# Patient Record
Sex: FEMALE | Race: WHITE | NOT HISPANIC OR LATINO | Employment: OTHER | URBAN - METROPOLITAN AREA
[De-identification: names, ages, dates, MRNs, and addresses within clinical notes are randomized per-mention and may not be internally consistent; named-entity substitution may affect disease eponyms.]

---

## 2018-04-05 LAB
%SAT (HISTORICAL): 14 % (ref 20–55)
ALBUMIN SERPL BCP-MCNC: 4 G/DL (ref 3.5–5.7)
ALP SERPL-CCNC: 57 IU/L (ref 55–165)
ALT SERPL W P-5'-P-CCNC: 11 IU/L (ref 5–26)
ANION GAP SERPL CALCULATED.3IONS-SCNC: 12.5 MM/L
AST SERPL W P-5'-P-CCNC: 15 U/L (ref 7–26)
BASOPHILS # BLD AUTO: 0.1 X3/UL (ref 0–0.3)
BASOPHILS # BLD AUTO: 1.1 % (ref 0–2)
BILIRUB SERPL-MCNC: 0.4 MG/DL (ref 0.3–1)
BUN SERPL-MCNC: 15 MG/DL (ref 7–25)
CALCIUM SERPL-MCNC: 9.7 MG/DL (ref 8.6–10.5)
CHLORIDE SERPL-SCNC: 99 MM/L (ref 98–107)
CHOLEST SERPL-MCNC: 152 MG/DL (ref 0–200)
CO2 SERPL-SCNC: 28 MM/L (ref 21–31)
CREAT SERPL-MCNC: 0.68 MG/DL (ref 0.6–1.2)
DEPRECATED RDW RBC AUTO: 14.7 % (ref 11.5–14.5)
EGFR (HISTORICAL): > 60 GFR
EGFR AFRICAN AMERICAN (HISTORICAL): > 60 GFR
EOSINOPHIL # BLD AUTO: 0.2 X3/UL (ref 0–0.5)
EOSINOPHIL NFR BLD AUTO: 3 % (ref 0–5)
GLUCOSE (HISTORICAL): 86 MG/DL (ref 65–99)
HCT VFR BLD AUTO: 34.7 % (ref 37–47)
HDLC SERPL-MCNC: 56 MG/DL (ref 40–60)
HGB BLD-MCNC: 11.5 G/DL (ref 12–16)
IRON SERPL-MCNC: 44 UG/DL (ref 50–212)
LDLC SERPL CALC-MCNC: 72.1 MG/DL (ref 75–193)
LYMPHOCYTES # BLD AUTO: 1.2 X3/UL (ref 1.2–4.2)
LYMPHOCYTES NFR BLD AUTO: 16.9 % (ref 20.5–51.1)
MCH RBC QN AUTO: 28.5 PG (ref 26–34)
MCHC RBC AUTO-ENTMCNC: 33.1 G/DL (ref 31–36)
MCV RBC AUTO: 86 FL (ref 81–99)
MONOCYTES # BLD AUTO: 0.6 X3/UL (ref 0–1)
MONOCYTES NFR BLD AUTO: 8.2 % (ref 1.7–12)
NEUTROPHILS # BLD AUTO: 5.1 X3/UL (ref 1.4–6.5)
NEUTS SEG NFR BLD AUTO: 70.8 % (ref 42.2–75.2)
OSMOLALITY, SERUM (HISTORICAL): 270 MOSM (ref 262–291)
PLATELET # BLD AUTO: 266 X3/UL (ref 130–400)
PMV BLD AUTO: 8.7 FL (ref 8.6–11.7)
POTASSIUM SERPL-SCNC: 4.5 MM/L (ref 3.5–5.5)
RBC # BLD AUTO: 4.04 X6/UL (ref 3.9–5.2)
SODIUM SERPL-SCNC: 135 MM/L (ref 134–143)
TIBC SERPL-MCNC: 314 UG/DL (ref 250–425)
TOTAL PROTEIN (HISTORICAL): 7.3 G/DL (ref 6.4–8.9)
TRANSFERRIN (HISTORICAL): 224 MG/DL (ref 250–380)
TRIGL SERPL-MCNC: 119 MG/DL (ref 44–166)
TSH SERPL DL<=0.05 MIU/L-ACNC: 1.49 UIU/M (ref 0.45–5.33)
VLDL CHOLESTEROL (HISTORICAL): 24 MG/DL (ref 5–51)
WBC # BLD AUTO: 7.2 X3/UL (ref 4.8–10.8)

## 2018-09-13 ENCOUNTER — APPOINTMENT (OUTPATIENT)
Dept: LAB | Facility: MEDICAL CENTER | Age: 83
End: 2018-09-13
Payer: MEDICARE

## 2018-09-13 ENCOUNTER — TRANSCRIBE ORDERS (OUTPATIENT)
Dept: LAB | Facility: MEDICAL CENTER | Age: 83
End: 2018-09-13

## 2018-09-13 DIAGNOSIS — K02.9 DENTAL CAVITIES: ICD-10-CM

## 2018-09-13 DIAGNOSIS — E78.5 HYPERLIPIDEMIA, UNSPECIFIED HYPERLIPIDEMIA TYPE: ICD-10-CM

## 2018-09-13 DIAGNOSIS — I10 HYPERTENSION, UNSPECIFIED TYPE: ICD-10-CM

## 2018-09-13 DIAGNOSIS — D64.9 ANEMIA, UNSPECIFIED TYPE: ICD-10-CM

## 2018-09-13 DIAGNOSIS — I25.10 ASCVD (ARTERIOSCLEROTIC CARDIOVASCULAR DISEASE): ICD-10-CM

## 2018-09-13 DIAGNOSIS — R53.83 OTHER FATIGUE: Primary | ICD-10-CM

## 2018-09-13 DIAGNOSIS — R53.83 OTHER FATIGUE: ICD-10-CM

## 2018-09-13 LAB
ERYTHROCYTE [DISTWIDTH] IN BLOOD BY AUTOMATED COUNT: 13.6 % (ref 11.6–15.1)
FERRITIN SERPL-MCNC: 72 NG/ML (ref 8–388)
HCT VFR BLD AUTO: 36.6 % (ref 34.8–46.1)
HGB BLD-MCNC: 11.8 G/DL (ref 11.5–15.4)
IRON SATN MFR SERPL: 23 %
IRON SERPL-MCNC: 67 UG/DL (ref 50–170)
MCH RBC QN AUTO: 29.5 PG (ref 26.8–34.3)
MCHC RBC AUTO-ENTMCNC: 32.2 G/DL (ref 31.4–37.4)
MCV RBC AUTO: 92 FL (ref 82–98)
PLATELET # BLD AUTO: 313 THOUSANDS/UL (ref 149–390)
PMV BLD AUTO: 9.8 FL (ref 8.9–12.7)
RBC # BLD AUTO: 4 MILLION/UL (ref 3.81–5.12)
TIBC SERPL-MCNC: 287 UG/DL (ref 250–450)
WBC # BLD AUTO: 7.88 THOUSAND/UL (ref 4.31–10.16)

## 2018-09-13 PROCEDURE — 82728 ASSAY OF FERRITIN: CPT

## 2018-09-13 PROCEDURE — 85027 COMPLETE CBC AUTOMATED: CPT

## 2018-09-13 PROCEDURE — 83540 ASSAY OF IRON: CPT

## 2018-09-13 PROCEDURE — 36415 COLL VENOUS BLD VENIPUNCTURE: CPT

## 2018-09-13 PROCEDURE — 83550 IRON BINDING TEST: CPT

## 2019-03-15 ENCOUNTER — APPOINTMENT (OUTPATIENT)
Dept: LAB | Facility: MEDICAL CENTER | Age: 84
End: 2019-03-15
Payer: MEDICARE

## 2019-03-15 ENCOUNTER — TRANSCRIBE ORDERS (OUTPATIENT)
Dept: LAB | Facility: MEDICAL CENTER | Age: 84
End: 2019-03-15

## 2019-03-15 DIAGNOSIS — I25.10 ASCVD (ARTERIOSCLEROTIC CARDIOVASCULAR DISEASE): ICD-10-CM

## 2019-03-15 DIAGNOSIS — I10 HYPERTENSION, UNSPECIFIED TYPE: ICD-10-CM

## 2019-03-15 DIAGNOSIS — E78.5 HYPERLIPIDEMIA, UNSPECIFIED HYPERLIPIDEMIA TYPE: ICD-10-CM

## 2019-03-15 DIAGNOSIS — M81.0 OSTEOPOROSIS, UNSPECIFIED OSTEOPOROSIS TYPE, UNSPECIFIED PATHOLOGICAL FRACTURE PRESENCE: ICD-10-CM

## 2019-03-15 DIAGNOSIS — L30.9 DERMATITIS: ICD-10-CM

## 2019-03-15 DIAGNOSIS — L30.9 DERMATITIS: Primary | ICD-10-CM

## 2019-03-15 LAB
25(OH)D3 SERPL-MCNC: 41.3 NG/ML (ref 30–100)
ALBUMIN SERPL BCP-MCNC: 3.9 G/DL (ref 3.5–5)
ALP SERPL-CCNC: 72 U/L (ref 46–116)
ALT SERPL W P-5'-P-CCNC: 21 U/L (ref 12–78)
ANION GAP SERPL CALCULATED.3IONS-SCNC: 3 MMOL/L (ref 4–13)
AST SERPL W P-5'-P-CCNC: 17 U/L (ref 5–45)
BILIRUB SERPL-MCNC: 0.67 MG/DL (ref 0.2–1)
BUN SERPL-MCNC: 12 MG/DL (ref 5–25)
CALCIUM SERPL-MCNC: 9.4 MG/DL (ref 8.3–10.1)
CHLORIDE SERPL-SCNC: 102 MMOL/L (ref 100–108)
CHOLEST SERPL-MCNC: 149 MG/DL (ref 50–200)
CO2 SERPL-SCNC: 27 MMOL/L (ref 21–32)
CREAT SERPL-MCNC: 0.66 MG/DL (ref 0.6–1.3)
ERYTHROCYTE [DISTWIDTH] IN BLOOD BY AUTOMATED COUNT: 14 % (ref 11.6–15.1)
GFR SERPL CREATININE-BSD FRML MDRD: 82 ML/MIN/1.73SQ M
GLUCOSE P FAST SERPL-MCNC: 96 MG/DL (ref 65–99)
HCT VFR BLD AUTO: 37.7 % (ref 34.8–46.1)
HDLC SERPL-MCNC: 61 MG/DL (ref 40–60)
HGB BLD-MCNC: 12 G/DL (ref 11.5–15.4)
LDLC SERPL CALC-MCNC: 69 MG/DL (ref 0–100)
MCH RBC QN AUTO: 29.3 PG (ref 26.8–34.3)
MCHC RBC AUTO-ENTMCNC: 31.8 G/DL (ref 31.4–37.4)
MCV RBC AUTO: 92 FL (ref 82–98)
NONHDLC SERPL-MCNC: 88 MG/DL
PLATELET # BLD AUTO: 292 THOUSANDS/UL (ref 149–390)
PMV BLD AUTO: 9.9 FL (ref 8.9–12.7)
POTASSIUM SERPL-SCNC: 4.2 MMOL/L (ref 3.5–5.3)
PROT SERPL-MCNC: 7.7 G/DL (ref 6.4–8.2)
RBC # BLD AUTO: 4.09 MILLION/UL (ref 3.81–5.12)
SODIUM SERPL-SCNC: 132 MMOL/L (ref 136–145)
T4 FREE SERPL-MCNC: 1.14 NG/DL (ref 0.76–1.46)
TRIGL SERPL-MCNC: 94 MG/DL
TSH SERPL DL<=0.05 MIU/L-ACNC: 1.87 UIU/ML (ref 0.36–3.74)
WBC # BLD AUTO: 8.26 THOUSAND/UL (ref 4.31–10.16)

## 2019-03-15 PROCEDURE — 80061 LIPID PANEL: CPT

## 2019-03-15 PROCEDURE — 36415 COLL VENOUS BLD VENIPUNCTURE: CPT

## 2019-03-15 PROCEDURE — 80053 COMPREHEN METABOLIC PANEL: CPT

## 2019-03-15 PROCEDURE — 84439 ASSAY OF FREE THYROXINE: CPT

## 2019-03-15 PROCEDURE — 82306 VITAMIN D 25 HYDROXY: CPT

## 2019-03-15 PROCEDURE — 84443 ASSAY THYROID STIM HORMONE: CPT

## 2019-03-15 PROCEDURE — 85027 COMPLETE CBC AUTOMATED: CPT

## 2019-03-19 ENCOUNTER — APPOINTMENT (OUTPATIENT)
Dept: LAB | Facility: MEDICAL CENTER | Age: 84
End: 2019-03-19
Payer: MEDICARE

## 2019-03-19 ENCOUNTER — TRANSCRIBE ORDERS (OUTPATIENT)
Dept: LAB | Facility: MEDICAL CENTER | Age: 84
End: 2019-03-19

## 2019-03-19 DIAGNOSIS — E78.5 HYPERLIPIDEMIA, UNSPECIFIED HYPERLIPIDEMIA TYPE: ICD-10-CM

## 2019-03-19 DIAGNOSIS — M81.0 OSTEOPOROSIS, UNSPECIFIED OSTEOPOROSIS TYPE, UNSPECIFIED PATHOLOGICAL FRACTURE PRESENCE: Primary | ICD-10-CM

## 2019-03-19 DIAGNOSIS — L30.9 DERMATITIS: ICD-10-CM

## 2019-03-19 DIAGNOSIS — I10 HYPERTENSION, UNSPECIFIED TYPE: ICD-10-CM

## 2019-03-19 DIAGNOSIS — I25.10 ASCVD (ARTERIOSCLEROTIC CARDIOVASCULAR DISEASE): ICD-10-CM

## 2019-03-19 PROCEDURE — 84120 ASSAY OF URINE PORPHYRINS: CPT

## 2019-03-21 LAB
COPRO1 24H UR-MCNC: 19 UG/L
COPRO1 24H UR-MRATE: NORMAL UG/24 HR (ref 0–24)
COPRO3 24H UR-MCNC: 40 UG/L
COPRO3 24H UR-MRATE: NORMAL UG/24 HR (ref 0–74)
HEPTA-CP 24H UR-MRATE: NORMAL UG/24 HR (ref 0–4)
HEPTA-CP UR-MCNC: 2 UG/L
HEXA-CP 24H UR-MRATE: NORMAL UG/24 HR (ref 0–1)
HEXA-CP UR-MCNC: <1 UG/L
PENTA-CP 24H UR-MRATE: NORMAL UG/24 HR (ref 0–4)
PENTA-CP UR-MCNC: 1 UG/L
UROPOR 24H UR-MRATE: NORMAL UG/24 HR (ref 0–24)
UROPOR UR-MCNC: 8 UG/L

## 2019-03-26 ENCOUNTER — APPOINTMENT (OUTPATIENT)
Dept: LAB | Facility: MEDICAL CENTER | Age: 84
End: 2019-03-26
Payer: MEDICARE

## 2019-03-26 ENCOUNTER — TRANSCRIBE ORDERS (OUTPATIENT)
Dept: LAB | Facility: MEDICAL CENTER | Age: 84
End: 2019-03-26

## 2019-03-26 DIAGNOSIS — M81.0 OSTEOPOROSIS, UNSPECIFIED OSTEOPOROSIS TYPE, UNSPECIFIED PATHOLOGICAL FRACTURE PRESENCE: ICD-10-CM

## 2019-03-26 DIAGNOSIS — E78.5 HYPERLIPIDEMIA, UNSPECIFIED HYPERLIPIDEMIA TYPE: ICD-10-CM

## 2019-03-26 DIAGNOSIS — I25.10 ASCVD (ARTERIOSCLEROTIC CARDIOVASCULAR DISEASE): ICD-10-CM

## 2019-03-26 DIAGNOSIS — I10 HYPERTENSION, UNSPECIFIED TYPE: ICD-10-CM

## 2019-03-26 DIAGNOSIS — L30.9 DERMATITIS: Primary | ICD-10-CM

## 2019-03-26 PROCEDURE — 36415 COLL VENOUS BLD VENIPUNCTURE: CPT

## 2019-03-26 PROCEDURE — 82542 COL CHROMOTOGRAPHY QUAL/QUAN: CPT

## 2019-04-05 LAB — MISCELLANEOUS LAB TEST RESULT: NORMAL

## 2019-06-05 ENCOUNTER — OFFICE VISIT (OUTPATIENT)
Dept: CARDIOLOGY CLINIC | Facility: CLINIC | Age: 84
End: 2019-06-05
Payer: MEDICARE

## 2019-06-05 VITALS
HEIGHT: 59 IN | SYSTOLIC BLOOD PRESSURE: 112 MMHG | HEART RATE: 68 BPM | WEIGHT: 157 LBS | DIASTOLIC BLOOD PRESSURE: 62 MMHG | BODY MASS INDEX: 31.65 KG/M2

## 2019-06-05 DIAGNOSIS — I25.10 CORONARY ARTERY DISEASE INVOLVING NATIVE CORONARY ARTERY OF NATIVE HEART WITHOUT ANGINA PECTORIS: Primary | ICD-10-CM

## 2019-06-05 DIAGNOSIS — I34.0 NON-RHEUMATIC MITRAL REGURGITATION: ICD-10-CM

## 2019-06-05 DIAGNOSIS — R06.02 SOB (SHORTNESS OF BREATH): ICD-10-CM

## 2019-06-05 DIAGNOSIS — I65.23 ATHEROSCLEROSIS OF BOTH CAROTID ARTERIES: ICD-10-CM

## 2019-06-05 DIAGNOSIS — R60.0 LOCALIZED EDEMA: ICD-10-CM

## 2019-06-05 DIAGNOSIS — R53.83 OTHER FATIGUE: ICD-10-CM

## 2019-06-05 PROCEDURE — 93000 ELECTROCARDIOGRAM COMPLETE: CPT | Performed by: INTERNAL MEDICINE

## 2019-06-05 PROCEDURE — 99214 OFFICE O/P EST MOD 30 MIN: CPT | Performed by: PHYSICIAN ASSISTANT

## 2019-06-05 RX ORDER — METOPROLOL TARTRATE 50 MG/1
50 TABLET, FILM COATED ORAL 2 TIMES DAILY
COMMUNITY
End: 2019-06-05

## 2019-06-05 RX ORDER — MONTELUKAST SODIUM 10 MG/1
10 TABLET ORAL DAILY
Status: ON HOLD | COMMUNITY
End: 2019-07-12 | Stop reason: CLARIF

## 2019-06-05 RX ORDER — FUROSEMIDE 20 MG/1
20 TABLET ORAL 2 TIMES DAILY
Qty: 30 TABLET | Refills: 5 | Status: ON HOLD | OUTPATIENT
Start: 2019-06-05 | End: 2019-07-12 | Stop reason: CLARIF

## 2019-06-05 RX ORDER — ALPRAZOLAM 0.25 MG/1
0.25 TABLET ORAL AS NEEDED
Status: ON HOLD | COMMUNITY
End: 2019-07-12 | Stop reason: CLARIF

## 2019-06-05 RX ORDER — SIMVASTATIN 20 MG
20 TABLET ORAL DAILY
Status: ON HOLD | COMMUNITY
Start: 2019-04-26 | End: 2019-07-12 | Stop reason: CLARIF

## 2019-06-05 RX ORDER — LISINOPRIL 10 MG/1
10 TABLET ORAL DAILY
Status: ON HOLD | COMMUNITY
End: 2019-07-12 | Stop reason: CLARIF

## 2019-06-05 RX ORDER — METOPROLOL TARTRATE 50 MG/1
25 TABLET, FILM COATED ORAL 2 TIMES DAILY
Qty: 60 TABLET | Refills: 5 | Status: ON HOLD | OUTPATIENT
Start: 2019-06-05 | End: 2019-07-12 | Stop reason: CLARIF

## 2019-06-05 RX ORDER — DIPHENHYDRAMINE HCL 25 MG
25 TABLET ORAL EVERY 6 HOURS PRN
Status: ON HOLD | COMMUNITY
End: 2019-07-12 | Stop reason: CLARIF

## 2019-06-05 RX ORDER — OXYBUTYNIN CHLORIDE 5 MG/1
5 TABLET ORAL 3 TIMES DAILY
COMMUNITY
End: 2022-08-09

## 2019-06-05 RX ORDER — OMEPRAZOLE 20 MG/1
20 CAPSULE, DELAYED RELEASE ORAL DAILY
COMMUNITY
End: 2019-09-04

## 2019-06-05 RX ORDER — FLUTICASONE PROPIONATE 50 MCG
SPRAY, SUSPENSION (ML) NASAL AS NEEDED
Status: ON HOLD | COMMUNITY
End: 2019-07-12 | Stop reason: CLARIF

## 2019-06-05 RX ORDER — MELOXICAM 7.5 MG/1
7.5 TABLET ORAL 2 TIMES DAILY
Status: ON HOLD | COMMUNITY
End: 2019-07-12 | Stop reason: CLARIF

## 2019-06-14 ENCOUNTER — APPOINTMENT (OUTPATIENT)
Dept: LAB | Facility: MEDICAL CENTER | Age: 84
End: 2019-06-14
Payer: MEDICARE

## 2019-06-14 DIAGNOSIS — R06.02 SOB (SHORTNESS OF BREATH): ICD-10-CM

## 2019-06-14 DIAGNOSIS — R60.0 LOCALIZED EDEMA: Primary | ICD-10-CM

## 2019-06-14 DIAGNOSIS — R60.0 LOCALIZED EDEMA: ICD-10-CM

## 2019-06-14 DIAGNOSIS — I25.10 CORONARY ARTERY DISEASE INVOLVING NATIVE CORONARY ARTERY OF NATIVE HEART WITHOUT ANGINA PECTORIS: ICD-10-CM

## 2019-06-14 LAB
ANION GAP SERPL CALCULATED.3IONS-SCNC: 5 MMOL/L (ref 4–13)
BASOPHILS # BLD AUTO: 0.05 THOUSANDS/ΜL (ref 0–0.1)
BASOPHILS NFR BLD AUTO: 1 % (ref 0–1)
BUN SERPL-MCNC: 23 MG/DL (ref 5–25)
CALCIUM SERPL-MCNC: 9.4 MG/DL (ref 8.3–10.1)
CHLORIDE SERPL-SCNC: 99 MMOL/L (ref 100–108)
CO2 SERPL-SCNC: 28 MMOL/L (ref 21–32)
CREAT SERPL-MCNC: 0.83 MG/DL (ref 0.6–1.3)
EOSINOPHIL # BLD AUTO: 0.13 THOUSAND/ΜL (ref 0–0.61)
EOSINOPHIL NFR BLD AUTO: 1 % (ref 0–6)
ERYTHROCYTE [DISTWIDTH] IN BLOOD BY AUTOMATED COUNT: 14.2 % (ref 11.6–15.1)
GFR SERPL CREATININE-BSD FRML MDRD: 65 ML/MIN/1.73SQ M
GLUCOSE P FAST SERPL-MCNC: 118 MG/DL (ref 65–99)
HCT VFR BLD AUTO: 40.6 % (ref 34.8–46.1)
HGB BLD-MCNC: 12.8 G/DL (ref 11.5–15.4)
IMM GRANULOCYTES # BLD AUTO: 0.04 THOUSAND/UL (ref 0–0.2)
IMM GRANULOCYTES NFR BLD AUTO: 0 % (ref 0–2)
LYMPHOCYTES # BLD AUTO: 1.06 THOUSANDS/ΜL (ref 0.6–4.47)
LYMPHOCYTES NFR BLD AUTO: 12 % (ref 14–44)
MCH RBC QN AUTO: 28.9 PG (ref 26.8–34.3)
MCHC RBC AUTO-ENTMCNC: 31.5 G/DL (ref 31.4–37.4)
MCV RBC AUTO: 92 FL (ref 82–98)
MONOCYTES # BLD AUTO: 0.67 THOUSAND/ΜL (ref 0.17–1.22)
MONOCYTES NFR BLD AUTO: 7 % (ref 4–12)
NEUTROPHILS # BLD AUTO: 7.07 THOUSANDS/ΜL (ref 1.85–7.62)
NEUTS SEG NFR BLD AUTO: 79 % (ref 43–75)
NRBC BLD AUTO-RTO: 0 /100 WBCS
NT-PROBNP SERPL-MCNC: 9927 PG/ML
PLATELET # BLD AUTO: 274 THOUSANDS/UL (ref 149–390)
PMV BLD AUTO: 10.1 FL (ref 8.9–12.7)
POTASSIUM SERPL-SCNC: 4.1 MMOL/L (ref 3.5–5.3)
RBC # BLD AUTO: 4.43 MILLION/UL (ref 3.81–5.12)
SODIUM SERPL-SCNC: 132 MMOL/L (ref 136–145)
TSH SERPL DL<=0.05 MIU/L-ACNC: 2.64 UIU/ML (ref 0.36–3.74)
WBC # BLD AUTO: 9.02 THOUSAND/UL (ref 4.31–10.16)

## 2019-06-14 PROCEDURE — 83880 ASSAY OF NATRIURETIC PEPTIDE: CPT

## 2019-06-14 PROCEDURE — 36415 COLL VENOUS BLD VENIPUNCTURE: CPT | Performed by: PHYSICIAN ASSISTANT

## 2019-06-14 PROCEDURE — 84443 ASSAY THYROID STIM HORMONE: CPT | Performed by: PHYSICIAN ASSISTANT

## 2019-06-14 PROCEDURE — 85025 COMPLETE CBC W/AUTO DIFF WBC: CPT | Performed by: PHYSICIAN ASSISTANT

## 2019-06-14 PROCEDURE — 80048 BASIC METABOLIC PNL TOTAL CA: CPT | Performed by: PHYSICIAN ASSISTANT

## 2019-06-25 ENCOUNTER — OFFICE VISIT (OUTPATIENT)
Dept: URGENT CARE | Facility: CLINIC | Age: 84
End: 2019-06-25
Payer: MEDICARE

## 2019-06-25 ENCOUNTER — HOSPITAL ENCOUNTER (INPATIENT)
Facility: HOSPITAL | Age: 84
LOS: 7 days | DRG: 308 | End: 2019-07-02
Attending: EMERGENCY MEDICINE | Admitting: INTERNAL MEDICINE
Payer: MEDICARE

## 2019-06-25 ENCOUNTER — APPOINTMENT (INPATIENT)
Dept: ULTRASOUND IMAGING | Facility: HOSPITAL | Age: 84
DRG: 308 | End: 2019-06-25
Payer: MEDICARE

## 2019-06-25 VITALS
WEIGHT: 153 LBS | SYSTOLIC BLOOD PRESSURE: 112 MMHG | BODY MASS INDEX: 30.84 KG/M2 | RESPIRATION RATE: 20 BRPM | HEIGHT: 59 IN | HEART RATE: 106 BPM | OXYGEN SATURATION: 99 % | DIASTOLIC BLOOD PRESSURE: 62 MMHG | TEMPERATURE: 97 F

## 2019-06-25 DIAGNOSIS — I48.91 ATRIAL FIBRILLATION WITH RVR (HCC): Primary | ICD-10-CM

## 2019-06-25 DIAGNOSIS — E87.1 HYPONATREMIA: ICD-10-CM

## 2019-06-25 DIAGNOSIS — I25.10 CORONARY ARTERY DISEASE INVOLVING NATIVE CORONARY ARTERY OF NATIVE HEART WITHOUT ANGINA PECTORIS: ICD-10-CM

## 2019-06-25 DIAGNOSIS — R77.8 ELEVATED TROPONIN: ICD-10-CM

## 2019-06-25 DIAGNOSIS — I48.91 NEW ONSET A-FIB (HCC): Primary | ICD-10-CM

## 2019-06-25 DIAGNOSIS — N17.9 ACUTE KIDNEY INJURY (HCC): ICD-10-CM

## 2019-06-25 PROBLEM — R11.0 NAUSEA: Status: ACTIVE | Noted: 2019-06-25

## 2019-06-25 LAB
ALBUMIN SERPL BCP-MCNC: 3.7 G/DL (ref 3.5–5.7)
ALP SERPL-CCNC: 68 U/L (ref 55–165)
ALT SERPL W P-5'-P-CCNC: 44 U/L (ref 7–52)
ANION GAP SERPL CALCULATED.3IONS-SCNC: 10 MMOL/L (ref 4–13)
APTT PPP: 23 SECONDS (ref 23–37)
AST SERPL W P-5'-P-CCNC: 36 U/L (ref 13–39)
ATRIAL RATE: 115 BPM
ATRIAL RATE: 122 BPM
BASOPHILS # BLD AUTO: 0.1 THOUSANDS/ΜL (ref 0–0.1)
BASOPHILS NFR BLD AUTO: 1 % (ref 0–2)
BILIRUB SERPL-MCNC: 1 MG/DL (ref 0.2–1)
BNP SERPL-MCNC: 1303 PG/ML (ref 1–100)
BUN SERPL-MCNC: 48 MG/DL (ref 7–25)
CALCIUM SERPL-MCNC: 9.5 MG/DL (ref 8.6–10.5)
CHLORIDE SERPL-SCNC: 95 MMOL/L (ref 98–107)
CO2 SERPL-SCNC: 24 MMOL/L (ref 21–31)
CREAT SERPL-MCNC: 1.8 MG/DL (ref 0.6–1.2)
EOSINOPHIL # BLD AUTO: 0.1 THOUSAND/ΜL (ref 0–0.61)
EOSINOPHIL NFR BLD AUTO: 1 % (ref 0–5)
ERYTHROCYTE [DISTWIDTH] IN BLOOD BY AUTOMATED COUNT: 15.3 % (ref 11.5–14.5)
GFR SERPL CREATININE-BSD FRML MDRD: 26 ML/MIN/1.73SQ M
GLUCOSE SERPL-MCNC: 114 MG/DL (ref 65–99)
HCT VFR BLD AUTO: 39.6 % (ref 42–47)
HGB BLD-MCNC: 13.2 G/DL (ref 12–16)
INR PPP: 1.2 (ref 0.9–1.5)
LYMPHOCYTES # BLD AUTO: 1 THOUSANDS/ΜL (ref 0.6–4.47)
LYMPHOCYTES NFR BLD AUTO: 10 % (ref 21–51)
MCH RBC QN AUTO: 29.9 PG (ref 26–34)
MCHC RBC AUTO-ENTMCNC: 33.3 G/DL (ref 31–37)
MCV RBC AUTO: 90 FL (ref 81–99)
MONOCYTES # BLD AUTO: 0.8 THOUSAND/ΜL (ref 0.17–1.22)
MONOCYTES NFR BLD AUTO: 7 % (ref 2–12)
NEUTROPHILS # BLD AUTO: 8.4 THOUSANDS/ΜL (ref 1.4–6.5)
NEUTS SEG NFR BLD AUTO: 82 % (ref 42–75)
PLATELET # BLD AUTO: 240 THOUSANDS/UL (ref 149–390)
PMV BLD AUTO: 8.2 FL (ref 8.6–11.7)
POTASSIUM SERPL-SCNC: 4.5 MMOL/L (ref 3.5–5.5)
PROT SERPL-MCNC: 7 G/DL (ref 6.4–8.9)
PROTHROMBIN TIME: 13.9 SECONDS (ref 10.2–13)
QRS AXIS: -26 DEGREES
QRS AXIS: -29 DEGREES
QRSD INTERVAL: 94 MS
QRSD INTERVAL: 96 MS
QT INTERVAL: 306 MS
QT INTERVAL: 346 MS
QTC INTERVAL: 423 MS
QTC INTERVAL: 463 MS
RBC # BLD AUTO: 4.4 MILLION/UL (ref 3.9–5.2)
SODIUM SERPL-SCNC: 129 MMOL/L (ref 134–143)
T WAVE AXIS: 18 DEGREES
T WAVE AXIS: 23 DEGREES
TROPONIN I SERPL-MCNC: 0.04 NG/ML
TROPONIN I SERPL-MCNC: 0.05 NG/ML
TROPONIN I SERPL-MCNC: 0.05 NG/ML
TSH SERPL DL<=0.05 MIU/L-ACNC: 2.54 UIU/ML (ref 0.45–5.33)
VENTRICULAR RATE: 108 BPM
VENTRICULAR RATE: 115 BPM
WBC # BLD AUTO: 10.3 THOUSAND/UL (ref 4.8–10.8)

## 2019-06-25 PROCEDURE — 93005 ELECTROCARDIOGRAM TRACING: CPT | Performed by: PHYSICIAN ASSISTANT

## 2019-06-25 PROCEDURE — 93005 ELECTROCARDIOGRAM TRACING: CPT

## 2019-06-25 PROCEDURE — 96365 THER/PROPH/DIAG IV INF INIT: CPT

## 2019-06-25 PROCEDURE — 76770 US EXAM ABDO BACK WALL COMP: CPT

## 2019-06-25 PROCEDURE — 83880 ASSAY OF NATRIURETIC PEPTIDE: CPT | Performed by: NURSE PRACTITIONER

## 2019-06-25 PROCEDURE — 99285 EMERGENCY DEPT VISIT HI MDM: CPT

## 2019-06-25 PROCEDURE — 84443 ASSAY THYROID STIM HORMONE: CPT | Performed by: NURSE PRACTITIONER

## 2019-06-25 PROCEDURE — 85025 COMPLETE CBC W/AUTO DIFF WBC: CPT | Performed by: PHYSICIAN ASSISTANT

## 2019-06-25 PROCEDURE — 99203 OFFICE O/P NEW LOW 30 MIN: CPT | Performed by: PHYSICIAN ASSISTANT

## 2019-06-25 PROCEDURE — 85610 PROTHROMBIN TIME: CPT | Performed by: PHYSICIAN ASSISTANT

## 2019-06-25 PROCEDURE — 85730 THROMBOPLASTIN TIME PARTIAL: CPT | Performed by: PHYSICIAN ASSISTANT

## 2019-06-25 PROCEDURE — 36415 COLL VENOUS BLD VENIPUNCTURE: CPT | Performed by: PHYSICIAN ASSISTANT

## 2019-06-25 PROCEDURE — 93010 ELECTROCARDIOGRAM REPORT: CPT | Performed by: INTERNAL MEDICINE

## 2019-06-25 PROCEDURE — 84484 ASSAY OF TROPONIN QUANT: CPT | Performed by: NURSE PRACTITIONER

## 2019-06-25 PROCEDURE — 80053 COMPREHEN METABOLIC PANEL: CPT | Performed by: PHYSICIAN ASSISTANT

## 2019-06-25 PROCEDURE — 84484 ASSAY OF TROPONIN QUANT: CPT | Performed by: PHYSICIAN ASSISTANT

## 2019-06-25 PROCEDURE — 99223 1ST HOSP IP/OBS HIGH 75: CPT | Performed by: NURSE PRACTITIONER

## 2019-06-25 PROCEDURE — G0463 HOSPITAL OUTPT CLINIC VISIT: HCPCS | Performed by: PHYSICIAN ASSISTANT

## 2019-06-25 PROCEDURE — 96376 TX/PRO/DX INJ SAME DRUG ADON: CPT

## 2019-06-25 RX ORDER — ACETAMINOPHEN 325 MG/1
650 TABLET ORAL EVERY 6 HOURS PRN
Status: DISCONTINUED | OUTPATIENT
Start: 2019-06-25 | End: 2019-07-02 | Stop reason: HOSPADM

## 2019-06-25 RX ORDER — ONDANSETRON 2 MG/ML
4 INJECTION INTRAMUSCULAR; INTRAVENOUS EVERY 6 HOURS PRN
Status: DISCONTINUED | OUTPATIENT
Start: 2019-06-25 | End: 2019-07-02 | Stop reason: HOSPADM

## 2019-06-25 RX ORDER — ONDANSETRON 4 MG/1
4 TABLET, FILM COATED ORAL ONCE
Qty: 10 TABLET | Refills: 0 | Status: SHIPPED | OUTPATIENT
Start: 2019-06-25 | End: 2019-06-25 | Stop reason: CLARIF

## 2019-06-25 RX ORDER — MONTELUKAST SODIUM 10 MG/1
10 TABLET ORAL DAILY
Status: DISCONTINUED | OUTPATIENT
Start: 2019-06-26 | End: 2019-07-02 | Stop reason: HOSPADM

## 2019-06-25 RX ORDER — ASPIRIN 81 MG/1
81 TABLET, CHEWABLE ORAL EVERY OTHER DAY
Status: DISCONTINUED | OUTPATIENT
Start: 2019-06-26 | End: 2019-06-26

## 2019-06-25 RX ORDER — PANTOPRAZOLE SODIUM 40 MG/1
40 TABLET, DELAYED RELEASE ORAL
Status: DISCONTINUED | OUTPATIENT
Start: 2019-06-26 | End: 2019-07-02 | Stop reason: HOSPADM

## 2019-06-25 RX ORDER — ALPRAZOLAM 0.25 MG/1
0.25 TABLET ORAL 3 TIMES DAILY PRN
Status: DISCONTINUED | OUTPATIENT
Start: 2019-06-25 | End: 2019-07-02 | Stop reason: HOSPADM

## 2019-06-25 RX ORDER — HEPARIN SODIUM 5000 [USP'U]/ML
5000 INJECTION, SOLUTION INTRAVENOUS; SUBCUTANEOUS EVERY 8 HOURS SCHEDULED
Status: DISCONTINUED | OUTPATIENT
Start: 2019-06-25 | End: 2019-06-26

## 2019-06-25 RX ORDER — DILTIAZEM HYDROCHLORIDE 5 MG/ML
10 INJECTION INTRAVENOUS ONCE
Status: COMPLETED | OUTPATIENT
Start: 2019-06-25 | End: 2019-06-25

## 2019-06-25 RX ORDER — ONDANSETRON 4 MG/1
4 TABLET, ORALLY DISINTEGRATING ORAL ONCE
Status: COMPLETED | OUTPATIENT
Start: 2019-06-25 | End: 2019-06-25

## 2019-06-25 RX ORDER — SODIUM CHLORIDE 9 MG/ML
75 INJECTION, SOLUTION INTRAVENOUS CONTINUOUS
Status: DISPENSED | OUTPATIENT
Start: 2019-06-25 | End: 2019-06-26

## 2019-06-25 RX ORDER — OXYBUTYNIN CHLORIDE 5 MG/1
5 TABLET ORAL DAILY
Status: DISCONTINUED | OUTPATIENT
Start: 2019-06-26 | End: 2019-07-02 | Stop reason: HOSPADM

## 2019-06-25 RX ADMIN — SODIUM CHLORIDE 500 ML: 9 INJECTION, SOLUTION INTRAVENOUS at 23:51

## 2019-06-25 RX ADMIN — ONDANSETRON 4 MG: 2 INJECTION INTRAMUSCULAR; INTRAVENOUS at 17:48

## 2019-06-25 RX ADMIN — DILTIAZEM HYDROCHLORIDE 10 MG: 5 INJECTION INTRAVENOUS at 14:36

## 2019-06-25 RX ADMIN — HEPARIN SODIUM 5000 UNITS: 5000 INJECTION INTRAVENOUS; SUBCUTANEOUS at 17:48

## 2019-06-25 RX ADMIN — SODIUM CHLORIDE 75 ML/HR: 9 INJECTION, SOLUTION INTRAVENOUS at 17:47

## 2019-06-25 RX ADMIN — DILTIAZEM HYDROCHLORIDE 10 MG/HR: 5 INJECTION INTRAVENOUS at 14:54

## 2019-06-25 RX ADMIN — HEPARIN SODIUM 5000 UNITS: 5000 INJECTION INTRAVENOUS; SUBCUTANEOUS at 22:35

## 2019-06-25 RX ADMIN — METOPROLOL TARTRATE 25 MG: 25 TABLET, FILM COATED ORAL at 17:48

## 2019-06-25 RX ADMIN — ONDANSETRON 4 MG: 4 TABLET, ORALLY DISINTEGRATING ORAL at 13:12

## 2019-06-25 NOTE — ASSESSMENT & PLAN NOTE
· Unclear etiology  · Suspected to be multifactorial including possible volume depletion with poor PO intake, new onset paroxysmal atrial fibrillation, congestive heart failure, rule out any GI etiology  · Metoprolol dose was decreased  Will continue with that for now  Will defer decision to cardiology on dose adjustment     · Check TSH

## 2019-06-25 NOTE — ASSESSMENT & PLAN NOTE
· Suspected this is a paroxysmal atrial fibrillation  The patient's metoprolol was decreased from 50 mg b i d  To 25 mg b i d  By her Cardiologist a few weeks ago  · According to cardiology's notes from 06/05/2019 the patient was in normal sinus rhythm with first-degree AV block  · Will continue with diltiazem drip  · Patient does not appears to be in acute congestive failure/fluid overload  · Will check BNP  · Consult Cardiology  · Check echocardiogram in the a m

## 2019-06-25 NOTE — ED PROVIDER NOTES
History  Chief Complaint   Patient presents with    Irregular Heart Beat     A-fib found at urgent care     Patient presents emergency room with complaints of fast heart rate and fatigue and dizziness which she states has been about 2-3 weeks  She states she developed the symptoms initially but now they seem to be getting worse  She denies chest pain pressure shortness of breath headache nausea vomiting  Patient states she does have a cardiologist was seen last week for routine exam, was given a water pill for lower extremity edema  At time of Cardiology visit EKG was obtained and reviewed by me today with patient to be in normal sinus with first-degree block on EKG from 6/5/19  Patient denies history of AFib  Admits to 1 cardiac stent placed in 2007  History provided by:  Patient   used: No    Rapid Heart Rate   Palpitations quality:  Fast  Onset quality:  Sudden  Duration:  2 weeks  Timing:  Intermittent  Progression:  Worsening  Chronicity:  New  Context: dehydration    Context: not anxiety and not blood loss    Relieved by:  None tried  Worsened by:  Nothing  Ineffective treatments:  None tried  Associated symptoms: dizziness and lower extremity edema (chronic)    Associated symptoms: no chest pain, no chest pressure, no cough, no diaphoresis, no nausea, no near-syncope, no numbness, no shortness of breath, no syncope, no vomiting and no weakness    Risk factors: no hx of atrial fibrillation          Allergies   Allergen Reactions    Chocolate Flavor     Iodine Other (See Comments)     shellfish- difficulty breathing    Shellfish-Derived Products     Codeine Rash       Prior to Admission Medications   Prescriptions Last Dose Informant Patient Reported? Taking?    ALPRAZolam (XANAX) 0 25 mg tablet   Yes No   Sig: Take 0 25 mg by mouth as needed   ASPIRIN 81 PO   Yes No   Sig: Take by mouth every other day   diphenhydrAMINE (BENADRYL) 25 mg tablet   Yes No   Sig: Take 25 mg by mouth every 6 (six) hours as needed   fluticasone (FLONASE) 50 mcg/act nasal spray   Yes No   Sig: into each nostril as needed   furosemide (LASIX) 20 mg tablet   No No   Sig: Take 1 tablet (20 mg total) by mouth 2 (two) times a day   lisinopril (ZESTRIL) 10 mg tablet   Yes No   Sig: Take 10 mg by mouth daily   meloxicam (MOBIC) 7 5 mg tablet   Yes No   Sig: Take 7 5 mg by mouth 2 (two) times a day   metoprolol tartrate (LOPRESSOR) 50 mg tablet   No No   Sig: Take 0 5 tablets (25 mg total) by mouth 2 (two) times a day   montelukast (SINGULAIR) 10 mg tablet   Yes No   Sig: Take 10 mg by mouth daily   omeprazole (PriLOSEC) 20 mg delayed release capsule   Yes No   Sig: Take 20 mg by mouth daily   oxybutynin (DITROPAN) 5 mg tablet   Yes No   Sig: Take 5 mg by mouth daily   sertraline (ZOLOFT) 50 mg tablet   Yes No   Sig: Take 50 mg by mouth daily   simvastatin (ZOCOR) 20 mg tablet   Yes No   Si mg daily      Facility-Administered Medications Last Administration Doses Remaining   ondansetron (ZOFRAN-ODT) dispersible tablet 4 mg 2019  1:12 PM 0          Past Medical History:   Diagnosis Date    Coronary artery disease     history of stenting    History of echocardiogram 2017    EF 55%, mild concentric LVH, bileaflet MVP with moderate MR, left atrial enlargement   Hyperlipidemia     Hypertension     Mitral regurgitation        Past Surgical History:   Procedure Laterality Date    CARDIAC CATHETERIZATION  04/10/2012    EF 65%, no significant CAD, patent stents, severe MR     CORONARY ANGIOPLASTY WITH STENT PLACEMENT  2007    EF 55%, GARRET to LAD  History reviewed  No pertinent family history  I have reviewed and agree with the history as documented  Social History     Tobacco Use    Smoking status: Never Smoker    Smokeless tobacco: Never Used   Substance Use Topics    Alcohol use: Not Currently    Drug use: Never        Review of Systems   Constitutional: Positive for fatigue  Negative for chills, diaphoresis and fever  HENT: Negative for congestion, ear pain, rhinorrhea, sneezing and sore throat  Respiratory: Negative for cough, shortness of breath, wheezing and stridor  Cardiovascular: Positive for palpitations  Negative for chest pain, leg swelling (chronic), syncope and near-syncope  Gastrointestinal: Negative for abdominal pain, constipation, diarrhea, nausea and vomiting  Genitourinary: Negative for difficulty urinating, dysuria, frequency, hematuria and urgency  Musculoskeletal: Negative for gait problem, myalgias and neck pain  Skin: Negative for rash  Neurological: Positive for dizziness and light-headedness  Negative for seizures, syncope, speech difficulty, weakness, numbness and headaches  All other systems reviewed and are negative  Physical Exam  Physical Exam   Constitutional: She is oriented to person, place, and time  She appears well-developed and well-nourished  HENT:   Head: Normocephalic and atraumatic  Nose: Nose normal    Eyes: Pupils are equal, round, and reactive to light  Conjunctivae and EOM are normal    Neck: Normal range of motion  Neck supple  No tracheal deviation present  Cardiovascular: Normal rate, normal heart sounds and intact distal pulses  An irregularly irregular rhythm present  Pulmonary/Chest: Effort normal and breath sounds normal  No respiratory distress  She has no wheezes  She has no rales  Abdominal: Soft  Bowel sounds are normal  She exhibits no distension  There is no tenderness  Musculoskeletal: Normal range of motion  She exhibits edema (Trace to +1 pitting bilateral lower extremities)  She exhibits no tenderness  Neurological: She is alert and oriented to person, place, and time  No cranial nerve deficit or sensory deficit  She exhibits normal muscle tone  Skin: Skin is warm and dry  No rash noted  No erythema  Nursing note and vitals reviewed        Vital Signs  ED Triage Vitals [06/25/19 1355] Temperature Pulse Respirations Blood Pressure SpO2   97 7 °F (36 5 °C) (!) 111 18 121/81 99 %      Temp Source Heart Rate Source Patient Position - Orthostatic VS BP Location FiO2 (%)   Temporal Monitor Lying Left arm --      Pain Score       No Pain           Vitals:    06/25/19 1355 06/25/19 1445 06/25/19 1500   BP: 121/81 110/65 130/59   Pulse: (!) 111 (!) 121 (!) 114   Patient Position - Orthostatic VS: Lying Lying Lying         Visual Acuity      ED Medications  Medications   diltiazem (CARDIZEM) 125 mg in sodium chloride 0 9 % 125 mL infusion (10 mg/hr Intravenous New Bag 6/25/19 1454)   diltiazem (CARDIZEM) injection 10 mg (10 mg Intravenous Given 6/25/19 1436)       Diagnostic Studies  Results Reviewed     Procedure Component Value Units Date/Time    Comprehensive metabolic panel [[id number]]  (Abnormal) Collected:  06/25/19 1422    Lab Status:  Final result Specimen:  Blood from Arm, Right Updated:  06/25/19 1452     Sodium 129 mmol/L      Potassium 4 5 mmol/L      Chloride 95 mmol/L      CO2 24 mmol/L      ANION GAP 10 mmol/L      BUN 48 mg/dL      Creatinine 1 80 mg/dL      Glucose 114 mg/dL      Calcium 9 5 mg/dL      AST 36 U/L      ALT 44 U/L      Alkaline Phosphatase 68 U/L      Total Protein 7 0 g/dL      Albumin 3 7 g/dL      Total Bilirubin 1 00 mg/dL      eGFR 26 ml/min/1 73sq m     Narrative:       Harsh guidelines for Chronic Kidney Disease (CKD):     Stage 1 with normal or high GFR (GFR > 90 mL/min/1 73 square meters)    Stage 2 Mild CKD (GFR = 60-89 mL/min/1 73 square meters)    Stage 3A Moderate CKD (GFR = 45-59 mL/min/1 73 square meters)    Stage 3B Moderate CKD (GFR = 30-44 mL/min/1 73 square meters)    Stage 4 Severe CKD (GFR = 15-29 mL/min/1 73 square meters)    Stage 5 End Stage CKD (GFR <15 mL/min/1 73 square meters)  Note: GFR calculation is accurate only with a steady state creatinine    Troponin I [[id number]]  (Abnormal) Collected:  06/25/19 1422 Lab Status:  Final result Specimen:  Blood from Arm, Right Updated:  06/25/19 1452     Troponin I 0 05 ng/mL     CBC and differential [[id number]]  (Abnormal) Collected:  06/25/19 1422    Lab Status:  Final result Specimen:  Blood from Arm, Right Updated:  06/25/19 1440     WBC 10 30 Thousand/uL      RBC 4 40 Million/uL      Hemoglobin 13 2 g/dL      Hematocrit 39 6 %      MCV 90 fL      MCH 29 9 pg      MCHC 33 3 g/dL      RDW 15 3 %      MPV 8 2 fL      Platelets 434 Thousands/uL      Neutrophils Relative 82 %      Lymphocytes Relative 10 %      Monocytes Relative 7 %      Eosinophils Relative 1 %      Basophils Relative 1 %      Neutrophils Absolute 8 40 Thousands/µL      Lymphocytes Absolute 1 00 Thousands/µL      Monocytes Absolute 0 80 Thousand/µL      Eosinophils Absolute 0 10 Thousand/µL      Basophils Absolute 0 10 Thousands/µL     Protime-INR [[id number]]  (Abnormal) Collected:  06/25/19 1422    Lab Status:  Final result Specimen:  Blood from Arm, Right Updated:  06/25/19 1440     Protime 13 9 seconds      INR 1 20    APTT [[id number]]  (Normal) Collected:  06/25/19 1422    Lab Status:  Final result Specimen:  Blood from Arm, Right Updated:  06/25/19 1440     PTT 23 seconds     UA w Reflex to Microscopic w Reflex to Culture [[id number]]     Lab Status:  No result Specimen:  Urine                  No orders to display              Procedures  ECG 12 Lead Documentation  Date/Time: 6/25/2019 2:08 PM  Performed by: Charlotte Jimenez PA-C  Authorized by: Charlotte Jimenez PA-C     Indications / Diagnosis:  Fatigue, irregular heart beat  ECG reviewed by me, the ED Provider: yes (& Dr Ashely Khan)    Patient location:  ED  Previous ECG:     Previous ECG:  Compared to current    Comparison ECG info:  6/5/19    Similarity:  Changes noted  Interpretation:     Interpretation: abnormal    Rate:     ECG rate:  115    ECG rate assessment: tachycardic    Rhythm:     Rhythm: atrial fibrillation    Ectopy:     Ectopy: none    QRS: QRS axis:  Normal  ST segments:     ST segments:  Normal  T waves:     T waves: normal      CriticalCare Time  Performed by: Lakisha العراقي PA-C  Authorized by: Lakisha العراقي PA-C     Critical care provider statement:     Critical care time (minutes):  65    Critical care start time:  6/25/2019 1:55 PM    Critical care time was exclusive of:  Separately billable procedures and treating other patients    Critical care was necessary to treat or prevent imminent or life-threatening deterioration of the following conditions:  Cardiac failure and renal failure    Critical care was time spent personally by me on the following activities:  Obtaining history from patient or surrogate, development of treatment plan with patient or surrogate, evaluation of patient's response to treatment, examination of patient, ordering and performing treatments and interventions, ordering and review of laboratory studies, ordering and review of radiographic studies, re-evaluation of patient's condition and review of old charts    I assumed direction of critical care for this patient from another provider in my specialty: no    Comments:      Acute new onset AFib with RVR requiring IV cardizem for rate control as well as a new elevation of troponin and acute kidney injury requiring IV fluids and further inpatient evaluation and management  Patient's heart rate improved following IV Cardizem will admit to ICU  ED Course  ED Course as of Jun 25 1617   Tue Jun 25, 2019   1537 Patient discussed with hospitalist Dr Maykel Dillard agrees to accept ICU admission                                    MDM  Number of Diagnoses or Management Options  Acute kidney injury [facility]): new and requires workup  Atrial fibrillation with RVR ([facility] [address] ): new and requires workup  Elevated troponin: new and requires workup  Hyponatremia: new and requires workup     Amount and/or Complexity of Data Reviewed  Clinical lab tests: ordered and reviewed  Tests in the radiology section of CPT®: ordered and reviewed  Review and summarize past medical records: yes  Discuss the patient with other providers: yes (Dr Yann Pike hospitalist)  Independent visualization of images, tracings, or specimens: yes    Risk of Complications, Morbidity, and/or Mortality  Presenting problems: high  Diagnostic procedures: high  Management options: high  General comments: Acute new onset AFib with RVR requiring IV medication for rate control as well as a new elevation of troponin and acute kidney injury requiring IV fluids and further inpatient evaluation and management  Patient Progress  Patient progress: stable      Disposition  Final diagnoses:   Atrial fibrillation with RVR ([facility] )   Acute kidney injury ([facility] )   Elevated troponin   Hyponatremia     Time reflects when diagnosis was documented in both MDM as applicable and the Disposition within this note     Time User Action Codes Description Comment    6/25/2019  3:32 PM Chris Tidwell Add [I48 91] Atrial fibrillation with RVR ([facility] )     6/25/2019  3:33 PM Chris Tidwell Add [N17 9] Acute kidney injury ([facility] )     6/25/2019  3:34 PM Chris Tidwell Add [R74 8] Elevated troponin     6/25/2019  3:34 PM Chris Tidwell Add [E87 1] Hyponatremia     6/25/2019  4:01 PM Crissy Gambino Add [I25 10] Coronary artery disease involving native coronary artery of native heart without angina pectoris       ED Disposition     ED Disposition Condition Date/Time Comment    Admit Stable Tue Jun 25, 2019  3:34 PM Case was discussed with Dr Yann Pike and the patient's admission status was agreed to be Admission Status: inpatient status to the service of Dr Yann Pike           Follow-up Information    None         Current Discharge Medication List      CONTINUE these medications which have NOT CHANGED    Details   ALPRAZolam (XANAX) 0 25 mg tablet Take 0 25 mg by mouth as needed      ASPIRIN 81 PO Take by mouth every other day      diphenhydrAMINE (BENADRYL) 25 mg tablet Take 25 mg by mouth every 6 (six) hours as needed      fluticasone (FLONASE) 50 mcg/act nasal spray into each nostril as needed      furosemide (LASIX) 20 mg tablet Take 1 tablet (20 mg total) by mouth 2 (two) times a day  Qty: 30 tablet, Refills: 5    Associated Diagnoses: Localized edema      lisinopril (ZESTRIL) 10 mg tablet Take 10 mg by mouth daily      meloxicam (MOBIC) 7 5 mg tablet Take 7 5 mg by mouth 2 (two) times a day      metoprolol tartrate (LOPRESSOR) 50 mg tablet Take 0 5 tablets (25 mg total) by mouth 2 (two) times a day  Qty: 60 tablet, Refills: 5    Associated Diagnoses: Coronary artery disease involving native coronary artery of native heart without angina pectoris; Other fatigue      montelukast (SINGULAIR) 10 mg tablet Take 10 mg by mouth daily      omeprazole (PriLOSEC) 20 mg delayed release capsule Take 20 mg by mouth daily      oxybutynin (DITROPAN) 5 mg tablet Take 5 mg by mouth daily      sertraline (ZOLOFT) 50 mg tablet Take 50 mg by mouth daily      simvastatin (ZOCOR) 20 mg tablet 20 mg daily           No discharge procedures on file      ED Provider  Electronically Signed by           Leonard Raines PA-C  06/25/19 [id number]

## 2019-06-25 NOTE — ASSESSMENT & PLAN NOTE
· Suspected that this is related to acute congestive heart failure-unknown type  · The patient was seen outpatient by her cardiologist and NT-pro BNP noted to be 9000s  She was started on oral diuretic initially at 20 mg daily and this was increased to 40 mg daily  · Lower extremity edema appears to be improving  · Will hold off on diuretic at this time due to a KI  · Check echocardiogram in the a m

## 2019-06-25 NOTE — ASSESSMENT & PLAN NOTE
· Suspected to be secondary to volume depletion due to diuretic therapy and poor p o  Intake  The patient also takes naproxen for her chronic pain    · Will check renal ultrasound  · Urine studies  · Consult Nephrology  · Follow-up with renal function closely  · Will give gentle IV fluid  · Hold off on diuretic therapy

## 2019-06-25 NOTE — H&P
H&P- Darwin  1935, 81 y o  female MRN: [id number]    Unit/Bed#: ICU 03 Encounter: [id number]    Primary Care Provider: Genaro Mackey MD   Date and time admitted to hospital: 6/25/2019  1:56 PM        * Atrial fibrillation with rapid ventricular response ([facility] [address] )  Assessment & Plan  · Suspected this is a paroxysmal atrial fibrillation  The patient's metoprolol was decreased from 50 mg b i d  To 25 mg b i d  By her Cardiologist a few weeks ago  · According to cardiology's notes from 06/05/2019 the patient was in normal sinus rhythm with first-degree AV block  · Will continue with diltiazem drip  · Patient does not appears to be in acute congestive failure/fluid overload  · Will check BNP  · Consult Cardiology  · Check echocardiogram in the a m  HUY (acute kidney injury) ([facility] [address] )  Assessment & Plan  · Suspected to be secondary to volume depletion due to diuretic therapy and poor p o  Intake  The patient also takes naproxen for her chronic pain  · Will check renal ultrasound  · Urine studies  · Consult Nephrology  · Follow-up with renal function closely  · Will give gentle IV fluid  · Hold off on diuretic therapy    Nausea  Assessment & Plan  · No vomiting  Patient also has abdominal pain  · Unclear etiology suspected this to be related to new onset AFib versus CHF  · Will check CT of abdomen and pelvis  · Continue on PPI  · Soft/low-fat diet    Other fatigue  Assessment & Plan  · Unclear etiology  · Suspected to be multifactorial including possible volume depletion with poor PO intake, new onset paroxysmal atrial fibrillation, congestive heart failure, rule out any GI etiology  · Metoprolol dose was decreased  Will continue with that for now  Will defer decision to cardiology on dose adjustment     · Check TSH    Localized edema  Assessment & Plan  · Suspected that this is related to acute congestive heart failure-unknown type  · The patient was seen outpatient by her cardiologist and NT-pro BNP noted to be 9000s  She was started on oral diuretic initially at 20 mg daily and this was increased to 40 mg daily  · Lower extremity edema appears to be improving  · Will hold off on diuretic at this time due to a KI  · Check echocardiogram in the a m  Coronary artery disease involving native coronary artery of native heart without angina pectoris  Assessment & Plan  · Will continue with current home regimen  · The patient is asymptomatic  · Cardiology is consulted  VTE Prophylaxis: Heparin  Code Status: DNR/DNI  POLST: POLST is not applicable to this patient  Discussion with family: left a message with Farhat Sons- [phone]    Anticipated Length of Stay:  Patient will be admitted on an Inpatient basis with an anticipated length of stay of  > 2 midnights  Justification for Hospital Stay:  Atrial fibrillation with rapid ventricular response    Chief Complaint:   Irregular heartbeat    History of Present Illness:    Anthony Shetty is a 81 y o  female who presents with irregular heartbeat that was found at Urgent Care today  The patient states that she has been feeling weak, nauseous, and fatigue over the past few weeks  She was seen at an urgent care today and found to be in atrial fibrillation with rapid ventricular response hence sent to the ED for further evaluation  Patient denies any chest pain or palpitation  She is feeling generalized weakness and has been having poor appetite due to nausea but no vomiting  She also complaining of some mild abdominal pain on the right lower quadrant  She was seen by her cardiologist outpatient on 06/5/2019 due to increasing lower extremity edema and fatigue  Metoprolol was decreased from 50 to 25 mg b i d  And furosemide 20 mg daily was added  Subsequently, furosemide dose was increased to 40 mg daily after NT-pro BNP of 9000s  The patient is supposed to have echocardiogram done as an outpatient    Upon reviewing the records during the office visit the patient was in normal sinus rhythm with first-degree AV block  She denies any prior history of atrial fibrillation  Review of Systems:  Review of Systems   Constitutional: Positive for appetite change (poor appetite), chills and fatigue  Negative for activity change, diaphoresis and fever  HENT: Negative for congestion, ear pain, hearing loss, tinnitus and trouble swallowing  Eyes: Negative for photophobia, pain, discharge, itching and visual disturbance  Respiratory: Negative for cough, shortness of breath, wheezing and stridor  Cardiovascular: Positive for leg swelling  Negative for chest pain and palpitations  Gastrointestinal: Positive for abdominal pain (RLQ), constipation and nausea  Negative for blood in stool, diarrhea and vomiting  Endocrine: Negative for cold intolerance, heat intolerance, polydipsia, polyphagia and polyuria  Genitourinary: Negative for difficulty urinating, dysuria, frequency, hematuria and urgency  Decreased amount of urine     Musculoskeletal: Negative for back pain, gait problem and neck stiffness  Skin: Negative for pallor, rash and wound  Allergic/Immunologic: Negative for environmental allergies, food allergies and immunocompromised state  Neurological: Positive for weakness  Negative for dizziness, tremors, speech difficulty, light-headedness, numbness and headaches  Hematological: Negative for adenopathy  Does not bruise/bleed easily  Psychiatric/Behavioral: Negative for confusion, hallucinations and sleep disturbance  Past Medical and Surgical History:   Past Medical History:   Diagnosis Date    Arthritis     Coronary artery disease     history of stenting    History of echocardiogram 07/20/2017    EF 55%, mild concentric LVH, bileaflet MVP with moderate MR, left atrial enlargement      History of transfusion     Hyperlipidemia     Hypertension     Mitral regurgitation        Past Surgical History:   Procedure Laterality Date    ABDOMINAL SURGERY      hysterectomy    CARDIAC CATHETERIZATION  04/10/2012    EF 65%, no significant CAD, patent stents, severe MR     CORONARY ANGIOPLASTY WITH STENT PLACEMENT  03/21/2007    EF 55%, GARRET to LAD   EYE SURGERY      b/l cataracts    HYSTERECTOMY         Meds/Allergies:  Prior to Admission medications    Medication Sig Start Date End Date Taking? Authorizing Provider   ALPRAZolam [place]) 0 25 mg tablet Take 0 25 mg by mouth as needed   Yes Historical Provider, MD   ASPIRIN 81 PO Take by mouth every other day   Yes Historical Provider, MD   diphenhydrAMINE (BENADRYL) 25 mg tablet Take 25 mg by mouth every 6 (six) hours as needed   Yes Historical Provider, MD   fluticasone (FLONASE) 50 mcg/act nasal spray into each nostril as needed   Yes Historical Provider, MD   furosemide (LASIX) 20 mg tablet Take 1 tablet (20 mg total) by mouth 2 (two) times a day 6/5/19  Yes Za Millan PA-C   lisinopril (ZESTRIL) 10 mg tablet Take 10 mg by mouth daily   Yes Historical Provider, MD   meloxicam (MOBIC) 7 5 mg tablet Take 7 5 mg by mouth 2 (two) times a day   Yes Historical Provider, MD   metoprolol tartrate (LOPRESSOR) 50 mg tablet Take 0 5 tablets (25 mg total) by mouth 2 (two) times a day 6/5/19  Yes Za Millan PA-C   montelukast (SINGULAIR) 10 mg tablet Take 10 mg by mouth daily   Yes Historical Provider, MD   omeprazole (PriLOSEC) 20 mg delayed release capsule Take 20 mg by mouth daily   Yes Historical Provider, MD   oxybutynin (DITROPAN) 5 mg tablet Take 5 mg by mouth daily   Yes Historical Provider, MD   sertraline (ZOLOFT) 50 mg tablet Take 50 mg by mouth daily   Yes Historical Provider, MD   simvastatin (ZOCOR) 20 mg tablet 20 mg daily 4/26/19  Yes Historical Provider, MD   ondansetron (ZOFRAN) 4 mg tablet Take 1 tablet (4 mg total) by mouth once for 1 dose 6/25/19 6/25/19  Reg Schwarz PA-C     I have reviewed home medications with patient personally      Allergies: Allergies   Allergen Reactions    Chocolate Flavor     Iodine Other (See Comments)     shellfish- difficulty breathing    Shellfish-Derived Products     Codeine Rash       Social History:  Marital Status:    Occupation: retired   Patient Pre-hospital Living Situation: family   Patient Pre-hospital Level of Mobility: independent   Patient Pre-hospital Diet Restrictions: cardiac   Substance Use History:     Social History     Substance and Sexual Activity   Alcohol Use Not Currently     Social History     Tobacco Use   Smoking Status Never Smoker   Smokeless Tobacco Never Used     Social History     Substance and Sexual Activity   Drug Use Never       Family History:  I have reviewed the patients family history    Physical Exam:   Vitals:   Blood Pressure: 130/59 (06/25/19 1500)  Pulse: (!) 114 (06/25/19 1500)  Temperature: 97 7 °F (36 5 °C) (06/25/19 1355)  Temp Source: Temporal (06/25/19 1355)  Respirations: (!) 25 (06/25/19 1500)  Height: 4' 11" (149 9 cm) (06/25/19 1355)  Weight - Scale: 69 4 kg (153 lb) (06/25/19 1355)  SpO2: 96 % (06/25/19 1500)    Physical Exam   Constitutional: She is oriented to person, place, and time  She appears well-developed  No distress  Frail appearing elderly female     HENT:   Head: Normocephalic and atraumatic  Mouth/Throat: Oropharynx is clear and moist    Eyes: Pupils are equal, round, and reactive to light  Conjunctivae and EOM are normal    Neck: Normal range of motion  Neck supple  Cardiovascular: Exam reveals no gallop and no friction rub  Murmur (+5/0 systolic murmur) heard  Pulmonary/Chest: Effort normal  She has no wheezes  She has rales (bases left greater than right)  Abdominal: Soft  Bowel sounds are normal  She exhibits no distension  There is tenderness (mild tenderness on RLQ)  There is no rebound  Musculoskeletal: Normal range of motion  She exhibits edema (trace to +1 pitting edema on ankles)  She exhibits no tenderness or deformity  Neurological: She is alert and oriented to person, place, and time  No cranial nerve deficit  Skin: Skin is warm and dry  No rash noted  No erythema  Psychiatric: She has a normal mood and affect  Her behavior is normal  Thought content normal    Vitals reviewed  Additional Data:   Lab Results: I have personally reviewed pertinent reports  Results from last 7 days   Lab Units 06/25/19  1422   WBC Thousand/uL 10 30   HEMOGLOBIN g/dL 13 2   HEMATOCRIT % 39 6*   PLATELETS Thousands/uL 240   NEUTROS PCT % 82*   LYMPHS PCT % 10*   MONOS PCT % 7   EOS PCT % 1     Results from last 7 days   Lab Units 06/25/19  1422   POTASSIUM mmol/L 4 5   CHLORIDE mmol/L 95*   CO2 mmol/L 24   BUN mg/dL 48*   CREATININE mg/dL 1 80*   CALCIUM mg/dL 9 5   ALK PHOS U/L 68   ALT U/L 44   AST U/L 36     Results from last 7 days   Lab Units 06/25/19  1422   INR  1 20               Imaging: I have personally reviewed pertinent reports  CT abdomen pelvis wo contrast    (Results Pending)   US retroperitoneal complete    (Results Pending)       EKG, Pathology, and Other Studies Reviewed on Admission:   · EKG: atrial fibrillation with heart rate of 103    NetAccess/Epic Records Reviewed: Yes     ** Please Note: This note has been constructed using a voice recognition system   **

## 2019-06-25 NOTE — ASSESSMENT & PLAN NOTE
· Will continue with current home regimen  · The patient is asymptomatic  · Cardiology is consulted

## 2019-06-25 NOTE — NURSING NOTE
icu admission to bed #3, dx afib with rvr  Pt awake and alert and oriented x3  Assisted to bed and placed on icu monitors  Assessment as noted in computer charting  Pt denies cp, sob, n/v, dizziness or lightheadedness  Cardizem gtt at 10 mg/hr  Oriented to room and unit  callbell at side

## 2019-06-25 NOTE — ASSESSMENT & PLAN NOTE
· No vomiting  Patient also has abdominal pain     · Unclear etiology suspected this to be related to new onset AFib versus CHF  · Will check CT of abdomen and pelvis  · Continue on PPI  · Soft/low-fat diet

## 2019-06-26 ENCOUNTER — APPOINTMENT (INPATIENT)
Dept: CT IMAGING | Facility: HOSPITAL | Age: 84
DRG: 308 | End: 2019-06-26
Payer: MEDICARE

## 2019-06-26 ENCOUNTER — APPOINTMENT (INPATIENT)
Dept: NON INVASIVE DIAGNOSTICS | Facility: HOSPITAL | Age: 84
DRG: 308 | End: 2019-06-26
Payer: MEDICARE

## 2019-06-26 LAB
ALBUMIN SERPL BCP-MCNC: 3.1 G/DL (ref 3.5–5.7)
ALP SERPL-CCNC: 55 U/L (ref 55–165)
ALT SERPL W P-5'-P-CCNC: 33 U/L (ref 7–52)
ANION GAP SERPL CALCULATED.3IONS-SCNC: 8 MMOL/L (ref 4–13)
AST SERPL W P-5'-P-CCNC: 25 U/L (ref 13–39)
BILIRUB SERPL-MCNC: 0.8 MG/DL (ref 0.2–1)
BILIRUB UR QL STRIP: NEGATIVE
BUN SERPL-MCNC: 46 MG/DL (ref 7–25)
CALCIUM SERPL-MCNC: 8.4 MG/DL (ref 8.6–10.5)
CHLORIDE SERPL-SCNC: 100 MMOL/L (ref 98–107)
CLARITY UR: CLEAR
CO2 SERPL-SCNC: 22 MMOL/L (ref 21–31)
COLOR UR: YELLOW
CREAT SERPL-MCNC: 1.67 MG/DL (ref 0.6–1.2)
CREAT UR-MCNC: 74.7 MG/DL
CREAT UR-MCNC: 74.7 MG/DL
ERYTHROCYTE [DISTWIDTH] IN BLOOD BY AUTOMATED COUNT: 15.5 % (ref 11.5–14.5)
GFR SERPL CREATININE-BSD FRML MDRD: 28 ML/MIN/1.73SQ M
GLUCOSE SERPL-MCNC: 94 MG/DL (ref 65–99)
GLUCOSE UR STRIP-MCNC: NEGATIVE MG/DL
HCT VFR BLD AUTO: 31.4 % (ref 42–47)
HGB BLD-MCNC: 10.8 G/DL (ref 12–16)
HGB UR QL STRIP.AUTO: NEGATIVE
KETONES UR STRIP-MCNC: NEGATIVE MG/DL
LEUKOCYTE ESTERASE UR QL STRIP: NEGATIVE
MAGNESIUM SERPL-MCNC: 2.3 MG/DL (ref 1.9–2.7)
MCH RBC QN AUTO: 30.6 PG (ref 26–34)
MCHC RBC AUTO-ENTMCNC: 34.3 G/DL (ref 31–37)
MCV RBC AUTO: 89 FL (ref 81–99)
MICROALBUMIN UR-MCNC: 147 MG/L (ref 0–20)
MICROALBUMIN/CREAT 24H UR: 197 MG/G CREATININE (ref 0–30)
NITRITE UR QL STRIP: NEGATIVE
OSMOLALITY UR: 351 MMOL/KG
PH UR STRIP.AUTO: 5.5 [PH]
PHOSPHATE SERPL-MCNC: 5.2 MG/DL (ref 3–5.5)
PLATELET # BLD AUTO: 184 THOUSANDS/UL (ref 149–390)
PMV BLD AUTO: 8.2 FL (ref 8.6–11.7)
POTASSIUM SERPL-SCNC: 4.5 MMOL/L (ref 3.5–5.5)
PROT SERPL-MCNC: 5.4 G/DL (ref 6.4–8.9)
PROT UR STRIP-MCNC: NEGATIVE MG/DL
RBC # BLD AUTO: 3.51 MILLION/UL (ref 3.9–5.2)
SODIUM 24H UR-SCNC: 14 MOL/L
SODIUM SERPL-SCNC: 130 MMOL/L (ref 134–143)
SP GR UR STRIP.AUTO: 1.02 (ref 1–1.03)
UROBILINOGEN UR QL STRIP.AUTO: 0.2 E.U./DL
WBC # BLD AUTO: 7.8 THOUSAND/UL (ref 4.8–10.8)

## 2019-06-26 PROCEDURE — 82043 UR ALBUMIN QUANTITATIVE: CPT | Performed by: NURSE PRACTITIONER

## 2019-06-26 PROCEDURE — 82570 ASSAY OF URINE CREATININE: CPT | Performed by: NURSE PRACTITIONER

## 2019-06-26 PROCEDURE — 74176 CT ABD & PELVIS W/O CONTRAST: CPT

## 2019-06-26 PROCEDURE — G8987 SELF CARE CURRENT STATUS: HCPCS

## 2019-06-26 PROCEDURE — 99223 1ST HOSP IP/OBS HIGH 75: CPT | Performed by: INTERNAL MEDICINE

## 2019-06-26 PROCEDURE — 93306 TTE W/DOPPLER COMPLETE: CPT | Performed by: INTERNAL MEDICINE

## 2019-06-26 PROCEDURE — 84100 ASSAY OF PHOSPHORUS: CPT | Performed by: NURSE PRACTITIONER

## 2019-06-26 PROCEDURE — 97163 PT EVAL HIGH COMPLEX 45 MIN: CPT

## 2019-06-26 PROCEDURE — 97530 THERAPEUTIC ACTIVITIES: CPT

## 2019-06-26 PROCEDURE — G8979 MOBILITY GOAL STATUS: HCPCS

## 2019-06-26 PROCEDURE — 80053 COMPREHEN METABOLIC PANEL: CPT | Performed by: NURSE PRACTITIONER

## 2019-06-26 PROCEDURE — 93306 TTE W/DOPPLER COMPLETE: CPT

## 2019-06-26 PROCEDURE — 81003 URINALYSIS AUTO W/O SCOPE: CPT | Performed by: NURSE PRACTITIONER

## 2019-06-26 PROCEDURE — 83935 ASSAY OF URINE OSMOLALITY: CPT | Performed by: NURSE PRACTITIONER

## 2019-06-26 PROCEDURE — 84300 ASSAY OF URINE SODIUM: CPT | Performed by: NURSE PRACTITIONER

## 2019-06-26 PROCEDURE — 85027 COMPLETE CBC AUTOMATED: CPT | Performed by: NURSE PRACTITIONER

## 2019-06-26 PROCEDURE — 97167 OT EVAL HIGH COMPLEX 60 MIN: CPT

## 2019-06-26 PROCEDURE — G8978 MOBILITY CURRENT STATUS: HCPCS

## 2019-06-26 PROCEDURE — G8988 SELF CARE GOAL STATUS: HCPCS

## 2019-06-26 PROCEDURE — 83735 ASSAY OF MAGNESIUM: CPT | Performed by: NURSE PRACTITIONER

## 2019-06-26 PROCEDURE — 99232 SBSQ HOSP IP/OBS MODERATE 35: CPT | Performed by: INTERNAL MEDICINE

## 2019-06-26 RX ORDER — FUROSEMIDE 10 MG/ML
80 INJECTION INTRAMUSCULAR; INTRAVENOUS ONCE
Status: COMPLETED | OUTPATIENT
Start: 2019-06-26 | End: 2019-06-26

## 2019-06-26 RX ORDER — DILTIAZEM HYDROCHLORIDE 60 MG/1
60 TABLET, FILM COATED ORAL EVERY 6 HOURS SCHEDULED
Status: COMPLETED | OUTPATIENT
Start: 2019-06-26 | End: 2019-06-27

## 2019-06-26 RX ADMIN — SODIUM CHLORIDE 75 ML/HR: 9 INJECTION, SOLUTION INTRAVENOUS at 01:44

## 2019-06-26 RX ADMIN — SODIUM CHLORIDE 500 ML: 9 INJECTION, SOLUTION INTRAVENOUS at 07:51

## 2019-06-26 RX ADMIN — ACETAMINOPHEN 650 MG: 325 TABLET ORAL at 10:04

## 2019-06-26 RX ADMIN — MONTELUKAST SODIUM 10 MG: 10 TABLET, COATED ORAL at 10:03

## 2019-06-26 RX ADMIN — PANTOPRAZOLE SODIUM 40 MG: 40 TABLET, DELAYED RELEASE ORAL at 05:49

## 2019-06-26 RX ADMIN — FUROSEMIDE 80 MG: 10 INJECTION, SOLUTION INTRAMUSCULAR; INTRAVENOUS at 10:05

## 2019-06-26 RX ADMIN — DILTIAZEM HYDROCHLORIDE 60 MG: 60 TABLET, FILM COATED ORAL at 17:33

## 2019-06-26 RX ADMIN — HEPARIN SODIUM 5000 UNITS: 5000 INJECTION INTRAVENOUS; SUBCUTANEOUS at 05:49

## 2019-06-26 RX ADMIN — OXYBUTYNIN CHLORIDE 5 MG: 5 TABLET ORAL at 10:01

## 2019-06-26 RX ADMIN — SERTRALINE HYDROCHLORIDE 50 MG: 50 TABLET ORAL at 10:02

## 2019-06-26 RX ADMIN — ENOXAPARIN SODIUM 80 MG: 100 INJECTION SUBCUTANEOUS at 13:44

## 2019-06-26 RX ADMIN — METOPROLOL TARTRATE 25 MG: 25 TABLET, FILM COATED ORAL at 10:17

## 2019-06-26 RX ADMIN — ALPRAZOLAM 0.25 MG: 0.25 TABLET ORAL at 07:54

## 2019-06-26 RX ADMIN — DILTIAZEM HYDROCHLORIDE 60 MG: 60 TABLET, FILM COATED ORAL at 10:03

## 2019-06-26 NOTE — OCCUPATIONAL THERAPY NOTE
Occupational Therapy Evaluation      Enrico Velez    6/26/2019    Patient Active Problem List   Diagnosis    Coronary artery disease involving native coronary artery of native heart without angina pectoris    SOB (shortness of breath)    Localized edema    Atherosclerosis of both carotid arteries    Non-rheumatic mitral regurgitation    Other fatigue    Atrial fibrillation with rapid ventricular response ([facility] [address] )    HUY (acute kidney injury) ([facility] [address] )    Nausea       Past Medical History:   Diagnosis Date    Arthritis     Coronary artery disease     history of stenting    History of echocardiogram 07/20/2017    EF 55%, mild concentric LVH, bileaflet MVP with moderate MR, left atrial enlargement   History of transfusion     Hyperlipidemia     Hypertension     Mitral regurgitation        Past Surgical History:   Procedure Laterality Date    ABDOMINAL SURGERY      hysterectomy    CARDIAC CATHETERIZATION  04/10/2012    EF 65%, no significant CAD, patent stents, severe MR     CORONARY ANGIOPLASTY WITH STENT PLACEMENT  03/21/2007    EF 55%, GARRET to LAD   EYE SURGERY      b/l cataracts    HYSTERECTOMY          06/26/19 0913   Note Type   Note type Eval/Treat   Restrictions/Precautions   Weight Bearing Precautions Per Order No   Other Precautions Fall Risk;Telemetry;Multiple lines;O2  (c/o Dizziness, > HR, > BP)   Pain Assessment   Pain Assessment No/denies pain   Pain Score No Pain   Home Living   Type of Home House   Home Layout Two level;Performs ADLs on one level; Able to live on main level with bedroom/bathroom; Ramped entrance; Access   Bathroom Shower/Tub Tub/shower unit   Bathroom Toilet Standard   Bathroom Equipment Shower chair   Bathroom Accessibility Accessible   Home Equipment Walker;Cane   Additional Comments uses cane in community, RW in home   Prior Function   Level of [place] Independent with ADLs and functional mobility   Lives With Son   Receives Help From Family   ADL Assistance Independent   IADLs Needs assistance   Falls in the last 6 months 0   Vocational Retired   Psychosocial   Psychosocial (WDL) WDL   ADL   Eating Assistance 5  Supervision/Setup   Eating Deficit Setup   Grooming Assistance 5  Supervision/Setup   Grooming Deficit Setup   [address] 3  Moderate Assistance   LB Bathing Assistance Unable to assess   [address]  Minimal Assistance    [address] Unable to assess   Additional Comments A r/t multiple lines   Bed Mobility   Supine to Sit 4  Minimal assistance   Additional items Assist x 1;HOB elevated; Bedrails; Increased time required;Verbal cues;LE management   Sit to Supine 3  Moderate assistance   Additional items Assist x 1;Bedrails; Increased time required;Verbal cues;LE management   Transfers   Sit to Stand Unable to assess   Additional items   (c/o dizziness)   Toilet transfer Unable to assess   Balance   Static Sitting Fair +   Dynamic Sitting Fair   Activity Tolerance   Activity Tolerance Treatment limited secondary to medical complications (Comment); Other (Comment)  (dizziness, > HR, > BP)   Nurse Made Aware yes   RUE Assessment   RUE Assessment WFL   LUE Assessment   LUE Assessment WFL   Hand Function   Gross Motor Coordination Functional   Fine Motor Coordination Functional   Cognition   Overall Cognitive Status WFL   Arousal/Participation Alert; Cooperative   Attention Within functional limits   Orientation Level Oriented X4   Memory Within functional limits   Following Commands Follows one step commands without difficulty   Assessment   Limitation Decreased ADL status; Decreased Safe judgement during ADL;Decreased endurance;Decreased self-care trans;Decreased high-level ADLs   Prognosis Good   Assessment Pt is a 81 y o  female seen for OT evaluation s/p admit to [address] on 6/25/2019 w/ Atrial fibrillation with rapid ventricular response ([address] )    Comorbidities affecting pt's functional performance at time of assessment include: SOB, CAD  Personal factors affecting pt at time of IE include:difficulty performing ADLS and difficulty performing IADLS   Prior to admission, pt was I with self care ADL's and functional mobility, A w/ IADL's  Upon evaluation: Pt requires an increased level of assistance with self care ADL's and functional transfers/toileting/mobility r/t the following deficits impacting occupational performance: weakness, decreased balance and decreased tolerance  Pt to benefit from continued skilled OT tx while in the hospital to address deficits as defined above and maximize level of functional independence w ADL's and functional mobility  Occupational Performance areas to address include: bathing/shower, dressing and functional mobility  From OT standpoint, recommendation at time of d/c would be STR  Goals   Patient Goals feel better   STG Time Frame 3-5   Short Term Goal #1 increase knowledge re: adaptations to good to increase lability to safely and independently participate in self care and daily tasks   Short Term Goal #2 increase bed mobility to Min A (all levels)   LTG Time Frame 7-10   Long Term Goal #1 increase self care ADL's to MI   Long Term Goal #2 increase bed mobility to MI   Long Term Goal increase self toileting Raymond with good safety demonstrated   Plan   Treatment Interventions Energy conservation; Activityengagement;ADL retraining;Functional transfer training; Endurance training;Patient/family training   Goal Expiration Date 07/06/19   Treatment Day 0   OT Frequency 3-5x/wk   Recommendation   OT Discharge Recommendation Short Term Rehab   OT - OK to Discharge No   Barthel Index   Feeding 5   Bathing 0   Grooming Score 0   Dressing Score 5   Bladder Score 5   Bowels Score 10   Toilet Use Score 5   Transfers (Bed/Chair) Score 5   Mobility (Level Surface) Score 0   Stairs Score 0   Barthel Index Score 35   Denis Rome OT

## 2019-06-26 NOTE — SOCIAL WORK
Evaluated the pt at the bedside with the son Melissa Gilliland present  Pt states she lives with her son Sharyle Donovan in a 2 story home  Pt states she has a ramp outside and stays on the first floor of the home  Pt states she has a walker, cane, shower bench at home  Pt does not wear oxygen at home  Pt indicated she is independent with her ADL"s and does her own laundry  Son provides all other IADl assistance  Pt gets her [address]  Pt may either need HHC vs STR upon discharge asa per PT  Will continue to follow  Patient/caregiver received discharge checklist   Content reviewed  Patient/caregiver encouraged to participate in discharge plan of care prior to discharge home  CM reviewed d/c planning process including the following: identifying help at home, patient preference for d/c planning needs, availability of treatment team to discuss questions or concerns patient and/or family may have regarding understanding medications and recognizing signs and symptoms once discharged  CM also encouraged patient to follow up with all recommended appointments after discharge  Patient advised of importance for patient and family to participate in managing patients medical well being

## 2019-06-26 NOTE — PHYSICAL THERAPY NOTE
Physical Therapy Evaluation     Patient's Name: Greg Zaman    Admitting Diagnosis  Hyponatremia [E87 1]  Irregular heart beat [I49 9]  Elevated troponin [R74 8]  Atrial fibrillation with RVR ([facility] ) [I48 91]  Acute kidney injury ([facility] [address] ) [N17 9]  Coronary artery disease involving native coronary artery of native heart without angina pectoris [I25 10]    Problem List  Patient Active Problem List   Diagnosis    Coronary artery disease involving native coronary artery of native heart without angina pectoris    SOB (shortness of breath)    Localized edema    Atherosclerosis of both carotid arteries    Non-rheumatic mitral regurgitation    Other fatigue    Atrial fibrillation with rapid ventricular response ([facility] [address] )    HUY (acute kidney injury) ([facility] [address] )    Nausea       Past Medical History  Past Medical History:   Diagnosis Date    Arthritis     Coronary artery disease     history of stenting    History of echocardiogram 07/20/2017    EF 55%, mild concentric LVH, bileaflet MVP with moderate MR, left atrial enlargement   History of transfusion     Hyperlipidemia     Hypertension     Mitral regurgitation        Past Surgical History  Past Surgical History:   Procedure Laterality Date    ABDOMINAL SURGERY      hysterectomy    CARDIAC CATHETERIZATION  04/10/2012    EF 65%, no significant CAD, patent stents, severe MR     CORONARY ANGIOPLASTY WITH STENT PLACEMENT  03/21/2007    EF 55%, GARRET to LAD   EYE SURGERY      b/l cataracts    HYSTERECTOMY          06/26/19 0911   Note Type   Note type Eval/Treat   Pain Assessment   Pain Assessment No/denies pain   Pain Score No Pain   Home Living   Type of Home House   Home Layout Two level;Performs ADLs on one level; Able to live on main level with bedroom/bathroom; Access; Ramped entrance   Bathroom Shower/Tub Tub/shower unit   [address] [place] [address]  chair   Bathroom Accessibility Accessible   Home Equipment Walker;Cane  (RW in home, SPC in community)   Prior Function   Level of [place] Independent with ADLs and functional mobility; Needs assistance with IADLs   Lives With Son   Receives Help From Family   ADL Assistance Independent   IADLs Needs assistance   Falls in the last 6 months 0   Vocational Retired   Restrictions/Precautions   [facility] Bearing Precautions Per Order No   Other Precautions Multiple lines;Telemetry;O2;Fall Risk  (2 L O2, does not utilize at home)   General   Family/Caregiver Present No   Cognition   Overall Cognitive Status WFL   Arousal/Participation Alert   Orientation Level Oriented X4   Memory Within functional limits   Following Commands Follows one step commands with increased time or repetition   RUE Assessment   RUE Assessment WFL   LUE Assessment   LUE Assessment WFL   RLE Assessment   RLE Assessment X   Strength RLE   R Hip Flexion 3/5   R Knee Extension 3/5   R Ankle Dorsiflexion 3/5   LLE Assessment   LLE Assessment X   Strength LLE   L Hip Flexion 3/5   L Knee Extension 3/5   L Ankle Dorsiflexion 3/5   Coordination   Movements are Fluid and Coordinated 0   Coordination and Movement Description Incremental mobility 2/2 medical complications requiring increased time and tactile cues of 1  Bed Mobility   Supine to Sit 4  Minimal assistance   Additional items Assist x 1;HOB elevated; Bedrails; Increased time required;Verbal cues;LE management   Sit to Supine 3  Moderate assistance   Additional items Assist x 1;Bedrails; Increased time required;Verbal cues;LE management   Additional Comments Patient rested on bedside w/ intermittent tactile cues to maintain stability  As sitting progressed, patient reported her dizziness was increasing & was returned BTB w/symptom resolution within 1 minute     Transfers   Sit to Stand Unable to assess  (2/2 safety concerns,medical status limitations)   Balance   Static Sitting Fair +   Dynamic Sitting Fair   Static Standing   (unable to assess at this time)   Endurance Deficit Endurance Deficit Yes   Endurance Deficit Description Dizziness & fatigue w/ sitting progression   (->124->114   /68->127/68)   Activity Tolerance   Activity Tolerance Treatment limited secondary to medical complications (Comment)   Nurse Made Aware yes, Yudi   Assessment   Prognosis Fair   Problem List Decreased strength;Decreased endurance; Impaired balance;Decreased mobility;Obesity   Assessment Pt is 81 y o  female seen for PT evaluation s/p admit to [facility] ICU on 6/25/2019 w/ Atrial fibrillation with rapid ventricular response ([address] )  PT consulted to assess pt's functional mobility and d/c needs  Order placed for PT eval and tx, w/ activity as tolerated order  Comorbidities affecting pt's physical performance at time of assessment include: weakness, dizziness, A-fib w/ RVR, HUY, nausea, SOB, elevated troponin, CAD  PTA, pt was independent w/ all functional mobility w/ AD usage  Personal factors affecting pt at time of IE include: inability to ambulate household distances, inability to navigate community distances, inability to navigate level surfaces w/o external assistance, unable to perform dynamic tasks in community, unable to perform physical activity and inability to perform ADLs  Please find objective findings from PT assessment regarding body systems outlined above with impairments and limitations including weakness, impaired balance, decreased endurance, impaired coordination, gait deviations, decreased activity tolerance, decreased functional mobility tolerance, fall risk and SOB upon exertion  The following objective measures performed on IE also reveal limitations: Barthel Index: 35/100  Pt's clinical presentation is currently unstable/unpredictable  Pt to benefit from continued PT tx to address deficits as defined above and maximize level of functional independent mobility and consistency   From PT/mobility standpoint, recommendation at time of d/c would be Home PT vs  STR pending progress in order to facilitate return to PLOF  Goals   Patient Goals feel better soon   LTG Expiration Date 07/06/19   Long Term Goal #1 1 )Patient will complete bed mobility with supervision of 1 for decrease need for caregiver assistance, decrease burden of care  2 ) Patient will complete transfers with supervision of 1 to decrease risk of falls, facilitate upright standing posture  3 ) BLE strength to greater than/equal to 3+/5 gross musculature to increase ability to safely transfer, control descent to chair  4 ) Patient will exhibit increase static standing balance to Fair+ , for 3-5 minutes  without LOB and supervision of 1 to improve activity tolerance and endurance  5 ) Patient will exhibit increase dynamic ambulatory balance to Fair for 100 feet w/RW supervision of 1  to improve ability to mobilize to toilet, chair and decrease risk for additional medical complications  6 ) Patient will exhibit good self monitoring and ability to follow 2 step commands to increase complexity of tasks and resume ADL's without LOB  Treatment Day 1   Plan   Treatment/Interventions Functional transfer training;LE strengthening/ROM; Therapeutic exercise; Endurance training;Bed mobility;Gait training;Equipment eval/education;Spoke to nursing;OT  (& neuro re-education w/balance training)   PT Frequency Other (Comment)  (3-5x/week)   Recommendation   Recommendation Other (Comment)  (STR vs HHPT/family support)   Equipment Recommended   (TBD based on functional progress)   PT - OK to Discharge No   Additional Comments Upon conclusion, patient was resting in bed w/ SCD's active & all needs within reach     Barthel Index   Feeding 5   Bathing 0   Grooming Score 0   Dressing Score 5   Bladder Score 5   Bowels Score 10   Toilet Use Score 5   Transfers (Bed/Chair) Score 5   Mobility (Level Surface) Score 0   Stairs Score 0   Barthel Index Score 35     Additional skilled interventions: Therapeutic Activity x 24 minutes    S: No reported pain prior or during session, patient agreeable to mobilize OOB as tolerated, A&O x 4     O: therapeutic activity x 24 minutes including mobilization education: bed mobility, supine<->sit, static/dynamic sitting balance w/ postural facilitation, and continued safety training for increased environmental awareness & monitoring symptoms  A: Patient exhibited weakness, impaired balance, gait dysfunction, decreased endurance, activity intolerance, and decline from PLOF to benefit from continued interventions  P: Continue PT tx with progression of functional tasks as patient can safely tolerate, 3-5x/week        Norah Armas, PT

## 2019-06-26 NOTE — PLAN OF CARE
Problem: PHYSICAL THERAPY ADULT  Goal: Performs mobility at highest level of function for planned discharge setting  See evaluation for individualized goals  Description  Treatment/Interventions: Functional transfer training, LE strengthening/ROM, Therapeutic exercise, Endurance training, Bed mobility, Gait training, Equipment eval/education, Spoke to nursing, OT(& neuro re-education w/balance training)  Equipment Recommended: (TBD based on functional progress)  See flowsheet documentation for full assessment, interventions and recommendations  Alison Guo, PT     Note:   Prognosis: Fair  Problem List: Decreased strength, Decreased endurance, Impaired balance, Decreased mobility, Obesity  Assessment: Pt is 81 y o  female seen for PT evaluation s/p admit to [facility] ICU on 6/25/2019 w/ Atrial fibrillation with rapid ventricular response ([address] )  PT consulted to assess pt's functional mobility and d/c needs  Order placed for PT eval and tx, w/ activity as tolerated order  Comorbidities affecting pt's physical performance at time of assessment include: weakness, dizziness, A-fib w/ RVR, HUY, nausea, SOB, elevated troponin, CAD  PTA, pt was independent w/ all functional mobility w/ AD usage  Personal factors affecting pt at time of IE include: inability to ambulate household distances, inability to navigate community distances, inability to navigate level surfaces w/o external assistance, unable to perform dynamic tasks in community, unable to perform physical activity and inability to perform ADLs  Please find objective findings from PT assessment regarding body systems outlined above with impairments and limitations including weakness, impaired balance, decreased endurance, impaired coordination, gait deviations, decreased activity tolerance, decreased functional mobility tolerance, fall risk and SOB upon exertion   The following objective measures performed on IE also reveal limitations: Barthel Index: 35/100  Pt's clinical presentation is currently unstable/unpredictable  Pt to benefit from continued PT tx to address deficits as defined above and maximize level of functional independent mobility and consistency  From PT/mobility standpoint, recommendation at time of d/c would be Home PT vs  STR pending progress in order to facilitate return to PLOF  Recommendation: Other (Comment)(STR vs HHPT/family support)     PT - OK to Discharge: No    See flowsheet documentation for full assessment     Cloteal Gammons, PT

## 2019-06-26 NOTE — ASSESSMENT & PLAN NOTE
· New onset atrial fibrillation  · Currently on Cardizem drip, hopefully transition to p o   Medications  · Cardiology consulted  · Check echocardiogram

## 2019-06-26 NOTE — PLAN OF CARE
Problem: Potential for Falls  Goal: Patient will remain free of falls  Description  INTERVENTIONS:  - Assess patient frequently for physical needs  -  Identify cognitive and physical deficits and behaviors that affect risk of falls  -  [place] fall precautions as indicated by assessment   - Educate patient/family on patient safety including physical limitations  - Instruct patient to call for assistance with activity based on assessment  - Modify environment to reduce risk of injury  - Consider OT/PT consult to assist with strengthening/mobility  Outcome: Progressing     Problem: Prexisting or High Potential for Compromised Skin Integrity  Goal: Skin integrity is maintained or improved  Description  INTERVENTIONS:  - Identify patients at risk for skin breakdown  - Assess and monitor skin integrity  - Assess and monitor nutrition and hydration status  - Monitor labs (i e  albumin)  - Assess for incontinence   - Turn and reposition patient  - Assist with mobility/ambulation  - Relieve pressure over bony prominences  - Avoid friction and shearing  - Provide appropriate hygiene as needed including keeping skin clean and dry  - Evaluate need for skin moisturizer/barrier cream  - Collaborate with interdisciplinary team (i e  Nutrition, Rehabilitation, etc )   - Patient/family teaching  Outcome: Progressing     Problem: Nutrition/Hydration-ADULT  Goal: Nutrient/Hydration intake appropriate for improving, restoring or maintaining nutritional needs  Description  Monitor and assess patient's nutrition/hydration status for malnutrition (ex- brittle hair, bruises, dry skin, pale skin and conjunctiva, muscle wasting, smooth red tongue, and disorientation)  Collaborate with interdisciplinary team and initiate plan and interventions as ordered  Monitor patient's weight and dietary intake as ordered or per policy  Utilize nutrition screening tool and intervene per policy   Determine patient's food preferences and provide high-protein, high-caloric foods as appropriate       INTERVENTIONS:  - Monitor oral intake, urinary output, labs, and treatment plans  - Assess nutrition and hydration status and recommend course of action  - Evaluate amount of meals eaten  - Assist patient with eating if necessary   - Allow adequate time for meals  - Recommend/ encourage appropriate diets, oral nutritional supplements, and vitamin/mineral supplements  - Order, calculate, and assess calorie counts as needed  - Recommend, monitor, and adjust tube feedings and TPN/PPN based on assessed needs  - Assess need for intravenous fluids  - Provide specific nutrition/hydration education as appropriate  - Include patient/family/caregiver in decisions related to nutrition  Outcome: Progressing     Problem: PAIN - ADULT  Goal: Verbalizes/displays adequate comfort level or baseline comfort level  Description  Interventions:  - Encourage patient to monitor pain and request assistance  - Assess pain using appropriate pain scale  - Administer analgesics based on type and severity of pain and evaluate response  - Implement non-pharmacological measures as appropriate and evaluate response  - Consider cultural and social influences on pain and pain management  - Notify physician/advanced practitioner if interventions unsuccessful or patient reports new pain  Outcome: Progressing     Problem: SAFETY ADULT  Goal: Maintain or return to baseline ADL function  Description  INTERVENTIONS:  -  Assess patient's ability to carry out ADLs; assess patient's baseline for ADL function and identify physical deficits which impact ability to perform ADLs (bathing, care of mouth/teeth, toileting, grooming, dressing, etc )  - Assess/evaluate cause of self-care deficits   - Assess range of motion  - Assess patient's mobility; develop plan if impaired  - Assess patient's need for assistive devices and provide as appropriate  - Encourage maximum independence but intervene and supervise when necessary  ¯ Involve family in performance of ADLs  ¯ Assess for home care needs following discharge   ¯ Request OT consult to assist with ADL evaluation and planning for discharge  ¯ Provide patient education as appropriate  Outcome: Progressing  Goal: Maintain or return mobility status to optimal level  Description  INTERVENTIONS:  - Assess patient's baseline mobility status (ambulation, transfers, stairs, etc )    - Identify cognitive and physical deficits and behaviors that affect mobility  - Identify mobility aids required to assist with transfers and/or ambulation (gait belt, sit-to-stand, lift, walker, cane, etc )  - [place] fall precautions as indicated by assessment  - Record patient progress and toleration of activity level on Mobility SBAR; progress patient to next Phase/Stage  - Instruct patient to call for assistance with activity based on assessment  - Request Rehabilitation consult to assist with strengthening/weightbearing, etc   Outcome: Progressing     Problem: Knowledge Deficit  Goal: Patient/family/caregiver demonstrates understanding of disease process, treatment plan, medications, and discharge instructions  Description  Complete learning assessment and assess knowledge base    Interventions:  - Provide teaching at level of understanding  - Provide teaching via preferred learning methods  Outcome: Progressing

## 2019-06-26 NOTE — CONSULTS
Consultation - Nephrology   Bora Monterroso 81 y o  female MRN: [id number]  Unit/Bed#: ICU 03 Encounter: [id number]      A/P:  1  Acute kidney failure on top of chronic kidney disease   This multifactorial including volume depletion, hemodynamic instability due to atrial fibrillation with rapid ventricular response, and use of naproxen  Creatinine slightly improved this morning status post volume expansion and heart rate which is marginally improved though remains above 120 in an atrial fibrillation rhythm  I will give another bolus the this morning due to hypotension  Patient currently getting 2D echocardiogram at the bedside  Check urine sodium, creatinine to rule out a acute tubular necrosis  Urinalysis was negative for blood or protein  2  Chronic kidney disease stage 2 with baseline creatinine between 0 7 - 0 8 mg/dL    3  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   I stressed the importance of avoiding nonsteroidal anti-inflammatory medications as possible  4  Hyponatremia   Potentially multifactorial, check urine osmolality, continue volume expanded at this time as patient most likely has at least a mild component of volume depletion  5  Hypotension most likely due to hypovolemia and hemodynamic instability   As mentioned above, continue with volume expansion, 500 mL normal saline bolus will be given this morning  Continue with rate control as indicated by Cardiology, continue with diltiazem  6  Atrial fibrillation with rapid ventricular response   As mentioned above  7  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       Thank you for allowing us to participate in the care of your patient  Please feel free to contact us regarding the care of this patient, or any other questions/concerns that may be applicable      Patient Active Problem List   Diagnosis    Coronary artery disease involving native coronary artery of native heart without angina pectoris    SOB (shortness of breath)    Localized edema    Atherosclerosis of both carotid arteries    Non-rheumatic mitral regurgitation    Other fatigue    Atrial fibrillation with rapid ventricular response (HCC)    HUY (acute kidney injury) ([facility] [address] )    Nausea       History of Present Illness   Physician Requesting Consult: Meño Nowak MD  Reason for Consult / Principal Problem: HUY  Hx and PE limited by:   HPI: Joanne Shukla is a 81y o  year old female who presented to the emergency department June 25th after presenting initially to the urgent care due to feeling weak, having being found to have atrial fibrillation with rapid ventricular response she was referred to the emergency department  Patient currently has been feeling like this for the past several days, she has experienced some anorexia with decreased overall p o  Intake  She denies any vomiting or diarrhea  Recently, earlier this June, patient was initiated on furosemide 20 mg once daily due to increased lower extremity edema which is noted physical exam while she visited her cardiologist   Patient has known valvulopathy in the form of severe mitral regurgitation and a history of chronic diastolic congestive heart failure  With respect to the atrial fibrillation, patient does not have a history of this dysrhythmia  Patient was transferred to the intensive care unit for further evaluation and care including the use of a diltiazem drip  From the renal standpoint, patient has chronic kidney disease stage 2 with baseline creatinine between 0 7-0 8 mg/dL  Patient presents to the hospital a creatinine of 1 8 mg/dL, patient had a mild hyponatremia otherwise no other significant electrolyte abnormalities  Patient denies any significant episode of acute kidney injury that she is aware of  As mentioned above, patient has had reduced p o  Intake overall, she was initiated on Lasix recently, and denies any use of nonsteroidal anti-inflammatory medications    Since presentation, the patient's creatinine has slightly improved down to 1 67 mg/dL from 1 8 mg/dL  Patient's sodium level is also minimally improved from 129 millimole/L up to 130 millimole/L  Patient was noted to have significantly reduced blood pressure a presentation and was given a 500 mL bolus normal saline, this morning hemodynamics remain unstable with the patient currently being imaged fibrillation with rapid ventricular response with associated systolic blood pressure around 80 mm Hg  History obtained from chart review and the patient    Review of Systems - Negative except as mentioned above in HPI, more specifics as mentioned below  Review of Systems - History obtained from chart review and the patient  General ROS: positive for  - fatigue  Psychological ROS: negative  Ophthalmic ROS: negative  ENT ROS: negative  Allergy and Immunology ROS: negative  Hematological and Lymphatic ROS: negative  Endocrine ROS: negative  Respiratory ROS: no cough, shortness of breath, or wheezing  Cardiovascular ROS:  As mentioned above  Gastrointestinal ROS: no abdominal pain, change in bowel habits, or black or bloody stools  Genito-Urinary ROS: no dysuria, trouble voiding, or hematuria  Musculoskeletal ROS: positive for - muscular weakness  Neurological ROS: no TIA or stroke symptoms  Dermatological ROS: negative    Historical Information   Past Medical History:   Diagnosis Date    Arthritis     Coronary artery disease     history of stenting    History of echocardiogram 07/20/2017    EF 55%, mild concentric LVH, bileaflet MVP with moderate MR, left atrial enlargement   History of transfusion     Hyperlipidemia     Hypertension     Mitral regurgitation      Past Surgical History:   Procedure Laterality Date    ABDOMINAL SURGERY      hysterectomy    CARDIAC CATHETERIZATION  04/10/2012    EF 65%, no significant CAD, patent stents, severe MR     CORONARY ANGIOPLASTY WITH STENT PLACEMENT  03/21/2007    EF 55%, GARRET to LAD      EYE SURGERY      b/l cataracts    HYSTERECTOMY       Social History   Social History     Substance and Sexual Activity   Alcohol Use Not Currently     Social History     Substance and Sexual Activity   Drug Use Never     Social History     Tobacco Use   Smoking Status Never Smoker   Smokeless Tobacco Never Used     Family History   Problem Relation Age of Onset    Arthritis Mother     Cancer Mother     Heart disease Mother    [facility] Hypertension Mother     Alcohol abuse Father     Arthritis Father     Birth defects Son     Hearing loss Son     Diabetes Daughter     Stroke Maternal Grandmother     Asthma Maternal Grandfather        Meds/Allergies   all current active meds have been reviewed, current meds:   Current Facility-Administered Medications   Medication Dose Route Frequency    acetaminophen (TYLENOL) tablet 650 mg  650 mg Oral Q6H PRN    ALPRAZolam (XANAX) tablet 0 25 mg  0 25 mg Oral TID PRN    aspirin chewable tablet 81 mg  81 mg Oral Every Other Day    diltiazem (CARDIZEM) 125 mg in sodium chloride 0 9 % 125 mL infusion  1-15 mg/hr Intravenous Titrated    heparin (porcine) subcutaneous injection 5,000 Units  5,000 Units Subcutaneous Q8H [facility]    metoprolol tartrate (LOPRESSOR) tablet 25 mg  25 mg Oral BID    montelukast (SINGULAIR) tablet 10 mg  10 mg Oral Daily    ondansetron (ZOFRAN) injection 4 mg  4 mg Intravenous Q6H PRN    oxybutynin (DITROPAN) tablet 5 mg  5 mg Oral Daily    pantoprazole (PROTONIX) EC tablet 40 mg  40 mg Oral Early Morning    sertraline (ZOLOFT) tablet 50 mg  50 mg Oral Daily    sodium chloride 0 9 % bolus 500 mL  500 mL Intravenous Once    sodium chloride 0 9 % infusion  75 mL/hr Intravenous Continuous    and PTA meds:    Facility-Administered Medications Prior to Admission   Medication    [COMPLETED] ondansetron (ZOFRAN-ODT) dispersible tablet 4 mg     Medications Prior to Admission   Medication    ALPRAZolam (XANAX) 0 25 mg tablet    ASPIRIN 81 PO    diphenhydrAMINE (BENADRYL) 25 mg tablet    fluticasone (FLONASE) 50 mcg/act nasal spray    furosemide (LASIX) 20 mg tablet    lisinopril (ZESTRIL) 10 mg tablet    meloxicam (MOBIC) 7 5 mg tablet    metoprolol tartrate (LOPRESSOR) 50 mg tablet    montelukast (SINGULAIR) 10 mg tablet    omeprazole (PriLOSEC) 20 mg delayed release capsule    oxybutynin (DITROPAN) 5 mg tablet    sertraline (ZOLOFT) 50 mg tablet    simvastatin (ZOCOR) 20 mg tablet         Allergies   Allergen Reactions    Chocolate Flavor     Iodine Other (See Comments)     shellfish- difficulty breathing    Shellfish-Derived Products     Codeine Rash       Objective     Intake/Output Summary (Last 24 hours) at 6/26/2019 0833  Last data filed at 6/26/2019 0751  Gross per 24 hour   Intake 1913 59 ml   Output 300 ml   Net 1613 59 ml       Invasive Devices:        Physical Exam      I/O last 3 completed shifts: In: 1413 6 [P O :60; I V :93 6; IV Piggyback:1260]  Out: 300 [Urine:300]    Vitals:    06/26/19 0600   BP: 95/61   Pulse: (!) 115   Resp: 19   Temp:    SpO2: 95%       Gen: in NAD, Alert/Awake  HEENT: no sclerous icterus, MMM, neck supple  CV: +S1/S2, RRR  Lungs:  Reduced bilaterally  Abd: +BS, soft NT/ND  Ext: all four extremities are warm  Skin: no rashes noted  Neuro: CN II-XII intact    Current Weight: Weight - Scale: 76 kg (167 lb 8 8 oz)  First Weight: Weight - Scale: 69 4 kg (153 lb)    Lab Results:  I have personally reviewed pertinent labs      CBC:   Lab Results   Component Value Date    WBC 7 80 06/26/2019    HGB 10 8 (L) 06/26/2019    HCT 31 4 (L) 06/26/2019    MCV 89 06/26/2019     06/26/2019    MCH 30 6 06/26/2019    MCHC 34 3 06/26/2019    RDW 15 5 (H) 06/26/2019    MPV 8 2 (L) 06/26/2019     CMP:   Lab Results   Component Value Date    K 4 5 06/26/2019     06/26/2019    CO2 22 06/26/2019    BUN 46 (H) 06/26/2019    CREATININE 1 67 (H) 06/26/2019    CALCIUM 8 4 (L) 06/26/2019    AST 25 06/26/2019    ALT 33 06/26/2019    ALKPHOS 55 06/26/2019    EGFR 28 06/26/2019     Phosphorus:   Lab Results   Component Value Date    PHOS 5 2 06/26/2019     Magnesium:   Lab Results   Component Value Date    MG 2 3 06/26/2019     Urinalysis:   Lab Results   Component Value Date    COLORU Yellow 06/26/2019    CLARITYU Clear 06/26/2019    SPECGRAV 1 020 06/26/2019    PHUR 5 5 06/26/2019    LEUKOCYTESUR Negative 06/26/2019    NITRITE Negative 06/26/2019    GLUCOSEU Negative 06/26/2019    KETONESU Negative 06/26/2019    BILIRUBINUR Negative 06/26/2019    BLOODU Negative 06/26/2019     Ionized Calcium: No results found for: CAION  Coagulation:   Lab Results   Component Value Date    INR 1 20 06/25/2019     Troponin:   Lab Results   Component Value Date    TROPONINI 0 04 (H) 06/25/2019     ABG: No results found for: PHART, AGE0EQP, PO2ART, WIT0DRQ, H2UOBLWL, BEART, SOURCE    Results from last 7 days   Lab Units 06/26/19  0444 06/25/19  1422   POTASSIUM mmol/L 4 5 4 5   CHLORIDE mmol/L 100 95*   CO2 mmol/L 22 24   BUN mg/dL 46* 48*   CREATININE mg/dL 1 67* 1 80*   CALCIUM mg/dL 8 4* 9 5   ALK PHOS U/L 55 68   ALT U/L 33 44   AST U/L 25 36       Radiology review:  No results found        Celester Repress, DO

## 2019-06-26 NOTE — ASSESSMENT & PLAN NOTE
· Suspected to be secondary to volume depletion due to diuretic therapy and poor p o  Intake  The patient also takes naproxen for her chronic pain    · Will check renal ultrasound  · Nephrology consulted  · Creatinine improved today  · Continue trend and monitor

## 2019-06-26 NOTE — CONSULTS
Consult- Marty Be 1935, 81 y o  female MRN: [id number]    Unit/Bed#: ICU 03 Encounter: [id number]    Primary Care Provider: Natacha Thakkar MD   Date and time admitted to hospital: 6/25/2019  1:56 PM      Inpatient consult to Cardiology  Consult performed by: Herbert Samuel PA-C  Consult ordered by: NEAL Blanco        Assessment/Plan:   New onset afib  · Remains in afib on telemetry   · Continue metoprolol 25mg PO BID  Discontinue diltiazem gtt and switch to diltiazem 60mg PO q6h with hold parameters  Goal HR <100-110  · [id number] - Will start lovenox and consider low dose Eliquis  She is somewhat of a fall risk, but is at high risk of CVA given high Chads score and severe MR    Acute on chronic diastolic HF   · BNP 0244 on presentation, proBNP 9927 two weeks ago  · Started on lasix a few weeks ago for lower extremity edema and dyspnea on exertion, but she has not been taking it   · Will give single dose of IV lasix today and recheck BMP tomorrow morning  Discontinue IV fluids  Mitral regurgitation   · Severe by cath in 2012, moderate by echo this morning   · Likely contributing to HF decompensation   · When/if renal function improves, would benefit from lisinopril for afterload reduction     HUY   · Baseline Cr 0 6-0 8, was 1 8 on presentation   · Likely secondary to diuretic and chronic NSAID use, poor PO intake    CAD   · S/p GARRET to LAD in 2007  · Discontinue ASA with addition of anticoagulation   · No recent angina     HTN   · Has been slightly hypotensive on cardizem gtt  Pt reports BP has been 150s at home  · Continue oral metoprolol and cardizem with hold parameters     HLD   · Not on statin therapy   · LDL 69 on 3/15/19      HPI: Marty Be is a 81 y o  female w/ CAD, MR, HTN, HLD, 1st degree AV block and bilateral carotid stenosis who presents with weakness x1 week   She originally presented to urgent care and was found to be in new onset afib and was transferred to the ED via EMS for further evaluation  She reports that she was "not feeling right" for the last few weeks  She has been weak/fatiged with some nausea  She hasn't been able to walk around the house much, due to significant dyspnea on exertion which is relatively new for her  She has had dyspnea on exertion for 1 year, but it has worsened in the last few weeks  As a result, PO intake / hydration have been poor  She was started on lasix a couple of weeks ago, but only took 2 doses because it made her feel nauseous  She lives at home with her son  She had 1 fall about 5 years ago, but reports that balance is fair when she uses her walker/cane  EKG: Afib with RVR  Telemetry: Afib, rates 100-120    Most recent cardiac testing:    TTE today - EF 60%  Moderate LVH  Moderate MR with marked prolapse of the anterior and posterior leaflets  Mild TR with moderate pulm HTN    TTE 2017 - EF 55%, moderate MR    Cardiac cath 2012 - patent LAD stent, severe MR  No significant CAD     Review of Systems:   Review of Systems   Constitution: Positive for malaise/fatigue  HENT: Negative  Eyes: Negative  Cardiovascular: Positive for dyspnea on exertion  Negative for chest pain, claudication, irregular heartbeat, leg swelling, near-syncope, orthopnea, palpitations, paroxysmal nocturnal dyspnea and syncope  Respiratory: Negative for cough, shortness of breath and wheezing  Endocrine: Negative  Skin: Negative  Musculoskeletal: Negative  Gastrointestinal: Positive for nausea  Genitourinary: Negative  Neurological: Positive for weakness  Negative for dizziness and light-headedness  Psychiatric/Behavioral: Negative  Historical Information   Past Medical History:   Diagnosis Date    Arthritis     Coronary artery disease     history of stenting    History of echocardiogram 07/20/2017    EF 55%, mild concentric LVH, bileaflet MVP with moderate MR, left atrial enlargement      History of transfusion  Hyperlipidemia     Hypertension     Mitral regurgitation      Past Surgical History:   Procedure Laterality Date    ABDOMINAL SURGERY      hysterectomy    CARDIAC CATHETERIZATION  04/10/2012    EF 65%, no significant CAD, patent stents, severe MR     CORONARY ANGIOPLASTY WITH STENT PLACEMENT  03/21/2007    EF 55%, GARRET to LAD      EYE SURGERY      b/l cataracts    HYSTERECTOMY       Social History     Substance and Sexual Activity   Alcohol Use Not Currently     Social History     Substance and Sexual Activity   Drug Use Never     Social History     Tobacco Use   Smoking Status Never Smoker   Smokeless Tobacco Never Used       Family History:   Family History   Problem Relation Age of Onset    Arthritis Mother     Cancer Mother     Heart disease Mother     Hypertension Mother     Alcohol abuse Father     Arthritis Father     Birth defects Son     Hearing loss Son     Diabetes Daughter     Stroke Maternal Grandmother     Asthma Maternal Grandfather        Meds/Allergies   all current active meds have been reviewed  Facility-Administered Medications Prior to Admission   Medication    [COMPLETED] ondansetron (ZOFRAN-ODT) dispersible tablet 4 mg     Medications Prior to Admission   Medication    ALPRAZolam (XANAX) 0 25 mg tablet    ASPIRIN 81 PO    diphenhydrAMINE (BENADRYL) 25 mg tablet    fluticasone (FLONASE) 50 mcg/act nasal spray    furosemide (LASIX) 20 mg tablet    lisinopril (ZESTRIL) 10 mg tablet    meloxicam (MOBIC) 7 5 mg tablet    metoprolol tartrate (LOPRESSOR) 50 mg tablet    montelukast (SINGULAIR) 10 mg tablet    omeprazole (PriLOSEC) 20 mg delayed release capsule    oxybutynin (DITROPAN) 5 mg tablet    sertraline (ZOLOFT) 50 mg tablet    simvastatin (ZOCOR) 20 mg tablet       Allergies   Allergen Reactions    Chocolate Flavor     Iodine Other (See Comments)     shellfish- difficulty breathing    Shellfish-Derived Products     Codeine Rash       Objective   Vitals: Blood pressure 95/61, pulse (!) 115, temperature 97 5 °F (36 4 °C), temperature source Temporal, resp  rate 19, height 4' 11" (1 499 m), weight 76 kg (167 lb 8 8 oz), SpO2 95 %  , Body mass index is 33 84 kg/m² ,   Orthostatic Blood Pressures      Most Recent Value   Blood Pressure  95/61 filed at 06/26/2019 0600   Patient Position - Orthostatic VS  Lying filed at 06/26/2019 0400        Physical Exam   Physical Exam   Constitutional: She is oriented to person, place, and time  She appears well-developed and well-nourished  No distress  HENT:   Head: Normocephalic and atraumatic  Eyes: EOM are normal  No scleral icterus  Neck: Normal range of motion  Neck supple  Cardiovascular: Normal rate, regular rhythm, S1 normal, S2 normal and intact distal pulses  Murmur (Grade 4/6 holosystolic murmur at the apex) heard  Pulmonary/Chest: Effort normal  She has no wheezes  She has rales  Abdominal: Soft  Bowel sounds are normal    Musculoskeletal: She exhibits no edema  Neurological: She is alert and oriented to person, place, and time  Skin: Skin is warm and dry  Psychiatric: She has a normal mood and affect  Her behavior is normal    Nursing note and vitals reviewed        Lab Results:     Troponins:   Results from last 7 days   Lab Units 06/25/19 2044 06/25/19  1812 06/25/19  1422   TROPONIN I ng/mL 0 04* 0 05* 0 05*     BNP:  Results from last 6 Months   Lab Units 06/25/19  1812   BNP pg/mL 1,303*     CBC with diff:   Results from last 7 days   Lab Units 06/26/19  0444 06/25/19  1422   WBC Thousand/uL 7 80 10 30   HEMOGLOBIN g/dL 10 8* 13 2   HEMATOCRIT % 31 4* 39 6*   PLATELETS Thousands/uL 184 240   RBC Million/uL 3 51* 4 40     CMP:  Results from last 7 days   Lab Units 06/26/19  0444 06/25/19  1422   POTASSIUM mmol/L 4 5 4 5   CHLORIDE mmol/L 100 95*   CO2 mmol/L 22 24   BUN mg/dL 46* 48*   CREATININE mg/dL 1 67* 1 80*   CALCIUM mg/dL 8 4* 9 5   AST U/L 25 36   ALT U/L 33 44   ALK PHOS U/L 55 68   EGFR ml/min/1 73sq m 28 26     TSH:     Coags:   Results from last 7 days   Lab Units 06/25/19  1422   INR  1 20

## 2019-06-26 NOTE — PROGRESS NOTES
Progress Note - Selene Shine 1935, 81 y o  female MRN: [id number]    Unit/Bed#: ICU 03 Encounter: [id number]    Primary Care Provider: Zulema Wilkes MD   Date and time admitted to hospital: 6/25/2019  1:56 PM        Nausea  Assessment & Plan  · Along with abdominal pain  · Unclear etiology suspected this to be related to new onset AFib versus CHF  · Will check CT of abdomen and pelvis  · Continue on PPI  · Soft/low-fat diet    HUY (acute kidney injury) ([facility] [address] )  Assessment & Plan  · Suspected to be secondary to volume depletion due to diuretic therapy and poor p o  Intake  The patient also takes naproxen for her chronic pain  · Will check renal ultrasound  · Nephrology consulted  · Creatinine improved today  · Continue trend and monitor    Coronary artery disease involving native coronary artery of native heart without angina pectoris  Assessment & Plan  · Will continue with current home regimen  · The patient is asymptomatic  · Cardiology is consulted    * Atrial fibrillation with rapid ventricular response ([facility] [address] )  Assessment & Plan  · New onset atrial fibrillation  · Currently on Cardizem drip, hopefully transition to p o  Medications  · Cardiology consulted  · Check echocardiogram       Acute on Chronic Diastolic CHF - POA, Check ECHO, cardiology consult    VTE Pharmacologic Prophylaxis: Pharmacologic: Enoxaparin (Lovenox)    Patient Centered Rounds: I have performed bedside rounds with nursing staff today  Discussions with Specialists or Other Care Team Provider: n/a  Education and Discussions with Family / Patient: patient    Current Length of Stay: 1 day(s)    Current Patient Status: Inpatient   Certification Statement: The patient will continue to require additional inpatient hospital stay due to afib with rvr    Discharge Plan: pending hospital course    Code Status: Level 3 - DNAR and DNI    Subjective:   Patient seen examined  No acute events overnight    Patient still feels very tired    Objective:     Vitals:   Temp (24hrs), Av 4 °F (36 3 °C), Min:97 °F (36 1 °C), Max:97 9 °F (36 6 °C)    Temp:  [97 °F (36 1 °C)-97 9 °F (36 6 °C)] 97 5 °F (36 4 °C)  HR:  [] 115  Resp:  [16-35] 19  BP: ()/(38-98) 95/61  SpO2:  [79 %-99 %] 95 %  Body mass index is 33 84 kg/m²  Input and Output Summary (last 24 hours): Intake/Output Summary (Last 24 hours) at 2019 0940  Last data filed at 2019 0751  Gross per 24 hour   Intake 1913 59 ml   Output 300 ml   Net 1613 59 ml       Physical Exam:     Physical Exam   Constitutional: She is oriented to person, place, and time  She appears well-developed and well-nourished  HENT:   Head: Normocephalic and atraumatic  Eyes: Pupils are equal, round, and reactive to light  EOM are normal    Neck: Normal range of motion  Neck supple  Cardiovascular:   Tachycardia, irregularly irregular   Pulmonary/Chest: Effort normal and breath sounds normal    Abdominal: Soft  Bowel sounds are normal  There is tenderness  Obese   Musculoskeletal: Normal range of motion  She exhibits edema  Neurological: She is alert and oriented to person, place, and time  Skin: Skin is warm  Capillary refill takes less than 2 seconds  Psychiatric: She has a normal mood and affect  Her behavior is normal  Judgment and thought content normal    Nursing note and vitals reviewed        Additional Data:     Labs:    Results from last 7 days   Lab Units 19  0444 19  1422   WBC Thousand/uL 7 80 10 30   HEMOGLOBIN g/dL 10 8* 13 2   HEMATOCRIT % 31 4* 39 6*   PLATELETS Thousands/uL 184 240   NEUTROS PCT %  --  82*   LYMPHS PCT %  --  10*   MONOS PCT %  --  7   EOS PCT %  --  1     Results from last 7 days   Lab Units 19  0444   POTASSIUM mmol/L 4 5   CHLORIDE mmol/L 100   CO2 mmol/L 22   BUN mg/dL 46*   CREATININE mg/dL 1 67*   CALCIUM mg/dL 8 4*   ALK PHOS U/L 55   ALT U/L 33   AST U/L 25     Results from last 7 days   Lab Units 19  9293 INR  1 20               * I Have Reviewed All Lab Data Listed Above  * Additional Pertinent Lab Tests Reviewed: [address] Admission  Reviewed    Imaging:  Imaging Reports Reviewed Today Include: n/a    Recent Cultures (last 7 days):           Last 24 Hours Medication List:     Current Facility-Administered Medications:  acetaminophen 650 mg Oral Q6H PRN Larna Lie, CRNP    ALPRAZolam 0 25 mg Oral TID PRN Larna Lie, CRNP    diltiazem 60 mg Oral Q6H [address] Sinai Mejia PA-C    enoxaparin 1 mg/kg Subcutaneous Q24H [address] Lia Ashley PA-C    furosemide 80 mg Intravenous Once Lia Ashley PA-C    metoprolol tartrate 25 mg Oral BID Larna Lie, CRNP    montelukast 10 mg Oral Daily Larna Lie, CRNP    ondansetron 4 mg Intravenous Q6H PRN Larna Lie, CRNP    oxybutynin 5 mg Oral Daily Larna Lie, CRNP    pantoprazole 40 mg Oral Early Morning Larna Lie, CRNP    sertraline 50 mg Oral Daily Larna Lie, CRNP    sodium chloride 75 mL/hr Intravenous Continuous Larna Lie, CRNP Last Rate: 75 mL/hr (06/26/19 0144)        Today, Patient Was Seen By: Holli Saldaña MD    ** Please Note: Dictation voice to text software may have been used in the creation of this document   **

## 2019-06-26 NOTE — ASSESSMENT & PLAN NOTE
· Along with abdominal pain  · Unclear etiology suspected this to be related to new onset AFib versus CHF  · Will check CT of abdomen and pelvis  · Continue on PPI  · Soft/low-fat diet

## 2019-06-27 PROBLEM — I05.0 MITRAL VALVE STENOSIS: Status: ACTIVE | Noted: 2019-06-27

## 2019-06-27 PROBLEM — I50.33 ACUTE ON CHRONIC DIASTOLIC CONGESTIVE HEART FAILURE (HCC): Status: ACTIVE | Noted: 2019-06-27

## 2019-06-27 LAB
ANION GAP SERPL CALCULATED.3IONS-SCNC: 8 MMOL/L (ref 4–13)
BUN SERPL-MCNC: 42 MG/DL (ref 7–25)
CALCIUM SERPL-MCNC: 9.1 MG/DL (ref 8.6–10.5)
CHLORIDE SERPL-SCNC: 99 MMOL/L (ref 98–107)
CO2 SERPL-SCNC: 24 MMOL/L (ref 21–31)
CREAT SERPL-MCNC: 1.69 MG/DL (ref 0.6–1.2)
ERYTHROCYTE [DISTWIDTH] IN BLOOD BY AUTOMATED COUNT: 15.9 % (ref 11.5–14.5)
GFR SERPL CREATININE-BSD FRML MDRD: 28 ML/MIN/1.73SQ M
GLUCOSE SERPL-MCNC: 118 MG/DL (ref 65–99)
HCT VFR BLD AUTO: 35.1 % (ref 42–47)
HGB BLD-MCNC: 12 G/DL (ref 12–16)
MCH RBC QN AUTO: 30.4 PG (ref 26–34)
MCHC RBC AUTO-ENTMCNC: 34.1 G/DL (ref 31–37)
MCV RBC AUTO: 89 FL (ref 81–99)
PLATELET # BLD AUTO: 205 THOUSANDS/UL (ref 149–390)
PMV BLD AUTO: 8.1 FL (ref 8.6–11.7)
POTASSIUM SERPL-SCNC: 4.5 MMOL/L (ref 3.5–5.5)
RBC # BLD AUTO: 3.93 MILLION/UL (ref 3.9–5.2)
SODIUM SERPL-SCNC: 131 MMOL/L (ref 134–143)
WBC # BLD AUTO: 7.8 THOUSAND/UL (ref 4.8–10.8)

## 2019-06-27 PROCEDURE — 80048 BASIC METABOLIC PNL TOTAL CA: CPT | Performed by: INTERNAL MEDICINE

## 2019-06-27 PROCEDURE — 99232 SBSQ HOSP IP/OBS MODERATE 35: CPT | Performed by: INTERNAL MEDICINE

## 2019-06-27 PROCEDURE — 97530 THERAPEUTIC ACTIVITIES: CPT

## 2019-06-27 PROCEDURE — 85027 COMPLETE CBC AUTOMATED: CPT | Performed by: INTERNAL MEDICINE

## 2019-06-27 PROCEDURE — 97116 GAIT TRAINING THERAPY: CPT

## 2019-06-27 PROCEDURE — 97535 SELF CARE MNGMENT TRAINING: CPT

## 2019-06-27 RX ORDER — POLYETHYLENE GLYCOL 3350 17 G/17G
17 POWDER, FOR SOLUTION ORAL DAILY
Status: DISCONTINUED | OUTPATIENT
Start: 2019-06-27 | End: 2019-07-02 | Stop reason: HOSPADM

## 2019-06-27 RX ORDER — FUROSEMIDE 10 MG/ML
80 INJECTION INTRAMUSCULAR; INTRAVENOUS
Status: COMPLETED | OUTPATIENT
Start: 2019-06-27 | End: 2019-06-27

## 2019-06-27 RX ORDER — DILTIAZEM HYDROCHLORIDE 120 MG/1
240 CAPSULE, COATED, EXTENDED RELEASE ORAL DAILY
Status: DISCONTINUED | OUTPATIENT
Start: 2019-06-28 | End: 2019-06-27

## 2019-06-27 RX ORDER — DILTIAZEM HYDROCHLORIDE 120 MG/1
240 CAPSULE, COATED, EXTENDED RELEASE ORAL DAILY
Status: DISCONTINUED | OUTPATIENT
Start: 2019-06-28 | End: 2019-06-28

## 2019-06-27 RX ADMIN — DILTIAZEM HYDROCHLORIDE 60 MG: 60 TABLET, FILM COATED ORAL at 12:21

## 2019-06-27 RX ADMIN — ENOXAPARIN SODIUM 80 MG: 100 INJECTION SUBCUTANEOUS at 08:47

## 2019-06-27 RX ADMIN — SERTRALINE HYDROCHLORIDE 50 MG: 50 TABLET ORAL at 08:47

## 2019-06-27 RX ADMIN — MONTELUKAST SODIUM 10 MG: 10 TABLET, COATED ORAL at 08:47

## 2019-06-27 RX ADMIN — OXYBUTYNIN CHLORIDE 5 MG: 5 TABLET ORAL at 08:47

## 2019-06-27 RX ADMIN — FUROSEMIDE 80 MG: 10 INJECTION, SOLUTION INTRAMUSCULAR; INTRAVENOUS at 12:21

## 2019-06-27 RX ADMIN — FUROSEMIDE 80 MG: 10 INJECTION, SOLUTION INTRAMUSCULAR; INTRAVENOUS at 17:10

## 2019-06-27 RX ADMIN — POLYETHYLENE GLYCOL 3350 17 G: 17 POWDER, FOR SOLUTION ORAL at 20:40

## 2019-06-27 RX ADMIN — APIXABAN 2.5 MG: 2.5 TABLET, FILM COATED ORAL at 12:20

## 2019-06-27 RX ADMIN — METOPROLOL TARTRATE 25 MG: 25 TABLET, FILM COATED ORAL at 08:47

## 2019-06-27 RX ADMIN — PANTOPRAZOLE SODIUM 40 MG: 40 TABLET, DELAYED RELEASE ORAL at 05:31

## 2019-06-27 RX ADMIN — ACETAMINOPHEN 650 MG: 325 TABLET ORAL at 09:03

## 2019-06-27 RX ADMIN — DILTIAZEM HYDROCHLORIDE 60 MG: 60 TABLET, FILM COATED ORAL at 00:41

## 2019-06-27 RX ADMIN — DILTIAZEM HYDROCHLORIDE 60 MG: 60 TABLET, FILM COATED ORAL at 17:10

## 2019-06-27 RX ADMIN — APIXABAN 2.5 MG: 2.5 TABLET, FILM COATED ORAL at 17:10

## 2019-06-27 RX ADMIN — DILTIAZEM HYDROCHLORIDE 60 MG: 60 TABLET, FILM COATED ORAL at 05:31

## 2019-06-27 RX ADMIN — METOPROLOL TARTRATE 25 MG: 25 TABLET, FILM COATED ORAL at 17:10

## 2019-06-27 NOTE — PROGRESS NOTES
Progress Note - Izaiah Armendariz 1935, 81 y o  female MRN: [id number]    Unit/Bed#: ICU 03 Encounter: [id number]    Primary Care Provider: Yeni Márquez MD   Date and time admitted to hospital: 6/25/2019  1:56 PM        Mitral valve stenosis  Assessment & Plan  · Cardiology following regarding discussion of valve replacement    Acute on chronic diastolic congestive heart failure (HCC)  Assessment & Plan  Wt Readings from Last 3 Encounters:   06/27/19 76 1 kg (167 lb 12 8 oz)   06/25/19 69 4 kg (153 lb)   06/05/19 71 2 kg (157 lb)       Diuresis under the guidance of Cardiology      Nausea  Assessment & Plan  · Along with abdominal pain  · Unclear etiology suspected this to be related to new onset AFib versus CHF  · CT unremarkable, incidental adrenal nodule noted, incidental finding note completed  · Continue on PPI  · Soft/low-fat diet    HUY (acute kidney injury) ([facility] [address] )  Assessment & Plan  · Perhaps related to hemodynamic instability related to atrial fibrillation  · Appreciate Nephrology input  · Creatinine modestly improved  · Continue trend and monitor    Coronary artery disease involving native coronary artery of native heart without angina pectoris  Assessment & Plan  · Will continue with current regimen  · The patient is asymptomatic  · Cardiology is following    * Atrial fibrillation with rapid ventricular response ([facility] [address] )  Assessment & Plan  · New onset atrial fibrillation  · With fluctuating rates on p o  Medications  · We will defer to Cardiology regarding medication adjustments  · Started on Lovenox in terms of anticoagulation        VTE Pharmacologic Prophylaxis: Pharmacologic: Enoxaparin (Lovenox)    Patient Centered Rounds: I have performed bedside rounds with nursing staff today      Discussions with Specialists or Other Care Team Provider: n/a  Education and Discussions with Family / Patient: patient    Current Length of Stay: 2 day(s)    Current Patient Status: Inpatient   Certification Statement: The patient will continue to require additional inpatient hospital stay due to afib with rvr, lucero    Discharge Plan: pending hospital course    Code Status: Level 3 - DNAR and DNI    Subjective:   Patient seen examined  No acute events overnight  Still feeling very fatigued    Objective:     Vitals:   Temp (24hrs), Av 5 °F (36 4 °C), Min:97 1 °F (36 2 °C), Max:98 1 °F (36 7 °C)    Temp:  [97 1 °F (36 2 °C)-98 1 °F (36 7 °C)] 97 2 °F (36 2 °C)  HR:  [] 115  Resp:  [7-30] 18  BP: ()/(50-76) 106/76  SpO2:  [91 %-98 %] 95 %  Body mass index is 33 89 kg/m²  Input and Output Summary (last 24 hours): Intake/Output Summary (Last 24 hours) at 2019 0852  Last data filed at 2019 0415  Gross per 24 hour   Intake 2145 7 ml   Output 1150 ml   Net 995 7 ml       Physical Exam:     Physical Exam   Constitutional: She is oriented to person, place, and time  She appears well-developed and well-nourished  HENT:   Head: Normocephalic and atraumatic  Eyes: Pupils are equal, round, and reactive to light  EOM are normal    Neck: Normal range of motion  Neck supple  Cardiovascular:   Murmur heard  Tachycardia, irregularly irregular   Pulmonary/Chest: Effort normal  No respiratory distress  Abdominal: Soft  Bowel sounds are normal    Obese   Musculoskeletal: Normal range of motion  She exhibits no edema  Neurological: She is alert and oriented to person, place, and time  Skin: Skin is warm  Capillary refill takes less than 2 seconds  Psychiatric: She has a normal mood and affect  Her behavior is normal  Judgment and thought content normal    Nursing note and vitals reviewed        Additional Data:     Labs:    Results from last 7 days   Lab Units 19  0439  19  1422   WBC Thousand/uL 7 80   < > 10 30   HEMOGLOBIN g/dL 12 0   < > 13 2   HEMATOCRIT % 35 1*   < > 39 6*   PLATELETS Thousands/uL 205   < > 240   NEUTROS PCT %  --   --  82*   LYMPHS PCT %  --   --  10* MONOS PCT %  --   --  7   EOS PCT %  --   --  1    < > = values in this interval not displayed  Results from last 7 days   Lab Units 06/27/19  0439 06/26/19  0444   POTASSIUM mmol/L 4 5 4 5   CHLORIDE mmol/L 99 100   CO2 mmol/L 24 22   BUN mg/dL 42* 46*   CREATININE mg/dL 1 69* 1 67*   CALCIUM mg/dL 9 1 8 4*   ALK PHOS U/L  --  55   ALT U/L  --  33   AST U/L  --  25     Results from last 7 days   Lab Units 06/25/19  1422   INR  1 20               * I Have Reviewed All Lab Data Listed Above  * Additional Pertinent Lab Tests Reviewed: [address] Admission  Reviewed    Imaging:  Imaging Reports Reviewed Today Include: n/a    Recent Cultures (last 7 days):           Last 24 Hours Medication List:     Current Facility-Administered Medications:  acetaminophen 650 mg Oral Q6H PRN Brenda Comer, COURTNEYNP   ALPRAZolam 0 25 mg Oral TID PRN Brenda Comer, NEAL   diltiazem 60 mg Oral Q6H [facility] Sinai Mejia PA-C   enoxaparin 1 mg/kg Subcutaneous Q24H [facility] Sinai Mejia PA-C   metoprolol tartrate 25 mg Oral BID Brenda Comer, NEAL   montelukast 10 mg Oral Daily Brenda Comer, NEAL   ondansetron 4 mg Intravenous Q6H PRN Brenda Dinnuria, COURTNEYNP   oxybutynin 5 mg Oral Daily Brenda Comer, CRNP   pantoprazole 40 mg Oral Early Morning Brenda Comer, CRNP   sertraline 50 mg Oral Daily NEAL [place]        Today, Patient Was Seen By: Carlos Burrell MD    ** Please Note: Dictation voice to text software may have been used in the creation of this document   **

## 2019-06-27 NOTE — OCCUPATIONAL THERAPY NOTE
06/27/19 1355   Restrictions/Precautions   Weight Bearing Precautions Per Order No   Other Precautions Multiple lines;Telemetry;O2;Fall Risk   Pain Assessment   Pain Assessment No/denies pain   ADL   Where Assessed Chair   Equipment Provided Other (Comment)  (instructed in optimal breathing technique (nasal cannula))   Grooming Assistance 5  Supervision/Setup   Grooming Deficit Setup; Increased time to complete   Grooming Comments patient brushed teeth and combed hair - with good interest and results   UB Bathing Assistance 5  Supervision/Setup   UB Bathing Deficit Setup;Verbal cueing;Supervision/safety   UB Bathing Comments cued throughout to take rest periods   LB Bathing Assistance 5  Supervision/Setup   LB Bathing Deficit Setup;Verbal cueing;Supervision/safety; Increased time to complete;Right lower leg including foot; Left lower leg including foot   LB Bathing Comments patient actually Declined assistance with lower legs and feet today but reports that she utilizes LHAE at home to accomplish same  She was able to weight shift and push up a few inches from chair to wash buttocks and perineal area    UB Dressing Assistance 4  Minimal Assistance   UB Dressing Comments With hospital gown, management of snaps/assembly   LB Dressing Comments patient declined anything regarding feet today   Coordination   Gross Motor [facility]   Dexterity WFL   Cognition   Overall Cognitive Status [facility]   Arousal/Participation Alert; Cooperative   Comments verbally sociable with this familiar staff   Activity Tolerance   Activity Tolerance Patient tolerated treatment well; Other (Comment)  (heart rate increased with this activity at times)   Assessment   Assessment Patient participated in Skilled OT session this date with interventions consisting of ADL re training with the use of correct body mechnaics, Energy Conservation techniques and deep breathing technique    ( patient stated "I've never had anyone explain the breathing technique that way before " Was pleased with same  )  Patient agreeable to OT treatment session, upon arrival patient was found out of bed in chair  Patient requiring verbal cues for safety, verbal cues for pacing thru activity steps and frequent rest periods, otherwise displayed good motion and sequencing within task (bathing session)  Patient continues to be functioning below baseline level, occupational performance remains limited secondary to factors listed above and increased risk for falls and injury  From OT standpoint, recommendation at time of d/c would be Short Term Rehab  Patient to benefit from continued Occupational Therapy treatment while in the hospital to address deficits as defined above and maximize level of functional independence with ADLs and functional mobility      Plan   Treatment Interventions ADL retraining;Energy conservation   Goal Expiration Date 07/06/19   Treatment Day 172 DON Dow/L

## 2019-06-27 NOTE — PROGRESS NOTES
Progress Note - Nephrology   Binta Liao 81 y o  female MRN: [id number]  Unit/Bed#: ICU 03 Encounter: [id number]    A/P:  1  Acute kidney failure on top of chronic kidney disease               patient's fraction excretion of sodium less 1%, most likely due to hypovolemia at presentation along with hemodynamics instability in form of atrial fibrillation rapid ventricular response  Unfortunately patient continues to have significant dysrhythmias at this time which the patient is aware of but denies any worrisome sequela such as no shortness of breath nausea vomiting diarrhea or diaphoresis  Patient appears appropriate from volume standpoint and would therefore discouraged any additional volume via the IV route  Continue to focus on heart rate control which will defer to Cardiology  2  Chronic kidney disease stage 2 with baseline creatinine between 0 7 - 0 8 mg/dL    3  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              I stressed the importance of avoiding nonsteroidal anti-inflammatory medications as possible   ============  4  Hyponatremia               sodium stable over last 24 hours or so with a slight trend to would improving up at 131 millimole/L this morning  I will place the patient on a mild fluid restriction, continue monitor closely  5  Right adrenal adenoma -most likely benign   This was noted on CT scan without contrast, adenoma is less than 2 5 cm  Recommendation at this point is to repeat imaging in approximately 12 months to ensure adenoma is stable  6  Hypotension most likely due to hypovolemia and hemodynamic instability               improved, continue to address dysrhythmia  7  Atrial fibrillation with rapid ventricular response              As mentioned above  Defer to Cardiology  8  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  9  Chronic diastolic congestive heart failure with potential acute episode   Patient placed on Lasix 80 mg IV b i d , follow clinical and laboratory findings closely    Continue to adjust as indicated according to Cardiology  10  Severe mitral regurgitation       Follow up reason for today's visit:  Acute kidney injury/electrolyte disorders/chronic kidney disease    Atrial fibrillation with rapid ventricular response [facility])    Patient Active Problem List   Diagnosis    Coronary artery disease involving native coronary artery of native heart without angina pectoris    SOB (shortness of breath)    Localized edema    Atherosclerosis of both carotid arteries    Non-rheumatic mitral regurgitation    Other fatigue    Atrial fibrillation with rapid ventricular response [facility] [address]    HUY (acute kidney injury) ([facility] [address] )    Nausea    Acute on chronic diastolic congestive heart failure (HCC)    Mitral valve stenosis         Subjective:   Patient continues to complain of palpitations, currently not associated with diaphoresis/shortness of breath/nausea or vomiting    Objective:     Vitals: Blood pressure 98/60, pulse (!) 114, temperature (!) 97 2 °F (36 2 °C), temperature source Tympanic, resp  rate (!) 33, height 4' 11" (1 499 m), weight 76 1 kg (167 lb 12 8 oz), SpO2 91 %  ,Body mass index is 33 89 kg/m²  Weight (last 2 days)     Date/Time   Weight    06/27/19 0540   76 1 (167 8)    06/26/19 0500   76 (167 55)    06/25/19 1355   69 4 (153)                Intake/Output Summary (Last 24 hours) at 6/27/2019 1527  Last data filed at 6/27/2019 1321  Gross per 24 hour   Intake 1893 75 ml   Output 1050 ml   Net 843 75 ml     I/O last 3 completed shifts: In: 4309 3 [P O :540; I V :2009 3;  IV Piggyback:1760]  Out: 1750 [Urine:1750]         Physical Exam: BP 98/60   Pulse (!) 114   Temp (!) 97 2 °F (36 2 °C) (Tympanic)   Resp (!) 33   Ht 4' 11" (1 499 m)   Wt 76 1 kg (167 lb 12 8 oz)   SpO2 91%   BMI 33 89 kg/m²     General Appearance:    Alert, cooperative, no distress, appears stated age   Head:    Normocephalic, without obvious abnormality, atraumatic   Eyes:    Conjunctiva/corneas clear Ears:    Normal external ears   Nose:   Nares normal, septum midline, mucosa normal, no drainage    or sinus tenderness   Throat:   Lips, mucosa, and tongue normal; teeth and gums normal   Neck:   Supple   Back:     Symmetric, no curvature, ROM normal, no CVA tenderness   Lungs:     Clear to auscultation bilaterally, respirations unlabored   Chest wall:    No tenderness or deformity   Heart:    Regular rate and rhythm, S1 and S2 normal, no murmur, rub   or gallop   Abdomen:     Soft, non-tender, bowel sounds active   Extremities:   Extremities normal, atraumatic, no cyanosis, trace bilateral lower extremity edema   Skin:   Skin color, texture, turgor normal, no rashes or lesions   Lymph nodes:   Cervical normal   Neurologic:   CNII-XII intact            Lab, Imaging and other studies: I have personally reviewed pertinent labs  CBC:   Lab Results   Component Value Date    WBC 7 80 06/27/2019    HGB 12 0 06/27/2019    HCT 35 1 (L) 06/27/2019    MCV 89 06/27/2019     06/27/2019    MCH 30 4 06/27/2019    MCHC 34 1 06/27/2019    RDW 15 9 (H) 06/27/2019    MPV 8 1 (L) 06/27/2019     CMP:   Lab Results   Component Value Date    K 4 5 06/27/2019    CL 99 06/27/2019    CO2 24 06/27/2019    BUN 42 (H) 06/27/2019    CREATININE 1 69 (H) 06/27/2019    CALCIUM 9 1 06/27/2019    EGFR 28 06/27/2019           Results from last 7 days   Lab Units 06/27/19  0439 06/26/19  0444 06/25/19  1422   POTASSIUM mmol/L 4 5 4 5 4 5   CHLORIDE mmol/L 99 100 95*   CO2 mmol/L 24 22 24   BUN mg/dL 42* 46* 48*   CREATININE mg/dL 1 69* 1 67* 1 80*   CALCIUM mg/dL 9 1 8 4* 9 5   ALK PHOS U/L  --  55 68   ALT U/L  --  33 44   AST U/L  --  25 36         Phosphorus: No results found for: PHOS  Magnesium: No results found for: MG  Urinalysis: No results found for: COLORU, CLARITYU, SPECGRAV, PHUR, LEUKOCYTESUR, NITRITE, PROTEINUA, GLUCOSEU, KETONESU, BILIRUBINUR, BLOODU  Ionized Calcium: No results found for: CAION  Coagulation: No results found for: PT, INR, APTT  Troponin: No results found for: TROPONINI  ABG: No results found for: PHART, DMG4CFK, PO2ART, AVJ1TVM, U3PIZWIM, BEART, SOURCE  Radiology review:     IMAGING  Procedure: Ct Abdomen Pelvis Wo Contrast    Result Date: 6/26/2019  Narrative: CT ABDOMEN AND PELVIS WITHOUT IV CONTRAST INDICATION:   Abdominal pain, unspecified with nausea  COMPARISON:  None  TECHNIQUE:  CT examination of the abdomen and pelvis was performed without intravenous contrast   Axial, sagittal, and coronal 2D reformatted images were created from the source data and submitted for interpretation  Radiation dose length product (DLP) for this visit:  575 mGy-cm   This examination, like all CT scans performed in the [facility], was performed utilizing techniques to minimize radiation dose exposure, including the use of iterative reconstruction and automated exposure control  Enteric contrast was not administered  FINDINGS: ABDOMEN LOWER CHEST:  No clinically significant abnormality identified in the visualized lower chest   Mitral annulus calcifications noted  LIVER/BILIARY TREE:  Unremarkable  GALLBLADDER:  No calcified gallstones  No pericholecystic inflammatory change  SPLEEN:  Unremarkable  PANCREAS:  Unremarkable  ADRENAL GLANDS:  1 3 cm right adrenal nodule  Normal left adrenal gland  KIDNEYS/URETERS:  Unremarkable  No hydronephrosis  STOMACH AND BOWEL:  Unremarkable  APPENDIX:  A normal appendix was visualized  ABDOMINOPELVIC CAVITY:  No ascites or free intraperitoneal air  No lymphadenopathy  VESSELS:  Atherosclerotic changes are present  No evidence of aneurysm  PELVIS REPRODUCTIVE ORGANS:  Unremarkable for patient's age  URINARY BLADDER:  Unremarkable  ABDOMINAL WALL/INGUINAL REGIONS:  Unremarkable  OSSEOUS STRUCTURES: Compression deformity at T12  Age appropriate degenerative changes in the lumbar spine  No acute fracture or destructive osseous lesion    Partially imaged antegrade intramedullary nail and cephalomedullary screw in the right femur  Impression: 1  No acute abnormality in the abdomen or pelvis  2   There is a 1 3 cm right adrenal gland nodule  Although its imaging features are indeterminate, it does not have suspicious imaging features (heterogeneity, necrosis, irregular margins), therefore this is likely benign, and can be followed by non-contrast abdomen CT or MRI in 12 months  If patient has history of adrenal hyperfunction, consider biochemical evaluation  Adrenal recommendation based on institutional consensus and Journal of [employer] of Radiology 2017;14:[id number] The study was marked in [facility] for significant notification  Workstation performed: [id number]     Procedure: Us Kidney And Bladder    Result Date: 6/26/2019  Narrative: RENAL ULTRASOUND INDICATION:   RENAL FAILURE, ACUTE   Karlie Rower COMPARISON: None TECHNIQUE: Portable ultrasound of the retroperitoneum was performed with a curvilinear transducer utilizing volumetric sweeps and still imaging techniques  FINDINGS: KIDNEYS: Symmetric and normal size  Right kidney:  10 5 x 4 5 cm  Left kidney:  10 9 x 5 0 cm  Right kidney Normal echogenicity and contour  No suspicious masses detected  No hydronephrosis  No shadowing calculi  No perinephric fluid collections  Left kidney Normal echogenicity and contour  No suspicious masses detected  No hydronephrosis  No shadowing calculi  No perinephric fluid collections  URETERS: Nonvisualized  BLADDER: Normally distended  No focal thickening or mass lesions  Fluid collection above the bladder  Impression: Normal kidneys and bladder  Fluid collection above the bladder measuring approximately 2 1 x 2 1 x 4 8 cm of uncertain etiology  Consider CT scan for further evaluation  The study was marked in [facility] for immediate notification   Workstation performed: [id number]       Current Facility-Administered Medications   Medication Dose Route Frequency    acetaminophen (TYLENOL) tablet 650 mg  650 mg Oral Q6H PRN    ALPRAZolam (XANAX) tablet 0 25 mg  0 25 mg Oral TID PRN    apixaban (ELIQUIS) tablet 2 5 mg  2 5 mg Oral BID    [START ON 6/28/2019] diltiazem (CARDIZEM CD) 24 hr capsule 240 mg  240 mg Oral Daily    diltiazem (CARDIZEM) tablet 60 mg  60 mg Oral Q6H [address]    furosemide (LASIX) injection 80 mg  80 mg Intravenous BID (diuretic)    metoprolol tartrate (LOPRESSOR) tablet 25 mg  25 mg Oral BID    montelukast (SINGULAIR) tablet 10 mg  10 mg Oral Daily    ondansetron (ZOFRAN) injection 4 mg  4 mg Intravenous Q6H PRN    oxybutynin (DITROPAN) tablet 5 mg  5 mg Oral Daily    pantoprazole (PROTONIX) EC tablet 40 mg  40 mg Oral Early Morning    sertraline (ZOLOFT) tablet 50 mg  50 mg Oral Daily     Medications Discontinued During This Encounter   Medication Reason    diltiazem (CARDIZEM) 125 mg in sodium chloride 0 9 % 125 mL infusion     heparin (porcine) subcutaneous injection 5,000 Units     sodium chloride 0 9 % bolus 500 mL     aspirin chewable tablet 81 mg     diltiazem (CARDIZEM CD) 24 hr capsule 240 mg     enoxaparin (LOVENOX) subcutaneous injection 80 mg        Christin Alexis DO

## 2019-06-27 NOTE — PHYSICAL THERAPY NOTE
Physical Therapy Treatment Session Note    Patient's Name: Amy Youngblood    Admitting Diagnosis  Hyponatremia [E87 1]  Irregular heart beat [I49 9]  Elevated troponin [R74 8]  Atrial fibrillation with RVR ([facility] [address] ) [I48 91]  Acute kidney injury ([facility] [address] ) [N17 9]  Coronary artery disease involving native coronary artery of native heart without angina pectoris [I25 10]    Problem List  Patient Active Problem List   Diagnosis    Coronary artery disease involving native coronary artery of native heart without angina pectoris    SOB (shortness of breath)    Localized edema    Atherosclerosis of both carotid arteries    Non-rheumatic mitral regurgitation    Other fatigue    Atrial fibrillation with rapid ventricular response ([facility] [address] )    HUY (acute kidney injury) ([facility] [address] )    Nausea    Acute on chronic diastolic congestive heart failure ([facility] [address] )    Mitral valve stenosis       Past Medical History  Past Medical History:   Diagnosis Date    Acute on chronic diastolic congestive heart failure ([facility] [address] ) 6/27/2019    Arthritis     Coronary artery disease     history of stenting    History of echocardiogram 07/20/2017    EF 55%, mild concentric LVH, bileaflet MVP with moderate MR, left atrial enlargement   History of transfusion     Hyperlipidemia     Hypertension     Mitral regurgitation        Past Surgical History  Past Surgical History:   Procedure Laterality Date    ABDOMINAL SURGERY      hysterectomy    CARDIAC CATHETERIZATION  04/10/2012    EF 65%, no significant CAD, patent stents, severe MR     CORONARY ANGIOPLASTY WITH STENT PLACEMENT  03/21/2007    EF 55%, GARRET to LAD      EYE SURGERY      b/l cataracts    HYSTERECTOMY          06/27/19 1042   Pain Assessment   Pain Assessment No/denies pain   Pain Score No Pain   Restrictions/Precautions   Weight Bearing Precautions Per Order No   Other Precautions Telemetry;Multiple lines;O2;Fall Risk  (2 L O2 via NC)   General   Chart Reviewed Yes   Response to Previous Treatment Patient with no complaints from previous session  Family/Caregiver Present No   Cognition   Overall Cognitive Status WFL   Arousal/Participation Alert; Cooperative   Attention Within functional limits   Orientation Level Oriented X4   Memory Within functional limits   Following Commands Follows one step commands without difficulty   Subjective   Subjective "I'm feeling ok today"   Bed Mobility   Rolling L 4  Minimal assistance   Additional items Assist x 1;HOB elevated; Bedrails; Increased time required;Verbal cues;LE management   Supine to Sit 4  Minimal assistance   Additional items Assist x 1;HOB elevated; Bedrails; Increased time required;Verbal cues;LE management   Sit to Supine   (DNT as pt  remained OOB in chair upon conclusion )   Additional Comments Upon initial transfer to bedside, patient reported increased dizziness & was provided extra time for symptom resolution prior to completing WB tasks  Transfers   Sit to Stand 3  Moderate assistance   Additional items Assist x 1;Bedrails; Increased time required;Verbal cues  (x 3 trials to modulate dizziness,skin checks w/nursing staff)   Stand to Sit 3  Moderate assistance   Additional items Assist x 2;Armrests; Impulsive;Verbal cues   Stand pivot 3  Moderate assistance   Additional items Assist x 2;Verbal cues; Increased time required   Ambulation/Elevation   Gait pattern Improper Weight shift;Narrow FILOMENA; Forward Flexion;Decreased foot clearance; Short stride; Inconsistent judy   Gait Assistance 4  Minimal assist   Additional items Assist x 2;Verbal cues; Tactile cues  (FILOMENA adjustment, safety, RW usage)   Assistive Device Rolling walker   Distance 5 feet from bedside->chair   Stair Management Assistance Not tested   Balance   Static Sitting Fair +   Dynamic Sitting Fair   Static Standing Fair -   Dynamic Standing Fair -   Endurance Deficit   Endurance Deficit Yes   Endurance Deficit Description dizziness, fatigue w/ positional changes   (HR 123->137->121->90   /71   POX 93% w/2L O2)   Activity Tolerance   Activity Tolerance Treatment limited secondary to medical complications (Comment)   Nurse Made Aware yes, Dilmasparkle Vance present, assisted w/ mobilization to chair  Assessment   Prognosis Fair   Problem List Decreased strength;Decreased endurance; Impaired balance;Decreased mobility;Obesity   Assessment Pt seen for PT treatment session this date with interventions consisting of gait training w/ emphasis on improving pt's ability to ambulate level surfaces x 5 feet with min A of 2 provided by therapist with RW and therapeutic activity consisting of training: bed mobility, supine<>sit transfers, sit<>stand transfers, static sitting tolerance at EOB for 7 minutes w/ B UE support, static standing tolerance for 2 minutes w/ B UE support, vc and tactile cues for static sitting posture faciliation, vc and tactile cues for static standing posture faciliation and stand pivot transfers towards L direction  Pt agreeable to PT treatment session upon arrival, pt found supine in bed w/ HOB elevated, in no apparent distress and A&O x 4  In comparison to previous session, pt with improvements in functional task progression to OOB  Post session: all needs in reach Continue to recommend STR at time of d/c in order to maximize pt's functional independence and safety w/ mobility  Pt continues to be functioning below baseline level, and remains limited 2* factors listed above and including weakness, impaired balance, gait dysfunction, decreased endurance, elevated HR,O2 usage, decline from PLOF  PT will continue to see pt while here in order to address the deficits listed above and provide interventions consistent w/ POC in effort to achieve LTGs     Goals   Patient Goals get better soon   LTG Expiration Date 07/06/19   Long Term Goal #1 LTGs remain appropriate   Treatment Day 2   Plan   Treatment/Interventions Functional transfer training;MIREYA strengthening/ROM; Therapeutic exercise; Endurance training;Bed mobility;Gait training;Equipment eval/education;Spoke to nursing;OT  (&neuro re-education w/balance training)   Progress Slow progress, medical status limitations   PT Frequency Other (Comment)  (3-5x/week)   Recommendation   Recommendation Short-term skilled PT   Equipment Recommended Walker  (pt  has own)   PT - OK to Discharge Yes  (If medically stable to STR)   Additional Comments Upon conclusion, patient was sitting OOB in chair w/ all needs within reach         Stephany Abt, PT

## 2019-06-27 NOTE — PROGRESS NOTES
Progress Note - Keesha James 1935, 81 y o  female MRN: [id number]    Unit/Bed#: ICU 03 Encounter: [id number]    Primary Care Provider: Korene Kayser, MD   Date and time admitted to hospital: 6/25/2019  1:56 PM    ASSESSMENT/PLAN:   New onset afib  · Remains in afib with rates fluctuating between   Multiple asymptomatic 2-3 second pauses noted on telemetry  For this reason, will not escalate AV andrew blockade  Continue diltiazem 60mg PO q6h today - will start long-acting cardizem CD tomorrow morning  Continue metoprolol 25mg PO BID  · [id number] - Although she is somewhat of a fall risk, she is at high risk of CVA given high Chads score and severe MR  Pt is agreeable to starting low dose Eliquis  Last fall was 5 years ago       Acute on chronic diastolic HF   · BNP 1609 on presentation, proBNP 9927 two weeks ago  · No significant diuresis with lasix yesterday, Cr unchanged from yesterday  Weight unchanged  Will give lasix 80mg IV BID today       Mitral regurgitation   · Severe by cath in 2012, moderate by echo this morning   · Likely contributing to HF decompensation   · When/if renal function improves, would benefit from lisinopril for afterload reduction   · The patient is not interested in valve repair at this time     HUY   · Baseline Cr 0 6-0 8, was 1 8 on presentation   · Cr this morning stable from yesterday     CAD   · S/p GARRET to LAD in 2007  · Discontinue ASA with addition of anticoagulation   · No recent angina      HTN   · Continue metoprolol and diltiazem with hold parameters      HLD   · Not on statin therapy   · LDL 69 on 3/15/19    SUBJECTIVE    HPI: The patient was seen and examined at the bedside  No events overnight  Dyspnea remains unchanged  She reports frequent urination with lasix yesterday but weight is unchanged  She states that she would not like to undergo any invasive procedures on her valve at this point  Telemetry: Afib, rates 80-120s   Multiple 2-3 second pauses  Most recent cardiac testing:   TTE today - EF 60%  Moderate LVH  Moderate MR with marked prolapse of the anterior and posterior leaflets  Mild TR with moderate pulm HTN     TTE 2017 - EF 55%, moderate MR     Cardiac cath 2012 - patent LAD stent, severe MR   No significant CAD     Current medications:     Current Facility-Administered Medications:     acetaminophen (TYLENOL) tablet 650 mg, 650 mg, Oral, Q6H PRN, Brenda Comer, CRNP, 650 mg at 06/27/19 6248    ALPRAZolam (XANAX) tablet 0 25 mg, 0 25 mg, Oral, TID PRN, COURTNEY MontgomeryNP, 0 25 mg at 06/26/19 0754    apixaban (ELIQUIS) tablet 2 5 mg, 2 5 mg, Oral, BID, Lynda Rodas PA-C    [START ON 6/28/2019] diltiazem (CARDIZEM CD) 24 hr capsule 240 mg, 240 mg, Oral, Daily, iSnai Mejia PA-C    diltiazem (CARDIZEM) tablet 60 mg, 60 mg, Oral, Q6H [facility], Lynda Rodas PA-C, 60 mg at 06/27/19 0531    furosemide (LASIX) injection 80 mg, 80 mg, Intravenous, BID (diuretic), Lynda Rodas PA-C    metoprolol tartrate (LOPRESSOR) tablet 25 mg, 25 mg, Oral, BID, Brenda Comer, COURTNEYNP, 25 mg at 06/27/19 0847    montelukast (SINGULAIR) tablet 10 mg, 10 mg, Oral, Daily, COURTNEY MontgomeryNP, 10 mg at 06/27/19 0847    ondansetron (ZOFRAN) injection 4 mg, 4 mg, Intravenous, Q6H PRN, COURTNEY MontgomeryNP, 4 mg at 06/25/19 1748    oxybutynin (DITROPAN) tablet 5 mg, 5 mg, Oral, Daily, COURTNEY MontgomeryNP, 5 mg at 06/27/19 0847    pantoprazole (PROTONIX) EC tablet 40 mg, 40 mg, Oral, Early Morning, Brenda Comer CRNP, 40 mg at 06/27/19 0531    sertraline (ZOLOFT) tablet 50 mg, 50 mg, Oral, Daily, NEAL Montgomery, 50 mg at 06/27/19 9170     Allergies   Allergen Reactions    Chocolate Flavor     Iodine Other (See Comments)     shellfish- difficulty breathing    Lactose     Shellfish-Derived Products     Codeine Rash       OBJECTIVE  Vitals: Blood pressure 114/71, pulse (!) 122, temperature 97 7 °F (36 5 °C), temperature source Tympanic, resp  rate (!) 30, height 4' 11" (1 499 m), weight 76 1 kg (167 lb 12 8 oz), SpO2 92 %  , Body mass index is 33 89 kg/m² ,   Orthostatic Blood Pressures      Most Recent Value   Blood Pressure  114/71 filed at 06/27/2019 0900   Patient Position - Orthostatic VS  Sitting filed at 06/27/2019 0900          Physical Exam   Physical Exam    Lab Results:   Troponins:   Results from last 7 days   Lab Units 06/25/19  2044 06/25/19  1812 06/25/19  1422   TROPONIN I ng/mL 0 04* 0 05* 0 05*     BNP:  Results from last 6 Months   Lab Units 06/25/19  1812   BNP pg/mL 1,303*     CBC with diff:   Results from last 7 days   Lab Units 06/27/19  0439 06/26/19  0444 06/25/19  1422   WBC Thousand/uL 7 80 7 80 10 30   HEMOGLOBIN g/dL 12 0 10 8* 13 2   HEMATOCRIT % 35 1* 31 4* 39 6*   PLATELETS Thousands/uL 205 184 240   RBC Million/uL 3 93 3 51* 4 40     CMP:  Results from last 7 days   Lab Units 06/27/19  0439 06/26/19  0444 06/25/19  1422   POTASSIUM mmol/L 4 5 4 5 4 5   CHLORIDE mmol/L 99 100 95*   CO2 mmol/L 24 22 24   BUN mg/dL 42* 46* 48*   CREATININE mg/dL 1 69* 1 67* 1 80*   CALCIUM mg/dL 9 1 8 4* 9 5   AST U/L  --  25 36   ALT U/L  --  33 44   ALK PHOS U/L  --  55 68   EGFR ml/min/1 73sq m 28 28 26     TSH:     Coags:   Results from last 7 days   Lab Units 06/25/19  1422   INR  1 20

## 2019-06-27 NOTE — PLAN OF CARE
Problem: PHYSICAL THERAPY ADULT  Goal: Performs mobility at highest level of function for planned discharge setting  See evaluation for individualized goals  Description  Treatment/Interventions: Functional transfer training, LE strengthening/ROM, Therapeutic exercise, Endurance training, Bed mobility, Gait training, Equipment eval/education, Spoke to nursing, OT(& neuro re-education w/balance training)  Equipment Recommended: (TBD based on functional progress)  See flowsheet documentation for full assessment, interventions and recommendations  Claudette Simmons PT     Outcome: Progressing  Note:   Prognosis: Fair  Problem List: Decreased strength, Decreased endurance, Impaired balance, Decreased mobility, Obesity  Assessment: Pt seen for PT treatment session this date with interventions consisting of gait training w/ emphasis on improving pt's ability to ambulate level surfaces x 5 feet with min A of 2 provided by therapist with RW and therapeutic activity consisting of training: bed mobility, supine<>sit transfers, sit<>stand transfers, static sitting tolerance at EOB for 7 minutes w/ B UE support, static standing tolerance for 2 minutes w/ B UE support, vc and tactile cues for static sitting posture faciliation, vc and tactile cues for static standing posture faciliation and stand pivot transfers towards L direction  Pt agreeable to PT treatment session upon arrival, pt found supine in bed w/ HOB elevated, in no apparent distress and A&O x 4  In comparison to previous session, pt with improvements in functional task progression to OOB  Post session: all needs in reach Continue to recommend STR at time of d/c in order to maximize pt's functional independence and safety w/ mobility  Pt continues to be functioning below baseline level, and remains limited 2* factors listed above and including weakness, impaired balance, gait dysfunction, decreased endurance, elevated HR,O2 usage, decline from PLOF   PT will continue to see pt while here in order to address the deficits listed above and provide interventions consistent w/ POC in effort to achieve LTGs  Recommendation: Short-term skilled PT     PT - OK to Discharge: Yes(If medically stable to STR)    See flowsheet documentation for full assessment     Jaylene Koyanagi, PT

## 2019-06-27 NOTE — ASSESSMENT & PLAN NOTE
Wt Readings from Last 3 Encounters:   06/27/19 76 1 kg (167 lb 12 8 oz)   06/25/19 69 4 kg (153 lb)   06/05/19 71 2 kg (157 lb)       Diuresis under the guidance of Cardiology

## 2019-06-27 NOTE — ASSESSMENT & PLAN NOTE
· Perhaps related to hemodynamic instability related to atrial fibrillation  · Appreciate Nephrology input  · Creatinine modestly improved  · Continue trend and monitor

## 2019-06-27 NOTE — ASSESSMENT & PLAN NOTE
· New onset atrial fibrillation  · With fluctuating rates on p o   Medications  · We will defer to Cardiology regarding medication adjustments  · Started on Lovenox in terms of anticoagulation

## 2019-06-27 NOTE — PLAN OF CARE
Problem: Nutrition/Hydration-ADULT  Goal: Nutrient/Hydration intake appropriate for improving, restoring or maintaining nutritional needs  Description  Monitor and assess patient's nutrition/hydration status for malnutrition (ex- brittle hair, bruises, dry skin, pale skin and conjunctiva, muscle wasting, smooth red tongue, and disorientation)  Collaborate with interdisciplinary team and initiate plan and interventions as ordered  Monitor patient's weight and dietary intake as ordered or per policy  Utilize nutrition screening tool and intervene per policy  Determine patient's food preferences and provide high-protein, high-caloric foods as appropriate  INTERVENTIONS:  - Monitor oral intake, urinary output, labs, and treatment plans  - Assess nutrition and hydration status and recommend course of action  - Evaluate amount of meals eaten  - Assist patient with eating if necessary   - Allow adequate time for meals  - Recommend/ encourage appropriate diets, oral nutritional supplements, and vitamin/mineral supplements  - Order, calculate, and assess calorie counts as needed  - Recommend, monitor, and adjust tube feedings and TPN/PPN based on assessed needs  - Assess need for intravenous fluids  - Provide specific nutrition/hydration education as appropriate  - Include patient/family/caregiver in decisions related to nutrition  Outcome: Progressing   Patient ordered cardiovascular diet lactose restricted  She has eczema  She is missing 9 teeth  She reported a decreased appetite and is consuming 25 of her meals  Her BI is 33 89 obese class 1  Her wt hx cannot be verified through chart review, she stated her ubw is 163 lb  Her meds include IVF zoloft and protonix  Her labs 6/37/19  Na 131 BUN 42 Creat 1 69 Gluc 118 e gfr 28  Recommend continue current diet, provide ensure compact vanilla at meals

## 2019-06-27 NOTE — ASSESSMENT & PLAN NOTE
· Along with abdominal pain  · Unclear etiology suspected this to be related to new onset AFib versus CHF  · CT unremarkable, incidental adrenal nodule noted, incidental finding note completed  · Continue on PPI  · Soft/low-fat diet

## 2019-06-27 NOTE — INCIDENTAL FINDINGS
The following findings require follow up:  Radiographic finding   Finding:  There is a 1 3 cm right adrenal gland nodule   Follow up required: CT or MRI in 12 months   Follow up should be done within 1 month(s)    Please notify the following clinician to assist with the follow up:   Dr Diaz Failing

## 2019-06-28 PROBLEM — I05.0 MITRAL VALVE STENOSIS: Status: RESOLVED | Noted: 2019-06-27 | Resolved: 2019-06-28

## 2019-06-28 LAB
ANION GAP SERPL CALCULATED.3IONS-SCNC: 8 MMOL/L (ref 4–13)
BUN SERPL-MCNC: 39 MG/DL (ref 7–25)
CALCIUM SERPL-MCNC: 9.3 MG/DL (ref 8.6–10.5)
CHLORIDE SERPL-SCNC: 97 MMOL/L (ref 98–107)
CO2 SERPL-SCNC: 26 MMOL/L (ref 21–31)
CREAT SERPL-MCNC: 1.44 MG/DL (ref 0.6–1.2)
ERYTHROCYTE [DISTWIDTH] IN BLOOD BY AUTOMATED COUNT: 15.6 % (ref 11.5–14.5)
GFR SERPL CREATININE-BSD FRML MDRD: 34 ML/MIN/1.73SQ M
GLUCOSE SERPL-MCNC: 119 MG/DL (ref 65–99)
HCT VFR BLD AUTO: 34.6 % (ref 42–47)
HGB BLD-MCNC: 11.8 G/DL (ref 12–16)
MAGNESIUM SERPL-MCNC: 2.1 MG/DL (ref 1.9–2.7)
MCH RBC QN AUTO: 30.3 PG (ref 26–34)
MCHC RBC AUTO-ENTMCNC: 34 G/DL (ref 31–37)
MCV RBC AUTO: 89 FL (ref 81–99)
OSMOLALITY UR: 272 MMOL/KG
PLATELET # BLD AUTO: 227 THOUSANDS/UL (ref 149–390)
PMV BLD AUTO: 8.3 FL (ref 8.6–11.7)
POTASSIUM SERPL-SCNC: 4 MMOL/L (ref 3.5–5.5)
RBC # BLD AUTO: 3.87 MILLION/UL (ref 3.9–5.2)
SODIUM SERPL-SCNC: 131 MMOL/L (ref 134–143)
TROPONIN I SERPL-MCNC: 0.03 NG/ML
WBC # BLD AUTO: 9.2 THOUSAND/UL (ref 4.8–10.8)

## 2019-06-28 PROCEDURE — 83935 ASSAY OF URINE OSMOLALITY: CPT | Performed by: INTERNAL MEDICINE

## 2019-06-28 PROCEDURE — 80048 BASIC METABOLIC PNL TOTAL CA: CPT | Performed by: INTERNAL MEDICINE

## 2019-06-28 PROCEDURE — 99232 SBSQ HOSP IP/OBS MODERATE 35: CPT | Performed by: INTERNAL MEDICINE

## 2019-06-28 PROCEDURE — 84484 ASSAY OF TROPONIN QUANT: CPT | Performed by: PHYSICIAN ASSISTANT

## 2019-06-28 PROCEDURE — 85027 COMPLETE CBC AUTOMATED: CPT | Performed by: INTERNAL MEDICINE

## 2019-06-28 PROCEDURE — 83735 ASSAY OF MAGNESIUM: CPT | Performed by: INTERNAL MEDICINE

## 2019-06-28 RX ORDER — METOPROLOL TARTRATE 5 MG/5ML
5 INJECTION INTRAVENOUS
Status: COMPLETED | OUTPATIENT
Start: 2019-06-28 | End: 2019-06-28

## 2019-06-28 RX ORDER — DILTIAZEM HYDROCHLORIDE 5 MG/ML
20 INJECTION INTRAVENOUS ONCE
Status: COMPLETED | OUTPATIENT
Start: 2019-06-29 | End: 2019-06-28

## 2019-06-28 RX ORDER — FUROSEMIDE 40 MG/1
40 TABLET ORAL
Status: DISCONTINUED | OUTPATIENT
Start: 2019-06-28 | End: 2019-06-28

## 2019-06-28 RX ORDER — DILTIAZEM HYDROCHLORIDE 5 MG/ML
15 INJECTION INTRAVENOUS ONCE
Status: COMPLETED | OUTPATIENT
Start: 2019-06-28 | End: 2019-06-28

## 2019-06-28 RX ORDER — FUROSEMIDE 10 MG/ML
80 INJECTION INTRAMUSCULAR; INTRAVENOUS ONCE
Status: COMPLETED | OUTPATIENT
Start: 2019-06-28 | End: 2019-06-28

## 2019-06-28 RX ADMIN — METOPROLOL TARTRATE 5 MG: 5 INJECTION, SOLUTION INTRAVENOUS at 15:30

## 2019-06-28 RX ADMIN — METOPROLOL TARTRATE 5 MG: 5 INJECTION, SOLUTION INTRAVENOUS at 15:22

## 2019-06-28 RX ADMIN — METOPROLOL TARTRATE 5 MG: 5 INJECTION, SOLUTION INTRAVENOUS at 15:10

## 2019-06-28 RX ADMIN — ALPRAZOLAM 0.25 MG: 0.25 TABLET ORAL at 04:28

## 2019-06-28 RX ADMIN — DILTIAZEM HYDROCHLORIDE 15 MG: 5 INJECTION INTRAVENOUS at 03:11

## 2019-06-28 RX ADMIN — DILTIAZEM HYDROCHLORIDE 180 MG: 180 CAPSULE, COATED, EXTENDED RELEASE ORAL at 15:11

## 2019-06-28 RX ADMIN — ALPRAZOLAM 0.25 MG: 0.25 TABLET ORAL at 22:53

## 2019-06-28 RX ADMIN — METOPROLOL TARTRATE 25 MG: 25 TABLET, FILM COATED ORAL at 22:54

## 2019-06-28 RX ADMIN — METOPROLOL TARTRATE 25 MG: 25 TABLET, FILM COATED ORAL at 10:07

## 2019-06-28 RX ADMIN — ACETAMINOPHEN 650 MG: 325 TABLET ORAL at 04:27

## 2019-06-28 RX ADMIN — POLYETHYLENE GLYCOL 3350 17 G: 17 POWDER, FOR SOLUTION ORAL at 09:09

## 2019-06-28 RX ADMIN — DILTIAZEM HYDROCHLORIDE 20 MG: 5 INJECTION INTRAVENOUS at 23:57

## 2019-06-28 RX ADMIN — APIXABAN 2.5 MG: 2.5 TABLET, FILM COATED ORAL at 09:09

## 2019-06-28 RX ADMIN — MONTELUKAST SODIUM 10 MG: 10 TABLET, COATED ORAL at 09:09

## 2019-06-28 RX ADMIN — OXYBUTYNIN CHLORIDE 5 MG: 5 TABLET ORAL at 09:09

## 2019-06-28 RX ADMIN — METOPROLOL TARTRATE 25 MG: 25 TABLET, FILM COATED ORAL at 16:50

## 2019-06-28 RX ADMIN — SERTRALINE HYDROCHLORIDE 50 MG: 50 TABLET ORAL at 09:09

## 2019-06-28 RX ADMIN — APIXABAN 2.5 MG: 2.5 TABLET, FILM COATED ORAL at 18:13

## 2019-06-28 RX ADMIN — FUROSEMIDE 80 MG: 10 INJECTION, SOLUTION INTRAMUSCULAR; INTRAVENOUS at 10:07

## 2019-06-28 RX ADMIN — PANTOPRAZOLE SODIUM 40 MG: 40 TABLET, DELAYED RELEASE ORAL at 06:11

## 2019-06-28 NOTE — ASSESSMENT & PLAN NOTE
· New onset atrial fibrillation  · With fluctuating rates on p o   Medications  · We will defer to Cardiology regarding medication adjustments, started on long-acting diltiazem today  · Started on Eliquis in terms of anticoagulation

## 2019-06-28 NOTE — ASSESSMENT & PLAN NOTE
Significant on exam and by echo  She remains quite frail for considering mitral valve intervention and when pressed she really is not interested in going through this type of intervention  Will continue attempts at medical therapy with heart rate control and volume control for now

## 2019-06-28 NOTE — NURSING NOTE
Patient's heart rate noted to dip into the 70-80's with rates predominantly in the 's  When entered patient's room to give cardizem IV inquired about chest pain/ chest discomfort and whether she was experiencing any  Patient responded that she has been having midsternal chest soreness all evening  Inquired as to why patient did not report it earlier when asked and her reply was that she had received pain medication on a prior shift administered according to her for the chest discomfort and was not having any at the time  Pain was reassessed and not reported with subsequent assessments  Regis OVALLES made aware of same

## 2019-06-28 NOTE — SOCIAL WORK
Discussed the POC with the roubrocking team   Pt will need STR  A post acute care recommendation was made by your care team for STR  Discussed Freedom of Choice with patient  List of facilities given to patient via in person  patient aware the list is custom filtered for them by zip code location and that [facility] post acute providers are designated  Sent referrals to the facilities chosen by pt  [place] present and aware of same

## 2019-06-28 NOTE — NURSING NOTE
2L/min NC maintained  VS and sats stable  Awake and alert, oriented  Denies pain  RH IV remains capped  Remains in Afib

## 2019-06-28 NOTE — PROGRESS NOTES
Progress Note - Ike Arora 1935, 81 y o  female MRN: [id number]    Unit/Bed#: ICU 03 Encounter: [id number]    Primary Care Provider: Meghana Stein MD   Date and time admitted to hospital: 6/25/2019  1:56 PM        Mitral valve stenosis  Assessment & Plan  · Cardiology following regarding discussion of valve replacement    Acute on chronic diastolic congestive heart failure (HCC)  Assessment & Plan  Wt Readings from Last 3 Encounters:   06/28/19 77 2 kg (170 lb 1 6 oz)   06/25/19 69 4 kg (153 lb)   06/05/19 71 2 kg (157 lb)       Diuresis under the guidance of Cardiology and nephrology      HUY (acute kidney injury) ([facility] )  Assessment & Plan  · Perhaps related to hemodynamic instability related to atrial fibrillation  · Appreciate Nephrology input  · Creatinine improving with IV diuresis  · Continue trend and monitor    Coronary artery disease involving native coronary artery of native heart without angina pectoris  Assessment & Plan  · Will continue with current regimen  · Mild chest discomfort last night related to elevated rates, now resolved  · Troponin negative  · Cardiology is following    * [facility] )  Assessment & Plan  · New onset atrial fibrillation  · With fluctuating rates on p o  Medications  · We will defer to Cardiology regarding medication adjustments, started on long-acting diltiazem today  · Started on Eliquis in terms of anticoagulation        VTE Pharmacologic Prophylaxis: Pharmacologic: Apixaban (Eliquis)    Patient Centered Rounds: I have performed bedside rounds with nursing staff today      Discussions with Specialists or Other Care Team Provider: cardiology  Education and Discussions with Family / Patient: patient    Current Length of Stay: 3 day(s)    Current Patient Status: Inpatient   Certification Statement: The patient will continue to require additional inpatient hospital stay due to afib    Discharge Plan:  Likely home after rates are improved    Code Status: Level 3 - DNAR and DNI    Subjective:   Patient seen examined  Had episode of elevated heart rate over night, with associated chest discomfort, now all resolved    Objective:     Vitals:   Temp (24hrs), Av 7 °F (36 5 °C), Min:97 2 °F (36 2 °C), Max:98 5 °F (36 9 °C)    Temp:  [97 2 °F (36 2 °C)-98 5 °F (36 9 °C)] 97 3 °F (36 3 °C)  HR:  [] 129  Resp:  [14-33] 23  BP: ()/(54-90) 111/63  SpO2:  [88 %-96 %] 95 %  Body mass index is 34 36 kg/m²  Input and Output Summary (last 24 hours): Intake/Output Summary (Last 24 hours) at 2019 0844  Last data filed at 2019 0201  Gross per 24 hour   Intake    Output 2500 ml   Net -2500 ml       Physical Exam:     Physical Exam   Constitutional: She is oriented to person, place, and time  She appears well-developed and well-nourished  HENT:   Head: Normocephalic and atraumatic  Eyes: Pupils are equal, round, and reactive to light  EOM are normal    Neck: Normal range of motion  Neck supple  Cardiovascular:   Tachycardic, irregularly irregular   Pulmonary/Chest: Effort normal and breath sounds normal    Abdominal: Soft  Bowel sounds are normal    Obese   Neurological: She is alert and oriented to person, place, and time  Skin: Skin is warm  Capillary refill takes less than 2 seconds  Psychiatric: She has a normal mood and affect  Her behavior is normal  Thought content normal    Nursing note and vitals reviewed  Additional Data:     Labs:    Results from last 7 days   Lab Units 19  0352  19  1422   WBC Thousand/uL 9 20   < > 10 30   HEMOGLOBIN g/dL 11 8*   < > 13 2   HEMATOCRIT % 34 6*   < > 39 6*   PLATELETS Thousands/uL 227   < > 240   NEUTROS PCT %  --   --  82*   LYMPHS PCT %  --   --  10*   MONOS PCT %  --   --  7   EOS PCT %  --   --  1    < > = values in this interval not displayed       Results from last 7 days   Lab Units 19  0352  19  0444   POTASSIUM mmol/L 4 0   < > 4 5   CHLORIDE mmol/L 97*   < > 100   CO2 mmol/L 26   < > 22   BUN mg/dL 39*   < > 46*   CREATININE mg/dL 1 44*   < > 1 67*   CALCIUM mg/dL 9 3   < > 8 4*   ALK PHOS U/L  --   --  55   ALT U/L  --   --  33   AST U/L  --   --  25    < > = values in this interval not displayed  Results from last 7 days   Lab Units 06/25/19  1422   INR  1 20               * I Have Reviewed All Lab Data Listed Above  * Additional Pertinent Lab Tests Reviewed: Alex 66 Admission  Reviewed    Imaging:  Imaging Reports Reviewed Today Include: n/a    Recent Cultures (last 7 days):           Last 24 Hours Medication List:     Current Facility-Administered Medications:  acetaminophen 650 mg Oral Q6H PRN Celsa Larch, CRNP   ALPRAZolam 0 25 mg Oral TID PRN Celsa Larch, CRNP   apixaban 2 5 mg Oral BID Rosalva , PA-C   diltiazem 240 mg Oral Daily Rosalva , PA-C   metoprolol tartrate 25 mg Oral BID Celsa Larch, CRNP   montelukast 10 mg Oral Daily Celsa Larch, CRNP   ondansetron 4 mg Intravenous Q6H PRN Celsa Larch, CRNP   oxybutynin 5 mg Oral Daily Celsa Larch, CRNP   pantoprazole 40 mg Oral Early Morning Celsa Larch, CRNP   polyethylene glycol 17 g Oral Daily Patti Diggs PA-C   sertraline 50 mg Oral Daily Celsa Larch, CRNP        Today, Patient Was Seen By: Dannielle Mercado MD    ** Please Note: Dictation voice to text software may have been used in the creation of this document   **

## 2019-06-28 NOTE — ASSESSMENT & PLAN NOTE
· Will continue with current regimen  · Mild chest discomfort last night related to elevated rates, now resolved  · Troponin negative  · Cardiology is following

## 2019-06-28 NOTE — NURSING NOTE
@ [id number], patient medicated with tylenol for c/o persistent mid-sternal chest soreness  Patient also given xanax @ this time r/t anxiety over heart rate levels   Will continue to monitor

## 2019-06-28 NOTE — NURSING NOTE
Samanta OVALLES made aware of uncontrolled heart rate with sustained rates in the 120-130's since about 0100   Last dose of po cardizem @ 1800 on 6/27  Cardizem CD to be started in the am but not due until 0900 today  Order for cardizem UIV received

## 2019-06-28 NOTE — ASSESSMENT & PLAN NOTE
Wt Readings from Last 3 Encounters:   06/28/19 77 2 kg (170 lb 1 6 oz)   06/25/19 69 4 kg (153 lb)   06/05/19 71 2 kg (157 lb)       Diuresis under the guidance of Cardiology and nephrology

## 2019-06-28 NOTE — ASSESSMENT & PLAN NOTE
Still with high heart rate much of the time  Will increase the metoprolol dose and as well tease up the diltiazem dose  On Eliquis lower dose

## 2019-06-28 NOTE — OCCUPATIONAL THERAPY NOTE
OT Tx session cancelled r/t increased HR as reported (125)  Informed CM, Snaa, of same    Jesi Hardwick, OT

## 2019-06-28 NOTE — PROGRESS NOTES
Progress Note - Loren Fail 1935, 81 y o  female MRN: [id number]    Unit/Bed#: ICU 03 Encounter: [id number]    Primary Care Provider: Agnieszka Smiley MD   Date and time admitted to hospital: 6/25/2019  1:56 PM        * Atrial fibrillation with rapid ventricular response ([facility] )  Assessment & Plan  Still with high heart rate much of the time  Will increase the metoprolol dose and as well tease up the diltiazem dose  On Eliquis lower dose  Non-rheumatic mitral regurgitation  Assessment & Plan  Significant on exam and by echo  She remains quite frail for considering mitral valve intervention and when pressed she really is not interested in going through this type of intervention  Will continue attempts at medical therapy with heart rate control and volume control for now  HUY (acute kidney injury) [facility])  Assessment & Plan  Improving creatinine  Subjective:   Patient seen and examined  In general she feels more lively today and less short of breath  Last night heart rate was high and she had some associated chest discomfort  Summary comments:  Adjusting meds further today  See above comments  Will also give another dose of lasix as creat is coming down  If comes down further will add ACE-Inhib      Telemetry/ECG/Cardiac testing:   Atrial fibrillation with a rapid ventricular response  Echo: 6/27/2019:  EF 60%  Moderate LVH  Moderate MR with marked prolapse of the anterior and posterior leaflets  Mild TR with moderate pulm HTN     TTE 2017 - EF 55%, moderate MR     Cardiac cath 2012 - patent LAD stent, severe MR  No significant CAD          Vitals: Blood pressure 111/63, pulse (!) 129, temperature (!) 97 3 °F (36 3 °C), temperature source Tympanic, resp   rate (!) 23, height 4' 11" (1 499 m), weight 77 2 kg (170 lb 1 6 oz), SpO2 95 % ,   Orthostatic Blood Pressures      Most Recent Value   Blood Pressure  111/63 filed at 06/28/2019 0600   Patient Position - Orthostatic VS  Lying filed at 06/28/2019 0600      ,   Weight (last 2 days)     Date/Time   Weight    06/28/19 0600   77 2 (170 1)    06/27/19 0540   76 1 (167 8)    06/26/19 0500   76 (167 55)              Physical Exam:    General:  Normal appearance in no distress  Eyes:  Anicteric  Oral mucosa:  Moist   Neck:  No JV D  Carotid upstrokes are brisk without bruits  No masses  Chest: Basal crackles  Cardiac:  Irregularly irregular  Normal PMI  Normal S1 and S2   Grade 4 holosystolic murmur at the apex  Abdomen:  Soft and nontender  No palpable organomegaly or aortic enlargement  Extremities:  1+ peripheral edema  Neuro:  Grossly symmetric  Psych:  Alert and oriented x3        Medications:      Current Facility-Administered Medications:     acetaminophen (TYLENOL) tablet 650 mg, 650 mg, Oral, Q6H PRN, NEAL Mullins, 650 mg at 06/28/19 0427    ALPRAZolam Veria Snow) tablet 0 25 mg, 0 25 mg, Oral, TID PRN, NEAL Mullins, 0 25 mg at 06/28/19 0428    apixaban (ELIQUIS) tablet 2 5 mg, 2 5 mg, Oral, BID, Saeed Lyons PA-C, 2 5 mg at 06/28/19 0909    diltiazem (CARDIZEM CD) 24 hr capsule 240 mg, 240 mg, Oral, Daily, Saeed Lyons PA-C    metoprolol tartrate (LOPRESSOR) tablet 25 mg, 25 mg, Oral, BID, NEAL Mullins, 25 mg at 06/27/19 1710    montelukast (SINGULAIR) tablet 10 mg, 10 mg, Oral, Daily, NEAL Mullins, 10 mg at 06/28/19 0909    ondansetron (ZOFRAN) injection 4 mg, 4 mg, Intravenous, Q6H PRN, NEAL Mullins, 4 mg at 06/25/19 1748    oxybutynin (DITROPAN) tablet 5 mg, 5 mg, Oral, Daily, NEAL Mullins, 5 mg at 06/28/19 0909    pantoprazole (PROTONIX) EC tablet 40 mg, 40 mg, Oral, Early Morning, NEAL Mullins, 40 mg at 06/28/19 7985    polyethylene glycol (MIRALAX) packet 17 g, 17 g, Oral, Daily, Flora Ryan PA-C, 17 g at 06/28/19 0909    sertraline (ZOLOFT) tablet 50 mg, 50 mg, Oral, Daily, NEAL Mullins, 50 mg at 06/28/19 6890     Labs & Results:    Troponins:   Results from last 7 days   Lab Units 06/28/19  0611 06/28/19  0352 06/25/19  2044   TROPONIN I ng/mL 0 03 0 03 0 04*      BNP:   Results from last 6 Months   Lab Units 06/25/19  1812   BNP pg/mL 1,303*       CBC with diff:   Results from last 7 days   Lab Units 06/28/19  0352 06/27/19  0439   WBC Thousand/uL 9 20 7 80   HEMOGLOBIN g/dL 11 8* 12 0   HEMATOCRIT % 34 6* 35 1*   MCV fL 89 89   PLATELETS Thousands/uL 227 205     TSH:     CMP:   Results from last 7 days   Lab Units 06/28/19  0352 06/27/19  0439 06/26/19  0444 06/25/19  1422   POTASSIUM mmol/L 4 0 4 5 4 5 4 5   CHLORIDE mmol/L 97* 99 100 95*   CO2 mmol/L 26 24 22 24   BUN mg/dL 39* 42* 46* 48*   CREATININE mg/dL 1 44* 1 69* 1 67* 1 80*   AST U/L  --   --  25 36   ALT U/L  --   --  33 44   EGFR ml/min/1 73sq m 34 28 28 26     Lipid Profile:     Coags:   Results from last 7 days   Lab Units 06/25/19  1422   INR  1 20

## 2019-06-28 NOTE — NURSING NOTE
Dr Zoran Suárez notified of patients sustained elevated HR; A-Fib with RVR  Patient denies chest pain or pressure; asymptomatic; OOB in chair visiting with family members  New orders received and noted via phone;  Metoprolol 5 mg IV x 3 doses over 15 mins administered  HR decreased from 142 to 127 bpm   Cardizem 300 mg XL PO x 1 dose administered stat  Patient assisted back to bed for rest period  Family at bedside for support

## 2019-06-28 NOTE — PROGRESS NOTES
Progress Note - Nephrology   Adin Fu 81 y o  female MRN: [id number]  Unit/Bed#: ICU 03 Encounter: [id number]    A/P:  1  Acute kidney failure on top of chronic kidney disease               patient's fraction excretion of sodium less 1%, most likely due to hypovolemia at presentation along with hemodynamics instability in form of atrial fibrillation rapid ventricular response  Unfortunately patient continues to have significant dysrhythmias at this time which the patient is aware of but denies any worrisome sequela such as no shortness of breath nausea vomiting diarrhea or diaphoresis  Patient appears appropriate from volume standpoint and would therefore discouraged any additional volume via the IV route  Continue to focus on heart rate control which will defer to Cardiology  6/28: Kidney function improved with diuresis and she is symptomatically better  Will follow I/O  She is less dyspneic  Will receive additional diuretic as per cardiology  2  Chronic kidney disease stage 2 with baseline creatinine between 0 7 - 0 8 mg/dL    3  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              I stressed the importance of avoiding nonsteroidal anti-inflammatory medications as possible   ============  4  Hyponatremia               sodium stable over last 24 hours or so with a slight trend to would improving up at 131 millimole/L this morning  I will place the patient on a mild fluid restriction, continue monitor closely  6/28: check urine osmolality  5  Right adrenal adenoma -most likely benign   This was noted on CT scan without contrast, adenoma is less than 2 5 cm  Recommendation at this point is to repeat imaging in approximately 12 months to ensure adenoma is stable  6  Hypotension most likely due to hypovolemia and hemodynamic instability               improved, continue to address dysrhythmia  7   Atrial fibrillation with rapid ventricular response              As mentioned above  Defer to Cardiology  8  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  9  Chronic diastolic congestive heart failure with potential acute episode   Patient placed on Lasix 80 mg IV b i d , follow clinical and laboratory findings closely  Continue to adjust as indicated according to Cardiology  10  Severe mitral regurgitation       Follow up reason for today's visit:  Acute kidney injury/electrolyte disorders/chronic kidney disease    Atrial fibrillation with rapid ventricular response [facility])    Patient Active Problem List   Diagnosis    Coronary artery disease involving native coronary artery of native heart without angina pectoris    SOB (shortness of breath)    Localized edema    Atherosclerosis of both carotid arteries    Non-rheumatic mitral regurgitation    Other fatigue    Atrial fibrillation with rapid ventricular response ([facility] [address] )    HUY (acute kidney injury) ([facility] [address] )    Nausea    Acute on chronic diastolic congestive heart failure (HCC)         Subjective:   Patient continues to complain of palpitations, currently not associated with diaphoresis/shortness of breath/nausea or vomiting    Objective:     Vitals: Blood pressure 111/63, pulse (!) 129, temperature (!) 97 3 °F (36 3 °C), temperature source Tympanic, resp  rate (!) 23, height 4' 11" (1 499 m), weight 77 2 kg (170 lb 1 6 oz), SpO2 95 %  ,Body mass index is 34 36 kg/m²  Weight (last 2 days)     Date/Time   Weight    06/28/19 0600   77 2 (170 1)    06/27/19 0540   76 1 (167 8)    06/26/19 0500   76 (167 55)                Intake/Output Summary (Last 24 hours) at 6/28/2019 0944  Last data filed at 6/28/2019 0201  Gross per 24 hour   Intake    Output 2500 ml   Net -2500 ml     I/O last 3 completed shifts:   In: 1893 8 [I V :1893 8]  Out: 3250 [Urine:3250]         Physical Exam: /63 (BP Location: Left arm)   Pulse (!) 129   Temp (!) 97 3 °F (36 3 °C) (Tympanic)   Resp (!) 23   Ht 4' 11" (1 499 m)   Wt 77 2 kg (170 lb 1 6 oz)   SpO2 95%   BMI 34 36 kg/m² General Appearance:    Alert, cooperative, no distress, appears stated age   Head:    Normocephalic, without obvious abnormality, atraumatic   Eyes:    Conjunctiva/corneas clear   Ears:    Normal external ears   Nose:   Nares normal, septum midline, mucosa normal, no drainage    or sinus tenderness   Throat:   Lips, mucosa, and tongue normal; teeth and gums normal   Neck:   Supple   Back:     Symmetric, no curvature, ROM normal, no CVA tenderness   Lungs:     Clear to auscultation bilaterally, respirations unlabored   Chest wall:    No tenderness or deformity   Heart:    Regular rate and rhythm, S1 and S2 normal, no murmur, rub   or gallop   Abdomen:     Soft, non-tender, bowel sounds active   Extremities:   Extremities normal, atraumatic, no cyanosis, trace bilateral lower extremity edema   Skin:   Skin color, texture, turgor normal, no rashes or lesions   Lymph nodes:   Cervical normal   Neurologic:   CNII-XII intact            Lab, Imaging and other studies: I have personally reviewed pertinent labs  CBC:   Lab Results   Component Value Date    WBC 9 20 06/28/2019    HGB 11 8 (L) 06/28/2019    HCT 34 6 (L) 06/28/2019    MCV 89 06/28/2019     06/28/2019    MCH 30 3 06/28/2019    MCHC 34 0 06/28/2019    RDW 15 6 (H) 06/28/2019    MPV 8 3 (L) 06/28/2019     CMP:   Lab Results   Component Value Date    K 4 0 06/28/2019    CL 97 (L) 06/28/2019    CO2 26 06/28/2019    BUN 39 (H) 06/28/2019    CREATININE 1 44 (H) 06/28/2019    CALCIUM 9 3 06/28/2019    EGFR 34 06/28/2019           Results from last 7 days   Lab Units 06/28/19  0352 06/27/19  0439 06/26/19  0444 06/25/19  1422   POTASSIUM mmol/L 4 0 4 5 4 5 4 5   CHLORIDE mmol/L 97* 99 100 95*   CO2 mmol/L 26 24 22 24   BUN mg/dL 39* 42* 46* 48*   CREATININE mg/dL 1 44* 1 69* 1 67* 1 80*   CALCIUM mg/dL 9 3 9 1 8 4* 9 5   ALK PHOS U/L  --   --  55 68   ALT U/L  --   --  33 44   AST U/L  --   --  25 36         Phosphorus: No results found for: PHOS  Magnesium: No results found for: MG  Urinalysis: No results found for: Renato Sanchez, SPECGRAV, PHUR, LEUKOCYTESUR, NITRITE, PROTEINUA, GLUCOSEU, KETONESU, BILIRUBINUR, BLOODU  Ionized Calcium: No results found for: CAION  Coagulation: No results found for: PT, INR, APTT  Troponin:   Lab Results   Component Value Date    TROPONINI 0 03 06/28/2019     ABG: No results found for: PHART, NIG8EKD, PO2ART, XWR5TWW, K3WFIDMH, BEART, SOURCE  Radiology review:     IMAGING  Procedure: Ct Abdomen Pelvis Wo Contrast    Result Date: 6/26/2019  Narrative: CT ABDOMEN AND PELVIS WITHOUT IV CONTRAST INDICATION:   Abdominal pain, unspecified with nausea  COMPARISON:  None  TECHNIQUE:  CT examination of the abdomen and pelvis was performed without intravenous contrast   Axial, sagittal, and coronal 2D reformatted images were created from the source data and submitted for interpretation  Radiation dose length product (DLP) for this visit:  575 mGy-cm   This examination, like all CT scans performed in the [facility], was performed utilizing techniques to minimize radiation dose exposure, including the use of iterative reconstruction and automated exposure control  Enteric contrast was not administered  FINDINGS: ABDOMEN LOWER CHEST:  No clinically significant abnormality identified in the visualized lower chest   Mitral annulus calcifications noted  LIVER/BILIARY TREE:  Unremarkable  GALLBLADDER:  No calcified gallstones  No pericholecystic inflammatory change  SPLEEN:  Unremarkable  PANCREAS:  Unremarkable  ADRENAL GLANDS:  1 3 cm right adrenal nodule  Normal left adrenal gland  KIDNEYS/URETERS:  Unremarkable  No hydronephrosis  STOMACH AND BOWEL:  Unremarkable  APPENDIX:  A normal appendix was visualized  ABDOMINOPELVIC CAVITY:  No ascites or free intraperitoneal air  No lymphadenopathy  VESSELS:  Atherosclerotic changes are present  No evidence of aneurysm  PELVIS REPRODUCTIVE ORGANS:  Unremarkable for patient's age  URINARY BLADDER:  Unremarkable  ABDOMINAL WALL/INGUINAL REGIONS:  Unremarkable  OSSEOUS STRUCTURES: Compression deformity at T12  Age appropriate degenerative changes in the lumbar spine  No acute fracture or destructive osseous lesion  Partially imaged antegrade intramedullary nail and cephalomedullary screw in the right femur  Impression: 1  No acute abnormality in the abdomen or pelvis  2   There is a 1 3 cm right adrenal gland nodule  Although its imaging features are indeterminate, it does not have suspicious imaging features (heterogeneity, necrosis, irregular margins), therefore this is likely benign, and can be followed by non-contrast abdomen CT or MRI in 12 months  If patient has history of adrenal hyperfunction, consider biochemical evaluation  Adrenal recommendation based on institutional consensus and Journal of [employer] of Radiology 2017;14:0378-4194 The study was marked in [facility] for significant notification  Workstation performed: [id number]     Procedure: Us Kidney And Bladder    Result Date: 6/26/2019  Narrative: RENAL ULTRASOUND INDICATION:   RENAL FAILURE, ACUTE   Ariana Mort COMPARISON: None TECHNIQUE: Portable ultrasound of the retroperitoneum was performed with a curvilinear transducer utilizing volumetric sweeps and still imaging techniques  FINDINGS: KIDNEYS: Symmetric and normal size  Right kidney:  10 5 x 4 5 cm  Left kidney:  10 9 x 5 0 cm  Right kidney Normal echogenicity and contour  No suspicious masses detected  No hydronephrosis  No shadowing calculi  No perinephric fluid collections  Left kidney Normal echogenicity and contour  No suspicious masses detected  No hydronephrosis  No shadowing calculi  No perinephric fluid collections  URETERS: Nonvisualized  BLADDER: Normally distended  No focal thickening or mass lesions  Fluid collection above the bladder  Impression: Normal kidneys and bladder   Fluid collection above the bladder measuring approximately 2 1 x 2 1 x 4 8 cm of uncertain etiology  Consider CT scan for further evaluation  The study was marked in [facility] for immediate notification   Workstation performed: [id number]       Current Facility-Administered Medications   Medication Dose Route Frequency    acetaminophen (TYLENOL) tablet 650 mg  650 mg Oral Q6H PRN    ALPRAZolam (XANAX) tablet 0 25 mg  0 25 mg Oral TID PRN    apixaban (ELIQUIS) tablet 2 5 mg  2 5 mg Oral BID    diltiazem (CARDIZEM CD) 24 hr capsule 240 mg  240 mg Oral Daily    metoprolol tartrate (LOPRESSOR) tablet 25 mg  25 mg Oral BID    montelukast (SINGULAIR) tablet 10 mg  10 mg Oral Daily    ondansetron (ZOFRAN) injection 4 mg  4 mg Intravenous Q6H PRN    oxybutynin (DITROPAN) tablet 5 mg  5 mg Oral Daily    pantoprazole (PROTONIX) EC tablet 40 mg  40 mg Oral Early Morning    polyethylene glycol (MIRALAX) packet 17 g  17 g Oral Daily    sertraline (ZOLOFT) tablet 50 mg  50 mg Oral Daily     Medications Discontinued During This Encounter   Medication Reason    diltiazem (CARDIZEM) 125 mg in sodium chloride 0 9 % 125 mL infusion     heparin (porcine) subcutaneous injection 5,000 Units     sodium chloride 0 9 % bolus 500 mL     aspirin chewable tablet 81 mg     diltiazem (CARDIZEM CD) 24 hr capsule 240 mg     enoxaparin (LOVENOX) subcutaneous injection 80 mg     furosemide (LASIX) tablet 40 mg        Ke Ying MD

## 2019-06-28 NOTE — ASSESSMENT & PLAN NOTE
· Perhaps related to hemodynamic instability related to atrial fibrillation  · Appreciate Nephrology input  · Creatinine improving with IV diuresis  · Continue trend and monitor

## 2019-06-29 ENCOUNTER — APPOINTMENT (INPATIENT)
Dept: RADIOLOGY | Facility: HOSPITAL | Age: 84
DRG: 308 | End: 2019-06-29
Payer: MEDICARE

## 2019-06-29 LAB
ANION GAP SERPL CALCULATED.3IONS-SCNC: 6 MMOL/L (ref 4–13)
BUN SERPL-MCNC: 34 MG/DL (ref 7–25)
CALCIUM SERPL-MCNC: 9.1 MG/DL (ref 8.6–10.5)
CHLORIDE SERPL-SCNC: 97 MMOL/L (ref 98–107)
CO2 SERPL-SCNC: 27 MMOL/L (ref 21–31)
CREAT SERPL-MCNC: 1.19 MG/DL (ref 0.6–1.2)
ERYTHROCYTE [DISTWIDTH] IN BLOOD BY AUTOMATED COUNT: 16 % (ref 11.5–14.5)
GFR SERPL CREATININE-BSD FRML MDRD: 42 ML/MIN/1.73SQ M
GLUCOSE SERPL-MCNC: 116 MG/DL (ref 65–99)
HCT VFR BLD AUTO: 36.8 % (ref 42–47)
HGB BLD-MCNC: 12.2 G/DL (ref 12–16)
MCH RBC QN AUTO: 30.3 PG (ref 26–34)
MCHC RBC AUTO-ENTMCNC: 33.3 G/DL (ref 31–37)
MCV RBC AUTO: 91 FL (ref 81–99)
PLATELET # BLD AUTO: 209 THOUSANDS/UL (ref 149–390)
PMV BLD AUTO: 7.5 FL (ref 8.6–11.7)
POTASSIUM SERPL-SCNC: 4.4 MMOL/L (ref 3.5–5.5)
RBC # BLD AUTO: 4.04 MILLION/UL (ref 3.9–5.2)
SODIUM SERPL-SCNC: 130 MMOL/L (ref 134–143)
WBC # BLD AUTO: 8.6 THOUSAND/UL (ref 4.8–10.8)

## 2019-06-29 PROCEDURE — 85027 COMPLETE CBC AUTOMATED: CPT | Performed by: INTERNAL MEDICINE

## 2019-06-29 PROCEDURE — 99232 SBSQ HOSP IP/OBS MODERATE 35: CPT | Performed by: INTERNAL MEDICINE

## 2019-06-29 PROCEDURE — 71045 X-RAY EXAM CHEST 1 VIEW: CPT

## 2019-06-29 PROCEDURE — 80048 BASIC METABOLIC PNL TOTAL CA: CPT | Performed by: INTERNAL MEDICINE

## 2019-06-29 RX ORDER — LISINOPRIL 5 MG/1
2.5 TABLET ORAL DAILY
Status: DISCONTINUED | OUTPATIENT
Start: 2019-06-30 | End: 2019-06-30

## 2019-06-29 RX ORDER — METOPROLOL TARTRATE 50 MG/1
50 TABLET, FILM COATED ORAL 3 TIMES DAILY
Status: DISCONTINUED | OUTPATIENT
Start: 2019-06-29 | End: 2019-06-30

## 2019-06-29 RX ORDER — LISINOPRIL 5 MG/1
5 TABLET ORAL DAILY
Status: DISCONTINUED | OUTPATIENT
Start: 2019-06-29 | End: 2019-06-29

## 2019-06-29 RX ADMIN — APIXABAN 2.5 MG: 2.5 TABLET, FILM COATED ORAL at 08:35

## 2019-06-29 RX ADMIN — SERTRALINE HYDROCHLORIDE 50 MG: 50 TABLET ORAL at 08:36

## 2019-06-29 RX ADMIN — METOPROLOL TARTRATE 25 MG: 25 TABLET, FILM COATED ORAL at 04:58

## 2019-06-29 RX ADMIN — MONTELUKAST SODIUM 10 MG: 10 TABLET, COATED ORAL at 08:36

## 2019-06-29 RX ADMIN — ALPRAZOLAM 0.25 MG: 0.25 TABLET ORAL at 22:19

## 2019-06-29 RX ADMIN — APIXABAN 2.5 MG: 2.5 TABLET, FILM COATED ORAL at 18:50

## 2019-06-29 RX ADMIN — SODIUM CHLORIDE 250 ML: 9 INJECTION, SOLUTION INTRAVENOUS at 19:00

## 2019-06-29 RX ADMIN — OXYBUTYNIN CHLORIDE 5 MG: 5 TABLET ORAL at 08:36

## 2019-06-29 RX ADMIN — METOPROLOL TARTRATE 50 MG: 50 TABLET, FILM COATED ORAL at 11:21

## 2019-06-29 RX ADMIN — POLYETHYLENE GLYCOL 3350 17 G: 17 POWDER, FOR SOLUTION ORAL at 08:35

## 2019-06-29 RX ADMIN — PANTOPRAZOLE SODIUM 40 MG: 40 TABLET, DELAYED RELEASE ORAL at 06:04

## 2019-06-29 RX ADMIN — DILTIAZEM HYDROCHLORIDE 300 MG: 180 CAPSULE, COATED, EXTENDED RELEASE ORAL at 08:36

## 2019-06-29 NOTE — PROGRESS NOTES
Progress Note - Daryle Sparrow 1935, 81 y o  female MRN: [id number]    Unit/Bed#: ICU 03 Encounter: [id number]    Primary Care Provider: Anna Burris MD   Date and time admitted to hospital: 6/25/2019  1:56 PM        * Atrial fibrillation with rapid ventricular response ([address] )  Assessment & Plan  Some high heart rates but no low heart rates  Will tease up the metoprolol further  On Eliquis lower dose  Non-rheumatic mitral regurgitation  Assessment & Plan  Significant on exam and by echo  She remains quite frail for considering mitral valve intervention and when pressed she really is not interested in going through this type of intervention  Will continue attempts at medical therapy with heart rate control and volume control for now  Coronary artery disease involving native coronary artery of native heart without angina pectoris  Assessment & Plan  No further symptoms with better rate control  Subjective:   Patient seen and examined  No significant events overnight  Generally weak but not breathless      Summary comments:  Heart rate is not optimal but certainly better controlled  Creatinine is down to normal!  Encourage ambulation released being out of bed  Metoprolol dose is being teased up and lisinopril added  Telemetry/ECG/Cardiac testing:   Atrial fibrillation with moderate to rapid ventricular response  Echo: 6/27/2019:  EF 60%  Moderate LVH  Moderate MR with marked prolapse of the anterior and posterior leaflets  Mild TR with moderate pulm HTN     TTE 2017 - EF 55%, moderate MR     Cardiac cath 2012 - patent LAD stent, severe MR  No significant CAD          Vitals: Blood pressure 97/61, pulse (!) 112, temperature (!) 97 4 °F (36 3 °C), temperature source Tympanic, resp   rate (!) 36, height 4' 11" (1 499 m), weight 73 2 kg (161 lb 5 oz), SpO2 94 % ,   Orthostatic Blood Pressures      Most Recent Value   Blood Pressure  97/61 filed at 06/29/2019 7834   Patient Position - Orthostatic VS  Lying filed at 06/29/2019 0800      ,   Weight (last 2 days)     Date/Time   Weight    06/29/19 0600   73 2 (161 31)    06/28/19 0600   77 2 (170 1)    06/27/19 0540   76 1 (167 8)              Physical Exam:  General:  Normal appearance in no distress  Eyes:  Anicteric  Oral mucosa:  Moist   Neck:  No JV D  Carotid upstrokes are brisk without bruits  No masses  Chest:  Basal crackles bilaterally  Cardiac:  Irregularly irregular  Normal PMI  Normal S1 and S2   Grade 3-4 6 holosystolic murmur loudest at the apex   Abdomen:  Soft and nontender  No palpable organomegaly or aortic enlargement  Extremities:  Trace peripheral edema  Neuro:  Grossly symmetric  Psych:  Alert and oriented x3        Medications:      Current Facility-Administered Medications:     acetaminophen (TYLENOL) tablet 650 mg, 650 mg, Oral, Q6H PRN, Marie Shape, CRNP, 650 mg at 06/28/19 0427    ALPRAZolam Susy Robin) tablet 0 25 mg, 0 25 mg, Oral, TID PRN, Marie Shape, CRNP, 0 25 mg at 06/28/19 2253    apixaban (ELIQUIS) tablet 2 5 mg, 2 5 mg, Oral, BID, Mariluz Zaman PA-C, 2 5 mg at 06/29/19 4642    diltiazem (CARDIZEM CD) 24 hr capsule 300 mg, 300 mg, Oral, Daily, Luna Li MD, 300 mg at 06/29/19 0836    metoprolol tartrate (LOPRESSOR) tablet 50 mg, 50 mg, Oral, TID, Luna Li MD    montelukast (SINGULAIR) tablet 10 mg, 10 mg, Oral, Daily, Marie Shape, CRNP, 10 mg at 06/29/19 0836    ondansetron (ZOFRAN) injection 4 mg, 4 mg, Intravenous, Q6H PRN, Marie Shape, CRNP, 4 mg at 06/25/19 1748    oxybutynin (DITROPAN) tablet 5 mg, 5 mg, Oral, Daily, Marie Shape, CRNP, 5 mg at 06/29/19 0836    pantoprazole (PROTONIX) EC tablet 40 mg, 40 mg, Oral, Early Morning, Marie Shape, CRNP, 40 mg at 06/29/19 0604    polyethylene glycol (MIRALAX) packet 17 g, 17 g, Oral, Daily, Yumiko Bernardo PA-C, 17 g at 06/29/19 0835    sertraline (ZOLOFT) tablet 50 mg, 50 mg, Oral, Daily, Enedina Breaker NEAL Mathew, 50 mg at 06/29/19 0836     Labs & Results:    Troponins:   Results from last 7 days   Lab Units 06/28/19  1318 06/28/19  0611 06/28/19  0352   TROPONIN I ng/mL 0 03 0 03 0 03      BNP:   Results from last 6 Months   Lab Units 06/25/19  1812   BNP pg/mL 1,303*       CBC with diff:   Results from last 7 days   Lab Units 06/29/19  0457 06/28/19  0352   WBC Thousand/uL 8 60 9 20   HEMOGLOBIN g/dL 12 2 11 8*   HEMATOCRIT % 36 8* 34 6*   MCV fL 91 89   PLATELETS Thousands/uL 209 227     TSH:     CMP:   Results from last 7 days   Lab Units 06/29/19  0457 06/28/19  0352  06/26/19  0444 06/25/19  1422   POTASSIUM mmol/L 4 4 4 0   < > 4 5 4 5   CHLORIDE mmol/L 97* 97*   < > 100 95*   CO2 mmol/L 27 26   < > 22 24   BUN mg/dL 34* 39*   < > 46* 48*   CREATININE mg/dL 1 19 1 44*   < > 1 67* 1 80*   AST U/L  --   --   --  25 36   ALT U/L  --   --   --  33 44   EGFR ml/min/1 73sq m 42 34   < > 28 26    < > = values in this interval not displayed       Lipid Profile:     Coags:   Results from last 7 days   Lab Units 06/25/19  1422   INR  1 20

## 2019-06-29 NOTE — ASSESSMENT & PLAN NOTE
Some high heart rates but no low heart rates  Will tease up the metoprolol further  On Eliquis lower dose

## 2019-06-29 NOTE — NURSING NOTE
Spoke with [facility] AT [facility]; will hold Lopressor 50mgPO as noted patient HR is low since receiving med earlier this AM  No chestpain, dizziness, or lightheadedness at present  Rails up times 2; callbell in reach

## 2019-06-29 NOTE — NURSING NOTE
Daughter in at bedside  BP systolic pressure in 80'M  MAP with HR 46; irregular A-fib  Remains without lightheadedness, dizziness or chestpain  [facility] aware of pt status  Will continue to monitor

## 2019-06-29 NOTE — NURSING NOTE
Cardizem IV per order; HR now 70s-80s  2L/min NC ,maintained  VS and sats stable  Sleeping when undisturbed

## 2019-06-29 NOTE — ASSESSMENT & PLAN NOTE
· Will continue with current regimen  · With elevated heart rates overnight requiring IV Cardizem  · Troponin negative  · Cardiology is following  · Titrate medications for better heart rate control

## 2019-06-29 NOTE — PROGRESS NOTES
Progress Note - Nephrology   [facility] 81 y o  female MRN: [id number]  Unit/Bed#: ICU 03 Encounter: [id number]    A/P:  1  Acute kidney failure on top of chronic kidney disease               patient's fraction excretion of sodium less 1%, most likely due to hypovolemia at presentation along with hemodynamics instability in form of atrial fibrillation rapid ventricular response  Unfortunately patient continues to have significant dysrhythmias at this time which the patient is aware of but denies any worrisome sequela such as no shortness of breath nausea vomiting diarrhea or diaphoresis  Patient appears appropriate from volume standpoint and would therefore discouraged any additional volume via the IV route  Continue to focus on heart rate control which will defer to Cardiology  6/28: Kidney function improved with diuresis and she is symptomatically better  Will follow I/O  She is less dyspneic  Will receive additional diuretic as per cardiology             6/29: diuresed 4kg, however, weights may be erroneous and has improved renal function  2  Chronic kidney disease stage 2 with baseline creatinine between 0 7 - 0 8 mg/dL    3  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              I stressed the importance of avoiding nonsteroidal anti-inflammatory medications as possible   ============  4  Hyponatremia               sodium stable over last 24 hours or so with a slight trend to would improving up at 131 millimole/L this morning  I will place the patient on a mild fluid restriction, continue monitor closely  6/28: check urine osmolality  5  Right adrenal adenoma -most likely benign   This was noted on CT scan without contrast, adenoma is less than 2 5 cm  Recommendation at this point is to repeat imaging in approximately 12 months to ensure adenoma is stable      6  Hypotension most likely due to hypovolemia and hemodynamic instability               improved, continue to address dysrhythmia  7  Atrial fibrillation with rapid ventricular response              As mentioned above  Defer to Cardiology  8  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  9  Chronic diastolic congestive heart failure with potential acute episode   Patient placed on Lasix 80 mg IV b i d , follow clinical and laboratory findings closely  Continue to adjust as indicated according to Cardiology  10  Severe mitral regurgitation       Follow up reason for today's visit:  Acute kidney injury/electrolyte disorders/chronic kidney disease    Atrial fibrillation with rapid ventricular response [facility])    Patient Active Problem List   Diagnosis    Coronary artery disease involving native coronary artery of native heart without angina pectoris    SOB (shortness of breath)    Localized edema    Atherosclerosis of both carotid arteries    Non-rheumatic mitral regurgitation    Other fatigue    [facility]rial fibrillation with rapid ventricular response ([facility] )    HUY (acute kidney injury) ([facility] )    Nausea    Acute on chronic diastolic congestive heart failure (HCC)         Subjective:   Patient continues to complain of palpitations, currently not associated with diaphoresis/shortness of breath/nausea or vomiting    Objective:     Vitals: Blood pressure 97/61, pulse (!) 112, temperature (!) 97 4 °F (36 3 °C), temperature source Tympanic, resp  rate (!) 36, height 4' 11" (1 499 m), weight 73 2 kg (161 lb 5 oz), SpO2 94 %  ,Body mass index is 32 58 kg/m²  Weight (last 2 days)     Date/Time   Weight    06/29/19 0600   73 2 (161 31)    06/28/19 0600   77 2 (170 1)    06/27/19 0540   76 1 (167 8)                Intake/Output Summary (Last 24 hours) at 6/29/2019 1028  Last data filed at 6/29/2019 0800  Gross per 24 hour   Intake 1320 ml   Output 1550 ml   Net -230 ml     I/O last 3 completed shifts:   In: 1360 [P O :1360]  Out: 3350 [Urine:3350]         Physical Exam: BP 97/61   Pulse (!) 112   Temp (!) 97 4 °F (36 3 °C) (Tympanic)   Resp (!) 36   Ht 4' 11" (1 499 m)   Wt 73 2 kg (161 lb 5 oz)   SpO2 94%   BMI 32 58 kg/m²     General Appearance:    Alert, cooperative, no distress, appears stated age   Head:    Normocephalic, without obvious abnormality, atraumatic   Eyes:    Conjunctiva/corneas clear   Ears:    Normal external ears   Nose:   Nares normal, septum midline, mucosa normal, no drainage    or sinus tenderness   Throat:   Lips, mucosa, and tongue normal; teeth and gums normal   Neck:   Supple   Back:     Symmetric, no curvature, ROM normal, no CVA tenderness   Lungs:     Clear to auscultation bilaterally, respirations unlabored   Chest wall:    No tenderness or deformity   Heart:    Regular rate and rhythm, S1 and S2 normal, no murmur, rub   or gallop   Abdomen:     Soft, non-tender, bowel sounds active   Extremities:   Extremities normal, atraumatic, no cyanosis, trace bilateral lower extremity edema   Skin:   Skin color, texture, turgor normal, no rashes or lesions   Lymph nodes:   Cervical normal   Neurologic:   CNII-XII intact            Lab, Imaging and other studies: I have personally reviewed pertinent labs  CBC:   Lab Results   Component Value Date    WBC 8 60 06/29/2019    HGB 12 2 06/29/2019    HCT 36 8 (L) 06/29/2019    MCV 91 06/29/2019     06/29/2019    MCH 30 3 06/29/2019    MCHC 33 3 06/29/2019    RDW 16 0 (H) 06/29/2019    MPV 7 5 (L) 06/29/2019     CMP:   Lab Results   Component Value Date    K 4 4 06/29/2019    CL 97 (L) 06/29/2019    CO2 27 06/29/2019    BUN 34 (H) 06/29/2019    CREATININE 1 19 06/29/2019    CALCIUM 9 1 06/29/2019    EGFR 42 06/29/2019           Results from last 7 days   Lab Units 06/29/19  0457 06/28/19  0352 06/27/19  0439 06/26/19  0444 06/25/19  1422   POTASSIUM mmol/L 4 4 4 0 4 5 4 5 4 5   CHLORIDE mmol/L 97* 97* 99 100 95*   CO2 mmol/L 27 26 24 22 24   BUN mg/dL 34* 39* 42* 46* 48*   CREATININE mg/dL 1 19 1 44* 1 69* 1 67* 1 80*   CALCIUM mg/dL 9 1 9 3 9 1 8 4* 9 5   ALK PHOS U/L  --   --   --  55 68 ALT U/L  --   --   --  33 44   AST U/L  --   --   --  25 36         Phosphorus: No results found for: PHOS  Magnesium: No results found for: MG  Urinalysis: No results found for: Marzetta Hemp, SPECGRAV, PHUR, LEUKOCYTESUR, NITRITE, PROTEINUA, GLUCOSEU, KETONESU, BILIRUBINUR, BLOODU  Ionized Calcium: No results found for: CAION  Coagulation: No results found for: PT, INR, APTT  Troponin:   Lab Results   Component Value Date    TROPONINI 0 03 06/28/2019     ABG: No results found for: PHART, WEI2MIU, PO2ART, FNB5VBM, F7UHRQJE, BEART, SOURCE  Radiology review:     IMAGING  Procedure: Ct Abdomen Pelvis Wo Contrast    Result Date: 6/26/2019  Narrative: CT ABDOMEN AND PELVIS WITHOUT IV CONTRAST INDICATION:   Abdominal pain, unspecified with nausea  COMPARISON:  None  TECHNIQUE:  CT examination of the abdomen and pelvis was performed without intravenous contrast   Axial, sagittal, and coronal 2D reformatted images were created from the source data and submitted for interpretation  Radiation dose length product (DLP) for this visit:  575 mGy-cm   This examination, like all CT scans performed in the [facility], was performed utilizing techniques to minimize radiation dose exposure, including the use of iterative reconstruction and automated exposure control  Enteric contrast was not administered  FINDINGS: ABDOMEN LOWER CHEST:  No clinically significant abnormality identified in the visualized lower chest   Mitral annulus calcifications noted  LIVER/BILIARY TREE:  Unremarkable  GALLBLADDER:  No calcified gallstones  No pericholecystic inflammatory change  SPLEEN:  Unremarkable  PANCREAS:  Unremarkable  ADRENAL GLANDS:  1 3 cm right adrenal nodule  Normal left adrenal gland  KIDNEYS/URETERS:  Unremarkable  No hydronephrosis  STOMACH AND BOWEL:  Unremarkable  APPENDIX:  A normal appendix was visualized  ABDOMINOPELVIC CAVITY:  No ascites or free intraperitoneal air  No lymphadenopathy   VESSELS: Atherosclerotic changes are present  No evidence of aneurysm  PELVIS REPRODUCTIVE ORGANS:  Unremarkable for patient's age  URINARY BLADDER:  Unremarkable  ABDOMINAL WALL/INGUINAL REGIONS:  Unremarkable  OSSEOUS STRUCTURES: Compression deformity at T12  Age appropriate degenerative changes in the lumbar spine  No acute fracture or destructive osseous lesion  Partially imaged antegrade intramedullary nail and cephalomedullary screw in the right femur  Impression: 1  No acute abnormality in the abdomen or pelvis  2   There is a 1 3 cm right adrenal gland nodule  Although its imaging features are indeterminate, it does not have suspicious imaging features (heterogeneity, necrosis, irregular margins), therefore this is likely benign, and can be followed by non-contrast abdomen CT or MRI in 12 months  If patient has history of adrenal hyperfunction, consider biochemical evaluation  Adrenal recommendation based on institutional consensus and Journal [employer] of Radiology 2017;14:7621-4503 The study was marked in [facility] for significant notification  Workstation performed: [id number]     Procedure: Us Kidney And Bladder    Result Date: 6/26/2019  Narrative: RENAL ULTRASOUND INDICATION:   RENAL FAILURE, ACUTE   Jerone Ga COMPARISON: None TECHNIQUE: Portable ultrasound of the retroperitoneum was performed with a curvilinear transducer utilizing volumetric sweeps and still imaging techniques  FINDINGS: KIDNEYS: Symmetric and normal size  Right kidney:  10 5 x 4 5 cm  Left kidney:  10 9 x 5 0 cm  Right kidney Normal echogenicity and contour  No suspicious masses detected  No hydronephrosis  No shadowing calculi  No perinephric fluid collections  Left kidney Normal echogenicity and contour  No suspicious masses detected  No hydronephrosis  No shadowing calculi  No perinephric fluid collections  URETERS: Nonvisualized  BLADDER: Normally distended  No focal thickening or mass lesions  Fluid collection above the bladder  Impression: Normal kidneys and bladder  Fluid collection above the bladder measuring approximately 2 1 x 2 1 x 4 8 cm of uncertain etiology  Consider CT scan for further evaluation  The study was marked in [facility] for immediate notification   Workstation performed: [id number]       Current Facility-Administered Medications   Medication Dose Route Frequency    acetaminophen (TYLENOL) tablet 650 mg  650 mg Oral Q6H PRN    ALPRAZolam (XANAX) tablet 0 25 mg  0 25 mg Oral TID PRN    apixaban (ELIQUIS) tablet 2 5 mg  2 5 mg Oral BID    diltiazem (CARDIZEM CD) 24 hr capsule 300 mg  300 mg Oral Daily    metoprolol tartrate (LOPRESSOR) tablet 25 mg  25 mg Oral Q6H    montelukast (SINGULAIR) tablet 10 mg  10 mg Oral Daily    ondansetron (ZOFRAN) injection 4 mg  4 mg Intravenous Q6H PRN    oxybutynin (DITROPAN) tablet 5 mg  5 mg Oral Daily    pantoprazole (PROTONIX) EC tablet 40 mg  40 mg Oral Early Morning    polyethylene glycol (MIRALAX) packet 17 g  17 g Oral Daily    sertraline (ZOLOFT) tablet 50 mg  50 mg Oral Daily     Medications Discontinued During This Encounter   Medication Reason    diltiazem (CARDIZEM) 125 mg in sodium chloride 0 9 % 125 mL infusion     heparin (porcine) subcutaneous injection 5,000 Units     sodium chloride 0 9 % bolus 500 mL     aspirin chewable tablet 81 mg     diltiazem (CARDIZEM CD) 24 hr capsule 240 mg     enoxaparin (LOVENOX) subcutaneous injection 80 mg     furosemide (LASIX) tablet 40 mg     diltiazem (CARDIZEM CD) 24 hr capsule 240 mg     metoprolol tartrate (LOPRESSOR) tablet 25 mg        Geoffrey Lipscomb MD

## 2019-06-29 NOTE — ASSESSMENT & PLAN NOTE
· New onset atrial fibrillation  · With fluctuating rates on p o   Medications  · Cardiology titrating rate controlling medications  · Started on Eliquis in terms of anticoagulation

## 2019-06-29 NOTE — ASSESSMENT & PLAN NOTE
Wt Readings from Last 3 Encounters:   06/29/19 73 2 kg (161 lb 5 oz)   06/25/19 69 4 kg (153 lb)   06/05/19 71 2 kg (157 lb)       Diuresis under the guidance of Cardiology and nephrology

## 2019-06-29 NOTE — PROGRESS NOTES
Progress Note - Paulina Bales 1935, 81 y o  female MRN: [id number]    Unit/Bed#: ICU 03 Encounter: [id number]    Primary Care Provider: Marina Apgar, MD   Date and time admitted to hospital: 2019  1:56 PM        Acute on chronic diastolic congestive heart failure ([facility] )  Assessment & Plan  Wt Readings from Last 3 Encounters:   19 73 2 kg (161 lb 5 oz)   19 69 4 kg (153 lb)   19 71 2 kg (157 lb)       Diuresis under the guidance of Cardiology and nephrology      HUY (acute kidney injury) ([facility] )  Assessment & Plan  · Perhaps related to hemodynamic instability related to atrial fibrillation  · Appreciate Nephrology input  · Creatinine improving with IV diuresis  · Continue trend and monitor    Non-rheumatic mitral regurgitation  Assessment & Plan  · Followed by Cardiology    Coronary artery disease involving native coronary artery of native heart without angina pectoris  Assessment & Plan  · Will continue with current regimen  · With elevated heart rates overnight requiring IV Cardizem  · Troponin negative  · Cardiology is following  · Titrate medications for better heart rate control    * Atrial fibrillation with rapid ventricular response ([facility] )  Assessment & Plan  · New onset atrial fibrillation  · With fluctuating rates on p o  Medications  · Cardiology titrating rate controlling medications  · Started on Eliquis in terms of anticoagulation    VTE Pharmacologic Prophylaxis: Pharmacologic: Apixaban (Eliquis)    Patient Centered Rounds: I have performed bedside rounds with nursing staff today      Discussions with Specialists or Other Care Team Provider: n/a  Education and Discussions with Family / Patient: patient    Current Length of Stay: 4 day(s)    Current Patient Status: Inpatient   Certification Statement: The patient will continue to require additional inpatient hospital stay due to afib with rvr    Discharge Plan: pending SNF placement on monday    Code Status: Level 3 - DNAR and DNI    Subjective:   Patient seen examined  Elevated heart rate overnight requiring IV Cardizem  Objective:     Vitals:   Temp (24hrs), Av 5 °F (36 9 °C), Min:97 8 °F (36 6 °C), Max:99 °F (37 2 °C)    Temp:  [97 8 °F (36 6 °C)-99 °F (37 2 °C)] 98 2 °F (36 8 °C)  HR:  [] 101  Resp:  [14-41] 29  BP: ()/(52-97) 101/72  SpO2:  [84 %-98 %] 95 %  Body mass index is 32 58 kg/m²  Input and Output Summary (last 24 hours): Intake/Output Summary (Last 24 hours) at 2019 0810  Last data filed at 2019 0600  Gross per 24 hour   Intake 1000 ml   Output 1400 ml   Net -400 ml       Physical Exam:     Physical Exam   Constitutional: She is oriented to person, place, and time  She appears well-developed and well-nourished  HENT:   Head: Normocephalic and atraumatic  Eyes: Pupils are equal, round, and reactive to light  EOM are normal    Neck: Normal range of motion  Neck supple  Cardiovascular:   Tachycardia, irregularly irregular   Pulmonary/Chest: Effort normal    Decreased breath sounds at bases bilaterally   Abdominal: Soft  Bowel sounds are normal    Musculoskeletal: Normal range of motion  She exhibits edema  Neurological: She is alert and oriented to person, place, and time  Skin: Skin is warm  Psychiatric: She has a normal mood and affect  Her behavior is normal  Judgment and thought content normal    Nursing note and vitals reviewed  Additional Data:     Labs:    Results from last 7 days   Lab Units 19  1422   WBC Thousand/uL 8 60   < > 10 30   HEMOGLOBIN g/dL 12 2   < > 13 2   HEMATOCRIT % 36 8*   < > 39 6*   PLATELETS Thousands/uL 209   < > 240   NEUTROS PCT %  --   --  82*   LYMPHS PCT %  --   --  10*   MONOS PCT %  --   --  7   EOS PCT %  --   --  1    < > = values in this interval not displayed       Results from last 7 days   Lab Units 19  0444   POTASSIUM mmol/L 4 4   < > 4 5   CHLORIDE mmol/L 97*   < > 100   CO2 mmol/L 27   < > 22   BUN mg/dL 34*   < > 46*   CREATININE mg/dL 1 19   < > 1 67*   CALCIUM mg/dL 9 1   < > 8 4*   ALK PHOS U/L  --   --  55   ALT U/L  --   --  33   AST U/L  --   --  25    < > = values in this interval not displayed  Results from last 7 days   Lab Units 06/25/19  1422   INR  1 20               * I Have Reviewed All Lab Data Listed Above  * Additional Pertinent Lab Tests Reviewed: Alex 66 Admission  Reviewed    Imaging:  Imaging Reports Reviewed Today Include: n/a    Recent Cultures (last 7 days):           Last 24 Hours Medication List:     Current Facility-Administered Medications:  acetaminophen 650 mg Oral Q6H PRN NEAL Carpio   ALPRAZolam 0 25 mg Oral TID PRN NEAL Carpio   apixaban 2 5 mg Oral BID Alejandra Cee PA-C   diltiazem 300 mg Oral Daily Faviola Culver MD   metoprolol tartrate 25 mg Oral Q6H Faviola Culver MD   montelukast 10 mg Oral Daily Kylie Downing, NEAL   ondansetron 4 mg Intravenous Q6H PRN Kylie Downing, NEAL   oxybutynin 5 mg Oral Daily NEAL Carpio   pantoprazole 40 mg Oral Early Morning NEAL Carpio   polyethylene glycol 17 g Oral Daily Margie Ramirez PA-C   sertraline 50 mg Oral Daily NEAL Carpio        Today, Patient Was Seen By: Suzie Agarwal MD    ** Please Note: Dictation voice to text software may have been used in the creation of this document   **

## 2019-06-29 NOTE — ASSESSMENT & PLAN NOTE
Significant on exam and by echo  She remains quite frail for considering mitral valve intervention and when pressed she really is not interested in going through this type of intervention  Will continue attempts at medical therapy with heart rate control and volume control for now  Given creatinine had down to normal will add a small dose of lisinopril

## 2019-06-29 NOTE — NURSING NOTE
HR ; denies dyspnea, chest pain, dizziness, etc  Sleeping when undisturbed  AM lab specimens to lab

## 2019-06-30 LAB
ANION GAP SERPL CALCULATED.3IONS-SCNC: 7 MMOL/L (ref 4–13)
BUN SERPL-MCNC: 44 MG/DL (ref 7–25)
CALCIUM SERPL-MCNC: 8.8 MG/DL (ref 8.6–10.5)
CHLORIDE SERPL-SCNC: 94 MMOL/L (ref 98–107)
CO2 SERPL-SCNC: 25 MMOL/L (ref 21–31)
CREAT SERPL-MCNC: 2.08 MG/DL (ref 0.6–1.2)
CREAT UR-MCNC: 81.3 MG/DL
ERYTHROCYTE [DISTWIDTH] IN BLOOD BY AUTOMATED COUNT: 15.6 % (ref 11.5–14.5)
GFR SERPL CREATININE-BSD FRML MDRD: 22 ML/MIN/1.73SQ M
GLUCOSE SERPL-MCNC: 185 MG/DL (ref 65–99)
HCT VFR BLD AUTO: 33 % (ref 42–47)
HGB BLD-MCNC: 11.3 G/DL (ref 12–16)
MCH RBC QN AUTO: 30.2 PG (ref 26–34)
MCHC RBC AUTO-ENTMCNC: 34.1 G/DL (ref 31–37)
MCV RBC AUTO: 89 FL (ref 81–99)
OSMOLALITY UR: 300 MMOL/KG
PLATELET # BLD AUTO: 183 THOUSANDS/UL (ref 149–390)
PMV BLD AUTO: 7.8 FL (ref 8.6–11.7)
POTASSIUM SERPL-SCNC: 4.5 MMOL/L (ref 3.5–5.5)
RBC # BLD AUTO: 3.72 MILLION/UL (ref 3.9–5.2)
SODIUM 24H UR-SCNC: 10 MOL/L
SODIUM SERPL-SCNC: 126 MMOL/L (ref 134–143)
WBC # BLD AUTO: 10.3 THOUSAND/UL (ref 4.8–10.8)

## 2019-06-30 PROCEDURE — 83935 ASSAY OF URINE OSMOLALITY: CPT | Performed by: INTERNAL MEDICINE

## 2019-06-30 PROCEDURE — 85027 COMPLETE CBC AUTOMATED: CPT | Performed by: INTERNAL MEDICINE

## 2019-06-30 PROCEDURE — 80048 BASIC METABOLIC PNL TOTAL CA: CPT | Performed by: INTERNAL MEDICINE

## 2019-06-30 PROCEDURE — 84300 ASSAY OF URINE SODIUM: CPT | Performed by: INTERNAL MEDICINE

## 2019-06-30 PROCEDURE — 82570 ASSAY OF URINE CREATININE: CPT | Performed by: INTERNAL MEDICINE

## 2019-06-30 PROCEDURE — 99232 SBSQ HOSP IP/OBS MODERATE 35: CPT | Performed by: PHYSICIAN ASSISTANT

## 2019-06-30 PROCEDURE — 99232 SBSQ HOSP IP/OBS MODERATE 35: CPT | Performed by: INTERNAL MEDICINE

## 2019-06-30 RX ORDER — DILTIAZEM HYDROCHLORIDE 5 MG/ML
15 INJECTION INTRAVENOUS ONCE
Status: COMPLETED | OUTPATIENT
Start: 2019-06-30 | End: 2019-06-30

## 2019-06-30 RX ADMIN — SERTRALINE HYDROCHLORIDE 50 MG: 50 TABLET ORAL at 09:05

## 2019-06-30 RX ADMIN — APIXABAN 2.5 MG: 2.5 TABLET, FILM COATED ORAL at 17:17

## 2019-06-30 RX ADMIN — DILTIAZEM HYDROCHLORIDE 300 MG: 180 CAPSULE, COATED, EXTENDED RELEASE ORAL at 09:04

## 2019-06-30 RX ADMIN — METOPROLOL TARTRATE 25 MG: 25 TABLET, FILM COATED ORAL at 10:11

## 2019-06-30 RX ADMIN — APIXABAN 2.5 MG: 2.5 TABLET, FILM COATED ORAL at 09:04

## 2019-06-30 RX ADMIN — DILTIAZEM HYDROCHLORIDE 15 MG: 5 INJECTION INTRAVENOUS at 03:00

## 2019-06-30 RX ADMIN — METOPROLOL TARTRATE 25 MG: 25 TABLET, FILM COATED ORAL at 21:18

## 2019-06-30 RX ADMIN — OXYBUTYNIN CHLORIDE 5 MG: 5 TABLET ORAL at 10:12

## 2019-06-30 RX ADMIN — PANTOPRAZOLE SODIUM 40 MG: 40 TABLET, DELAYED RELEASE ORAL at 06:06

## 2019-06-30 RX ADMIN — METOPROLOL TARTRATE 25 MG: 25 TABLET, FILM COATED ORAL at 17:17

## 2019-06-30 RX ADMIN — MONTELUKAST SODIUM 10 MG: 10 TABLET, COATED ORAL at 09:05

## 2019-06-30 NOTE — ASSESSMENT & PLAN NOTE
Wt Readings from Last 3 Encounters:   06/30/19 75 kg (165 lb 5 5 oz)   06/25/19 69 4 kg (153 lb)   06/05/19 71 2 kg (157 lb)       Diuresis under the guidance of Cardiology and nephrology

## 2019-06-30 NOTE — ASSESSMENT & PLAN NOTE
· Will continue with current regimen  · With fluctuating heart rates yesterday, became bradycardic with increased dose of metoprolol of 50 mg t i d   · Troponin negative  · Cardiology is following  · Titrate medications for better heart rate control

## 2019-06-30 NOTE — PROGRESS NOTES
Progress Note - Darwin  1935, 81 y o  female MRN: [id number]    Unit/Bed#: ICU 03 Encounter: [id number]    Primary Care Provider: Genaro Mackey MD   Date and time admitted to hospital: 6/25/2019  1:56 PM    ASSESSMENT/PLAN:   New onset afib  · Rates currently 100-130s  Metoprolol was increased yesterday, but HR fell into the 30-40s and she became hypotensive  Metoprolol decreased to 25mg TID this morning  Continue Cardizem CD  · [id number] - On low dose Eliquis  · Will discuss next course of action with the patient tomorrow, regarding her feelings on further invasive measures      Acute on chronic diastolic HF   · BNP 1994 on presentation, proBNP 9927 two weeks ago  · No dyspnea or edema today  · Will hold lasix with worsening creatinine      Mitral regurgitation   · Severe by cath in 2012, moderate by echo  · Lisinopril started for afterload reduction, currently held with worsening Cr this morning  · The patient is not interested in valve repair at this time     HYU   · Cr was improving with diuresis, however is elevated again this morning likely secondary to bradycardia and hypotension yesterday     CAD   · S/p GARRET to LAD in 2007  · Continue metoprolol  · No recent angina      HTN   · Continue metoprolol and diltiazem with hold parameters      HLD   · Not on statin therapy   · LDL 69 on 3/15/19    SUBJECTIVE  HPI: The patient was seen and examined at the bedside  Bradycardia and hypotension yesterday with increased dose of metoprolol  The patient noted feeling tired, but was otherwise asymptomatic  Telemetry: Afib, rates 100-130s    Most recent cardiac testing:   TTE 6/27/2019:  EF 60%  Moderate LVH  Moderate MR with marked prolapse of the anterior and posterior leaflets  Mild TR with moderate pulm HTN     TTE 2017 - EF 55%, moderate MR     Cardiac cath 2012 - patent LAD stent, severe MR   No significant CAD     Current medications:     Current Facility-Administered Medications:    acetaminophen (TYLENOL) tablet 650 mg, 650 mg, Oral, Q6H PRN, COURTNEY WelchNP, 650 mg at 06/28/19 0427    ALPRAZolam Josemanuel Fish) tablet 0 25 mg, 0 25 mg, Oral, TID PRN, COURTNEY WelchNP, 0 25 mg at 06/29/19 2219    apixaban (ELIQUIS) tablet 2 5 mg, 2 5 mg, Oral, BID, Nneka Weir PA-C, 2 5 mg at 06/30/19 9184    diltiazem (CARDIZEM CD) 24 hr capsule 300 mg, 300 mg, Oral, Daily, Seda Posey MD, 300 mg at 06/30/19 9939    metoprolol tartrate (LOPRESSOR) tablet 25 mg, 25 mg, Oral, TID, Gisell Sahni MD, 25 mg at 06/30/19 1011    montelukast (SINGULAIR) tablet 10 mg, 10 mg, Oral, Daily, NEAL Welch, 10 mg at 06/30/19 0905    ondansetron (ZOFRAN) injection 4 mg, 4 mg, Intravenous, Q6H PRN, COURTNEY WelchNP, 4 mg at 06/25/19 1748    oxybutynin (DITROPAN) tablet 5 mg, 5 mg, Oral, Daily, COURTNEY WelchNP, 5 mg at 06/30/19 1012    pantoprazole (PROTONIX) EC tablet 40 mg, 40 mg, Oral, Early Morning, COURTNEY WelchNP, 40 mg at 06/30/19 0606    polyethylene glycol (MIRALAX) packet 17 g, 17 g, Oral, Daily, Miguel Angel Dyson PA-C, 17 g at 06/29/19 0835    sertraline (ZOLOFT) tablet 50 mg, 50 mg, Oral, Daily, Nory No CRNP, 50 mg at 06/30/19 7643     Allergies   Allergen Reactions    Chocolate Flavor     Iodine Other (See Comments)     shellfish- difficulty breathing    Lactose     Shellfish-Derived Products     Codeine Rash       OBJECTIVE  Vitals: Blood pressure 117/86, pulse (!) 117, temperature 98 °F (36 7 °C), temperature source Tympanic, resp  rate 22, height 4' 11" (1 499 m), weight 75 kg (165 lb 5 5 oz), SpO2 92 %  , Body mass index is 33 4 kg/m² ,   Orthostatic Blood Pressures      Most Recent Value   Blood Pressure  117/86 filed at 06/30/2019 1011   Patient Position - Orthostatic VS  Lying filed at 06/30/2019 0800          Physical Exam   Physical Exam   Constitutional: She is oriented to person, place, and time   She appears well-developed and well-nourished  No distress  HENT:   Head: Normocephalic and atraumatic  Eyes: EOM are normal  No scleral icterus  Neck: Normal range of motion  Neck supple  Cardiovascular: S1 normal, S2 normal and intact distal pulses  An irregularly irregular rhythm present  Tachycardia present  Murmur (Grade 4/6 holosystolic murmur at the apex) heard  Pulmonary/Chest: Effort normal and breath sounds normal  She has no wheezes  Bilateral basilar crackles   Abdominal: Soft  Bowel sounds are normal    Musculoskeletal: She exhibits no edema  Neurological: She is alert and oriented to person, place, and time  Skin: Skin is warm and dry  Psychiatric: She has a normal mood and affect  Her behavior is normal    Nursing note and vitals reviewed        Lab Results:   Troponins:   Results from last 7 days   Lab Units 06/28/19  1318 06/28/19  0611 06/28/19  0352   TROPONIN I ng/mL 0 03 0 03 0 03     BNP:  Results from last 6 Months   Lab Units 06/25/19  1812   BNP pg/mL 1,303*     CBC with diff:   Results from last 7 days   Lab Units 06/30/19  0437 06/29/19  0457 06/28/19  0352   WBC Thousand/uL 10 30 8 60 9 20   HEMOGLOBIN g/dL 11 3* 12 2 11 8*   HEMATOCRIT % 33 0* 36 8* 34 6*   PLATELETS Thousands/uL 183 209 227   RBC Million/uL 3 72* 4 04 3 87*     CMP:  Results from last 7 days   Lab Units 06/30/19  0437 06/29/19  0457 06/28/19  0352 06/27/19  0439 06/26/19  0444 06/25/19  1422   POTASSIUM mmol/L 4 5 4 4 4 0 4 5 4 5 4 5   CHLORIDE mmol/L 94* 97* 97* 99 100 95*   CO2 mmol/L 25 27 26 24 22 24   BUN mg/dL 44* 34* 39* 42* 46* 48*   CREATININE mg/dL 2 08* 1 19 1 44* 1 69* 1 67* 1 80*   CALCIUM mg/dL 8 8 9 1 9 3 9 1 8 4* 9 5   AST U/L  --   --   --   --  25 36   ALT U/L  --   --   --   --  33 44   ALK PHOS U/L  --   --   --   --  55 68   EGFR ml/min/1 73sq m 22 42 34 28 28 26     TSH:     Coags:   Results from last 7 days   Lab Units 06/25/19  1422   INR  1 20

## 2019-06-30 NOTE — PROGRESS NOTES
Progress Note - Nephrology   Enrico Velez 81 y o  female MRN: [id number]  Unit/Bed#: ICU 03 Encounter: [id number]    A/P:  1  Acute kidney failure on top of chronic kidney disease               patient's fraction excretion of sodium less 1%, most likely due to hypovolemia at presentation along with hemodynamics instability in form of atrial fibrillation rapid ventricular response  Unfortunately patient continues to have significant dysrhythmias at this time which the patient is aware of but denies any worrisome sequela such as no shortness of breath nausea vomiting diarrhea or diaphoresis  Patient appears appropriate from volume standpoint and would therefore discouraged any additional volume via the IV route  Continue to focus on heart rate control which will defer to Cardiology  6/28: Kidney function improved with diuresis and she is symptomatically better  Will follow I/O  She is less dyspneic  Will receive additional diuretic as per cardiology             6/29: diuresed 4kg, however, weights may be erroneous and has improved renal function  6/30: had an episode of bradycardia and hypotension and renal function has declined  She is voiding well  Will check urine electrolytes to rule out ATN  2  Chronic kidney disease stage 2 with baseline creatinine between 0 7 - 0 8 mg/dL    3  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              I stressed the importance of avoiding nonsteroidal anti-inflammatory medications as possible   ============  4  Hyponatremia               sodium stable over last 24 hours or so with a slight trend to would improving up at 131 millimole/L this morning  I will place the patient on a mild fluid restriction, continue monitor closely  6/28: check urine osmolality  5  Right adrenal adenoma -most likely benign   This was noted on CT scan without contrast, adenoma is less than 2 5 cm    Recommendation at this point is to repeat imaging in approximately 12 months to ensure adenoma is stable  6  Hypotension most likely due to hypovolemia and hemodynamic instability               improved, continue to address dysrhythmia  7  Atrial fibrillation with rapid ventricular response              As mentioned above  Defer to Cardiology  8  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  9  Chronic diastolic congestive heart failure with potential acute episode   Patient placed on Lasix 80 mg IV b i d , follow clinical and laboratory findings closely  Continue to adjust as indicated according to Cardiology  10  Severe mitral regurgitation       Follow up reason for today's visit:  Acute kidney injury/electrolyte disorders/chronic kidney disease    Atrial fibrillation with rapid ventricular response [facility])    Patient Active Problem List   Diagnosis    Coronary artery disease involving native coronary artery of native heart without angina pectoris    SOB (shortness of breath)    Localized edema    Atherosclerosis of both carotid arteries    Non-rheumatic mitral regurgitation    Other fatigue    [facility] fibrillation with [facility] response [facility] )    HUY (acute kidney injury) ([facility]    Nausea    Acute on chronic diastolic congestive heart failure ([facility] )         Subjective:   Patient continues to complain of palpitations, currently not associated with diaphoresis/shortness of breath/nausea or vomiting  6/30" feels tired - no chest pain, dyspnea or abdominal pain    Objective:     Vitals: Blood pressure 117/86, pulse (!) 117, temperature 98 °F (36 7 °C), temperature source Tympanic, resp  rate 22, height 4' 11" (1 499 m), weight 75 kg (165 lb 5 5 oz), SpO2 92 %  ,Body mass index is 33 4 kg/m²      Weight (last 2 days)     Date/Time   Weight    06/30/19 0600   75 (165 35)    06/29/19 0600   73 2 (161 31)    06/28/19 0600   77 2 (170 1)                Intake/Output Summary (Last 24 hours) at 6/30/2019 1349  Last data filed at 6/30/2019 0800  Gross per 24 hour   Intake 545 ml Output 50 ml   Net 495 ml     I/O last 3 completed shifts: In: 1815 [P O :1140; IV Piggyback:125]  Out: 900 [Urine:900]         Physical Exam: /86   Pulse (!) 117   Temp 98 °F (36 7 °C) (Tympanic)   Resp 22   Ht 4' 11" (1 499 m)   Wt 75 kg (165 lb 5 5 oz)   SpO2 92%   BMI 33 40 kg/m²     General Appearance:    Alert, cooperative, no distress, appears stated age   Head:    Normocephalic, without obvious abnormality, atraumatic   Eyes:    Conjunctiva/corneas clear   Ears:    Normal external ears   Nose:   Nares normal, septum midline, mucosa normal, no drainage    or sinus tenderness   Throat:   Lips, mucosa, and tongue normal; teeth and gums normal   Neck:   Supple   Back:     Symmetric, no curvature, ROM normal, no CVA tenderness   Lungs:     Clear to auscultation bilaterally, respirations unlabored   Chest wall:    No tenderness or deformity   Heart:    Regular rate and rhythm, S1 and S2 normal, no murmur, rub   or gallop   Abdomen:     Soft, non-tender, bowel sounds active   Extremities:   Extremities normal, atraumatic, no cyanosis, trace bilateral lower extremity edema   Skin:   Skin color, texture, turgor normal, no rashes or lesions   Lymph nodes:   Cervical normal   Neurologic:   CNII-XII intact            Lab, Imaging and other studies: I have personally reviewed pertinent labs  CBC:   Lab Results   Component Value Date    WBC 10 30 06/30/2019    HGB 11 3 (L) 06/30/2019    HCT 33 0 (L) 06/30/2019    MCV 89 06/30/2019     06/30/2019    MCH 30 2 06/30/2019    MCHC 34 1 06/30/2019    RDW 15 6 (H) 06/30/2019    MPV 7 8 (L) 06/30/2019     CMP:   Lab Results   Component Value Date    K 4 5 06/30/2019    CL 94 (L) 06/30/2019    CO2 25 06/30/2019    BUN 44 (H) 06/30/2019    CREATININE 2 08 (H) 06/30/2019    CALCIUM 8 8 06/30/2019    EGFR 22 06/30/2019           Results from last 7 days   Lab Units 06/30/19  0437 06/29/19  0457 06/28/19  0352  06/26/19  0444 06/25/19  1422   POTASSIUM mmol/L 4 5 4 4 4 0   < > 4 5 4 5   CHLORIDE mmol/L 94* 97* 97*   < > 100 95*   CO2 mmol/L 25 27 26   < > 22 24   BUN mg/dL 44* 34* 39*   < > 46* 48*   CREATININE mg/dL 2 08* 1 19 1 44*   < > 1 67* 1 80*   CALCIUM mg/dL 8 8 9 1 9 3   < > 8 4* 9 5   ALK PHOS U/L  --   --   --   --  55 68   ALT U/L  --   --   --   --  33 44   AST U/L  --   --   --   --  25 36    < > = values in this interval not displayed  Phosphorus: No results found for: PHOS  Magnesium: No results found for: MG  Urinalysis: No results found for: Jes Penning, SPECGRAV, PHUR, LEUKOCYTESUR, NITRITE, PROTEINUA, GLUCOSEU, KETONESU, BILIRUBINUR, BLOODU  Ionized Calcium: No results found for: CAION  Coagulation: No results found for: PT, INR, APTT  Troponin:   No results found for: TROPONINI  ABG: No results found for: PHART, XXE5QCL, PO2ART, LPD9GFH, E8PWJNJB, BEART, SOURCE  Radiology review:     IMAGING  Procedure: Ct Abdomen Pelvis Wo Contrast    Result Date: 6/26/2019  Narrative: CT ABDOMEN AND PELVIS WITHOUT IV CONTRAST INDICATION:   Abdominal pain, unspecified with nausea  COMPARISON:  None  TECHNIQUE:  CT examination of the abdomen and pelvis was performed without intravenous contrast   Axial, sagittal, and coronal 2D reformatted images were created from the source data and submitted for interpretation  Radiation dose length product (DLP) for this visit:  575 mGy-cm   This examination, like all CT scans performed in the [facility], was performed utilizing techniques to minimize radiation dose exposure, including the use of iterative reconstruction and automated exposure control  Enteric contrast was not administered  FINDINGS: ABDOMEN LOWER CHEST:  No clinically significant abnormality identified in the visualized lower chest   Mitral annulus calcifications noted  LIVER/BILIARY TREE:  Unremarkable  GALLBLADDER:  No calcified gallstones  No pericholecystic inflammatory change  SPLEEN:  Unremarkable  PANCREAS:  Unremarkable  ADRENAL GLANDS:  1 3 cm right adrenal nodule  Normal left adrenal gland  KIDNEYS/URETERS:  Unremarkable  No hydronephrosis  STOMACH AND BOWEL:  Unremarkable  APPENDIX:  A normal appendix was visualized  ABDOMINOPELVIC CAVITY:  No ascites or free intraperitoneal air  No lymphadenopathy  VESSELS:  Atherosclerotic changes are present  No evidence of aneurysm  PELVIS REPRODUCTIVE ORGANS:  Unremarkable for patient's age  URINARY BLADDER:  Unremarkable  ABDOMINAL WALL/INGUINAL REGIONS:  Unremarkable  OSSEOUS STRUCTURES: Compression deformity at T12  Age appropriate degenerative changes in the lumbar spine  No acute fracture or destructive osseous lesion  Partially imaged antegrade intramedullary nail and cephalomedullary screw in the right femur  Impression: 1  No acute abnormality in the abdomen or pelvis  2   There is a 1 3 cm right adrenal gland nodule  Although its imaging features are indeterminate, it does not have suspicious imaging features (heterogeneity, necrosis, irregular margins), therefore this is likely benign, and can be followed by non-contrast abdomen CT or MRI in 12 months  If patient has history of adrenal hyperfunction, consider biochemical evaluation  Adrenal recommendation based on institutional consensus and Journal of [employer] of Radiology 2017;14:7666-2492 The study was marked in [facility] for significant notification  Workstation performed: [id number]     Procedure: Us Kidney And Bladder    Result Date: 6/26/2019  Narrative: RENAL ULTRASOUND INDICATION:   RENAL FAILURE, ACUTE   Guicho Glass COMPARISON: None TECHNIQUE: Portable ultrasound of the retroperitoneum was performed with a curvilinear transducer utilizing volumetric sweeps and still imaging techniques  FINDINGS: KIDNEYS: Symmetric and normal size  Right kidney:  10 5 x 4 5 cm  Left kidney:  10 9 x 5 0 cm  Right kidney Normal echogenicity and contour  No suspicious masses detected  No hydronephrosis  No shadowing calculi   No perinephric fluid collections  Left kidney Normal echogenicity and contour  No suspicious masses detected  No hydronephrosis  No shadowing calculi  No perinephric fluid collections  URETERS: Nonvisualized  BLADDER: Normally distended  No focal thickening or mass lesions  Fluid collection above the bladder  Impression: Normal kidneys and bladder  Fluid collection above the bladder measuring approximately 2 1 x 2 1 x 4 8 cm of uncertain etiology  Consider CT scan for further evaluation  The study was marked in [facility] for immediate notification   Workstation performed: [id number]       Current Facility-Administered Medications   Medication Dose Route Frequency    acetaminophen (TYLENOL) tablet 650 mg  650 mg Oral Q6H PRN    ALPRAZolam (XANAX) tablet 0 25 mg  0 25 mg Oral TID PRN    apixaban (ELIQUIS) tablet 2 5 mg  2 5 mg Oral BID    diltiazem (CARDIZEM CD) 24 hr capsule 300 mg  300 mg Oral Daily    metoprolol tartrate (LOPRESSOR) tablet 25 mg  25 mg Oral TID    montelukast (SINGULAIR) tablet 10 mg  10 mg Oral Daily    ondansetron (ZOFRAN) injection 4 mg  4 mg Intravenous Q6H PRN    oxybutynin (DITROPAN) tablet 5 mg  5 mg Oral Daily    pantoprazole (PROTONIX) EC tablet 40 mg  40 mg Oral Early Morning    polyethylene glycol (MIRALAX) packet 17 g  17 g Oral Daily    sertraline (ZOLOFT) tablet 50 mg  50 mg Oral Daily     Medications Discontinued During This Encounter   Medication Reason    diltiazem (CARDIZEM) 125 mg in sodium chloride 0 9 % 125 mL infusion     heparin (porcine) subcutaneous injection 5,000 Units     sodium chloride 0 9 % bolus 500 mL     aspirin chewable tablet 81 mg     diltiazem (CARDIZEM CD) 24 hr capsule 240 mg     enoxaparin (LOVENOX) subcutaneous injection 80 mg     furosemide (LASIX) tablet 40 mg     diltiazem (CARDIZEM CD) 24 hr capsule 240 mg     metoprolol tartrate (LOPRESSOR) tablet 25 mg     metoprolol tartrate (LOPRESSOR) tablet 25 mg     lisinopril (ZESTRIL) tablet 5 mg     metoprolol tartrate (LOPRESSOR) tablet 50 mg     lisinopril (ZESTRIL) tablet 2 5 mg        Dave Coronel MD

## 2019-06-30 NOTE — ASSESSMENT & PLAN NOTE
· Perhaps related to hemodynamic instability related to atrial fibrillation  · Appreciate Nephrology input  · Creatinine increased today, perhaps related to episode of hypotension yesterday related to bradycardia  · Continue trend and monitor

## 2019-06-30 NOTE — NURSING NOTE
2L/min NC maintained  VS and sats stable  Remains in Afib  Awake and alert, oriented  Dernies pain  Family visiting  RH IV remains capped

## 2019-06-30 NOTE — NURSING NOTE
Patient OOB to chair since after lunch  HR 80's to 691'E with systolic BP's around 313-695'Y  Denying any lightheadedness/dizziness,sob/diaphoresis or chest discomfort  Will continue to monitor closely as patient has had 2sec to 3sec pauses as noted per strip printouts  Safety maintained

## 2019-06-30 NOTE — NURSING NOTE
VS and sats stable  O2 @ 2L/min  RH IV remains capped  Sleeping whn undisturbed  Arouses to name  Offering no complaints  Returns to sleep when left undisturbed

## 2019-06-30 NOTE — PROGRESS NOTES
Progress Note - Amy Youngblood 1935, 81 y o  female MRN: [id number]    Unit/Bed#: ICU 03 Encounter: [id number]    Primary Care Provider: Anne Rodriguez MD   Date and time admitted to hospital: 6/25/2019  1:56 PM        Acute on chronic diastolic congestive heart failure ([facility] [address] )  Assessment & Plan  Wt Readings from Last 3 Encounters:   06/30/19 75 kg (165 lb 5 5 oz)   06/25/19 69 4 kg (153 lb)   06/05/19 71 2 kg (157 lb)       Diuresis under the guidance of Cardiology and nephrology      Nausea  Assessment & Plan  · Improving  · Unclear etiology suspected this to be related to new onset AFib versus CHF  · CT unremarkable, incidental adrenal nodule noted, incidental finding note completed  · Continue on PPI  · Soft/low-fat diet    HU[facility]) ([facility] )  Assessment & Plan  · Perhaps related to hemodynamic instability related to atrial fibrillation  · Appreciate Nephrology input  · Creatinine increased today, perhaps related to episode of hypotension yesterday related to bradycardia  · Continue trend and monitor    Non-rheumatic mitral regurgitation  Assessment & Plan  · Followed by Cardiology    Coronary artery disease involving native coronary artery of native heart without angina pectoris  Assessment & Plan  · Will continue with current regimen  · With fluctuating heart rates yesterday, became bradycardic with increased dose of metoprolol of 50 mg t i d   · Troponin negative  · Cardiology is following  · Titrate medications for better heart rate control    * Atrial fibrillation with rapid ventricular response ([facility] )  Assessment & Plan  · Difficult to control heart rate  · With fluctuating rates on p o  Medications, bradycardic and hypotensive yesterday with increased dose of metoprolol of 50 mg t i d   · Will decrease dose to 25 mg t i d   For now and defer to Cardiology regarding further rate controlling medications  · Started on Eliquis in terms of anticoagulation        VTE Pharmacologic Prophylaxis: Pharmacologic: Apixaban (Eliquis)    Patient Centered Rounds: I have performed bedside rounds with nursing staff today  Discussions with Specialists or Other Care Team Provider: n/a  Education and Discussions with Family / Patient: patient    Current Length of Stay: 5 day(s)    Current Patient Status: Inpatient   Certification Statement: The patient will continue to require additional inpatient hospital stay due to afib with rvr    Discharge Plan: pending hospital course  snf when medically stable    Code Status: Level 3 - DNAR and DNI    Subjective:   Patient seen examined  Episode of bradycardia and hypotension yesterday    Objective:     Vitals:   Temp (24hrs), Av 1 °F (36 7 °C), Min:97 5 °F (36 4 °C), Max:99 2 °F (37 3 °C)    Temp:  [97 5 °F (36 4 °C)-99 2 °F (37 3 °C)] 98 °F (36 7 °C)  HR:  [] 106  Resp:  [15-42] 15  BP: ()/(50-78) 82/52  SpO2:  [88 %-97 %] 94 %  Body mass index is 33 4 kg/m²  Input and Output Summary (last 24 hours): Intake/Output Summary (Last 24 hours) at 2019 4047  Last data filed at 2019 0600  Gross per 24 hour   Intake 545 ml   Output 50 ml   Net 495 ml       Physical Exam:     Physical Exam   Constitutional: She is oriented to person, place, and time  She appears well-developed and well-nourished  HENT:   Head: Normocephalic and atraumatic  Eyes: Pupils are equal, round, and reactive to light  EOM are normal    Neck: Normal range of motion  Neck supple  Cardiovascular:   Murmur heard  Tachycardia, irregularly irregular   Pulmonary/Chest: Effort normal and breath sounds normal    Abdominal: Soft  Bowel sounds are normal    Musculoskeletal: Normal range of motion  She exhibits no edema  Neurological: She is alert and oriented to person, place, and time  No cranial nerve deficit  Skin: Skin is warm  Capillary refill takes less than 2 seconds  Psychiatric: She has a normal mood and affect   Her behavior is normal  Judgment and thought content normal    Nursing note and vitals reviewed  Additional Data:     Labs:    Results from last 7 days   Lab Units 06/30/19  0437  06/25/19  1422   WBC Thousand/uL 10 30   < > 10 30   HEMOGLOBIN g/dL 11 3*   < > 13 2   HEMATOCRIT % 33 0*   < > 39 6*   PLATELETS Thousands/uL 183   < > 240   NEUTROS PCT %  --   --  82*   LYMPHS PCT %  --   --  10*   MONOS PCT %  --   --  7   EOS PCT %  --   --  1    < > = values in this interval not displayed  Results from last 7 days   Lab Units 06/30/19  0437  06/26/19  0444   POTASSIUM mmol/L 4 5   < > 4 5   CHLORIDE mmol/L 94*   < > 100   CO2 mmol/L 25   < > 22   BUN mg/dL 44*   < > 46*   CREATININE mg/dL 2 08*   < > 1 67*   CALCIUM mg/dL 8 8   < > 8 4*   ALK PHOS U/L  --   --  55   ALT U/L  --   --  33   AST U/L  --   --  25    < > = values in this interval not displayed  Results from last 7 days   Lab Units 06/25/19  1422   INR  1 20               * I Have Reviewed All Lab Data Listed Above  * Additional Pertinent Lab Tests Reviewed:  Alex [facility] Admission  Reviewed    Imaging:  Imaging Reports Reviewed Today Include: n/a    Recent Cultures (last 7 days):           Last 24 Hours Medication List:     Current Facility-Administered Medications:  acetaminophen 650 mg Oral Q6H PRN Leotha Saint Paul, CRNP   ALPRAZolam 0 25 mg Oral TID PRN Leotha [place], CRNP   apixaban 2 5 mg Oral BID Veverly Pump, PA-C   diltiazem 300 mg Oral Daily Jass Fu MD   lisinopril 2 5 mg Oral Daily Leotha [place], CRNP   metoprolol tartrate 25 mg Oral TID Gem Resendiz MD   montelukast 10 mg Oral Daily Leotha [place], CRRENETTA   ondansetron 4 mg Intravenous Q6H PRN Leotha [place], CRNP   oxybutynin 5 mg Oral Daily Leotha [place], CRNP   pantoprazole 40 mg Oral Early Morning Leotha [place], CRNP   polyethylene glycol 17 g Oral Daily Iwona Presley PA-C   sertraline 50 mg Oral Daily Leotha [place], CRNP        Today, Patient Was Seen By: Srinath Monzon Faraz Cisneros MD    ** Please Note: Dictation voice to text software may have been used in the creation of this document   **

## 2019-06-30 NOTE — ASSESSMENT & PLAN NOTE
· Improving  · Unclear etiology suspected this to be related to new onset AFib versus CHF  · CT unremarkable, incidental adrenal nodule noted, incidental finding note completed  · Continue on PPI  · Soft/low-fat diet

## 2019-07-01 LAB
ANION GAP SERPL CALCULATED.3IONS-SCNC: 6 MMOL/L (ref 4–13)
BUN SERPL-MCNC: 41 MG/DL (ref 7–25)
CALCIUM SERPL-MCNC: 9.3 MG/DL (ref 8.6–10.5)
CHLORIDE SERPL-SCNC: 96 MMOL/L (ref 98–107)
CO2 SERPL-SCNC: 27 MMOL/L (ref 21–31)
CREAT SERPL-MCNC: 1.53 MG/DL (ref 0.6–1.2)
ERYTHROCYTE [DISTWIDTH] IN BLOOD BY AUTOMATED COUNT: 15.5 % (ref 11.5–14.5)
GFR SERPL CREATININE-BSD FRML MDRD: 31 ML/MIN/1.73SQ M
GLUCOSE SERPL-MCNC: 119 MG/DL (ref 65–99)
HCT VFR BLD AUTO: 35.8 % (ref 42–47)
HGB BLD-MCNC: 12 G/DL (ref 12–16)
MCH RBC QN AUTO: 29.8 PG (ref 26–34)
MCHC RBC AUTO-ENTMCNC: 33.6 G/DL (ref 31–37)
MCV RBC AUTO: 89 FL (ref 81–99)
PLATELET # BLD AUTO: 206 THOUSANDS/UL (ref 149–390)
PMV BLD AUTO: 7.4 FL (ref 8.6–11.7)
POTASSIUM SERPL-SCNC: 4.9 MMOL/L (ref 3.5–5.5)
RBC # BLD AUTO: 4.03 MILLION/UL (ref 3.9–5.2)
SODIUM SERPL-SCNC: 129 MMOL/L (ref 134–143)
WBC # BLD AUTO: 9 THOUSAND/UL (ref 4.8–10.8)

## 2019-07-01 PROCEDURE — 99231 SBSQ HOSP IP/OBS SF/LOW 25: CPT | Performed by: INTERNAL MEDICINE

## 2019-07-01 PROCEDURE — 85027 COMPLETE CBC AUTOMATED: CPT | Performed by: INTERNAL MEDICINE

## 2019-07-01 PROCEDURE — 80048 BASIC METABOLIC PNL TOTAL CA: CPT | Performed by: INTERNAL MEDICINE

## 2019-07-01 PROCEDURE — 97110 THERAPEUTIC EXERCISES: CPT

## 2019-07-01 PROCEDURE — 99232 SBSQ HOSP IP/OBS MODERATE 35: CPT | Performed by: INTERNAL MEDICINE

## 2019-07-01 PROCEDURE — 97112 NEUROMUSCULAR REEDUCATION: CPT

## 2019-07-01 PROCEDURE — 97116 GAIT TRAINING THERAPY: CPT

## 2019-07-01 RX ORDER — DILTIAZEM HYDROCHLORIDE 5 MG/ML
15 INJECTION INTRAVENOUS ONCE
Status: COMPLETED | OUTPATIENT
Start: 2019-07-01 | End: 2019-07-01

## 2019-07-01 RX ADMIN — METOPROLOL TARTRATE 25 MG: 25 TABLET, FILM COATED ORAL at 22:28

## 2019-07-01 RX ADMIN — METOPROLOL TARTRATE 25 MG: 25 TABLET, FILM COATED ORAL at 17:52

## 2019-07-01 RX ADMIN — APIXABAN 2.5 MG: 2.5 TABLET, FILM COATED ORAL at 17:52

## 2019-07-01 RX ADMIN — METOPROLOL TARTRATE 25 MG: 25 TABLET, FILM COATED ORAL at 08:30

## 2019-07-01 RX ADMIN — DILTIAZEM HYDROCHLORIDE 300 MG: 180 CAPSULE, COATED, EXTENDED RELEASE ORAL at 08:29

## 2019-07-01 RX ADMIN — DILTIAZEM HYDROCHLORIDE 15 MG: 5 INJECTION INTRAVENOUS at 04:13

## 2019-07-01 RX ADMIN — OXYBUTYNIN CHLORIDE 5 MG: 5 TABLET ORAL at 08:29

## 2019-07-01 RX ADMIN — POLYETHYLENE GLYCOL 3350 17 G: 17 POWDER, FOR SOLUTION ORAL at 08:30

## 2019-07-01 RX ADMIN — SERTRALINE HYDROCHLORIDE 50 MG: 50 TABLET ORAL at 08:30

## 2019-07-01 RX ADMIN — APIXABAN 2.5 MG: 2.5 TABLET, FILM COATED ORAL at 08:30

## 2019-07-01 RX ADMIN — PANTOPRAZOLE SODIUM 40 MG: 40 TABLET, DELAYED RELEASE ORAL at 06:07

## 2019-07-01 RX ADMIN — MONTELUKAST SODIUM 10 MG: 10 TABLET, COATED ORAL at 08:29

## 2019-07-01 NOTE — PHYSICAL THERAPY NOTE
Physical Therapy Treatment Session Note    Patient's Name: Adin Fu    Admitting Diagnosis  Hyponatremia [E87 1]  Irregular heart beat [I49 9]  Elevated troponin [R74 8]  Atrial fibrillation with RVR ([facility] [address] ) [I48 91]  Acute kidney injury ([facility] [address] ) [N17 9]  Coronary artery disease involving native coronary artery of native heart without angina pectoris [I25 10]    Problem List  Patient Active Problem List   Diagnosis    Coronary artery disease involving native coronary artery of native heart without angina pectoris    SOB (shortness of breath)    Localized edema    Atherosclerosis of both carotid arteries    Non-rheumatic mitral regurgitation    Other fatigue    Atrial fibrillation with rapid ventricular response ([facility] [address] )    HUY (acute kidney injury) ([facility] [address] )    Nausea    Acute on chronic diastolic congestive heart failure ([facility] [address] )       Past Medical History  Past Medical History:   Diagnosis Date    Acute on chronic diastolic congestive heart failure ([facility] [address] ) 6/27/2019    Arthritis     Coronary artery disease     history of stenting    History of echocardiogram 07/20/2017    EF 55%, mild concentric LVH, bileaflet MVP with moderate MR, left atrial enlargement   History of transfusion     Hyperlipidemia     Hypertension     Mitral regurgitation        Past Surgical History  Past Surgical History:   Procedure Laterality Date    ABDOMINAL SURGERY      hysterectomy    CARDIAC CATHETERIZATION  04/10/2012    EF 65%, no significant CAD, patent stents, severe MR     CORONARY ANGIOPLASTY WITH STENT PLACEMENT  03/21/2007    EF 55%, GARRET to LAD      EYE SURGERY      b/l cataracts    HYSTERECTOMY          07/01/19 1002   Pain Assessment   Pain Assessment No/denies pain   Pain Score No Pain   Restrictions/Precautions   Weight Bearing Precautions Per Order No   Other Precautions Multiple lines;Telemetry;O2;Fall Risk  (2 L O2, does not utilize at home)   General   Chart Reviewed Yes   Response to Previous Treatment Patient with no complaints from previous session  Family/Caregiver Present No   Cognition   Overall Cognitive Status WFL   Arousal/Participation Alert; Cooperative   Attention Attends with cues to redirect   Orientation Level Oriented X4   Memory Within functional limits   Following Commands Follows one step commands with increased time or repetition   Subjective   Subjective "I'm about the same"   Bed Mobility   Rolling L 4  Minimal assistance   Additional items Assist x 1;HOB elevated; Bedrails; Increased time required;Verbal cues;LE management   Supine to Sit 4  Minimal assistance   Additional items Assist x 1;HOB elevated; Bedrails; Increased time required;Verbal cues;LE management   Sit to Supine   (DNT as pt  remained OOB in chair upon conclusion )   Transfers   Sit to Stand 3  Moderate assistance   Additional items Assist x 1;Bedrails; Increased time required;Verbal cues   Stand to Sit 4  Minimal assistance   Additional items Assist x 1;Verbal cues; Bedrails   Stand pivot 3  Moderate assistance   Additional items Assist x 1;Verbal cues   Toilet transfer 4  Minimal assistance   Additional items Assist x 1;Commode;Raised toilet seat;Verbal cues; Increased time required   Ambulation/Elevation   Gait pattern Improper Weight shift;Narrow FILOMENA; Forward Flexion; Short stride  (increased time)   Gait Assistance 3  Moderate assist   Additional items Assist x 1;Verbal cues; Tactile cues  (for increased FILOMENA,safety w/directional changes)   Assistive Device Rolling walker   Distance 15 feet   Stair Management Assistance Not tested   Balance   Static Sitting Good   Dynamic Sitting Fair +   Static Standing Fair   Dynamic Standing Fair   Ambulatory Fair   Endurance Deficit   Endurance Deficit Yes   Endurance Deficit Description Fatigue w/ WB tasks  (->133->127->112   /88)   Activity Tolerance   Activity Tolerance Treatment limited secondary to medical complications (Comment); Patient limited by fatigue   Nurse Made Aware yes, Nedra Latif present during session   Exercises   Neuro re-ed in static standing including multi-directional WS, head turns <->x 10 B, & sidestepping toward chair   Assessment   Prognosis Fair   Problem List Decreased strength;Decreased endurance; Impaired balance;Decreased mobility; Impaired judgement;Decreased safety awareness; Obesity   Assessment Pt seen for PT treatment session this date with interventions consisting of gait training w/ emphasis on improving pt's ability to ambulate level surfaces x 15 feet with mod A provided by therapist with RW and neuromuscular re-education: side stepping bilaterally and mulit-directional WS, <->head turns x 5 ft w/ B UE support  Pt agreeable to PT treatment session upon arrival, pt found seated OOB in chair, in no apparent distress and A&O x 4  In comparison to previous session, pt with improvements in standing balance, ambulatory balance  Post session: all needs in reach Continue to recommend STR at time of d/c in order to maximize pt's functional independence and safety w/ mobility  Pt continues to be functioning below baseline level, and remains limited 2* factors listed above and including weakness, impaired balance, decreased endurance, decreased activity tolerance w/ elevated HR, gait dysfunction, decline from PLOF  PT will continue to see pt while here in order to address the deficits listed above and provide interventions consistent w/ POC in effort to achieve LTGs  Goals   Patient Goals feel better soon   LTG Expiration Date 07/06/19   Long Term Goal #1 LTGs remain appropriate   Treatment Day 3   Plan   Treatment/Interventions Functional transfer training;LE strengthening/ROM; Therapeutic exercise; Endurance training;Bed mobility;Gait training;Equipment eval/education;Spoke to nursing;OT  (&neuro re-education w/balance training)   Progress Slow progress, medical status limitations   PT Frequency Other (Comment)  (3-5x/week)   Recommendation   Recommendation Short-term skilled PT   Equipment Recommended Walker   PT - OK to Discharge Yes  (if medically stable to STR)   Additional Comments Upon conclusion, patient was resting OOB in chair w/SCD's active & all needs within reach       Keke Bravo, PT

## 2019-07-01 NOTE — PROGRESS NOTES
Progress Note - Daryle Sparrow 1935, 81 y o  female MRN: [id number]    Unit/Bed#: ICU 03 Encounter: [id number]    Primary Care Provider: Anna Burris MD   Date and time admitted to hospital: 6/25/2019  1:56 PM        Acute on chronic diastolic congestive heart failure ([facility] [address] )  Assessment & Plan  Wt Readings from Last 3 Encounters:   07/01/19 76 7 kg (169 lb)   06/25/19 69 4 kg (153 lb)   06/05/19 71 2 kg (157 lb)       Diuresis under the guidance of Cardiology and nephrology      HUY (acute kidney injury) ([facility] [address] )  Assessment & Plan  · Perhaps related to hemodynamic instability related to atrial fibrillation  · Appreciate Nephrology input  · Creatinine improved today today  · Continue trend and monitor    Non-rheumatic mitral regurgitation  Assessment & Plan  · Followed by Cardiology    Coronary artery disease involving native coronary artery of native heart without angina pectoris  Assessment & Plan  · Will continue with current regimen  · With fluctuating heart rates still, became bradycardic with increased dose of metoprolol of 50 mg t i d  previously  · Troponin negative  · Cardiology is following  · Titrate medications for better heart rate control    * Atrial fibrillation with rapid ventricular response ([facility] [address] )  Assessment & Plan  · Difficult to control heart rate  · With fluctuating rates on p o  Medications, bradycardic and hypotensive previously with increased dose of metoprolol of 50 mg t i d   · Continue decreased dose of metoprolol 25 mg t i d  for now and defer to Cardiology regarding further rate controlling medications  · Started on Eliquis in terms of anticoagulation        VTE Pharmacologic Prophylaxis: Pharmacologic: Apixaban (Eliquis)    Patient Centered Rounds: I have performed bedside rounds with nursing staff today      Discussions with Specialists or Other Care Team Provider: cardiology  Education and Discussions with Family / Patient: patient    Current Length of Stay: 6 day(s)    Current Patient Status: Inpatient   Certification Statement: The patient will continue to require additional inpatient hospital stay due to afib with rvr    Discharge Plan: pending hospital course    Code Status: Level 3 - DNAR and DNI    Subjective:   Patient seen and examined  Had elevated heart rate overnight requiring IV cardizem  Objective:     Vitals:   Temp (24hrs), Av 2 °F (36 2 °C), Min:96 5 °F (35 8 °C), Max:97 6 °F (36 4 °C)    Temp:  [96 5 °F (35 8 °C)-97 6 °F (36 4 °C)] 96 5 °F (35 8 °C)  HR:  [] 122  Resp:  [13-34] 15  BP: ()/(62-86) 104/75  SpO2:  [91 %-97 %] 94 %  Body mass index is 34 13 kg/m²  Input and Output Summary (last 24 hours): Intake/Output Summary (Last 24 hours) at 2019 0807  Last data filed at 2019 0400  Gross per 24 hour   Intake 610 ml   Output 500 ml   Net 110 ml       Physical Exam:     Physical Exam   Constitutional: She is oriented to person, place, and time  She appears well-developed and well-nourished  HENT:   Head: Normocephalic and atraumatic  Eyes: Pupils are equal, round, and reactive to light  EOM are normal    Neck: Normal range of motion  Neck supple  Cardiovascular:   Tachycardia, irregularly irregular   Pulmonary/Chest: Effort normal and breath sounds normal    Abdominal: Soft  Bowel sounds are normal    Obese   Musculoskeletal: Normal range of motion  She exhibits no edema  Neurological: She is alert and oriented to person, place, and time  Skin: Skin is warm  Capillary refill takes less than 2 seconds  Psychiatric: She has a normal mood and affect  Her behavior is normal  Judgment and thought content normal    Nursing note and vitals reviewed        Additional Data:     Labs:    Results from last 7 days   Lab Units 19  0458  19  1422   WBC Thousand/uL 9 00   < > 10 30   HEMOGLOBIN g/dL 12 0   < > 13 2   HEMATOCRIT % 35 8*   < > 39 6*   PLATELETS Thousands/uL 206   < > 240   NEUTROS PCT %  --   -- 82*   LYMPHS PCT %  --   --  10*   MONOS PCT %  --   --  7   EOS PCT %  --   --  1    < > = values in this interval not displayed  Results from last 7 days   Lab Units 07/01/19  0458  06/26/19  0444   POTASSIUM mmol/L 4 9   < > 4 5   CHLORIDE mmol/L 96*   < > 100   CO2 mmol/L 27   < > 22   BUN mg/dL 41*   < > 46*   CREATININE mg/dL 1 53*   < > 1 67*   CALCIUM mg/dL 9 3   < > 8 4*   ALK PHOS U/L  --   --  55   ALT U/L  --   --  33   AST U/L  --   --  25    < > = values in this interval not displayed  Results from last 7 days   Lab Units 06/25/19  1422   INR  1 20               * I Have Reviewed All Lab Data Listed Above  * Additional Pertinent Lab Tests Reviewed: Alex 66 Admission  Reviewed    Imaging:  Imaging Reports Reviewed Today Include: n/a    Recent Cultures (last 7 days):           Last 24 Hours Medication List:     Current Facility-Administered Medications:  acetaminophen 650 mg Oral Q6H PRN Radha Phenes, CRNP   ALPRAZolam 0 25 mg Oral TID PRN Radha Phenes, CRNP   apixaban 2 5 mg Oral BID Bernarda Dorantes PA-C   diltiazem 300 mg Oral Daily Darlin Umaña MD   metoprolol tartrate 25 mg Oral TID Wood Viera MD   montelukast 10 mg Oral Daily Radha Phenes, CRNP   ondansetron 4 mg Intravenous Q6H PRN Radha Phenes, CRNP   oxybutynin 5 mg Oral Daily Radha Phenes, CRNP   pantoprazole 40 mg Oral Early Morning Radha Phenes, CRNP   polyethylene glycol 17 g Oral Daily Sharyle Pimenta, PA-C   sertraline 50 mg Oral Daily Radha Phenes, CRNP        Today, Patient Was Seen By: Wood Viera MD    ** Please Note: Dictation voice to text software may have been used in the creation of this document   **

## 2019-07-01 NOTE — PROGRESS NOTES
Progress Note - Enrico Velez 1935, 81 y o  female MRN: [id number]    Unit/Bed#: ICU 03 Encounter: [id number]    Primary Care Provider: Tez Carreon MD   Date and time admitted to hospital: 6/25/2019  1:56 PM        * Atrial fibrillation with rapid ventricular response ([address] )  Assessment & Plan  Only fair heart rate control both on the high side in low side  I discussed this at length with the patient  For optimal control more meds but also a pacemaker would be needed but she really does not want this to be performed  On Eliquis lower dose  Non-rheumatic mitral regurgitation  Assessment & Plan  Significant on exam and by echo  She remains quite frail for considering mitral valve intervention and when pressed she really is not interested in going through this type of intervention  Will continue attempts at medical therapy with heart rate control and volume control for now  I again had a saw conversation with her about the merits of valve replacement and the risks and she wants no part of that regardless of whether it is percutaneous or an open procedure  Coronary artery disease involving native coronary artery of native heart without angina pectoris  Assessment & Plan  No further symptoms with better rate control  Subjective:   Patient seen and examined  No significant events overnight  Summary comments:  Heart rate remains variable as outlined above  I spoke at length with the patient and then afterwards with her daughter  The patient clearly wants no part of a pacemaker or mitral valve procedure  The patient's family is quite aware of this as well and understands the patient's position  At this point I would like to try to mobilize her a bit and help with arranging palliative type care  Frankly at this point we should turn off the monitor so we are not reacting to every slight high and low rate      Telemetry/ECG/Cardiac testing:   Atrial fibrillation with variable rates   Echo: 6/27/2019:  EF 60%  Moderate LVH  Moderate MR with marked prolapse of the anterior and posterior leaflets  Mild TR with moderate pulm HTN     TTE 2017 - EF 55%, moderate MR     Cardiac cath 2012 - patent LAD stent, severe MR  No significant CAD          Vitals: Blood pressure 126/80, pulse (!) 122, temperature (!) 96 5 °F (35 8 °C), temperature source Temporal, resp  rate 15, height 4' 11" (1 499 m), weight 76 7 kg (169 lb), SpO2 94 % ,   Orthostatic Blood Pressures      Most Recent Value   Blood Pressure  126/80 filed at 07/01/2019 0829   Patient Position - Orthostatic VS  Lying filed at 07/01/2019 0600      ,   Weight (last 2 days)     Date/Time   Weight    07/01/19 0600   76 7 (169)    06/30/19 0600   75 (165 35)    06/29/19 0600   73 2 (161 31)              Physical Exam:    General:  Normal appearance in no distress  Eyes:  Anicteric  Oral mucosa:  Moist   Neck:  No JV D  Carotid upstrokes are brisk without bruits  No masses  Chest:  Clear  Cardiac:  Irregularly irregular  Normal PMI  Normal S1 and S2   Grade 3-4 6 holosystolic murmur loudest at the apex   Abdomen:  Soft and nontender  No palpable organomegaly or aortic enlargement  Extremities:  Trace peripheral edema  Neuro:  Grossly symmetric  Psych:  Alert and oriented x3          Medications:      Current Facility-Administered Medications:     acetaminophen (TYLENOL) tablet 650 mg, 650 mg, Oral, Q6H PRN, NEAL Tabor, 650 mg at 06/28/19 0427    ALPRAZolam Martha Campos) tablet 0 25 mg, 0 25 mg, Oral, TID PRN, NEAL Tabor, 0 25 mg at 06/29/19 2219    apixaban (ELIQUIS) tablet 2 5 mg, 2 5 mg, Oral, BID, Clair Primrose, PA-C, 2 5 mg at 07/01/19 0830    diltiazem (CARDIZEM CD) 24 hr capsule 300 mg, 300 mg, Oral, Daily, Yennifer Ruiz MD, 300 mg at 07/01/19 0829    metoprolol tartrate (LOPRESSOR) tablet 25 mg, 25 mg, Oral, TID, Jose A Arevalo MD, 25 mg at 07/01/19 0830    montelukast (SINGULAIR) tablet 10 mg, 10 mg, Oral, Daily, Lilian Alar, CRNP, 10 mg at 07/01/19 0829    ondansetron [facility]) injection 4 mg, 4 mg, Intravenous, Q6H PRN, Lilian Alar, CRNP, 4 mg at 06/25/19 1748    oxybutynin (DITROPAN) tablet 5 mg, 5 mg, Oral, Daily, Lilian Alar, CRNP, 5 mg at 07/01/19 0829    pantoprazole (PROTONIX) EC tablet 40 mg, 40 mg, Oral, Early Morning, Lilian Alar, CRNP, 40 mg at 07/01/19 0607    polyethylene glycol (MIRALAX) packet 17 g, 17 g, Oral, Daily, Vickie Whaley PA-C, 17 g at 07/01/19 0830    sertraline (ZOLOFT) tablet 50 mg, 50 mg, Oral, Daily, Lilian Alar, CRNP, 50 mg at 07/01/19 0830     Labs & Results:    Troponins:   Results from last 7 days   Lab Units 06/28/19  1318 06/28/19  0611 06/28/19  0352   TROPONIN I ng/mL 0 03 0 03 0 03      BNP:   Results from last 6 Months   Lab Units 06/25/19  1812   BNP pg/mL 1,303*       CBC with diff:   Results from last 7 days   Lab Units 07/01/19  0458 06/30/19  0437   WBC Thousand/uL 9 00 10 30   HEMOGLOBIN g/dL 12 0 11 3*   HEMATOCRIT % 35 8* 33 0*   MCV fL 89 89   PLATELETS Thousands/uL 206 183     TSH:     CMP:   Results from last 7 days   Lab Units 07/01/19  0458 06/30/19  0437  06/26/19  0444 06/25/19  1422   POTASSIUM mmol/L 4 9 4 5   < > 4 5 4 5   CHLORIDE mmol/L 96* 94*   < > 100 95*   CO2 mmol/L 27 25   < > 22 24   BUN mg/dL 41* 44*   < > 46* 48*   CREATININE mg/dL 1 53* 2 08*   < > 1 67* 1 80*   AST U/L  --   --   --  25 36   ALT U/L  --   --   --  33 44   EGFR ml/min/1 73sq m 31 22   < > 28 26    < > = values in this interval not displayed       Lipid Profile:     Coags:   Results from last 7 days   Lab Units 06/25/19  1422   INR  1 20

## 2019-07-01 NOTE — PROGRESS NOTES
Progress Note - Nephrology   Parish Dowell 81 y o  female MRN: [id number]  Unit/Bed#: ICU 03 Encounter: [id number]    A/P:  1  Acute kidney failure on top of chronic kidney disease               patient's fraction excretion of sodium less 1%, most likely due to hypovolemia at presentation along with hemodynamics instability in form of atrial fibrillation rapid ventricular response  Unfortunately patient continues to have significant dysrhythmias at this time which the patient is aware of but denies any worrisome sequela such as no shortness of breath nausea vomiting diarrhea or diaphoresis  Patient appears appropriate from volume standpoint and would therefore discouraged any additional volume via the IV route  Continue to focus on heart rate control which will defer to Cardiology  6/28: Kidney function improved with diuresis and she is symptomatically better  Will follow I/O  She is less dyspneic  Will receive additional diuretic as per cardiology             6/29: diuresed 4kg, however, weights may be erroneous and has improved renal function  6/30: had an episode of bradycardia and hypotension and renal function has declined  She is voiding well  Will check urine electrolytes to rule out ATN              7/1: renal function improved since yesterday  She has a fractional excretion of sodium of 0 2%  This may be due to decreased renal perfusion due to tachycardia  However, encouraged her to drink water today  She has refused a mitral valve replacement as she "doesn't want to be cut open"  2  Chronic kidney disease stage 2 with baseline creatinine between 0 7 - 0 8 mg/dL    3  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              I stressed the importance of avoiding nonsteroidal anti-inflammatory medications as possible   ============  4   Hyponatremia               sodium stable over last 24 hours or so with a slight trend to would improving up at 131 millimole/L this morning  I will place the patient on a mild fluid restriction, continue monitor closely  6/28: check urine osmolality  5  Right adrenal adenoma -most likely benign   This was noted on CT scan without contrast, adenoma is less than 2 5 cm  Recommendation at this point is to repeat imaging in approximately 12 months to ensure adenoma is stable  6  Hypotension most likely due to hypovolemia and hemodynamic instability               improved, continue to address dysrhythmia  7  Atrial fibrillation with rapid ventricular response              As mentioned above  Defer to Cardiology  8  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  9  Chronic diastolic congestive heart failure with potential acute episode   Patient placed on Lasix 80 mg IV b i d , follow clinical and laboratory findings closely  Continue to adjust as indicated according to Cardiology  10  Severe mitral regurgitation - refused MVR      Follow up reason for today's visit:  Acute kidney injury/electrolyte disorders/chronic kidney disease    Atrial fibrillation with rapid ventricular response [facility])    Patient Active Problem List   Diagnosis    Coronary artery disease involving native coronary artery of native heart without angina pectoris    SOB (shortness of breath)    Localized edema    Atherosclerosis of both carotid arteries    Non-rheumatic mitral regurgitation    Other fatigue    Atrial fibrillation with rapid ventricular response [facility] [address] [facility]    HUY (acute kidney injury) ([facility] [address] [facility]    Nausea    Acute on chronic diastolic congestive heart failure ([facility] [address] )         Subjective:   Patient continues to complain of palpitations, currently not associated with diaphoresis/shortness of breath/nausea or vomiting  6/30" feels tired - no chest pain, dyspnea or abdominal pain    Objective:     Vitals: Blood pressure 126/80, pulse (!) 122, temperature (!) 96 5 °F (35 8 °C), temperature source Temporal, resp   rate 15, height 4' 11" (1 499 m), weight 76 7 kg (169 lb), SpO2 94 % ,Body mass index is 34 13 kg/m²  Weight (last 2 days)     Date/Time   Weight    07/01/19 0600   76 7 (169)    06/30/19 0600   75 (165 35)    06/29/19 0600   73 2 (161 31)                Intake/Output Summary (Last 24 hours) at 7/1/2019 1028  Last data filed at 7/1/2019 0400  Gross per 24 hour   Intake 610 ml   Output 500 ml   Net 110 ml     I/O last 3 completed shifts: In: 200 [P O :1030; IV Piggyback:125]  Out: 550 [Urine:550]         Physical Exam: /80   Pulse (!) 122   Temp (!) 96 5 °F (35 8 °C) (Temporal)   Resp 15   Ht 4' 11" (1 499 m)   Wt 76 7 kg (169 lb)   SpO2 94%   BMI 34 13 kg/m²     General Appearance:    Alert, cooperative, no distress, appears stated age   Head:    Normocephalic, without obvious abnormality, atraumatic   Eyes:    Conjunctiva/corneas clear   Ears:    Normal external ears   Nose:   Nares normal, septum midline, mucosa normal, no drainage    or sinus tenderness   Throat:   Lips, mucosa, and tongue normal; teeth and gums normal   Neck:   Supple   Back:     Symmetric, no curvature, ROM normal, no CVA tenderness   Lungs:     Clear to auscultation bilaterally, respirations unlabored   Chest wall:    No tenderness or deformity   Heart:    Regular rate and rhythm, S1 and S2 normal, no murmur, rub   or gallop   Abdomen:     Soft, non-tender, bowel sounds active   Extremities:   Extremities normal, atraumatic, no cyanosis, trace bilateral lower extremity edema   Skin:   Skin color, texture, turgor normal, no rashes or lesions   Lymph nodes:   Cervical normal   Neurologic:   CNII-XII intact            Lab, Imaging and other studies: I have personally reviewed pertinent labs    CBC:   Lab Results   Component Value Date    WBC 9 00 07/01/2019    HGB 12 0 07/01/2019    HCT 35 8 (L) 07/01/2019    MCV 89 07/01/2019     07/01/2019    MCH 29 8 07/01/2019    MCHC 33 6 07/01/2019    RDW 15 5 (H) 07/01/2019    MPV 7 4 (L) 07/01/2019     CMP:   Lab Results   Component Value Date K 4 9 07/01/2019    CL 96 (L) 07/01/2019    CO2 27 07/01/2019    BUN 41 (H) 07/01/2019    CREATININE 1 53 (H) 07/01/2019    CALCIUM 9 3 07/01/2019    EGFR 31 07/01/2019         Results from last 7 days   Lab Units 07/01/19  0458 06/30/19  0437 06/29/19  0457  06/26/19  0444 06/25/19  1422   POTASSIUM mmol/L 4 9 4 5 4 4   < > 4 5 4 5   CHLORIDE mmol/L 96* 94* 97*   < > 100 95*   CO2 mmol/L 27 25 27   < > 22 24   BUN mg/dL 41* 44* 34*   < > 46* 48*   CREATININE mg/dL 1 53* 2 08* 1 19   < > 1 67* 1 80*   CALCIUM mg/dL 9 3 8 8 9 1   < > 8 4* 9 5   ALK PHOS U/L  --   --   --   --  55 68   ALT U/L  --   --   --   --  33 44   AST U/L  --   --   --   --  25 36    < > = values in this interval not displayed  Phosphorus: No results found for: PHOS  Magnesium: No results found for: MG  Urinalysis: No results found for: Ara Silk, SPECGRAV, PHUR, LEUKOCYTESUR, NITRITE, PROTEINUA, GLUCOSEU, KETONESU, BILIRUBINUR, BLOODU  Ionized Calcium: No results found for: CAION  Coagulation: No results found for: PT, INR, APTT  Troponin:   No results found for: TROPONINI  ABG: No results found for: PHART, WAF5JFZ, PO2ART, QVD0ZYU, O6EWALLV, BEART, SOURCE  Radiology review:     IMAGING  Procedure: Ct Abdomen Pelvis Wo Contrast    Result Date: 6/26/2019  Narrative: CT ABDOMEN AND PELVIS WITHOUT IV CONTRAST INDICATION:   Abdominal pain, unspecified with nausea  COMPARISON:  None  TECHNIQUE:  CT examination of the abdomen and pelvis was performed without intravenous contrast   Axial, sagittal, and coronal 2D reformatted images were created from the source data and submitted for interpretation  Radiation dose length product (DLP) for this visit:  575 mGy-cm   This examination, like all CT scans performed in the [facility], was performed utilizing techniques to minimize radiation dose exposure, including the use of iterative reconstruction and automated exposure control  Enteric contrast was not administered  FINDINGS: ABDOMEN LOWER CHEST:  No clinically significant abnormality identified in the visualized lower chest   Mitral annulus calcifications noted  LIVER/BILIARY TREE:  Unremarkable  GALLBLADDER:  No calcified gallstones  No pericholecystic inflammatory change  SPLEEN:  Unremarkable  PANCREAS:  Unremarkable  ADRENAL GLANDS:  1 3 cm right adrenal nodule  Normal left adrenal gland  KIDNEYS/URETERS:  Unremarkable  No hydronephrosis  STOMACH AND BOWEL:  Unremarkable  APPENDIX:  A normal appendix was visualized  ABDOMINOPELVIC CAVITY:  No ascites or free intraperitoneal air  No lymphadenopathy  VESSELS:  Atherosclerotic changes are present  No evidence of aneurysm  PELVIS REPRODUCTIVE ORGANS:  Unremarkable for patient's age  URINARY BLADDER:  Unremarkable  ABDOMINAL WALL/INGUINAL REGIONS:  Unremarkable  OSSEOUS STRUCTURES: Compression deformity at T12  Age appropriate degenerative changes in the lumbar spine  No acute fracture or destructive osseous lesion  Partially imaged antegrade intramedullary nail and cephalomedullary screw in the right femur  Impression: 1  No acute abnormality in the abdomen or pelvis  2   There is a 1 3 cm right adrenal gland nodule  Although its imaging features are indeterminate, it does not have suspicious imaging features (heterogeneity, necrosis, irregular margins), therefore this is likely benign, and can be followed by non-contrast abdomen CT or MRI in 12 months  If patient has history of adrenal hyperfunction, consider biochemical evaluation  Adrenal recommendation based on institutional consensus and Journal of [employer] of Radiology 2017;14:8519-2023 The study was marked in [facility] for significant notification  Workstation performed: [id number]     Procedure: Us Kidney And Bladder    Result Date: 6/26/2019  Narrative: RENAL ULTRASOUND INDICATION:   RENAL FAILURE, ACUTE   Jerryl Rm  COMPARISON: None TECHNIQUE: Portable ultrasound of the retroperitoneum was performed with a curvilinear transducer utilizing volumetric sweeps and still imaging techniques  FINDINGS: KIDNEYS: Symmetric and normal size  Right kidney:  10 5 x 4 5 cm  Left kidney:  10 9 x 5 0 cm  Right kidney Normal echogenicity and contour  No suspicious masses detected  No hydronephrosis  No shadowing calculi  No perinephric fluid collections  Left kidney Normal echogenicity and contour  No suspicious masses detected  No hydronephrosis  No shadowing calculi  No perinephric fluid collections  URETERS: Nonvisualized  BLADDER: Normally distended  No focal thickening or mass lesions  Fluid collection above the bladder  Impression: Normal kidneys and bladder  Fluid collection above the bladder measuring approximately 2 1 x 2 1 x 4 8 cm of uncertain etiology  Consider CT scan for further evaluation  The study was marked in [facility] for immediate notification   Workstation performed: [id number]       Current Facility-Administered Medications   Medication Dose Route Frequency    acetaminophen (TYLENOL) tablet 650 mg  650 mg Oral Q6H PRN    ALPRAZolam (XANAX) tablet 0 25 mg  0 25 mg Oral TID PRN    apixaban (ELIQUIS) tablet 2 5 mg  2 5 mg Oral BID    diltiazem (CARDIZEM CD) 24 hr capsule 300 mg  300 mg Oral Daily    metoprolol tartrate (LOPRESSOR) tablet 25 mg  25 mg Oral TID    montelukast (SINGULAIR) tablet 10 mg  10 mg Oral Daily    ondansetron (ZOFRAN) injection 4 mg  4 mg Intravenous Q6H PRN    oxybutynin (DITROPAN) tablet 5 mg  5 mg Oral Daily    pantoprazole (PROTONIX) EC tablet 40 mg  40 mg Oral Early Morning    polyethylene glycol (MIRALAX) packet 17 g  17 g Oral Daily    sertraline (ZOLOFT) tablet 50 mg  50 mg Oral Daily     Medications Discontinued During This Encounter   Medication Reason    diltiazem (CARDIZEM) 125 mg in sodium chloride 0 9 % 125 mL infusion     heparin (porcine) subcutaneous injection 5,000 Units     sodium chloride 0 9 % bolus 500 mL     aspirin chewable tablet 81 mg     diltiazem (CARDIZEM CD) 24 hr capsule 240 mg     enoxaparin (LOVENOX) subcutaneous injection 80 mg     furosemide (LASIX) tablet 40 mg     diltiazem (CARDIZEM CD) 24 hr capsule 240 mg     metoprolol tartrate (LOPRESSOR) tablet 25 mg     metoprolol tartrate (LOPRESSOR) tablet 25 mg     lisinopril (ZESTRIL) tablet 5 mg     metoprolol tartrate (LOPRESSOR) tablet 50 mg     lisinopril (ZESTRIL) tablet 2 5 mg        Kelle Weldon MD

## 2019-07-01 NOTE — ASSESSMENT & PLAN NOTE
· Difficult to control heart rate  · With fluctuating rates on p o   Medications, bradycardic and hypotensive previously with increased dose of metoprolol of 50 mg t i d   · Continue decreased dose of metoprolol 25 mg t i d  for now and defer to Cardiology regarding further rate controlling medications  · Started on Eliquis in terms of anticoagulation

## 2019-07-01 NOTE — PLAN OF CARE
Problem: PHYSICAL THERAPY ADULT  Goal: Performs mobility at highest level of function for planned discharge setting  See evaluation for individualized goals  Description  Treatment/Interventions: Functional transfer training, LE strengthening/ROM, Therapeutic exercise, Endurance training, Bed mobility, Gait training, Equipment eval/education, Spoke to nursing, OT(& neuro re-education w/balance training)  Equipment Recommended: (TBD based on functional progress)  See flowsheet documentation for full assessment, interventions and recommendations  Pravin Qiu, PT     Outcome: Progressing  Note:   Prognosis: Fair  Problem List: Decreased strength, Decreased endurance, Impaired balance, Decreased mobility, Impaired judgement, Decreased safety awareness, Obesity  Assessment: Pt seen for PT treatment session this date with interventions consisting of gait training w/ emphasis on improving pt's ability to ambulate level surfaces x 15 feet with mod A provided by therapist with RW and neuromuscular re-education: side stepping bilaterally and mulit-directional WS, <->head turns x 5 ft w/ B UE support  Pt agreeable to PT treatment session upon arrival, pt found seated OOB in chair, in no apparent distress and A&O x 4  In comparison to previous session, pt with improvements in standing balance, ambulatory balance  Post session: all needs in reach Continue to recommend STR at time of d/c in order to maximize pt's functional independence and safety w/ mobility  Pt continues to be functioning below baseline level, and remains limited 2* factors listed above and including weakness, impaired balance, decreased endurance, decreased activity tolerance w/ elevated HR, gait dysfunction, decline from PLOF  PT will continue to see pt while here in order to address the deficits listed above and provide interventions consistent w/ POC in effort to achieve LTGs          Recommendation: Short-term skilled PT     PT - OK to Discharge: Yes(if medically stable to STR)    See flowsheet documentation for full assessment     Claudette Simmons, PT

## 2019-07-01 NOTE — ASSESSMENT & PLAN NOTE
· Will continue with current regimen  · With fluctuating heart rates still, became bradycardic with increased dose of metoprolol of 50 mg t i d  previously  · Troponin negative  · Cardiology is following  · Titrate medications for better heart rate control

## 2019-07-01 NOTE — ASSESSMENT & PLAN NOTE
· Perhaps related to hemodynamic instability related to atrial fibrillation  · Appreciate Nephrology input  · Creatinine improved today today  · Continue trend and monitor

## 2019-07-01 NOTE — QUICK NOTE
Spoke with Cardiology about patient's disposition  Patient has poorly controlled atrial fibrillation, and significant mitral regurgitation, and not interested in undergoing any type of intervention for this  Although patient would like to continue receiving medical treatment, she would prefer conservative measures  I will transfer patient to med surg floor, with no need for telemetry as per cardiology's recommendations as we will be treating her conservatively as per patient's wishes

## 2019-07-01 NOTE — OCCUPATIONAL THERAPY NOTE
07/01/19 1310   Restrictions/Precautions   Weight Bearing Precautions Per Order No   Other Precautions O2;Fall Risk  (palliative care - no heart monitoring )   Pain Assessment   Pain Assessment No/denies pain   ADL   Where Assessed   (patient declined additional self-care at this time)   Grooming Comments patient brushed teeth with set-up   Therapeutic Excerise-Strength   UE Strength Yes   Right Upper Extremity- Strength   R Shoulder Flexion;ABduction;Horizontal ABduction; Other (Comment)  (pro/re-traction)   R Elbow Elbow flexion;Elbow extension  ((in forward And reverse); AND supin/pron-ation)   R Weight/Reps/Sets 10 reps shoulders; 20 reps elbows   Left Upper Extremity-Strength   L Weights/Reps/Sets Same as RUE above   Coordination   Gross Motor WFL   Cognition   Overall Cognitive Status WFL   Arousal/Participation Alert; Cooperative   Comments mood more subdued than last session   Activity Tolerance   Activity Tolerance Other (Comment)  (fair tolerance with slow pacing and rest periods)   Assessment   Assessment Patient participated in Skilled OT session this date with interventions consisting of [employer] techniques, therapeutic exercise to: increase functional use of BUEs, increase BUE muscle strength  and  therapeutic activities to: increase activity tolerance   Patient agreeable to OT treatment session, upon arrival patient was found OOB to chair  Patient requiring verbal cues for correct technique, verbal cues for pacing thru activity steps and frequent rest periods  Patient continues to be functioning below baseline level, occupational performance remains limited secondary to factors listed above and increased risk for falls and injury  From OT standpoint, recommendation at time of d/c would be Short Term Rehab -- patient stated "I guess I can go home once I'm walking OK"    Patient to benefit from continued Occupational Therapy treatment while in the hospital to address deficits as defined above and maximize level of functional independence with ADLs and functional mobility  Plan   Treatment Interventions ADL retraining;UE strengthening/ROM; Patient/family training;Energy conservation; Activityengagement  (to maintain and/or increase activity tolerance)   Goal Expiration Date 07/06/19   Treatment Day 2     Kell Jean, OCHOA/L

## 2019-07-01 NOTE — NURSING NOTE
Patient transferred from ICU to Med/surg room 109-2  Patient's family updated and aware of transfer  Patient oriented to unit and use of call bell system

## 2019-07-01 NOTE — ASSESSMENT & PLAN NOTE
Significant on exam and by echo  She remains quite frail for considering mitral valve intervention and when pressed she really is not interested in going through this type of intervention  Will continue attempts at medical therapy with heart rate control and volume control for now  I again had a saw conversation with her about the merits of valve replacement and the risks and she wants no part of that regardless of whether it is percutaneous or an open procedure

## 2019-07-01 NOTE — ASSESSMENT & PLAN NOTE
Only fair heart rate control both on the high side in low side  I discussed this at length with the patient  For optimal control more meds but also a pacemaker would be needed but she really does not want this to be performed  On Eliquis lower dose

## 2019-07-01 NOTE — ASSESSMENT & PLAN NOTE
Wt Readings from Last 3 Encounters:   07/01/19 76 7 kg (169 lb)   06/25/19 69 4 kg (153 lb)   06/05/19 71 2 kg (157 lb)       Diuresis under the guidance of Cardiology and nephrology

## 2019-07-01 NOTE — NURSING NOTE
2L/min NC maintained  VS and sats stable  RH IV remains capped  OOB w assistance to commode earlier  Remains in Afib  Sleeping most times when undisturbed

## 2019-07-02 ENCOUNTER — HOSPITAL ENCOUNTER (INPATIENT)
Facility: HOSPITAL | Age: 84
LOS: 10 days | Discharge: HOME HEALTH | DRG: 947 | End: 2019-07-12
Attending: PHYSICAL MEDICINE & REHABILITATION | Admitting: PHYSICAL MEDICINE & REHABILITATION
Payer: MEDICARE

## 2019-07-02 VITALS
BODY MASS INDEX: 34.18 KG/M2 | TEMPERATURE: 98.5 F | HEIGHT: 59 IN | WEIGHT: 169.56 LBS | HEART RATE: 73 BPM | DIASTOLIC BLOOD PRESSURE: 82 MMHG | RESPIRATION RATE: 16 BRPM | SYSTOLIC BLOOD PRESSURE: 98 MMHG | OXYGEN SATURATION: 98 %

## 2019-07-02 DIAGNOSIS — N17.9 ACUTE ON CHRONIC KIDNEY FAILURE (HCC): ICD-10-CM

## 2019-07-02 DIAGNOSIS — I48.91 ATRIAL FIBRILLATION WITH RAPID VENTRICULAR RESPONSE (HCC): ICD-10-CM

## 2019-07-02 DIAGNOSIS — N18.9 ACUTE ON CHRONIC KIDNEY FAILURE (HCC): ICD-10-CM

## 2019-07-02 DIAGNOSIS — E87.1 HYPONATREMIA: ICD-10-CM

## 2019-07-02 DIAGNOSIS — I25.10 CORONARY ARTERY DISEASE INVOLVING NATIVE CORONARY ARTERY OF NATIVE HEART WITHOUT ANGINA PECTORIS: Primary | ICD-10-CM

## 2019-07-02 DIAGNOSIS — I50.33 ACUTE ON CHRONIC DIASTOLIC CONGESTIVE HEART FAILURE (HCC): ICD-10-CM

## 2019-07-02 DIAGNOSIS — I10 HTN (HYPERTENSION): ICD-10-CM

## 2019-07-02 DIAGNOSIS — R00.0 TACHYCARDIA: ICD-10-CM

## 2019-07-02 DIAGNOSIS — E83.42 HYPOMAGNESEMIA: ICD-10-CM

## 2019-07-02 DIAGNOSIS — I25.10 CAD (CORONARY ARTERY DISEASE): ICD-10-CM

## 2019-07-02 DIAGNOSIS — D64.9 ANEMIA: ICD-10-CM

## 2019-07-02 DIAGNOSIS — I48.91 A-FIB (HCC): ICD-10-CM

## 2019-07-02 PROBLEM — F41.8 DEPRESSION WITH ANXIETY: Status: ACTIVE | Noted: 2019-07-02

## 2019-07-02 PROBLEM — N32.81 OAB (OVERACTIVE BLADDER): Status: ACTIVE | Noted: 2019-07-02

## 2019-07-02 PROBLEM — I50.30 DIASTOLIC HEART FAILURE (HCC): Status: ACTIVE | Noted: 2019-06-27

## 2019-07-02 PROBLEM — K21.9 GERD (GASTROESOPHAGEAL REFLUX DISEASE): Status: ACTIVE | Noted: 2019-07-02

## 2019-07-02 LAB
ANION GAP SERPL CALCULATED.3IONS-SCNC: 4 MMOL/L (ref 4–13)
BUN SERPL-MCNC: 31 MG/DL (ref 7–25)
CALCIUM SERPL-MCNC: 9.5 MG/DL (ref 8.6–10.5)
CHLORIDE SERPL-SCNC: 95 MMOL/L (ref 98–107)
CO2 SERPL-SCNC: 31 MMOL/L (ref 21–31)
CREAT SERPL-MCNC: 1.19 MG/DL (ref 0.6–1.2)
ERYTHROCYTE [DISTWIDTH] IN BLOOD BY AUTOMATED COUNT: 15.9 % (ref 11.5–14.5)
GFR SERPL CREATININE-BSD FRML MDRD: 42 ML/MIN/1.73SQ M
GLUCOSE SERPL-MCNC: 107 MG/DL (ref 65–99)
HCT VFR BLD AUTO: 36.8 % (ref 42–47)
HGB BLD-MCNC: 12.4 G/DL (ref 12–16)
MCH RBC QN AUTO: 30.7 PG (ref 26–34)
MCHC RBC AUTO-ENTMCNC: 33.7 G/DL (ref 31–37)
MCV RBC AUTO: 91 FL (ref 81–99)
PLATELET # BLD AUTO: 176 THOUSANDS/UL (ref 149–390)
PMV BLD AUTO: 8.4 FL (ref 8.6–11.7)
POTASSIUM SERPL-SCNC: 5.2 MMOL/L (ref 3.5–5.5)
RBC # BLD AUTO: 4.05 MILLION/UL (ref 3.9–5.2)
SODIUM SERPL-SCNC: 130 MMOL/L (ref 134–143)
WBC # BLD AUTO: 8.8 THOUSAND/UL (ref 4.8–10.8)

## 2019-07-02 PROCEDURE — 97110 THERAPEUTIC EXERCISES: CPT

## 2019-07-02 PROCEDURE — 97116 GAIT TRAINING THERAPY: CPT

## 2019-07-02 PROCEDURE — 97530 THERAPEUTIC ACTIVITIES: CPT

## 2019-07-02 PROCEDURE — 97535 SELF CARE MNGMENT TRAINING: CPT

## 2019-07-02 PROCEDURE — 99239 HOSP IP/OBS DSCHRG MGMT >30: CPT | Performed by: HOSPITALIST

## 2019-07-02 PROCEDURE — 1123F ACP DISCUSS/DSCN MKR DOCD: CPT | Performed by: INTERNAL MEDICINE

## 2019-07-02 PROCEDURE — 85027 COMPLETE CBC AUTOMATED: CPT | Performed by: INTERNAL MEDICINE

## 2019-07-02 PROCEDURE — 80048 BASIC METABOLIC PNL TOTAL CA: CPT | Performed by: INTERNAL MEDICINE

## 2019-07-02 PROCEDURE — 99223 1ST HOSP IP/OBS HIGH 75: CPT | Performed by: PHYSICAL MEDICINE & REHABILITATION

## 2019-07-02 PROCEDURE — 99232 SBSQ HOSP IP/OBS MODERATE 35: CPT | Performed by: INTERNAL MEDICINE

## 2019-07-02 RX ORDER — PRAVASTATIN SODIUM 40 MG
40 TABLET ORAL
Status: DISCONTINUED | OUTPATIENT
Start: 2019-07-02 | End: 2019-07-02

## 2019-07-02 RX ORDER — PANTOPRAZOLE SODIUM 40 MG/1
40 TABLET, DELAYED RELEASE ORAL
Status: CANCELLED | OUTPATIENT
Start: 2019-07-03

## 2019-07-02 RX ORDER — PANTOPRAZOLE SODIUM 40 MG/1
40 TABLET, DELAYED RELEASE ORAL
Status: DISCONTINUED | OUTPATIENT
Start: 2019-07-03 | End: 2019-07-12

## 2019-07-02 RX ORDER — POLYETHYLENE GLYCOL 3350 17 G/17G
17 POWDER, FOR SOLUTION ORAL DAILY
Status: CANCELLED | OUTPATIENT
Start: 2019-07-03

## 2019-07-02 RX ORDER — ONDANSETRON 2 MG/ML
4 INJECTION INTRAMUSCULAR; INTRAVENOUS EVERY 6 HOURS PRN
Status: CANCELLED | OUTPATIENT
Start: 2019-07-02

## 2019-07-02 RX ORDER — POLYETHYLENE GLYCOL 3350 17 G/17G
17 POWDER, FOR SOLUTION ORAL DAILY PRN
Status: DISCONTINUED | OUTPATIENT
Start: 2019-07-02 | End: 2019-07-12

## 2019-07-02 RX ORDER — ALPRAZOLAM 0.25 MG/1
0.25 TABLET ORAL 3 TIMES DAILY PRN
Status: DISCONTINUED | OUTPATIENT
Start: 2019-07-02 | End: 2019-07-12

## 2019-07-02 RX ORDER — OXYBUTYNIN CHLORIDE 5 MG/1
5 TABLET ORAL DAILY
Status: DISCONTINUED | OUTPATIENT
Start: 2019-07-03 | End: 2019-07-12 | Stop reason: HOSPADM

## 2019-07-02 RX ORDER — ACETAMINOPHEN 325 MG/1
650 TABLET ORAL EVERY 6 HOURS PRN
Status: DISCONTINUED | OUTPATIENT
Start: 2019-07-02 | End: 2019-07-12

## 2019-07-02 RX ORDER — ALPRAZOLAM 0.25 MG/1
0.25 TABLET ORAL 3 TIMES DAILY PRN
Status: CANCELLED | OUTPATIENT
Start: 2019-07-02

## 2019-07-02 RX ORDER — MONTELUKAST SODIUM 10 MG/1
10 TABLET ORAL DAILY
Status: DISCONTINUED | OUTPATIENT
Start: 2019-07-03 | End: 2019-07-12

## 2019-07-02 RX ORDER — MONTELUKAST SODIUM 10 MG/1
10 TABLET ORAL DAILY
Status: CANCELLED | OUTPATIENT
Start: 2019-07-03

## 2019-07-02 RX ORDER — ACETAMINOPHEN 325 MG/1
650 TABLET ORAL EVERY 6 HOURS PRN
Status: CANCELLED | OUTPATIENT
Start: 2019-07-02

## 2019-07-02 RX ORDER — OXYBUTYNIN CHLORIDE 5 MG/1
5 TABLET ORAL DAILY
Status: CANCELLED | OUTPATIENT
Start: 2019-07-03

## 2019-07-02 RX ADMIN — METOPROLOL TARTRATE 25 MG: 25 TABLET, FILM COATED ORAL at 08:43

## 2019-07-02 RX ADMIN — APIXABAN 2.5 MG: 2.5 TABLET, FILM COATED ORAL at 17:21

## 2019-07-02 RX ADMIN — METOPROLOL TARTRATE 25 MG: 25 TABLET, FILM COATED ORAL at 17:22

## 2019-07-02 RX ADMIN — ALPRAZOLAM 0.25 MG: 0.25 TABLET ORAL at 08:43

## 2019-07-02 RX ADMIN — MONTELUKAST SODIUM 10 MG: 10 TABLET, COATED ORAL at 08:43

## 2019-07-02 RX ADMIN — METOPROLOL TARTRATE 25 MG: 25 TABLET, FILM COATED ORAL at 21:22

## 2019-07-02 RX ADMIN — APIXABAN 2.5 MG: 2.5 TABLET, FILM COATED ORAL at 08:44

## 2019-07-02 RX ADMIN — ALPRAZOLAM 0.25 MG: 0.25 TABLET ORAL at 19:40

## 2019-07-02 RX ADMIN — DILTIAZEM HYDROCHLORIDE 300 MG: 180 CAPSULE, COATED, EXTENDED RELEASE ORAL at 08:43

## 2019-07-02 RX ADMIN — PANTOPRAZOLE SODIUM 40 MG: 40 TABLET, DELAYED RELEASE ORAL at 05:10

## 2019-07-02 RX ADMIN — OXYBUTYNIN CHLORIDE 5 MG: 5 TABLET ORAL at 08:43

## 2019-07-02 RX ADMIN — SERTRALINE HYDROCHLORIDE 50 MG: 50 TABLET ORAL at 08:43

## 2019-07-02 NOTE — NURSING NOTE
Handoff report given via phone to accepting RN on ARU  Vital signs stable on transfer  IV catheter discontinued  Family at bedside and aware of transfer

## 2019-07-02 NOTE — ASSESSMENT & PLAN NOTE
· As per Cardiology  · Patient is not interested in any type of intervention for the mitral regurgitation

## 2019-07-02 NOTE — INCIDENTAL FINDINGS
The following findings require follow up:  Radiographic finding   Findin 3 cm right adrenal gland nodule   Follow up required:  Noncontrast CT scan of the abdomen and pelvis and or an adrenal MRI   Follow up should be done within 1 year     Please notify the following clinician to assist with the follow up:   Dr Genaro Mackey

## 2019-07-02 NOTE — PLAN OF CARE
Problem: Potential for Falls  Goal: Patient will remain free of falls  Description  INTERVENTIONS:  - Assess patient frequently for physical needs  -  Identify cognitive and physical deficits and behaviors that affect risk of falls    -  [place] fall precautions as indicated by assessment   - Educate patient/family on patient safety including physical limitations  - Instruct patient to call for assistance with activity based on assessment  - Modify environment to reduce risk of injury  - Consider OT/PT consult to assist with strengthening/mobility  Outcome: Progressing

## 2019-07-02 NOTE — DISCHARGE SUMMARY
Discharge- Selene Minus 1935, 81 y o  female MRN: [id number]    Unit/Bed#: -02 Encounter: [id number]    Primary Care Provider: Zulema Wilkes MD   Date and time admitted to hospital: 6/25/2019  1:56 PM        * Atrial fibrillation with rapid ventricular response ([facility] [address] )  Assessment & Plan  · Status post a Cardiology evaluation  · Patient has been off of telemetry  · Patient has no interest in any type of cardiac device placement - please see cardiology notes for full details  · Case reviewed with Cardiology  · Patient has been cleared from their standpoint to be discharged to the acute rehab unit on the current anti arrhythmic medication regimen which includes diltiazem and metoprolol  · Additionally, coleen for discharge to the acute rehab unit on apixaban 2 5 mg twice a day  · [address] will be ready willing and able to provide any assistance to the rehab team in a consultative role if needed    Coronary artery disease involving native coronary artery of native heart without angina pectoris  Assessment & Plan  · Stable  · DC to rehab on current medication regimen    Non-rheumatic mitral regurgitation  Assessment & Plan  · As per Cardiology  · Patient is not interested in any type of intervention for the mitral regurgitation    HUY (acute kidney injury) ([facility] [address] )  Assessment & Plan  · Perhaps related to hemodynamic instability related to atrial fibrillation and hypovolemia  · Appreciate Nephrology input  · Renal function back to normal, case reviewed with Nephrologycoleen for discharge to rehab from their standpoint    Acute on chronic diastolic congestive heart failure (HCC)  Assessment & Plan  Wt Readings from Last 3 Encounters:   07/02/19 76 9 kg (169 lb 9 oz)   06/25/19 69 4 kg (153 lb)   06/05/19 71 2 kg (157 lb)       Stable  Status post diuretic therapy            Discharging Physician / Practitioner: Meme Villalta MD  PCP: Zulema Wilkes MD  Admission Date:   Admission Orders (From admission, onward)    Ordered        06/25/19 1536  Inpatient Admission (expected length of stay for this patient Order details is greater than two midnights)  Once             Discharge Date: 07/02/19    Resolved Problems  Date Reviewed: 7/1/2019          Resolved    Mitral valve stenosis 6/28/2019     Resolved by  Jass Fu MD          Consultations During Hospital Stay:  · Cardiology  · Nephrology    Procedures Performed:   · None    Significant Findings / Test Results:   · June 30, 2019-Chest x-ray-no acute cardiopulmonary disease  · June 26, 2019-CT scan abdomen and pelvis-1   No acute abnormality in the abdomen or pelvis  2  Andres Arias is a 1 3 cm right adrenal gland nodule  Although its imaging features are indeterminate, it does not have suspicious imaging features (heterogeneity, necrosis, irregular margins), therefore this is likely benign, and can be followed by non-contrast abdomen CT or MRI in 12 months   If patient has history of adrenal hyperfunction, consider biochemical evaluation  Adrenal recommendation based on institutional consensus and [employer] of [employer] of Radiology 2017;14:[id number]  · June 26, 2019-ultrasound kidney and bladder-Normal kidneys and bladder  Fluid collection above the bladder measuring approximately 2 1 x 2 1 x 4 8 cm of uncertain etiology  Consider CT scan for further evaluation  · June 26, [id number]-[place] function was normal  Ejection fraction was estimated to be 60 %  There were no regional wall motion abnormalities  There was moderate concentric hypertrophy    RIGHT VENTRICLE:  The ventricle was mildly dilated  Systolic function was moderately reduced    LEFT ATRIUM:  The atrium was dilated    MITRAL VALVE:  There was moderate annular calcification  There was a marked prolapse involving the anterior and posterior leaflets  There was moderate regurgitation    TRICUSPID VALVE:  There was mild regurgitation    Estimated peak PA pressure was 55 mmHg   The findings suggest moderate pulmonary hypertension    Incidental Findings:   · 1 3 cm right adrenal gland nodule     Test Results Pending at Discharge (will require follow up): · None     Outpatient Tests Requested:  · None    Complications:     None    Reason for Admission:  Irregular heartbeat/AFib with RVR    Hospital Course:     Loren Dinero is a 81 y o  female patient who originally presented to the hospital on 6/25/2019 due to an irregular heartbeat  Please refer to the initial history and physical examination completed by Stacey Stapleton for the initial presenting features and complaints  In brief, the patient is a very pleasant 45-year-old female who was admitted initially to the ICU after she came in with the aforementioned chief complaint  She was found to be in a condition of AFib with RVR  She was started on an IV Cardizem drip  A cardiology consultation was obtained  A 2D echocardiogram was performed  The results of the 2D echocardiogram are outlined above  Patient was then transitioned from IV Cardizem to oral antiarrhythmics  Cardiology had spoken with the patient in regards to a potential device for AFib management as well as a surgical intervention for non rheumatic mitral valve regurgitation  Patient refused both and wanted conservative measures in the form of medication alone  Patient was ultimately downgraded from the ICU to med [facility]  Based upon the patient's wishes telemetry was discontinued  She was additionally seen by Nephrology because of an acute kidney injury  Patient was diuresed with Lasix  The diuresis with Lasix was because of an acute exacerbation of diastolic dysfunction CHF  Her acute kidney injury was deemed to be secondary to the AFib with RVR as well as hypovolemia  Ultimately her kidney function resolved  She was seen by physical therapy and occupational therapy  They felt that she would be a good candidate for the acute rehab unit  She was accepted to the acute rehab unit here at this hospital on the afternoon of 07/02/2019  Hospital Medicine will be ready, willing and able to follow with the patient in a consultative role if deemed necessary by the physiatrist   Upon release from the rehab unit she will follow up with Cardiology and her primary care physician as well as Nephrology  Please see above list of diagnoses and related plan for additional information  Condition at Discharge: stable     Discharge Day Visit / Exam:     Subjective:  Patient seen and examined  No new complaints no distress  Vitals: Blood Pressure: 110/80 (07/02/19 1317)  Pulse: 88 (07/02/19 1317)  Temperature: 98 3 °F (36 8 °C) (07/02/19 1317)  Temp Source: Tympanic (07/02/19 1317)  Respirations: 18 (07/02/19 1317)  Height: 4' 11" (149 9 cm) (06/25/19 1615)  Weight - Scale: 76 9 kg (169 lb 9 oz) (07/02/19 0600)  SpO2: 97 % (07/02/19 1317)  Exam:   Physical Exam   Constitutional: She is oriented to person, place, and time  She appears well-developed and well-nourished  HENT:   Head: Normocephalic and atraumatic  Nose: Nose normal    Mouth/Throat: Oropharynx is clear and moist    Eyes: Pupils are equal, round, and reactive to light  Conjunctivae and EOM are normal    Neck: Normal range of motion  Neck supple  No JVD present  No thyromegaly present  Cardiovascular: Normal rate and intact distal pulses  Exam reveals no gallop and no friction rub  No murmur heard  S1 plus S2, irregularly irregular   Pulmonary/Chest: Effort normal and breath sounds normal  No respiratory distress  Abdominal: Soft  Bowel sounds are normal  She exhibits no distension and no mass  There is no tenderness  There is no guarding  Musculoskeletal: Normal range of motion  She exhibits no edema  Lymphadenopathy:     She has no cervical adenopathy  Neurological: She is alert and oriented to person, place, and time  No cranial nerve deficit  Skin: Skin is warm  No rash noted  No erythema  Psychiatric: She has a normal mood and affect  Her behavior is normal    Vitals reviewed  Discussion with Family:  Patient's daughter and granddaughter were brought up to par with the plan of care for today, they were present at the time of my discharge evaluation at the patient's bedside    Discharge instructions/Information to patient and family:   See after visit summary for information provided to patient and family  Provisions for Follow-Up Care:  See after visit summary for information related to follow-up care and any pertinent home health orders  Disposition:     Acute Rehab at [address] [place] [address] SNF:   · Not Applicable to this Patient - spoke with the acute rehab physician directly    Planned Readmission:    None     Discharge Statement:  I spent 40 minutes discharging the patient  This time was spent on the day of discharge  I had direct contact with the patient on the day of discharge  Greater than 50% of the total time was spent examining patient, answering all patient questions, arranging and discussing plan of care with patient as well as directly providing post-discharge instructions  Additional time then spent on discharge activities  Discharge Medications:  See after visit summary for reconciled discharge medications provided to patient and family        ** Please Note: This note has been constructed using a voice recognition system **

## 2019-07-02 NOTE — ASSESSMENT & PLAN NOTE
· Status post a Cardiology evaluation  · Patient has been off of telemetry  · Patient has no interest in any type of cardiac device placement - please see cardiology notes for full details  · Case reviewed with Cardiology  · Patient has been cleared from their standpoint to be discharged to the acute rehab unit on the current anti arrhythmic medication regimen which includes diltiazem and metoprolol  · Additionally, okay for discharge to the acute rehab unit on apixaban 2 5 mg twice a day  · [address] will be ready willing and able to provide any assistance to the rehab team in a consultative role if needed

## 2019-07-02 NOTE — PLAN OF CARE
Problem: Potential for Falls  Goal: Patient will remain free of falls  Description  INTERVENTIONS:  - Assess patient frequently for physical needs  -  Identify cognitive and physical deficits and behaviors that affect risk of falls  -  [place] fall precautions as indicated by assessment   - Educate patient/family on patient safety including physical limitations  - Instruct patient to call for assistance with activity based on assessment  - Modify environment to reduce risk of injury  - Consider OT/PT consult to assist with strengthening/mobility  Outcome: Progressing     Problem: Prexisting or High Potential for Compromised Skin Integrity  Goal: Skin integrity is maintained or improved  Description  INTERVENTIONS:  - Identify patients at risk for skin breakdown  - Assess and monitor skin integrity  - Assess and monitor nutrition and hydration status  - Monitor labs (i e  albumin)  - Assess for incontinence   - Turn and reposition patient  - Assist with mobility/ambulation  - Relieve pressure over bony prominences  - Avoid friction and shearing  - Provide appropriate hygiene as needed including keeping skin clean and dry  - Evaluate need for skin moisturizer/barrier cream  - Collaborate with interdisciplinary team (i e  Nutrition, Rehabilitation, etc )   - Patient/family teaching  Outcome: Progressing     Problem: Nutrition/Hydration-ADULT  Goal: Nutrient/Hydration intake appropriate for improving, restoring or maintaining nutritional needs  Description  Monitor and assess patient's nutrition/hydration status for malnutrition (ex- brittle hair, bruises, dry skin, pale skin and conjunctiva, muscle wasting, smooth red tongue, and disorientation)  Collaborate with interdisciplinary team and initiate plan and interventions as ordered  Monitor patient's weight and dietary intake as ordered or per policy  Utilize nutrition screening tool and intervene per policy   Determine patient's food preferences and provide high-protein, high-caloric foods as appropriate       INTERVENTIONS:  - Monitor oral intake, urinary output, labs, and treatment plans  - Assess nutrition and hydration status and recommend course of action  - Evaluate amount of meals eaten  - Assist patient with eating if necessary   - Allow adequate time for meals  - Recommend/ encourage appropriate diets, oral nutritional supplements, and vitamin/mineral supplements  - Order, calculate, and assess calorie counts as needed  - Recommend, monitor, and adjust tube feedings and TPN/PPN based on assessed needs  - Assess need for intravenous fluids  - Provide specific nutrition/hydration education as appropriate  - Include patient/family/caregiver in decisions related to nutrition  Outcome: Progressing     Problem: PAIN - ADULT  Goal: Verbalizes/displays adequate comfort level or baseline comfort level  Description  Interventions:  - Encourage patient to monitor pain and request assistance  - Assess pain using appropriate pain scale  - Administer analgesics based on type and severity of pain and evaluate response  - Implement non-pharmacological measures as appropriate and evaluate response  - Consider cultural and social influences on pain and pain management  - Notify physician/advanced practitioner if interventions unsuccessful or patient reports new pain  Outcome: Progressing     Problem: SAFETY ADULT  Goal: Maintain or return to baseline ADL function  Description  INTERVENTIONS:  -  Assess patient's ability to carry out ADLs; assess patient's baseline for ADL function and identify physical deficits which impact ability to perform ADLs (bathing, care of mouth/teeth, toileting, grooming, dressing, etc )  - Assess/evaluate cause of self-care deficits   - Assess range of motion  - Assess patient's mobility; develop plan if impaired  - Assess patient's need for assistive devices and provide as appropriate  - Encourage maximum independence but intervene and supervise when necessary  ¯ Involve family in performance of ADLs  ¯ Assess for home care needs following discharge   ¯ Request OT consult to assist with ADL evaluation and planning for discharge  ¯ Provide patient education as appropriate  Outcome: Progressing  Goal: Maintain or return mobility status to optimal level  Description  INTERVENTIONS:  - Assess patient's baseline mobility status (ambulation, transfers, stairs, etc )    - Identify cognitive and physical deficits and behaviors that affect mobility  - Identify mobility aids required to assist with transfers and/or ambulation (gait belt, sit-to-stand, lift, walker, cane, etc )  - [place] fall precautions as indicated by assessment  - Record patient progress and toleration of activity level on Mobility SBAR; progress patient to next Phase/Stage  - Instruct patient to call for assistance with activity based on assessment  - Request Rehabilitation consult to assist with strengthening/weightbearing, etc   Outcome: Progressing     Problem: DISCHARGE PLANNING  Goal: Discharge to home or other facility with appropriate resources  Description  INTERVENTIONS:  - Identify barriers to discharge w/patient and caregiver  - Arrange for needed discharge resources and transportation as appropriate  - Identify discharge learning needs (meds, wound care, etc )  - Arrange for interpretive services to assist at discharge as needed  - Refer to Case Management Department for coordinating discharge planning if the patient needs post-hospital services based on physician/advanced practitioner order or complex needs related to functional status, cognitive ability, or social support system  Outcome: Progressing     Problem: Knowledge Deficit  Goal: Patient/family/caregiver demonstrates understanding of disease process, treatment plan, medications, and discharge instructions  Description  Complete learning assessment and assess knowledge base    Interventions:  - Provide teaching at level of understanding  - Provide teaching via preferred learning methods  Outcome: Progressing     Problem: DISCHARGE PLANNING - CARE MANAGEMENT  Goal: Discharge to post-acute care or home with appropriate resources  Description  INTERVENTIONS:  - Conduct assessment to determine patient/family and health care team treatment goals, and need for post-acute services based on payer coverage, community resources, and patient preferences, and barriers to discharge  - Address psychosocial, clinical, and financial barriers to discharge as identified in assessment in conjunction with the patient/family and health care team  - Arrange appropriate level of post-acute services according to patient's   needs and preference and payer coverage in collaboration with the physician and health care team  - Communicate with and update the patient/family, physician, and health care team regarding progress on the discharge plan  - Arrange appropriate transportation to post-acute venues  Pt may either need HHC vs STR upon discharge  Will continue to follow     Outcome: Progressing

## 2019-07-02 NOTE — ASSESSMENT & PLAN NOTE
- per renal baseline Cr 0 7-0 8  - Cr currently 0 68  -renal managing   - OP FU with Dr Natasha Kearns

## 2019-07-02 NOTE — QUICK NOTE
I additionally note that I believe the patient is reasonably stable and would benefit from acute rehab from a cardiac perspective

## 2019-07-02 NOTE — ASSESSMENT & PLAN NOTE
- seen by darvin in acute care and offered pacer (for increased andrew blockade) however patient and family have d/w Dr Ramona Fowler and they have all decided that it would be best not to pursue this option   - renal fxn improved so patient could be on eliquis 5 mg BID however cards elected to keep patient on eliquis 2 5 mg BID due to possible fall risk (was not on any AC PTA)  - at home on lopressor 25 mg q12 which was increased to TID per IM/renal and is to be dc'd on TID dosing per their recommendation   - IM managing rate control agents   - OP FU with Dr Asuncion Erickson

## 2019-07-02 NOTE — PROGRESS NOTES
Progress Note - Edward Estimable 1935, 81 y o  female MRN: [id number]    Unit/Bed#: -02 Encounter: [id number]    Primary Care Provider: Son Davis MD   Date and time admitted to hospital: 6/25/2019  1:56 PM        * Atrial fibrillation with rapid ventricular response ([address] )  Assessment & Plan  No longer on telemetry but at least on exam today the rate feels well controlled  Again she has no interest in pacemaker therapy to help with rate control  On Eliquis lower dose  Non-rheumatic mitral regurgitation  Assessment & Plan  Significant on exam and by echo  Again, she has elected to not undergo any intervention and this decision seems reasonable to me  Mallory Kang HUY (acute kidney injury) [facility])  Assessment & Plan  Improving creatinine  Coronary artery disease involving native coronary artery of native heart without angina pectoris  Assessment & Plan  No further symptoms  Subjective:   Patient seen and examined  No significant events overnight  No dyspnea  Summary comments: In general the patient seems better now that she is out of the ICU  She is wearing oxygen but does not appear dyspneic  Again the thrust at this point is on comfort in the care plan  Meds seem appropriate at present and I will remain available should new issues arise  Telemetry/ECG/Cardiac testing:   No longer on telemetry  Echo: 6/27/2019:  EF 60%  Moderate LVH  Moderate MR with marked prolapse of the anterior and posterior leaflets  Mild TR with moderate pulm HTN     TTE 2017 - EF 55%, moderate MR   Cardiac cath 2012 - patent LAD stent, severe MR  No significant CAD        Vitals: Blood pressure 125/81, pulse 100, temperature 98 3 °F (36 8 °C), temperature source Tympanic, resp   rate 18, height 4' 11" (1 499 m), weight 76 9 kg (169 lb 9 oz), SpO2 97 % ,   Orthostatic Blood Pressures      Most Recent Value   Blood Pressure  125/81 filed at 07/02/2019 9891   Patient Position - Orthostatic VS  Lying filed at 07/02/2019 0714      ,   Weight (last 2 days)     Date/Time   Weight    07/02/19 0600   76 9 (169 56)    07/01/19 0600   76 7 (169)    06/30/19 0600   75 (165 35)              Physical Exam:  General:  Normal appearance in no distress  Eyes:  Anicteric  Oral mucosa:  Moist   Neck:  No JV D  Carotid upstrokes are brisk without bruits  No masses  Chest:  Basal crackles bilaterally  Cardiac:  Irregularly irregular  Normal PMI  Normal S1 and S2   Grade 3-4 6 holosystolic murmur loudest at the apex   Abdomen:  Soft and nontender  No palpable organomegaly or aortic enlargement  Extremities:  No peripheral edema  Neuro:  Grossly symmetric  Psych:  Alert and oriented x3            Medications:      Current Facility-Administered Medications:     acetaminophen (TYLENOL) tablet 650 mg, 650 mg, Oral, Q6H PRN, Shannon Carrillo MD, 650 mg at 06/28/19 0427    ALPRAZolam Jaquita Grayson) tablet 0 25 mg, 0 25 mg, Oral, TID PRN, Shannon Carrillo MD, 0 25 mg at 07/02/19 0843    apixaban (ELIQUIS) tablet 2 5 mg, 2 5 mg, Oral, BID, Shannon Carrillo MD, 2 5 mg at 07/02/19 0844    diltiazem (CARDIZEM CD) 24 hr capsule 300 mg, 300 mg, Oral, Daily, Shannon Carrillo MD, 300 mg at 07/02/19 0843    metoprolol tartrate (LOPRESSOR) tablet 25 mg, 25 mg, Oral, TID, Shannon Carrillo MD, 25 mg at 07/02/19 0843    montelukast (SINGULAIR) tablet 10 mg, 10 mg, Oral, Daily, Shannon Carrillo MD, 10 mg at 07/02/19 0843    ondansetron (ZOFRAN) injection 4 mg, 4 mg, Intravenous, Q6H PRN, Shannon Carrillo MD, 4 mg at 06/25/19 1748    oxybutynin (DITROPAN) tablet 5 mg, 5 mg, Oral, Daily, Shannon Carrillo MD, 5 mg at 07/02/19 0843    pantoprazole (PROTONIX) EC tablet 40 mg, 40 mg, Oral, Early Morning, Shannon Carrillo MD, 40 mg at 07/02/19 0510    polyethylene glycol (MIRALAX) packet 17 g, 17 g, Oral, Daily, Shannon Carrillo MD, 17 g at 07/01/19 0830    sertraline (ZOLOFT) tablet 50 mg, 50 mg, Oral, Daily, Shannon Carrillo MD, 50 mg at 07/02/19 0843     Labs & Results:    Troponins: Results from last 7 days   Lab Units 06/28/19  1318 06/28/19  0611 06/28/19  0352   TROPONIN I ng/mL 0 03 0 03 0 03      BNP:   Results from last 6 Months   Lab Units 06/25/19  1812   BNP pg/mL 1,303*       CBC with diff:   Results from last 7 days   Lab Units 07/02/19  0440 07/01/19  0458   WBC Thousand/uL 8 80 9 00   HEMOGLOBIN g/dL 12 4 12 0   HEMATOCRIT % 36 8* 35 8*   MCV fL 91 89   PLATELETS Thousands/uL 176 206     TSH:     CMP:   Results from last 7 days   Lab Units 07/02/19  0440 07/01/19  0458  06/26/19  0444 06/25/19  1422   POTASSIUM mmol/L 5 2 4 9   < > 4 5 4 5   CHLORIDE mmol/L 95* 96*   < > 100 95*   CO2 mmol/L 31 27   < > 22 24   BUN mg/dL 31* 41*   < > 46* 48*   CREATININE mg/dL 1 19 1 53*   < > 1 67* 1 80*   AST U/L  --   --   --  25 36   ALT U/L  --   --   --  33 44   EGFR ml/min/1 73sq m 42 31   < > 28 26    < > = values in this interval not displayed       Lipid Profile:     Coags:   Results from last 7 days   Lab Units 06/25/19  1422   INR  1 20

## 2019-07-02 NOTE — PROGRESS NOTES
Progress Note - Nephrology   Adin Fu 81 y o  female MRN: [id number]  Unit/Bed#: -02 Encounter: [id number]    A/P:  1  Acute kidney failure on top of chronic kidney disease               patient's fraction excretion of sodium less 1%, most likely due to hypovolemia at presentation along with hemodynamics instability in form of atrial fibrillation rapid ventricular response  Unfortunately patient continues to have significant dysrhythmias at this time which the patient is aware of but denies any worrisome sequela such as no shortness of breath nausea vomiting diarrhea or diaphoresis  Patient appears appropriate from volume standpoint and would therefore discouraged any additional volume via the IV route  Continue to focus on heart rate control which will defer to Cardiology  6/28: Kidney function improved with diuresis and she is symptomatically better  Will follow I/O  She is less dyspneic  Will receive additional diuretic as per cardiology             6/29: diuresed 4kg, however, weights may be erroneous and has improved renal function  6/30: had an episode of bradycardia and hypotension and renal function has declined  She is voiding well  Will check urine electrolytes to rule out ATN              7/1: renal function improved since yesterday  She has a fractional excretion of sodium of 0 2%  This may be due to decreased renal perfusion due to tachycardia  However, encouraged her to drink water today  She has refused a mitral valve replacement as she "doesn't want to be cut open"  7/2: renal function at baseline  2  Chronic kidney disease stage 2 with baseline creatinine between 0 7 - 0 8 mg/dL    3  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              I stressed the importance of avoiding nonsteroidal anti-inflammatory medications as possible   ============  4   Hyponatremia               sodium stable over last 24 hours or so with a slight trend to would improving up at 131 millimole/L this morning  I will place the patient on a mild fluid restriction, continue monitor closely  6/28: check urine osmolality  5  Right adrenal adenoma -most likely benign   This was noted on CT scan without contrast, adenoma is less than 2 5 cm  Recommendation at this point is to repeat imaging in approximately 12 months to ensure adenoma is stable  6  Hypotension most likely due to hypovolemia and hemodynamic instability               improved, continue to address dysrhythmia  7  Atrial fibrillation with rapid ventricular response              As mentioned above  Defer to Cardiology  8  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  9  Chronic diastolic congestive heart failure with potential acute episode   Patient placed on Lasix 80 mg IV b i d , follow clinical and laboratory findings closely  Continue to adjust as indicated according to Cardiology  10  Severe mitral regurgitation - refused MVR      Follow up reason for today's visit:  Acute kidney injury/electrolyte disorders/chronic kidney disease    Atrial fibrillation with rapid ventricular response [facility])    Patient Active Problem List   Diagnosis    Coronary artery disease involving native coronary artery of native heart without angina pectoris    SOB (shortness of breath)    Localized edema    Atherosclerosis of both carotid arteries    Non-rheumatic mitral regurgitation    Other fatigue    Atrial fibrillation with rapid ventricular response ([facility] )    HUY (acute kidney injury) ([facility] )    Nausea    Acute on chronic diastolic congestive heart failure ([facility] )         Subjective:   Patient continues to complain of palpitations, currently not associated with diaphoresis/shortness of breath/nausea or vomiting  6/30" feels tired - no chest pain, dyspnea or abdominal pain    Objective:     Vitals: Blood pressure 125/81, pulse 100, temperature 98 3 °F (36 8 °C), temperature source Tympanic, resp   rate 18, height 4' 11" (1 499 m), weight 76 9 kg (169 lb 9 oz), SpO2 97 %  ,Body mass index is 34 25 kg/m²  Weight (last 2 days)     Date/Time   Weight    07/02/19 0600   76 9 (169 56)    07/01/19 0600   76 7 (169)    06/30/19 0600   75 (165 35)                Intake/Output Summary (Last 24 hours) at 7/2/2019 1137  Last data filed at 7/2/2019 0800  Gross per 24 hour   Intake 320 ml   Output 400 ml   Net -80 ml     I/O last 3 completed shifts: In: 12 [P O :670]  Out: 950 [Urine:950]         Physical Exam: /81 (BP Location: Right arm)   Pulse 100   Temp 98 3 °F (36 8 °C) (Tympanic)   Resp 18   Ht 4' 11" (1 499 m)   Wt 76 9 kg (169 lb 9 oz)   SpO2 97%   BMI 34 25 kg/m²     General Appearance:    Alert, cooperative, no distress, appears stated age   Head:    Normocephalic, without obvious abnormality, atraumatic   Eyes:    Conjunctiva/corneas clear   Ears:    Normal external ears   Nose:   Nares normal, septum midline, mucosa normal, no drainage    or sinus tenderness   Throat:   Lips, mucosa, and tongue normal; teeth and gums normal   Neck:   Supple   Back:     Symmetric, no curvature, ROM normal, no CVA tenderness   Lungs:     Clear to auscultation bilaterally, respirations unlabored   Chest wall:    No tenderness or deformity   Heart:    Regular rate and rhythm, S1 and S2 normal, no murmur, rub   or gallop   Abdomen:     Soft, non-tender, bowel sounds active   Extremities:   Extremities normal, atraumatic, no cyanosis, trace bilateral lower extremity edema   Skin:   Skin color, texture, turgor normal, no rashes or lesions   Lymph nodes:   Cervical normal   Neurologic:   CNII-XII intact            Lab, Imaging and other studies: I have personally reviewed pertinent labs    CBC:   Lab Results   Component Value Date    WBC 8 80 07/02/2019    HGB 12 4 07/02/2019    HCT 36 8 (L) 07/02/2019    MCV 91 07/02/2019     07/02/2019    MCH 30 7 07/02/2019    MCHC 33 7 07/02/2019    RDW 15 9 (H) 07/02/2019    MPV 8 4 (L) 07/02/2019     CMP: Lab Results   Component Value Date    K 5 2 07/02/2019    CL 95 (L) 07/02/2019    CO2 31 07/02/2019    BUN 31 (H) 07/02/2019    CREATININE 1 19 07/02/2019    CALCIUM 9 5 07/02/2019    EGFR 42 07/02/2019         Results from last 7 days   Lab Units 07/02/19  0440 07/01/19  0458 06/30/19  0437  06/26/19  0444 06/25/19  1422   POTASSIUM mmol/L 5 2 4 9 4 5   < > 4 5 4 5   CHLORIDE mmol/L 95* 96* 94*   < > 100 95*   CO2 mmol/L 31 27 25   < > 22 24   BUN mg/dL 31* 41* 44*   < > 46* 48*   CREATININE mg/dL 1 19 1 53* 2 08*   < > 1 67* 1 80*   CALCIUM mg/dL 9 5 9 3 8 8   < > 8 4* 9 5   ALK PHOS U/L  --   --   --   --  55 68   ALT U/L  --   --   --   --  33 44   AST U/L  --   --   --   --  25 36    < > = values in this interval not displayed  Phosphorus: No results found for: PHOS  Magnesium: No results found for: MG  Urinalysis: No results found for: Ara Silk, SPECGRAV, PHUR, LEUKOCYTESUR, NITRITE, PROTEINUA, GLUCOSEU, KETONESU, BILIRUBINUR, BLOODU  Ionized Calcium: No results found for: CAION  Coagulation: No results found for: PT, INR, APTT  Troponin:   No results found for: TROPONINI  ABG: No results found for: PHART, FOA0AWC, PO2ART, VCO6GHI, P0TJIUAJ, BEART, SOURCE  Radiology review:     IMAGING  Procedure: Ct Abdomen Pelvis Wo Contrast    Result Date: 6/26/2019  Narrative: CT ABDOMEN AND PELVIS WITHOUT IV CONTRAST INDICATION:   Abdominal pain, unspecified with nausea  COMPARISON:  None  TECHNIQUE:  CT examination of the abdomen and pelvis was performed without intravenous contrast   Axial, sagittal, and coronal 2D reformatted images were created from the source data and submitted for interpretation  Radiation dose length product (DLP) for this visit:  575 mGy-cm   This examination, like all CT scans performed in the [facility], was performed utilizing techniques to minimize radiation dose exposure, including the use of iterative reconstruction and automated exposure control   Enteric contrast was not administered  FINDINGS: ABDOMEN LOWER CHEST:  No clinically significant abnormality identified in the visualized lower chest   Mitral annulus calcifications noted  LIVER/BILIARY TREE:  Unremarkable  GALLBLADDER:  No calcified gallstones  No pericholecystic inflammatory change  SPLEEN:  Unremarkable  PANCREAS:  Unremarkable  ADRENAL GLANDS:  1 3 cm right adrenal nodule  Normal left adrenal gland  KIDNEYS/URETERS:  Unremarkable  No hydronephrosis  STOMACH AND BOWEL:  Unremarkable  APPENDIX:  A normal appendix was visualized  ABDOMINOPELVIC CAVITY:  No ascites or free intraperitoneal air  No lymphadenopathy  VESSELS:  Atherosclerotic changes are present  No evidence of aneurysm  PELVIS REPRODUCTIVE ORGANS:  Unremarkable for patient's age  URINARY BLADDER:  Unremarkable  ABDOMINAL WALL/INGUINAL REGIONS:  Unremarkable  OSSEOUS STRUCTURES: Compression deformity at T12  Age appropriate degenerative changes in the lumbar spine  No acute fracture or destructive osseous lesion  Partially imaged antegrade intramedullary nail and cephalomedullary screw in the right femur  Impression: 1  No acute abnormality in the abdomen or pelvis  2   There is a 1 3 cm right adrenal gland nodule  Although its imaging features are indeterminate, it does not have suspicious imaging features (heterogeneity, necrosis, irregular margins), therefore this is likely benign, and can be followed by non-contrast abdomen CT or MRI in 12 months  If patient has history of adrenal hyperfunction, consider biochemical evaluation  Adrenal recommendation based on institutional consensus and Journal of [employer] of Radiology 2017;14:6335-4732 The study was marked in [facility] for significant notification  Workstation performed: [id number]     Procedure: Us Kidney And Bladder    Result Date: 6/26/2019  Narrative: RENAL ULTRASOUND INDICATION:   RENAL FAILURE, ACUTE   Bailey Bradford  COMPARISON: None TECHNIQUE: Portable ultrasound of the retroperitoneum was performed with a curvilinear transducer utilizing volumetric sweeps and still imaging techniques  FINDINGS: KIDNEYS: Symmetric and normal size  Right kidney:  10 5 x 4 5 cm  Left kidney:  10 9 x 5 0 cm  Right kidney Normal echogenicity and contour  No suspicious masses detected  No hydronephrosis  No shadowing calculi  No perinephric fluid collections  Left kidney Normal echogenicity and contour  No suspicious masses detected  No hydronephrosis  No shadowing calculi  No perinephric fluid collections  URETERS: Nonvisualized  BLADDER: Normally distended  No focal thickening or mass lesions  Fluid collection above the bladder  Impression: Normal kidneys and bladder  Fluid collection above the bladder measuring approximately 2 1 x 2 1 x 4 8 cm of uncertain etiology  Consider CT scan for further evaluation  The study was marked in [facility] for immediate notification   Workstation performed: [id number]       Current Facility-Administered Medications   Medication Dose Route Frequency    acetaminophen (TYLENOL) tablet 650 mg  650 mg Oral Q6H PRN    ALPRAZolam (XANAX) tablet 0 25 mg  0 25 mg Oral TID PRN    apixaban (ELIQUIS) tablet 2 5 mg  2 5 mg Oral BID    diltiazem (CARDIZEM CD) 24 hr capsule 300 mg  300 mg Oral Daily    metoprolol tartrate (LOPRESSOR) tablet 25 mg  25 mg Oral TID    montelukast (SINGULAIR) tablet 10 mg  10 mg Oral Daily    ondansetron (ZOFRAN) injection 4 mg  4 mg Intravenous Q6H PRN    oxybutynin (DITROPAN) tablet 5 mg  5 mg Oral Daily    pantoprazole (PROTONIX) EC tablet 40 mg  40 mg Oral Early Morning    polyethylene glycol (MIRALAX) packet 17 g  17 g Oral Daily    sertraline (ZOLOFT) tablet 50 mg  50 mg Oral Daily     Medications Discontinued During This Encounter   Medication Reason    diltiazem (CARDIZEM) 125 mg in sodium chloride 0 9 % 125 mL infusion     heparin (porcine) subcutaneous injection 5,000 Units     sodium chloride 0 9 % bolus 500 mL     aspirin chewable tablet 81 mg     diltiazem (CARDIZEM CD) 24 hr capsule 240 mg     enoxaparin (LOVENOX) subcutaneous injection 80 mg     furosemide (LASIX) tablet 40 mg     diltiazem (CARDIZEM CD) 24 hr capsule 240 mg     metoprolol tartrate (LOPRESSOR) tablet 25 mg     metoprolol tartrate (LOPRESSOR) tablet 25 mg     lisinopril (ZESTRIL) tablet 5 mg     metoprolol tartrate (LOPRESSOR) tablet 50 mg     lisinopril (ZESTRIL) tablet 2 5 mg        Hernandez Harrison MD

## 2019-07-02 NOTE — H&P
PHYSICAL MEDICINE AND REHABILITATION H&P/ADMISSION NOTE  Christy José 81 y o  female MRN: [id number]  Unit/Bed#: [facility] 218-01 Encounter: [id number]     Rehab Diagnosis: debility     History of Present Illness:   Christy José is a 81 y o  female who presented to the [facility] with an irregular heart beat and was found to have A-fib for which she was seen by cardiology and offered a pacer but refused and was medically managed  Course was complicated by acute on chronic RF and hyponatremia for which renal service managed the patient  Subjective: patient w/o complaint currently     Review of Systems: A 10-point review of systems was performed  Negative except as listed above      Plan:     HTN (hypertension)  Assessment & Plan  - at home on lopressor 25 mg q12 and lisinopril 10 mg qd   -IM and renal co-managing     Acute on chronic kidney failure (HCC)  Assessment & Plan  - per renal baseline Cr 0 7-0 8  - Cr currently improved to 1 19  -renal managing     OAB (overactive bladder)  Assessment & Plan  - on home ditropan 5 mg qd     GERD (gastroesophageal reflux disease)  Assessment & Plan  - at home on prilosec 20 mg qd (therapeutic substitution as inpt)     Depression with anxiety  Assessment & Plan  - on home zoloft 50 mg qd  - also on home xanax 0 25 mg prn  - per PAPDMP last filled prescription for xanax 0 25 mg tabs on 4/26/19 for 270 tabs with 0 refills (appears to be a regular prescription every 3 months provided by PCP)     Hyponatremia  Assessment & Plan  - currently stable at 130  -renal managing     Diastolic heart failure (HCC)  Assessment & Plan  - was on lasix 20 mg BID PTA  - volume status currently stable w/o diuretics   -IM managing as inpt   - OP FU with Dr Lori Aguirre          A-fib [facility])  Assessment & Plan  - seen by cards in acute care and offered pacer however patient refused  - renal fxn improved so patient could be on eliquis 5 mg BID however in acute care cards elected to keep patient on eliquis 2 5 mg BID due to possible fall risk (was not on any AC PTA)  - at home on lopressor 25 mg q12   - IM managing rate control agents   - OP FU with Dr Mayra Gaytan       Mitral regurgitation  Assessment & Plan  - offered surgical intervention by darvin however patient refused  - OP FU with Dr Mayra Gaytan     CAD (coronary artery disease)  Assessment & Plan  - h/o stent in 2007  - home ASA 81 mg qd d/c'd by cards in acute care due to [facility]  - per cards notes in acute care not on statin however per EMR was on zocor 20 mg qpm (on 3/15/19 LDL was 69); will clarify   - at home was on lopressor 25 mg q12 PTA   - IM managing as inpt  - OP FU with Dr Mayra Gaytan          Drug regimen reviewed, all potential adverse effects identified and addressed:    Scheduled Meds:  Current Facility-Administered Medications:  acetaminophen 650 mg Oral Q6H PRN Woo Valentine MD   ALPRAZolam 0 25 mg Oral TID PRN Woo Valentine MD   apixaban 2 5 mg Oral BID MD Amna Gonzales ON 7/3/2019] diltiazem 300 mg Oral Daily Woo Valentine MD   metoprolol tartrate 25 mg Oral TID MD Amna Gonzales ON 7/3/2019] montelukast 10 mg Oral Daily MD Amna Gonzales ON 7/3/2019] oxybutynin 5 mg Oral Daily MD Amna Gonzales ON 7/3/2019] pantoprazole 40 mg Oral Early Morning Woo Valentine MD   polyethylene glycol 17 g Oral Daily PRN MD Amna Gonzales ON 7/3/2019] sertraline 50 mg Oral Daily Woo Valentine MD        Incidental findings:  1) elevated peak PA pressure: OP FU with Dr Mayra Gaytan  2) 1 3 cm rt adrenal nodule: per radiology report recommend repeat non-contrast CT or MRI in 12 months, will defer to PCP to arrange  3) fluid collection above bladder: noted on US and FU CT was recommended which did not show a fluid collection above the bladder     DVT ppx: eliquis              Functional History - Prior to Admission:      I PTA     Functional Status Upon Admission to ARC:  Mobility: min  Transfers: min  ADLs: min-sup     Bora Monterroso lives with their son  She lives in a(n) single family home  The living area: can live on one level    There Ramp to enter the home            Physical Exam:            General: alert, no apparent distress, cooperative and comfortable  HEENT:  Head: Normal, normocephalic, atraumatic  CARDIAC:  +S1/2  LUNGS:  respirations unlabored   ABDOMEN:  soft NT   EXTREMITIES:  volume status currently stable  NEURO:   mental status, speech normal, alert and oriented x3, cranial nerves 2-12 intact, muscle tone and strength normal and symmetric, sensation grossly normal and finger to nose and cerebellar exam normal  PSYCH:  Alert and oriented, appropriate affect  Laboratory:    Results from last 7 days   Lab Units 07/02/19  0440 07/01/19 0458 06/30/19  0437   HEMOGLOBIN g/dL 12 4 12 0 11 3*   HEMATOCRIT % 36 8* 35 8* 33 0*   WBC Thousand/uL 8 80 9 00 10 30     Results from last 7 days   Lab Units 07/02/19  0440 07/01/19 0458 06/30/19 0437  06/26/19  0444   BUN mg/dL 31* 41* 44*   < > 46*   SODIUM mmol/L 130* 129* 126*   < > 130*   POTASSIUM mmol/L 5 2 4 9 4 5   < > 4 5   CHLORIDE mmol/L 95* 96* 94*   < > 100   CREATININE mg/dL 1 19 1 53* 2 08*   < > 1 67*   AST U/L  --   --   --   --  25   ALT U/L  --   --   --   --  33    < > = values in this interval not displayed  Past Medical History:   Past Surgical History:   Family History:   Social history:   Past Medical History:   Diagnosis Date    Acute on chronic diastolic congestive heart failure ([facility] [address] ) 6/27/2019    Arthritis     Coronary artery disease     history of stenting    History of echocardiogram 07/20/2017    EF 55%, mild concentric LVH, bileaflet MVP with moderate MR, left atrial enlargement      History of transfusion     Hyperlipidemia     Hypertension     Mitral regurgitation     Past Surgical History:   Procedure Laterality Date    ABDOMINAL SURGERY      hysterectomy    CARDIAC CATHETERIZATION  04/10/2012    EF 65%, no significant CAD, patent stents, severe MR     CORONARY ANGIOPLASTY WITH STENT PLACEMENT  03/21/2007    EF 55%, GARRET to LAD   EYE SURGERY      b/l cataracts    HYSTERECTOMY       Family History   Problem Relation Age of Onset    Arthritis Mother     Cancer Mother     Heart disease Mother     Hypertension Mother     Alcohol abuse Father     Arthritis Father     Birth defects Son     Hearing loss Son     Diabetes Daughter     Stroke Maternal Grandmother     Asthma Maternal Grandfather       Social History     Socioeconomic History    Marital status:       Spouse name: Not on file    Number of children: Not on file    Years of education: Not on file    Highest education level: Not on file   Occupational History    Not on file   Social Needs    Financial resource strain: Not on file    Food insecurity:     Worry: Not on file     Inability: Not on file    Transportation needs:     Medical: Not on file     Non-medical: Not on file   Tobacco Use    Smoking status: Never Smoker    Smokeless tobacco: Never Used   Substance and Sexual Activity    Alcohol use: Not Currently    Drug use: Never    Sexual activity: Not on file   Lifestyle    Physical activity:     Days per week: Not on file     Minutes per session: Not on file    Stress: Not on file   Relationships    Social connections:     Talks on phone: Not on file     Gets together: Not on file     Attends [employer] service: Not on file     Active member of club or organization: Not on file     Attends meetings of clubs or organizations: Not on file     Relationship status: Not on file    Intimate partner violence:     Fear of current or ex partner: Not on file     Emotionally abused: Not on file     Physically abused: Not on file     Forced sexual activity: Not on file   Other Topics Concern    Not on file   Social History Narrative    Not on file          Current Medical Diagnosis Allergies Patient Active Problem List   Diagnosis    CAD (coronary artery disease)    Mitral regurgitation    A-fib (HCC)    Acute on chronic kidney failure (HCC)    Diastolic heart failure ([address] )    HTN (hypertension)    Hyponatremia    Depression with anxiety    GERD (gastroesophageal reflux disease)    OAB (overactive bladder)    Allergies   Allergen Reactions    Chocolate Flavor     Iodine Other (See Comments)     shellfish- difficulty breathing    Lactose     Shellfish-Derived Products     Codeine Rash           Medical Necessity Criteria for ARC Admission: hyponatremia, HUY  In addition, the preadmission screen, post-admission physical evaluation, overall plan of care and admissions order demonstrate a reasonable expectation that the following criteria were met at the time of admission to the [facility]  1  The patient requires active and ongoing therapeutic intervention of multiple therapy disciplines (physical therapy, occupational therapy, speech-language pathology, or prosthetics/orthotics), one of which is physical or occupational therapy  2  Patient requires an intensive rehabilitation therapy program, as defined in Chapter 1, section 110 2 2 of the CMS Medicare Policy Manual  This intensive rehabilitation therapy program will consist of at least 3 hours of therapy per day at least 5 days per week or at least 15 hours of intensive rehabilitation therapy within a 7 consecutive day period, beginning with the date of admission to the [facility]  3  The patient is reasonably expected to actively participate in, and benefit significantly from, the intensive rehabilitation therapy program as defined in Chapter 1, section 110 2 2 of the CMS Medicare Policy Manual at this time of admission to the [facility]   She can reasonably be expected to make measurable improvement (that will be of practical value to improve the patients functional capacity or adaptation to impairments) as a result of the rehabilitation treatment, as defined in section 110 3, and such improvement can be expected to be made within the prescribed period of time  As noted in the CMS Medicare Policy Manual, the patient need not be expected to achieve complete independence in the domain of self-care nor be expected to return to his or her prior level of functioning in order to meet this standard  4  The patient must require physician supervision by a rehabilitation physician  As such, a rehabilitation physician will conduct face-to-face visits with the patient at least 3 days per week throughout the patients stay in the [facility] to assess the patient both medically and functionally, as well as to modify the course of treatment as needed to maximize the patients capacity to benefit from the rehabilitation process  5  The patient requires an intensive and coordinated interdisciplinary approach to providing rehabilitation, as defined in Chapter 1, section 110 2 5 of the CMS Medicare Policy Manual  This will be achieved through periodic team conferences, conducted at least once in a 7-day period, and comprising of an interdisciplinary team of medical professionals consisting of: a rehabilitation physician, registered nurse,  and/or , and a licensed/certified therapist from each therapy discipline involved in treating the patient  Changes Since Pre-admission Assessment: None -This patient's participation in rehab continues to be reasonable, necessary and appropriate  CMS Required Post-Admission Physician Evaluation Elements  History and Physical, including medical history, functional history and active comorbidities as in above text      Post-Admission Physician Evaluation:  The patient has the potential to make improvement and is in need of physical, occupational, and/or therapy services  The patient may also need nutritional services   Given the patient's complex medical condition and risk of further medical complications, rehabilitative services cannot be safely provided at a lower level of care, such as a skilled nursing facility  I have reviewed the patient's functional and medical status at the time of the preadmission screening and they are the same as on the day of this admission  I acknowledge that I have personally performed a full physical examination on this patient within 24 hours of admission   The patient and/or family demonstrated understanding the rehabilitation program and the discharge process after we discussed them      Agree in entirety: yes  Minor adaptions: none    Major changes: none     Zachary De La Paz MD  Physical Medicine and Rehabilitation

## 2019-07-02 NOTE — ASSESSMENT & PLAN NOTE
- on home zoloft 50 mg qd  - also on home xanax 0 25 mg prn  - per PAPDMP last filled prescription for xanax 0 25 mg tabs on 4/26/19 for 270 tabs with 0 refills (appears to be a regular prescription every 3 months provided by PCP)

## 2019-07-02 NOTE — PLAN OF CARE
Problem: PHYSICAL THERAPY ADULT  Goal: Performs mobility at highest level of function for planned discharge setting  See evaluation for individualized goals  Description  Treatment/Interventions: Functional transfer training, LE strengthening/ROM, Therapeutic exercise, Endurance training, Bed mobility, Gait training, Equipment eval/education, Spoke to nursing, OT(& neuro re-education w/balance training)  Equipment Recommended: (TBD based on functional progress)  See flowsheet documentation for full assessment, interventions and recommendations  Florencio Gonzalez, PT     Outcome: Progressing  Note:   Prognosis: Fair  Problem List: Decreased strength, Decreased endurance, Impaired balance, Decreased mobility, Obesity  Assessment: Pt  agreeable to PT treatment this PM   Pt  found resting in supine  Pt  transfered by rolling to R side and then from supine to sit with MI  She required use of R BSR and elevated HOB  She sat at EOB for 30 seconds and then transfered from sit to stand with min Ax1 and ambulated 5 ft with min Ax1 and RW forward and backward  Pt  then expressed the need to toilet, so pt  SPS to commode with min Ax1 and RW  While on commode 59 Saran Youngblood performed vital signs including BP of 110/80mmHg, O2 level of 98% on 2L of O2, and pulse rate of 88 BPM   59 Saran Youngblood and helped pt  with pericare  Pt  then transfered back to bed with min Ax1 and RW  Ther ex performed at EOB on BLE x 20 reps as per flow sheet requiring restperiods secondary to fatigue  Pt  instructed on proper breathing technique to help improve energy concervation  Pt  then transfered sit to supine with MI   Pt  tolerated treament well but would benifit from futher PT to improve functional activity tolerance  SCD's reapplied to pt  and all needs left in reach  Recommendation: Short-term skilled PT     PT - OK to Discharge: Yes(if medically stable to STR)    See flowsheet documentation for full assessment

## 2019-07-02 NOTE — ASSESSMENT & PLAN NOTE
Significant on exam and by echo  Again, she has elected to not undergo any intervention and this decision seems reasonable to me  Keke Sole

## 2019-07-02 NOTE — ASSESSMENT & PLAN NOTE
- now low normal at 134 on FR of 1 8 L therefore Dr Melissa Pak recommended patient continue FR 1 8 L upon dc   -OP FU with Dr Melissa Pak

## 2019-07-02 NOTE — PLAN OF CARE
Problem: Nutrition/Hydration-ADULT  Goal: Nutrient/Hydration intake appropriate for improving, restoring or maintaining nutritional needs  Description  Monitor and assess patient's nutrition/hydration status for malnutrition (ex- brittle hair, bruises, dry skin, pale skin and conjunctiva, muscle wasting, smooth red tongue, and disorientation)  Collaborate with interdisciplinary team and initiate plan and interventions as ordered  Monitor patient's weight and dietary intake as ordered or per policy  Utilize nutrition screening tool and intervene per policy  Determine patient's food preferences and provide high-protein, high-caloric foods as appropriate  INTERVENTIONS:  - Monitor oral intake, urinary output, labs, and treatment plans  - Assess nutrition and hydration status and recommend course of action  - Evaluate amount of meals eaten  - Assist patient with eating if necessary   - Allow adequate time for meals  - Recommend/ encourage appropriate diets, oral nutritional supplements, and vitamin/mineral supplements  - Order, calculate, and assess calorie counts as needed  - Recommend, monitor, and adjust tube feedings and TPN/PPN based on assessed needs  - Assess need for intravenous fluids  - Provide specific nutrition/hydration education as appropriate  - Include patient/family/caregiver in decisions related to nutrition  Outcome: Progressing   7/2/19 Pt receiving a cardiovascular diet lactose restricted with ensure compact tid  She stated her appetite is improved and she is able to order soft foods since she is missing her teeth and that she is tolerating the ensure compact  Her wt is stable 169lb, trending toward her ubw 163lb  Continue cardiovascular diet  and ensure compact tid

## 2019-07-02 NOTE — ASSESSMENT & PLAN NOTE
- was on lasix 20 mg BID PTA  - volume status currently stable w/o diuretics   - Dr Yamileth Gutierrez recommends patient not resume lasix upon dc   - OP FU with Dr Katya Edmond

## 2019-07-02 NOTE — PHYSICAL THERAPY NOTE
07/02/19 1341   Pain Assessment   Pain Assessment 0-10   Pain Score 5   Pain Type Chronic pain   Pain Location Knee   Pain Orientation Left   Pain Descriptors Aching   Pain Frequency Intermittent   Pain Onset Ongoing   Clinical Progression Not changed   Patient's Stated Pain Goal No pain   Hospital Pain Intervention(s) Ambulation/increased activity   Restrictions/Precautions   Weight Bearing Precautions Per Order No   Other Precautions O2;Fall Risk   General   Chart Reviewed Yes   Response to Previous Treatment Patient with no complaints from previous session  Family/Caregiver Present No   Cognition   Overall Cognitive Status WFL   Arousal/Participation Alert; Cooperative   Attention Within functional limits   Orientation Level Oriented X4   Memory Within functional limits   Following Commands Follows one step commands with increased time or repetition   Subjective   Subjective "I'm feeling a bit better right now "   Bed Mobility   Rolling R 5  Supervision   Additional items Assist x 1   Supine to Sit 6  Modified independent   Additional items Assist x 1;HOB elevated;Verbal cues; Increased time required   Sit to Supine 6  Modified independent   Additional items HOB elevated;Assist x 1;Bedrails;Verbal cues; Increased time required   Additional Comments Pt  performed ther ex at EOB with out any support or assistance  Transfers   Sit to Stand 4  Minimal assistance   Additional items Assist x 1;Verbal cues   Stand to Sit 4  Minimal assistance   Additional items Assist x 1;Verbal cues   Stand pivot 4  Minimal assistance   Additional items Assist x 1;Verbal cues   Toilet transfer 4  Minimal assistance   Additional items Assist x 1;Verbal cues; Increased time required   Additional items   (RW)   Ambulation/Elevation   Gait pattern Improper Weight shift; Forward Flexion;Decreased foot clearance; Short stride; Inconsistent judy   Gait Assistance 4  Minimal assist   Additional items Assist x 1;Verbal cues   Assistive Device Rolling walker   Distance 5 ft forward and 5 ft backward   Balance   Static Sitting Fair +   Dynamic Sitting Fair   Endurance Deficit   Endurance Deficit Yes   Endurance Deficit Description fatigues quickly   Activity Tolerance   Activity Tolerance Patient limited by fatigue   Exercises   Hip Flexion 20 reps; Sitting;AROM; Bilateral   Hip Abduction Sitting;20 reps;AROM; Bilateral   Hip Adduction Sitting;20 reps;AROM; Bilateral   Knee AROM Long Arc Quad Sitting;20 reps;AROM; Bilateral   Ankle Pumps Sitting;20 reps;AROM; Bilateral   Marching Sitting;20 reps;AROM; Bilateral   Assessment   Prognosis Fair   Problem List Decreased strength;Decreased endurance; Impaired balance;Decreased mobility;Obesity   Assessment Pt  agreeable to PT treatment this PM   Pt  found resting in supine  Pt  transfered by rolling to R side and then from supine to sit with MI  She required use of R BSR and elevated HOB  She sat at EOB for 30 seconds and then transfered from sit to stand with min Ax1 and ambulated 5 ft with min Ax1 and RW forward and backward  Pt  then expressed the need to toilet, so pt  SPS to commode with min Ax1 and RW  While on commode 59 Saran Youngblood performed vital signs including BP of 110/80mmHg, O2 level of 98% on 2L of O2, and pulse rate of 88 BPM   59 Saran Youngblood and helped pt  with pericare  Pt  then transfered back to bed with min Ax1 and RW  Ther ex performed at EOB on BLE x 20 reps as per flow sheet requiring restperiods secondary to fatigue  Pt  instructed on proper breathing technique to help improve energy concervation  Pt  then transfered sit to supine with MI   Pt  tolerated treament well but would benifit from futher PT to improve functional activity tolerance  SCD's reapplied to pt  and all needs left in reach  Goals   Patient Goals to go home   Plan   Treatment/Interventions Functional transfer training;LE strengthening/ROM; Therapeutic exercise; Endurance training;Patient/family training;Bed mobility;Gait training;Spoke to nursing   Progress Progressing toward goals   PT Frequency Other (Comment)  (3-5x/wk)   Shannon De La Paz, PTA

## 2019-07-02 NOTE — ASSESSMENT & PLAN NOTE
Wt Readings from Last 3 Encounters:   07/02/19 76 9 kg (169 lb 9 oz)   06/25/19 69 4 kg (153 lb)   06/05/19 71 2 kg (157 lb)       Stable  Status post diuretic therapy

## 2019-07-02 NOTE — DISCHARGE INSTRUCTIONS
A-fib (Atrial Fibrillation)   WHAT YOU NEED TO KNOW:   A-fib may come and go, or it may be a long-term condition  A-fib can cause blood clots, stroke, or heart failure  These conditions may become life-threatening  It is important to treat and manage a-fib to help prevent a blood clot, stroke, or heart failure  DISCHARGE INSTRUCTIONS:   Call 911 for any of the following:   · You have any of the following signs of a heart attack:      ¨ Squeezing, pressure, or pain in your chest that lasts longer than 5 minutes or returns    ¨ Discomfort or pain in your back, neck, jaw, stomach, or arm     ¨ Trouble breathing    ¨ Nausea or vomiting    ¨ Lightheadedness or a sudden cold sweat, especially with chest pain or trouble breathing    · You have any of the following signs of a stroke:      ¨ Numbness or drooping on one side of your face     ¨ Weakness in an arm or leg    ¨ Confusion or difficulty speaking    ¨ Dizziness, a severe headache, or vision loss  Seek care immediately if:  You have any of the following signs of a blood clot:  · You feel lightheaded, are short of breath, and have chest pain  · You cough up blood  · You have swelling, redness, pain, or warmth in your arm or leg  Contact your cardiologist or healthcare provider if:   · Your heart rate is higher than your healthcare provider said it should be  · You have new or worsening swelling in your legs, feet, ankles, or abdomen  · You are short of breath, even at rest      · You have questions or concerns about your condition or care  Medicines: You may need any of the following:  · Heart medicines  help control your heart rate and rhythm  You may need more than one medicine to treat your symptoms  · Blood thinners    help prevent blood clots  Examples of blood thinners include heparin and warfarin  Clots can cause strokes, heart attacks, and death   The following are general safety guidelines to follow while you are taking a blood thinner:    ¨ Watch for bleeding and bruising while you take blood thinners  Watch for bleeding from your gums or nose  Watch for blood in your urine and bowel movements  Use a soft washcloth on your skin, and a soft toothbrush to brush your teeth  This can keep your skin and gums from bleeding  If you shave, use an electric shaver  Do not play contact sports  ¨ Tell your dentist and other healthcare providers that you take anticoagulants  Wear a bracelet or necklace that says you take this medicine  ¨ Do not start or stop any medicines unless your healthcare provider tells you to  Many medicines cannot be used with blood thinners  ¨ Tell your healthcare provider right away if you forget to take the medicine, or if you take too much  ¨ Warfarin  is a blood thinner that you may need to take  The following are things you should be aware of if you take warfarin  § Foods and medicines can affect the amount of warfarin in your blood  Do not make major changes to your diet while you take warfarin  Warfarin works best when you eat about the same amount of vitamin K every day  Vitamin K is found in green leafy vegetables and certain other foods  Ask for more information about what to eat when you are taking warfarin  § You will need to see your healthcare provider for follow-up visits when you are on warfarin  You will need regular blood tests  These tests are used to decide how much medicine you need  · Antiplatelets , such as aspirin, help prevent blood clots  Take your antiplatelet medicine exactly as directed  These medicines make it more likely for you to bleed or bruise  If you are told to take aspirin, do not take acetaminophen or ibuprofen instead  · Take your medicine as directed  Contact your healthcare provider if you think your medicine is not helping or if you have side effects  Tell him or her if you are allergic to any medicine   Keep a list of the medicines, vitamins, and herbs you take  Include the amounts, and when and why you take them  Bring the list or the pill bottles to follow-up visits  Carry your medicine list with you in case of an emergency  Follow up with your cardiologist as directed: You will need regular blood tests and monitoring  Write down your questions so you remember to ask them during your visits  Manage A-fib:   · Know your target heart rate  Learn how to take your pulse and monitor your heart rate  · Manage other health conditions  This includes high blood pressure, sleep apnea, thyroid disease, diabetes, and other heart conditions  Take medicine as directed and follow your treatment plan  · Limit or do not drink alcohol  Alcohol can make a-fib hard to manage  Ask your healthcare provider if it is safe for you to drink alcohol  A drink of alcohol is 12 ounces of beer, 5 ounces of wine, or 1½ ounces of liquor  · Do not smoke  Nicotine and other chemicals in cigarettes and cigars can cause heart and lung damage  Ask your healthcare provider for information if you currently smoke and need help to quit  E-cigarettes or smokeless tobacco still contain nicotine  Talk to your healthcare provider before you use these products  · Eat heart-healthy foods  Heart healthy foods will help keep your cholesterol low  These include fruits, vegetables, whole-grain breads, low-fat dairy products, beans, lean meats, and fish  Replace butter and margarine with heart-healthy oils such as olive oil and canola oil  · Maintain a healthy weight  Ask your healthcare provider how much you should weigh  Ask him to help you create a weight loss plan if you are overweight  · Exercise for 30 minutes  most days of the week  Ask your healthcare provider about the best exercise plan for you  © 2017 [employer] [employer] Information is for End User's use only and may not be sold, redistributed or otherwise used for commercial purposes   All illustrations and images included in CareNotes® are the copyrighted property of A D A M , Inc  or Wilfrid Bonilla  The above information is an  only  It is not intended as medical advice for individual conditions or treatments  Talk to your doctor, nurse or pharmacist before following any medical regimen to see if it is safe and effective for you

## 2019-07-02 NOTE — OCCUPATIONAL THERAPY NOTE
07/02/19 1130   Restrictions/Precautions   Weight Bearing Precautions Per Order No   Other Precautions O2;Fall Risk   Pain Assessment   Pain Assessment No/denies pain   ADL   Where Assessed Edge of bed  (patient plans to have her bedrm rearranged for simplicity)   Grooming Assistance 5  Supervision/Setup   UB Bathing Assistance 5  Supervision/Setup   UB Bathing Deficit Setup; Increased time to complete   LB Bathing Assistance 4  Minimal Assistance   LB Bathing Deficit Setup;Supervision/safety; Increased time to complete;Right lower leg including foot; Left lower leg including foot   UB Dressing Assistance 5  Supervision/Setup   UB Dressing Deficit Setup;Verbal cueing   UB Dressing Comments demonstrated ability to doff socks/UNable to don --- utilized sockaid as needed at home ( *also:  "i'll call Latonia Jefferson (son - lives with) if I need help")  LB Dressing Deficit   (patient reported SOB with bending forward at waist/Not overt)   LB Dressing Comments LH equipment would be required for safety / energy conservation   Bed Mobility   Supine to Sit 6  Modified independent   Additional items HOB elevated; Bedrails; Increased time required   Sit to Supine 6  Modified independent   Additional items HOB elevated; Bedrails; Increased time required;Verbal cues   Additional Comments patient remained sitting at edge of bed to eat lunch   Coordination   Gross Motor [facility]   Dexterity WFL   Cognition   Overall Cognitive Status [facility]   Arousal/Participation Alert; Cooperative   Activity Tolerance   Activity Tolerance Patient limited by fatigue  (SOB, cardiac issues impede)   Assessment   Assessment Patient participated in Skilled OT session this date with interventions consisting of ADL re training with the use of correct body mechnaics, Energy Conservation techniques, Work simplification skills  and  therapeutic activities to: increase activity tolerance  Patient agreeable to OT treatment session, upon arrival patient was found seated in bed  Patient requiring verbal cues for correct technique, verbal cues for pacing thru activity steps, frequent rest periods and ocassional safety reminders  Patient continues to be functioning below baseline level, occupational performance remains limited secondary to factors listed above and increased risk for falls and injury  From OT standpoint, recommendation at time of d/c would be STR vs Home with family support and HHA  Patient to benefit from continued Occupational Therapy treatment while in the hospital to address deficits as defined above and maximize level of functional independence with ADLs and functional mobility  Plan   Treatment Interventions ADL retraining;Functional transfer training;Patient/family training;Energy conservation; Activityengagement   Goal Expiration Date 07/06/19   Treatment Day 615 DON Graves/JANIYA

## 2019-07-02 NOTE — ASSESSMENT & PLAN NOTE
- at home on lopressor 25 mg q12 and lisinopril 10 mg qd   -IM and renal co-managing and recommend patient be d/c'd home on current inpt regimen of diltiazem (24 hr) 300 mg qd and lopressor 25 mg TID

## 2019-07-02 NOTE — ASSESSMENT & PLAN NOTE
- h/o stent in 2007  - home ASA 81 mg qd d/c'd by cards in acute care due [facility]  - per cards notes in acute care not on statin however per EMR was on zocor 20 mg qpm (on 3/15/19 LDL was 69) and patient did confirm she takes zocor 20 mg qpm however per pharmacy there is a drug interaction between diltiazem and zocor which can increase plasma concentrations of zocor therefore per pharmacy recommendation dc'd patient on therapeutic dose substitution dose of pravastatin 40 mg qpm instead, will defer to PCP and/or Dr rGacia Youssef if they wish to resume zocor and stop pravastatin as OP however for now patient will be d/c'd off zocor and on pravastatin instead   - at home was on lopressor 25 mg q12 PTA (increased to TID dosing per IM/renal and is to be dc'd on TID dosing per their recommendation)   - IM managing as inpt  - OP FU with Dr Gracia Youssef

## 2019-07-02 NOTE — ASSESSMENT & PLAN NOTE
· Perhaps related to hemodynamic instability related to atrial fibrillation and hypovolemia  · Appreciate Nephrology input  · Renal function back to normal, case reviewed with Nephrology, okay for discharge to rehab from their standpoint

## 2019-07-02 NOTE — ASSESSMENT & PLAN NOTE
No longer on telemetry but at least on exam today the rate feels well controlled  Again she has no interest in pacemaker therapy to help with rate control  On Eliquis lower dose

## 2019-07-03 PROCEDURE — 97530 THERAPEUTIC ACTIVITIES: CPT

## 2019-07-03 PROCEDURE — 97167 OT EVAL HIGH COMPLEX 60 MIN: CPT

## 2019-07-03 PROCEDURE — 97162 PT EVAL MOD COMPLEX 30 MIN: CPT

## 2019-07-03 PROCEDURE — 99232 SBSQ HOSP IP/OBS MODERATE 35: CPT | Performed by: PHYSICAL MEDICINE & REHABILITATION

## 2019-07-03 PROCEDURE — 97535 SELF CARE MNGMENT TRAINING: CPT

## 2019-07-03 PROCEDURE — 97110 THERAPEUTIC EXERCISES: CPT

## 2019-07-03 PROCEDURE — 97537 COMMUNITY/WORK REINTEGRATION: CPT

## 2019-07-03 PROCEDURE — 97116 GAIT TRAINING THERAPY: CPT

## 2019-07-03 RX ADMIN — APIXABAN 2.5 MG: 2.5 TABLET, FILM COATED ORAL at 17:45

## 2019-07-03 RX ADMIN — METOPROLOL TARTRATE 25 MG: 25 TABLET, FILM COATED ORAL at 20:59

## 2019-07-03 RX ADMIN — MONTELUKAST SODIUM 10 MG: 10 TABLET, COATED ORAL at 08:18

## 2019-07-03 RX ADMIN — METOPROLOL TARTRATE 25 MG: 25 TABLET, FILM COATED ORAL at 08:17

## 2019-07-03 RX ADMIN — PANTOPRAZOLE SODIUM 40 MG: 40 TABLET, DELAYED RELEASE ORAL at 05:13

## 2019-07-03 RX ADMIN — METOPROLOL TARTRATE 25 MG: 25 TABLET, FILM COATED ORAL at 16:20

## 2019-07-03 RX ADMIN — APIXABAN 2.5 MG: 2.5 TABLET, FILM COATED ORAL at 08:18

## 2019-07-03 RX ADMIN — DILTIAZEM HYDROCHLORIDE 300 MG: 180 CAPSULE, COATED, EXTENDED RELEASE ORAL at 08:18

## 2019-07-03 RX ADMIN — OXYBUTYNIN CHLORIDE 5 MG: 5 TABLET ORAL at 08:18

## 2019-07-03 RX ADMIN — SERTRALINE HYDROCHLORIDE 50 MG: 50 TABLET ORAL at 08:18

## 2019-07-03 NOTE — PROGRESS NOTES
OT eval and ADL note  Pt participates in 25 minutes concurrent treatment following eval focusing on functional reacher use and energy conservation with similar goals as another to increase independence with daily tasks  Pt is able to  pegs from floor with reacher requiring increased time and cues for pacing during tasks   BPM and O2 sats 92-94 throughout session  07/03/19 0830   Patient Data   Rehab Impairment Debility   Etiologic Diagnosis A-fib    Date of Onset 06/25/19   Support System   Name Pelon Wright   Relationship Son   Home Setup   Type of Home Multi Level   Method of Entry Ramp   First Floor Bathroom Combo; Full   First Floor Bathroom Accessibility Tub bench   First Floor Setup Available Yes  (currently on first floor)   Available Equipment Roller Walker;Tub Transfer Bench; Bedside Commode   Baseline Information   Transportation Family/friends drive   Prior Device(s) Used Roller Walker   Prior IADL Participation   Money Management Identify Money;Estimate Costs;Estimate Change;Combine Bills;Manage Checkbook   Meal Preparation Partial Participation   Laundry Full Participation   Home Cleaning Partial Participation   Prior Level of Function   Self-Care 3  Independent - Patient completed the activities by him/herself, with or without an assistive device, with no assistance from a helper  Functional Cognition 3  Independent - Patient completed the activities by him/herself, with or without an assistive device, with no assistance from a helper  Prior Device Used D   Walker   Restrictions/Precautions   Precautions Fall Risk;O2   Pain Assessment   Pain Assessment 0-10   Pain Score 5   Pain Location Back   Pain Orientation Upper   Pain Descriptors Sharp   QI: Eating   Assistance Needed Set-up / clean-up   Eating CARE Score 5   Eating Assessment   Opens Packages No   Eating (FIM) 5 - Patient needs help to open contianers or set up tray   QI: Oral Hygiene   Assistance Needed Set-up / clean-up   Oral Hygiene CARE Score 5   Grooming   Able To Initiate Tasks;Comb/Brush Hair;Wash/Dry Face;Brush/Clean Teeth;Wash/Dry Hands   Limitation Noted In Safety   Grooming (FIM) 5 - [place] sets up supplies or applies device   QI: Shower/Bathe Self   Assistance Needed Physical assistance   Assistance Provided by [place] 25%-49%   Shower/Bathe Self CARE Score 3   Bathing   Assessed Bath Style Shower   Anticipated D/C Bath Style Shower;Sponge Bath   Able to [place] No   Able to [employer] Temperature No   Able to Wash/Rinse/Dry (body part) Left Arm;Right Arm;L Upper Leg;R Upper Leg;Abdomen; Chest;Perineal Area   Limitations Noted in Balance; Endurance;ROM;Safety;Strength   Bathing (FIM) 3 - Patient completes 5/10  6/10 or 7/10 parts   QI: Upper Body Dressing   Assistance Needed Set-up / clean-up   Upper Body Dressing CARE Score 5   QI: Lower Body Dressing   Assistance Needed Physical assistance   Assistance Provided by Gilmer Less than 25%   Lower Body Dressing CARE Score 3   QI: Putting On/Taking Off Footwear   Assistance Needed Physical assistance   Assistance Provided by Gilmer Less than 25%   Putting On/Taking Off Footwear CARE Score 3   QI: Picking Up Object   Assistance Needed Physical assistance   Assistance Provided by Gilmer 75% or more   Picking Up Object CARE Score 2   Dressing/Undressing Clothing   Remove UB Clothes Pullover Shirt   Remove LB Clothes Undergarment;Socks   [address]; Undergarment;Socks   Limitations Noted In Balance; Endurance; Safety;ROM;Strength   UB Dressing (FIM) 5 - Gilmer sets up supplies or applies device   LB Dressing (FIM) 3 - Patient completes  50-74% of all tasks   QI: [address] Needed Physical assistance   Assistance Provided by Gilmer Less than 25%   [address] Score 3   Toileting   Able to [address] down yes, up yes     Manage Hygiene Bladder   Limitations Noted In Balance;ROM;Safety;LE Strength   Toileting (FIM) 4 - Patient completes 75% of all tasks  (Min A to reposition pants to avoid struggling)   Transfer Bed/Chair/Wheelchair   Limitations Noted In Balance; Endurance;LE Strength   Stand Pivot Minimal Assist   Sit to Stand Minimal   Stand to Sit Minimal   Bed, Chair, Wheelchair Transfer (FIM) 4 - Patient completes 75% of all tasks   QI: Toilet Transfer   Assistance Needed Physical assistance   Assistance Provided by [place] Less than 25%   Toilet Transfer CARE Score 3   Toilet Transfer   Surface Assessed Raised Toilet   Transfer Technique Stand Pivot   Limitations Noted In Balance;ROM;Safety;LE Strength   Toilet Transfer (FIM) 4 - Patient completes 75% of all tasks   Tub/Shower Transfer   Limitations Noted In Balance; Endurance;ROM;Safety;LE Strength   Adaptive Equipment Transfer Bench   Shower Transfer (FIM) 3 - Patient completes 50 - 74% of all tasks   RUE Assessment   RUE Assessment WFL  (MMT grossly 4-/5 sh-hand)   LUE Assessment   LUE Assessment WFL  (MMT grossly 4-/5 sh to hand)   Coordination   Movements are Fluid and Coordinated 1   Sensation   Light Touch No apparent deficits   Propioception No apparent deficits   Cognition   Orientation Level Oriented X4   Discharge Information   Vocational Plan Retired/not working   Patient's Rehab Expectations "Start walking again "   Impressions Pt presents following hospitalization due to a-fib with generalized weakness and debility  Pt requires assist due to decreased ROM/ strength, balance, safety and endurance  Pt will benefit from skilled OT services to increase independence with daily tasks     OT Therapy Minutes   OT Time In 0830   OT Time Out 1000   OT Total Time (minutes) 90   OT Mode of treatment - Individual (minutes) 65   OT Mode of treatment - Concurrent (minutes) 25

## 2019-07-03 NOTE — PCC CARE MANAGEMENT
Pt reported that she resides in a 1 story home with her son, who is home 24/7 (her son also has medical issues)  Pt reported that her 2 daughters reside in close proximity & are very helpful, as well as her granddaughter; she reported that she has a strong support system  She has a ramp at her home & DME including RW & cane  SW will continue to monitor & assist as needed with [address]  7/9/19 - Tx team rec reviewed with Pt & son & daughter  DC being planned for Friday, 7/12 at 1 PM, with Pt being transported home by car with family, which Tx team has determined to be a safe mode of transport  Pt has constant supervision in the home; Pt's son resides with her & is not working  [facility] to be arranged for PT & OT  SW will assist as needed with Tx & DC planning

## 2019-07-03 NOTE — CASE MANAGEMENT
Initial assessment & orientation to [facility] with Pt; message left for Pt's daughter, Jody Marks  Pt reported that she resides in a 1 story home with her son, who is home 24/7 (her son also has medical issues)  Pt reported that her 2 daughters reside in close proximity & are very helpful, as well as her granddaughter; she reported that she has a strong support system  She has a ramp at her home & DME including RW & cane  SW will continue to monitor & assist as needed with [address]  IMM reviewed, signed & submitted for scanning

## 2019-07-03 NOTE — PROGRESS NOTES
Physical Medicine and Rehabilitation Progress Note  Marc Delgadillo 81 y o  female MRN: [id number]  Unit/Bed#: -01 Encounter: [id number]    HPI: Marc Delgadillo is a 81 y o  female who presented to the [facility] with an irregular heart beat and was found to have A-fib for which she was seen by cardiology and offered a pacer but refused and was medically managed  Course was complicated by acute on chronic RF and hyponatremia for which renal service managed the patient         Chief Complaint: debility     Interval/subjective: patient without complaint currently     ROS: A 10 point ROS was performed; negative except as noted above       Assessment/Plan:      Acute on chronic kidney failure (HCC)  Assessment & Plan  - per renal baseline Cr 0 7-0 8  - Cr currently improved to 1 19  -renal managing     * HTN (hypertension)  Assessment & Plan  - at home on lopressor 25 mg q12 and lisinopril 10 mg qd   -IM and renal co-managing     OAB (overactive bladder)  Assessment & Plan  - on home ditropan 5 mg qd     GERD (gastroesophageal reflux disease)  Assessment & Plan  - at home on prilosec 20 mg qd (therapeutic substitution as inpt)     Depression with anxiety  Assessment & Plan  - on home zoloft 50 mg qd  - also on home xanax 0 25 mg prn  - per PAPDMP last filled prescription for xanax 0 25 mg tabs on 4/26/19 for 270 tabs with 0 refills (appears to be a regular prescription every 3 months provided by PCP)     Hyponatremia  Assessment & Plan  - currently stable at 130  -renal managing     Diastolic heart failure (HCC)  Assessment & Plan  - was on lasix 20 mg BID PTA  - volume status currently stable w/o diuretics   -IM managing as inpt   - OP FU with Dr Stefany Escobar          A-fib [facility])  Assessment & Plan  - seen by cards in acute care and offered pacer however patient refused  - renal fxn improved so patient could be on eliquis 5 mg BID however in acute care cards elected to keep patient on eliquis 2 5 mg BID due to possible fall risk (was not on any AC PTA)  - at home on lopressor 25 mg q12   - IM managing rate control agents   - OP FU with Dr Mayra Gaytan       Mitral regurgitation  Assessment & Plan  - offered surgical intervention by cards however patient refused  - OP FU with Dr Mayra Gaytan     CAD (coronary artery disease)  Assessment & Plan  - h/o stent in 2007  - home ASA 81 mg qd d/c'd by cards in acute care due to patient being started on [facility]  - per cards notes in acute care not on statin however per EMR was on zocor 20 mg qpm (on 3/15/19 LDL was 69); will clarify   - at home was on lopressor 25 mg q12 PTA   - IM managing as inpt  - OP FU with Dr Mayra Gaytan          Scheduled Meds:  Current Facility-Administered Medications:  acetaminophen 650 mg Oral Q6H PRN Woo Valentine MD   ALPRAZolam 0 25 mg Oral TID PRN Woo Valentine MD   apixaban 2 5 mg Oral BID Woo Valentine MD   diltiazem 300 mg Oral Daily Woo Valentine MD   metoprolol tartrate 25 mg Oral TID Woo Valentine MD   montelukast 10 mg Oral Daily Woo Valentine MD   oxybutynin 5 mg Oral Daily Woo Valentine MD   pantoprazole 40 mg Oral Early Morning Woo Valentine MD   polyethylene glycol 17 g Oral Daily PRN Woo Valentine MD   sertraline 50 mg Oral Daily Woo Valentine MD        Incidental findings:  1) elevated peak PA pressure: OP FU with Dr Mayra Gaytan  2) 1 3 cm rt adrenal nodule: per radiology report recommend repeat non-contrast CT or MRI in 12 months, will defer to PCP to arrange  3) fluid collection above bladder: noted on US and FU CT was recommended which did not show a fluid collection above the bladder      DVT ppx: eliquis             Objective:    Functional Update:  Mobility: CG  Transfers: CG  ADLs: min         Physical Exam:            General: alert, no apparent distress, cooperative and comfortable  HEENT:  Head: Normal, normocephalic, atraumatic    CARDIAC:  +S1/2  LUNGS:  respirations unlabored   ABDOMEN:  soft NT   EXTREMITIES:  volume status currently stable   NEURO:   mental status, speech normal, alert and oriented x3  PSYCH:  mood/affect currently stable         Laboratory:   Results from last 7 days   Lab Units 07/02/19  0440 07/01/19  0458 06/30/19  0437   HEMOGLOBIN g/dL 12 4 12 0 11 3*   HEMATOCRIT % 36 8* 35 8* 33 0*   WBC Thousand/uL 8 80 9 00 10 30     Results from last 7 days   Lab Units 07/02/19  0440 07/01/19 0458 06/30/19  0437   BUN mg/dL 31* 41* 44*   SODIUM mmol/L 130* 129* 126*   POTASSIUM mmol/L 5 2 4 9 4 5   CHLORIDE mmol/L 95* 96* 94*   CREATININE mg/dL 1 19 1 53* 2 08*            Patient Active Problem List   Diagnosis    CAD (coronary artery disease)    Mitral regurgitation    A-fib (HCC)    Acute on chronic kidney failure (HCC)    Diastolic heart failure (HCC)    HTN (hypertension)    Hyponatremia    Depression with anxiety    GERD (gastroesophageal reflux disease)    OAB (overactive bladder)

## 2019-07-03 NOTE — NURSING NOTE
Pt c/o anxiety in the beginning of the shift and requested prn xanax  Xanax was effective for relief of anxiety  Up to BR with walker and min assist  Continent of bowel and bladder overnight

## 2019-07-03 NOTE — PROGRESS NOTES
07/03/19 1034   Patient Data   Rehab Impairment Debility   Etiologic Diagnosis a-fib   Support System   Name Alcides Gonsalves and Elías Gleason   Relationship son and dtr   Able to provide 24 hour supervision Yes   Able to provide physical help? Yes   Home Setup   Type of Home Multi Level   Method of Entry Ramp   First Floor Setup Available Yes   Available Equipment Rollator;Single Point Niko Rasmussen  (reports brakes don't work on rollator)   Prior Level of Function   Indoor-Mobility (Ambulation) 3  Independent - Patient completed the activities by him/herself, with or without an assistive device, with no assistance from a helper  Stairs 9  Not Applicable   Prior Device Used D   Walker   Restrictions/Precautions   Precautions Fall Risk   Pain Assessment   Pain Assessment No/denies pain   QI: Roll Left and Right   Assistance Needed Supervision   Roll Left and Right CARE Score 4   QI: Sit to Lying   Assistance Needed Supervision   Sit to Lying CARE Score 4   QI: Lying to Sitting on Side of Bed   Assistance Needed Supervision   Lying to Sitting on Side of Bed CARE Score 4   QI: Sit to Stand   Assistance Needed Incidental touching   Comment cg   Sit to Stand CARE Score 4   QI: Chair/Bed-to-Chair Transfer   Assistance Needed Incidental touching   Comment cg   Chair/Bed-to-Chair Transfer CARE Score 4   QI: Car Transfer   Reason if not Attempted Safety concerns   Car Transfer CARE Score 88   Transfer Bed/Chair/Wheelchair   Limitations Noted In Balance;LE Strength   Adaptive Equipment Roller Walker   Stand Pivot Contact Guard   Sit to Stand   (cg)   Stand to Sit   (cg)   Supine to Sit Supervision   Sit to Supine Supervision   Bed, Chair, Wheelchair Transfer (FIM) 4 - Patient requires steadying assist or light touching   QI: Walk 10 Feet   Assistance Needed Incidental touching   Comment cg   Walk 10 Feet CARE Score 4   QI: Walk 50 Feet with Two Turns   Assistance Needed Incidental touching   Comment cg   Walk 50 Feet with Two Turns CARE Score 4 QI: Walk 150 Feet   Reason if not Attempted Safety concerns   Walk 150 Feet CARE Score 88   QI: Walking 10 Feet on Uneven Surfaces   Assistance Needed Incidental touching   Comment cg   Walking 10 Feet on Uneven Surfaces CARE Score 4   Ambulation   Does the patient walk? 1  No, and walking goal is clinically indicated  Primary Discharge Mode of Locomotion Walk   Walk Assist Level Contact Guard   Gait Pattern Inconsistant Tatiana   Assist Device Roller Walker   Distance Walked (feet) 92 ft  (92' 87')   Limitations Noted In Balance; Safety;Strength   Findings level and carpet   Walking (FIM) 2 - Patient ambulates between 50 - 149 feet regardless of assist/device/set up   QI: Wheel 50 Feet with Two Turns   Reason if not Attempted Activity not applicable   Wheel 50 Feet with Two Turns CARE Score 9   QI: Wheel 150 Feet   Reason if not Attempted Activity not applicable   Wheel 311 Feet CARE Score 9   Wheelchair mobility   QI: Does the patient use a wheelchair? 0  No   Wheelchair (FIM) 0 - Activity does not occur   QI: 1 Step (Curb)   Assistance Needed Incidental touching   Comment cg   1 Step (Curb) CARE Score 4   QI: 4 Steps   Reason if not Attempted Safety concerns   4 Steps CARE Score 88   QI: 12 Steps   Reason if not Attempted Safety concerns   12 Steps CARE Score 88   Stairs   Type Stairs; Ramp   # of Steps   (3-4")   Weight Bearing Precautions WBAT   Assist Devices Bilateral Rail;Roller Walker   Findings cg steps; cg ramp with walker   Stairs (FIM) 1 - Patient goes up and down less than 4 stairs regardless of assist/device/set up   Comprehension   Comprehension (FIM) 6 - Understands complex/abstract but requires  glasses for visual comp   Expression   Expression (FIM) 6 - Expresses complex/abstract but requires:  more time   Social Interaction   Social Interaction (FIM) 6 - Interacts appropriately with others BUT requires medication for control   Problem Solving   Problem solving (FIM) 5 - Solves basic problems 90% of time   Memory   Memory (FIM) 6 - Recognizes with extra time   RLE Assessment   RLE Assessment X  (ROM WFL)   Strength RLE   R Hip Flexion 4/5   R Hip Extension 4/5   R Hip ABduction 4/5   R Hip ADduction 4/5   R Knee Flexion 4/5   R Knee Extension 4-/5   R Ankle Dorsiflexion 3+/5   R Ankle Plantar Flexion 3+/5   LLE Assessment   LLE Assessment X  (ROM WFL)   Strength LLE   L Hip Flexion 4/5   L Hip Extension 4/5   L Hip ABduction 3+/5   L Hip ADduction 3+/5   L Knee Flexion 4/5   L Knee Extension 4/5   L Ankle Dorsiflexion 3+/5   L Ankle Plantar Flexion 3+/5   Coordination   Movements are Fluid and Coordinated 1   Sensation   Light Touch Partial deficits in the RLE;Partial deficits in the LLE   Propioception No apparent deficits   Additional Comments complains of numbness bilateral feet   Cognition   Overall Cognitive Status WFL   Arousal/Participation Alert; Responsive; Cooperative   Attention Attends with cues to redirect   Orientation Level Oriented X4   Memory Within functional limits   Following Commands Follows one step commands without difficulty   Discharge Information   Patient's Discharge Plan home with family   Patient's Rehab Expectations "Walk and get stronger "   Impressions Patient seen for IE   PResents with decreased ROM/strength, decreased balance and safety; all affecting functional mobility  Would benefit from continued inpatient [facility] PT to increase function, safeaty and increased independence in prep for safe d/c to home     PT Therapy Minutes   PT Time In 8459   PT Time Out 1204   PT Total Time (minutes) 90   PT Mode of treatment - Individual (minutes) 60   PT Mode of treatment - Concurrent (minutes) 30   PT Mode of treatment - Group (minutes) 0   PT Mode of treatment - Co-treat (minutes) 0   PT Mode of Teatment - Total time(minutes) 90 minutes

## 2019-07-03 NOTE — PROGRESS NOTES
07/03/19 0900   Charting Type   Charting Type Shift assessment   Neurological   Neuro (WDL) X   Muscle Function/Sensation Assessment    R Hand  Moderate   L Hand  Moderate   HEENT   HEENT (WDL) X   R Eye Mildly impaired vision   L Eye Mildly impaired vision   Vision - Corrective Lenses (at bedside) Glasses   R Ear Mildly impaired hearing   Teeth Missing teeth   Respiratory   Respiratory (WDL) X   Respiratory Pattern Dyspnea with exertion   Bilateral Breath Sounds Clear;Diminished   R Breath Sounds Clear;Diminished   L Breath Sounds Clear;Diminished   Cardiac   Cardiac (WDL) X   Cardiac Regularity Irregular   Peripheral Vascular   Peripheral Vascular (WDL) X   Edema Right lower extremity; Left lower extremity   RLE Edema Trace   LLE Edema Trace   Integumentary   Integumentary (WDL) X   Skin Condition/Temp Warm;Dry   Skin Integrity Bruising   Skin Location scattered   Skin Turgor Epidermis thin with loss of subcutaneous tissue   Juan Scale   Sensory Perceptions 4   Moisture 4   Activity 3   Mobility 3   Nutrition 3   Friction and Shear 2   Juan Scale Score 19   Musculoskeletal   Musculoskeletal (WDL) X   Level of Assistance Minimal assist, patient does 75% or more   Assistive Device Front wheel walker   RLE Limited movement   LLE Limited movement   Gastrointestinal   Gastrointestinal (WDL) WDL   Stool Assessment   Bowel Incontinence No   Genitourinary   Genitourinary (WDL) WDL   Urine Assessment   Urinary Incontinence No   Genitalia   Female Genitalia Intact   Genitourinary Additional Assessments   Genitourinary Additional Assessments No   Anal/Rectal   Anal/Rectal (WDL) WDL   Psychosocial   Psychosocial (WDL) WDL

## 2019-07-03 NOTE — TREATMENT PLAN
Individualized Plan of [facility]  Suzanne Kohler 81 y o  female MRN: [id number]  Unit/Bed#: -01 Encounter: [id number]     PATIENT INFORMATION  ADMISSION DATE: 7/2/2019  4:09 PM SHERRIE CATEGORY: debility    ADMISSION DIAGNOSIS: Cardiac abnormality [Q24 9]  EXPECTED LOS: 1-2 wks      MEDICAL/FUNCTIONAL PROGNOSIS  Based on my assessment of the patient's medical conditions and current functional status, the prognosis for attaining medical and functional goals or the IRF stay is:  Fair     Medical Goals: heart rate control     ANTICIPATED DISCHARGE DISPOSITION AND SERVICES  COMMUNITY SETTING: home     ANTICIPATED FOLLOW-UP SERVICE:   Outpatient Therapy Services: PT/OT     DISCIPLINE SPECIFIC PLANS:  Required Disciplines & Services: PT/OT     REQUIRED THERAPY:  Therapy Hours per Day Days per Week Total Days   Physical Therapy 1 5 5 10   Occupational Therapy 1 5 5 10   Speech/Language Therapy 0 0 0   NOTE: Additional therapy time(s) may be added as appropriate to meet patient needs and to achieve functional goals          ANTICIPATED FUNCTIONAL OUTCOMES:  ADL: Patient will be independent with ADLs with least restrictive device upon completion of rehab program   Bladder/Bowel: Patient will return to premorbid level for bladder/bowel management upon completion of rehab program   Transfers: Patient will be independent with transfers with least restrictive device upon completion of rehab program   Locomotion: Patient will be independent with locomotion with least restrictive device upon completion of rehab program   Cognitive: Patient will return to premorbid level of cognitive activity upon completion of rehab program     DISCHARGE PLANNING NEEDS  Equipment needs: TBD       REHAB ANTICIPATED PARTICIPATION RESTRICTIONS:  None

## 2019-07-03 NOTE — PLAN OF CARE
Problem: Potential for Falls  Goal: Patient will remain free of falls  Description  INTERVENTIONS:  - Assess patient frequently for physical needs  -  Identify cognitive and physical deficits and behaviors that affect risk of falls    -  [place] fall precautions as indicated by assessment   - Educate patient/family on patient safety including physical limitations  - Instruct patient to call for assistance with activity based on assessment  - Modify environment to reduce risk of injury  - Consider OT/PT consult to assist with strengthening/mobility  Outcome: Progressing     Problem: PAIN - ADULT  Goal: Verbalizes/displays adequate comfort level or baseline comfort level  Description  Interventions:  - Encourage patient to monitor pain and request assistance  - Assess pain using appropriate pain scale  - Administer analgesics based on type and severity of pain and evaluate response  - Implement non-pharmacological measures as appropriate and evaluate response  - Consider cultural and social influences on pain and pain management  - Notify physician/advanced practitioner if interventions unsuccessful or patient reports new pain  Outcome: Progressing     Problem: INFECTION - ADULT  Goal: Absence or prevention of progression during hospitalization  Description  INTERVENTIONS:  - Assess and monitor for signs and symptoms of infection  - Monitor lab/diagnostic results  - Monitor all insertion sites, i e  indwelling lines, tubes, and drains  - Monitor endotracheal (as able) and nasal secretions for changes in amount and color  - [place] appropriate cooling/warming therapies per order  - Administer medications as ordered  - Instruct and encourage patient and family to use good hand hygiene technique  - Identify and instruct in appropriate isolation precautions for identified infection/condition  Outcome: Progressing     Problem: SAFETY ADULT  Goal: Maintain or return to baseline ADL function  Description  INTERVENTIONS:  -  Assess patient's ability to carry out ADLs; assess patient's baseline for ADL function and identify physical deficits which impact ability to perform ADLs (bathing, care of mouth/teeth, toileting, grooming, dressing, etc )  - Assess/evaluate cause of self-care deficits   - Assess range of motion  - Assess patient's mobility; develop plan if impaired  - Assess patient's need for assistive devices and provide as appropriate  - Encourage maximum independence but intervene and supervise when necessary  ¯ Involve family in performance of ADLs  ¯ Assess for home care needs following discharge   ¯ Request OT consult to assist with ADL evaluation and planning for discharge  ¯ Provide patient education as appropriate  Outcome: Progressing  Goal: Maintain or return mobility status to optimal level  Description  INTERVENTIONS:  - Assess patient's baseline mobility status (ambulation, transfers, stairs, etc )    - Identify cognitive and physical deficits and behaviors that affect mobility  - Identify mobility aids required to assist with transfers and/or ambulation (gait belt, sit-to-stand, lift, walker, cane, etc )  - [place] fall precautions as indicated by assessment  - Record patient progress and toleration of activity level on Mobility SBAR; progress patient to next Phase/Stage  - Instruct patient to call for assistance with activity based on assessment  - Request Rehabilitation consult to assist with strengthening/weightbearing, etc   Outcome: Progressing     Problem: DISCHARGE PLANNING  Goal: Discharge to home or other facility with appropriate resources  Description  INTERVENTIONS:  - Identify barriers to discharge w/patient and caregiver  - Arrange for needed discharge resources and transportation as appropriate  - Identify discharge learning needs (meds, wound care, etc )  - Arrange for interpretive services to assist at discharge as needed  - Refer to Case Management Department for coordinating discharge planning if the patient needs post-hospital services based on physician/advanced practitioner order or complex needs related to functional status, cognitive ability, or social support system  Outcome: Progressing     Problem: CARDIOVASCULAR - ADULT  Goal: Maintains optimal cardiac output and hemodynamic stability  Description  INTERVENTIONS:  - Monitor I/O, vital signs and rhythm  - Monitor for S/S and trends of decreased cardiac output i e  bleeding, hypotension  - Administer and titrate ordered vasoactive medications to optimize hemodynamic stability  - Assess quality of pulses, skin color and temperature  - Assess for signs of decreased coronary artery perfusion - ex   Angina  - Instruct patient to report change in severity of symptoms  Outcome: Progressing  Goal: Absence of cardiac dysrhythmias or at baseline rhythm  Description  INTERVENTIONS:  - monitor vital signs, obtain 12 lead EKG if indicated  - Administer antiarrhythmic and heart rate control medications as ordered  - Monitor electrolytes and administer replacement therapy as ordered   Outcome: Progressing     Problem: RESPIRATORY - ADULT  Goal: Achieves optimal ventilation and oxygenation  Description  INTERVENTIONS:  - Assess for changes in respiratory status  - Assess for changes in mentation and behavior  - Position to facilitate oxygenation and minimize respiratory effort  - Oxygen administration by appropriate delivery method based on oxygen saturation (per order) or ABGs  - Initiate smoking cessation education as indicated  - Encourage broncho-pulmonary hygiene including cough, deep breathe, Incentive Spirometry  - Assess the need for suctioning and aspirate as needed  - Assess and instruct to report SOB or any respiratory difficulty  - Respiratory Therapy support as indicated  Outcome: Progressing     Problem: GASTROINTESTINAL - ADULT  Goal: Maintains adequate nutritional intake  Description  INTERVENTIONS:  - Monitor percentage of each meal consumed  - Identify factors contributing to decreased intake, treat as appropriate  - Assist with meals as needed  - Monitor I&O, WT and lab values  - Obtain nutrition services referral as needed  Outcome: Progressing     Problem: METABOLIC, FLUID AND ELECTROLYTES - ADULT  Goal: Electrolytes maintained within normal limits  Description  INTERVENTIONS:  - Monitor labs and assess patient for signs and symptoms of electrolyte imbalances  - Administer electrolyte replacement as ordered  - Monitor response to electrolyte replacements, including repeat lab results as appropriate  - Instruct patient on fluid and nutrition as appropriate  Outcome: Progressing  Goal: Fluid balance maintained  Description  INTERVENTIONS:  - Monitor labs and assess for signs and symptoms of volume excess or deficit  - Monitor I/O and WT  - Instruct patient on fluid and nutrition as appropriate  Outcome: Progressing     Problem: Nutrition/Hydration-ADULT  Goal: Nutrient/Hydration intake appropriate for improving, restoring or maintaining nutritional needs  Description  Monitor and assess patient's nutrition/hydration status for malnutrition (ex- brittle hair, bruises, dry skin, pale skin and conjunctiva, muscle wasting, smooth red tongue, and disorientation)  Collaborate with interdisciplinary team and initiate plan and interventions as ordered  Monitor patient's weight and dietary intake as ordered or per policy  Utilize nutrition screening tool and intervene per policy  Determine patient's food preferences and provide high-protein, high-caloric foods as appropriate       INTERVENTIONS:  - Monitor oral intake, urinary output, labs, and treatment plans  - Assess nutrition and hydration status and recommend course of action  - Evaluate amount of meals eaten  - Assist patient with eating if necessary   - Allow adequate time for meals  - Recommend/ encourage appropriate diets, oral nutritional supplements, and vitamin/mineral supplements  - Order, calculate, and assess calorie counts as needed  - Recommend, monitor, and adjust tube feedings and TPN/PPN based on assessed needs  - Assess need for intravenous fluids  - Provide specific nutrition/hydration education as appropriate  - Include patient/family/caregiver in decisions related to nutrition  Outcome: Progressing

## 2019-07-04 ENCOUNTER — APPOINTMENT (INPATIENT)
Dept: RADIOLOGY | Facility: HOSPITAL | Age: 84
DRG: 947 | End: 2019-07-04
Payer: MEDICARE

## 2019-07-04 LAB
ANION GAP SERPL CALCULATED.3IONS-SCNC: 6 MMOL/L (ref 4–13)
BASOPHILS # BLD AUTO: 0 THOUSANDS/ΜL (ref 0–0.1)
BASOPHILS NFR BLD AUTO: 0 % (ref 0–2)
BUN SERPL-MCNC: 21 MG/DL (ref 7–25)
CALCIUM SERPL-MCNC: 9.6 MG/DL (ref 8.6–10.5)
CHLORIDE SERPL-SCNC: 92 MMOL/L (ref 98–107)
CO2 SERPL-SCNC: 29 MMOL/L (ref 21–31)
CREAT SERPL-MCNC: 0.82 MG/DL (ref 0.6–1.2)
EOSINOPHIL # BLD AUTO: 0.1 THOUSAND/ΜL (ref 0–0.61)
EOSINOPHIL NFR BLD AUTO: 1 % (ref 0–5)
ERYTHROCYTE [DISTWIDTH] IN BLOOD BY AUTOMATED COUNT: 15.1 % (ref 11.5–14.5)
GFR SERPL CREATININE-BSD FRML MDRD: 66 ML/MIN/1.73SQ M
GLUCOSE SERPL-MCNC: 138 MG/DL (ref 65–99)
HCT VFR BLD AUTO: 36.2 % (ref 42–47)
HGB BLD-MCNC: 12.1 G/DL (ref 12–16)
LYMPHOCYTES # BLD AUTO: 0.6 THOUSANDS/ΜL (ref 0.6–4.47)
LYMPHOCYTES NFR BLD AUTO: 6 % (ref 21–51)
MCH RBC QN AUTO: 30 PG (ref 26–34)
MCHC RBC AUTO-ENTMCNC: 33.4 G/DL (ref 31–37)
MCV RBC AUTO: 90 FL (ref 81–99)
MONOCYTES # BLD AUTO: 0.8 THOUSAND/ΜL (ref 0.17–1.22)
MONOCYTES NFR BLD AUTO: 8 % (ref 2–12)
NEUTROPHILS # BLD AUTO: 8.4 THOUSANDS/ΜL (ref 1.4–6.5)
NEUTS SEG NFR BLD AUTO: 85 % (ref 42–75)
PLATELET # BLD AUTO: 230 THOUSANDS/UL (ref 149–390)
PMV BLD AUTO: 7.6 FL (ref 8.6–11.7)
POTASSIUM SERPL-SCNC: 4.3 MMOL/L (ref 3.5–5.5)
RBC # BLD AUTO: 4.03 MILLION/UL (ref 3.9–5.2)
SODIUM SERPL-SCNC: 127 MMOL/L (ref 134–143)
WBC # BLD AUTO: 9.9 THOUSAND/UL (ref 4.8–10.8)

## 2019-07-04 PROCEDURE — 97530 THERAPEUTIC ACTIVITIES: CPT

## 2019-07-04 PROCEDURE — 97537 COMMUNITY/WORK REINTEGRATION: CPT

## 2019-07-04 PROCEDURE — 97110 THERAPEUTIC EXERCISES: CPT

## 2019-07-04 PROCEDURE — 80048 BASIC METABOLIC PNL TOTAL CA: CPT | Performed by: INTERNAL MEDICINE

## 2019-07-04 PROCEDURE — 97535 SELF CARE MNGMENT TRAINING: CPT

## 2019-07-04 PROCEDURE — 85025 COMPLETE CBC W/AUTO DIFF WBC: CPT | Performed by: INTERNAL MEDICINE

## 2019-07-04 PROCEDURE — 71046 X-RAY EXAM CHEST 2 VIEWS: CPT

## 2019-07-04 PROCEDURE — 97116 GAIT TRAINING THERAPY: CPT

## 2019-07-04 RX ADMIN — DILTIAZEM HYDROCHLORIDE 300 MG: 180 CAPSULE, COATED, EXTENDED RELEASE ORAL at 08:14

## 2019-07-04 RX ADMIN — OXYBUTYNIN CHLORIDE 5 MG: 5 TABLET ORAL at 08:14

## 2019-07-04 RX ADMIN — APIXABAN 2.5 MG: 2.5 TABLET, FILM COATED ORAL at 08:14

## 2019-07-04 RX ADMIN — METOPROLOL TARTRATE 25 MG: 25 TABLET, FILM COATED ORAL at 20:17

## 2019-07-04 RX ADMIN — ACETAMINOPHEN 650 MG: 325 TABLET ORAL at 08:14

## 2019-07-04 RX ADMIN — APIXABAN 2.5 MG: 2.5 TABLET, FILM COATED ORAL at 17:00

## 2019-07-04 RX ADMIN — PANTOPRAZOLE SODIUM 40 MG: 40 TABLET, DELAYED RELEASE ORAL at 05:29

## 2019-07-04 RX ADMIN — SERTRALINE HYDROCHLORIDE 50 MG: 50 TABLET ORAL at 08:14

## 2019-07-04 RX ADMIN — MONTELUKAST SODIUM 10 MG: 10 TABLET, COATED ORAL at 08:14

## 2019-07-04 RX ADMIN — METOPROLOL TARTRATE 25 MG: 25 TABLET, FILM COATED ORAL at 16:47

## 2019-07-04 RX ADMIN — METOPROLOL TARTRATE 25 MG: 25 TABLET, FILM COATED ORAL at 08:14

## 2019-07-04 NOTE — NURSING NOTE
Patient ambulates with contact guard assist times one with walker  Able to perform bathroom hygiene on her own  Remains continant of bowel and bladder  Reports no complaints of pain  Compliant with SCDs  Will continue to monitor

## 2019-07-04 NOTE — PROGRESS NOTES
O2 sats 94% and  BPM      07/04/19 0827   Pain Assessment   Pain Assessment 0-10   Pain Score 7   Pain Type Chronic pain   Pain Location Back;Leg   Pain Orientation Lower; Left   Restrictions/Precautions   Precautions Cardiac/sternal;Fall Risk;O2  (O2prn and monitor sats and HR)   Grooming   Able To Initiate Tasks;Comb/Brush Hair;Wash/Dry Face;Brush/Clean Teeth;Wash/Dry Hands   Limitation Noted In Safety   Grooming (FIM) 5 - [place] sets up supplies or applies device   Transfer Bed/Chair/Wheelchair   Limitations Noted In Balance; Endurance;Pain Management;LE Strength   Stand Pivot Contact Guard   Bed, Chair, Wheelchair Transfer (FIM) 4 - Patient requires steadying assist or light touching   Toileting   Able to Pull Clothing down yes, up yes  Manage Hygiene Bladder   Limitations Noted In Balance; Safety;LE Strength   Toileting (FIM) 5 - Patient requires supervision/monitoring   Toilet Transfer   Surface Assessed Raised Toilet   Transfer Technique Stand Pivot   Limitations Noted In Balance; Endurance; Safety;LE Strength   Toilet Transfer (FIM) 5 - Patient requires supervision/monitoring   Functional Standing Tolerance   Time 2 min 11 secs and 1 min 40 secs with S for card matching while standing at table BUE use   Comments pt requires a rest in between trials secondary to decreased endurance and LLE strength   Exercise Tools   Exercise Tools Yes   Other Exercise Tool 1 Sanderbox 2 sets 15 all planes with 2# wt BUE   Cognition   Overall Cognitive Status WFL   Orientation Level Oriented X4   Additional Activities   Additional Activities Other (Comment)  (fxl mobility in room with RW CGA)   Assessment   Treatment Assessment Pt participates in toileting, grooming, transfers and mobility prior to transition to gym for standing and UB therex  Pt tolerates treatment with no increased pain however does require rest breaks due to decreased balance, safety, endurance and strength LLE   Pt will benefit from continued skilled OT services to increase independence with daily tasks  Problem List Decreased strength;Decreased range of motion;Decreased endurance; Impaired balance;Decreased safety awareness   Plan   Treatment/Interventions ADL retraining;Functional transfer training; Therapeutic exercise; Endurance training;Patient/family training; Compensatory technique education   Progress Improving as expected   OT Therapy Minutes   OT Time In 0827   OT Time Out 0929   OT Total Time (minutes) 62   OT Mode of treatment - Individual (minutes) 62   Therapy Time missed   Time missed?  No

## 2019-07-04 NOTE — CONSULTS
Consultation - Nephrology   Parish Dowell 81 y o  female MRN: [id number]  Unit/Bed#: -01 Encounter: [id number]      A/P:  1  Acute kidney injury: resolving to her baseline  2  CKD 2 baseline creatinine 0 8-0 9  Recheck labs  3  Atrial fibrillation: with rapid ventricular rates  Not being monitored  Patient does not want aggressive treatment  4  Diastolic CHF: she has orthopnea  Will check a CXR         Thank you for allowing us to participate in the care of your patient  Please feel free to contact us regarding the care of this patient, or any other questions/concerns that may be applicable  Patient Active Problem List   Diagnosis    CAD (coronary artery disease)    Mitral regurgitation    A-fib (HCC)    Acute on chronic kidney failure (HCC)    Diastolic heart failure (HCC)    HTN (hypertension)    Hyponatremia    Depression with anxiety    GERD (gastroesophageal reflux disease)    OAB (overactive bladder)       History of Present Illness   Physician Requesting Consult: Mile Wilson MD  Reason for Consult / Principal Problem: chronic kidney disease  Hx and PE limited by:   HPI: Parish Dowell is a 81y o  year old female who presents with follow up from inpatient admission  She was admitted with fluid overload and severe mitral regurgitation  She declined MVR and has varying atrial fibrillation episodes  She is transferred to rehab for deconditioning  She has  A history of CKD 2 with a b aseline creatinine of 0 8  She has chronic tubulointerstitial disease  Her serum sodium was low in the acute care setting but this resolved with diuretic therapy and fluid restriction  History obtained from chart review and the patient    Review of Systems - Negative except as mentioned above in HPI, more specifics as mentioned below    Review of Systems - General ROS: negative No headaches, dizziness chest pain, BRIZUELA, has orthopnea no  abdomianl apin NVD or dysuria has LLE edema    Historical Information   Past Medical History:   Diagnosis Date    Acute on chronic diastolic congestive heart failure ([facility] [address] ) 6/27/2019    Arthritis     Coronary artery disease     history of stenting    Eczema     years    History of echocardiogram 07/20/2017    EF 55%, mild concentric LVH, bileaflet MVP with moderate MR, left atrial enlargement   History of transfusion     Hyperlipidemia     Hypertension     Mitral regurgitation      Past Surgical History:   Procedure Laterality Date    ABDOMINAL SURGERY      hysterectomy    CARDIAC CATHETERIZATION  04/10/2012    EF 65%, no significant CAD, patent stents, severe MR     CORONARY ANGIOPLASTY WITH STENT PLACEMENT  03/21/2007    EF 55%, GARRET to LAD      EYE SURGERY      b/l cataracts    HYSTERECTOMY      JOINT REPLACEMENT      Rt knee     Social History   Social History     Substance and Sexual Activity   Alcohol Use Not Currently     Social History     Substance and Sexual Activity   Drug Use Never     Social History     Tobacco Use   Smoking Status Never Smoker   Smokeless Tobacco Never Used     Family History   Problem Relation Age of Onset    Arthritis Mother     Cancer Mother     Heart disease Mother    [place] Hypertension Mother     Alcohol abuse Father     Arthritis Father     Birth defects Son     Hearing loss Son     Diabetes Daughter     Stroke Maternal Grandmother     Asthma Maternal Grandfather        Meds/Allergies   all current active meds have been reviewed, current meds:   Current Facility-Administered Medications   Medication Dose Route Frequency    acetaminophen (TYLENOL) tablet 650 mg  650 mg Oral Q6H PRN    ALPRAZolam (XANAX) tablet 0 25 mg  0 25 mg Oral TID PRN    apixaban (ELIQUIS) tablet 2 5 mg  2 5 mg Oral BID    diltiazem (CARDIZEM CD) 24 hr capsule 300 mg  300 mg Oral Daily    metoprolol tartrate (LOPRESSOR) tablet 25 mg  25 mg Oral TID    montelukast (SINGULAIR) tablet 10 mg  10 mg Oral Daily    oxybutynin (DITROPAN) tablet 5 mg  5 mg Oral Daily    pantoprazole (PROTONIX) EC tablet 40 mg  40 mg Oral Early Morning    polyethylene glycol (MIRALAX) packet 17 g  17 g Oral Daily PRN    sertraline (ZOLOFT) tablet 50 mg  50 mg Oral Daily    and PTA meds:    Medications Prior to Admission   Medication    ALPRAZolam (XANAX) 0 25 mg tablet    ASPIRIN 81 PO    diphenhydrAMINE (BENADRYL) 25 mg tablet    fluticasone (FLONASE) 50 mcg/act nasal spray    furosemide (LASIX) 20 mg tablet    lisinopril (ZESTRIL) 10 mg tablet    meloxicam (MOBIC) 7 5 mg tablet    metoprolol tartrate (LOPRESSOR) 50 mg tablet    montelukast (SINGULAIR) 10 mg tablet    omeprazole (PriLOSEC) 20 mg delayed release capsule    oxybutynin (DITROPAN) 5 mg tablet    sertraline (ZOLOFT) 50 mg tablet    simvastatin (ZOCOR) 20 mg tablet         Allergies   Allergen Reactions    Chocolate Flavor     Iodine Other (See Comments)     shellfish- difficulty breathing    Lactose     Shellfish-Derived Products     Codeine Rash       Objective     Intake/Output Summary (Last 24 hours) at 7/4/2019 1025  Last data filed at 7/3/2019 1718  Gross per 24 hour   Intake 540 ml   Output --   Net 540 ml       Invasive Devices:        Physical Exam      I/O last 3 completed shifts: In: 65 [P O :660]  Out: -     Vitals:    07/04/19 0751   BP: 140/80   Pulse: (!) 121   Resp: 18   Temp: 98 8 °F (37 1 °C)   SpO2: 94%       Gen: in NAD, Alert/Awake  HEENT: no sclerous icterus, MMM, neck supple  CV: +S1/S2, RRR with murmur at apex  Lungs: CTA bilaterally  Abd: +BS, soft NT/ND  Ext: all four extremities are warm  Skin: no rashes noted  Neuro: CN II-XII intact    Current Weight: Weight - Scale: 74 3 kg (163 lb 12 8 oz)  First Weight: Weight - Scale: 75 8 kg (167 lb)    Lab Results:  I have personally reviewed pertinent labs      CBC: No results found for: WBC, HGB, HCT, MCV, PLT, ADJUSTEDWBC, MCH, MCHC, RDW, MPV, NRBC  CMP: No results found for: NA, K, CL, CO2, ANIONGAP, BUN, CREATININE, GLUCOSE, CALCIUM, AST, ALT, ALKPHOS, PROT, BILITOT, EGFR  Phosphorus: No results found for: PHOS  Magnesium: No results found for: MG  Urinalysis: No results found for: COLORU, CLARITYU, SPECGRAV, PHUR, LEUKOCYTESUR, NITRITE, PROTEINUA, GLUCOSEU, KETONESU, BILIRUBINUR, BLOODU  Ionized Calcium: No results found for: CAION  Coagulation: No results found for: PT, INR, APTT  Troponin: No results found for: TROPONINI  ABG: No results found for: PHART, CRZ7ADS, PO2ART, ZTR5IMA, K8LMXMGZ, BEART, SOURCE    Results from last 7 days   Lab Units 07/02/19  0440 07/01/19  0458 06/30/19  0437   POTASSIUM mmol/L 5 2 4 9 4 5   CHLORIDE mmol/L 95* 96* 94*   CO2 mmol/L 31 27 25   BUN mg/dL 31* 41* 44*   CREATININE mg/dL 1 19 1 53* 2 08*   CALCIUM mg/dL 9 5 9 3 8 8       Radiology review:  No results found  EKG, Pathology, and Other Studies: reviewed      Counseling / Coordination of Care  Total ADDITIONAL floor / unit time spent today 30 minutes  Greater than 50% of total time was spent with the patient and / or family counseling and / or coordination of care   A description of the counseling / coordination of care: follows    Geoffrey Lipscomb MD

## 2019-07-04 NOTE — CONSULTS
Consult- Loren Fail 1935, 81 y o  female MRN: [id number]    Unit/Bed#: -01 Encounter: [id number]    Primary Care Provider: Agnieszka Smiley MD   Date and time admitted to hospital: 7/2/2019  4:09 PM      Inpatient consult to Internal Medicine  Consult performed by: Sommer Garcia PA-C  Consult ordered by: John Dong MD        Please see discharge summary dictated by Dr Helio Coulter on 07/02/2019 full details of admission and discharge recommendations  We will follow along    Please call if there is any urgent issues

## 2019-07-04 NOTE — PROGRESS NOTES
07/04/19 1000   Pain Assessment   Pain Assessment 0-10   Pain Score 5   Pain Type Chronic pain   Pain Location Leg   Pain Orientation Bilateral   Restrictions/Precautions   Precautions Cardiac/sternal;Fall Risk   Cognition   Overall Cognitive Status WFL   Orientation Level Oriented X4   Transfer Bed/Chair/Wheelchair   Limitations Noted In Balance;Pain Management;LE Strength   Adaptive Equipment Roller Walker   Stand Pivot Supervision   Sit to Stand Supervision   Stand to W  R  [place], Chair, Wheelchair Transfer (FIM) 5 - Patient requires verbal cues   Ambulation   Primary Discharge Mode of Locomotion Walk   Walk Assist Level Supervision   Gait Pattern Inconsistant Tatiana   Assist Device Roller Walker   Distance Walked (feet) 162 ft  (435' 152' 144')   Limitations Noted In Balance; Safety;Strength   Findings level and carpet   Walking (FIM) 5 - Patient requires verbal cues AND distance 150 feet or more, no rest   Stairs   Type Stairs; Ramp   # of Steps 5   Weight Bearing Precautions WBAT   Assist Devices Bilateral Rail;Roller Walker   Findings cg/min A steps; cg ramp with walker   Stairs (FIM) 2 - Patient goes up and down 4 - 11 stairs regardless of assist/device/setup   Therapeutic Interventions   Strengthening seated and standing ther ex    Balance gait and transfer training   Assessment   Treatment Assessment Tolerated therapy session well  Completed ther ex for general LE strengthening; gait and transfer training focusing on sequence and technique for improved balance and safety with mobility  PT Barriers   Physical Impairment Decreased strength;Decreased endurance;Decreased safety awareness;Pain   Functional Limitation Walking;Transfers;Standing;Stair negotiation;Ramp negotiation   Plan   Treatment/Interventions LE strengthening/ROM; Elevations; Functional transfer training; Therapeutic exercise;Gait training   Progress Improving as expected   PT Therapy Minutes   PT Time In 1000   PT Time Out 1130 PT Total Time (minutes) 90   PT Mode of treatment - Individual (minutes) 60   PT Mode of treatment - Concurrent (minutes) 30   PT Mode of treatment - Group (minutes) 0   PT Mode of treatment - Co-treat (minutes) 0   PT Mode of Teatment - Total time(minutes) 90 minutes   Therapy Time missed   Time missed?  No

## 2019-07-04 NOTE — PROGRESS NOTES
07/04/19 1230   Pain Assessment   Pain Assessment 0-10   Restrictions/Precautions   Precautions Fall Risk;Cardiac/sternal   Toileting   Able to Pull Clothing down yes, up yes  Manage Hygiene Bladder   Limitations Noted In Safety;Balance;LE Strength   Toileting (FIM) 5 - Patient requires supervision/monitoring   Toilet Transfer   Surface Assessed Raised Toilet   Transfer Technique Stand Pivot   Limitations Noted In Balance; Endurance; Safety;LE Strength   Toilet Transfer (FIM) 5 - Patient requires supervision/monitoring   Coordination   Fine Motor red putty for item retrieval B hands   Assessment   Treatment Assessment Pt participates in afternoon session with 15 minutes concurrent treatment with another patient focusing on similar goals  Pt will benefit from continued skilled OT services to increase independence with daily tasks  Pt tolerates treatment without complaints  Problem List Decreased strength;Decreased range of motion;Decreased endurance; Impaired balance;Decreased safety awareness   Plan   Treatment/Interventions ADL retraining;Functional transfer training; Therapeutic exercise; Endurance training;Patient/family training; Compensatory technique education   Progress Progressing toward goals   OT Therapy Minutes   OT Time In 1230   OT Time Out 1300   OT Total Time (minutes) 30   OT Mode of treatment - Individual (minutes) 15   OT Mode of treatment - Concurrent (minutes) 15

## 2019-07-04 NOTE — PLAN OF CARE
Problem: Potential for Falls  Goal: Patient will remain free of falls  Description  INTERVENTIONS:  - Assess patient frequently for physical needs  -  Identify cognitive and physical deficits and behaviors that affect risk of falls    -  [place] fall precautions as indicated by assessment   - Educate patient/family on patient safety including physical limitations  - Instruct patient to call for assistance with activity based on assessment  - Modify environment to reduce risk of injury  - Consider OT/PT consult to assist with strengthening/mobility  Outcome: Progressing     Problem: PAIN - ADULT  Goal: Verbalizes/displays adequate comfort level or baseline comfort level  Description  Interventions:  - Encourage patient to monitor pain and request assistance  - Assess pain using appropriate pain scale  - Administer analgesics based on type and severity of pain and evaluate response  - Implement non-pharmacological measures as appropriate and evaluate response  - Consider cultural and social influences on pain and pain management  - Notify physician/advanced practitioner if interventions unsuccessful or patient reports new pain  Outcome: Progressing     Problem: INFECTION - ADULT  Goal: Absence or prevention of progression during hospitalization  Description  INTERVENTIONS:  - Assess and monitor for signs and symptoms of infection  - Monitor lab/diagnostic results  - Monitor all insertion sites, i e  indwelling lines, tubes, and drains  - Monitor endotracheal (as able) and nasal secretions for changes in amount and color  - [place] appropriate cooling/warming therapies per order  - Administer medications as ordered  - Instruct and encourage patient and family to use good hand hygiene technique  - Identify and instruct in appropriate isolation precautions for identified infection/condition  Outcome: Progressing     Problem: SAFETY ADULT  Goal: Maintain or return to baseline ADL function  Description  INTERVENTIONS:  -  Assess patient's ability to carry out ADLs; assess patient's baseline for ADL function and identify physical deficits which impact ability to perform ADLs (bathing, care of mouth/teeth, toileting, grooming, dressing, etc )  - Assess/evaluate cause of self-care deficits   - Assess range of motion  - Assess patient's mobility; develop plan if impaired  - Assess patient's need for assistive devices and provide as appropriate  - Encourage maximum independence but intervene and supervise when necessary  ¯ Involve family in performance of ADLs  ¯ Assess for home care needs following discharge   ¯ Request OT consult to assist with ADL evaluation and planning for discharge  ¯ Provide patient education as appropriate  Outcome: Progressing  Goal: Maintain or return mobility status to optimal level  Description  INTERVENTIONS:  - Assess patient's baseline mobility status (ambulation, transfers, stairs, etc )    - Identify cognitive and physical deficits and behaviors that affect mobility  - Identify mobility aids required to assist with transfers and/or ambulation (gait belt, sit-to-stand, lift, walker, cane, etc )  - [place] fall precautions as indicated by assessment  - Record patient progress and toleration of activity level on Mobility SBAR; progress patient to next Phase/Stage  - Instruct patient to call for assistance with activity based on assessment  - Request Rehabilitation consult to assist with strengthening/weightbearing, etc   Outcome: Progressing     Problem: DISCHARGE PLANNING  Goal: Discharge to home or other facility with appropriate resources  Description  INTERVENTIONS:  - Identify barriers to discharge w/patient and caregiver  - Arrange for needed discharge resources and transportation as appropriate  - Identify discharge learning needs (meds, wound care, etc )  - Arrange for interpretive services to assist at discharge as needed  - Refer to Case Management Department for coordinating discharge planning if the patient needs post-hospital services based on physician/advanced practitioner order or complex needs related to functional status, cognitive ability, or social support system  Outcome: Progressing     Problem: CARDIOVASCULAR - ADULT  Goal: Maintains optimal cardiac output and hemodynamic stability  Description  INTERVENTIONS:  - Monitor I/O, vital signs and rhythm  - Monitor for S/S and trends of decreased cardiac output i e  bleeding, hypotension  - Administer and titrate ordered vasoactive medications to optimize hemodynamic stability  - Assess quality of pulses, skin color and temperature  - Assess for signs of decreased coronary artery perfusion - ex   Angina  - Instruct patient to report change in severity of symptoms  Outcome: Progressing  Goal: Absence of cardiac dysrhythmias or at baseline rhythm  Description  INTERVENTIONS:  - monitor vital signs, obtain 12 lead EKG if indicated  - Administer antiarrhythmic and heart rate control medications as ordered  - Monitor electrolytes and administer replacement therapy as ordered   Outcome: Progressing     Problem: RESPIRATORY - ADULT  Goal: Achieves optimal ventilation and oxygenation  Description  INTERVENTIONS:  - Assess for changes in respiratory status  - Assess for changes in mentation and behavior  - Position to facilitate oxygenation and minimize respiratory effort  - Oxygen administration by appropriate delivery method based on oxygen saturation (per order) or ABGs  - Initiate smoking cessation education as indicated  - Encourage broncho-pulmonary hygiene including cough, deep breathe, Incentive Spirometry  - Assess the need for suctioning and aspirate as needed  - Assess and instruct to report SOB or any respiratory difficulty  - Respiratory Therapy support as indicated  Outcome: Progressing     Problem: GASTROINTESTINAL - ADULT  Goal: Maintains adequate nutritional intake  Description  INTERVENTIONS:  - Monitor percentage of each meal consumed  - Identify factors contributing to decreased intake, treat as appropriate  - Assist with meals as needed  - Monitor I&O, WT and lab values  - Obtain nutrition services referral as needed  Outcome: Progressing     Problem: METABOLIC, FLUID AND ELECTROLYTES - ADULT  Goal: Electrolytes maintained within normal limits  Description  INTERVENTIONS:  - Monitor labs and assess patient for signs and symptoms of electrolyte imbalances  - Administer electrolyte replacement as ordered  - Monitor response to electrolyte replacements, including repeat lab results as appropriate  - Instruct patient on fluid and nutrition as appropriate  Outcome: Progressing  Goal: Fluid balance maintained  Description  INTERVENTIONS:  - Monitor labs and assess for signs and symptoms of volume excess or deficit  - Monitor I/O and WT  - Instruct patient on fluid and nutrition as appropriate  Outcome: Progressing     Problem: Nutrition/Hydration-ADULT  Goal: Nutrient/Hydration intake appropriate for improving, restoring or maintaining nutritional needs  Description  Monitor and assess patient's nutrition/hydration status for malnutrition (ex- brittle hair, bruises, dry skin, pale skin and conjunctiva, muscle wasting, smooth red tongue, and disorientation)  Collaborate with interdisciplinary team and initiate plan and interventions as ordered  Monitor patient's weight and dietary intake as ordered or per policy  Utilize nutrition screening tool and intervene per policy  Determine patient's food preferences and provide high-protein, high-caloric foods as appropriate       INTERVENTIONS:  - Monitor oral intake, urinary output, labs, and treatment plans  - Assess nutrition and hydration status and recommend course of action  - Evaluate amount of meals eaten  - Assist patient with eating if necessary   - Allow adequate time for meals  - Recommend/ encourage appropriate diets, oral nutritional supplements, and vitamin/mineral supplements  - Order, calculate, and assess calorie counts as needed  - Recommend, monitor, and adjust tube feedings and TPN/PPN based on assessed needs  - Assess need for intravenous fluids  - Provide specific nutrition/hydration education as appropriate  - Include patient/family/caregiver in decisions related to nutrition  Outcome: Progressing

## 2019-07-05 PROCEDURE — 97110 THERAPEUTIC EXERCISES: CPT

## 2019-07-05 PROCEDURE — 97537 COMMUNITY/WORK REINTEGRATION: CPT

## 2019-07-05 PROCEDURE — 97116 GAIT TRAINING THERAPY: CPT

## 2019-07-05 PROCEDURE — 97530 THERAPEUTIC ACTIVITIES: CPT

## 2019-07-05 PROCEDURE — 97535 SELF CARE MNGMENT TRAINING: CPT

## 2019-07-05 PROCEDURE — 99232 SBSQ HOSP IP/OBS MODERATE 35: CPT | Performed by: PHYSICAL MEDICINE & REHABILITATION

## 2019-07-05 PROCEDURE — 99232 SBSQ HOSP IP/OBS MODERATE 35: CPT | Performed by: INTERNAL MEDICINE

## 2019-07-05 RX ADMIN — DILTIAZEM HYDROCHLORIDE 300 MG: 180 CAPSULE, COATED, EXTENDED RELEASE ORAL at 08:11

## 2019-07-05 RX ADMIN — APIXABAN 2.5 MG: 2.5 TABLET, FILM COATED ORAL at 17:02

## 2019-07-05 RX ADMIN — SERTRALINE HYDROCHLORIDE 50 MG: 50 TABLET ORAL at 08:11

## 2019-07-05 RX ADMIN — MONTELUKAST SODIUM 10 MG: 10 TABLET, COATED ORAL at 08:11

## 2019-07-05 RX ADMIN — METOPROLOL TARTRATE 25 MG: 25 TABLET, FILM COATED ORAL at 20:44

## 2019-07-05 RX ADMIN — APIXABAN 2.5 MG: 2.5 TABLET, FILM COATED ORAL at 08:11

## 2019-07-05 RX ADMIN — PANTOPRAZOLE SODIUM 40 MG: 40 TABLET, DELAYED RELEASE ORAL at 05:42

## 2019-07-05 RX ADMIN — METOPROLOL TARTRATE 25 MG: 25 TABLET, FILM COATED ORAL at 08:11

## 2019-07-05 RX ADMIN — METOPROLOL TARTRATE 25 MG: 25 TABLET, FILM COATED ORAL at 16:44

## 2019-07-05 RX ADMIN — ACETAMINOPHEN 650 MG: 325 TABLET ORAL at 08:11

## 2019-07-05 RX ADMIN — OXYBUTYNIN CHLORIDE 5 MG: 5 TABLET ORAL at 08:13

## 2019-07-05 RX ADMIN — ALPRAZOLAM 0.25 MG: 0.25 TABLET ORAL at 20:46

## 2019-07-05 NOTE — PROGRESS NOTES
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  4  Hyponatremia              Patient may have an underlying pituitary dysfunction form of SIADH  Will place on a fluid restriction 1 5 L  Follow-up blood work in the next 48-72 hours  5  Right adrenal adenoma -most likely benign  7  Atrial fibrillation with rapid ventricular response              As mentioned above  Defer to Cardiology  8  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   Patient informed me that she also has a daughter who has porphyria  9  Chronic diastolic congestive heart failure     Follow up reason for today's visit:  Electrolyte disorder/chronic kidney disease    HTN (hypertension)    Patient Active Problem List   Diagnosis    CAD (coronary artery disease)    Mitral regurgitation    A-fib ([address] )    Acute on chronic kidney failure (HCC)    Diastolic heart failure (HCC)    HTN (hypertension)    Hyponatremia    Depression with anxiety    GERD (gastroesophageal reflux disease)    OAB (overactive bladder)         Subjective:   No acute events overnight    Objective:     Vitals: Blood pressure 120/52, pulse (!) 106, temperature 98 8 °F (37 1 °C), temperature source Tympanic, resp  rate 20, height 4' 11" (1 499 m), weight 75 5 kg (166 lb 7 2 oz), SpO2 92 %  ,Body mass index is 33 62 kg/m²  Weight (last 2 days)     Date/Time   Weight    07/05/19 1113   75 5 (166 45)    07/05/19 0600   75 5 (166 5)    07/04/19 0600   74 3 (163 8)    07/03/19 0534   75 7 (166 9)                Intake/Output Summary (Last 24 hours) at 7/5/2019 1554  Last data filed at 7/5/2019 1214  Gross per 24 hour   Intake 740 ml   Output --   Net 740 ml     I/O last 3 completed shifts:   In: 240 [P O :240]  Out: -          Physical Exam: /52 (BP Location: Left arm)   Pulse (!) 106   Temp 98 8 °F (37 1 °C) (Tympanic)   Resp 20   Ht 4' 11" (1 499 m)   Wt 75 5 kg (166 lb 7 2 oz)   SpO2 92%   BMI 33 62 kg/m²     General Appearance:    Alert, cooperative, no distress, appears stated age   Head:    Normocephalic, without obvious abnormality, atraumatic   Eyes:    Conjunctiva/corneas clear   Ears:    Normal external ears   Nose:   Nares normal, septum midline, mucosa normal, no drainage    or sinus tenderness   Throat:   Lips, mucosa, and tongue normal; teeth and gums normal   Neck:   Supple   Back:     Symmetric, no curvature, ROM normal, no CVA tenderness   Lungs:     Clear to auscultation bilaterally, respirations unlabored   Chest wall:    No tenderness or deformity   Heart:    Regular rate and rhythm, S1 and S2 normal, no murmur, rub   or gallop   Abdomen:     Soft, non-tender, bowel sounds active   Extremities:   Extremities normal, atraumatic, no cyanosis or edema   Skin:   Skin color, texture, turgor normal, areas of healing blisters noted bilateral upper extremities   Lymph nodes:   Cervical normal   Neurologic:   CNII-XII intact            Lab, Imaging and other studies: I have personally reviewed pertinent labs  CBC: No results found for: WBC, HGB, HCT, MCV, PLT, ADJUSTEDWBC, MCH, MCHC, RDW, MPV, NRBC  CMP: No results found for: NA, K, CL, CO2, ANIONGAP, BUN, CREATININE, GLUCOSE, CALCIUM, AST, ALT, ALKPHOS, PROT, BILITOT, EGFR        Results from last 7 days   Lab Units 07/04/19  1148 07/02/19  0440 07/01/19  0458   POTASSIUM mmol/L 4 3 5 2 4 9   CHLORIDE mmol/L 92* 95* 96*   CO2 mmol/L 29 31 27   BUN mg/dL 21 31* 41*   CREATININE mg/dL 0 82 1 19 1 53*   CALCIUM mg/dL 9 6 9 5 9 3         Phosphorus: No results found for: PHOS  Magnesium: No results found for: MG  Urinalysis: No results found for: COLORU, CLARITYU, SPECGRAV, PHUR, LEUKOCYTESUR, NITRITE, PROTEINUA, GLUCOSEU, KETONESU, BILIRUBINUR, BLOODU  Ionized Calcium: No results found for: CAION  Coagulation: No results found for: PT, INR, APTT  Troponin: No results found for: TROPONINI  ABG: No results found for: PHART, TIW6VBS, PO2ART, EAB7NSS, S9ZEAMHJ, BEART, SOURCE  Radiology review:     IMAGING  Procedure: Xr Chest Pa & Lateral    Result Date: 7/5/2019  Narrative: CHEST INDICATION:   orthopnea  COMPARISON:  Chest 6/29/2019 EXAM PERFORMED/VIEWS:  XR CHEST PA & LATERAL FINDINGS: Heart shadow appears enlarged but stable  Atherosclerotic vascular calcifications are noted  There is mild pulmonary vascular congestion  No large effusion or pneumothorax  Osseous structures appear within normal limits for patient age  Impression: 1  Pulmonary vascular congestion  2   Stable mild enlargement of the cardiac silhouette   Workstation performed: [id number]       Current Facility-Administered Medications   Medication Dose Route Frequency    acetaminophen (TYLENOL) tablet 650 mg  650 mg Oral Q6H PRN    ALPRAZolam (XANAX) tablet 0 25 mg  0 25 mg Oral TID PRN    apixaban (ELIQUIS) tablet 2 5 mg  2 5 mg Oral BID    diltiazem (CARDIZEM CD) 24 hr capsule 300 mg  300 mg Oral Daily    metoprolol tartrate (LOPRESSOR) tablet 25 mg  25 mg Oral TID    montelukast (SINGULAIR) tablet 10 mg  10 mg Oral Daily    oxybutynin (DITROPAN) tablet 5 mg  5 mg Oral Daily    pantoprazole (PROTONIX) EC tablet 40 mg  40 mg Oral Early Morning    polyethylene glycol (MIRALAX) packet 17 g  17 g Oral Daily PRN    sertraline (ZOLOFT) tablet 50 mg  50 mg Oral Daily     Medications Discontinued During This Encounter   Medication Reason    apixaban (ELIQUIS) tablet 5 mg     metoprolol tartrate (LOPRESSOR) tablet 25 mg     pravastatin (PRAVACHOL) tablet 40 mg        J Carlos Sahu DO

## 2019-07-05 NOTE — PROGRESS NOTES
07/05/19 1230   Pain Assessment   Pain Assessment 0-10   Pain Score 4   Pain Type Chronic pain   Pain Location Back   Pain Orientation Lower   Cognition   Overall Cognitive Status WFL   Orientation Level Oriented X4   Transfer Bed/Chair/Wheelchair   Limitations Noted In Balance; Endurance;Pain Management;LE Strength   Adaptive Equipment Roller Walker   Stand Pivot Supervision   Sit to Stand Supervision   Stand to Sit Supervision   Supine to Sit Supervision   Sit to Supine Supervision   Bed, Chair, Wheelchair Transfer (FIM) 5 - Patient requires verbal cues   Ambulation   Primary Discharge Mode of Locomotion Walk   Walk Assist Level Supervision   Gait Pattern Antalgic; Inconsistant Tatiana   Assist Device Roller Walker   Distance Walked (feet) 157 ft  (121' 152')   Limitations Noted In Balance;Device Management; Endurance; Safety;Strength   Findings level and carpet   Walking (FIM) 5 - Patient requires verbal cues AND distance 150 feet or more, no rest   Stairs   Type Stairs; Ramp   # of Steps 5   Weight Bearing Precautions WBAT   Assist Devices Bilateral Rail   Findings S steps with 2 handrails; S ramp with walker   Stairs (FIM) 2 - Patient goes up and down 4 - 11 stairs regardless of assist/device/setup   Therapeutic Interventions   Strengthening seated and standing ther ex   Balance gait and transfer training   Assessment   Treatment Assessment Tolerated therapy session well  Completed ther ex for general LE strengthening; gait and trasnfer training with focus on sequence and technique for improved balance and safety with functional mobility  PT Barriers   Physical Impairment Decreased strength;Decreased range of motion;Decreased endurance; Impaired balance;Decreased safety awareness;Pain   Functional Limitation Walking;Transfers;Standing;Stair negotiation;Ramp negotiation   Plan   Treatment/Interventions Functional transfer training;LE strengthening/ROM; Elevations; Therapeutic exercise;Gait training;Bed mobility Progress Improving as expected   PT Therapy Minutes   PT Time In 1230   PT Time Out 1400   PT Total Time (minutes) 90   PT Mode of treatment - Individual (minutes) 75   PT Mode of treatment - Concurrent (minutes) 15   PT Mode of treatment - Group (minutes) 0   PT Mode of treatment - Co-treat (minutes) 0   PT Mode of Teatment - Total time(minutes) 90 minutes   Therapy Time missed   Time missed?  No

## 2019-07-05 NOTE — PLAN OF CARE
Problem: Potential for Falls  Goal: Patient will remain free of falls  Description  INTERVENTIONS:  - Assess patient frequently for physical needs  -  Identify cognitive and physical deficits and behaviors that affect risk of falls    -  [place] fall precautions as indicated by assessment   - Educate patient/family on patient safety including physical limitations  - Instruct patient to call for assistance with activity based on assessment  - Modify environment to reduce risk of injury  - Consider OT/PT consult to assist with strengthening/mobility  Outcome: Progressing     Problem: PAIN - ADULT  Goal: Verbalizes/displays adequate comfort level or baseline comfort level  Description  Interventions:  - Encourage patient to monitor pain and request assistance  - Assess pain using appropriate pain scale  - Administer analgesics based on type and severity of pain and evaluate response  - Implement non-pharmacological measures as appropriate and evaluate response  - Consider cultural and social influences on pain and pain management  - Notify physician/advanced practitioner if interventions unsuccessful or patient reports new pain  Outcome: Progressing     Problem: INFECTION - ADULT  Goal: Absence or prevention of progression during hospitalization  Description  INTERVENTIONS:  - Assess and monitor for signs and symptoms of infection  - Monitor lab/diagnostic results  - Monitor all insertion sites, i e  indwelling lines, tubes, and drains  - Monitor endotracheal (as able) and nasal secretions for changes in amount and color  - [place] appropriate cooling/warming therapies per order  - Administer medications as ordered  - Instruct and encourage patient and family to use good hand hygiene technique  - Identify and instruct in appropriate isolation precautions for identified infection/condition  Outcome: Progressing     Problem: SAFETY ADULT  Goal: Maintain or return to baseline ADL function  Description  INTERVENTIONS:  -  Assess patient's ability to carry out ADLs; assess patient's baseline for ADL function and identify physical deficits which impact ability to perform ADLs (bathing, care of mouth/teeth, toileting, grooming, dressing, etc )  - Assess/evaluate cause of self-care deficits   - Assess range of motion  - Assess patient's mobility; develop plan if impaired  - Assess patient's need for assistive devices and provide as appropriate  - Encourage maximum independence but intervene and supervise when necessary  ¯ Involve family in performance of ADLs  ¯ Assess for home care needs following discharge   ¯ Request OT consult to assist with ADL evaluation and planning for discharge  ¯ Provide patient education as appropriate  Outcome: Progressing  Goal: Maintain or return mobility status to optimal level  Description  INTERVENTIONS:  - Assess patient's baseline mobility status (ambulation, transfers, stairs, etc )    - Identify cognitive and physical deficits and behaviors that affect mobility  - Identify mobility aids required to assist with transfers and/or ambulation (gait belt, sit-to-stand, lift, walker, cane, etc )  - [place] fall precautions as indicated by assessment  - Record patient progress and toleration of activity level on Mobility SBAR; progress patient to next Phase/Stage  - Instruct patient to call for assistance with activity based on assessment  - Request Rehabilitation consult to assist with strengthening/weightbearing, etc   Outcome: Progressing     Problem: DISCHARGE PLANNING  Goal: Discharge to home or other facility with appropriate resources  Description  INTERVENTIONS:  - Identify barriers to discharge w/patient and caregiver  - Arrange for needed discharge resources and transportation as appropriate  - Identify discharge learning needs (meds, wound care, etc )  - Arrange for interpretive services to assist at discharge as needed  - Refer to Case Management Department for coordinating discharge planning if the patient needs post-hospital services based on physician/advanced practitioner order or complex needs related to functional status, cognitive ability, or social support system  Outcome: Progressing     Problem: CARDIOVASCULAR - ADULT  Goal: Maintains optimal cardiac output and hemodynamic stability  Description  INTERVENTIONS:  - Monitor I/O, vital signs and rhythm  - Monitor for S/S and trends of decreased cardiac output i e  bleeding, hypotension  - Administer and titrate ordered vasoactive medications to optimize hemodynamic stability  - Assess quality of pulses, skin color and temperature  - Assess for signs of decreased coronary artery perfusion - ex   Angina  - Instruct patient to report change in severity of symptoms  Outcome: Progressing  Goal: Absence of cardiac dysrhythmias or at baseline rhythm  Description  INTERVENTIONS:  - monitor vital signs, obtain 12 lead EKG if indicated  - Administer antiarrhythmic and heart rate control medications as ordered  - Monitor electrolytes and administer replacement therapy as ordered   Outcome: Progressing     Problem: RESPIRATORY - ADULT  Goal: Achieves optimal ventilation and oxygenation  Description  INTERVENTIONS:  - Assess for changes in respiratory status  - Assess for changes in mentation and behavior  - Position to facilitate oxygenation and minimize respiratory effort  - Oxygen administration by appropriate delivery method based on oxygen saturation (per order) or ABGs  - Initiate smoking cessation education as indicated  - Encourage broncho-pulmonary hygiene including cough, deep breathe, Incentive Spirometry  - Assess the need for suctioning and aspirate as needed  - Assess and instruct to report SOB or any respiratory difficulty  - Respiratory Therapy support as indicated  Outcome: Progressing     Problem: GASTROINTESTINAL - ADULT  Goal: Maintains adequate nutritional intake  Description  INTERVENTIONS:  - Monitor percentage of each meal consumed  - Identify factors contributing to decreased intake, treat as appropriate  - Assist with meals as needed  - Monitor I&O, WT and lab values  - Obtain nutrition services referral as needed  Outcome: Progressing     Problem: METABOLIC, FLUID AND ELECTROLYTES - ADULT  Goal: Electrolytes maintained within normal limits  Description  INTERVENTIONS:  - Monitor labs and assess patient for signs and symptoms of electrolyte imbalances  - Administer electrolyte replacement as ordered  - Monitor response to electrolyte replacements, including repeat lab results as appropriate  - Instruct patient on fluid and nutrition as appropriate  Outcome: Progressing  Goal: Fluid balance maintained  Description  INTERVENTIONS:  - Monitor labs and assess for signs and symptoms of volume excess or deficit  - Monitor I/O and WT  - Instruct patient on fluid and nutrition as appropriate  Outcome: Progressing     Problem: Nutrition/Hydration-ADULT  Goal: Nutrient/Hydration intake appropriate for improving, restoring or maintaining nutritional needs  Description  Monitor and assess patient's nutrition/hydration status for malnutrition (ex- brittle hair, bruises, dry skin, pale skin and conjunctiva, muscle wasting, smooth red tongue, and disorientation)  Collaborate with interdisciplinary team and initiate plan and interventions as ordered  Monitor patient's weight and dietary intake as ordered or per policy  Utilize nutrition screening tool and intervene per policy  Determine patient's food preferences and provide high-protein, high-caloric foods as appropriate       INTERVENTIONS:  - Monitor oral intake, urinary output, labs, and treatment plans  - Assess nutrition and hydration status and recommend course of action  - Evaluate amount of meals eaten  - Assist patient with eating if necessary   - Allow adequate time for meals  - Recommend/ encourage appropriate diets, oral nutritional supplements, and vitamin/mineral supplements  - Order, calculate, and assess calorie counts as needed  - Recommend, monitor, and adjust tube feedings and TPN/PPN based on assessed needs  - Assess need for intravenous fluids  - Provide specific nutrition/hydration education as appropriate  - Include patient/family/caregiver in decisions related to nutrition  Outcome: Progressing

## 2019-07-05 NOTE — NURSING NOTE
Patient ambulates with contact guard and walker  Reports no pain  Fluid restriction maintained and legs elevated on leg rests  Will continue to monitor

## 2019-07-05 NOTE — NURSING NOTE
Patient resting comfortably in bed at this time  No signs of distress noted  Patient remains alert and oriented bed alarm in place for patient safety  Patient was continent of bowel and bladder overnight  One assist to ambulate to the bathroom with walker overnight  Patient was encouraged to reposition frequently overnight to promote skin integrity  Call bell within reach  Will continue to monitor patient and follow plan of care

## 2019-07-05 NOTE — PROGRESS NOTES
OT treatment provided 9:37-11:10 with incorrect time noted on flowsheet     07/05/19 0806   Pain Assessment   Pain Assessment 0-10   Pain Score 4   Pain Type Chronic pain   Pain Location Back   Hospital Pain Intervention(s) Medication (See MAR); Repositioned   Restrictions/Precautions   Precautions Fall Risk;Cardiac/sternal;Pain   Grooming   Able To Initiate Tasks;Comb/Brush Hair;Wash/Dry Face;Brush/Clean Teeth;Wash/Dry Hands   Limitation Noted In Safety   Grooming (FIM) 5 - [place] sets up supplies or applies device   Bathing   Assessed Bath Style Shower   Anticipated D/C Bath Style Shower   Able to Thayne Steve No   Able to [employer] Temperature No   Able to Wash/Rinse/Dry (body part) Left Arm;Right Arm;L Upper Leg;R Upper Leg;L Lower Leg/Foot;R Lower Leg/Foot;Chest;Abdomen;Perineal Area; Buttocks   Limitations Noted in Balance; Endurance; Safety   Positioning Seated;Standing   Adaptive Equipment Longhand Sponge   Bathing (FIM) 5 - Patient requires supervision/monitoring but completes 10/10 parts   Tub/Shower Transfer   Limitations Noted In Balance; Endurance; Safety;LE Strength   Adaptive Equipment Transfer Bench   Shower Transfer (FIM) 4 - Patient requires steadying assist or light touching   Dressing/Undressing Clothing   Remove UB Clothes Pullover Shirt   Remove LB Clothes Pants; Undergarment;Socks   Don UB [address] [place] [address] Avenue; Undergarment;Socks   Limitations Noted In Balance; Endurance; Safety;ROM   Adaptive Equipment Reacher;Sock Aide   UB Dressing (FIM) 5 - [place] sets up supplies or applies device   LB Dressing (FIM) 4 - Patient completes 75% of all tasks   Transfer Bed/Chair/Wheelchair   Limitations Noted In Balance; Endurance;Pain Management;LE Strength   Stand Pivot Supervision   Sit to Stand Supervision   Stand to Sit Supervision   Bed, Chair, Wheelchair Transfer (FIM) 5 - Patient requires supervision/monitoring   Light Housekeeping   Light Housekeeping Level Wheelchair   Light Housekeeping Level of Assistance Close supervision   Light Housekeeping S applying pillowcases   Exercise Tools   Other Exercise Tool 1 card match sitting for bottom 3 rows and standing for top row reaching with BUE  Other Exercise Tool 2 Sanderbox 2 sets 15 all planes with 2# wt and BUE   Other Exercise Tool 3 gripper with pegs B hands   Cognition   Orientation Level Oriented X4   Additional Activities   Additional Activities Other (Comment)  (fxl mobility with RW to/ from shower room S)   Assessment   Treatment Assessment Pt participates in a shower this day prior to exercises in gym with 40 minutes concurrent treatment provided with another patient to increase UB strength through exercises and functional use  Pt is making gains towards goals with increasing activity tolerance however patient continues to require assists due to decreased balance, safety, endurance and generalized strength  Pt will benefit from continued skilled OT services to increase independence with daily tasks  Problem List Decreased strength;Decreased range of motion;Decreased endurance; Impaired balance;Decreased safety awareness;Pain   Plan   Treatment/Interventions ADL retraining;Functional transfer training; Therapeutic exercise; Endurance training;Patient/family training; Compensatory technique education   Progress Improving as expected   OT Therapy Minutes   OT Time In 0937   OT Time Out 1110   OT Total Time (minutes) 93   OT Mode of treatment - Individual (minutes) 53   OT Mode of treatment - Concurrent (minutes) 40   Therapy Time missed   Time missed?  No

## 2019-07-05 NOTE — PROGRESS NOTES
Physical Medicine and Rehabilitation Progress Note  Daryle Sparrow 81 y o  female MRN: [id number]  Unit/Bed#: -01 Encounter: [id number]    HPI: Daryle Sparrow is a 81 y o  female who presented to the [facility] [address] with an irregular heart beat and was found to have A-fib for which she was seen by cardiology and offered a pacer but refused and was medically managed  Course was complicated by acute on chronic RF and hyponatremia for which renal service managed the patient         Chief Complaint: debility     Interval/subjective:  Patient seen face to face  No acute distress denies chest pain or shortness of breath  ROS: A 10 point ROS was performed; negative except as noted above       Assessment/Plan:      OAB (overactive bladder)  Assessment & Plan  - on home ditropan 5 mg qd     GERD (gastroesophageal reflux disease)  Assessment & Plan  - at home on prilosec 20 mg qd (therapeutic substitution as inpt)     Depression with anxiety  Assessment & Plan  - on home zoloft 50 mg qd  - also on home xanax 0 25 mg prn  - per PAPDMP last filled prescription for xanax 0 25 mg tabs on 4/26/19 for 270 tabs with 0 refills (appears to be a regular prescription every 3 months provided by PCP)     Hyponatremia  Assessment & Plan  - currently at 127  -renal managing     Diastolic heart failure (HCC)  Assessment & Plan  - was on lasix 20 mg BID PTA  - volume status currently stable w/o diuretics   -IM managing as inpt   - OP FU with Dr Adam Israel          Acute on chronic kidney failure ([facility] [address] )  Assessment & Plan  - per renal baseline Cr 0 7-0 8  - Cr currently improved to 0 82  -renal managing     [facility] ([facility] )  Assessment & Plan  - seen by cards in acute care and offered pacer however patient refused  - renal fxn improved so patient could be on eliquis 5 mg BID however in acute care cards elected to keep patient on eliquis 2 5 mg BID due to possible fall risk (was not on any AC PTA)  - at home on lopressor 25 mg q12   - IM managing rate control agents   - OP FU with Dr Adam Israel       Mitral regurgitation  Assessment & Plan  - offered surgical intervention by cards however patient refused  - OP FU with Dr Adam Israel     CAD (coronary artery disease)  Assessment & Plan  - h/o stent in 2007  - home ASA 81 mg qd d/c'd by cards in acute care due to patient being started on [facility]  - per cards notes in acute care not on statin however per EMR was on zocor 20 mg qpm (on 3/15/19 LDL was 69); will clarify   - at home was on lopressor 25 mg q12 PTA   - IM managing as inpt  - OP FU with Dr Adam Israel       * HTN (hypertension)  Assessment & Plan  - at home on lopressor 25 mg q12 and lisinopril 10 mg qd   -IM and renal co-managing      Scheduled Meds:    Current Facility-Administered Medications:  acetaminophen 650 mg Oral Q6H PRN Gin Chen, MD   ALPRAZolam 0 25 mg Oral TID PRN Gin Sport, MD   apixaban 2 5 mg Oral BID Gin Sport, MD   diltiazem 300 mg Oral Daily Gin Sport, MD   metoprolol tartrate 25 mg Oral TID Gin Sport, MD   montelukast 10 mg Oral Daily Gin Sport, MD   oxybutynin 5 mg Oral Daily Gin Sport, MD   pantoprazole 40 mg Oral Early Morning Gin Sport, MD   polyethylene glycol 17 g Oral Daily PRN Gin Sport, MD   sertraline 50 mg Oral Daily Gin Sport, MD        Incidental findings:  1) elevated peak PA pressure: OP FU with Dr Adam Israel  2) 1 3 cm rt adrenal nodule: per radiology report recommend repeat non-contrast CT or MRI in 12 months, will defer to PCP to arrange  3) fluid collection above bladder: noted on US and FU CT was recommended which did not show a fluid collection above the bladder      DVT ppx: eliquis        Objective:    Functional Update:  Mobility: CGA  Transfers: CGA  ADLs: min       Physical Exam:    /52 (BP Location: Left arm)   Pulse (!) 106   Temp 98 8 °F (37 1 °C) (Tympanic)   Resp 20   Ht 4' 11" (1 499 m)   Wt 75 5 kg (166 lb 7 2 oz)   SpO2 92%   BMI 33 62 kg/m² General: alert, no apparent distress, cooperative and comfortable  HEENT:  Head: Normal, normocephalic, atraumatic    CARDIAC:  +S1/2  LUNGS:  respirations unlabored   ABDOMEN:  soft NT   EXTREMITIES:  volume status currently stable   NEURO:   mental status, speech normal, alert and oriented x3  PSYCH:  mood/affect currently stable         Laboratory:   Results from last 7 days   Lab Units 07/04/19  1148 07/02/19  0440 07/01/19  0458   HEMOGLOBIN g/dL 12 1 12 4 12 0   HEMATOCRIT % 36 2* 36 8* 35 8*   WBC Thousand/uL 9 90 8 80 9 00     Results from last 7 days   Lab Units 07/04/19  1148 07/02/19  0440 07/01/19  0458   BUN mg/dL 21 31* 41*   SODIUM mmol/L 127* 130* 129*   POTASSIUM mmol/L 4 3 5 2 4 9   CHLORIDE mmol/L 92* 95* 96*   CREATININE mg/dL 0 82 1 19 1 53*            Patient Active Problem List   Diagnosis    CAD (coronary artery disease)    Mitral regurgitation    A-fib (HCC)    Acute on chronic kidney failure (HCC)    Diastolic heart failure (HCC)    HTN (hypertension)    Hyponatremia    Depression with anxiety    GERD (gastroesophageal reflux disease)    OAB (overactive bladder)

## 2019-07-06 PROCEDURE — 97116 GAIT TRAINING THERAPY: CPT

## 2019-07-06 PROCEDURE — 99231 SBSQ HOSP IP/OBS SF/LOW 25: CPT | Performed by: INTERNAL MEDICINE

## 2019-07-06 PROCEDURE — 97110 THERAPEUTIC EXERCISES: CPT

## 2019-07-06 PROCEDURE — 97530 THERAPEUTIC ACTIVITIES: CPT

## 2019-07-06 PROCEDURE — 97535 SELF CARE MNGMENT TRAINING: CPT

## 2019-07-06 PROCEDURE — 97537 COMMUNITY/WORK REINTEGRATION: CPT

## 2019-07-06 RX ADMIN — SERTRALINE HYDROCHLORIDE 50 MG: 50 TABLET ORAL at 08:36

## 2019-07-06 RX ADMIN — DILTIAZEM HYDROCHLORIDE 300 MG: 180 CAPSULE, COATED, EXTENDED RELEASE ORAL at 08:36

## 2019-07-06 RX ADMIN — APIXABAN 2.5 MG: 2.5 TABLET, FILM COATED ORAL at 08:36

## 2019-07-06 RX ADMIN — METOPROLOL TARTRATE 25 MG: 25 TABLET, FILM COATED ORAL at 16:13

## 2019-07-06 RX ADMIN — MONTELUKAST SODIUM 10 MG: 10 TABLET, COATED ORAL at 08:36

## 2019-07-06 RX ADMIN — OXYBUTYNIN CHLORIDE 5 MG: 5 TABLET ORAL at 08:36

## 2019-07-06 RX ADMIN — PANTOPRAZOLE SODIUM 40 MG: 40 TABLET, DELAYED RELEASE ORAL at 05:38

## 2019-07-06 RX ADMIN — METOPROLOL TARTRATE 25 MG: 25 TABLET, FILM COATED ORAL at 20:12

## 2019-07-06 RX ADMIN — ACETAMINOPHEN 650 MG: 325 TABLET ORAL at 07:28

## 2019-07-06 RX ADMIN — METOPROLOL TARTRATE 25 MG: 25 TABLET, FILM COATED ORAL at 08:36

## 2019-07-06 RX ADMIN — APIXABAN 2.5 MG: 2.5 TABLET, FILM COATED ORAL at 17:08

## 2019-07-06 RX ADMIN — ALPRAZOLAM 0.25 MG: 0.25 TABLET ORAL at 20:14

## 2019-07-06 NOTE — PROGRESS NOTES
07/06/19 0930   Pain Assessment   Pain Assessment 0-10   Pain Score 9   Pain Type Chronic pain   Pain Location Back;Leg   Pain Orientation Bilateral   Restrictions/Precautions   Precautions Fall Risk   Cognition   Overall Cognitive Status WFL   Orientation Level Oriented X4   Transfer Bed/Chair/Wheelchair   Limitations Noted In Balance;Pain Management;UE Strength;LE Strength; Endurance   Adaptive Equipment Roller Walker   Stand Pivot Supervision   Sit to Stand Supervision   Stand to Sit Supervision   Supine to Sit Supervision   Sit to Supine Supervision   Bed, Chair, Wheelchair Transfer (FIM) 5 - Patient requires supervision/monitoring   Ambulation   Primary Discharge Mode of Locomotion Walk   Walk Assist Level Supervision   Gait Pattern Inconsistant Tatiana   Assist Device Roller Walker   Distance Walked (feet) 157 ft  (897' 120' 152')   Limitations Noted In Balance;Device Management; Safety;Strength   Findings level and carpet   Walking (FIM) 5 - Patient requires verbal cues AND distance 150 feet or more, no rest   Stairs   Type Stairs; Ramp   # of Steps 5   Weight Bearing Precautions WBAT   Assist Devices Bilateral Rail;Roller Walker   Findings close S ramp with walker; CG steps with 2 handrails   Stairs (FIM) 2 - Patient goes up and down 4 - 11 stairs regardless of assist/device/setup   Therapeutic Interventions   Strengthening seated, standing, and supine ther ex   Balance gait and transfer training   Assessment   Treatment Assessment Tolerated therapy session well  COmplete ther ex for general LE strengthening; gait and transfer training with focus on sequence and technique for improved balance and safety with mobility  PT Barriers   Physical Impairment Decreased strength;Decreased range of motion;Decreased endurance; Impaired balance;Decreased safety awareness;Pain   Plan   Treatment/Interventions Functional transfer training;LE strengthening/ROM; Elevations; Therapeutic exercise; Bed mobility;Gait training Progress Improving as expected   PT Therapy Minutes   PT Time In 0930   PT Time Out 1130   PT Total Time (minutes) 120   PT Mode of treatment - Individual (minutes) 0   PT Mode of treatment - Concurrent (minutes) 120   PT Mode of treatment - Group (minutes) 0   PT Mode of treatment - Co-treat (minutes) 0   PT Mode of Teatment - Total time(minutes) 120 minutes   Therapy Time missed   Time missed?  No

## 2019-07-06 NOTE — PROGRESS NOTES
07/06/19 0624   Pain Assessment   Pain Assessment 0-10   Pain Score 4   Pain Type Chronic pain   Pain Location Leg   Pain Orientation Left   Restrictions/Precautions   Precautions Fall Risk;Pain   Eating Assessment   Food To Mouth Yes   Able To Cut Yes   Opens Packages Yes   Eating (FIM) 7 - Patient completely independent   Grooming   Able To Initiate Tasks;Comb/Brush Hair;Wash/Dry Face;Brush/Clean Teeth;Wash/Dry Hands   Limitation Noted In Safety   Grooming (FIM) 5 - Conrad sets up supplies or applies device   Bathing   Assessed Bath Style Sponge Bath   Anticipated D/C Bath Style Shower;Sponge Bath   Able to [place] Steve No   Able to [employer] Temperature Yes   Able to Wash/Rinse/Dry (body part) Left Arm;Right Arm;L Upper Leg;R Upper Leg;L Lower Leg/Foot;R Lower Leg/Foot;Chest;Abdomen;Perineal Area; Buttocks   Limitations Noted in Balance; Endurance; Safety;ROM   Adaptive Equipment Longhand Sponge   Bathing (FIM) 5 - Patient requires supervision/monitoring but completes 10/10 parts   Tub/Shower Transfer   Not Assessed Sponge Bath   Dressing/Undressing Clothing   Remove UB Clothes Pullover Shirt   Remove LB Clothes Pants; Undergarment;Socks   Don UB [address] [place] [address]; Undergarment;Socks; Shoes   Limitations Noted In Balance; Endurance; Safety;Strength;ROM   Adaptive Equipment Reacher;Sock Aide;LH Shoehorn   UB Dressing (FIM) 5 - Conrad sets up supplies or applies device   LB Dressing (FIM) 5 - Patient requires supervision/monitoring   Transfer Bed/Chair/Wheelchair   Limitations Noted In Balance; Endurance;LE Strength   Stand Pivot Supervision   Sit to Stand Supervision   Supine to Sit Supervision   Bed, Chair, Wheelchair Transfer (FIM) 5 - Patient requires supervision/monitoring   Toileting   Able to Pull Clothing down yes, up yes     Manage Hygiene Bladder   Limitations Noted In Safety;LE Strength;Balance   Toileting (FIM) 5 - Patient requires supervision/monitoring   Toilet Transfer Surface Assessed Raised Toilet   Transfer Technique Stand Pivot   Limitations Noted In Balance; Safety;LE Strength   Toilet Transfer (FIM) 5 - Patient requires supervision/monitoring   Exercise Tools   Other Exercise Tool 1 fxl reacher use to cleanup laundy/ dirty linen following ADL   Cognition   Overall Cognitive Status WFL   Additional Activities   Additional Activities Other (Comment)  (fxl mob to/ from BR with S and RW)   Assessment   Treatment Assessment Pt participates in an ADL this morning  Pt requires increased time however is able to pace self engaging in energy conservation  for task completion  Pt tolerates session without complaints and will benefit from continued skilled OT services to increase independence with daily tasks  Problem List Decreased strength;Decreased range of motion;Decreased endurance; Impaired balance;Decreased safety awareness;Pain   Plan   Treatment/Interventions ADL retraining;Functional transfer training; Therapeutic exercise; Endurance training;Patient/family training; Compensatory technique education   Progress Improving as expected   OT Therapy Minutes   OT Time In 0624   OT Time Out 0724   OT Total Time (minutes) 60   OT Mode of treatment - Individual (minutes) 60   Therapy Time missed   Time missed?  No

## 2019-07-06 NOTE — PROGRESS NOTES
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  4  Hyponatremia              Follow-up sodium level Monday morning, July 8th  Also check a urine osmolality at the same time  5  Right adrenal adenoma -most likely benign  7  Atrial fibrillation with rapid ventricular response              Follow-up heart rate later today  Patient is currently asymptomatic  Continue current medications  8  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   Patient informed me that she also has a daughter who has porphyria  9  Chronic diastolic congestive heart failure     Follow up reason for today's visit:  Electrolyte disorder/chronic kidney disease    HTN (hypertension)    Patient Active Problem List   Diagnosis    CAD (coronary artery disease)    Mitral regurgitation    A-fib ([address] )    Acute on chronic kidney failure (HCC)    Diastolic heart failure (HCC)    HTN (hypertension)    Hyponatremia    Depression with anxiety    GERD (gastroesophageal reflux disease)    OAB (overactive bladder)         Subjective:   No acute events overnight    Objective:     Vitals: Blood pressure 130/68, pulse (!) 120, temperature 97 5 °F (36 4 °C), temperature source Temporal, resp  rate 18, height 4' 11" (1 499 m), weight 75 8 kg (167 lb 3 oz), SpO2 93 %  ,Body mass index is 33 77 kg/m²  Weight (last 2 days)     Date/Time   Weight    07/06/19 0600   75 8 (167 19)    07/05/19 1113   75 5 (166 45)    07/05/19 0600   75 5 (166 5)    07/04/19 0600   74 3 (163 8)                Intake/Output Summary (Last 24 hours) at 7/6/2019 0933  Last data filed at 7/6/2019 8631  Gross per 24 hour   Intake 680 ml   Output --   Net 680 ml     I/O last 3 completed shifts:   In: 680 [P O :680]  Out: -          Physical Exam: /68 (BP Location: Right arm)   Pulse (!) 120   Temp 97 5 °F (36 4 °C) (Temporal)   Resp 18   Ht 4' 11" (1 499 m)   Wt 75 8 kg (167 lb 3 oz)   SpO2 93%   BMI 33 77 kg/m²     General Appearance:    Alert, cooperative, no distress, appears stated age   Head:    Normocephalic, without obvious abnormality, atraumatic   Eyes:    Conjunctiva/corneas clear   Ears:    Normal external ears   Nose:   Nares normal, septum midline, mucosa normal, no drainage    or sinus tenderness   Throat:   Lips, mucosa, and tongue normal; teeth and gums normal   Neck:   Supple   Back:     Symmetric, no curvature, ROM normal, no CVA tenderness   Lungs:     Clear to auscultation bilaterally, respirations unlabored   Chest wall:    No tenderness or deformity   Heart:    Regular rate and rhythm, S1 and S2 normal, no murmur, rub   or gallop   Abdomen:     Soft, non-tender, bowel sounds active   Extremities:   Extremities normal, atraumatic, no cyanosis or edema   Skin:   Skin color, texture, turgor normal, areas of healing blisters noted bilateral upper extremities   Lymph nodes:   Cervical normal   Neurologic:   CNII-XII intact            Lab, Imaging and other studies: I have personally reviewed pertinent labs  CBC: No results found for: WBC, HGB, HCT, MCV, PLT, ADJUSTEDWBC, MCH, MCHC, RDW, MPV, NRBC  CMP: No results found for: NA, K, CL, CO2, ANIONGAP, BUN, CREATININE, GLUCOSE, CALCIUM, AST, ALT, ALKPHOS, PROT, BILITOT, EGFR        Results from last 7 days   Lab Units 07/04/19  1148 07/02/19  0440 07/01/19  0458   POTASSIUM mmol/L 4 3 5 2 4 9   CHLORIDE mmol/L 92* 95* 96*   CO2 mmol/L 29 31 27   BUN mg/dL 21 31* 41*   CREATININE mg/dL 0 82 1 19 1 53*   CALCIUM mg/dL 9 6 9 5 9 3         Phosphorus: No results found for: PHOS  Magnesium: No results found for: MG  Urinalysis: No results found for: COLORU, CLARITYU, SPECGRAV, PHUR, LEUKOCYTESUR, NITRITE, PROTEINUA, GLUCOSEU, KETONESU, BILIRUBINUR, BLOODU  Ionized Calcium: No results found for: CAION  Coagulation: No results found for: PT, INR, APTT  Troponin: No results found for: TROPONINI  ABG: No results found for: PHART, KQC9FAP, PO2ART, GQS1ZVE, D8UZIUWC, BEART, SOURCE  Radiology review:     IMAGING  Procedure: Xr Chest Pa & Lateral    Result Date: 7/5/2019  Narrative: CHEST INDICATION:   orthopnea  COMPARISON:  Chest 6/29/2019 EXAM PERFORMED/VIEWS:  XR CHEST PA & LATERAL FINDINGS: Heart shadow appears enlarged but stable  Atherosclerotic vascular calcifications are noted  There is mild pulmonary vascular congestion  No large effusion or pneumothorax  Osseous structures appear within normal limits for patient age  Impression: 1  Pulmonary vascular congestion  2   Stable mild enlargement of the cardiac silhouette   Workstation performed: [id number]       Current Facility-Administered Medications   Medication Dose Route Frequency    acetaminophen (TYLENOL) tablet 650 mg  650 mg Oral Q6H PRN    ALPRAZolam (XANAX) tablet 0 25 mg  0 25 mg Oral TID PRN    apixaban (ELIQUIS) tablet 2 5 mg  2 5 mg Oral BID    diltiazem (CARDIZEM CD) 24 hr capsule 300 mg  300 mg Oral Daily    metoprolol tartrate (LOPRESSOR) tablet 25 mg  25 mg Oral TID    montelukast (SINGULAIR) tablet 10 mg  10 mg Oral Daily    oxybutynin (DITROPAN) tablet 5 mg  5 mg Oral Daily    pantoprazole (PROTONIX) EC tablet 40 mg  40 mg Oral Early Morning    polyethylene glycol (MIRALAX) packet 17 g  17 g Oral Daily PRN    sertraline (ZOLOFT) tablet 50 mg  50 mg Oral Daily     Medications Discontinued During This Encounter   Medication Reason    apixaban (ELIQUIS) tablet 5 mg     metoprolol tartrate (LOPRESSOR) tablet 25 mg     pravastatin (PRAVACHOL) tablet 40 mg        Cachorro Pérez DO

## 2019-07-06 NOTE — PROGRESS NOTES
07/06/19 0820   Pain Assessment   Pain Assessment No/denies pain   Restrictions/Precautions   Precautions Fall Risk   Transfer Bed/Chair/Wheelchair   Sit to Stand Supervision   Bed, Chair, Wheelchair Transfer (FIM) 5 - Patient requires supervision/monitoring   Exercise Tools   Theraputty red offerring moderate resisatnce, large and small pinch and     Hand Gripper 5# digi x 30 B hands    Other Exercise Tool 1 push box 2# 2x20 reps 4 planes    Other Exercise Tool 2 1 # dumbells 2x10 reps all planes    Cognition   Overall Cognitive Status WFL   Arousal/Participation Alert   Attention Within functional limits   Orientation Level Oriented X4   Memory Within functional limits   Following Commands Follows all commands and directions without difficulty   Additional Activities   Additional Activities   (functional mobiltiy x 80 feet with CS with RW then 150 feet )   Activity Tolerance   Activity Tolerance Patient tolerated treatment well   Assessment   Treatment Assessment Pt seen this date by OT for instruction in generalized UE strengthening program needed for increased strength and endurance for functional activites  Pt tolerated tx well as noted by ability to complete activties with minimal rest periods  Plan to continue OT to address establihed OT goals  Prognosis Good   Problem List Decreased strength;Decreased endurance; Impaired balance;Decreased mobility; Decreased safety awareness   Assessment 55 minutes of concurrent tx completed addressing similar goals and defiicits with another pt encouraging socialization during session   Plan   Treatment/Interventions Functional transfer training; Therapeutic exercise; Endurance training   Progress Improving as expected   OT Therapy Minutes   OT Time In 0820   OT Time Out 0920   OT Total Time (minutes) 60   OT Mode of treatment - Individual (minutes) 5   OT Mode of treatment - Concurrent (minutes) 55   OT Mode of treatment - Group (minutes) 0   OT Mode of treatment - Co-treat (minutes) 0   OT Mode of Teatment - Total time(minutes) 60 minutes   Therapy Time missed   Time missed?  No

## 2019-07-06 NOTE — PLAN OF CARE
Problem: Potential for Falls  Goal: Patient will remain free of falls  Description  INTERVENTIONS:  - Assess patient frequently for physical needs  -  Identify cognitive and physical deficits and behaviors that affect risk of falls    -  [place] fall precautions as indicated by assessment   - Educate patient/family on patient safety including physical limitations  - Instruct patient to call for assistance with activity based on assessment  - Modify environment to reduce risk of injury  - Consider OT/PT consult to assist with strengthening/mobility  Outcome: Progressing     Problem: PAIN - ADULT  Goal: Verbalizes/displays adequate comfort level or baseline comfort level  Description  Interventions:  - Encourage patient to monitor pain and request assistance  - Assess pain using appropriate pain scale  - Administer analgesics based on type and severity of pain and evaluate response  - Implement non-pharmacological measures as appropriate and evaluate response  - Consider cultural and social influences on pain and pain management  - Notify physician/advanced practitioner if interventions unsuccessful or patient reports new pain  Outcome: Progressing     Problem: INFECTION - ADULT  Goal: Absence or prevention of progression during hospitalization  Description  INTERVENTIONS:  - Assess and monitor for signs and symptoms of infection  - Monitor lab/diagnostic results  - Monitor all insertion sites, i e  indwelling lines, tubes, and drains  - Monitor endotracheal (as able) and nasal secretions for changes in amount and color  - [place] appropriate cooling/warming therapies per order  - Administer medications as ordered  - Instruct and encourage patient and family to use good hand hygiene technique  - Identify and instruct in appropriate isolation precautions for identified infection/condition  Outcome: Progressing     Problem: SAFETY ADULT  Goal: Maintain or return to baseline ADL function  Description  INTERVENTIONS:  -  Assess patient's ability to carry out ADLs; assess patient's baseline for ADL function and identify physical deficits which impact ability to perform ADLs (bathing, care of mouth/teeth, toileting, grooming, dressing, etc )  - Assess/evaluate cause of self-care deficits   - Assess range of motion  - Assess patient's mobility; develop plan if impaired  - Assess patient's need for assistive devices and provide as appropriate  - Encourage maximum independence but intervene and supervise when necessary  ¯ Involve family in performance of ADLs  ¯ Assess for home care needs following discharge   ¯ Request OT consult to assist with ADL evaluation and planning for discharge  ¯ Provide patient education as appropriate  Outcome: Progressing  Goal: Maintain or return mobility status to optimal level  Description  INTERVENTIONS:  - Assess patient's baseline mobility status (ambulation, transfers, stairs, etc )    - Identify cognitive and physical deficits and behaviors that affect mobility  - Identify mobility aids required to assist with transfers and/or ambulation (gait belt, sit-to-stand, lift, walker, cane, etc )  - [place] fall precautions as indicated by assessment  - Record patient progress and toleration of activity level on Mobility SBAR; progress patient to next Phase/Stage  - Instruct patient to call for assistance with activity based on assessment  - Request Rehabilitation consult to assist with strengthening/weightbearing, etc   Outcome: Progressing     Problem: DISCHARGE PLANNING  Goal: Discharge to home or other facility with appropriate resources  Description  INTERVENTIONS:  - Identify barriers to discharge w/patient and caregiver  - Arrange for needed discharge resources and transportation as appropriate  - Identify discharge learning needs (meds, wound care, etc )  - Arrange for interpretive services to assist at discharge as needed  - Refer to Case Management Department for coordinating discharge planning if the patient needs post-hospital services based on physician/advanced practitioner order or complex needs related to functional status, cognitive ability, or social support system  Outcome: Progressing     Problem: CARDIOVASCULAR - ADULT  Goal: Maintains optimal cardiac output and hemodynamic stability  Description  INTERVENTIONS:  - Monitor I/O, vital signs and rhythm  - Monitor for S/S and trends of decreased cardiac output i e  bleeding, hypotension  - Administer and titrate ordered vasoactive medications to optimize hemodynamic stability  - Assess quality of pulses, skin color and temperature  - Assess for signs of decreased coronary artery perfusion - ex   Angina  - Instruct patient to report change in severity of symptoms  Outcome: Progressing  Goal: Absence of cardiac dysrhythmias or at baseline rhythm  Description  INTERVENTIONS:  - monitor vital signs, obtain 12 lead EKG if indicated  - Administer antiarrhythmic and heart rate control medications as ordered  - Monitor electrolytes and administer replacement therapy as ordered   Outcome: Progressing     Problem: RESPIRATORY - ADULT  Goal: Achieves optimal ventilation and oxygenation  Description  INTERVENTIONS:  - Assess for changes in respiratory status  - Assess for changes in mentation and behavior  - Position to facilitate oxygenation and minimize respiratory effort  - Oxygen administration by appropriate delivery method based on oxygen saturation (per order) or ABGs  - Initiate smoking cessation education as indicated  - Encourage broncho-pulmonary hygiene including cough, deep breathe, Incentive Spirometry  - Assess the need for suctioning and aspirate as needed  - Assess and instruct to report SOB or any respiratory difficulty  - Respiratory Therapy support as indicated  Outcome: Progressing     Problem: GASTROINTESTINAL - ADULT  Goal: Maintains adequate nutritional intake  Description  INTERVENTIONS:  - Monitor percentage of each meal consumed  - Identify factors contributing to decreased intake, treat as appropriate  - Assist with meals as needed  - Monitor I&O, WT and lab values  - Obtain nutrition services referral as needed  Outcome: Progressing     Problem: METABOLIC, FLUID AND ELECTROLYTES - ADULT  Goal: Electrolytes maintained within normal limits  Description  INTERVENTIONS:  - Monitor labs and assess patient for signs and symptoms of electrolyte imbalances  - Administer electrolyte replacement as ordered  - Monitor response to electrolyte replacements, including repeat lab results as appropriate  - Instruct patient on fluid and nutrition as appropriate  Outcome: Progressing  Goal: Fluid balance maintained  Description  INTERVENTIONS:  - Monitor labs and assess for signs and symptoms of volume excess or deficit  - Monitor I/O and WT  - Instruct patient on fluid and nutrition as appropriate  Outcome: Progressing     Problem: Nutrition/Hydration-ADULT  Goal: Nutrient/Hydration intake appropriate for improving, restoring or maintaining nutritional needs  Description  Monitor and assess patient's nutrition/hydration status for malnutrition (ex- brittle hair, bruises, dry skin, pale skin and conjunctiva, muscle wasting, smooth red tongue, and disorientation)  Collaborate with interdisciplinary team and initiate plan and interventions as ordered  Monitor patient's weight and dietary intake as ordered or per policy  Utilize nutrition screening tool and intervene per policy  Determine patient's food preferences and provide high-protein, high-caloric foods as appropriate       INTERVENTIONS:  - Monitor oral intake, urinary output, labs, and treatment plans  - Assess nutrition and hydration status and recommend course of action  - Evaluate amount of meals eaten  - Assist patient with eating if necessary   - Allow adequate time for meals  - Recommend/ encourage appropriate diets, oral nutritional supplements, and vitamin/mineral supplements  - Order, calculate, and assess calorie counts as needed  - Recommend, monitor, and adjust tube feedings and TPN/PPN based on assessed needs  - Assess need for intravenous fluids  - Provide specific nutrition/hydration education as appropriate  - Include patient/family/caregiver in decisions related to nutrition  Outcome: Progressing

## 2019-07-07 PROCEDURE — 99231 SBSQ HOSP IP/OBS SF/LOW 25: CPT | Performed by: INTERNAL MEDICINE

## 2019-07-07 RX ORDER — FUROSEMIDE 20 MG/1
20 TABLET ORAL ONCE
Status: COMPLETED | OUTPATIENT
Start: 2019-07-07 | End: 2019-07-07

## 2019-07-07 RX ADMIN — OXYBUTYNIN CHLORIDE 5 MG: 5 TABLET ORAL at 08:08

## 2019-07-07 RX ADMIN — APIXABAN 2.5 MG: 2.5 TABLET, FILM COATED ORAL at 17:00

## 2019-07-07 RX ADMIN — METOPROLOL TARTRATE 25 MG: 25 TABLET, FILM COATED ORAL at 16:31

## 2019-07-07 RX ADMIN — PANTOPRAZOLE SODIUM 40 MG: 40 TABLET, DELAYED RELEASE ORAL at 05:59

## 2019-07-07 RX ADMIN — MONTELUKAST SODIUM 10 MG: 10 TABLET, COATED ORAL at 09:29

## 2019-07-07 RX ADMIN — METOPROLOL TARTRATE 25 MG: 25 TABLET, FILM COATED ORAL at 08:08

## 2019-07-07 RX ADMIN — FUROSEMIDE 20 MG: 20 TABLET ORAL at 11:45

## 2019-07-07 RX ADMIN — METOPROLOL TARTRATE 25 MG: 25 TABLET, FILM COATED ORAL at 20:10

## 2019-07-07 RX ADMIN — APIXABAN 2.5 MG: 2.5 TABLET, FILM COATED ORAL at 08:07

## 2019-07-07 RX ADMIN — ALPRAZOLAM 0.25 MG: 0.25 TABLET ORAL at 21:32

## 2019-07-07 RX ADMIN — SERTRALINE HYDROCHLORIDE 50 MG: 50 TABLET ORAL at 08:08

## 2019-07-07 RX ADMIN — DILTIAZEM HYDROCHLORIDE 300 MG: 180 CAPSULE, COATED, EXTENDED RELEASE ORAL at 08:07

## 2019-07-07 NOTE — NURSING NOTE
Patient resting comfortably in bed at this time  No signs of distress noted  Patient remains alert and oriented bed alarm in place for patient safety  Patient was continent of bowel and bladder overnight  LE edema noted patient encouraged to elevate feet overnight as much as possible  One assist to ambulate to the bathroom with walker overnight  Patient was encouraged to reposition frequently overnight to promote skin integrity  Patient wore SCDs as ordered for DVT prophylaxis  Call bell within reach  Will continue to monitor patient and follow plan of care

## 2019-07-07 NOTE — PROGRESS NOTES
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  4  Hyponatremia              Continue fluid restriction 1 5 L  Follow-up blood work tomorrow morning  5  Edema   How give the patient 1 time dose of furosemide  6  Right adrenal adenoma -most likely benign  7  Atrial fibrillation with rapid ventricular response              Patient has valvular atrial fibrillation due to mitral regurgitation  Patient wishes to have a valve evaluation for potential intervention  Cardiology to discuss further during her stay at the rehab unit  Patient's heart rate remains elevated, she is currently on diltiazem 300 mg once a day, as well as metoprolol 25 mg t i d   Patient currently on apixaban 2 5 twice a day  8  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   Patient informed me that she also has a daughter who has porphyria  9  Chronic diastolic congestive heart failure     Follow up reason for today's visit:  Electrolyte disorder/chronic kidney disease    HTN (hypertension)    Patient Active Problem List   Diagnosis    CAD (coronary artery disease)    Mitral regurgitation    A-fib ([address] )    Acute on chronic kidney failure (HCC)    Diastolic heart failure (HCC)    HTN (hypertension)    Hyponatremia    Depression with anxiety    GERD (gastroesophageal reflux disease)    OAB (overactive bladder)         Subjective:   No acute events overnight    Objective:     Vitals: Blood pressure 118/66, pulse (!) 110, temperature 98 8 °F (37 1 °C), temperature source Tympanic, resp  rate 20, height 4' 11" (1 499 m), weight 76 1 kg (167 lb 12 8 oz), SpO2 94 %  ,Body mass index is 33 89 kg/m²      Weight (last 2 days)     Date/Time   Weight    07/07/19 0600   76 1 (167 8)    07/06/19 0600   75 8 (167 19)    07/05/19 1113   75 5 (166 45)    07/05/19 0600   75 5 (166 5)                Intake/Output Summary (Last 24 hours) at 7/7/2019 1106  Last data filed at 7/7/2019 0807  Gross per 24 hour   Intake 440 ml   Output --   Net 440 ml     I/O last 3 completed shifts: In: 360 [P O :360]  Out: -          Physical Exam: /66 (BP Location: Right arm)   Pulse (!) 110   Temp 98 8 °F (37 1 °C) (Tympanic)   Resp 20   Ht 4' 11" (1 499 m)   Wt 76 1 kg (167 lb 12 8 oz)   SpO2 94%   BMI 33 89 kg/m²     General Appearance:    Alert, cooperative, no distress, appears stated age   Head:    Normocephalic, without obvious abnormality, atraumatic   Eyes:    Conjunctiva/corneas clear   Ears:    Normal external ears   Nose:   Nares normal, septum midline, mucosa normal, no drainage    or sinus tenderness   Throat:   Lips, mucosa, and tongue normal; teeth and gums normal   Neck:   Supple   Back:     Symmetric, no curvature, ROM normal, no CVA tenderness   Lungs:     Clear to auscultation bilaterally, respirations unlabored   Chest wall:    No tenderness or deformity   Heart:    Regular rate and rhythm, S1 and S2 normal, no murmur, rub   or gallop   Abdomen:     Soft, non-tender, bowel sounds active   Extremities:   Extremities normal, atraumatic, no cyanosis or edema   Skin:   Skin color, texture, turgor normal, areas of healing blisters noted bilateral upper extremities   Lymph nodes:   Cervical normal   Neurologic:   CNII-XII intact            Lab, Imaging and other studies: I have personally reviewed pertinent labs  CBC: No results found for: WBC, HGB, HCT, MCV, PLT, ADJUSTEDWBC, MCH, MCHC, RDW, MPV, NRBC  CMP: No results found for: NA, K, CL, CO2, ANIONGAP, BUN, CREATININE, GLUCOSE, CALCIUM, AST, ALT, ALKPHOS, PROT, BILITOT, EGFR        Results from last 7 days   Lab Units 07/04/19  1148 07/02/19  0440 07/01/19  0458   POTASSIUM mmol/L 4 3 5 2 4 9   CHLORIDE mmol/L 92* 95* 96*   CO2 mmol/L 29 31 27   BUN mg/dL 21 31* 41*   CREATININE mg/dL 0 82 1 19 1 53*   CALCIUM mg/dL 9 6 9 5 9 3         Phosphorus: No results found for: PHOS  Magnesium: No results found for: MG  Urinalysis: No results found for: [place] Puffer, SPECGRAV, PHUR, LEUKOCYTESUR, NITRITE, PROTEINUA, GLUCOSEU, KETONESU, BILIRUBINUR, BLOODU  Ionized Calcium: No results found for: CAION  Coagulation: No results found for: PT, INR, APTT  Troponin: No results found for: TROPONINI  ABG: No results found for: PHART, MIE8KVZ, PO2ART, KET6NJC, L8ODISVL, BEART, SOURCE  Radiology review:     IMAGING  Procedure: Xr Chest Pa & Lateral    Result Date: 7/5/2019  Narrative: CHEST INDICATION:   orthopnea  COMPARISON:  Chest 6/29/2019 EXAM PERFORMED/VIEWS:  XR CHEST PA & LATERAL FINDINGS: Heart shadow appears enlarged but stable  Atherosclerotic vascular calcifications are noted  There is mild pulmonary vascular congestion  No large effusion or pneumothorax  Osseous structures appear within normal limits for patient age  Impression: 1  Pulmonary vascular congestion  2   Stable mild enlargement of the cardiac silhouette   Workstation performed: [id number]       Current Facility-Administered Medications   Medication Dose Route Frequency    acetaminophen (TYLENOL) tablet 650 mg  650 mg Oral Q6H PRN    ALPRAZolam (XANAX) tablet 0 25 mg  0 25 mg Oral TID PRN    apixaban (ELIQUIS) tablet 2 5 mg  2 5 mg Oral BID    diltiazem (CARDIZEM CD) 24 hr capsule 300 mg  300 mg Oral Daily    metoprolol tartrate (LOPRESSOR) tablet 25 mg  25 mg Oral TID    montelukast (SINGULAIR) tablet 10 mg  10 mg Oral Daily    oxybutynin (DITROPAN) tablet 5 mg  5 mg Oral Daily    pantoprazole (PROTONIX) EC tablet 40 mg  40 mg Oral Early Morning    polyethylene glycol (MIRALAX) packet 17 g  17 g Oral Daily PRN    sertraline (ZOLOFT) tablet 50 mg  50 mg Oral Daily     Medications Discontinued During This Encounter   Medication Reason    apixaban (ELIQUIS) tablet 5 mg     metoprolol tartrate (LOPRESSOR) tablet 25 mg     pravastatin (PRAVACHOL) tablet 40 mg        Macario Acosta DO

## 2019-07-07 NOTE — NURSING NOTE
No change from earlier assessment  Patient ambulates supervision to bathroom with walker  Voices no complaints of pain or discomfort  Continues on 1500 fluid restriction and legs elevated when OOB in wheelchair  Patient remains continant of B&B  IS at bedside and encouraged patient to use  Will continue to monitor and follow plan of care

## 2019-07-08 PROBLEM — D64.9 ANEMIA: Status: ACTIVE | Noted: 2019-07-08

## 2019-07-08 PROBLEM — I49.5 SICK SINUS SYNDROME (HCC): Status: ACTIVE | Noted: 2019-07-08

## 2019-07-08 LAB
ALBUMIN SERPL BCP-MCNC: 3.4 G/DL (ref 3.5–5.7)
ALP SERPL-CCNC: 49 U/L (ref 55–165)
ALT SERPL W P-5'-P-CCNC: 17 U/L (ref 7–52)
ANION GAP SERPL CALCULATED.3IONS-SCNC: 6 MMOL/L (ref 4–13)
AST SERPL W P-5'-P-CCNC: 16 U/L (ref 13–39)
BASOPHILS # BLD AUTO: 0.1 THOUSANDS/ΜL (ref 0–0.1)
BASOPHILS NFR BLD AUTO: 1 % (ref 0–2)
BILIRUB SERPL-MCNC: 1 MG/DL (ref 0.2–1)
BUN SERPL-MCNC: 17 MG/DL (ref 7–25)
CALCIUM SERPL-MCNC: 9.1 MG/DL (ref 8.6–10.5)
CHLORIDE SERPL-SCNC: 97 MMOL/L (ref 98–107)
CO2 SERPL-SCNC: 29 MMOL/L (ref 21–31)
CREAT SERPL-MCNC: 0.78 MG/DL (ref 0.6–1.2)
EOSINOPHIL # BLD AUTO: 0.2 THOUSAND/ΜL (ref 0–0.61)
EOSINOPHIL NFR BLD AUTO: 3 % (ref 0–5)
ERYTHROCYTE [DISTWIDTH] IN BLOOD BY AUTOMATED COUNT: 15.6 % (ref 11.5–14.5)
GFR SERPL CREATININE-BSD FRML MDRD: 71 ML/MIN/1.73SQ M
GLUCOSE P FAST SERPL-MCNC: 101 MG/DL (ref 65–99)
GLUCOSE SERPL-MCNC: 101 MG/DL (ref 65–99)
HCT VFR BLD AUTO: 34.6 % (ref 42–47)
HGB BLD-MCNC: 11.7 G/DL (ref 12–16)
LYMPHOCYTES # BLD AUTO: 0.7 THOUSANDS/ΜL (ref 0.6–4.47)
LYMPHOCYTES NFR BLD AUTO: 9 % (ref 21–51)
MAGNESIUM SERPL-MCNC: 2 MG/DL (ref 1.9–2.7)
MCH RBC QN AUTO: 30.4 PG (ref 26–34)
MCHC RBC AUTO-ENTMCNC: 33.7 G/DL (ref 31–37)
MCV RBC AUTO: 90 FL (ref 81–99)
MONOCYTES # BLD AUTO: 0.8 THOUSAND/ΜL (ref 0.17–1.22)
MONOCYTES NFR BLD AUTO: 10 % (ref 2–12)
NEUTROPHILS # BLD AUTO: 6 THOUSANDS/ΜL (ref 1.4–6.5)
NEUTS SEG NFR BLD AUTO: 77 % (ref 42–75)
PLATELET # BLD AUTO: 222 THOUSANDS/UL (ref 149–390)
PMV BLD AUTO: 8.1 FL (ref 8.6–11.7)
POTASSIUM SERPL-SCNC: 3.6 MMOL/L (ref 3.5–5.5)
PROT SERPL-MCNC: 6.4 G/DL (ref 6.4–8.9)
RBC # BLD AUTO: 3.83 MILLION/UL (ref 3.9–5.2)
SODIUM SERPL-SCNC: 132 MMOL/L (ref 134–143)
WBC # BLD AUTO: 7.8 THOUSAND/UL (ref 4.8–10.8)

## 2019-07-08 PROCEDURE — 80053 COMPREHEN METABOLIC PANEL: CPT | Performed by: INTERNAL MEDICINE

## 2019-07-08 PROCEDURE — 97537 COMMUNITY/WORK REINTEGRATION: CPT

## 2019-07-08 PROCEDURE — 99232 SBSQ HOSP IP/OBS MODERATE 35: CPT | Performed by: PHYSICAL MEDICINE & REHABILITATION

## 2019-07-08 PROCEDURE — 99232 SBSQ HOSP IP/OBS MODERATE 35: CPT | Performed by: INTERNAL MEDICINE

## 2019-07-08 PROCEDURE — 97535 SELF CARE MNGMENT TRAINING: CPT

## 2019-07-08 PROCEDURE — 97110 THERAPEUTIC EXERCISES: CPT

## 2019-07-08 PROCEDURE — 97116 GAIT TRAINING THERAPY: CPT

## 2019-07-08 PROCEDURE — 85025 COMPLETE CBC W/AUTO DIFF WBC: CPT | Performed by: INTERNAL MEDICINE

## 2019-07-08 PROCEDURE — 97530 THERAPEUTIC ACTIVITIES: CPT

## 2019-07-08 PROCEDURE — 83735 ASSAY OF MAGNESIUM: CPT | Performed by: INTERNAL MEDICINE

## 2019-07-08 RX ORDER — FUROSEMIDE 40 MG/1
40 TABLET ORAL ONCE
Status: COMPLETED | OUTPATIENT
Start: 2019-07-08 | End: 2019-07-08

## 2019-07-08 RX ADMIN — FUROSEMIDE 40 MG: 40 TABLET ORAL at 08:51

## 2019-07-08 RX ADMIN — ACETAMINOPHEN 650 MG: 325 TABLET ORAL at 16:52

## 2019-07-08 RX ADMIN — PANTOPRAZOLE SODIUM 40 MG: 40 TABLET, DELAYED RELEASE ORAL at 05:02

## 2019-07-08 RX ADMIN — APIXABAN 2.5 MG: 2.5 TABLET, FILM COATED ORAL at 11:35

## 2019-07-08 RX ADMIN — SERTRALINE HYDROCHLORIDE 50 MG: 50 TABLET ORAL at 08:50

## 2019-07-08 RX ADMIN — METOPROLOL TARTRATE 25 MG: 25 TABLET, FILM COATED ORAL at 21:35

## 2019-07-08 RX ADMIN — APIXABAN 2.5 MG: 2.5 TABLET, FILM COATED ORAL at 17:07

## 2019-07-08 RX ADMIN — METOPROLOL TARTRATE 25 MG: 25 TABLET, FILM COATED ORAL at 08:50

## 2019-07-08 RX ADMIN — MONTELUKAST SODIUM 10 MG: 10 TABLET, COATED ORAL at 08:50

## 2019-07-08 RX ADMIN — METOPROLOL TARTRATE 25 MG: 25 TABLET, FILM COATED ORAL at 16:16

## 2019-07-08 RX ADMIN — ALPRAZOLAM 0.25 MG: 0.25 TABLET ORAL at 21:37

## 2019-07-08 RX ADMIN — DILTIAZEM HYDROCHLORIDE 300 MG: 180 CAPSULE, COATED, EXTENDED RELEASE ORAL at 08:50

## 2019-07-08 RX ADMIN — OXYBUTYNIN CHLORIDE 5 MG: 5 TABLET ORAL at 08:50

## 2019-07-08 NOTE — PCC PHYSICAL THERAPY
7/8/19:   Patient participating in therapy and making positive gains  Patient MOD I bed mobility, S/MOD I all transfers with walker; S/MOD I ambulation up to 182" level and carpet with walker; S ramp with walker; S 5 steps with 2 handrails  Patient limited by pain and decreased endurance  Would benefit from continued inpatient [facility] PT to increase function, safety, and increased independence in prep for safe d/c to home with family and continued PT services

## 2019-07-08 NOTE — PROGRESS NOTES
07/08/19 1230   Pain Assessment   Pain Assessment 0-10   Pain Score 4   Pain Type Chronic pain   Pain Location Leg   Pain Orientation Bilateral   Cognition   Overall Cognitive Status WFL   Orientation Level Oriented X4   Transfer Bed/Chair/Wheelchair   Limitations Noted In Balance;Pain Management; Endurance;LE Strength   Adaptive Equipment Roller Walker   Stand Pivot Supervision;Modified Independent   Sit to Stand Supervision;Modified Independent   Stand to Sit Supervision;Modified Independent   Supine to Sit Modified Independent   Sit to Supine Modified Independent   Bed, Chair, Wheelchair Transfer (FIM) 5 - Patient requires verbal cues   Ambulation   Primary Discharge Mode of Locomotion Walk   Walk Assist Level Supervision;Modified Independent   Gait Pattern WNL   Assist Device Roller Walker   Distance Walked (feet) 182 ft  (680' 153' 157')   Limitations Noted In Endurance   Findings level and carpet   Walking (FIM) 5 - Patient requires supervision/monitoring AND distance 150 feet or more, no rest   Stairs   Type Stairs; Ramp   # of Steps 5   Weight Bearing Precautions WBAT   Assist Devices Bilateral Rail;Roller Walker   Findings S ramp with walker; S steps   Stairs (FIM) 2 - Patient goes up and down 4 - 11 stairs regardless of assist/device/setup   Therapeutic Interventions   Strengthening seated and standing   Balance gait and transfer training   Assessment   Treatment Assessment Patient pleasant and cooperative  Tolerated therapy well  Completed ther ex for general LE strengthening;  gait and transfer training focuisng on sequence and technique for improved balance and safety with functional mobility     PT Barriers   Physical Impairment Decreased endurance;Decreased strength;Decreased range of motion;Decreased safety awareness;Pain   Functional Limitation Walking;Transfers;Standing;Stair negotiation;Ramp negotiation   Plan   Treatment/Interventions Functional transfer training;LE strengthening/ROM; Elevations; Therapeutic exercise; Bed mobility;Gait training   Progress Improving as expected   PT Therapy Minutes   PT Time In 1230   PT Time Out 1400   PT Total Time (minutes) 90   PT Mode of treatment - Individual (minutes) 90   PT Mode of treatment - Concurrent (minutes) 0   PT Mode of treatment - Group (minutes) 0   PT Mode of treatment - Co-treat (minutes) 0   PT Mode of Teatment - Total time(minutes) 90 minutes   Therapy Time missed   Time missed?  No

## 2019-07-08 NOTE — PROGRESS NOTES
07/08/19 0819   Charting Type   Charting Type Shift assessment   Neurological   Neuro (WDL) X   Muscle Function/Sensation Assessment    R Hand  Moderate   L Hand  Moderate   Neuro Symptoms Fatigue   Relieved by Rest   Oli Coma Scale   Eye Opening 4   Best Verbal Response 5   Best Motor Response 6   [place] Coma Scale Score 15   HEENT   HEENT (WDL) X   R Eye Mildly impaired vision   L Eye Mildly impaired vision   Vision - Corrective Lenses (at bedside) Glasses   R Ear Mildly impaired hearing   L Ear Mildly impaired hearing   Teeth Missing teeth   Respiratory   Respiratory (WDL) X   Bilateral Breath Sounds Clear;Diminished   R Breath Sounds Clear;Diminished   L Breath Sounds Clear;Diminished   Cardiac   Cardiac (WDL) X   Cardiac Regularity Irregular   Pain Assessment   Pain Assessment No/denies pain   Pain Score No Pain   Peripheral Vascular   Peripheral Vascular (WDL) X   Edema Right lower extremity; Left lower extremity   RLE Edema +2   LLE Edema +2   Integumentary   Integumentary (WDL) X   Skin Condition/Temp Warm;Dry   Skin Integrity Bruising   Skin Location scattered   Skin Turgor Non-tenting   Juan Scale   Sensory Perceptions 3   Moisture 3   Activity 3   Mobility 3   Nutrition 3   Friction and Shear 3   Juan Scale Score 18   Musculoskeletal   Musculoskeletal (WDL) X   Level of Assistance Standby assist, set-up cues, supervision of patient - no hands on   Assistive Device Front wheel walker   RLE Limited movement   LLE Limited movement   Gastrointestinal   Gastrointestinal (WDL) WDL   Stool Assessment   Bowel Incontinence No   Genitourinary   Genitourinary (WDL) WDL   Urine Assessment   Urinary Incontinence No   Genitalia   Female Genitalia Intact   Genitourinary Additional Assessments   Genitourinary Additional Assessments No   Anal/Rectal   Anal/Rectal (WDL) WDL   Psychosocial   Psychosocial (WDL) WDL

## 2019-07-08 NOTE — ASSESSMENT & PLAN NOTE
Significant  Not a terrific candidate for mitral valve intervention because of her general frailty  In any event she is not interested

## 2019-07-08 NOTE — PCC OCCUPATIONAL THERAPY
7/8/19: Pt participates in ADLs, transfers/ mobility and BUe tehrex  Pt is making great progress with current LOF listed above  Pt requires assist and Supervision due to decreased endurance, safety, balance and edema and decreased strength LLE  Pt is demonstrating energy conservation with rest breaks and A/E use due to cardiac diagnoses  Pt will benefit from continued skilled OT services to increase independence with daily tasks

## 2019-07-08 NOTE — PROGRESS NOTES
07/08/19 0832   Pain Assessment   Pain Assessment No/denies pain   Restrictions/Precautions   Precautions Fall Risk;Cardiac/sternal   Grooming   Able To Initiate Tasks; Acquire Items;Comb/Brush Hair;Wash/Dry Face;Brush/Clean Teeth;Wash/Dry Hands   Limitation Noted In Safety   Grooming (FIM) 5 - [place] sets up supplies or applies device   Bathing   Assessed Bath Style Shower   Anticipated D/C Bath Style Shower;Sponge Bath   Able to [place] No   Able to [employer] Temperature No   Able to Wash/Rinse/Dry (body part) Left Arm;Right Arm;L Upper Leg;R Upper Leg;L Lower Leg/Foot;R Lower Leg/Foot;Chest;Abdomen;Perineal Area; Buttocks   Limitations Noted in ROM; Safety; Endurance   Bathing (FIM) 5 - Patient requires supervision/monitoring but completes 10/10 parts   Tub/Shower Transfer   Limitations Noted In Endurance; Safety;LE Strength   Adaptive Equipment Seat with Back   Assessed Shower   Shower Transfer (FIM) 4 - Patient requires steadying assist or light touching   Dressing/Undressing Clothing   Remove UB Clothes Pullover Shirt   Remove LB Clothes Pants; Undergarment;Socks   Don UB [address] [place] [address]; Undergarment;Socks; Shoes   Limitations Noted In Balance; Endurance; Safety;Strength   Adaptive Equipment Reacher;Sock Aide;LH Shoehorn   UB Dressing (FIM) 5 - [place] sets up supplies or applies device   LB Dressing (FIM) 4 - Patient completes 75% of all tasks  (assist with shoes due to edema)   Transfer Bed/Chair/Wheelchair   Limitations Noted In Balance; Endurance;LE Strength   Stand Pivot Supervision   Sit to Stand Supervision   Stand to Sit Supervision   Bed, Chair, Wheelchair Transfer (FIM) 5 - Patient requires supervision/monitoring   Toileting   Able to Pull Clothing down yes, up yes  Manage Hygiene Bladder; Bowel   Limitations Noted In Balance; Safety;LE Strength   Toileting (FIM) 5 - Patient requires supervision/monitoring   Toilet Transfer   Surface Assessed Raised Toilet   Transfer Technique Stand Pivot   Limitations Noted In Balance; Endurance;LE Strength; Safety   Toilet Transfer (FIM) 5 - Patient requires supervision/monitoring   Exercise Tools   Other Exercise Tool 1 card match reaching BUE while sitting   Other Exercise Tool 2 Sanderbox 2 sets 15 all planes with 2 pounds   Other Exercise Tool 3 functional recher for retreival of pins from floor and then hanging up with L hand   Cognition   Overall Cognitive Status WFL   Orientation Level Oriented X4   Additional Activities   Additional Activities Other (Comment)  (fxl mobility to/ from shower room with RW and S)   Assessment   Treatment Assessment Pt agreeable to a shower this morning with ADL to begin session and then transitioned to the gym for UB therex  Pt completes 36 minutes concurrent treatment with another patient focusing on similar goals as another top increase UB strength and ROM for task completion  Pt requires assist for LB dressing due to increased edema B feet and increased time due to decerased endurance/ safety and decreased LLE strength  Pt is making progress towards goals and will benefit from continued skilled OT services to increase independence  Problem List Decreased strength;Decreased range of motion;Decreased endurance; Impaired balance;Decreased safety awareness   Plan   Treatment/Interventions ADL retraining;Functional transfer training; Therapeutic exercise; Endurance training;Patient/family training; Compensatory technique education   Progress Progressing toward goals   OT Therapy Minutes   OT Time In 0932   OT Time Out 1104   OT Total Time (minutes) 92   OT Mode of treatment - Individual (minutes) 56   OT Mode of treatment - Concurrent (minutes) 36   Therapy Time missed   Time missed?  No

## 2019-07-08 NOTE — ASSESSMENT & PLAN NOTE
I had held the lower dose Eliquis anticipating possible pacemaker implantation but for now will resume this

## 2019-07-08 NOTE — PLAN OF CARE
Problem: Potential for Falls  Goal: Patient will remain free of falls  Description  INTERVENTIONS:  - Assess patient frequently for physical needs  -  Identify cognitive and physical deficits and behaviors that affect risk of falls    -  [place] fall precautions as indicated by assessment   - Educate patient/family on patient safety including physical limitations  - Instruct patient to call for assistance with activity based on assessment  - Modify environment to reduce risk of injury  - Consider OT/PT consult to assist with strengthening/mobility  Outcome: Progressing     Problem: PAIN - ADULT  Goal: Verbalizes/displays adequate comfort level or baseline comfort level  Description  Interventions:  - Encourage patient to monitor pain and request assistance  - Assess pain using appropriate pain scale  - Administer analgesics based on type and severity of pain and evaluate response  - Implement non-pharmacological measures as appropriate and evaluate response  - Consider cultural and social influences on pain and pain management  - Notify physician/advanced practitioner if interventions unsuccessful or patient reports new pain  Outcome: Progressing     Problem: INFECTION - ADULT  Goal: Absence or prevention of progression during hospitalization  Description  INTERVENTIONS:  - Assess and monitor for signs and symptoms of infection  - Monitor lab/diagnostic results  - Monitor all insertion sites, i e  indwelling lines, tubes, and drains  - Monitor endotracheal (as able) and nasal secretions for changes in amount and color  - [place] appropriate cooling/warming therapies per order  - Administer medications as ordered  - Instruct and encourage patient and family to use good hand hygiene technique  - Identify and instruct in appropriate isolation precautions for identified infection/condition  Outcome: Progressing     Problem: SAFETY ADULT  Goal: Maintain or return to baseline ADL function  Description  INTERVENTIONS:  -  Assess patient's ability to carry out ADLs; assess patient's baseline for ADL function and identify physical deficits which impact ability to perform ADLs (bathing, care of mouth/teeth, toileting, grooming, dressing, etc )  - Assess/evaluate cause of self-care deficits   - Assess range of motion  - Assess patient's mobility; develop plan if impaired  - Assess patient's need for assistive devices and provide as appropriate  - Encourage maximum independence but intervene and supervise when necessary  ¯ Involve family in performance of ADLs  ¯ Assess for home care needs following discharge   ¯ Request OT consult to assist with ADL evaluation and planning for discharge  ¯ Provide patient education as appropriate  Outcome: Progressing  Goal: Maintain or return mobility status to optimal level  Description  INTERVENTIONS:  - Assess patient's baseline mobility status (ambulation, transfers, stairs, etc )    - Identify cognitive and physical deficits and behaviors that affect mobility  - Identify mobility aids required to assist with transfers and/or ambulation (gait belt, sit-to-stand, lift, walker, cane, etc )  - [place] fall precautions as indicated by assessment  - Record patient progress and toleration of activity level on Mobility SBAR; progress patient to next Phase/Stage  - Instruct patient to call for assistance with activity based on assessment  - Request Rehabilitation consult to assist with strengthening/weightbearing, etc   Outcome: Progressing     Problem: DISCHARGE PLANNING  Goal: Discharge to home or other facility with appropriate resources  Description  INTERVENTIONS:  - Identify barriers to discharge w/patient and caregiver  - Arrange for needed discharge resources and transportation as appropriate  - Identify discharge learning needs (meds, wound care, etc )  - Arrange for interpretive services to assist at discharge as needed  - Refer to Case Management Department for coordinating discharge planning if the patient needs post-hospital services based on physician/advanced practitioner order or complex needs related to functional status, cognitive ability, or social support system  Outcome: Progressing     Problem: CARDIOVASCULAR - ADULT  Goal: Maintains optimal cardiac output and hemodynamic stability  Description  INTERVENTIONS:  - Monitor I/O, vital signs and rhythm  - Monitor for S/S and trends of decreased cardiac output i e  bleeding, hypotension  - Administer and titrate ordered vasoactive medications to optimize hemodynamic stability  - Assess quality of pulses, skin color and temperature  - Assess for signs of decreased coronary artery perfusion - ex   Angina  - Instruct patient to report change in severity of symptoms  Outcome: Progressing  Goal: Absence of cardiac dysrhythmias or at baseline rhythm  Description  INTERVENTIONS:  - monitor vital signs, obtain 12 lead EKG if indicated  - Administer antiarrhythmic and heart rate control medications as ordered  - Monitor electrolytes and administer replacement therapy as ordered   Outcome: Progressing     Problem: RESPIRATORY - ADULT  Goal: Achieves optimal ventilation and oxygenation  Description  INTERVENTIONS:  - Assess for changes in respiratory status  - Assess for changes in mentation and behavior  - Position to facilitate oxygenation and minimize respiratory effort  - Oxygen administration by appropriate delivery method based on oxygen saturation (per order) or ABGs  - Initiate smoking cessation education as indicated  - Encourage broncho-pulmonary hygiene including cough, deep breathe, Incentive Spirometry  - Assess the need for suctioning and aspirate as needed  - Assess and instruct to report SOB or any respiratory difficulty  - Respiratory Therapy support as indicated  Outcome: Progressing     Problem: GASTROINTESTINAL - ADULT  Goal: Maintains adequate nutritional intake  Description  INTERVENTIONS:  - Monitor percentage of each meal consumed  - Identify factors contributing to decreased intake, treat as appropriate  - Assist with meals as needed  - Monitor I&O, WT and lab values  - Obtain nutrition services referral as needed  Outcome: Progressing     Problem: METABOLIC, FLUID AND ELECTROLYTES - ADULT  Goal: Electrolytes maintained within normal limits  Description  INTERVENTIONS:  - Monitor labs and assess patient for signs and symptoms of electrolyte imbalances  - Administer electrolyte replacement as ordered  - Monitor response to electrolyte replacements, including repeat lab results as appropriate  - Instruct patient on fluid and nutrition as appropriate  Outcome: Progressing  Goal: Fluid balance maintained  Description  INTERVENTIONS:  - Monitor labs and assess for signs and symptoms of volume excess or deficit  - Monitor I/O and WT  - Instruct patient on fluid and nutrition as appropriate  Outcome: Progressing     Problem: Nutrition/Hydration-ADULT  Goal: Nutrient/Hydration intake appropriate for improving, restoring or maintaining nutritional needs  Description  Monitor and assess patient's nutrition/hydration status for malnutrition (ex- brittle hair, bruises, dry skin, pale skin and conjunctiva, muscle wasting, smooth red tongue, and disorientation)  Collaborate with interdisciplinary team and initiate plan and interventions as ordered  Monitor patient's weight and dietary intake as ordered or per policy  Utilize nutrition screening tool and intervene per policy  Determine patient's food preferences and provide high-protein, high-caloric foods as appropriate       INTERVENTIONS:  - Monitor oral intake, urinary output, labs, and treatment plans  - Assess nutrition and hydration status and recommend course of action  - Evaluate amount of meals eaten  - Assist patient with eating if necessary   - Allow adequate time for meals  - Recommend/ encourage appropriate diets, oral nutritional supplements, and vitamin/mineral supplements  - Order, calculate, and assess calorie counts as needed  - Recommend, monitor, and adjust tube feedings and TPN/PPN based on assessed needs  - Assess need for intravenous fluids  - Provide specific nutrition/hydration education as appropriate  - Include patient/family/caregiver in decisions related to nutrition  Outcome: Progressing     Problem: Prexisting or High Potential for Compromised Skin Integrity  Goal: Skin integrity is maintained or improved  Description  INTERVENTIONS:  - Identify patients at risk for skin breakdown  - Assess and monitor skin integrity  - Assess and monitor nutrition and hydration status  - Monitor labs (i e  albumin)  - Assess for incontinence   - Turn and reposition patient  - Assist with mobility/ambulation  - Relieve pressure over bony prominences  - Avoid friction and shearing  - Provide appropriate hygiene as needed including keeping skin clean and dry  - Evaluate need for skin moisturizer/barrier cream  - Collaborate with interdisciplinary team (i e  Nutrition, Rehabilitation, etc )   - Patient/family teaching  Outcome: Progressing

## 2019-07-08 NOTE — PROGRESS NOTES
Physical Medicine and Rehabilitation Progress Note  Joanne Shukla 81 y o  female MRN: [id number]  Unit/Bed#: -01 Encounter: [id number]    HPI: Joanne Shukla is a 81 y o  female who presented to the [facility] with an irregular heart beat and was found to have A-fib for which she was seen by cardiology and offered a pacer but refused and was medically managed  Course was complicated by acute on chronic RF and hyponatremia for which renal service managed the patient         Chief Complaint: debility     Interval/subjective: patient denies any events over the weekend     ROS: A 10 point ROS was performed; negative except as noted above       Assessment/Plan:      Anemia  Assessment & Plan  - mild at 11 7  - IM managing      Hyponatremia  Assessment & Plan  - improved to 132 with FR of 1 5 L per renal   -renal managing     CKD (chronic kidney disease)  Assessment & Plan  - per renal baseline Cr 0 7-0 8  - Cr currently 0 78  -renal managing     * HTN (hypertension)  Assessment & Plan  - at home on lopressor 25 mg q12 and lisinopril 10 mg qd   -IM and renal co-managing     OAB (overactive bladder)  Assessment & Plan  - on home ditropan 5 mg qd     GERD (gastroesophageal reflux disease)  Assessment & Plan  - at home on prilosec 20 mg qd (therapeutic substitution as inpt)     Depression with anxiety  Assessment & Plan  - on home zoloft 50 mg qd  - also on home xanax 0 25 mg prn  - per PAPDMP last filled prescription for xanax 0 25 mg tabs on 4/26/19 for 270 tabs with 0 refills (appears to be a regular prescription every 3 months provided by PCP)     Diastolic heart failure (HCC)  Assessment & Plan  - was on lasix 20 mg BID PTA  - volume status currently stable w/o diuretics   -IM managing as inpt   - OP FU with Dr Krysta Ware          A-fib [facility])  Assessment & Plan  - seen by cards in acute care and offered pacer (for increased andrew blockade) however patient and family have d/w   Cassandra Georges and they have all decided that it would be best not to pursue this option   - renal fxn improved so patient could be on eliquis 5 mg BID however cards elected to keep patient on eliquis 2 5 mg BID due to possible fall risk (was not on any AC PTA)  - at home on lopressor 25 mg q12   - IM managing rate control agents   - OP FU with Dr Donnell Dougherty      Mitral regurgitation  Assessment & Plan  - offered surgical intervention by cards however patient refused  - OP FU with Dr Donnell Dougherty     CAD (coronary artery disease)  Assessment & Plan  - h/o stent in 2007  - home ASA 81 mg qd d/c'd by cards in acute care due to [facility]  - per cards notes in acute care not on statin however per EMR was on zocor 20 mg qpm (on 3/15/19 LDL was 69); will clarify   - at home was on lopressor 25 mg q12 PTA   - IM managing as inpt  - OP FU with Dr Donnell Dougherty        Scheduled Meds:    Current Facility-Administered Medications:  acetaminophen 650 mg Oral Q6H PRN Patrizia Monzon MD   ALPRAZolam 0 25 mg Oral TID PRN Patrizia Monzon MD   apixaban 2 5 mg Oral BID Renato Frias MD   diltiazem 300 mg Oral Daily Patrizia Monzon MD   metoprolol tartrate 25 mg Oral TID Patrizia Monzon MD   montelukast 10 mg Oral Daily Patrizia Monzon MD   oxybutynin 5 mg Oral Daily Patrizia Monzon MD   pantoprazole 40 mg Oral Early Morning Patrizia Monzon MD   polyethylene glycol 17 g Oral Daily PRN Patrizia Monzon MD   sertraline 50 mg Oral Daily Patrizia Monzon MD        Incidental findings:  1) elevated peak PA pressure: OP FU with Dr Donnell Dougherty  2) 1 3 cm rt adrenal nodule: per radiology report recommend repeat non-contrast CT or MRI in 12 months, will defer to PCP to arrange  3) fluid collection above bladder: noted on US and FU CT was recommended which did not show a fluid collection above the bladder   4) low alk phos with nl tot bilirubin: mildly decreased alk phos of 49 (LLN 55): OP FU with PCP with further testing/treatment and/or specialist referral at PCP's discretion        DVT ppx: eliquis             Objective:    Functional Update:  Mobility: CG  Transfers: CG  ADLs: min         Physical Exam:    T: 97 5  HR: 84  BP: 126/84  RR: 18  POx: 94%          General: alert, no apparent distress, cooperative and comfortable  HEENT:  Head: Normal, normocephalic, atraumatic    CARDIAC:  +S1/2  LUNGS:  respirations unlabored   ABDOMEN:  soft NT   EXTREMITIES:  volume status currently stable   NEURO:   mental status, speech normal, alert and oriented x3  PSYCH:  mood/affect currently stable         Laboratory:   Results from last 7 days   Lab Units 07/08/19  0522 07/04/19  1148 07/02/19  0440   HEMOGLOBIN g/dL 11 7* 12 1 12 4   HEMATOCRIT % 34 6* 36 2* 36 8*   WBC Thousand/uL 7 80 9 90 8 80     Results from last 7 days   Lab Units 07/08/19 0522 07/04/19  1148 07/02/19  0440   BUN mg/dL 17 21 31*   SODIUM mmol/L 132* 127* 130*   POTASSIUM mmol/L 3 6 4 3 5 2   CHLORIDE mmol/L 97* 92* 95*   CREATININE mg/dL 0 78 0 82 1 19   AST U/L 16  --   --    ALT U/L 17  --   --             Patient Active Problem List   Diagnosis    CAD (coronary artery disease)    Mitral regurgitation    A-fib (HCC)    CKD (chronic kidney disease)    Diastolic heart failure (HCC)    HTN (hypertension)    Hyponatremia    Depression with anxiety    GERD (gastroesophageal reflux disease)    OAB (overactive bladder)    Anemia

## 2019-07-08 NOTE — ASSESSMENT & PLAN NOTE
She continues to with periods of relative tachycardia but is completely without symptoms  I discussed this at length with the patient and her daughter Stephanie Anna  At this point, although better rate control would make us feel better, I am not of the strong belief that it will to change her quality of life to put a pacemaker in and increase AV nadrew blockade  All are okay with holding off for now

## 2019-07-08 NOTE — PROGRESS NOTES
Patient place don O2 this AM while ion  Bed for low saturation, OOB for breakfast and saturation WNL on RA  Nephology in to see patient in AM, another dose of lasix given, weights WNL  Cardiologist in to see patient, no new orders at this time  Will continue to monitor and follow plan of care

## 2019-07-08 NOTE — CONSULTS
Consult- Lennox Bailer 1935, 81 y o  female MRN: [id number]    Unit/Bed#: [facility] 218-01 Encounter: [id number]    Primary Care Provider: Brandon Feldman MD   Date and time admitted to hospital: 7/2/2019  4:09 PM      Inpatient consult to Cardiology  Consult performed by: Cachorro Euceda MD  Consult ordered by: NEAL Wright          Sick sinus syndrome [facility])  Assessment & Plan  She continues to with periods of relative tachycardia but is completely without symptoms  I discussed this at length with the patient and her daughter Ernestina Newell  At this point, although better rate control would make us feel better, I am not of the strong belief that it will to change her quality of life to put a pacemaker in and increase AV andrew blockade  All are okay with holding off for now  A-fib [facility])  Assessment & Plan  I had held the lower dose Eliquis anticipating possible pacemaker implantation but for now will resume this  Mitral regurgitation  Assessment & Plan  Significant  Not a terrific candidate for mitral valve intervention because of her general frailty  In any event she is not interested  Other summary comments: In general the patient is really doing better when compared to a week ago  She is more engaged and is able to walk a little bit with assistance  Nonetheless she is not keen on any aggressive care and is more focused right now on discomfort of skin lesions then any sense of dyspnea  HPI: Lennox Bailer is a 81y o  year old female who presented to rehab to regain strength  I had seen her while she was in the acute hospital   She has significant mitral regurgitation as well as tachy-nikia syndrome  For details see my prior notes  Suffice it to say that she had a fluctuating creatinine while in the hospital as well as dyspnea but strikingly all has improved    Her current AV andrew blockade is as much as we could safely give her without adding a pacemaker to prevent more significant bradycardia  While in the acute phase of her hospital stay she certainly did not want any pacemaker implantation nor mitral valve intervention  I had spoken to her daughter about this as well  Yesterday I was informed that she was willing to have a pacemaker placed  I note that I discussed this again at length with her today  In general she seems to be a little bit more alert than last week, she has no sense of dyspnea and certainly no palpitations  With a more in-depth discussion about the pros and cons she prefers to hold off on any pacemaker  In fact, she is please that she is doing better and hoping to go home soon  As I outlined above again, I discussed strategies and philosophy of healthcare at length with the patient's daughter Constantin Silva as well  MOST  RECENT CARDIAC IMAGING:   Echo: 6/27/2019:  EF 60%  Moderate LVH  Moderate MR with marked prolapse of the anterior and posterior leaflets  Mild TR with moderate pulm HTN     TTE 2017 - EF 55%, moderate MR     Cardiac cath 2012 - patent LAD stent, severe MR  No significant CAD            Review of Systems: a 10 point review of systems was conducted and is negative except for as mentioned in the HPI or as below  Historical Information   Past Medical History:   Diagnosis Date    Acute on chronic diastolic congestive heart failure ([facility] [address] ) 6/27/2019    Arthritis     Coronary artery disease     history of stenting    Eczema     years    History of echocardiogram 07/20/2017    EF 55%, mild concentric LVH, bileaflet MVP with moderate MR, left atrial enlargement   History of transfusion     Hyperlipidemia     Hypertension     Mitral regurgitation      Past Surgical History:   Procedure Laterality Date    ABDOMINAL SURGERY      hysterectomy    CARDIAC CATHETERIZATION  04/10/2012    EF 65%, no significant CAD, patent stents, severe MR     CORONARY ANGIOPLASTY WITH STENT PLACEMENT  03/21/2007    EF 55%, GARRET to LAD      EYE SURGERY      b/l cataracts    HYSTERECTOMY      JOINT REPLACEMENT      Rt knee     Social History     Substance and Sexual Activity   Alcohol Use Not Currently     Social History     Substance and Sexual Activity   Drug Use Never     Social History     Tobacco Use   Smoking Status Never Smoker   Smokeless Tobacco Never Used       Family History:   No longer relevant  Meds/Allergies   all current active meds have been reviewed  Medications Prior to Admission   Medication    ALPRAZolam (XANAX) 0 25 mg tablet    ASPIRIN 81 PO    diphenhydrAMINE (BENADRYL) 25 mg tablet    fluticasone (FLONASE) 50 mcg/act nasal spray    furosemide (LASIX) 20 mg tablet    lisinopril (ZESTRIL) 10 mg tablet    meloxicam (MOBIC) 7 5 mg tablet    metoprolol tartrate (LOPRESSOR) 50 mg tablet    montelukast (SINGULAIR) 10 mg tablet    omeprazole (PriLOSEC) 20 mg delayed release capsule    oxybutynin (DITROPAN) 5 mg tablet    sertraline (ZOLOFT) 50 mg tablet    simvastatin (ZOCOR) 20 mg tablet       Allergies   Allergen Reactions    Chocolate Flavor     Iodine Other (See Comments)     shellfish- difficulty breathing    Lactose     Shellfish-Derived Products     Codeine Rash       Objective   Vitals: Blood pressure 126/84, pulse 84, temperature 97 5 °F (36 4 °C), temperature source Temporal, resp  rate 18, height 4' 11" (1 499 m), weight 75 8 kg (167 lb 1 6 oz), SpO2 93 %  , Body mass index is 33 75 kg/m² ,   Orthostatic Blood Pressures      Most Recent Value   Blood Pressure  126/84 filed at 07/08/2019 0726   Patient Position - Orthostatic VS  Lying filed at 07/08/2019 4516          Systolic (71ZRB), FNT:029 , Min:124 , BAY:651     Diastolic (22KMI), SLO:73, Min:70, Max:84              Physical Exam:  General:  Normal appearance in no distress  Eyes:  Anicteric  Oral mucosa:  Moist   Neck:  No JV D  Carotid upstrokes are brisk without bruits  No masses  Chest:  Basal crackles bilaterally    Cardiac:  Irregularly irregular  Normal PMI  Normal S1 and S2   Grade 6-6/5 holosystolic murmur loudest at the apex   Abdomen:  Soft and nontender  No palpable organomegaly or aortic enlargement  Extremities:  1 + peripheral edema  Neuro:  Grossly symmetric  Psych:  Alert and oriented x3            Lab Results:     Troponins:     BNP:   Results from last 6 Months   Lab Units 06/25/19  1812   BNP pg/mL 1,303*       CBC :   Results from last 7 days   Lab Units 07/08/19  0522 07/04/19  1148   WBC Thousand/uL 7 80 9 90   HEMOGLOBIN g/dL 11 7* 12 1   HEMATOCRIT % 34 6* 36 2*   MCV fL 90 90   PLATELETS Thousands/uL 222 230     TSH:     CMP:   Results from last 7 days   Lab Units 07/08/19  0522 07/04/19  1148   POTASSIUM mmol/L 3 6 4 3   CHLORIDE mmol/L 97* 92*   CO2 mmol/L 29 29   BUN mg/dL 17 21   CREATININE mg/dL 0 78 0 82   AST U/L 16  --    ALT U/L 17  --    EGFR ml/min/1 73sq m 71 66     Lipid Profile:     Coags:

## 2019-07-08 NOTE — ASSESSMENT & PLAN NOTE
- mild at 11 7  - IM managing and have cleared patient for dc with scrip for H&H with results to PCP upon dc

## 2019-07-08 NOTE — PROGRESS NOTES
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  4  Hyponatremia              Significantly better with 1 5 L fluid restriction  Continue with coarse appear at this time  5  Edema   I will given additional dose of furosemide this morning  Patient continues to have edema and is now requiring oxygen via nasal cannula  6  Right adrenal adenoma -most likely benign  7  Atrial fibrillation with rapid ventricular response              Patient has valvular atrial fibrillation due to mitral regurgitation  Patient wishes to have a valve evaluation for potential intervention  Cardiology to discuss further during her stay at the rehab unit  Patient's heart rate remains elevated, she is currently on diltiazem 300 mg once a day, as well as metoprolol 25 mg t i d   Patient currently on apixaban 2 5 twice a day  7/8:  Defer to Cardiology, patient's heart rate this morning was appropriate  8  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   Patient informed me that she also has a daughter who has porphyria  9  Chronic diastolic congestive heart failure     Follow up reason for today's visit:  Electrolyte disorder/chronic kidney disease    HTN (hypertension)    Patient Active Problem List   Diagnosis    CAD (coronary artery disease)    Mitral regurgitation    A-fib ([address] )    Acute on chronic kidney failure (HCC)    Diastolic heart failure (HCC)    HTN (hypertension)    Hyponatremia    Depression with anxiety    GERD (gastroesophageal reflux disease)    OAB (overactive bladder)         Subjective:   No acute events overnight    Objective:     Vitals: Blood pressure 126/84, pulse 84, temperature 97 5 °F (36 4 °C), temperature source Temporal, resp   rate 18, height 4' 11" (1 499 m), weight 75 8 kg (167 lb 1 6 oz), SpO2 94 %  ,Body mass index is 33 75 kg/m²  Weight (last 2 days)     Date/Time   Weight    07/08/19 0600   75 8 (167 1)    07/07/19 0600   76 1 (167 8)    07/06/19 0600   75 8 (167 19)                Intake/Output Summary (Last 24 hours) at 7/8/2019 0800  Last data filed at 7/7/2019 1709  Gross per 24 hour   Intake 720 ml   Output --   Net 720 ml     I/O last 3 completed shifts: In: 5 [P O :720]  Out: -          Physical Exam: /84 (BP Location: Left arm)   Pulse 84   Temp 97 5 °F (36 4 °C) (Temporal)   Resp 18   Ht 4' 11" (1 499 m)   Wt 75 8 kg (167 lb 1 6 oz)   SpO2 94%   BMI 33 75 kg/m²     General Appearance:    Alert, cooperative, no distress, appears stated age   Head:    Normocephalic, without obvious abnormality, atraumatic   Eyes:    Conjunctiva/corneas clear   Ears:    Normal external ears   Nose:   Nares normal, septum midline, mucosa normal, no drainage    or sinus tenderness   Throat:   Lips, mucosa, and tongue normal; teeth and gums normal   Neck:   Supple   Back:     Symmetric, no curvature, ROM normal, no CVA tenderness   Lungs:     Clear to auscultation bilaterally, respirations unlabored   Chest wall:    No tenderness or deformity   Heart:    Regular rate and rhythm, S1 and S2 normal, no murmur, rub   or gallop   Abdomen:     Soft, non-tender, bowel sounds active   Extremities:   Extremities normal, atraumatic, no cyanosis +2 bilateral lower extremity edema   Skin:   Skin color, texture, turgor normal, areas of healing blisters noted bilateral upper extremities   Lymph nodes:   Cervical normal   Neurologic:   CNII-XII intact            Lab, Imaging and other studies: I have personally reviewed pertinent labs    CBC:   Lab Results   Component Value Date    WBC 7 80 07/08/2019    HGB 11 7 (L) 07/08/2019    HCT 34 6 (L) 07/08/2019    MCV 90 07/08/2019     07/08/2019    MCH 30 4 07/08/2019    MCHC 33 7 07/08/2019    RDW 15 6 (H) 07/08/2019    MPV 8 1 (L) 07/08/2019     CMP:   Lab Results   Component Value Date    K 3 6 07/08/2019    CL 97 (L) 07/08/2019    CO2 29 07/08/2019    BUN 17 07/08/2019    CREATININE 0 78 07/08/2019    CALCIUM 9 1 07/08/2019    AST 16 07/08/2019    ALT 17 07/08/2019    ALKPHOS 49 (L) 07/08/2019    EGFR 71 07/08/2019         Results from last 7 days   Lab Units 07/08/19  0522 07/04/19  1148 07/02/19  0440   POTASSIUM mmol/L 3 6 4 3 5 2   CHLORIDE mmol/L 97* 92* 95*   CO2 mmol/L 29 29 31   BUN mg/dL 17 21 31*   CREATININE mg/dL 0 78 0 82 1 19   CALCIUM mg/dL 9 1 9 6 9 5   ALK PHOS U/L 49*  --   --    ALT U/L 17  --   --    AST U/L 16  --   --          Phosphorus: No results found for: PHOS  Magnesium:   Lab Results   Component Value Date    MG 2 0 07/08/2019     Urinalysis: No results found for: COLORU, CLARITYU, SPECGRAV, PHUR, LEUKOCYTESUR, NITRITE, PROTEINUA, GLUCOSEU, KETONESU, BILIRUBINUR, BLOODU  Ionized Calcium: No results found for: CAION  Coagulation: No results found for: PT, INR, APTT  Troponin: No results found for: TROPONINI  ABG: No results found for: PHART, YLX9GHO, PO2ART, JEG2WPS, S7FCGDQM, BEART, SOURCE  Radiology review:     IMAGING  Procedure: Xr Chest Pa & Lateral    Result Date: 7/5/2019  Narrative: CHEST INDICATION:   orthopnea  COMPARISON:  Chest 6/29/2019 EXAM PERFORMED/VIEWS:  XR CHEST PA & LATERAL FINDINGS: Heart shadow appears enlarged but stable  Atherosclerotic vascular calcifications are noted  There is mild pulmonary vascular congestion  No large effusion or pneumothorax  Osseous structures appear within normal limits for patient age  Impression: 1  Pulmonary vascular congestion  2   Stable mild enlargement of the cardiac silhouette   Workstation performed: [id number]       Current Facility-Administered Medications   Medication Dose Route Frequency    acetaminophen (TYLENOL) tablet 650 mg  650 mg Oral Q6H PRN    ALPRAZolam (XANAX) tablet 0 25 mg  0 25 mg Oral TID PRN    diltiazem (CARDIZEM CD) 24 hr capsule 300 mg  300 mg Oral Daily    metoprolol tartrate (LOPRESSOR) tablet 25 mg  25 mg Oral TID    montelukast (SINGULAIR) tablet 10 mg  10 mg Oral Daily    oxybutynin (DITROPAN) tablet 5 mg  5 mg Oral Daily    pantoprazole (PROTONIX) EC tablet 40 mg  40 mg Oral Early Morning    polyethylene glycol (MIRALAX) packet 17 g  17 g Oral Daily PRN    sertraline (ZOLOFT) tablet 50 mg  50 mg Oral Daily     Medications Discontinued During This Encounter   Medication Reason    apixaban (ELIQUIS) tablet 5 mg     metoprolol tartrate (LOPRESSOR) tablet 25 mg     pravastatin (PRAVACHOL) tablet 40 mg     apixaban (ELIQUIS) tablet 2 5 mg        Karolee Cheadle, DO

## 2019-07-09 PROCEDURE — 97537 COMMUNITY/WORK REINTEGRATION: CPT

## 2019-07-09 PROCEDURE — 99231 SBSQ HOSP IP/OBS SF/LOW 25: CPT | Performed by: INTERNAL MEDICINE

## 2019-07-09 PROCEDURE — 97110 THERAPEUTIC EXERCISES: CPT

## 2019-07-09 PROCEDURE — 99233 SBSQ HOSP IP/OBS HIGH 50: CPT | Performed by: PHYSICAL MEDICINE & REHABILITATION

## 2019-07-09 PROCEDURE — 97116 GAIT TRAINING THERAPY: CPT

## 2019-07-09 PROCEDURE — 97530 THERAPEUTIC ACTIVITIES: CPT

## 2019-07-09 RX ORDER — PRAVASTATIN SODIUM 40 MG
40 TABLET ORAL
Status: DISCONTINUED | OUTPATIENT
Start: 2019-07-09 | End: 2019-07-12

## 2019-07-09 RX ORDER — FUROSEMIDE 40 MG/1
40 TABLET ORAL ONCE
Status: COMPLETED | OUTPATIENT
Start: 2019-07-09 | End: 2019-07-09

## 2019-07-09 RX ADMIN — DILTIAZEM HYDROCHLORIDE 300 MG: 180 CAPSULE, COATED, EXTENDED RELEASE ORAL at 08:16

## 2019-07-09 RX ADMIN — APIXABAN 2.5 MG: 2.5 TABLET, FILM COATED ORAL at 08:16

## 2019-07-09 RX ADMIN — METOPROLOL TARTRATE 25 MG: 25 TABLET, FILM COATED ORAL at 15:54

## 2019-07-09 RX ADMIN — SERTRALINE HYDROCHLORIDE 50 MG: 50 TABLET ORAL at 08:17

## 2019-07-09 RX ADMIN — PANTOPRAZOLE SODIUM 40 MG: 40 TABLET, DELAYED RELEASE ORAL at 06:35

## 2019-07-09 RX ADMIN — APIXABAN 2.5 MG: 2.5 TABLET, FILM COATED ORAL at 17:20

## 2019-07-09 RX ADMIN — FUROSEMIDE 40 MG: 40 TABLET ORAL at 11:01

## 2019-07-09 RX ADMIN — ACETAMINOPHEN 650 MG: 325 TABLET ORAL at 08:15

## 2019-07-09 RX ADMIN — OXYBUTYNIN CHLORIDE 5 MG: 5 TABLET ORAL at 08:17

## 2019-07-09 RX ADMIN — METOPROLOL TARTRATE 25 MG: 25 TABLET, FILM COATED ORAL at 08:17

## 2019-07-09 RX ADMIN — MONTELUKAST SODIUM 10 MG: 10 TABLET, COATED ORAL at 08:16

## 2019-07-09 RX ADMIN — PRAVASTATIN SODIUM 40 MG: 40 TABLET ORAL at 15:54

## 2019-07-09 NOTE — TEAM CONFERENCE
Acute RehabilitationTeam Conference Note  Date: 7/9/2019   Time: 11:21 AM       Patient Name:  Marc Delgadillo       Medical Record Number: [id number]   YOB: 1935  Sex: Female          Room/Bed:  [facility] 218/[facility] 218-01  Payor Info:  Payor: MEDICARE / Plan: MEDICARE A AND B / Product Type: Medicare A & B Fee for Service /      Admitting Diagnosis: Cardiac abnormality [Q24 9]   Admit Date/Time:  7/2/2019  4:09 PM  Admission Comments: No comment available     Primary Diagnosis:  HTN (hypertension)  Principal Problem: HTN (hypertension)    Patient Active Problem List    Diagnosis Date Noted    Anemia 07/08/2019    HTN (hypertension) 07/02/2019    Hyponatremia 07/02/2019    Depression with anxiety 07/02/2019    GERD (gastroesophageal reflux disease) 07/02/2019    OAB (overactive bladder) 25/26/7836    Diastolic heart failure ([facility] [address] ) 06/27/2019    A-fib ([facility] [address] ) 06/25/2019    CKD (chronic kidney disease) 06/25/2019    CAD (coronary artery disease) 06/05/2019    Mitral regurgitation 06/05/2019       Physical Therapy:    Weight Bearing Status: Full Weight Bearing  Transfers: Supervision, Modified Independent  Bed Mobility: Modified Independent  Amulation Distance (ft): 182 feet  Ambulation: Supervision, Modified Independent  Assistive Device for Ambulation: Roller Walker  Number of Stairs: 5  Assistive Device for Stairs: Bilateral [employer]  Stair Assistance: Supervision  Ramp: Supervision  Assistive Device for Ramp: Roller Walker  Discharge Recommendations: Home Independently  [address] [facility] [place] with[de-identified] First Floor Setup, Family Support, Home Physical Therapy    7/8/19:   Patient participating in therapy and making positive gains  Patient MOD I bed mobility, S/MOD I all transfers with walker; S/MOD I ambulation up to 182" level and carpet with walker; S ramp with walker; S 5 steps with 2 handrails  Patient limited by pain and decreased endurance    Would benefit from continued inpatient ARC PT to increase function, safety, and increased independence in prep for safe d/c to home with family and continued PT services  Occupational Therapy:  Eating: Independent  Grooming: Supervision  Bathing: Supervision  Bathing: Supervision  Upper Body Dressing: Supervision  Lower Body Dressing: Minimal Assistance(Assist with shoes due to edema)  Toileting: Supervision  Tub/Shower Transfer: Contact Guard  Toilet Transfer: Supervision  Cognition: Within Defined Limits  Orientation: Person, Place, Time, Situation  Discharge Recommendations: Home with:  [address] with[de-identified] Home Occupational Therapy, Family Support       7/8/19: Pt participates in ADLs, transfers/ mobility and BUe tehrex  Pt is making great progress with current LOF listed above  Pt requires assist and Supervision due to decreased endurance, safety, balance and edema and decreased strength LLE  Pt is demonstrating energy conservation with rest breaks and A/E use due to cardiac diagnoses  Pt will benefit from continued skilled OT services to increase independence with daily tasks  Speech Therapy:           No notes on file    Nursing Notes:  Appetite: Fair  Diet Type: Cardiac                      Diet Patient/Family Education Complete: Yes                            Bladder: Continent     Bladder Patient/Family Education: Yes  Bowel: Continent     Bowel Patient/Family Education: Yes  Pain Location: Abdomen, Back  Pain Orientation: Right, Mid  Pain Score: 4                       Hospital Pain Intervention(s): Medication (See MAR)  Pain Patient/Family Education: Yes       7-9-2019, pt admitted with afib/weakness  A/O x4, heart rate Irr  At this time no plans for pacer per cardiology  Lungs diminished with non productive cough  + for B/L lower legs edema  SCD's in place, Minimal assist with rolling walker  Megan Foote       Case Management:     Discharge Planning  Goal Length of Stay: 10  Living Arrangements: Children  Support Systems: Children, Family members  Assistance Needed: pt states "none needed"  Type of Current Residence: Private residence  Current Home Care Services: No   Pt reported that she resides in a 1 story home with her son, who is home 24/7 (her son also has medical issues)  Pt reported that her 2 daughters reside in close proximity & are very helpful, as well as her granddaughter; she reported that she has a strong support system  She has a ramp at her home & DME including RW & cane  SW will continue to monitor & assist as needed with [address]  Is the patient actively participating in therapies? yes  List any modifications to the treatment plan: na    Barriers Interventions   Decreased strength Therapeutic exercise, therapeutic activity   Decreased endurance ADL, transfer, gait training   Decreased balance Therapeutic exercise, therapeutic activity             Is the patient making expected progress toward goals?  yes  List any update or changes to goals: na    Medical Goals: Patient will be able to manage medical conditions and comorbid conditions with medications and follow up upon completion of rehab program    Weekly Team Goals:   Rehab Team Goals  ADL Team Goal: Patient will be independent with ADLs with least restrictive device upon completion of rehab program  Bowel/Bladder Team Goal: Patient will return to premorbid level for bladder/bowel management upon completion of rehab program  Transfer Team Goal: Patient will be independent with transfers with least restrictive device upon completion of rehab program  Locomotion Team Goal: Patient will be independent with locomotion with least restrictive device upon completion of rehab program  Cognitive Team Goal: Patient will return to premorbid level of cognitive activity upon completion of rehab program     Mod I self care, transfers, and mobility    Health and wellness: to be able to return home and complete activities with grandchildren    Discussion: Plan for return home with son with Brittany Rivas for PT and OT    Anticipated Discharge Date:  July 12, 2019

## 2019-07-09 NOTE — PCC NURSING
7-9-2019, pt admitted with afib/weakness  A/O x4, heart rate Irr  At this time no plans for pacer per cardiology  Lungs diminished with non productive cough  + for B/L lower legs edema  SCD's in place, Minimal assist with rolling walker  Madhav Safia

## 2019-07-09 NOTE — PROGRESS NOTES
07/09/19 1020   Pain Assessment   Pain Assessment 0-10   Pain Score No Pain   Restrictions/Precautions   Precautions Fall Risk;Cardiac/sternal;Pain   Cognition   Overall Cognitive Status WFL   Arousal/Participation Alert; Responsive; Cooperative   Attention Within functional limits   Orientation Level Oriented X4   Memory Within functional limits   Following Commands Follows all commands and directions without difficulty   Transfer Bed/Chair/Wheelchair   Limitations Noted In Balance; Endurance;Pain Management;LE Strength   Adaptive Equipment Roller Walker   Stand Pivot Modified Independent   Sit to Stand Modified Independent   Stand to Sit Modified Independent   Supine to Sit Modified Independent   Sit to Supine Modified Independent   All Transfer Modified Independent   Bed, Chair, Wheelchair Transfer (FIM) 6 - Patient requires assistive device/extra time/safety concerns but completes independently   Ambulation   Primary Discharge Mode of Locomotion Walk   Walk Assist Level Supervision;Modified Independent   Assist Device Roller Walker   Distance Walked (feet) 272 ft  (239 and 107)   Limitations Noted In Endurance   Findings level and carpet   Walking (FIM) 5 - Patient requires supervision/monitoring AND distance 150 feet or more, no rest   Stairs   Type Stairs   # of Steps 10   Assist Devices Bilateral Rail   Findings S on stairs   Stairs (FIM) 2 - Patient goes up and down 4 - 11 stairs regardless of assist/device/setup   Therapeutic Interventions   Strengthening seated and supine ther ex 30 reps   Other gait and community re-ed   Assessment   Treatment Assessment Pt is pleasant and cooperative with therapy; Pt is Mod I for all transfers and bed mobility; Pt amb up to 272' with RW and S/Mod I; Pt needs rests throughout session due to fatigue;  Pt performed seated and supine ther ex to increase strength B LE   PT Barriers   Physical Impairment Decreased strength;Decreased range of motion;Decreased endurance;Decreased safety awareness;Pain   Functional Limitation Walking;Transfers;Standing;Stair negotiation   Plan   Treatment/Interventions Functional transfer training;LE strengthening/ROM; Elevations; Therapeutic exercise;Gait training   Progress Improving as expected   PT Therapy Minutes   PT Time In 1020   PT Time Out 1152   PT Total Time (minutes) 92   PT Mode of treatment - Individual (minutes) 92   PT Mode of treatment - Concurrent (minutes) 0   PT Mode of treatment - Group (minutes) 0   PT Mode of treatment - Co-treat (minutes) 0   PT Mode of Teatment - Total time(minutes) 92 minutes   Therapy Time missed   Time missed?  No

## 2019-07-09 NOTE — CASE MANAGEMENT
Tx team rec reviewed with Pt & son & daughter  DC being planned for Friday, 7/12 at 1 PM, with Pt being transported home by car with family, which Tx team has determined to be a safe mode of transport  Pt has constant supervision in the home; Pt's son resides with her & is not working  [facility] to be arranged for PT & OT  SW will assist as needed with Tx & DC planning

## 2019-07-09 NOTE — PROGRESS NOTES
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  4  Hyponatremia              Will increase the patient's fluid restriction to 1 8 L  Continue monitor blood work from time to time  5  Edema   I will give the patient additional dose of furosemide today, continue to monitor on daily basis and offer diuretics as indicated  6  Right adrenal adenoma -most likely benign  7  Atrial fibrillation with rapid ventricular response              Cardiology was able to discuss overall cardiac health with the patient and family at bedside  At this point we will continue with current medical treatment and consider potential procedures at some point in the future  8  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   Patient informed me that she also has a daughter who has porphyria  9  Chronic diastolic congestive heart failure     Follow up reason for today's visit:  Electrolyte disorder/chronic kidney disease    HTN (hypertension)    Patient Active Problem List   Diagnosis    CAD (coronary artery disease)    Mitral regurgitation    [facility] [address] [facility]    CKD (chronic kidney disease)    Diastolic heart failure ([facility] [address] [facility]    HTN (hypertension)    Hyponatremia    Depression with anxiety    GERD (gastroesophageal reflux disease)    OAB (overactive bladder)    Anemia         Subjective:   No acute events overnight    Objective:     Vitals: Blood pressure 140/80, pulse 69, temperature 98 °F (36 7 °C), temperature source Temporal, resp  rate 18, height 4' 11" (1 499 m), weight 76 6 kg (168 lb 12 8 oz), SpO2 96 %  ,Body mass index is 34 09 kg/m²      Weight (last 2 days)     Date/Time   Weight    07/09/19 0538   76 6 (168 8)    07/08/19 0600   75 8 (167 1)    07/07/19 0600   76 1 (167 8)                Intake/Output Summary (Last 24 hours) at 7/9/2019 0948  Last data filed at 7/9/2019 0801  Gross per 24 hour   Intake 600 ml   Output --   Net 600 ml     I/O last 3 completed shifts: In: 0 [P O :640]  Out: -          Physical Exam: /80 (BP Location: Left arm)   Pulse 69   Temp 98 °F (36 7 °C) (Temporal)   Resp 18   Ht 4' 11" (1 499 m)   Wt 76 6 kg (168 lb 12 8 oz)   SpO2 96%   BMI 34 09 kg/m²     General Appearance:    Alert, cooperative, no distress, appears stated age   Head:    Normocephalic, without obvious abnormality, atraumatic   Eyes:    Conjunctiva/corneas clear   Ears:    Normal external ears   Nose:   Nares normal, septum midline, mucosa normal, no drainage    or sinus tenderness   Throat:   Lips, mucosa, and tongue normal; teeth and gums normal   Neck:   Supple   Back:     Symmetric, no curvature, ROM normal, no CVA tenderness   Lungs:     Clear to auscultation bilaterally, respirations unlabored   Chest wall:    No tenderness or deformity   Heart:    Regular rate and rhythm, S1 and S2 normal, no murmur, rub   or gallop   Abdomen:     Soft, non-tender, bowel sounds active   Extremities:   Extremities normal, atraumatic, no cyanosis +2 bilateral lower extremity edema   Skin:   Skin color, texture, turgor normal, areas of healing blisters noted bilateral upper extremities   Lymph nodes:   Cervical normal   Neurologic:   CNII-XII intact            Lab, Imaging and other studies: I have personally reviewed pertinent labs  CBC:   No results found for: WBC, HGB, HCT, MCV, PLT, ADJUSTEDWBC, MCH, MCHC, RDW, MPV, NRBC  CMP:   No results found for: NA, K, CL, CO2, ANIONGAP, BUN, CREATININE, GLUCOSE, CALCIUM, AST, ALT, ALKPHOS, PROT, BILITOT, EGFR        Results from last 7 days   Lab Units 07/08/19  0522 07/04/19  1148   POTASSIUM mmol/L 3 6 4 3   CHLORIDE mmol/L 97* 92*   CO2 mmol/L 29 29   BUN mg/dL 17 21   CREATININE mg/dL 0 78 0 82   CALCIUM mg/dL 9 1 9 6   ALK PHOS U/L 49*  --    ALT U/L 17  --    AST U/L 16  -- Phosphorus: No results found for: PHOS  Magnesium:   No results found for: MG  Urinalysis: No results found for: [place], SPECGRAV, PHUR, LEUKOCYTESUR, NITRITE, PROTEINUA, GLUCOSEU, KETONESU, BILIRUBINUR, BLOODU  Ionized Calcium: No results found for: CAION  Coagulation: No results found for: PT, INR, APTT  Troponin: No results found for: TROPONINI  ABG: No results found for: PHART, ZFZ3OTO, PO2ART, WGN9ABC, Y9ANFDNX, BEART, SOURCE  Radiology review:     IMAGING  Procedure: Xr Chest Pa & Lateral    Result Date: 7/5/2019  Narrative: CHEST INDICATION:   orthopnea  COMPARISON:  Chest 6/29/2019 EXAM PERFORMED/VIEWS:  XR CHEST PA & LATERAL FINDINGS: Heart shadow appears enlarged but stable  Atherosclerotic vascular calcifications are noted  There is mild pulmonary vascular congestion  No large effusion or pneumothorax  Osseous structures appear within normal limits for patient age  Impression: 1  Pulmonary vascular congestion  2   Stable mild enlargement of the cardiac silhouette   Workstation performed: [id number]       Current Facility-Administered Medications   Medication Dose Route Frequency    acetaminophen (TYLENOL) tablet 650 mg  650 mg Oral Q6H PRN    ALPRAZolam (XANAX) tablet 0 25 mg  0 25 mg Oral TID PRN    apixaban (ELIQUIS) tablet 2 5 mg  2 5 mg Oral BID    diltiazem (CARDIZEM CD) 24 hr capsule 300 mg  300 mg Oral Daily    metoprolol tartrate (LOPRESSOR) tablet 25 mg  25 mg Oral TID    montelukast (SINGULAIR) tablet 10 mg  10 mg Oral Daily    oxybutynin (DITROPAN) tablet 5 mg  5 mg Oral Daily    pantoprazole (PROTONIX) EC tablet 40 mg  40 mg Oral Early Morning    polyethylene glycol (MIRALAX) packet 17 g  17 g Oral Daily PRN    sertraline (ZOLOFT) tablet 50 mg  50 mg Oral Daily     Medications Discontinued During This Encounter   Medication Reason    apixaban (ELIQUIS) tablet 5 mg     metoprolol tartrate (LOPRESSOR) tablet 25 mg     pravastatin (PRAVACHOL) tablet 40 mg  apixaban (ELIQUIS) tablet 2 5 mg        Jany Lynn DO

## 2019-07-09 NOTE — PROGRESS NOTES
07/09/19 0829   Pain Assessment   Pain Assessment 0-10   Pain Score 5   Pain Location Abdomen;Back   Pain Orientation Right;Mid   Restrictions/Precautions   Precautions Fall Risk;Cardiac/sternal;Pain;Fluid restriction  (1500 cc fluid restriction)   Transfer Bed/Chair/Wheelchair   Limitations Noted In Balance; Endurance;LE Strength   Stand Pivot Supervision   Sit to Stand Supervision   Stand to Sit Supervision   Bed, Chair, Wheelchair Transfer (FIM) 5 - Patient requires supervision/monitoring   Functional Standing Tolerance   Time Standing 2 trails for card match, 3 minutes and 1 5 minutes superision with a rest between sets   Exercise Tools   Other Exercise Tool 1 Sanderbox 2 sets 15 2# all planes   Other Exercise Tool 2 BUE therex 2 sets 15 1# wt with shoulder flexion, elbow flexion/ extension and supination/ pronation   Other Exercise Tool 3 gripper 2 sets 30 B hands   Coordination   Fine Motor knots out/ in B hands   Additional Activities   Additional Activities Other (Comment)  (Fxl mobility with RW S to/ from gym)   Additional Activities Comments fxl mobility with w/c to/ from lobby with min A due to longer distance   Assessment   Treatment Assessment Pt participates in UB therex, mobility and transfers this day  60 minutes concurrent treatment focusing on community reenrty with longer distance mobility to/ from lobby and UB therex focusing on similar goals as another to increase activity tolerance and strength  Pt tolerates session well with rest breaks and self pacing for energy conservation  Pt is making gains towards goals and will benefit from continued skilled OT services to increase independence with daily tasks  Problem List Decreased strength;Decreased range of motion;Decreased endurance; Impaired balance;Decreased safety awareness;Pain   Plan   Treatment/Interventions ADL retraining;Functional transfer training; Therapeutic exercise; Endurance training;Patient/family training; Compensatory technique education   Progress Progressing toward goals   OT Therapy Minutes   OT Time In 0829   OT Time Out 1000   OT Total Time (minutes) 91   OT Mode of treatment - Individual (minutes) 31   OT Mode of treatment - Concurrent (minutes) 60   Therapy Time missed   Time missed?  No

## 2019-07-09 NOTE — PROGRESS NOTES
Physical Medicine and Rehabilitation Progress Note  Darwin Lee 81 y o  female MRN: [id number]  Unit/Bed#: -01 Encounter: [id number]    HPI: Darwin Lee is a 81 y o  female who presented to the [facility] with an irregular heart beat and was found to have A-fib for which she was seen by cardiology and offered a pacer but refused and was medically managed  Course was complicated by acute on chronic RF and hyponatremia for which renal service managed the patient         Chief Complaint: debility     Interval/subjective: d/w patient potential dc date and patient is agreeable     ROS: A 10 point ROS was performed; negative except as noted above       Assessment/Plan:      Anemia  Assessment & Plan  - mild at 11 7  - IM managing      Hyponatremia  Assessment & Plan  - improved to 132 with FR of 1 5 L therefore renal liberalized FR to 1 8 L  -renal managing     CKD (chronic kidney disease)  Assessment & Plan  - per renal baseline Cr 0 7-0 8  - Cr currently 0 78  -renal managing     * HTN (hypertension)  Assessment & Plan  - at home on lopressor 25 mg q12 and lisinopril 10 mg qd   -IM and renal co-managing     OAB (overactive bladder)  Assessment & Plan  - on home ditropan 5 mg qd     GERD (gastroesophageal reflux disease)  Assessment & Plan  - at home on prilosec 20 mg qd (therapeutic substitution as inpt)     Depression with anxiety  Assessment & Plan  - on home zoloft 50 mg qd  - also on home xanax 0 25 mg prn  - per PAPDMP last filled prescription for xanax 0 25 mg tabs on 4/26/19 for 270 tabs with 0 refills (appears to be a regular prescription every 3 months provided by PCP)     Diastolic heart failure (HCC)  Assessment & Plan  - was on lasix 20 mg BID PTA  - volume status currently stable w/o diuretics   -IM managing as inpt   - OP FU with Dr Delia Schwarz          A-fib [facility])  Assessment & Plan  - seen by cards in acute care and offered pacer (for increased andrew blockade) however patient and family have d/w Dr Lacey Tena and they have all decided that it would be best not to pursue this option   - renal fxn improved so patient could be on eliquis 5 mg BID however cards elected to keep patient on eliquis 2 5 mg BID due to possible fall risk (was not on any AC PTA)  - at home on lopressor 25 mg q12   - IM managing rate control agents   - OP FU with Dr Marley Valadez      Mitral regurgitation  Assessment & Plan  - offered surgical intervention by cards however patient refused  - OP FU with Dr Marley Valadez     CAD (coronary artery disease)  Assessment & Plan  - h/o stent in 2007  - home ASA 81 mg qd d/c'd by cards in acute care due to patient being started on [facility], operated by [facility]  - per cards notes in acute care not on statin however per EMR was on zocor 20 mg qpm (on 3/15/19 LDL was 69) and patient did confirm she takes zocor 20 mg qpm   - at home was on lopressor 25 mg q12 PTA   - IM managing as inpt  - OP FU with Dr Marley Valadez        Scheduled Meds:    Current Facility-Administered Medications:  acetaminophen 650 mg Oral Q6H PRN Manohar Dooley MD   ALPRAZolam 0 25 mg Oral TID PRN Manohar Dooley MD   apixaban 2 5 mg Oral BID Alfonzo Villatoro MD   diltiazem 300 mg Oral Daily Manohar Dooley MD   metoprolol tartrate 25 mg Oral TID Manohar Dooley MD   montelukast 10 mg Oral Daily Manohar Dooley MD   oxybutynin 5 mg Oral Daily Manohar Dooley MD   pantoprazole 40 mg Oral Early Morning Manohar Dooley MD   polyethylene glycol 17 g Oral Daily PRN Manohar Dooley MD   pravastatin 40 mg Oral Daily With Yulissa Chilel MD   sertraline 50 mg Oral Daily Manohar Dooley MD        Incidental findings:  1) elevated peak PA pressure: OP FU with Dr Marley Valadez  2) 1 3 cm rt adrenal nodule: per radiology report recommend repeat non-contrast CT or MRI in 12 months, will defer to PCP to arrange  3) fluid collection above bladder: noted on US and FU CT was recommended which did not show a fluid collection above the bladder   4) low alk phos with nl tot bilirubin: mildly decreased alk phos of 49 (LLN 55): OP FU with PCP with further testing/treatment and/or specialist referral at PCP's discretion        DVT ppx: eliquis             Objective:    Functional Update:  Mobility: sup-mod I   Transfers: sup-mod I   ADLs: sup-min         Physical Exam:  Vitals:    07/09/19 0623   BP: 140/80   Pulse: 69   Resp: 18   Temp: 98 °F (36 7 °C)   SpO2: 96%               General: alert, no apparent distress, cooperative and comfortable  HEENT:  Head: Normal, normocephalic, atraumatic  CARDIAC:  +S1/2  LUNGS:  respirations unlabored   ABDOMEN:  soft NT   EXTREMITIES:  volume status currently stable   NEURO:   mental status, speech normal, alert and oriented x3  PSYCH:  mood/affect currently stable         Laboratory:   Results from last 7 days   Lab Units 07/08/19  0522 07/04/19  1148   HEMOGLOBIN g/dL 11 7* 12 1   HEMATOCRIT % 34 6* 36 2*   WBC Thousand/uL 7 80 9 90     Results from last 7 days   Lab Units 07/08/19  0522 07/04/19  1148   BUN mg/dL 17 21   SODIUM mmol/L 132* 127*   POTASSIUM mmol/L 3 6 4 3   CHLORIDE mmol/L 97* 92*   CREATININE mg/dL 0 78 0 82   AST U/L 16  --    ALT U/L 17  --             Patient Active Problem List   Diagnosis    CAD (coronary artery disease)    Mitral regurgitation    A-fib (HCC)    CKD (chronic kidney disease)    Diastolic heart failure (HCC)    HTN (hypertension)    Hyponatremia    Depression with anxiety    GERD (gastroesophageal reflux disease)    OAB (overactive bladder)    Anemia       This patient was discussed by the Interdisciplinary Team in weekly case conference today  The care of the patient was extensively discussed with all care providers and an appropriate rehabilitation plan was formulated unique for this patient  Barriers were identified preventing progression of therapy and appropriate interventions were discussed with each discipline   Please see the team note for input from all disciplines regarding barriers, intervention, and discharge planning  [ x ] Total time spent: 35 Mins, and greater than 50% of this time was spent counseling/coordinating care

## 2019-07-09 NOTE — PROGRESS NOTES
07/08/19 2100   Charting Type   Charting Type Shift assessment   Neurological   Neuro (WDL) X   Level of Consciousness Alert/awake   Orientation Level Oriented X4   Cognition Appropriate judgement; Follows commands   Extraocular Movements Full   Swallow Able to swallow solids and liquids without difficulty   Oli Coma Scale   Eye Opening 4   Best Verbal Response 5   Best Motor Response 6   Oli Coma Scale Score 15   HEENT   HEENT (WDL) X   Head and Face Symmetrical   R Eye Mildly impaired vision   L Eye Mildly impaired vision   Vision - Corrective Lenses (at bedside) Glasses   Respiratory   Respiratory (WDL) X   Respiratory Pattern Normal   Chest Assessment Chest expansion symmetrical   Bilateral Breath Sounds Clear;Diminished   R Breath Sounds Clear;Diminished   L Breath Sounds Clear;Diminished   Cardiac   Cardiac (WDL) X   Cardiac Regularity Irregular   Peripheral Vascular   Peripheral Vascular (WDL) X   Cyanosis None   Capillary Refill Less than/equal to 2 seconds (All extremities)   Pulses R radial;L radial;R pedal;L pedal   Edema Right lower extremity; Left lower extremity   Generalized Edema Trace   RUE Edema Trace   LUE Edema Trace   RLE Edema +1   LLE Edema +1   Integumentary   Integumentary (WDL) X   Skin Color Appropriate for ethnicity   Skin Condition/Temp Warm;Dry   Skin Integrity Bruising   Skin Location scattered   Skin Turgor Non-tenting   Juan Scale   Sensory Perceptions 3   Moisture 3   Activity 3   Mobility 3   Nutrition 3   Friction and Shear 3   Juan Scale Score 18   Musculoskeletal   Musculoskeletal (WDL) X   Level of Assistance Standby assist, set-up cues, supervision of patient - no hands on   Assistive Device Front wheel walker   RUE Full movement   LUE Full movement   RLE Limited movement   LLE Limited movement   Gastrointestinal   Gastrointestinal (WDL) WDL   Abdomen Inspection Soft;Rounded;Obese   Bowel Sounds (All Quadrants) Normoactive   Stool Assessment   Bowel Incontinence No   Genitourinary   Genitourinary (WDL) WDL   Genitourinary Symptoms None   Urine Assessment   Urinary Incontinence No

## 2019-07-10 PROCEDURE — 97535 SELF CARE MNGMENT TRAINING: CPT

## 2019-07-10 PROCEDURE — 97110 THERAPEUTIC EXERCISES: CPT

## 2019-07-10 PROCEDURE — 99232 SBSQ HOSP IP/OBS MODERATE 35: CPT | Performed by: PHYSICAL MEDICINE & REHABILITATION

## 2019-07-10 PROCEDURE — 97537 COMMUNITY/WORK REINTEGRATION: CPT

## 2019-07-10 PROCEDURE — 97116 GAIT TRAINING THERAPY: CPT

## 2019-07-10 PROCEDURE — 97530 THERAPEUTIC ACTIVITIES: CPT

## 2019-07-10 PROCEDURE — 99231 SBSQ HOSP IP/OBS SF/LOW 25: CPT | Performed by: INTERNAL MEDICINE

## 2019-07-10 RX ORDER — FUROSEMIDE 40 MG/1
40 TABLET ORAL ONCE
Status: COMPLETED | OUTPATIENT
Start: 2019-07-10 | End: 2019-07-10

## 2019-07-10 RX ADMIN — OXYBUTYNIN CHLORIDE 5 MG: 5 TABLET ORAL at 10:26

## 2019-07-10 RX ADMIN — METOPROLOL TARTRATE 25 MG: 25 TABLET, FILM COATED ORAL at 10:22

## 2019-07-10 RX ADMIN — MONTELUKAST SODIUM 10 MG: 10 TABLET, COATED ORAL at 10:25

## 2019-07-10 RX ADMIN — DILTIAZEM HYDROCHLORIDE 300 MG: 180 CAPSULE, COATED, EXTENDED RELEASE ORAL at 10:24

## 2019-07-10 RX ADMIN — METOPROLOL TARTRATE 25 MG: 25 TABLET, FILM COATED ORAL at 16:07

## 2019-07-10 RX ADMIN — APIXABAN 2.5 MG: 2.5 TABLET, FILM COATED ORAL at 17:21

## 2019-07-10 RX ADMIN — PANTOPRAZOLE SODIUM 40 MG: 40 TABLET, DELAYED RELEASE ORAL at 05:47

## 2019-07-10 RX ADMIN — METOPROLOL TARTRATE 25 MG: 25 TABLET, FILM COATED ORAL at 20:09

## 2019-07-10 RX ADMIN — SERTRALINE HYDROCHLORIDE 50 MG: 50 TABLET ORAL at 10:26

## 2019-07-10 RX ADMIN — ACETAMINOPHEN 650 MG: 325 TABLET ORAL at 07:06

## 2019-07-10 RX ADMIN — ALPRAZOLAM 0.25 MG: 0.25 TABLET ORAL at 20:09

## 2019-07-10 RX ADMIN — PRAVASTATIN SODIUM 40 MG: 40 TABLET ORAL at 16:07

## 2019-07-10 RX ADMIN — FUROSEMIDE 40 MG: 40 TABLET ORAL at 10:29

## 2019-07-10 RX ADMIN — APIXABAN 2.5 MG: 2.5 TABLET, FILM COATED ORAL at 10:25

## 2019-07-10 NOTE — PROGRESS NOTES
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  4  Hyponatremia              Will check labs tomorrow morning to confirm sodium levels appropriate and also follow-up other electrolytes  5  Edema   Will give another dose of furosemide today, patient continues to have good urine output the furosemide  6  Right adrenal adenoma -most likely benign  7  Atrial fibrillation with rapid ventricular response              Cardiology was able to discuss overall cardiac health with the patient and family at bedside  At this point we will continue with current medical treatment and consider potential procedures at some point in the future   ============  8  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   Patient informed me that she also has a daughter who has porphyria  9  Chronic diastolic congestive heart failure    Patient's compensated at this time, continue with diuresis as mentioned above as indicated  Follow up reason for today's visit:  Electrolyte disorder/chronic kidney disease    HTN (hypertension)    Patient Active Problem List   Diagnosis    CAD (coronary artery disease)    Mitral regurgitation    [facility] [address] )    CKD (chronic kidney disease)    Diastolic heart failure ([facility] [address] )    HTN (hypertension)    Hyponatremia    Depression with anxiety    GERD (gastroesophageal reflux disease)    OAB (overactive bladder)    Anemia         Subjective:   No acute events overnight    Objective:     Vitals: Blood pressure 100/80, pulse 74, temperature 98 5 °F (36 9 °C), temperature source Tympanic, resp  rate 16, height 4' 11" (1 499 m), weight 74 2 kg (163 lb 9 oz), SpO2 92 %  ,Body mass index is 33 04 kg/m²      Weight (last 2 days)     Date/Time   Weight    07/10/19 0555   74 2 (163 56)    07/09/19 0538   76 6 (168 8)    07/08/19 0600   75 8 (167 1)                Intake/Output Summary (Last 24 hours) at 7/10/2019 0808  Last data filed at 7/9/2019 1723  Gross per 24 hour   Intake 240 ml   Output --   Net 240 ml     I/O last 3 completed shifts: In: 5 [P O :420]  Out: -          Physical Exam: /80 (BP Location: Left arm)   Pulse 74   Temp 98 5 °F (36 9 °C) (Tympanic)   Resp 16   Ht 4' 11" (1 499 m)   Wt 74 2 kg (163 lb 9 oz)   SpO2 92%   BMI 33 04 kg/m²     General Appearance:    Alert, cooperative, no distress, appears stated age   Head:    Normocephalic, without obvious abnormality, atraumatic   Eyes:    Conjunctiva/corneas clear   Ears:    Normal external ears   Nose:   Nares normal, septum midline, mucosa normal, no drainage    or sinus tenderness   Throat:   Lips, mucosa, and tongue normal; teeth and gums normal   Neck:   Supple   Back:     Symmetric, no curvature, ROM normal, no CVA tenderness   Lungs:     Clear to auscultation bilaterally, respirations unlabored   Chest wall:    No tenderness or deformity   Heart:    Regular rate and rhythm, S1 and S2 normal, no murmur, rub   or gallop   Abdomen:     Soft, non-tender, bowel sounds active   Extremities:   Extremities normal, atraumatic, no cyanosis +2 bilateral lower extremity edema   Skin:   Skin color, texture, turgor normal, areas of healing blisters noted bilateral upper extremities   Lymph nodes:   Cervical normal   Neurologic:   CNII-XII intact            Lab, Imaging and other studies: I have personally reviewed pertinent labs  CBC:   No results found for: WBC, HGB, HCT, MCV, PLT, ADJUSTEDWBC, MCH, MCHC, RDW, MPV, NRBC  CMP:   No results found for: NA, K, CL, CO2, ANIONGAP, BUN, CREATININE, GLUCOSE, CALCIUM, AST, ALT, ALKPHOS, PROT, BILITOT, EGFR        Results from last 7 days   Lab Units 07/08/19  0522 07/04/19  1148   POTASSIUM mmol/L 3 6 4 3   CHLORIDE mmol/L 97* 92*   CO2 mmol/L 29 29   BUN mg/dL 17 21   CREATININE mg/dL 0 78 0  82   CALCIUM mg/dL 9 1 9 6   ALK PHOS U/L 49*  --    ALT U/L 17  --    AST U/L 16  --          Phosphorus: No results found for: PHOS  Magnesium:   No results found for: MG  Urinalysis: No results found for: COLORU, CLARITYU, SPECGRAV, PHUR, LEUKOCYTESUR, NITRITE, PROTEINUA, GLUCOSEU, KETONESU, BILIRUBINUR, BLOODU  Ionized Calcium: No results found for: CAION  Coagulation: No results found for: PT, INR, APTT  Troponin: No results found for: TROPONINI  ABG: No results found for: PHART, [id number], PO2ART, TBD7VNU, [id number], BEART, SOURCE  Radiology review:     IMAGING  Procedure: Xr Chest Pa & Lateral    Result Date: 7/5/2019  Narrative: CHEST INDICATION:   orthopnea  COMPARISON:  Chest 6/29/2019 EXAM PERFORMED/VIEWS:  XR CHEST PA & LATERAL FINDINGS: Heart shadow appears enlarged but stable  Atherosclerotic vascular calcifications are noted  There is mild pulmonary vascular congestion  No large effusion or pneumothorax  Osseous structures appear within normal limits for patient age  Impression: 1  Pulmonary vascular congestion  2   Stable mild enlargement of the cardiac silhouette   Workstation performed: [id number]       Current Facility-Administered Medications   Medication Dose Route Frequency    acetaminophen (TYLENOL) tablet 650 mg  650 mg Oral Q6H PRN    ALPRAZolam (XANAX) tablet 0 25 mg  0 25 mg Oral TID PRN    apixaban (ELIQUIS) tablet 2 5 mg  2 5 mg Oral BID    diltiazem (CARDIZEM CD) 24 hr capsule 300 mg  300 mg Oral Daily    metoprolol tartrate (LOPRESSOR) tablet 25 mg  25 mg Oral TID    montelukast (SINGULAIR) tablet 10 mg  10 mg Oral Daily    oxybutynin (DITROPAN) tablet 5 mg  5 mg Oral Daily    pantoprazole (PROTONIX) EC tablet 40 mg  40 mg Oral Early Morning    polyethylene glycol (MIRALAX) packet 17 g  17 g Oral Daily PRN    pravastatin (PRAVACHOL) tablet 40 mg  40 mg Oral Daily With Dinner    sertraline (ZOLOFT) tablet 50 mg  50 mg Oral Daily     Medications Discontinued During This Encounter   Medication Reason    apixaban (ELIQUIS) tablet 5 mg     metoprolol tartrate (LOPRESSOR) tablet 25 mg     pravastatin (PRAVACHOL) tablet 40 mg     apixaban (ELIQUIS) tablet 2 5 mg        Clark Rue, DO

## 2019-07-10 NOTE — PROGRESS NOTES
07/10/19 0811   Charting Type   Charting Type Shift assessment   Neurological   Neuro (WDL) X   Muscle Function/Sensation Assessment ;Muscle strength   R Hand  Moderate   L Hand  Moderate   RLE Muscle Strength 4- Movement against gravity and limited resistance   Neuro Symptoms Fatigue   Relieved by Rest   [place] Coma Scale   Eye Opening 4   Best Verbal Response 5   Best Motor Response 6   [place] Coma Scale Score 15   HEENT   HEENT (WDL) X   R Eye Mildly impaired vision   L Eye Mildly impaired vision   Vision - Corrective Lenses (at bedside) Glasses   R Ear Mildly impaired hearing   Teeth Missing teeth   Respiratory   Respiratory (WDL) X   Respiratory Pattern Normal   Bilateral Breath Sounds Clear;Diminished   R Breath Sounds Clear;Diminished   L Breath Sounds Clear;Diminished   Cardiac   Cardiac (WDL) X   Cardiac Regularity Irregular   Peripheral Vascular   Peripheral Vascular (WDL) X   Edema Right lower extremity; Left lower extremity   RLE Edema +1   LLE Edema +1   Integumentary   Integumentary (WDL) X   Skin Integrity Bruising   Skin Location scattered   Skin Turgor Non-tenting   Juan Scale   Sensory Perceptions 3   Moisture 3   Activity 3   Mobility 3   Nutrition 3   Friction and Shear 3   Juan Scale Score 18   Musculoskeletal   Musculoskeletal (WDL) X   Level of Assistance Standby assist, set-up cues, supervision of patient - no hands on   Assistive Device Front wheel walker   RLE Limited movement   LLE Limited movement   Gastrointestinal   Gastrointestinal (WDL) WDL   Stool Assessment   Bowel Incontinence No   Genitourinary   Genitourinary (WDL) WDL   Urine Assessment   Urinary Incontinence No   Genitalia   Female Genitalia Intact   Genitourinary Additional Assessments   Genitourinary Additional Assessments No   Anal/Rectal   Anal/Rectal (WDL) WDL   Psychosocial   Psychosocial (WDL) WDL

## 2019-07-10 NOTE — PROGRESS NOTES
07/10/19 1030   Pain Assessment   Pain Assessment 0-10   Pain Score 4   Pain Type Chronic pain   Pain Location Knee   Pain Orientation Left   Restrictions/Precautions   Precautions Fall Risk   Cognition   Overall Cognitive Status WFL   Arousal/Participation Alert; Responsive; Cooperative   Attention Within functional limits   Orientation Level Oriented X4   Memory Within functional limits   Following Commands Follows all commands and directions without difficulty   Transfer Bed/Chair/Wheelchair   Limitations Noted In Balance;Pain Management;LE Strength   Adaptive Equipment Roller Walker   Stand Pivot Modified Independent   Sit to Stand Modified Independent   Stand to Sit Modified Independent   Supine to Sit Modified Independent   Sit to Supine Modified Independent   All Transfer Modified Independent   Bed, Chair, Wheelchair Transfer (FIM) 6 - Patient requires assistive device/extra time/safety concerns but completes independently   Ambulation   Primary Discharge Mode of Locomotion Walk   Walk Assist Level Supervision;Modified Independent   Assist Device Roller Walker   Distance Walked (feet) 276 ft  (90 and 154)   Limitations Noted In Endurance   Findings level and carpet   Walking (FIM) 5 - Patient requires supervision/monitoring AND distance 150 feet or more, no rest   Stairs   Type Stairs   # of Steps 10   Weight Bearing Precautions WBAT   Assist Devices Bilateral Rail   Findings S   Stairs (FIM) 2 - Patient goes up and down 4 - 11 stairs regardless of assist/device/setup   Therapeutic Interventions   Strengthening seated and supine ther ex 30 reps   Other gait and community re-ed   Assessment   Treatment Assessment Pt is pleasant and cooperative with therapy; Pt is appropriate for copncurrent therapy to increase motivation and socialization while working on similar exerciese; Pt amb up to 0' with RW and S/Mod I; Pt is Mod i for all transfers and bed mobility; Rests given throughout session due to fatigue;  Pt performed seated and supine ther ex to increase strength B LE   PT Barriers   Physical Impairment Decreased strength;Decreased endurance;Pain   Functional Limitation Walking;Transfers;Standing;Stair negotiation;Ramp negotiation   Plan   Treatment/Interventions Functional transfer training;LE strengthening/ROM; Elevations; Therapeutic exercise;Gait training   Progress Improving as expected   PT Therapy Minutes   PT Time In 1030   PT Time Out 1201   PT Total Time (minutes) 91   PT Mode of treatment - Individual (minutes) 61   PT Mode of treatment - Concurrent (minutes) 30   PT Mode of treatment - Group (minutes) 0   PT Mode of treatment - Co-treat (minutes) 0   PT Mode of Teatment - Total time(minutes) 91 minutes   Therapy Time missed   Time missed?  No

## 2019-07-10 NOTE — PROGRESS NOTES
Physical Medicine and Rehabilitation Progress Note  Anthony Shetty 81 y o  female MRN: [id number]  Unit/Bed#: -01 Encounter: [id number]    HPI: Anthony Shetty is a 81 y o  female who presented to the [facility] with an irregular heart beat and was found to have A-fib for which she was seen by cardiology and offered a pacer but refused and was medically managed  Course was complicated by acute on chronic RF and hyponatremia for which renal service managed the patient         Chief Complaint: debility     Interval/subjective: patient denies any events overnight     ROS: A 10 point ROS was performed; negative except as noted above       Assessment/Plan:      Anemia  Assessment & Plan  - mild at 11 7  - IM managing      Hyponatremia  Assessment & Plan  - improved to 132 with FR of 1 5 L therefore renal liberalized FR to 1 8 L  -renal managing     CKD (chronic kidney disease)  Assessment & Plan  - per renal baseline Cr 0 7-0 8  - Cr currently 0 78  -renal managing     * HTN (hypertension)  Assessment & Plan  - at home on lopressor 25 mg q12 and lisinopril 10 mg qd   -IM and renal co-managing     OAB (overactive bladder)  Assessment & Plan  - on home ditropan 5 mg qd     GERD (gastroesophageal reflux disease)  Assessment & Plan  - at home on prilosec 20 mg qd (therapeutic substitution as inpt)     Depression with anxiety  Assessment & Plan  - on home zoloft 50 mg qd  - also on home xanax 0 25 mg prn  - per PAPDMP last filled prescription for xanax 0 25 mg tabs on 4/26/19 for 270 tabs with 0 refills (appears to be a regular prescription every 3 months provided by PCP)     Diastolic heart failure (HCC)  Assessment & Plan  - was on lasix 20 mg BID PTA  - volume status currently stable w/o diuretics   -IM managing as inpt   - OP FU with Dr Marissa Warren          A-fib [facility])  Assessment & Plan  - seen by cards in acute care and offered pacer (for increased andrew blockade) however patient and family have d/w Dr Anahi Giraldo and they have all decided that it would be best not to pursue this option   - renal fxn improved so patient could be on eliquis 5 mg BID however cards elected to keep patient on eliquis 2 5 mg BID due to possible fall risk (was not on any AC PTA)  - at home on lopressor 25 mg q12   - IM managing rate control agents   - OP FU with Dr Sean Quiroz      Mitral regurgitation  Assessment & Plan  - offered surgical intervention by cards however patient refused  - OP FU with Dr Sean Quiroz     CAD (coronary artery disease)  Assessment & Plan  - h/o stent in 2007  - home ASA 81 mg qd d/c'd by cards in acute care due to [facility]  - per cards notes in acute care not on statin however per EMR was on zocor 20 mg qpm (on 3/15/19 LDL was 69) and patient did confirm she takes zocor 20 mg qpm   - at home was on lopressor 25 mg q12 PTA   - IM managing as inpt  - OP FU with Dr Sean Quiroz        Scheduled Meds:    Current Facility-Administered Medications:  acetaminophen 650 mg Oral Q6H PRN Ashley Parnell MD   ALPRAZolam 0 25 mg Oral TID PRN Ashley Parnell MD   apixaban 2 5 mg Oral BID Santiago Alicea MD   diltiazem 300 mg Oral Daily Ashley Parnell MD   metoprolol tartrate 25 mg Oral TID Ashley Parnell MD   montelukast 10 mg Oral Daily Ashley Parnell MD   oxybutynin 5 mg Oral Daily Ashley Parnell MD   pantoprazole 40 mg Oral Early Morning Ashley Parnell MD   polyethylene glycol 17 g Oral Daily PRN Ashley Parnell MD   pravastatin 40 mg Oral Daily With Amie Asencio MD   sertraline 50 mg Oral Daily Ashley Parnell MD        Incidental findings:  1) elevated peak PA pressure: OP FU with Dr Sean Quiroz  2) 1 3 cm rt adrenal nodule: per radiology report recommend repeat non-contrast CT or MRI in 12 months, will defer to PCP to arrange  3) fluid collection above bladder: noted on US and FU CT was recommended which did not show a fluid collection above the bladder   4) low alk phos with nl tot bilirubin: mildly decreased alk phos of 49 (LLN 55): OP FU with PCP with further testing/treatment and/or specialist referral at PCP's discretion        DVT ppx: eliquis             Objective:    Functional Update:  Mobility: sup-mod I   Transfers: sup-mod I   ADLs: sup-min         Physical Exam:  Vitals:    07/10/19 1023   BP: 135/68   Pulse: 75   Resp: 18   Temp: 98 1 °F (36 7 °C)   SpO2: 97%               General: alert, no apparent distress, cooperative and comfortable  HEENT:  Head: Normal, normocephalic, atraumatic    CARDIAC:  +S1/2  LUNGS:  respirations unlabored   ABDOMEN:  soft NT   EXTREMITIES:  volume status currently stable   NEURO:   mental status, speech normal, alert and oriented x3  PSYCH:  mood/affect currently stable         Laboratory:   Results from last 7 days   Lab Units 07/08/19  0522 07/04/19  1148   HEMOGLOBIN g/dL 11 7* 12 1   HEMATOCRIT % 34 6* 36 2*   WBC Thousand/uL 7 80 9 90     Results from last 7 days   Lab Units 07/08/19  0522 07/04/19  1148   BUN mg/dL 17 21   SODIUM mmol/L 132* 127*   POTASSIUM mmol/L 3 6 4 3   CHLORIDE mmol/L 97* 92*   CREATININE mg/dL 0 78 0 82   AST U/L 16  --    ALT U/L 17  --             Patient Active Problem List   Diagnosis    CAD (coronary artery disease)    Mitral regurgitation    A-fib (HCC)    CKD (chronic kidney disease)    Diastolic heart failure (HCC)    HTN (hypertension)    Hyponatremia    Depression with anxiety    GERD (gastroesophageal reflux disease)    OAB (overactive bladder)    Anemia

## 2019-07-10 NOTE — PROGRESS NOTES
07/10/19 0830   Pain Assessment   Pain Assessment 0-10   Pain Score 3   Pain Location Knee   Pain Orientation Left   Restrictions/Precautions   Precautions Fall Risk   Grooming   Able To Initiate Tasks;Comb/Brush Hair;Wash/Dry Face;Brush/Clean Teeth;Wash/Dry Hands   Findings seated at sink in w/c   Grooming (FIM) 5 - [place] sets up supplies or applies device   Bathing   Assessed Bath Style Shower   Able to Wash/Rinse/Dry (body part) Left Arm;Right Arm;L Upper Leg;R Upper Leg;L Lower Leg/Foot;R Lower Leg/Foot;Chest;Abdomen;Perineal Area; Buttocks   Adaptive Equipment Longhand Sponge;Longhand Reacher;Tub Bench; Shower [employer] uses an outdoor patio bench for tub  OT does not advise this  OT attempted to have pt step over tub and unable with different technqieus trialed  OT then demonstarted how to use a tub bench and she was successful and safe  [facility]  OT suggested having family order through [facility]  Pt interested  Bathing (FIM) 5 - Patient requires supervision/monitoring but completes 10/10 parts   Tub/Shower Transfer   Adaptive Equipment Grab Bars;Transfer Bench   Tub Transfer (FIM) 5 - Patient requires verbal cues   Dressing/Undressing Clothing   Remove UB Clothes Pullover Shirt   Remove LB Clothes Pants; Undergarment;Socks   Don UB [address] [place] [address]; Undergarment;Socks   Adaptive Equipment Reacher;Sock Aide   Findings increased LE edema prevents shoes from fitting safely   UB Dressing (FIM) 5 - Patient requires supervision/monitoring   LB Dressing (FIM) 5 - Patient requires supervision/monitoring   Transfer Bed/Chair/Wheelchair   Sit to Stand Supervision   Bed, Chair, Wheelchair Transfer (FIM) 5 - Patient requires supervision/monitoring   Toileting   Able to Pull Clothing down yes, up yes     Toileting (FIM) 5 - Patient requires supervision/monitoring   Light Housekeeping   Light Housekeeping Educated in item transportation with draping things over front of walker   Pt reproting she has a rollator walker at home that is broken but she was using it and had two falls from it  OT adivsed to not use it  OT then demonstrated use of tray and basket and advised she could purchase through [employer] [address] [employer] 7# x 30 reps B hands    Other Exercise Tool 1 push box 3# 2x15 reps in all planes    Other Exercise Tool 2 1# elbow flexion/exetsnion 3 x10 reps    Cognition   Overall Cognitive Status WFL   Arousal/Participation Alert   Attention Within functional limits   Orientation Level Oriented X4   Memory Within functional limits   Following Commands Follows all commands and directions without difficulty   Additional Activities   Additional Activities   (fxnl mobiltiy greater than 150 feet with RW Sup)   Activity Tolerance   Activity Tolerance Patient tolerated treatment well   Assessment   Treatment Assessment OT advised pt to have shower bar in tub at home repaired as well as rollator walker due to pt reporting both are broken  OT educated pt in use of tub bench for tub txf due to pt unable to safely step over tub wall which she has at home  Refer to respective sections for details on ADL today  D/C planning for end of week  Reviewed medication mgmt and pt has good knowledge of same  Plan to continue OT to to increase Indep toward OT goals in preparation for d/c at end of the week  Prognosis Good   Problem List Decreased endurance; Impaired balance   Assessment Pt requested exewrcises for neck, OT instructed in gentle AROM in all planes  Plan   Treatment/Interventions ADL retraining;Functional transfer training; Therapeutic exercise; Endurance training   Progress Improving as expected   OT Therapy Minutes   OT Time In 0830   OT Time Out 1000   OT Total Time (minutes) 90   OT Mode of treatment - Individual (minutes) 86   OT Mode of treatment - Concurrent (minutes) 4   OT Mode of treatment - Group (minutes) 0   OT Mode of treatment - Co-treat (minutes) 0   OT Mode of Teatment - Total time(minutes) 90 minutes   Therapy Time missed   Time missed?  No

## 2019-07-10 NOTE — PLAN OF CARE
Problem: Potential for Falls  Goal: Patient will remain free of falls  Description  INTERVENTIONS:  - Assess patient frequently for physical needs  -  Identify cognitive and physical deficits and behaviors that affect risk of falls    -  [place] fall precautions as indicated by assessment   - Educate patient/family on patient safety including physical limitations  - Instruct patient to call for assistance with activity based on assessment  - Modify environment to reduce risk of injury  - Consider OT/PT consult to assist with strengthening/mobility  Outcome: Progressing     Problem: PAIN - ADULT  Goal: Verbalizes/displays adequate comfort level or baseline comfort level  Description  Interventions:  - Encourage patient to monitor pain and request assistance  - Assess pain using appropriate pain scale  - Administer analgesics based on type and severity of pain and evaluate response  - Implement non-pharmacological measures as appropriate and evaluate response  - Consider cultural and social influences on pain and pain management  - Notify physician/advanced practitioner if interventions unsuccessful or patient reports new pain  Outcome: Progressing     Problem: INFECTION - ADULT  Goal: Absence or prevention of progression during hospitalization  Description  INTERVENTIONS:  - Assess and monitor for signs and symptoms of infection  - Monitor lab/diagnostic results  - Monitor all insertion sites, i e  indwelling lines, tubes, and drains  - Monitor endotracheal (as able) and nasal secretions for changes in amount and color  - [place] appropriate cooling/warming therapies per order  - Administer medications as ordered  - Instruct and encourage patient and family to use good hand hygiene technique  - Identify and instruct in appropriate isolation precautions for identified infection/condition  Outcome: Progressing     Problem: SAFETY ADULT  Goal: Maintain or return to baseline ADL function  Description  INTERVENTIONS:  -  Assess patient's ability to carry out ADLs; assess patient's baseline for ADL function and identify physical deficits which impact ability to perform ADLs (bathing, care of mouth/teeth, toileting, grooming, dressing, etc )  - Assess/evaluate cause of self-care deficits   - Assess range of motion  - Assess patient's mobility; develop plan if impaired  - Assess patient's need for assistive devices and provide as appropriate  - Encourage maximum independence but intervene and supervise when necessary  ¯ Involve family in performance of ADLs  ¯ Assess for home care needs following discharge   ¯ Request OT consult to assist with ADL evaluation and planning for discharge  ¯ Provide patient education as appropriate  Outcome: Progressing  Goal: Maintain or return mobility status to optimal level  Description  INTERVENTIONS:  - Assess patient's baseline mobility status (ambulation, transfers, stairs, etc )    - Identify cognitive and physical deficits and behaviors that affect mobility  - Identify mobility aids required to assist with transfers and/or ambulation (gait belt, sit-to-stand, lift, walker, cane, etc )  - [place] fall precautions as indicated by assessment  - Record patient progress and toleration of activity level on Mobility SBAR; progress patient to next Phase/Stage  - Instruct patient to call for assistance with activity based on assessment  - Request Rehabilitation consult to assist with strengthening/weightbearing, etc   Outcome: Progressing     Problem: DISCHARGE PLANNING  Goal: Discharge to home or other facility with appropriate resources  Description  INTERVENTIONS:  - Identify barriers to discharge w/patient and caregiver  - Arrange for needed discharge resources and transportation as appropriate  - Identify discharge learning needs (meds, wound care, etc )  - Arrange for interpretive services to assist at discharge as needed  - Refer to Case Management Department for coordinating discharge planning if the patient needs post-hospital services based on physician/advanced practitioner order or complex needs related to functional status, cognitive ability, or social support system  Outcome: Progressing     Problem: CARDIOVASCULAR - ADULT  Goal: Maintains optimal cardiac output and hemodynamic stability  Description  INTERVENTIONS:  - Monitor I/O, vital signs and rhythm  - Monitor for S/S and trends of decreased cardiac output i e  bleeding, hypotension  - Administer and titrate ordered vasoactive medications to optimize hemodynamic stability  - Assess quality of pulses, skin color and temperature  - Assess for signs of decreased coronary artery perfusion - ex   Angina  - Instruct patient to report change in severity of symptoms  Outcome: Progressing  Goal: Absence of cardiac dysrhythmias or at baseline rhythm  Description  INTERVENTIONS:  - monitor vital signs, obtain 12 lead EKG if indicated  - Administer antiarrhythmic and heart rate control medications as ordered  - Monitor electrolytes and administer replacement therapy as ordered   Outcome: Progressing     Problem: RESPIRATORY - ADULT  Goal: Achieves optimal ventilation and oxygenation  Description  INTERVENTIONS:  - Assess for changes in respiratory status  - Assess for changes in mentation and behavior  - Position to facilitate oxygenation and minimize respiratory effort  - Oxygen administration by appropriate delivery method based on oxygen saturation (per order) or ABGs  - Initiate smoking cessation education as indicated  - Encourage broncho-pulmonary hygiene including cough, deep breathe, Incentive Spirometry  - Assess the need for suctioning and aspirate as needed  - Assess and instruct to report SOB or any respiratory difficulty  - Respiratory Therapy support as indicated  Outcome: Progressing     Problem: GASTROINTESTINAL - ADULT  Goal: Maintains adequate nutritional intake  Description  INTERVENTIONS:  - Monitor percentage of each meal consumed  - Identify factors contributing to decreased intake, treat as appropriate  - Assist with meals as needed  - Monitor I&O, WT and lab values  - Obtain nutrition services referral as needed  Outcome: Progressing     Problem: METABOLIC, FLUID AND ELECTROLYTES - ADULT  Goal: Electrolytes maintained within normal limits  Description  INTERVENTIONS:  - Monitor labs and assess patient for signs and symptoms of electrolyte imbalances  - Administer electrolyte replacement as ordered  - Monitor response to electrolyte replacements, including repeat lab results as appropriate  - Instruct patient on fluid and nutrition as appropriate  Outcome: Progressing  Goal: Fluid balance maintained  Description  INTERVENTIONS:  - Monitor labs and assess for signs and symptoms of volume excess or deficit  - Monitor I/O and WT  - Instruct patient on fluid and nutrition as appropriate  Outcome: Progressing     Problem: Nutrition/Hydration-ADULT  Goal: Nutrient/Hydration intake appropriate for improving, restoring or maintaining nutritional needs  Description  Monitor and assess patient's nutrition/hydration status for malnutrition (ex- brittle hair, bruises, dry skin, pale skin and conjunctiva, muscle wasting, smooth red tongue, and disorientation)  Collaborate with interdisciplinary team and initiate plan and interventions as ordered  Monitor patient's weight and dietary intake as ordered or per policy  Utilize nutrition screening tool and intervene per policy  Determine patient's food preferences and provide high-protein, high-caloric foods as appropriate       INTERVENTIONS:  - Monitor oral intake, urinary output, labs, and treatment plans  - Assess nutrition and hydration status and recommend course of action  - Evaluate amount of meals eaten  - Assist patient with eating if necessary   - Allow adequate time for meals  - Recommend/ encourage appropriate diets, oral nutritional supplements, and vitamin/mineral supplements  - Order, calculate, and assess calorie counts as needed  - Recommend, monitor, and adjust tube feedings and TPN/PPN based on assessed needs  - Assess need for intravenous fluids  - Provide specific nutrition/hydration education as appropriate  - Include patient/family/caregiver in decisions related to nutrition  Outcome: Progressing     Problem: Prexisting or High Potential for Compromised Skin Integrity  Goal: Skin integrity is maintained or improved  Description  INTERVENTIONS:  - Identify patients at risk for skin breakdown  - Assess and monitor skin integrity  - Assess and monitor nutrition and hydration status  - Monitor labs (i e  albumin)  - Assess for incontinence   - Turn and reposition patient  - Assist with mobility/ambulation  - Relieve pressure over bony prominences  - Avoid friction and shearing  - Provide appropriate hygiene as needed including keeping skin clean and dry  - Evaluate need for skin moisturizer/barrier cream  - Collaborate with interdisciplinary team (i e  Nutrition, Rehabilitation, etc )   - Patient/family teaching  Outcome: Progressing

## 2019-07-11 PROBLEM — E83.42 HYPOMAGNESEMIA: Status: ACTIVE | Noted: 2019-07-11

## 2019-07-11 PROBLEM — E87.6 HYPOKALEMIA: Status: ACTIVE | Noted: 2019-07-11

## 2019-07-11 LAB
ANION GAP SERPL CALCULATED.3IONS-SCNC: 8 MMOL/L (ref 4–13)
BUN SERPL-MCNC: 17 MG/DL (ref 7–25)
CALCIUM SERPL-MCNC: 9 MG/DL (ref 8.6–10.5)
CHLORIDE SERPL-SCNC: 97 MMOL/L (ref 98–107)
CO2 SERPL-SCNC: 30 MMOL/L (ref 21–31)
CREAT SERPL-MCNC: 0.71 MG/DL (ref 0.6–1.2)
GFR SERPL CREATININE-BSD FRML MDRD: 79 ML/MIN/1.73SQ M
GLUCOSE P FAST SERPL-MCNC: 103 MG/DL (ref 65–99)
GLUCOSE SERPL-MCNC: 103 MG/DL (ref 65–99)
MAGNESIUM SERPL-MCNC: 1.7 MG/DL (ref 1.9–2.7)
POTASSIUM SERPL-SCNC: 2.8 MMOL/L (ref 3.5–5.5)
SODIUM SERPL-SCNC: 135 MMOL/L (ref 134–143)

## 2019-07-11 PROCEDURE — 97530 THERAPEUTIC ACTIVITIES: CPT

## 2019-07-11 PROCEDURE — 97537 COMMUNITY/WORK REINTEGRATION: CPT

## 2019-07-11 PROCEDURE — 97535 SELF CARE MNGMENT TRAINING: CPT

## 2019-07-11 PROCEDURE — 83735 ASSAY OF MAGNESIUM: CPT | Performed by: INTERNAL MEDICINE

## 2019-07-11 PROCEDURE — 99232 SBSQ HOSP IP/OBS MODERATE 35: CPT | Performed by: PHYSICAL MEDICINE & REHABILITATION

## 2019-07-11 PROCEDURE — 97110 THERAPEUTIC EXERCISES: CPT

## 2019-07-11 PROCEDURE — 80048 BASIC METABOLIC PNL TOTAL CA: CPT | Performed by: INTERNAL MEDICINE

## 2019-07-11 PROCEDURE — 97116 GAIT TRAINING THERAPY: CPT

## 2019-07-11 RX ORDER — POTASSIUM CHLORIDE 20 MEQ/1
20 TABLET, EXTENDED RELEASE ORAL
Status: COMPLETED | OUTPATIENT
Start: 2019-07-11 | End: 2019-07-11

## 2019-07-11 RX ADMIN — METOPROLOL TARTRATE 25 MG: 25 TABLET, FILM COATED ORAL at 20:11

## 2019-07-11 RX ADMIN — MONTELUKAST SODIUM 10 MG: 10 TABLET, COATED ORAL at 09:48

## 2019-07-11 RX ADMIN — ACETAMINOPHEN 650 MG: 325 TABLET ORAL at 05:14

## 2019-07-11 RX ADMIN — METOPROLOL TARTRATE 25 MG: 25 TABLET, FILM COATED ORAL at 09:47

## 2019-07-11 RX ADMIN — APIXABAN 2.5 MG: 2.5 TABLET, FILM COATED ORAL at 17:15

## 2019-07-11 RX ADMIN — SERTRALINE HYDROCHLORIDE 50 MG: 50 TABLET ORAL at 09:52

## 2019-07-11 RX ADMIN — OXYBUTYNIN CHLORIDE 5 MG: 5 TABLET ORAL at 09:51

## 2019-07-11 RX ADMIN — MAGNESIUM OXIDE TAB 400 MG (241.3 MG ELEMENTAL MG) 400 MG: 400 (241.3 MG) TAB at 09:46

## 2019-07-11 RX ADMIN — METOPROLOL TARTRATE 25 MG: 25 TABLET, FILM COATED ORAL at 16:02

## 2019-07-11 RX ADMIN — DILTIAZEM HYDROCHLORIDE 300 MG: 180 CAPSULE, COATED, EXTENDED RELEASE ORAL at 09:44

## 2019-07-11 RX ADMIN — APIXABAN 2.5 MG: 2.5 TABLET, FILM COATED ORAL at 09:42

## 2019-07-11 RX ADMIN — PANTOPRAZOLE SODIUM 40 MG: 40 TABLET, DELAYED RELEASE ORAL at 05:09

## 2019-07-11 RX ADMIN — MAGNESIUM OXIDE TAB 400 MG (241.3 MG ELEMENTAL MG) 400 MG: 400 (241.3 MG) TAB at 17:15

## 2019-07-11 RX ADMIN — POTASSIUM CHLORIDE 20 MEQ: 1500 TABLET, EXTENDED RELEASE ORAL at 12:03

## 2019-07-11 RX ADMIN — POTASSIUM CHLORIDE 20 MEQ: 1500 TABLET, EXTENDED RELEASE ORAL at 13:50

## 2019-07-11 RX ADMIN — POTASSIUM CHLORIDE 20 MEQ: 1500 TABLET, EXTENDED RELEASE ORAL at 09:49

## 2019-07-11 RX ADMIN — PRAVASTATIN SODIUM 40 MG: 40 TABLET ORAL at 16:02

## 2019-07-11 NOTE — PROGRESS NOTES
Discharge Summary     07/11/19 3292   Pain Assessment   Pain Assessment 0-10   Pain Score 4   Pain Type Chronic pain   Pain Location Leg   Pain Orientation Left   Restrictions/Precautions   Precautions Cardiac/sternal;Fall Risk;Fluid restriction;Pain  (1800 FR)   QI: Eating   Assistance Needed Independent   Eating CARE Score 6   QI: Oral Hygiene   Assistance Needed Independent   Oral Hygiene CARE Score 6   Grooming   Able To Initiate Tasks;Comb/Brush Hair;Wash/Dry Face;Brush/Clean Teeth;Wash/Dry Hands   Limitation Noted In Safety   Grooming (FIM) 7 - No helper, safely, timely and completes all tasks independently   QI: Shower/Bathe Self   Assistance Needed Independent; Adaptive equipment   Shower/Bathe Self CARE Score 6   Bathing   Assessed Bath Style Shower   Anticipated D/C Bath Style Shower;Sponge Bath   Able to [place] Yes   Able to [employer] Temperature Yes   Able to Wash/Rinse/Dry (body part) Left Arm;Right Arm;L Upper Leg;R Upper Leg;L Lower Leg/Foot;R Lower Leg/Foot;Chest;Abdomen;Perineal Area; Buttocks   Limitations Noted in Safety   Positioning Seated;Standing   Adaptive Equipment Tub Bench   Bathing (FIM) 6 - Patient requires assistive device/extra time/safety concerns but completes independently   Tub/Shower Transfer   Limitations Noted In Safety   Adaptive Equipment Transfer Bench   Tub Transfer (FIM) 5 - Patient requires supervision/monitoring   QI: Upper Body Dressing   Assistance Needed Independent   Upper Body Dressing CARE Score 6   QI: Lower Body Dressing   Assistance Needed Independent; Adaptive equipment   Lower Body Dressing CARE Score 6   QI: Putting On/Taking Off [address]; Adaptive equipment   Putting On/Taking Off Footwear CARE Score 6   QI: Picking Up Object   Assistance Needed Independent; Adaptive equipment;Supervision   Picking Up Object CARE Score -   Dressing/Undressing Clothing   Remove UB Clothes Pullover Shirt   Remove LB Clothes Pants;Undergarment;Socks   [address]; Undergarment;Socks  (family took shoes home, recommend velcro or belen shoes)   Limitations Noted In Safety   Adaptive Equipment Reacher;Sock Aide   UB Dressing (FIM) 7 - No helper, safely, timely and completes all tasks independently   LB Dressing (FIM) 6 - Patient requires assistive device/extra time/safety concerns but completes independently   Transfer Bed/Chair/Wheelchair   Limitations Noted In Pain Management   Stand Pivot Modified Independent   Bed, Chair, Wheelchair Transfer (FIM) 6 - Patient requires assistive device/extra time/safety concerns but completes independently   QI: Juan  96  Score 6   Toileting   Able to [address] down yes, up yes  Manage Hygiene Bladder; Bowel   Limitations Noted In Safety   Toileting (FIM) 6 - Patient requires assistive device/extra time/safety concerns but completes independently   QI: Toilet Transfer   Assistance Needed Independent   Toilet Transfer CARE Score 6   Toilet Transfer   Surface Assessed Raised Toilet   Transfer Technique Stand Pivot   Limitations Noted In Safety   Toilet Transfer (FIM) 6 - Patient requires assistive device/extra time/safety concerns but completes independently   Kitchen Mobility   Kitchen-Mobility Level Walker   Kitchen Activity Retrieve items;Transport items   Kitchen Mobility Comments   (mod I )   Light Housekeeping   Light Housekeeping Level Wheelchair   Light Housekeeping Level of Assistance Modified independent   Light Housekeeping pillowcases on sitting   Cognition   Overall Cognitive Status WFL   Orientation Level Oriented X4   Additional Activities   Additional Activities Other (Comment)  (fxl mobility with RW Mod I in room and to shower)   Assessment   Treatment Assessment Pt agreeable to a showe rthis am with SCDs in place   ADL comlpeted Mod I/ I with S using transfer tub bench into tub/ shower and A/ E for LB bathign and dressing  Pt is Mod I with toileting and mobility in room to BR  Pt toelrates kitchen mobility and light homemaking as well without complaints  Pt is prepared for discharge to home with goals met  Problem List Decreased safety awareness   Plan   Treatment/Interventions ADL retraining;Functional transfer training; Therapeutic exercise; Endurance training;Patient/family training; Compensatory technique education   Progress Progressing toward goals   Recommendation   Equipment Recommended Other (comment)  (tub bench and walker trayrecommended with family to purchase)   Discharge Summary Pt participates in ADLs, transfers/ mobility, homemaking and BUE therex  Pt has made great progress and is currently Mod I / I for all tasks except for tub shower transfer S with bench  Pt has made great progress with goals met and plan for discharge to home 7/12/19 with family to assist with transition and [address] recommended  No concerns expressed by patient about discharge  OT Therapy Minutes   OT Time In 0838   OT Time Out 1010   OT Total Time (minutes) 92   OT Mode of treatment - Individual (minutes) 51   OT Mode of treatment - Concurrent (minutes) 41   Therapy Time missed   Time missed?  No

## 2019-07-11 NOTE — PROGRESS NOTES
07/11/19 1031   Pain Assessment   Pain Assessment 0-10   Pain Score 4   Pain Type Chronic pain   Pain Location Arm;Knee;Leg   Pain Orientation Bilateral   Restrictions/Precautions   Precautions Cardiac/sternal;Fall Risk;Fluid restriction;Pain   Cognition   Overall Cognitive Status WFL   Arousal/Participation Alert; Responsive; Cooperative   Attention Within functional limits   Orientation Level Oriented X4   Memory Within functional limits   Following Commands Follows all commands and directions without difficulty   QI: Roll Left and Right   Assistance Needed Independent   Assistance Provided by [place] No physical assistance   Roll Left and Right CARE Score 6   QI: Sit to [address] Provided by [place] No physical assistance   Sit to Lying CARE Score 6   QI: Lying to Sitting on Side of Bed   Assistance Needed Independent   Assistance Provided by [place] No physical assistance   Lying to Sitting on Side of Bed CARE Score 6   QI: Sit to [address] Provided by [place] No physical assistance   Sit to Stand CARE Score 6   QI: Chair/Bed-to-Chair Transfer   Assistance Needed Independent   Assistance Provided by [place] No physical assistance   Chair/Bed-to-Chair Transfer CARE Score 6   Transfer Bed/Chair/Wheelchair   Limitations Noted In Balance;Pain Management;LE Strength   Adaptive Equipment Roller Walker   Stand Pivot Modified Independent   Sit to Stand Modified Independent   Stand to Sit Modified Independent   Supine to Sit Modified Independent   Sit to Supine Modified Independent   All Transfer Modified Independent   Bed, Chair, Wheelchair Transfer (FIM) 6 - Patient requires assistive device/extra time/safety concerns but completes independently   QI: [address] Provided by [place] No physical assistance   Walk 10 Feet CARE Score 6   QI: Walk 50 Feet with [address] [facility] Assistance Provided by [place] No physical assistance   Walk 50 Feet with Two Turns CARE Score 6   QI: [address] Provided by [place] No physical assistance   Walk 150 Feet CARE Score 6   QI: Walking 10 Feet on Uneven Surfaces   Assistance Needed Independent   Assistance Provided by [place] No physical assistance   Walking 10 Feet on Uneven Surfaces CARE Score 6   Ambulation   Does the patient walk? 2  Yes   Primary Discharge Mode of Locomotion Walk   Walk Assist Level Modified Independent   Assist Device Roller Walker   Distance Walked (feet) 246 ft  (209)   Limitations Noted In Endurance; Safety;Strength   Findings level and carpet   Walking (FIM) 6 - Patient requires assistive device/extra time/safety concerns but completes independently AND distance 150 feet or more, no rest   QI: Wheel 50 Feet with Two Turns   Reason if not Attempted Activity not applicable   Wheel 50 Feet with Two Turns CARE Score 9   QI: Wheel 150 Feet   Reason if not Attempted Activity not applicable   Wheel 401 Feet CARE Score 9   QI: 1 Step (Curb)   Assistance Needed Independent   Assistance Provided by [place] No physical assistance   1 Step (Curb) CARE Score 6   QI: 4 Steps   Assistance Needed [address]  Provided by [place] No physical assistance   4 Steps CARE Score 6   QI: 12 Steps   Assistance Needed [address]  Provided by [place] No physical assistance   12 Steps CARE Score 6   Stairs   Type Stairs   # of Steps 15   Weight Bearing Precautions WBAT   Assist Devices Bilateral Rail   Findings Mod I    Stairs (FIM) 6 - Patient requires assistive device/extra time/safety concerns but completes independently AND goes up and down full flight (12- 14 stairs)   Therapeutic Interventions   Strengthening supine ther ex 30 reps   Other gait and community re-ed   Assessment   Treatment Assessment Pt is comfortable and ready for D/C home tomorrow;  Pt met all PT goals of Mod I PT Barriers   Physical Impairment Decreased strength;Decreased endurance;Decreased safety awareness;Pain   Functional Limitation Walking;Transfers;Standing;Stair negotiation   Plan   Treatment/Interventions Functional transfer training;LE strengthening/ROM; Elevations; Therapeutic exercise;Gait training   Progress Improving as expected   Recommendation   PT - OK to Discharge Yes   Discharge Summary Pt is being D/C home tomorrow with family assisting home health services   PT Therapy Minutes   PT Time In 1031   PT Time Out 1131   PT Total Time (minutes) 60   PT Mode of treatment - Individual (minutes) 60   PT Mode of treatment - Concurrent (minutes) 0   PT Mode of treatment - Group (minutes) 0   PT Mode of treatment - Co-treat (minutes) 0   PT Mode of Teatment - Total time(minutes) 60 minutes   Therapy Time missed   Time missed?  No

## 2019-07-11 NOTE — ASSESSMENT & PLAN NOTE
- now low normal at 1 9 therefore Dr Kanchan Mina recommended patient be d/c'd on MgOx 400 mg qd  - OP FU with Dr Kanchan Mina

## 2019-07-11 NOTE — ASSESSMENT & PLAN NOTE
- now WNL at 3 9, per renal likely 2/2 to lasix which per Dr Yuniel Yanes is not to be continued upon dc therefore Dr Yuniel Yanes recommends patient not be dc'd on any potassium supplementation either    -OP FU with Dr Yuniel Yanes

## 2019-07-11 NOTE — PROGRESS NOTES
07/11/19 1246   Pain Assessment   Pain Assessment 0-10   Pain Score No Pain   Restrictions/Precautions   Precautions Cardiac/sternal;Fall Risk;Fluid restriction;Pain   Cognition   Overall Cognitive Status WFL   Therapeutic Interventions   Strengthening Seated TE 3 x 10    Assessment   Treatment Assessment Pt completed seated TE for general LE strengthening and conditioning with good tolerance  Reports no pain throuhgout therapy session  PT Barriers   Physical Impairment Decreased strength;Decreased endurance;Decreased safety awareness;Pain   Functional Limitation Walking;Transfers;Standing;Stair negotiation   Plan   Treatment/Interventions Functional transfer training;LE strengthening/ROM; Therapeutic exercise; Endurance training;Gait training   Progress Improving as expected   Recommendation   PT - OK to Discharge Yes   PT Therapy Minutes   PT Time In 1246   PT Time Out 1321   PT Total Time (minutes) 35   PT Mode of treatment - Individual (minutes) 9   PT Mode of treatment - Concurrent (minutes) 26   PT Mode of treatment - Group (minutes) 0   PT Mode of treatment - Co-treat (minutes) 0   PT Mode of Teatment - Total time(minutes) 35 minutes   Therapy Time missed   Time missed?  No

## 2019-07-11 NOTE — PLAN OF CARE
Problem: Nutrition/Hydration-ADULT  Goal: Nutrient/Hydration intake appropriate for improving, restoring or maintaining nutritional needs  Description  Monitor and assess patient's nutrition/hydration status for malnutrition (ex- brittle hair, bruises, dry skin, pale skin and conjunctiva, muscle wasting, smooth red tongue, and disorientation)  Collaborate with interdisciplinary team and initiate plan and interventions as ordered  Monitor patient's weight and dietary intake as ordered or per policy  Utilize nutrition screening tool and intervene per policy  Determine patient's food preferences and provide high-protein, high-caloric foods as appropriate       INTERVENTIONS:  - Monitor oral intake, urinary output, labs, and treatment plans  - Assess nutrition and hydration status and recommend course of action  - Evaluate amount of meals eaten  - Assist patient with eating if necessary   - Allow adequate time for meals  - Recommend/ encourage appropriate diets, oral nutritional supplements, and vitamin/mineral supplements  - Order, calculate, and assess calorie counts as needed  - Recommend, monitor, and adjust tube feedings and TPN/PPN based on assessed needs  - Assess need for intravenous fluids  - Provide specific nutrition/hydration education as appropriate  - Include patient/family/caregiver in decisions related to nutrition  Outcome: Adequate for Discharge

## 2019-07-11 NOTE — CASE MANAGEMENT
DC instructions reviewed with Pt & Pt's family  Pt being [address] home tomorrow at 1 PM, being transported by family via car, which tx team has determined to be a safe mode of transport  FU appts indicated on DC instructions, to be scheduled by Pt per scheduling preferences  HHC arranged with [facility] (PT & OT)  FU IMM reviewed & signed

## 2019-07-11 NOTE — PROGRESS NOTES
Physical Medicine and Rehabilitation Progress Note  Izaiah Armendariz 81 y o  female MRN: [id number]  Unit/Bed#: -01 Encounter: [id number]    HPI: Izaiah Armendariz is a 81 y o  female who presented to the [facility] with an irregular heart beat and was found to have A-fib for which she was seen by cardiology and offered a pacer but refused and was medically managed  Course was complicated by acute on chronic RF and hyponatremia for which renal service managed the patient         Chief Complaint: debility     Interval/subjective: patient without complaint currently and looking forward to dc tomorrow     ROS: A 10 point ROS was performed; negative except as noted above       Assessment/Plan:      Hypomagnesemia  Assessment & Plan  - repletion ordered by renal with repeat level in AM   - renal managing     Hypokalemia  Assessment & Plan  - repletion ordered by renal with BMP tomorrow AM  -renal managing     Anemia  Assessment & Plan  - mild at 11 7  - IM managing      Hyponatremia  Assessment & Plan  - now WNL at 135 on FR to 1 8 L  -renal managing     CKD (chronic kidney disease)  Assessment & Plan  - per renal baseline Cr 0 7-0 8  - Cr currently 0 71  -renal managing     * HTN (hypertension)  Assessment & Plan  - at home on lopressor 25 mg q12 and lisinopril 10 mg qd   -IM and renal co-managing     OAB (overactive bladder)  Assessment & Plan  - on home ditropan 5 mg qd     GERD (gastroesophageal reflux disease)  Assessment & Plan  - at home on prilosec 20 mg qd (therapeutic substitution as inpt)     Depression with anxiety  Assessment & Plan  - on home zoloft 50 mg qd  - also on home xanax 0 25 mg prn  - per PAPDMP last filled prescription for xanax 0 25 mg tabs on 4/26/19 for 270 tabs with 0 refills (appears to be a regular prescription every 3 months provided by PCP)     Diastolic heart failure (HCC)  Assessment & Plan  - was on lasix 20 mg BID PTA  - volume status currently stable w/o diuretics   -IM managing as inpt   - OP FU with Dr Aleida Solis          A-fib [facility])  Assessment & Plan  - seen by cards in acute care and offered pacer (for increased andrew blockade) however patient and family have d/w Dr Giovani Hall and they have all decided that it would be best not to pursue this option   - renal fxn improved so patient could be on eliquis 5 mg BID however cards elected to keep patient on eliquis 2 5 mg BID due to possible fall risk (was not on any AC PTA)  - at home on lopressor 25 mg q12   - IM managing rate control agents   - OP FU with Dr Aleida Solis      Mitral regurgitation  Assessment & Plan  - offered surgical intervention by cards however patient refused  - OP FU with Dr Aleida Solis     CAD (coronary artery disease)  Assessment & Plan  - h/o stent in 2007  - home ASA 81 mg qd d/c'd by cards in acute care [facility]  - per cards notes in acute care not on statin however per EMR was on zocor 20 mg qpm (on 3/15/19 LDL was 69) and patient did confirm she takes zocor 20 mg qpm   - at home was on lopressor 25 mg q12 PTA   - IM managing as inpt  - OP FU with Dr Aleida Solis        Scheduled Meds:    Current Facility-Administered Medications:  acetaminophen 650 mg Oral Q6H PRN Tong Alvarez MD   ALPRAZolam 0 25 mg Oral TID PRN Tong Alvarez MD   apixaban 2 5 mg Oral BID Estevan Gibson MD   diltiazem 300 mg Oral Daily Tong Alvarez MD   magnesium oxide 400 mg Oral BID Son Rodk, DO   metoprolol tartrate 25 mg Oral TID Tong Alvarez MD   montelukast 10 mg Oral Daily Tong Alvarez MD   oxybutynin 5 mg Oral Daily Tong Alvarez MD   pantoprazole 40 mg Oral Early Morning Tong Alvarez MD   polyethylene glycol 17 g Oral Daily PRN Tong Alvarez MD   potassium chloride 20 mEq Oral Q3H Brent Varvarelis, DO   pravastatin 40 mg Oral Daily With Sandy Huber MD   sertraline 50 mg Oral Daily Tong Alvarez MD        Incidental findings:  1) elevated peak PA pressure: OP FU with Dr Bob Casas  2) 1 3 cm rt adrenal nodule: per radiology report recommend repeat non-contrast CT or MRI in 12 months, will defer to PCP to arrange  3) fluid collection above bladder: noted on US and FU CT was recommended which did not show a fluid collection above the bladder   4) low alk phos with nl tot bilirubin: mildly decreased alk phos of 49 (LLN 55): OP FU with PCP with further testing/treatment and/or specialist referral at PCP's discretion        DVT ppx: eliquis             Objective:    Functional Update:  Mobility: mod I   Transfers: mod I   ADLs: mod I          Physical Exam:  Vitals:    07/11/19 0720   BP: 126/60   Pulse: 62   Resp: 18   Temp: 98 °F (36 7 °C)   SpO2: 93%               General: alert, no apparent distress, cooperative and comfortable  HEENT:  Head: Normal, normocephalic, atraumatic    CARDIAC:  +S1/2  LUNGS:  respirations unlabored   ABDOMEN:  soft NT   EXTREMITIES:  volume status currently stable   NEURO:   mental status, speech normal, alert and oriented x3  PSYCH:  mood/affect currently stable         Laboratory:   Results from last 7 days   Lab Units 07/08/19  0522   HEMOGLOBIN g/dL 11 7*   HEMATOCRIT % 34 6*   WBC Thousand/uL 7 80     Results from last 7 days   Lab Units 07/11/19  0503 07/08/19  0522   BUN mg/dL 17 17   SODIUM mmol/L 135 132*   POTASSIUM mmol/L 2 8* 3 6   CHLORIDE mmol/L 97* 97*   CREATININE mg/dL 0 71 0 78   AST U/L  --  16   ALT U/L  --  17            Patient Active Problem List   Diagnosis    CAD (coronary artery disease)    Mitral regurgitation    A-fib (HCC)    CKD (chronic kidney disease)    Diastolic heart failure (HCC)    HTN (hypertension)    Hyponatremia    Depression with anxiety    GERD (gastroesophageal reflux disease)    OAB (overactive bladder)    Anemia    Hypokalemia    Hypomagnesemia

## 2019-07-12 VITALS
WEIGHT: 164.4 LBS | HEART RATE: 65 BPM | TEMPERATURE: 98.2 F | HEIGHT: 59 IN | OXYGEN SATURATION: 93 % | RESPIRATION RATE: 18 BRPM | SYSTOLIC BLOOD PRESSURE: 130 MMHG | DIASTOLIC BLOOD PRESSURE: 66 MMHG | BODY MASS INDEX: 33.14 KG/M2

## 2019-07-12 LAB
ANION GAP SERPL CALCULATED.3IONS-SCNC: 7 MMOL/L (ref 4–13)
BUN SERPL-MCNC: 16 MG/DL (ref 7–25)
CALCIUM SERPL-MCNC: 9 MG/DL (ref 8.6–10.5)
CHLORIDE SERPL-SCNC: 99 MMOL/L (ref 98–107)
CO2 SERPL-SCNC: 28 MMOL/L (ref 21–31)
CREAT SERPL-MCNC: 0.68 MG/DL (ref 0.6–1.2)
GFR SERPL CREATININE-BSD FRML MDRD: 81 ML/MIN/1.73SQ M
GLUCOSE SERPL-MCNC: 111 MG/DL (ref 65–99)
MAGNESIUM SERPL-MCNC: 1.9 MG/DL (ref 1.9–2.7)
POTASSIUM SERPL-SCNC: 3.9 MMOL/L (ref 3.5–5.5)
SODIUM SERPL-SCNC: 134 MMOL/L (ref 134–143)

## 2019-07-12 PROCEDURE — 80048 BASIC METABOLIC PNL TOTAL CA: CPT | Performed by: INTERNAL MEDICINE

## 2019-07-12 PROCEDURE — NC001 PR NO CHARGE: Performed by: PHYSICAL MEDICINE & REHABILITATION

## 2019-07-12 PROCEDURE — 99239 HOSP IP/OBS DSCHRG MGMT >30: CPT | Performed by: PHYSICAL MEDICINE & REHABILITATION

## 2019-07-12 PROCEDURE — 83735 ASSAY OF MAGNESIUM: CPT | Performed by: INTERNAL MEDICINE

## 2019-07-12 RX ORDER — PRAVASTATIN SODIUM 40 MG
40 TABLET ORAL
Status: DISCONTINUED | OUTPATIENT
Start: 2019-07-12 | End: 2019-07-12 | Stop reason: HOSPADM

## 2019-07-12 RX ORDER — DILTIAZEM HYDROCHLORIDE 300 MG/1
300 CAPSULE, COATED, EXTENDED RELEASE ORAL DAILY
Qty: 30 CAPSULE | Refills: 0 | Status: SHIPPED | OUTPATIENT
Start: 2019-07-13 | End: 2019-08-15 | Stop reason: DRUGHIGH

## 2019-07-12 RX ORDER — SIMVASTATIN 20 MG
20 TABLET ORAL EVERY EVENING
Status: ON HOLD | COMMUNITY
End: 2019-07-12 | Stop reason: CLARIF

## 2019-07-12 RX ORDER — PRAVASTATIN SODIUM 40 MG
40 TABLET ORAL
Qty: 30 TABLET | Refills: 0 | Status: SHIPPED | OUTPATIENT
Start: 2019-07-12 | End: 2021-07-01 | Stop reason: ALTCHOICE

## 2019-07-12 RX ADMIN — SERTRALINE HYDROCHLORIDE 50 MG: 50 TABLET ORAL at 09:03

## 2019-07-12 RX ADMIN — ALPRAZOLAM 0.25 MG: 0.25 TABLET ORAL at 00:30

## 2019-07-12 RX ADMIN — MAGNESIUM OXIDE TAB 400 MG (241.3 MG ELEMENTAL MG) 400 MG: 400 (241.3 MG) TAB at 09:03

## 2019-07-12 RX ADMIN — OXYBUTYNIN CHLORIDE 5 MG: 5 TABLET ORAL at 09:03

## 2019-07-12 RX ADMIN — ACETAMINOPHEN 650 MG: 325 TABLET ORAL at 07:26

## 2019-07-12 RX ADMIN — APIXABAN 2.5 MG: 2.5 TABLET, FILM COATED ORAL at 09:03

## 2019-07-12 RX ADMIN — DILTIAZEM HYDROCHLORIDE 300 MG: 180 CAPSULE, COATED, EXTENDED RELEASE ORAL at 09:03

## 2019-07-12 RX ADMIN — MONTELUKAST SODIUM 10 MG: 10 TABLET, COATED ORAL at 09:03

## 2019-07-12 RX ADMIN — METOPROLOL TARTRATE 25 MG: 25 TABLET, FILM COATED ORAL at 09:04

## 2019-07-12 RX ADMIN — PANTOPRAZOLE SODIUM 40 MG: 40 TABLET, DELAYED RELEASE ORAL at 06:03

## 2019-07-12 NOTE — DISCHARGE INSTRUCTIONS
Please have your blood work drawn as prescribed the results will be sent to your family doctor    Please continue your fluid restriction of 1 8 liters per day until advised otherwise by Dr Melissa Pak    Please note you are restricted from driving/operating a motorized vehicle/operating heavy machinery/etc    Please see your doctors listed in the follow up providers section of your discharge paperwork, and take the discharge paperwork with you to your appointments    Please note changes may have been made to your medications please refer to your discharge paperwork for your current medications and take this list with you to all your doctors appointments for your doctors to review    Please do not resume a home medication unless the medication reconciliation sheet indicates to do so, please do not assume that a medication that you were given a prescription for is the same as a medication you have at home based on both medications having the same name as dosages and frequency may have changed  Your nurse also reviewed with you just prior to your discharge from the hospital your medications, when to take them, how to take them, what they are for, how much to take, and when your next dose is due, please follow these instructions         Please check your blood pressure & heart rate/pulse prior to taking your blood pressure/heart rate medications and 1 hour after, please contact your family doctor or cardiologist immediately for a blood pressure below 100/60 and do not take the medications until speaking with them, please contact your family doctor or cardiologist immediately for blood pressure greater than 160/100; please contact your family doctor or cardiologist immediately for a heart rate/pulse lower than 60 and do not take the medications until speaking with them, please contact your family doctor or cardiologist immediately for heart rate/pulse greater than 100     Please note the following incidental findings were found during your recent hospitalization please discuss them with your doctors so that they may arrange any tests/referrals as they deem necessary:     1) elevated peak pulmonary artery pressure: Please follow up with Dr Cornelia Treviño for further testing/treatment/monitoring if any as they deem necessary     2) 1 3 cm right adrenal nodule: per radiology report recommend repeat non-contrast CT or MRI in 12 months, please follow up with your family doctor to arrange this      3) low alkaline phosphatase level: Please follow up with your family doctor for further testing/treatment if any and/or specialist referral if any as they deem necessary       4) 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: Please follow up with your family doctor for further testing/treatment if any and/or specialist referral if any as they deem necessary     5) low magnesium level: please take your magnesium supplements as prescribed and please follow up with Dr Kanchan Mina for further testing/treatment/monitoring if any as they deem necessary     6) low sodium level: please continue your fluid restriction of 1 8 liters per day and please follow up with Dr Kanchan Mina for further testing/treatment/monitoring if any as they deem necessary     7) chronic kidney disease: Please follow up with Dr Kanchan Mina for further testing/treatment/monitoring if any as they deem necessary     8) low hemoglobin (anemia): please have your blood work drawn as prescribed to monitor your hemoglobin level the results will be sent to your family doctor        Please avoid NSAID (including but not limited to advil, aleve, motrin, naproxen, ibuprofen, mobic, meloxicam, diclofenac etc) medications unless cleared to do so by your doctors as these medications can potentially cause worsening of your chronic kidney disease unless cleared to do so by you doctors       Please avoid NSAID (including but not limited to advil, aleve, motrin, naproxen, ibuprofen, mobic, meloxicam, diclofenac etc) medications, anti platelet medications (including but not limited to plavix, aspirin, and aspirin containing products), and any prescription blood thinners due to your already being on eliquis (apixaban) and when combined with these other medications they can increase your risk of bleeding; you may be cleared to use these medications in the future but do not do so unless advised to do so by your doctors      Please note a summary of your hospital stay with relevant information for your doctors has been sent to them, please confirm with your doctors at your follow up visits that they have received this summary and have them contact [address] [place] [address] if they have not received them along with any other medical records they may require

## 2019-07-12 NOTE — NURSING NOTE
Reviewed discharge instructions with patient and family  Patient and family verbalized understanding of discharge instructions for home, scripts given and faxed to preferred pharmacy, f/u appointments  Patient discharge to car in [facility] via wc with assist of PCA and daughter  Interdisciplinary care team determined safest means of transport is via car

## 2019-07-12 NOTE — PHYSICAL THERAPY NOTE
Discharge summary:     Pt has met all PT goals and is mod I for mobility tasks including bed mobility, transfers, amb with RW, and S for steps with B/L hand railing  Pt to be discharged to home on 7/12/19 with family  Recommend home health PT to assist in the transition and continue working on remaining deficits including decreased strength, endurance, and activity tolerance  Pt notes she is ready to go home

## 2019-07-12 NOTE — DISCHARGE SUMMARY
Physical Medicine and Rehabilitation Progress Note  Lennox Bailer 81 y o  female MRN: [id number]  Unit/Bed#: -01 Encounter: [id number]    Discharge Summary - PMR           Admission Date:    7/2/19  Discharge Date:   7/12/19    Diagnosis:   A-fib    Functional Status Upon Discharge: Mod I mobility/tx/ADLs     Condition at Discharge: stable    Discharge instructions/Information to patient and family:   See after visit summary for information provided to patient and family  Provisions for Follow-Up Care:  See after visit summary for information related to follow-up care and any pertinent home health orders  Disposition: home with family     Planned Readmission: no        Discharge Medications:  See after visit summary for reconciled discharge medications provided to patient and family  Comorbidities:   See below for medical details     Hospital Course: The patient had an unremarkable rehab course with significant functional gains made, please see below for medical details:          Lennox Bailer is a 81 y o  female who presented to the [address] [place] [address] with an irregular heart beat and was found to have A-fib for which she was seen by cardiology and offered a pacer but refused and was medically managed   Course was complicated by acute on chronic RF and hyponatremia for which renal service managed the patient           Hypomagnesemia  Assessment & Plan  - now low normal at 1 9 therefore Dr Daksha Christopher recommended patient be d/c'd on MgOx 400 mg qd  - OP FU with Dr Daksha Christopher     Hypokalemia  Matty Bullock  - now WNL at 3 9, per renal likely 2/2 to lasix which per Dr Daksha Christopher is not to be continued upon dc therefore Dr Daksha Christopher recommends patient not be dc'd on any potassium supplementation either    -OP FU with Dr Alanna luevano at 11 7  - IM managing and have cleared patient for dc with scrip for H&H with results to PCP upon dc     Hyponatremia  Assessment & Plan  - now low normal at 134 on FR of 1 8 L therefore Dr Lora Conway recommended patient continue FR 1 8 L upon dc   -OP FU with Dr Lora Conway     CKD (chronic kidney disease)  Assessment & Plan  - per renal baseline Cr 0 7-0 8  - Cr currently 0 68  -renal managing   - OP FU with Dr Lora Conway     * HTN (hypertension)  Assessment & Plan  - at home on lopressor 25 mg q12 and lisinopril 10 mg qd   -IM and renal co-managing and recommend patient be d/c'd home on current inpt regimen of diltiazem (24 hr) 300 mg qd and lopressor 25 mg TID     OAB (overactive bladder)  Assessment & Plan  - on home ditropan 5 mg qd     GERD (gastroesophageal reflux disease)  Assessment & Plan  - at home on prilosec 20 mg qd (therapeutic substitution as inpt)     Depression with anxiety  Assessment & Plan  - on home zoloft 50 mg qd  - also on home xanax 0 25 mg prn  - per PAPDMP last filled prescription for xanax 0 25 mg tabs on 4/26/19 for 270 tabs with 0 refills (appears to be a regular prescription every 3 months provided by PCP)     Diastolic heart failure (HCC)  Assessment & Plan  - was on lasix 20 mg BID PTA  - volume status currently stable w/o diuretics   - Dr Lora Conway recommends patient not resume lasix upon dc   - OP FU with Dr Amirah Gagnon          A-fib [facility])  Assessment & Plan  - seen by cards in acute care and offered pacer (for increased andrew blockade) however patient and family have d/w Dr Philly Patterson and they have all decided that it would be best not to pursue this option   - renal fxn improved so patient could be on eliquis 5 mg BID however cards elected to keep patient on eliquis 2 5 mg BID due to possible fall risk (was not on any AC PTA)  - at home on lopressor 25 mg q12 which was increased to TID per IM/renal and is to be dc'd on TID dosing per their recommendation   - IM managing rate control agents   - OP FU with Dr Amirah Gagnon      Mitral regurgitation  Assessment & Plan  - offered surgical intervention by cards however patient refused  - OP FU with Dr Delia Schwarz     CAD (coronary artery disease)  Assessment & Plan  - h/o stent in 2007  - home ASA 81 mg qd d/c'd by cards in acute care due to [facility]  - per cards notes in acute care not on statin however per EMR was on zocor 20 mg qpm (on 3/15/19 LDL was 69) and patient did confirm she takes zocor 20 mg qpm however per pharmacy there is a drug interaction between diltiazem and zocor which can increase plasma concentrations of zocor therefore per pharmacy recommendation dc'd patient on therapeutic dose substitution dose of pravastatin 40 mg qpm instead, will defer to PCP and/or Dr Delia Schwarz if they wish to resume zocor and stop pravastatin as OP however for now patient will be d/c'd off zocor and on pravastatin instead   - at home was on lopressor 25 mg q12 PTA (increased to TID dosing per IM/renal and is to be dc'd on TID dosing per their recommendation)   - IM managing as inpt  - OP FU with Dr Delia Schwarz        Scheduled Meds:    Current Facility-Administered Medications:  apixaban 2 5 mg Oral BID Cynthia Albert MD   diltiazem 300 mg Oral Daily MD Lou Ureña ON 7/13/2019] magnesium oxide 400 mg Oral Daily Graciela Garrett MD   metoprolol tartrate 25 mg Oral TID Graciela Garrett MD   oxybutynin 5 mg Oral Daily Graciela Garrett MD   pravastatin 40 mg Oral Daily With Snehal Burnette MD   sertraline 50 mg Oral Daily Graciela Garrett MD        Incidental findings:  1) elevated peak PA pressure: OP FU with Dr Delia Schwarz  2) 1 3 cm rt adrenal nodule: per radiology report recommend repeat non-contrast CT or MRI in 12 months, will defer to PCP to arrange  3) fluid collection above bladder: noted on US and FU CT was recommended which did not show a fluid collection above the bladder   4) low alk phos with nl tot bilirubin: mildly decreased alk phos of 49 (LLN 55): OP FU with PCP with further testing/treatment and/or specialist referral at PCP's discretion   5) T12 compression deformity: however per radiology report no acute fracture or destructive osseous lesion therefore likely chronic, patient is asymptomatic (no pain, no neuro findings); OP FU with PCP with further testing/treatment and/or specialist referral at PCP's discretion          DVT ppx: eliquis       * 33 min was spent in preparation for patient discharge including discussion with patient, patient's nurse, therapy staff, and case management

## 2019-07-12 NOTE — INCIDENTAL FINDINGS
1) elevated peak pulmonary artery pressure: Please follow up with Dr Gwen Palomares for further testing/treatment/monitoring if any as they deem necessary     2) 1 3 cm right adrenal nodule: per radiology report recommend repeat non-contrast CT or MRI in 12 months, please follow up with your family doctor to arrange this      3) low alkaline phosphatase level: Please follow up with your family doctor for further testing/treatment if any and/or specialist referral if any as they deem necessary       4) 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: Please follow up with your family doctor for further testing/treatment if any and/or specialist referral if any as they deem necessary     5) low magnesium level: please take your magnesium supplements as prescribed and please follow up with Dr Xiomara Quinones for further testing/treatment/monitoring if any as they deem necessary     6) low sodium level: please continue your fluid restriction of 1 8 liters per day and please follow up with Dr Xiomara Quinones for further testing/treatment/monitoring if any as they deem necessary     7) chronic kidney disease: Please follow up with Dr Xiomara Quinones for further testing/treatment/monitoring if any as they deem necessary     8) low hemoglobin (anemia): please have your blood work drawn as prescribed to monitor your hemoglobin level the results will be sent to your family doctor

## 2019-07-12 NOTE — NURSING NOTE
Pt sleeping in bed, no s/s of pain or distress  MOD I in room with RW during night  Assessment unchanged  All items within reach  Continuing to monitor

## 2019-07-12 NOTE — PROGRESS NOTES
Physical Medicine and Rehabilitation Progress Note  Parish Dowell 81 y o  female MRN: [id number]  Unit/Bed#: -01 Encounter: [id number]    HPI: Parish Dowell is a 81 y o  female who presented to the [address] with an irregular heart beat and was found to have A-fib for which she was seen by cardiology and offered a pacer but refused and was medically managed  Course was complicated by acute on chronic RF and hyponatremia for which renal service managed the patient         Chief Complaint: debility     Interval/subjective: patient without complaint and is looking forward to dc today     ROS: A 10 point ROS was performed; negative except as noted above       Assessment/Plan:      Hypomagnesemia  Assessment & Plan  - now low normal at 1 9 therefore Dr Natasha Kearns recommended patient be d/c'd on MgOx 400 mg qd  - OP FU with Dr Natasha Kearns     Hypokalemia  Yanick Veneta  - now WNL at 3 9, per renal likely 2/2 to lasix which per Dr Natasha Kearns is not to be continued upon dc therefore Dr Natasha Kearns recommends patient not be dc'd on any potassium supplementation either    -OP FU with Dr Chanel Kilpatrick  - mild at 11 7  - IM managing and have cleared patient for dc with scrip for H&H with results to PCP upon dc     Hyponatremia  Assessment & Plan  - now low normal at 134 on FR of 1 8 L therefore Dr Natasha Kearns recommended patient continue FR 1 8 L upon dc   -OP FU with Dr Natasha Kearns     CKD (chronic kidney disease)  Assessment & Plan  - per renal baseline Cr 0 7-0 8  - Cr currently 0 68  -renal managing   - OP FU with Dr Natasha Kearns     * HTN (hypertension)  Assessment & Plan  - at home on lopressor 25 mg q12 and lisinopril 10 mg qd   -IM and renal co-managing and recommend patient be d/c'd home on current inpt regimen of diltiazem (24 hr) 300 mg qd and lopressor 25 mg TID     OAB (overactive bladder)  Assessment & Plan  - on home ditropan 5 mg qd GERD (gastroesophageal reflux disease)  Assessment & Plan  - at home on prilosec 20 mg qd (therapeutic substitution as inpt)     Depression with anxiety  Assessment & Plan  - on home zoloft 50 mg qd  - also on home xanax 0 25 mg prn  - per PAPDMP last filled prescription for xanax 0 25 mg tabs on 4/26/19 for 270 tabs with 0 refills (appears to be a regular prescription every 3 months provided by PCP)     Diastolic heart failure (HCC)  Assessment & Plan  - was on lasix 20 mg BID PTA  - volume status currently stable w/o diuretics   - Dr Vargas Persaud recommends patient not resume lasix upon dc   - OP FU with Dr Laya Kelley          [facility])  Assessment & Plan  - seen by cards in acute care and offered pacer (for increased andrew blockade) however patient and family have d/w Dr Dior Anna and they have all decided that it would be best not to pursue this option   - renal fxn improved so patient could be on eliquis 5 mg BID however cards elected to keep patient on eliquis 2 5 mg BID due to possible fall risk (was not on any AC PTA)  - at home on lopressor 25 mg q12 which was increased to TID per IM/renal and is to be dc'd on TID dosing per their recommendation   - IM managing rate control agents   - OP FU with Dr Laya Kelley      Mitral regurgitation  Assessment & Plan  - offered surgical intervention by cards however patient refused  - OP FU with Dr Laya Kelley     CAD (coronary artery disease)  Assessment & Plan  - h/o stent in 2007  - home ASA 81 mg qd d/c'd by cards in acute care due to patient being started on [facility] [place]  - per cards notes in acute care not on statin however per EMR was on zocor 20 mg qpm (on 3/15/19 LDL was 69) and patient did confirm she takes zocor 20 mg qpm however per pharmacy there is a drug interaction between diltiazem and zocor which can increase plasma concentrations of zocor therefore per pharmacy recommendation dc'd patient on therapeutic dose substitution dose of pravastatin 40 mg qpm instead, will defer to PCP and/or Dr Yulia Pate if they wish to resume zocor and stop pravastatin as OP however for now patient will be d/c'd off zocor and on pravastatin instead   - at home was on lopressor 25 mg q12 PTA (increased to TID dosing per IM/renal and is to be dc'd on TID dosing per their recommendation)   - IM managing as inpt  - OP FU with Dr Yulia Pate        Scheduled Meds:    Current Facility-Administered Medications:  apixaban 2 5 mg Oral BID Lorenzo Winn MD   diltiazem 300 mg Oral Daily Jeanie Owen MD   Sparkle Body ON 7/13/2019] magnesium oxide 400 mg Oral Daily Jeanie Owen MD   metoprolol tartrate 25 mg Oral TID Jeanie Owen MD   oxybutynin 5 mg Oral Daily Jeanie Owen MD   pravastatin 40 mg Oral Daily With Amber Gómez MD   sertraline 50 mg Oral Daily Jeanie Owen MD        Incidental findings:  1) elevated peak PA pressure: OP FU with Dr Yulia Pate  2) 1 3 cm rt adrenal nodule: per radiology report recommend repeat non-contrast CT or MRI in 12 months, will defer to PCP to arrange  3) fluid collection above bladder: noted on US and FU CT was recommended which did not show a fluid collection above the bladder   4) low alk phos with nl tot bilirubin: mildly decreased alk phos of 49 (LLN 55): OP FU with PCP with further testing/treatment and/or specialist referral at PCP's discretion   5) T12 compression deformity: however per radiology report no acute fracture or destructive osseous lesion therefore likely chronic, patient is asymptomatic (no pain, no neuro findings); OP FU with PCP with further testing/treatment and/or specialist referral at PCP's discretion          DVT ppx: eliquis             Objective:    Functional Update:  Mobility: mod I   Transfers: mod I   ADLs: mod I          Physical Exam:  Vitals:    07/12/19 0658   BP: 130/66   Pulse: 65   Resp: 18   Temp: 98 2 °F (36 8 °C)   SpO2: 93%                 General: alert, no apparent distress, cooperative and comfortable  HEENT:  Head: Normal, normocephalic, atraumatic    CARDIAC:  +S1/2  LUNGS:  respirations unlabored   ABDOMEN:  soft NT   EXTREMITIES:  volume status currently stable   NEURO:   mental status, speech normal, alert and oriented x3  PSYCH:  mood/affect currently stable         Laboratory:   Results from last 7 days   Lab Units 07/08/19  0522   HEMOGLOBIN g/dL 11 7*   HEMATOCRIT % 34 6*   WBC Thousand/uL 7 80     Results from last 7 days   Lab Units 07/12/19  0502 07/11/19  0503 07/08/19  0522   BUN mg/dL 16 17 17   SODIUM mmol/L 134 135 132*   POTASSIUM mmol/L 3 9 2 8* 3 6   CHLORIDE mmol/L 99 97* 97*   CREATININE mg/dL 0 68 0 71 0 78   AST U/L  --   --  16   ALT U/L  --   --  17            Patient Active Problem List   Diagnosis    CAD (coronary artery disease)    Mitral regurgitation    A-fib (HCC)    CKD (chronic kidney disease)    Diastolic heart failure (HCC)    HTN (hypertension)    Hyponatremia    Depression with anxiety    GERD (gastroesophageal reflux disease)    OAB (overactive bladder)    Anemia    Hypokalemia    Hypomagnesemia

## 2019-07-12 NOTE — INCIDENTAL FINDINGS
1) elevated peak pulmonary artery pressure: Please follow up with Dr Krysta Ware for further testing/treatment/monitoring if any as they deem necessary     2) 1 3 cm right adrenal nodule: per radiology report recommend repeat non-contrast CT or MRI in 12 months, please follow up with your family doctor to arrange this      3) low alkaline phosphatase level: Please follow up with your family doctor for further testing/treatment if any and/or specialist referral if any as they deem necessary       4) 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: Please follow up with your family doctor for further testing/treatment if any and/or specialist referral if any as they deem necessary     5) low magnesium level: please take your magnesium supplements as prescribed and please follow up with Dr Favian Mohan for further testing/treatment/monitoring if any as they deem necessary     6) low sodium level: please continue your fluid restriction of 1 8 liters per day and please follow up with Dr Favian Mohan for further testing/treatment/monitoring if any as they deem necessary     7) chronic kidney disease: Please follow up with Dr Favian Mohan for further testing/treatment/monitoring if any as they deem necessary     8) low hemoglobin (anemie): please have your blood work drawn as prescribed to monitor your hemoglobin level the results will be sent to your family doctor

## 2019-07-15 ENCOUNTER — TELEPHONE (OUTPATIENT)
Dept: CARDIOLOGY CLINIC | Facility: CLINIC | Age: 84
End: 2019-07-15

## 2019-07-15 NOTE — TELEPHONE ENCOUNTER
Concerned about edema in bilateral legs  Up to knees  Not on any type of water pill per daughter  Patient was just d/c from hospital   Can not get her in for OV until 8/8/19 recs?

## 2019-07-17 DIAGNOSIS — R60.9 EDEMA, UNSPECIFIED TYPE: Primary | ICD-10-CM

## 2019-07-17 NOTE — TELEPHONE ENCOUNTER
Spoke to Peace Musa and gave her the recs  She stated she thinks she has some Lasix but will call us back if she needs a script  I told her I would mail her the script for the bloodwork

## 2019-07-18 ENCOUNTER — APPOINTMENT (OUTPATIENT)
Dept: LAB | Facility: MEDICAL CENTER | Age: 84
End: 2019-07-18
Payer: MEDICARE

## 2019-07-18 DIAGNOSIS — D64.9 ANEMIA: ICD-10-CM

## 2019-07-18 LAB
ANION GAP SERPL CALCULATED.3IONS-SCNC: 8 MMOL/L (ref 4–13)
BUN SERPL-MCNC: 11 MG/DL (ref 5–25)
CALCIUM SERPL-MCNC: 9.5 MG/DL (ref 8.3–10.1)
CHLORIDE SERPL-SCNC: 103 MMOL/L (ref 100–108)
CO2 SERPL-SCNC: 26 MMOL/L (ref 21–32)
CREAT SERPL-MCNC: 0.71 MG/DL (ref 0.6–1.3)
GFR SERPL CREATININE-BSD FRML MDRD: 79 ML/MIN/1.73SQ M
GLUCOSE SERPL-MCNC: 98 MG/DL (ref 65–140)
HCT VFR BLD AUTO: 38.9 % (ref 34.8–46.1)
HGB BLD-MCNC: 12.1 G/DL (ref 11.5–15.4)
POTASSIUM SERPL-SCNC: 3.6 MMOL/L (ref 3.5–5.3)
SODIUM SERPL-SCNC: 137 MMOL/L (ref 136–145)

## 2019-07-18 PROCEDURE — 85014 HEMATOCRIT: CPT

## 2019-07-18 PROCEDURE — 80048 BASIC METABOLIC PNL TOTAL CA: CPT

## 2019-07-18 PROCEDURE — 36415 COLL VENOUS BLD VENIPUNCTURE: CPT

## 2019-07-18 PROCEDURE — 85018 HEMOGLOBIN: CPT

## 2019-08-08 ENCOUNTER — OFFICE VISIT (OUTPATIENT)
Dept: CARDIOLOGY CLINIC | Facility: CLINIC | Age: 84
End: 2019-08-08
Payer: MEDICARE

## 2019-08-08 ENCOUNTER — TRANSCRIBE ORDERS (OUTPATIENT)
Dept: LAB | Facility: MEDICAL CENTER | Age: 84
End: 2019-08-08

## 2019-08-08 ENCOUNTER — APPOINTMENT (OUTPATIENT)
Dept: LAB | Facility: MEDICAL CENTER | Age: 84
End: 2019-08-08
Payer: MEDICARE

## 2019-08-08 VITALS
HEART RATE: 80 BPM | HEIGHT: 59 IN | BODY MASS INDEX: 29.84 KG/M2 | DIASTOLIC BLOOD PRESSURE: 60 MMHG | SYSTOLIC BLOOD PRESSURE: 118 MMHG | WEIGHT: 148 LBS

## 2019-08-08 DIAGNOSIS — I10 ESSENTIAL HYPERTENSION: ICD-10-CM

## 2019-08-08 DIAGNOSIS — I50.32 CHRONIC DIASTOLIC HEART FAILURE (HCC): ICD-10-CM

## 2019-08-08 DIAGNOSIS — I48.91 A-FIB (HCC): ICD-10-CM

## 2019-08-08 DIAGNOSIS — I34.0 MITRAL VALVE INSUFFICIENCY, UNSPECIFIED ETIOLOGY: Primary | ICD-10-CM

## 2019-08-08 DIAGNOSIS — E87.1 HYPOSMOLALITY SYNDROME: ICD-10-CM

## 2019-08-08 DIAGNOSIS — N18.2 CHRONIC KIDNEY DISEASE, STAGE II (MILD): ICD-10-CM

## 2019-08-08 DIAGNOSIS — N18.2 CHRONIC KIDNEY DISEASE, STAGE II (MILD): Primary | ICD-10-CM

## 2019-08-08 DIAGNOSIS — R60.9 EDEMA, UNSPECIFIED TYPE: ICD-10-CM

## 2019-08-08 LAB
ANION GAP SERPL CALCULATED.3IONS-SCNC: 5 MMOL/L (ref 4–13)
BUN SERPL-MCNC: 15 MG/DL (ref 5–25)
CALCIUM SERPL-MCNC: 9.5 MG/DL (ref 8.3–10.1)
CHLORIDE SERPL-SCNC: 100 MMOL/L (ref 100–108)
CO2 SERPL-SCNC: 29 MMOL/L (ref 21–32)
CREAT SERPL-MCNC: 0.72 MG/DL (ref 0.6–1.3)
GFR SERPL CREATININE-BSD FRML MDRD: 78 ML/MIN/1.73SQ M
GLUCOSE P FAST SERPL-MCNC: 115 MG/DL (ref 65–99)
MAGNESIUM SERPL-MCNC: 2.1 MG/DL (ref 1.6–2.6)
POTASSIUM SERPL-SCNC: 3.7 MMOL/L (ref 3.5–5.3)
SODIUM SERPL-SCNC: 134 MMOL/L (ref 136–145)
T4 FREE SERPL-MCNC: 1.22 NG/DL (ref 0.76–1.46)
TSH SERPL DL<=0.05 MIU/L-ACNC: 2.21 UIU/ML (ref 0.36–3.74)

## 2019-08-08 PROCEDURE — 80048 BASIC METABOLIC PNL TOTAL CA: CPT

## 2019-08-08 PROCEDURE — 83735 ASSAY OF MAGNESIUM: CPT

## 2019-08-08 PROCEDURE — 99214 OFFICE O/P EST MOD 30 MIN: CPT | Performed by: PHYSICIAN ASSISTANT

## 2019-08-08 PROCEDURE — 36415 COLL VENOUS BLD VENIPUNCTURE: CPT

## 2019-08-08 PROCEDURE — 84443 ASSAY THYROID STIM HORMONE: CPT

## 2019-08-08 PROCEDURE — 84439 ASSAY OF FREE THYROXINE: CPT

## 2019-08-08 RX ORDER — FUROSEMIDE 40 MG/1
40 TABLET ORAL 2 TIMES DAILY
Qty: 90 TABLET | Refills: 3 | Status: SHIPPED | OUTPATIENT
Start: 2019-08-08 | End: 2019-08-08

## 2019-08-08 RX ORDER — FUROSEMIDE 40 MG/1
40 TABLET ORAL DAILY
Qty: 90 TABLET | Refills: 3 | Status: SHIPPED | OUTPATIENT
Start: 2019-08-08 | End: 2019-12-27 | Stop reason: SDUPTHER

## 2019-08-08 NOTE — PROGRESS NOTES
[facility]     Marc Delgadillo / 81 y o  female / : 1935 / MRN: [id number]  Date of Visit: 19    ASSESSMENT/PLAN  1  Severe MR  · She would be a poor candidate for surgical intervention due to frailty  · She is not interested in any intervention, including less invasive clip procedure  · Lisinopril was held during hospitalization for HUY  Cr is back to baseline, however BP is on the lower side and likely would not tolerate addition of lisinopril  2  Persistent afib   · Rate-controlled afib by exam    · Continue metoprolol, cardizem and low-dose Eliquis  She or her son will call if they want to switch from Eliquis to coumadin due to cost     3  Chronic diastolic HF   · Euvolemic, weights at home have been stable (dry weight ~143-145 lbs)  · Advised her to take lasix 40mg daily for weight gain of 3lbs overnight or 5lbs in 1 week  4  CAD  · S/p GARRET to LAD in   · No recent angina      5  HTN   · Continue current doses of metoprolol and cardizem  Follow-up in 6 months  SUBJECTIVE:  HPI: Marc Delgadillo is a 81 y o  female who presents for follow-up regarding recent admission for decompensated HF, severe MR and new onset afib  She received diuresis with improvement in her dyspnea  Afib rates were difficult to control during admission - she became significantly bradycardic (rates 30-40s) when AV andrew blockade was increased, so only modest rate control was able to be achieved  There was discussion regarding pacemaker implantation and valve intervention, but ultimately she decided against both  She had a short stay in acute rehab  She has been home from acute rehab for about 1 month and she is doing remarkably well  Dyspnea is much improved  She has been taking 20mg of lasix daily, with an extra dose occasionally for leg edema  She is currently down about 10 lbs from her weight on admission  Afib remains asymptomatic   Eliquis was $105/month through the mail order but they are going to check the cost at [facility]  Cardiac testing:   TTE 6/27/2019:  EF 60%  Moderate LVH  Moderate MR with marked prolapse of the anterior and posterior leaflets  Mild TR with moderate pulm HTN     TTE 2017 - EF 55%, moderate MR     Cardiac cath 2012 - patent LAD stent, severe MR   No significant CAD       Allergies   Allergen Reactions    Chocolate Flavor     Iodine Other (See Comments)     shellfish- difficulty breathing    Lactose     Shellfish-Derived Products     Codeine Rash         Current Outpatient Medications:     apixaban (ELIQUIS) 2 5 mg, Take 1 tablet (2 5 mg total) by mouth 2 (two) times a day, Disp: 180 tablet, Rfl: 3    diltiazem (CARDIZEM CD) 300 mg 24 hr capsule, Take 1 capsule (300 mg total) by mouth daily, Disp: 30 capsule, Rfl: 0    magnesium oxide (MAG-OX) 400 mg, Take 1 tablet (400 mg total) by mouth daily, Disp: 30 tablet, Rfl: 0    metoprolol tartrate (LOPRESSOR) 25 mg tablet, Take 1 tablet (25 mg total) by mouth 3 (three) times a day, Disp: 90 tablet, Rfl: 0    oxybutynin (DITROPAN) 5 mg tablet, Take 5 mg by mouth daily, Disp: , Rfl:     PANTOPRAZOLE SODIUM PO, Take by mouth daily, Disp: , Rfl:     sertraline (ZOLOFT) 50 mg tablet, Take 50 mg by mouth daily, Disp: , Rfl:     furosemide (LASIX) 40 mg tablet, Take 1 tablet (40 mg total) by mouth daily, Disp: 90 tablet, Rfl: 3    omeprazole (PriLOSEC) 20 mg delayed release capsule, Take 20 mg by mouth daily, Disp: , Rfl:     pravastatin (PRAVACHOL) 40 mg tablet, Take 1 tablet (40 mg total) by mouth daily with dinner (Patient not taking: Reported on 8/8/2019), Disp: 30 tablet, Rfl: 0    Past Medical History:   Diagnosis Date    Acute on chronic diastolic congestive heart failure ([facility] [address] ) 6/27/2019    Arthritis     Coronary artery disease     history of stenting    Eczema     years    History of echocardiogram 07/20/2017    EF 55%, mild concentric LVH, bileaflet MVP with moderate MR, left atrial enlargement   History of transfusion     Hyperlipidemia     Hypertension     Mitral regurgitation        Family History   Problem Relation Age of Onset    Arthritis Mother     Cancer Mother     Heart disease Mother     Hypertension Mother     Alcohol abuse Father     Arthritis Father     Birth defects Son     Hearing loss Son     Diabetes Daughter     Stroke Maternal Grandmother     Asthma Maternal Grandfather        Past Surgical History:   Procedure Laterality Date    ABDOMINAL SURGERY      hysterectomy    CARDIAC CATHETERIZATION  04/10/2012    EF 65%, no significant CAD, patent stents, severe MR     CORONARY ANGIOPLASTY WITH STENT PLACEMENT  03/21/2007    EF 55%, GARRET to LAD   EYE SURGERY      b/l cataracts    HYSTERECTOMY      JOINT REPLACEMENT      Rt knee       Social History     Socioeconomic History    Marital status:       Spouse name: Not on file    Number of children: Not on file    Years of education: Not on file    Highest education level: Not on file   Occupational History    Not on file   Social Needs    Financial resource strain: Not on file    Food insecurity:     Worry: Not on file     Inability: Not on file    Transportation needs:     Medical: Not on file     Non-medical: Not on file   Tobacco Use    Smoking status: Never Smoker    Smokeless tobacco: Never Used   Substance and Sexual Activity    Alcohol use: Not Currently    Drug use: Never    Sexual activity: Not Currently   Lifestyle    Physical activity:     Days per week: Not on file     Minutes per session: Not on file    Stress: Not on file   Relationships    Social connections:     Talks on phone: Not on file     Gets together: Not on file     Attends Sikh service: Not on file     Active member of club or organization: Not on file     Attends meetings of clubs or organizations: Not on file     Relationship status: Not on file    Intimate partner violence:     Fear of current or ex partner: Not on file     Emotionally abused: Not on file     Physically abused: Not on file     Forced sexual activity: Not on file   Other Topics Concern    Not on file   Social History Narrative    Not on file       Review of Systems   Constitution: Negative  HENT: Negative  Eyes: Negative  Cardiovascular: Positive for leg swelling (mild, resolves w/ lasix)  Negative for chest pain, claudication, dyspnea on exertion, irregular heartbeat, near-syncope, orthopnea, palpitations, paroxysmal nocturnal dyspnea and syncope  Respiratory: Negative for cough, shortness of breath and wheezing  Endocrine: Negative  Skin: Negative  Musculoskeletal: Positive for joint pain  Gastrointestinal: Negative  Genitourinary: Negative  Neurological: Negative for dizziness and light-headedness  Psychiatric/Behavioral: Negative  OBJECTIVE:  Vitals: /60   Pulse 80 Comment: irregular  Ht 4' 11" (1 499 m)   Wt 67 1 kg (148 lb)   BMI 29 89 kg/m²     Physical exam:   Physical Exam   Constitutional: She is oriented to person, place, and time  She appears well-developed and well-nourished  No distress  HENT:   Head: Normocephalic and atraumatic  Eyes: EOM are normal  No scleral icterus  Neck: Normal range of motion  Neck supple  Cardiovascular: Normal rate, S1 normal and S2 normal  An irregularly irregular rhythm present  Murmur (Grade 2-5/6 holosystolic murmur at the apex) heard  Pulmonary/Chest: Effort normal and breath sounds normal  She has no wheezes  She has no rales  Abdominal: Soft  Bowel sounds are normal    Musculoskeletal: She exhibits no edema  Ambulates using a cane   Neurological: She is alert and oriented to person, place, and time  Skin: Skin is warm and dry  Psychiatric: She has a normal mood and affect  Her behavior is normal    Nursing note and vitals reviewed        Lab Results:   No results found for: HGBA1C  Lab Results   Component Value Date    CHOL 152 04/05/2018     Lab Results   Component Value Date    HDL 61 (H) 03/15/2019    HDL 56 04/05/2018     Lab Results   Component Value Date    LDLCALC 69 03/15/2019    LDLCALC 72 1 (L) 04/05/2018     Lab Results   Component Value Date    TRIG 94 03/15/2019    TRIG 119 04/05/2018     No results found for: CHOLHDL

## 2019-08-15 ENCOUNTER — TELEPHONE (OUTPATIENT)
Dept: CARDIOLOGY CLINIC | Facility: CLINIC | Age: 84
End: 2019-08-15

## 2019-08-15 DIAGNOSIS — I48.91 ATRIAL FIBRILLATION, UNSPECIFIED TYPE (HCC): Primary | ICD-10-CM

## 2019-08-15 RX ORDER — DILTIAZEM HCL 90 MG
90 TABLET ORAL 3 TIMES DAILY
Qty: 90 TABLET | Refills: 0 | Status: SHIPPED | OUTPATIENT
Start: 2019-08-15 | End: 2019-09-10 | Stop reason: SDUPTHER

## 2019-08-15 NOTE — TELEPHONE ENCOUNTER
Spoke to patient - will try diltiazem 90mg TID  1 month filled - will fill 90 day next time if there are no issues  ----- Message from Seda Henry sent at 8/14/2019  4:09 PM EDT -----  Regarding: WANTS A LESS EXPENSIVE MEDICATION  Contact: [phone]  Cardizem CD was too expensive  Is there an alternative?

## 2019-09-04 ENCOUNTER — OFFICE VISIT (OUTPATIENT)
Dept: NEPHROLOGY | Facility: CLINIC | Age: 84
End: 2019-09-04
Payer: MEDICARE

## 2019-09-04 VITALS
DIASTOLIC BLOOD PRESSURE: 72 MMHG | SYSTOLIC BLOOD PRESSURE: 130 MMHG | HEIGHT: 59 IN | WEIGHT: 143.8 LBS | BODY MASS INDEX: 28.99 KG/M2

## 2019-09-04 DIAGNOSIS — E83.42 HYPOMAGNESEMIA: ICD-10-CM

## 2019-09-04 DIAGNOSIS — E87.1 HYPONATREMIA: ICD-10-CM

## 2019-09-04 DIAGNOSIS — I48.91 ATRIAL FIBRILLATION, UNSPECIFIED TYPE (HCC): ICD-10-CM

## 2019-09-04 DIAGNOSIS — E87.6 HYPOKALEMIA: ICD-10-CM

## 2019-09-04 DIAGNOSIS — I50.32 CHRONIC DIASTOLIC HEART FAILURE (HCC): ICD-10-CM

## 2019-09-04 DIAGNOSIS — N18.2 CHRONIC KIDNEY DISEASE, STAGE 2 (MILD): Primary | ICD-10-CM

## 2019-09-04 PROBLEM — E78.2 MIXED HYPERLIPIDEMIA: Status: ACTIVE | Noted: 2019-09-04

## 2019-09-04 PROCEDURE — 1124F ACP DISCUSS-NO DSCNMKR DOCD: CPT | Performed by: INTERNAL MEDICINE

## 2019-09-04 PROCEDURE — 99214 OFFICE O/P EST MOD 30 MIN: CPT | Performed by: INTERNAL MEDICINE

## 2019-09-04 RX ORDER — ALPRAZOLAM 0.25 MG/1
0.25 TABLET ORAL
Status: ON HOLD | COMMUNITY
End: 2020-08-21 | Stop reason: SDUPTHER

## 2019-09-04 NOTE — ASSESSMENT & PLAN NOTE
Wt Readings from Last 3 Encounters:   09/04/19 65 2 kg (143 lb 12 8 oz)   08/08/19 67 1 kg (148 lb)   07/12/19 74 6 kg (164 lb 6 4 oz)     Patient is compensated at this time

## 2019-09-10 DIAGNOSIS — I48.91 ATRIAL FIBRILLATION, UNSPECIFIED TYPE (HCC): ICD-10-CM

## 2019-09-10 RX ORDER — DILTIAZEM HCL 90 MG
90 TABLET ORAL 3 TIMES DAILY
Qty: 270 TABLET | Refills: 3 | Status: SHIPPED | OUTPATIENT
Start: 2019-09-10 | End: 2019-10-18

## 2019-10-18 ENCOUNTER — HOSPITAL ENCOUNTER (EMERGENCY)
Facility: HOSPITAL | Age: 84
Discharge: HOME | End: 2019-10-18
Attending: EMERGENCY MEDICINE
Payer: MEDICARE

## 2019-10-18 VITALS
RESPIRATION RATE: 16 BRPM | HEART RATE: 96 BPM | WEIGHT: 138 LBS | TEMPERATURE: 98.9 F | SYSTOLIC BLOOD PRESSURE: 130 MMHG | HEIGHT: 59 IN | OXYGEN SATURATION: 98 % | DIASTOLIC BLOOD PRESSURE: 91 MMHG | BODY MASS INDEX: 27.82 KG/M2

## 2019-10-18 DIAGNOSIS — R21 RASH: Primary | ICD-10-CM

## 2019-10-18 PROCEDURE — 99282 EMERGENCY DEPT VISIT SF MDM: CPT

## 2019-10-18 RX ORDER — HYDROXYZINE HYDROCHLORIDE 25 MG/1
25 TABLET, FILM COATED ORAL EVERY 6 HOURS
Qty: 12 TABLET | Refills: 0 | Status: ON HOLD | OUTPATIENT
Start: 2019-10-18 | End: 2020-08-16

## 2019-10-18 RX ORDER — DIAPER,BRIEF,INFANT-TODD,DISP
EACH MISCELLANEOUS
Qty: 15 G | Refills: 0 | Status: ON HOLD | OUTPATIENT
Start: 2019-10-18 | End: 2020-08-16

## 2019-10-18 NOTE — ED PROVIDER NOTES
History  Chief Complaint   Patient presents with    Rash     under skin folds, arms leg and back, burning, had steroid shot two days ago at dermatologist, tried calamine at home      Patient is an 50-year-old female with history of hypertension congestive heart failure coronary disease on Eliquis who presents complaining of several weeks of a dry pruritic rash over entire body  Patient has seen her PMD who gave her a shot of IM steroids from which she got temporary relief  Patient called her doctor today who is referred her to the emergency department for further evaluation  Patient has no chest pain or shortness of breath  She denies any urticaria  Patient has no new exposures or new medications  Patient denies any swelling in her throat  She is not wheezing  Prior to Admission Medications   Prescriptions Last Dose Informant Patient Reported? Taking?    ALPRAZolam (XANAX) 0 25 mg tablet   Yes No   Sig: Take 0 25 mg by mouth daily at bedtime as needed for anxiety   PANTOPRAZOLE SODIUM PO   Yes No   Sig: Take by mouth daily   apixaban (ELIQUIS) 2 5 mg   No No   Sig: Take 1 tablet (2 5 mg total) by mouth 2 (two) times a day   furosemide (LASIX) 40 mg tablet   No No   Sig: Take 1 tablet (40 mg total) by mouth daily   magnesium oxide (MAG-OX) 400 mg   No No   Sig: Take 1 tablet (400 mg total) by mouth 3 (three) times a week   metoprolol tartrate (LOPRESSOR) 25 mg tablet   No No   Sig: Take 1 tablet (25 mg total) by mouth 3 (three) times a day   oxybutynin (DITROPAN) 5 mg tablet   Yes No   Sig: Take 5 mg by mouth daily   pravastatin (PRAVACHOL) 40 mg tablet   No No   Sig: Take 1 tablet (40 mg total) by mouth daily with dinner   sertraline (ZOLOFT) 50 mg tablet   Yes No   Sig: Take 50 mg by mouth daily      Facility-Administered Medications: None       Past Medical History:   Diagnosis Date    Acute on chronic diastolic congestive heart failure (HCC) 6/27/2019    Arthritis     Chronic diastolic heart failure (HCC)     Chronic kidney disease, stage 2 (mild)     Coronary artery disease     history of stenting    Eczema     years    Edema     History of echocardiogram 07/20/2017    EF 55%, mild concentric LVH, bileaflet MVP with moderate MR, left atrial enlargement   History of transfusion     Hyperlipidemia     Hypertension     Hypo-osmolality and hyponatremia     Hypokalemia 7/11/2019    Mitral regurgitation        Past Surgical History:   Procedure Laterality Date    ABDOMINAL SURGERY      hysterectomy    CARDIAC CATHETERIZATION  04/10/2012    EF 65%, no significant CAD, patent stents, severe MR     CORONARY ANGIOPLASTY WITH STENT PLACEMENT  03/21/2007    EF 55%, GARRET to LAD   EYE SURGERY      b/l cataracts    HYSTERECTOMY      JOINT REPLACEMENT      Rt knee       Family History   Problem Relation Age of Onset    Arthritis Mother     Cancer Mother     Heart disease Mother     Hypertension Mother     Alcohol abuse Father     Arthritis Father     Birth defects Son     Hearing loss Son     Diabetes Daughter     Stroke Maternal Grandmother     Asthma Maternal Grandfather      I have reviewed and agree with the history as documented  Social History     Tobacco Use    Smoking status: Never Smoker    Smokeless tobacco: Never Used   Substance Use Topics    Alcohol use: Not Currently    Drug use: Never        Review of Systems   Constitutional: Negative for chills, fatigue, fever and unexpected weight change  HENT: Negative for congestion and nosebleeds  Eyes: Negative for visual disturbance  Respiratory: Negative for chest tightness and shortness of breath  Cardiovascular: Negative for chest pain, palpitations and leg swelling  Gastrointestinal: Negative for abdominal pain, blood in stool, diarrhea, nausea and vomiting  Endocrine: Negative for cold intolerance and heat intolerance  Genitourinary: Negative for difficulty urinating     Musculoskeletal: Negative for arthralgias, back pain, gait problem, joint swelling and myalgias  Skin: Negative for rash  Neurological: Negative for dizziness, speech difficulty, weakness and headaches  Psychiatric/Behavioral: Negative for behavioral problems, confusion, self-injury and suicidal ideas  All other systems reviewed and are negative  Physical Exam  Physical Exam   Constitutional: She is oriented to person, place, and time  She appears well-developed and well-nourished  HENT:   Head: Normocephalic and atraumatic  Nose: Nose normal    Eyes: Pupils are equal, round, and reactive to light  EOM are normal    Neck: Normal range of motion  Neck supple  Cardiovascular: Normal rate, regular rhythm and normal heart sounds  Exam reveals no gallop and no friction rub  No murmur heard  Pulmonary/Chest: Effort normal and breath sounds normal  No respiratory distress  She has no wheezes  She has no rales  Abdominal: Soft  She exhibits no distension  There is no tenderness  There is no rebound and no guarding  Musculoskeletal: Normal range of motion  She exhibits no edema  Neurological: She is alert and oriented to person, place, and time  Skin: Skin is warm and dry  Patient has scaly patchy areas over her trunk which she describes pruritic  There are no signs and symptoms of infection  There are no vesicles  There is no erythema  There are no urticaria   Psychiatric: She has a normal mood and affect  Her behavior is normal  Judgment and thought content normal    Nursing note and vitals reviewed        Vital Signs  ED Triage Vitals   Temperature Pulse Respirations Blood Pressure SpO2   10/18/19 1441 10/18/19 1442 10/18/19 1442 10/18/19 1443 10/18/19 1442   98 9 °F (37 2 °C) 96 16 130/91 98 %      Temp Source Heart Rate Source Patient Position - Orthostatic VS BP Location FiO2 (%)   10/18/19 1441 -- 10/18/19 1443 10/18/19 1443 --   Temporal  Sitting Left arm       Pain Score       10/18/19 1441       8 Vitals:    10/18/19 1442 10/18/19 1443   BP:  130/91   Pulse: 96    Patient Position - Orthostatic VS:  Sitting         Visual Acuity      ED Medications  Medications - No data to display    Diagnostic Studies  Results Reviewed     None                 No orders to display              Procedures  Procedures       ED Course  ED Course as of Oct 18 1452   Fri Oct 18, 2019   1451 Unclear etiology of patient's pruritic rash  Will treat with hydrocortisone cream and p o  Atarax p r n  MDM    Disposition  Final diagnoses:   None     ED Disposition     None      Follow-up Information    None         Patient's Medications   Discharge Prescriptions    No medications on file     No discharge procedures on file      ED Provider  Electronically Signed by           Lg Pino MD  10/18/19 6522

## 2019-11-30 ENCOUNTER — HOSPITAL ENCOUNTER (INPATIENT)
Facility: HOSPITAL | Age: 84
LOS: 4 days | Discharge: HOME HEALTH | DRG: 308 | End: 2019-12-04
Attending: EMERGENCY MEDICINE | Admitting: HOSPITALIST
Payer: MEDICARE

## 2019-11-30 ENCOUNTER — APPOINTMENT (EMERGENCY)
Dept: RADIOLOGY | Facility: HOSPITAL | Age: 84
DRG: 308 | End: 2019-11-30
Payer: MEDICARE

## 2019-11-30 DIAGNOSIS — I48.91 ATRIAL FIBRILLATION WITH RVR (HCC): ICD-10-CM

## 2019-11-30 DIAGNOSIS — R05.9 COUGH: Primary | ICD-10-CM

## 2019-11-30 DIAGNOSIS — J20.5 RSV BRONCHITIS: ICD-10-CM

## 2019-11-30 PROBLEM — I50.32 CHRONIC DIASTOLIC HEART FAILURE (HCC): Status: ACTIVE | Noted: 2019-06-27

## 2019-11-30 LAB
ALBUMIN SERPL BCP-MCNC: 3.5 G/DL (ref 3.5–5.7)
ALP SERPL-CCNC: 60 U/L (ref 55–165)
ALT SERPL W P-5'-P-CCNC: 12 U/L (ref 7–52)
ANION GAP SERPL CALCULATED.3IONS-SCNC: 10 MMOL/L (ref 4–13)
APTT PPP: 33 SECONDS (ref 23–37)
AST SERPL W P-5'-P-CCNC: 19 U/L (ref 13–39)
ATRIAL RATE: 104 BPM
ATRIAL RATE: 119 BPM
BASOPHILS # BLD AUTO: 0.1 THOUSANDS/ΜL (ref 0–0.1)
BASOPHILS NFR BLD AUTO: 1 % (ref 0–2)
BILIRUB SERPL-MCNC: 1.3 MG/DL (ref 0.2–1)
BNP SERPL-MCNC: 1541 PG/ML (ref 1–100)
BUN SERPL-MCNC: 12 MG/DL (ref 7–25)
CALCIUM SERPL-MCNC: 8.9 MG/DL (ref 8.6–10.5)
CHLORIDE SERPL-SCNC: 95 MMOL/L (ref 98–107)
CO2 SERPL-SCNC: 27 MMOL/L (ref 21–31)
CREAT SERPL-MCNC: 0.69 MG/DL (ref 0.6–1.2)
EOSINOPHIL # BLD AUTO: 0.1 THOUSAND/ΜL (ref 0–0.61)
EOSINOPHIL NFR BLD AUTO: 1 % (ref 0–5)
ERYTHROCYTE [DISTWIDTH] IN BLOOD BY AUTOMATED COUNT: 13.9 % (ref 11.5–14.5)
FLUAV RNA NPH QL NAA+PROBE: ABNORMAL
FLUBV RNA NPH QL NAA+PROBE: ABNORMAL
GFR SERPL CREATININE-BSD FRML MDRD: 81 ML/MIN/1.73SQ M
GLUCOSE SERPL-MCNC: 116 MG/DL (ref 65–99)
HCT VFR BLD AUTO: 37.9 % (ref 42–47)
HGB BLD-MCNC: 13 G/DL (ref 12–16)
INR PPP: 1.21 (ref 0.9–1.5)
LACTATE SERPL-SCNC: 1.4 MMOL/L (ref 0.5–2)
LYMPHOCYTES # BLD AUTO: 0.7 THOUSANDS/ΜL (ref 0.6–4.47)
LYMPHOCYTES NFR BLD AUTO: 8 % (ref 21–51)
MCH RBC QN AUTO: 30.9 PG (ref 26–34)
MCHC RBC AUTO-ENTMCNC: 34.4 G/DL (ref 31–37)
MCV RBC AUTO: 90 FL (ref 81–99)
MONOCYTES # BLD AUTO: 0.7 THOUSAND/ΜL (ref 0.17–1.22)
MONOCYTES NFR BLD AUTO: 9 % (ref 2–12)
NEUTROPHILS # BLD AUTO: 6.5 THOUSANDS/ΜL (ref 1.4–6.5)
NEUTS SEG NFR BLD AUTO: 81 % (ref 42–75)
PLATELET # BLD AUTO: 227 THOUSANDS/UL (ref 149–390)
PMV BLD AUTO: 7.7 FL (ref 8.6–11.7)
POTASSIUM SERPL-SCNC: 3.4 MMOL/L (ref 3.5–5.5)
PROCALCITONIN SERPL-MCNC: <0.05 NG/ML
PROT SERPL-MCNC: 7 G/DL (ref 6.4–8.9)
PROTHROMBIN TIME: 14.1 SECONDS (ref 10.2–13)
QRS AXIS: -39 DEGREES
QRS AXIS: -43 DEGREES
QRSD INTERVAL: 84 MS
QRSD INTERVAL: 86 MS
QT INTERVAL: 334 MS
QT INTERVAL: 336 MS
QTC INTERVAL: 489 MS
QTC INTERVAL: 496 MS
RBC # BLD AUTO: 4.22 MILLION/UL (ref 3.9–5.2)
RSV RNA NPH QL NAA+PROBE: DETECTED
S PYO DNA THROAT QL NAA+PROBE: NORMAL
SODIUM SERPL-SCNC: 132 MMOL/L (ref 134–143)
T WAVE AXIS: -10 DEGREES
T WAVE AXIS: -15 DEGREES
TROPONIN I SERPL-MCNC: <0.03 NG/ML
TSH SERPL DL<=0.05 MIU/L-ACNC: 2 UIU/ML (ref 0.45–5.33)
VENTRICULAR RATE: 129 BPM
VENTRICULAR RATE: 131 BPM
WBC # BLD AUTO: 8 THOUSAND/UL (ref 4.8–10.8)

## 2019-11-30 PROCEDURE — 87040 BLOOD CULTURE FOR BACTERIA: CPT | Performed by: EMERGENCY MEDICINE

## 2019-11-30 PROCEDURE — 85025 COMPLETE CBC W/AUTO DIFF WBC: CPT | Performed by: EMERGENCY MEDICINE

## 2019-11-30 PROCEDURE — 71046 X-RAY EXAM CHEST 2 VIEWS: CPT

## 2019-11-30 PROCEDURE — 84443 ASSAY THYROID STIM HORMONE: CPT | Performed by: NURSE PRACTITIONER

## 2019-11-30 PROCEDURE — 36415 COLL VENOUS BLD VENIPUNCTURE: CPT | Performed by: EMERGENCY MEDICINE

## 2019-11-30 PROCEDURE — 83880 ASSAY OF NATRIURETIC PEPTIDE: CPT | Performed by: EMERGENCY MEDICINE

## 2019-11-30 PROCEDURE — 87631 RESP VIRUS 3-5 TARGETS: CPT | Performed by: EMERGENCY MEDICINE

## 2019-11-30 PROCEDURE — 93010 ELECTROCARDIOGRAM REPORT: CPT | Performed by: INTERNAL MEDICINE

## 2019-11-30 PROCEDURE — 96361 HYDRATE IV INFUSION ADD-ON: CPT

## 2019-11-30 PROCEDURE — 94640 AIRWAY INHALATION TREATMENT: CPT

## 2019-11-30 PROCEDURE — 99223 1ST HOSP IP/OBS HIGH 75: CPT | Performed by: HOSPITALIST

## 2019-11-30 PROCEDURE — 93005 ELECTROCARDIOGRAM TRACING: CPT

## 2019-11-30 PROCEDURE — 99222 1ST HOSP IP/OBS MODERATE 55: CPT | Performed by: PHYSICIAN ASSISTANT

## 2019-11-30 PROCEDURE — 80053 COMPREHEN METABOLIC PANEL: CPT | Performed by: EMERGENCY MEDICINE

## 2019-11-30 PROCEDURE — 94760 N-INVAS EAR/PLS OXIMETRY 1: CPT

## 2019-11-30 PROCEDURE — 85610 PROTHROMBIN TIME: CPT | Performed by: EMERGENCY MEDICINE

## 2019-11-30 PROCEDURE — 85730 THROMBOPLASTIN TIME PARTIAL: CPT | Performed by: EMERGENCY MEDICINE

## 2019-11-30 PROCEDURE — 99285 EMERGENCY DEPT VISIT HI MDM: CPT

## 2019-11-30 PROCEDURE — 83605 ASSAY OF LACTIC ACID: CPT | Performed by: EMERGENCY MEDICINE

## 2019-11-30 PROCEDURE — 84484 ASSAY OF TROPONIN QUANT: CPT | Performed by: EMERGENCY MEDICINE

## 2019-11-30 PROCEDURE — 84145 PROCALCITONIN (PCT): CPT | Performed by: EMERGENCY MEDICINE

## 2019-11-30 PROCEDURE — 87651 STREP A DNA AMP PROBE: CPT | Performed by: NURSE PRACTITIONER

## 2019-11-30 PROCEDURE — 96374 THER/PROPH/DIAG INJ IV PUSH: CPT

## 2019-11-30 PROCEDURE — 99285 EMERGENCY DEPT VISIT HI MDM: CPT | Performed by: EMERGENCY MEDICINE

## 2019-11-30 RX ORDER — OXYBUTYNIN CHLORIDE 5 MG/1
5 TABLET ORAL DAILY
Status: DISCONTINUED | OUTPATIENT
Start: 2019-11-30 | End: 2019-12-04 | Stop reason: HOSPADM

## 2019-11-30 RX ORDER — GUAIFENESIN 100 MG/5ML
200 SOLUTION ORAL EVERY 4 HOURS PRN
Status: DISCONTINUED | OUTPATIENT
Start: 2019-11-30 | End: 2019-12-04 | Stop reason: HOSPADM

## 2019-11-30 RX ORDER — LEVALBUTEROL 1.25 MG/.5ML
1.25 SOLUTION, CONCENTRATE RESPIRATORY (INHALATION) EVERY 4 HOURS PRN
Status: DISCONTINUED | OUTPATIENT
Start: 2019-11-30 | End: 2019-11-30

## 2019-11-30 RX ORDER — SODIUM CHLORIDE 9 MG/ML
75 INJECTION, SOLUTION INTRAVENOUS ONCE
Status: COMPLETED | OUTPATIENT
Start: 2019-11-30 | End: 2019-12-01

## 2019-11-30 RX ORDER — ACETAMINOPHEN 325 MG/1
650 TABLET ORAL EVERY 6 HOURS PRN
Status: DISCONTINUED | OUTPATIENT
Start: 2019-11-30 | End: 2019-12-04 | Stop reason: HOSPADM

## 2019-11-30 RX ORDER — SODIUM CHLORIDE FOR INHALATION 0.9 %
3 VIAL, NEBULIZER (ML) INHALATION
Status: DISCONTINUED | OUTPATIENT
Start: 2019-11-30 | End: 2019-12-04 | Stop reason: HOSPADM

## 2019-11-30 RX ORDER — LEVALBUTEROL 1.25 MG/.5ML
1.25 SOLUTION, CONCENTRATE RESPIRATORY (INHALATION)
Status: DISCONTINUED | OUTPATIENT
Start: 2019-11-30 | End: 2019-12-04 | Stop reason: HOSPADM

## 2019-11-30 RX ORDER — PRAVASTATIN SODIUM 40 MG
40 TABLET ORAL
Status: DISCONTINUED | OUTPATIENT
Start: 2019-11-30 | End: 2019-12-04 | Stop reason: HOSPADM

## 2019-11-30 RX ORDER — FLUTICASONE PROPIONATE 50 MCG
1 SPRAY, SUSPENSION (ML) NASAL 2 TIMES DAILY
Status: DISCONTINUED | OUTPATIENT
Start: 2019-11-30 | End: 2019-12-04 | Stop reason: HOSPADM

## 2019-11-30 RX ORDER — ALPRAZOLAM 0.25 MG/1
0.25 TABLET ORAL
Status: DISCONTINUED | OUTPATIENT
Start: 2019-11-30 | End: 2019-12-04 | Stop reason: HOSPADM

## 2019-11-30 RX ORDER — GUAIFENESIN 600 MG
600 TABLET, EXTENDED RELEASE 12 HR ORAL EVERY 12 HOURS SCHEDULED
Status: DISCONTINUED | OUTPATIENT
Start: 2019-11-30 | End: 2019-12-04 | Stop reason: HOSPADM

## 2019-11-30 RX ORDER — DILTIAZEM HYDROCHLORIDE 5 MG/ML
15 INJECTION INTRAVENOUS ONCE
Status: COMPLETED | OUTPATIENT
Start: 2019-11-30 | End: 2019-11-30

## 2019-11-30 RX ORDER — SODIUM CHLORIDE 9 MG/ML
100 INJECTION, SOLUTION INTRAVENOUS ONCE
Status: DISCONTINUED | OUTPATIENT
Start: 2019-11-30 | End: 2019-11-30

## 2019-11-30 RX ORDER — SODIUM CHLORIDE 9 MG/ML
60 INJECTION, SOLUTION INTRAVENOUS CONTINUOUS
Status: DISCONTINUED | OUTPATIENT
Start: 2019-11-30 | End: 2019-11-30

## 2019-11-30 RX ORDER — ONDANSETRON 2 MG/ML
4 INJECTION INTRAMUSCULAR; INTRAVENOUS EVERY 6 HOURS PRN
Status: DISCONTINUED | OUTPATIENT
Start: 2019-11-30 | End: 2019-12-04 | Stop reason: HOSPADM

## 2019-11-30 RX ORDER — DIAPER,BRIEF,INFANT-TODD,DISP
EACH MISCELLANEOUS 2 TIMES DAILY
Status: DISCONTINUED | OUTPATIENT
Start: 2019-11-30 | End: 2019-12-04 | Stop reason: HOSPADM

## 2019-11-30 RX ORDER — HYDROXYZINE HYDROCHLORIDE 25 MG/1
25 TABLET, FILM COATED ORAL EVERY 6 HOURS
Status: DISCONTINUED | OUTPATIENT
Start: 2019-11-30 | End: 2019-12-04 | Stop reason: HOSPADM

## 2019-11-30 RX ORDER — PANTOPRAZOLE SODIUM 40 MG/1
40 TABLET, DELAYED RELEASE ORAL DAILY
Status: DISCONTINUED | OUTPATIENT
Start: 2019-11-30 | End: 2019-12-04 | Stop reason: HOSPADM

## 2019-11-30 RX ORDER — POTASSIUM CHLORIDE 14.9 MG/ML
20 INJECTION INTRAVENOUS ONCE
Status: COMPLETED | OUTPATIENT
Start: 2019-11-30 | End: 2019-12-01

## 2019-11-30 RX ADMIN — DILTIAZEM HYDROCHLORIDE 90 MG: 60 TABLET, FILM COATED ORAL at 22:58

## 2019-11-30 RX ADMIN — SODIUM CHLORIDE 1000 ML: 0.9 INJECTION, SOLUTION INTRAVENOUS at 08:57

## 2019-11-30 RX ADMIN — DILTIAZEM HYDROCHLORIDE 15 MG: 5 INJECTION INTRAVENOUS at 10:15

## 2019-11-30 RX ADMIN — GUAIFENESIN 600 MG: 600 TABLET, EXTENDED RELEASE ORAL at 13:57

## 2019-11-30 RX ADMIN — PANTOPRAZOLE SODIUM 40 MG: 40 TABLET, DELAYED RELEASE ORAL at 13:43

## 2019-11-30 RX ADMIN — HYDROXYZINE HYDROCHLORIDE 25 MG: 25 TABLET ORAL at 13:43

## 2019-11-30 RX ADMIN — LEVALBUTEROL HYDROCHLORIDE 1.25 MG: 1.25 SOLUTION, CONCENTRATE RESPIRATORY (INHALATION) at 14:58

## 2019-11-30 RX ADMIN — FLUTICASONE PROPIONATE 1 SPRAY: 50 SPRAY, METERED NASAL at 17:21

## 2019-11-30 RX ADMIN — HYDROCORTISONE: 10 CREAM TOPICAL at 17:21

## 2019-11-30 RX ADMIN — POTASSIUM CHLORIDE 20 MEQ: 14.9 INJECTION, SOLUTION INTRAVENOUS at 14:10

## 2019-11-30 RX ADMIN — DILTIAZEM HYDROCHLORIDE 5 MG/HR: 5 INJECTION INTRAVENOUS at 10:27

## 2019-11-30 RX ADMIN — SODIUM CHLORIDE 75 ML/HR: 9 INJECTION, SOLUTION INTRAVENOUS at 13:43

## 2019-11-30 RX ADMIN — GUAIFENESIN 600 MG: 600 TABLET, EXTENDED RELEASE ORAL at 21:14

## 2019-11-30 RX ADMIN — SERTRALINE HYDROCHLORIDE 50 MG: 50 TABLET ORAL at 13:42

## 2019-11-30 RX ADMIN — HYDROCORTISONE: 10 CREAM TOPICAL at 14:11

## 2019-11-30 RX ADMIN — ISODIUM CHLORIDE 3 ML: 0.03 SOLUTION RESPIRATORY (INHALATION) at 20:11

## 2019-11-30 RX ADMIN — HYDROXYZINE HYDROCHLORIDE 25 MG: 25 TABLET ORAL at 18:49

## 2019-11-30 RX ADMIN — LEVALBUTEROL HYDROCHLORIDE 1.25 MG: 1.25 SOLUTION, CONCENTRATE RESPIRATORY (INHALATION) at 20:11

## 2019-11-30 RX ADMIN — PRAVASTATIN SODIUM 40 MG: 40 TABLET ORAL at 15:52

## 2019-11-30 RX ADMIN — APIXABAN 2.5 MG: 2.5 TABLET, FILM COATED ORAL at 17:21

## 2019-11-30 RX ADMIN — OXYBUTYNIN CHLORIDE 5 MG: 5 TABLET ORAL at 13:42

## 2019-11-30 RX ADMIN — METOPROLOL TARTRATE 25 MG: 25 TABLET, FILM COATED ORAL at 13:42

## 2019-11-30 RX ADMIN — METOPROLOL TARTRATE 25 MG: 25 TABLET, FILM COATED ORAL at 21:14

## 2019-11-30 NOTE — ED PROVIDER NOTES
Pt Name: Beck Acuna  MRN: [id number]  [place] 1935  Age/Sex: 81 y o  female  Date of evaluation: 11/30/2019  PCP: Eulalia Ling MD    [address] [place] [address]    Chief Complaint   Patient presents with    Cough     productive for three weeks, green sputum     Shortness of Breath         HPI    Erin Hines presents to the Emergency Department complaining of cough and SOB  She is known to have a hx of afib and has not taken her medication this morning  She has been feeling like she can't lie flat  HPI      Past Medical and Surgical History    Past Medical History:   Diagnosis Date    Acute on chronic diastolic congestive heart failure ([address] ) 6/27/2019    Arthritis     Chronic diastolic heart failure (HCC)     Chronic kidney disease, stage 2 (mild)     Coronary artery disease     history of stenting    Eczema     years    Edema     History of echocardiogram 07/20/2017    EF 55%, mild concentric LVH, bileaflet MVP with moderate MR, left atrial enlargement   History of transfusion     Hyperlipidemia     Hypertension     Hypo-osmolality and hyponatremia     Hypokalemia 7/11/2019    Mitral regurgitation        Past Surgical History:   Procedure Laterality Date    ABDOMINAL SURGERY      hysterectomy    CARDIAC CATHETERIZATION  04/10/2012    EF 65%, no significant CAD, patent stents, severe MR     CORONARY ANGIOPLASTY WITH STENT PLACEMENT  03/21/2007    EF 55%, GARRET to LAD      EYE SURGERY      b/l cataracts    HYSTERECTOMY      JOINT REPLACEMENT      Rt knee       Family History   Problem Relation Age of Onset    Arthritis Mother     Cancer Mother     Heart disease Mother    [place] Hypertension Mother     Alcohol abuse Father     Arthritis Father     Birth defects Son     Hearing loss Son     Diabetes Daughter     Stroke Maternal Grandmother     Asthma Maternal Grandfather        Social History     Tobacco Use    Smoking status: Never Smoker    Smokeless tobacco: Never Used   Substance Use Topics    Alcohol use: Not Currently    Drug use: Never           Allergies    Allergies   Allergen Reactions    Chocolate Flavor     Iodine Other (See Comments)     shellfish- difficulty breathing    Lactose     Shellfish-Derived Products     Codeine Rash       Home Medications    Prior to Admission medications    Medication Sig Start Date End Date Taking? Authorizing Provider   ALPRAZolam [place]) 0 25 mg tablet Take 0 25 mg by mouth daily at bedtime as needed for anxiety    Historical Provider, MD   apixaban (ELIQUIS) 2 5 mg Take 1 tablet (2 5 mg total) by mouth 2 (two) times a day 8/8/19   Catarina Acosta PA-C   furosemide (LASIX) 40 mg tablet Take 1 tablet (40 mg total) by mouth daily 8/8/19   Catarina Acosta PA-C   hydrocortisone 1 % cream Apply to affected area 2 times daily 10/18/19   Bakari Barber MD   hydrOXYzine HCL (ATARAX) 25 mg tablet Take 1 tablet (25 mg total) by mouth every 6 (six) hours 10/18/19   Bakari Barber MD   magnesium oxide (MAG-OX) 400 mg Take 1 tablet (400 mg total) by mouth 3 (three) times a week 9/4/19   Ishmael Taylor DO   metoprolol tartrate (LOPRESSOR) 25 mg tablet Take 1 tablet (25 mg total) by mouth 3 (three) times a day 7/12/19   Nic Snyder MD   oxybutynin (DITROPAN) 5 mg tablet Take 5 mg by mouth daily    Historical Provider, MD   PANTOPRAZOLE SODIUM PO Take by mouth daily    Historical Provider, MD   pravastatin (PRAVACHOL) 40 mg tablet Take 1 tablet (40 mg total) by mouth daily with dinner 7/12/19   Nic Snyder MD   sertraline (ZOLOFT) 50 mg tablet Take 50 mg by mouth daily    Historical Provider, MD           Review of Systems    Review of Systems   Constitutional: Negative for activity change, appetite change, chills, diaphoresis, fatigue and fever  HENT: Negative for congestion, postnasal drip, rhinorrhea, sinus pressure, sneezing and sore throat  Eyes: Negative for pain and visual disturbance     Respiratory: Positive for cough and shortness of breath  Negative for chest tightness  Cardiovascular: Negative for chest pain, palpitations and leg swelling  Gastrointestinal: Negative for abdominal distention, abdominal pain, constipation, diarrhea, nausea and vomiting  Endocrine: Negative for polydipsia, polyphagia and polyuria  Genitourinary: Negative for decreased urine volume, difficulty urinating, dysuria, flank pain, frequency and hematuria  Musculoskeletal: Negative for arthralgias, gait problem, joint swelling and neck pain  Skin: Negative for pallor and rash  Allergic/Immunologic: Negative for immunocompromised state  Neurological: Positive for weakness  Negative for syncope, speech difficulty, light-headedness, numbness and headaches  All other systems reviewed and are negative  Physical Exam      ED Triage Vitals [11/30/19 0820]   Temperature Pulse Respirations Blood Pressure SpO2   98 1 °F (36 7 °C) (!) 140 (!) 24 129/69 95 %      Temp Source Heart Rate Source Patient Position - Orthostatic VS BP Location FiO2 (%)   Temporal Monitor Sitting Left arm --      Pain Score       No Pain               Physical Exam   Constitutional: She is oriented to person, place, and time  She appears well-developed and well-nourished  No distress  HENT:   Head: Normocephalic and atraumatic  Nose: Nose normal    Mouth/Throat: Oropharynx is clear and moist    Eyes: Pupils are equal, round, and reactive to light  Conjunctivae, EOM and lids are normal    Neck: Normal range of motion  Neck supple  Cardiovascular: Normal rate, regular rhythm and normal heart sounds  Exam reveals no gallop and no friction rub  No murmur heard  Pulmonary/Chest: Effort normal and breath sounds normal  No accessory muscle usage  No respiratory distress  She has no wheezes  She has no rales  Abdominal: Soft  She exhibits no distension  There is no tenderness  There is no rebound and no guarding     Neurological: She is alert and oriented to person, place, and time  No cranial nerve deficit or sensory deficit  Skin: Skin is warm and dry  No rash noted  She is not diaphoretic  No erythema  Psychiatric: She has a normal mood and affect  Her speech is normal and behavior is normal  Judgment and thought content normal    Nursing note and vitals reviewed  Assessment and Plan    Kane Groves is a 81 y o  female who presents with cough and SOB  Plan will be to perform diagnostic testing and treat symptomatically  MDM    Diagnostic Results    EKG:  atrial fibrillation, rate 129    EKG INTERPRETATION  EKG Interpretation    Rate: 129 BPM  Rhythm: atrial fibrillation  Axis: leftward  Intervals: Normal, no blocks, QTc 489ms  T waves: nonspecific  ST segments: nonspecific    Impression: abnormal EKG  EKG for comparison: not immediately available  EKG interpreted by me  Interpretation by Sheeba Rivas DO  EKG reviewed and interpreted independently      Labs:    Results for orders placed or performed during the hospital encounter of 11/30/19   Influenza A/B and RSV PCR   Result Value Ref Range    INFLUENZA A PCR None Detected None Detected    INFLUENZA B PCR None Detected None Detected    RSV PCR Detected (A) None Detected   CBC and differential   Result Value Ref Range    WBC 8 00 4 80 - 10 80 Thousand/uL    RBC 4 22 3 90 - 5 20 Million/uL    Hemoglobin 13 0 12 0 - 16 0 g/dL    Hematocrit 37 9 (L) 42 0 - 47 0 %    MCV 90 81 - 99 fL    MCH 30 9 26 0 - 34 0 pg    MCHC 34 4 31 0 - 37 0 g/dL    RDW 13 9 11 5 - 14 5 %    MPV 7 7 (L) 8 6 - 11 7 fL    Platelets 780 719 - 482 Thousands/uL    Neutrophils Relative 81 (H) 42 - 75 %    Lymphocytes Relative 8 (L) 21 - 51 %    Monocytes Relative 9 2 - 12 %    Eosinophils Relative 1 0 - 5 %    Basophils Relative 1 0 - 2 %    Neutrophils Absolute 6 50 1 40 - 6 50 Thousands/µL    Lymphocytes Absolute 0 70 0 60 - 4 47 Thousands/µL    Monocytes Absolute 0 70 0 17 - 1 22 Thousand/µL    Eosinophils Absolute 0 10 0 00 - 0 61 Thousand/µL    Basophils Absolute 0 10 0 00 - 0 10 Thousands/µL   Comprehensive metabolic panel   Result Value Ref Range    Sodium 132 (L) 134 - 143 mmol/L    Potassium 3 4 (L) 3 5 - 5 5 mmol/L    Chloride 95 (L) 98 - 107 mmol/L    CO2 27 21 - 31 mmol/L    ANION GAP 10 4 - 13 mmol/L    BUN 12 7 - 25 mg/dL    Creatinine 0 69 0 60 - 1 20 mg/dL    Glucose 116 (H) 65 - 99 mg/dL    Calcium 8 9 8 6 - 10 5 mg/dL    AST 19 13 - 39 U/L    ALT 12 7 - 52 U/L    Alkaline Phosphatase 60 55 - 165 U/L    Total Protein 7 0 6 4 - 8 9 g/dL    Albumin 3 5 3 5 - 5 7 g/dL    Total Bilirubin 1 30 (H) 0 20 - 1 00 mg/dL    eGFR 81 ml/min/1 73sq m   Lactic acid x2   Result Value Ref Range    LACTIC ACID 1 4 0 5 - 2 0 mmol/L   Protime-INR   Result Value Ref Range    Protime 14 1 (H) 10 2 - 13 0 seconds    INR 1 21 0 90 - 1 50   APTT   Result Value Ref Range    PTT 33 23 - 37 seconds   B-Type Natriuretic Peptide [facility] ONLY)   Result Value Ref Range    BNP 1,541 (H) 1 - 100 pg/mL       All labs reviewed and utilized in the medical decision making process    Radiology:    XR chest 2 views    (Results Pending)       All radiology studies independently viewed by me and interpreted by the radiologist     Procedure    CriticalCare Time  Performed by: Francesca Salvador DO  Authorized by: Francesca Salvador DO     Critical care provider statement:     Critical care time (minutes):  30    Critical care time was exclusive of:  Separately billable procedures and treating other patients and teaching time    Critical care was time spent personally by me on the following activities:  Blood draw for specimens, obtaining history from patient or surrogate, development of treatment plan with patient or surrogate, discussions with consultants, evaluation of patient's response to treatment, examination of patient, interpretation of cardiac output measurements, ordering and performing treatments and interventions, ordering and review of laboratory studies, ordering and review of radiographic studies, re-evaluation of patient's condition and review of old charts    I assumed direction of critical care for this patient from another provider in my specialty: no            ED Course of Care and Re-Assessments    I spoke with Dr Flaco Claudio for admission  Medications   sodium chloride 0 9 % bolus 1,000 mL (1,000 mL Intravenous New Bag 11/30/19 0857)   diltiazem (CARDIZEM) injection 15 mg (15 mg Intravenous Given 11/30/19 1015)   diltiazem (CARDIZEM) 125 mg in sodium chloride 0 9 % 125 mL infusion (7 5 mg/hr Intravenous Rate/Dose Change 11/30/19 1047)           FINAL IMPRESSION    Final diagnoses:   Cough   Atrial fibrillation with RVR ([address] )         DISPOSITION/PLAN    Time reflects when diagnosis was documented in both MDM as applicable and the Disposition within this note     Time User Action Codes Description Comment    11/30/2019 10:36 AM Clyde Barlow [R05] Cough     11/30/2019 10:37 AM Clyde Barlow [[id number]] Atrial fibrillation with RVR [facility])       ED Disposition     ED Disposition Condition Date/Time Comment    Admit Stable Sat Nov 30, 2019 10:36 AM Case was discussed with AMIRAH and the patient's admission status was agreed to be Admission Status: inpatient status to the service of Dr Flaco Claudio   Follow-up Information    None           PATIENT REFERRED TO:    No follow-up provider specified  DISCHARGE MEDICATIONS:    Patient's Medications   Discharge Prescriptions    No medications on file       No discharge procedures on file           DO Dhruv Crespo DO  11/30/19 0678

## 2019-11-30 NOTE — H&P
H&P- Herbert [place] 1935, 81 y o  female MRN: [id number]    Unit/Bed#: ICU 02 Encounter: [id number]    Primary Care Provider: Brittani Perez MD   Date and time admitted to hospital: 11/30/2019  8:07 AM        * Atrial fibrillation with RVR ([address] )  Assessment & Plan  · Rapid ventricular response is suspected to be due to acute infection- RSV and volume depletion as she has not been eating or drinking well  · Will continue with cardizem drop titrate to keep HR less than 100   · Will resume beta-blocker  · Cardiology consult  · Will give gentle IV fluid    RSV bronchitis  Assessment & Plan  · Patient believes that she contacted this from her great-grand children   · Will continue supportive care   · Check strep   · Gentle IVF   · Tylenol for fever/pain     Chronic kidney disease, stage 2 (mild)  Assessment & Plan  · With baseline creatinine between 0 7-0 8 mg/dL   · Currently at baseline   · Will monitor renal function closely   · Avoid nephrotoxins     Mixed hyperlipidemia  Assessment & Plan  · Continue with statin     Gastroesophageal reflux disease without esophagitis  Assessment & Plan  · Continue with PPI    Chronic diastolic heart failure (HCC)  Assessment & Plan  Wt Readings from Last 3 Encounters:   11/30/19 59 kg (130 lb)   10/18/19 62 6 kg (138 lb)   09/04/19 65 2 kg (143 lb 12 8 oz)     In no acute exacerbation   Patient actually appears to be somewhat dry as she has not been eating or drinking well due to the viral infection   Will give gentle IVF   Will hold lasix for now   Monitor daily weight             VTE Prophylaxis: Apixaban (Eliquis)  Code Status: Full code   POLST: POLST is not applicable to this patient  Discussion with family: morenita German [phone]    Anticipated Length of Stay:  Patient will be admitted on an Inpatient basis with an anticipated length of stay of  > 2 midnights     Justification for Hospital Stay: afib with RVR       Chief Complaint:   Cough and shortness of breath     History of Present Illness:    Pippa Miramontes is a 81 y o  female who presents with cough and shortness of breath  The patient with past medical history of chronic atrial fibrillation, CAD, and congestive heart failure  Patient states that she has been feeling weak, generalized malaise, productive green sputum cough, nasal congestion, and postnasal drip for the past 2 weeks  She states that her great grandchildren were sick and she thinks that she might have got this from them  She complained of some shortness of breath that started 2-3 days ago  She came in and found to have RSV positive  Additionally the patient was found to be in rapid ventricular response with heart rate in the 130s  She received IV Cardizem bolus and is currently on IV Cardizem drip  She is currently being admitted for atrial fibrillation with rapid ventricular response secondary to acute viral infection  She denies any chest pain, palpitation, nausea, vomiting, or abdominal pain  She states having some loose stools over the past few days  Review of Systems:  Review of Systems   Constitutional: Positive for chills and fatigue  Negative for activity change, appetite change, diaphoresis and fever  HENT: Negative for congestion, ear pain, hearing loss, tinnitus and trouble swallowing  Eyes: Negative for photophobia, pain, discharge, itching and visual disturbance  Respiratory: Positive for cough (green sputum) and shortness of breath  Negative for wheezing and stridor  Cardiovascular: Positive for palpitations  Negative for chest pain and leg swelling  Gastrointestinal: Negative for abdominal pain, blood in stool, constipation, diarrhea, nausea and vomiting  Endocrine: Negative for cold intolerance, heat intolerance, polydipsia, polyphagia and polyuria  Genitourinary: Negative for difficulty urinating, dysuria, frequency, hematuria and urgency  Musculoskeletal: Positive for myalgias   Negative for back pain, gait problem and neck stiffness  Skin: Negative for pallor, rash and wound  Allergic/Immunologic: Negative for environmental allergies, food allergies and immunocompromised state  Neurological: Positive for weakness  Negative for dizziness, tremors, speech difficulty, light-headedness, numbness and headaches  Hematological: Negative for adenopathy  Does not bruise/bleed easily  Psychiatric/Behavioral: Positive for sleep disturbance  Negative for confusion and hallucinations  Past Medical and Surgical History:   Past Medical History:   Diagnosis Date    Acute on chronic diastolic congestive heart failure ([facility] ) 6/27/2019    Arthritis     Chronic diastolic heart failure (HCC)     Chronic kidney disease, stage 2 (mild)     Coronary artery disease     history of stenting    Eczema     years    Edema     History of echocardiogram 07/20/2017    EF 55%, mild concentric LVH, bileaflet MVP with moderate MR, left atrial enlargement   History of transfusion     Hyperlipidemia     Hypertension     Hypo-osmolality and hyponatremia     Hypokalemia 7/11/2019    Mitral regurgitation        Past Surgical History:   Procedure Laterality Date    ABDOMINAL SURGERY      hysterectomy    CARDIAC CATHETERIZATION  04/10/2012    EF 65%, no significant CAD, patent stents, severe MR     CORONARY ANGIOPLASTY WITH STENT PLACEMENT  03/21/2007    EF 55%, GARRET to LAD   EYE SURGERY      b/l cataracts    HYSTERECTOMY      JOINT REPLACEMENT      Rt knee       Meds/Allergies:  Prior to Admission medications    Medication Sig Start Date End Date Taking?  Authorizing Provider   ALPRAZolam [place]) 0 25 mg tablet Take 0 25 mg by mouth daily at bedtime as needed for anxiety   Yes Historical Provider, MD   apixaban (ELIQUIS) 2 5 mg Take 1 tablet (2 5 mg total) by mouth 2 (two) times a day 8/8/19  Yes Bruno Glover PA-C   furosemide (LASIX) 40 mg tablet Take 1 tablet (40 mg total) by mouth daily 8/8/19  Yes Kennedy Davis PA-C   hydrocortisone 1 % cream Apply to affected area 2 times daily 10/18/19  Yes Chip Rosado MD   hydrOXYzine HCL (ATARAX) 25 mg tablet Take 1 tablet (25 mg total) by mouth every 6 (six) hours 10/18/19  Yes Chip Rosado MD   magnesium oxide (MAG-OX) 400 mg Take 1 tablet (400 mg total) by mouth 3 (three) times a week 9/4/19  Yes Porfirio Mendez DO   oxybutynin (DITROPAN) 5 mg tablet Take 5 mg by mouth daily   Yes Historical Provider, MD   PANTOPRAZOLE SODIUM PO Take by mouth daily   Yes Historical Provider, MD   pravastatin (PRAVACHOL) 40 mg tablet Take 1 tablet (40 mg total) by mouth daily with dinner 7/12/19  Yes Ortiz Sky MD   sertraline (ZOLOFT) 50 mg tablet Take 50 mg by mouth daily   Yes Historical Provider, MD   metoprolol tartrate (LOPRESSOR) 25 mg tablet Take 1 tablet (25 mg total) by mouth 3 (three) times a day 7/12/19   Ortiz Sky MD     I have reviewed home medications with patient personally  Allergies: Allergies   Allergen Reactions    Chocolate Flavor     Iodine Other (See Comments)     shellfish- difficulty breathing    Lactose     Shellfish-Derived Products     Codeine Rash       Social History:  Marital Status:     Occupation: retired   Patient Pre-hospital Living Situation: family   Patient Pre-hospital Level of Mobility: independent   Patient Pre-hospital Diet Restrictions: cardiac   Substance Use History:     Social History     Substance and Sexual Activity   Alcohol Use Not Currently     Social History     Tobacco Use   Smoking Status Never Smoker   Smokeless Tobacco Never Used     Social History     Substance and Sexual Activity   Drug Use Never       Family History:  I have reviewed the patients family history    Physical Exam:   Vitals:   Blood Pressure: 126/92 (11/30/19 1215)  Pulse: (!) 136 (11/30/19 1215)  Temperature: 99 1 °F (37 3 °C) (11/30/19 1215)  Temp Source: Tympanic (11/30/19 1215)  Respirations: (!) 27 (11/30/19 1215)  Height: 4' 11" (149 9 cm) (11/30/19 1215)  Weight - Scale: 59 kg (130 lb) (11/30/19 0820)  SpO2: 90 % (11/30/19 1215)    Physical Exam   Constitutional: She is oriented to person, place, and time  She appears well-developed  No distress  Frail      HENT:   Head: Normocephalic and atraumatic  Mouth/Throat: Oropharynx is clear and moist    Eyes: Pupils are equal, round, and reactive to light  Conjunctivae and EOM are normal    Neck: Normal range of motion  Neck supple  No thyromegaly present  Cardiovascular: Normal heart sounds  Exam reveals no gallop and no friction rub  No murmur heard  Irregularly irregular      Pulmonary/Chest: Effort normal  She has wheezes (expiratory wheezing right greater than right )  She has rales (bases)  Abdominal: Soft  Bowel sounds are normal  She exhibits no distension  There is no tenderness  There is no rebound  Musculoskeletal: Normal range of motion  She exhibits no edema, tenderness or deformity  Neurological: She is alert and oriented to person, place, and time  No cranial nerve deficit  Skin: Skin is warm and dry  No rash noted  No erythema  Psychiatric: She has a normal mood and affect  Her behavior is normal  Judgment and thought content normal    Vitals reviewed  Additional Data:   Lab Results: I have personally reviewed pertinent reports  Results from last 7 days   Lab Units 11/30/19  0844   WBC Thousand/uL 8 00   HEMOGLOBIN g/dL 13 0   HEMATOCRIT % 37 9*   PLATELETS Thousands/uL 227   NEUTROS PCT % 81*   LYMPHS PCT % 8*   MONOS PCT % 9   EOS PCT % 1     Results from last 7 days   Lab Units 11/30/19  0941   POTASSIUM mmol/L 3 4*   CHLORIDE mmol/L 95*   CO2 mmol/L 27   BUN mg/dL 12   CREATININE mg/dL 0 69   CALCIUM mg/dL 8 9   ALK PHOS U/L 60   ALT U/L 12   AST U/L 19     Results from last 7 days   Lab Units 11/30/19  0844   INR  1 21               Imaging: I have personally reviewed pertinent reports      XR chest 2 views    (Results Pending)       EKG, Pathology, and Other Studies Reviewed on Admission:   · EKG: afib     NetAccess/Epic Records Reviewed: Yes     ** Please Note: This note has been constructed using a voice recognition system   **

## 2019-11-30 NOTE — PLAN OF CARE
Patient on tele monitor, heart rate 93 afib  Respirations 17 even and unlabored  Sats 93% room air  No signs of acute distress

## 2019-11-30 NOTE — ASSESSMENT & PLAN NOTE
Wt Readings from Last 3 Encounters:   11/30/19 59 kg (130 lb)   10/18/19 62 6 kg (138 lb)   09/04/19 65 2 kg (143 lb 12 8 oz)     In no acute exacerbation   Patient actually appears to be somewhat dry as she has not been eating or drinking well due to the viral infection   Will give gentle IVF   Will hold lasix for now   Monitor daily weight

## 2019-11-30 NOTE — ASSESSMENT & PLAN NOTE
· Patient believes that she contacted this from her great-grand children   · Will continue supportive care   · Check strep   · Gentle IVF   · Tylenol for fever/pain

## 2019-11-30 NOTE — RESPIRATORY THERAPY NOTE
RT Protocol Note  Hamlet Powell 81 y o  female MRN: [id number]  Unit/Bed#: ICU 02 Encounter: [id number]    Assessment    Principal Problem:    Atrial fibrillation with RVR ([facility] )  Active Problems:    Chronic kidney disease, stage 2 (mild)    Chronic diastolic heart failure (HCC)    Gastroesophageal reflux disease without esophagitis    Mixed hyperlipidemia    RSV bronchitis      Home Pulmonary Medications:  None    Home Devices/Therapy: Other (Comment)(None)    Past Medical History:   Diagnosis Date    Acute on chronic diastolic congestive heart failure ([facility] ) 6/27/2019    Arthritis     Chronic diastolic heart failure (HCC)     Chronic kidney disease, stage 2 (mild)     Coronary artery disease     history of stenting    Eczema     years    Edema     History of echocardiogram 07/20/2017    EF 55%, mild concentric LVH, bileaflet MVP with moderate MR, left atrial enlargement   History of transfusion     Hyperlipidemia     Hypertension     Hypo-osmolality and hyponatremia     Hypokalemia 7/11/2019    Mitral regurgitation      Social History     Socioeconomic History    Marital status:       Spouse name: None    Number of children: None    Years of education: None    Highest education level: None   Occupational History    None   Social Needs    Financial resource strain: None    Food insecurity:     Worry: None     Inability: None    Transportation needs:     Medical: None     Non-medical: None   Tobacco Use    Smoking status: Never Smoker    Smokeless tobacco: Never Used   Substance and Sexual Activity    Alcohol use: Not Currently    Drug use: Never    Sexual activity: Not Currently   Lifestyle    Physical activity:     Days per week: None     Minutes per session: None    Stress: None   Relationships    Social connections:     Talks on phone: None     Gets together: None     Attends [employer] service: None     Active member of club or organization: None     Attends meetings of clubs or organizations: None     Relationship status: None    Intimate partner violence:     Fear of current or ex partner: None     Emotionally abused: None     Physically abused: None     Forced sexual activity: None   Other Topics Concern    None   Social History Narrative    None       Subjective         Objective    Physical Exam:   Assessment Type: Pre-treatment  General Appearance: Alert, Awake  Respiratory Pattern: Dyspnea at rest  Chest Assessment: Chest expansion symmetrical  Bilateral Breath Sounds: Expiratory wheezes  R Breath Sounds: Rhonchi  L Breath Sounds: Rales(Rales RLL)  Cough: Non-productive, Moist, Strong  O2 Device: RA    Vitals:  Blood pressure 115/67, pulse 76, temperature 99 1 °F (37 3 °C), temperature source Tympanic, resp  rate 20, height 4' 11" (1 499 m), weight 59 kg (130 lb), SpO2 92 %  Imaging and other studies: I have personally reviewed pertinent reports  O2 Device: RA     Plan    Respiratory Plan: Mild Distress pathway  Airway Clearance Plan: Incentive Spirometer     Resp Comments: Pt  assessed  Pt  stated that she is SOB and having a difficult time expectorating her sputum  Pt  does not wear O2, take any breathing medications or use BiPap/CPAP @ home  Nebulizer treatments will be changed to TID  Nebulizer treatment administered and Pt  tolerated it well  Pt  also instructed on the proper use of and reason for Incentive Spirometry  Pt  returned adequate demonstration of her IS  Pt  encouraged and reminded to use her IS Q1 hour w/a x 10 breaths on her own

## 2019-11-30 NOTE — ASSESSMENT & PLAN NOTE
· Rapid ventricular response is suspected to be due to acute infection- RSV and volume depletion as she has not been eating or drinking well  · Will continue with cardizem drop titrate to keep HR less than 100   · Will resume beta-blocker  · Cardiology consult  · Will give gentle IV fluid

## 2019-11-30 NOTE — NURSING NOTE
Received from ER; alert and oriented times 4  Cardizem gtt infusing at 10ML/HR with controlled current HR in 80's  Breath sounds presently diminished with scattered faint crackles  Moist cough present  Positive pulses upper and lower ; no noted edema  Positive BS ; no assessed pain with light palpation  Patient states last BM 11/30 this early AM  No current nausea or emesis  No chest discomfort at rest or increased SOB  Pulse ox 90%-92% on RA  HOB elevated to greater than 30 degrees  Droplet precautions placed for positive RSV bronchitis  Assist as needed to bedpan  Oriented to callbell system ; instructing to use for all needs  Rails up times 3; safety maintained

## 2019-11-30 NOTE — CONSULTS
Consult- Maryjane Warren 1935, 81 y o  female MRN: [id number]    Unit/Bed#: ICU 02 Encounter: [id number]    Primary Care Provider: Monika Bonilla MD   Date and time admitted to hospital: 11/30/2019  8:07 AM      Inpatient consult to Cardiology  Consult performed by: Halima Ryan PA-C  Consult ordered by: NEAL Marte        Assessment/Plan:   1  Persistent afib with RVR  · Rates 130s on presentation, now in the 80s on cardizem gtt @ 10mL/hr   · Rates were difficult to control during last admission  She had significant bradycardia on higher doses of BB so only modest rate control was achieved  She deferred ppm for tachy/nikia  · Restart diltiazem 90mg TID and titrate off drip  Continue outpatient lopressor 25mg TID   · Anticoagulated on Eliquis  · TSH normal  · RVR likely secondary to acute infection     2  Chronic diastolic HF   · BNP 3301, CXR with mild vascular congestion  No edema  She received 1L of NS  Would not give more fluid, especially in the setting of severe MR  · In August, dry weight was 143-145 lbs  More recently, dry weight has been 130 lbs- no recent weight gain    · Takes lasix on PRN basis at home - she reports rare use  3  RSV bronchitis     4  HTN  - continue metoprolol and cardizem    5  Severe MR  · She would be a poor candidate for surgical intervention due to frailty  · She is not interested in any intervention, including less invasive clip procedure  · Ideally would be on lisinopril for afterload reduction, however her BP has historically been on the lower side and she needs both lopressor and cardizem for rate control      6  CAD  · S/p GARRET to LAD in 2007  · No recent angina    · On lopressor and pravastatin  Not on ASA as she is on Eliquis        HPI: Maryjane Warren is a 81 y o  female who presents with productive cough and progressive dyspnea for 2-3 weeks  She reports that her grandchildren were sick a couple of weeks ago   In the ED she was found to be positive for RSV  EKG on presentation showed afib with rate in the 130s  She was admitted to the ICU and started on a cardizem drip  She had palpitations on presentation but they have resolved since being on the cardizem drip  BNP elevated at 1541, however she appears euvolemic  She continues to weigh herself daily  Weight has been stable around 130 lbs  Weight in August was about 145 lbs but she reports that she hasn't had much of an appetite in the last few months  She denies associated edema or orthopnea  Outpatient diltiazem 90mg TID was discontinued following an ED visit in October for pruritic rash  She reports that "many" of her medications were stopped  She was seen by dermatology and was told that it was eczema  She does not think that she was allergic to the cardizem, as she had been on it for >3 months prior to developing the rash  EKG: Afib with RVR- rate 130s  Telemetry: Afib, rates in the 80s on cardizem drip 10 ml/hr    Most recent cardiac testing:    TTE 6/27/2019:  EF 60%  Moderate LVH  Moderate MR with marked prolapse of the anterior and posterior leaflets  Mild TR with moderate pulm HTN     TTE 2017 - EF 55%, moderate MR     Cardiac cath 2012 - patent LAD stent, severe MR  No significant CAD     Review of Systems:   Review of Systems   Constitution: Positive for chills and malaise/fatigue  HENT: Negative  Eyes: Negative  Cardiovascular: Positive for palpitations  Negative for chest pain, claudication, irregular heartbeat, leg swelling, near-syncope, orthopnea, paroxysmal nocturnal dyspnea and syncope  Respiratory: Positive for cough, shortness of breath and sputum production (green)  Negative for wheezing  Endocrine: Negative  Skin: Negative  Musculoskeletal: Positive for myalgias  Genitourinary: Negative  Neurological: Positive for weakness  Negative for dizziness and light-headedness  Psychiatric/Behavioral: Negative          Historical Information   Past Medical History:   Diagnosis Date    Acute on chronic diastolic congestive heart failure (HCC) 6/27/2019    Arthritis     Chronic diastolic heart failure (HCC)     Chronic kidney disease, stage 2 (mild)     Coronary artery disease     history of stenting    Eczema     years    Edema     History of echocardiogram 07/20/2017    EF 55%, mild concentric LVH, bileaflet MVP with moderate MR, left atrial enlargement   History of transfusion     Hyperlipidemia     Hypertension     Hypo-osmolality and hyponatremia     Hypokalemia 7/11/2019    Mitral regurgitation      Past Surgical History:   Procedure Laterality Date    ABDOMINAL SURGERY      hysterectomy    CARDIAC CATHETERIZATION  04/10/2012    EF 65%, no significant CAD, patent stents, severe MR     CORONARY ANGIOPLASTY WITH STENT PLACEMENT  03/21/2007    EF 55%, GARRET to LAD      EYE SURGERY      b/l cataracts    HYSTERECTOMY      JOINT REPLACEMENT      Rt knee     Social History     Substance and Sexual Activity   Alcohol Use Not Currently     Social History     Substance and Sexual Activity   Drug Use Never     Social History     Tobacco Use   Smoking Status Never Smoker   Smokeless Tobacco Never Used       Family History:   Family History   Problem Relation Age of Onset    Arthritis Mother     Cancer Mother     Heart disease Mother     Hypertension Mother     Alcohol abuse Father     Arthritis Father     Birth defects Son     Hearing loss Son     Diabetes Daughter     Stroke Maternal Grandmother     Asthma Maternal Grandfather        Meds/Allergies   all current active meds have been reviewed  Medications Prior to Admission   Medication    ALPRAZolam (XANAX) 0 25 mg tablet    apixaban (ELIQUIS) 2 5 mg    furosemide (LASIX) 40 mg tablet    hydrocortisone 1 % cream    hydrOXYzine HCL (ATARAX) 25 mg tablet    magnesium oxide (MAG-OX) 400 mg    oxybutynin (DITROPAN) 5 mg tablet    PANTOPRAZOLE SODIUM PO    pravastatin (PRAVACHOL) 40 mg tablet    sertraline (ZOLOFT) 50 mg tablet    metoprolol tartrate (LOPRESSOR) 25 mg tablet       Allergies   Allergen Reactions    Chocolate Flavor     Iodine Other (See Comments)     shellfish- difficulty breathing    Lactose     Shellfish-Derived Products     Codeine Rash       Objective   Vitals: Blood pressure 115/67, pulse 76, temperature 99 1 °F (37 3 °C), temperature source Tympanic, resp  rate 20, height 4' 11" (1 499 m), weight 59 kg (130 lb), SpO2 92 %  , Body mass index is 26 26 kg/m² ,   Orthostatic Blood Pressures      Most Recent Value   Blood Pressure  115/67 filed at 11/30/2019 1342   Patient Position - Orthostatic VS  Lying filed at 11/30/2019 1215          Physical Exam   Physical Exam   Constitutional: She is oriented to person, place, and time  She appears well-developed and well-nourished  No distress  HENT:   Head: Normocephalic and atraumatic  Eyes: EOM are normal  No scleral icterus  Neck: Normal range of motion  Neck supple  Cardiovascular: Normal rate, S1 normal and S2 normal  An irregularly irregular rhythm present  Murmur (Grade 0-8/5 holosystolic murmur at the apex) heard  Pulmonary/Chest: Effort normal  She has wheezes (diffuse end expiratory)  She has rhonchi (L>R)  She has no rales  Abdominal: Soft  Bowel sounds are normal    Musculoskeletal: She exhibits no edema  Neurological: She is alert and oriented to person, place, and time  Skin: Skin is warm and dry  Psychiatric: She has a normal mood and affect  Her behavior is normal    Nursing note and vitals reviewed        Lab Results:     Troponins:   Results from last 7 days   Lab Units 11/30/19  0940   TROPONIN I ng/mL <0 03     BNP:  Results from last 6 Months   Lab Units 11/30/19  0844 06/25/19  1812   BNP pg/mL 1,541* 1,303*     CBC with diff:   Results from last 7 days   Lab Units 11/30/19  0844   WBC Thousand/uL 8 00   HEMOGLOBIN g/dL 13 0   HEMATOCRIT % 37 9*   PLATELETS Thousands/uL 227   RBC Million/uL 4  22     CMP:  Results from last 7 days   Lab Units 11/30/19  0941   POTASSIUM mmol/L 3 4*   CHLORIDE mmol/L 95*   CO2 mmol/L 27   BUN mg/dL 12   CREATININE mg/dL 0 69   CALCIUM mg/dL 8 9   AST U/L 19   ALT U/L 12   ALK PHOS U/L 60   EGFR ml/min/1 73sq m 81     TSH:     Coags:   Results from last 7 days   Lab Units 11/30/19  0844   INR  1 21

## 2019-11-30 NOTE — ASSESSMENT & PLAN NOTE
· With baseline creatinine between 0 7-0 8 mg/dL   · Currently at baseline   · Will monitor renal function closely   · Avoid nephrotoxins

## 2019-11-30 NOTE — NURSING NOTE
Throat swab collected to r/o strep  HOB remains elevated greater than 30degrees  No collected urine at this time  K-rider infusing as per med order  Cardizem gtt continues at 10ML/HR  Cardizem PO held due to BP systolic parameters  Rails up times 3; callbell in reach

## 2019-12-01 LAB
ALBUMIN SERPL BCP-MCNC: 3.1 G/DL (ref 3.5–5.7)
ALP SERPL-CCNC: 51 U/L (ref 55–165)
ALT SERPL W P-5'-P-CCNC: 9 U/L (ref 7–52)
ANION GAP SERPL CALCULATED.3IONS-SCNC: 8 MMOL/L (ref 4–13)
AST SERPL W P-5'-P-CCNC: 14 U/L (ref 13–39)
BASOPHILS # BLD AUTO: 0.1 THOUSANDS/ΜL (ref 0–0.1)
BASOPHILS NFR BLD AUTO: 1 % (ref 0–2)
BILIRUB SERPL-MCNC: 0.9 MG/DL (ref 0.2–1)
BILIRUB UR QL STRIP: NEGATIVE
BUN SERPL-MCNC: 12 MG/DL (ref 7–25)
CALCIUM SERPL-MCNC: 8.5 MG/DL (ref 8.6–10.5)
CHLORIDE SERPL-SCNC: 99 MMOL/L (ref 98–107)
CLARITY UR: CLEAR
CO2 SERPL-SCNC: 28 MMOL/L (ref 21–31)
COLOR UR: YELLOW
CREAT SERPL-MCNC: 0.6 MG/DL (ref 0.6–1.2)
EOSINOPHIL # BLD AUTO: 0.3 THOUSAND/ΜL (ref 0–0.61)
EOSINOPHIL NFR BLD AUTO: 4 % (ref 0–5)
ERYTHROCYTE [DISTWIDTH] IN BLOOD BY AUTOMATED COUNT: 14.3 % (ref 11.5–14.5)
GFR SERPL CREATININE-BSD FRML MDRD: 85 ML/MIN/1.73SQ M
GLUCOSE SERPL-MCNC: 85 MG/DL (ref 65–99)
GLUCOSE UR STRIP-MCNC: NEGATIVE MG/DL
HCT VFR BLD AUTO: 34.8 % (ref 42–47)
HGB BLD-MCNC: 12 G/DL (ref 12–16)
HGB UR QL STRIP.AUTO: NEGATIVE
KETONES UR STRIP-MCNC: NEGATIVE MG/DL
LEUKOCYTE ESTERASE UR QL STRIP: NEGATIVE
LYMPHOCYTES # BLD AUTO: 0.8 THOUSANDS/ΜL (ref 0.6–4.47)
LYMPHOCYTES NFR BLD AUTO: 13 % (ref 21–51)
MAGNESIUM SERPL-MCNC: 2.1 MG/DL (ref 1.9–2.7)
MCH RBC QN AUTO: 31.2 PG (ref 26–34)
MCHC RBC AUTO-ENTMCNC: 34.5 G/DL (ref 31–37)
MCV RBC AUTO: 90 FL (ref 81–99)
MONOCYTES # BLD AUTO: 0.6 THOUSAND/ΜL (ref 0.17–1.22)
MONOCYTES NFR BLD AUTO: 9 % (ref 2–12)
NEUTROPHILS # BLD AUTO: 5 THOUSANDS/ΜL (ref 1.4–6.5)
NEUTS SEG NFR BLD AUTO: 74 % (ref 42–75)
NITRITE UR QL STRIP: NEGATIVE
PH UR STRIP.AUTO: 5.5 [PH]
PHOSPHATE SERPL-MCNC: 3.1 MG/DL (ref 3–5.5)
PLATELET # BLD AUTO: 210 THOUSANDS/UL (ref 149–390)
PMV BLD AUTO: 8 FL (ref 8.6–11.7)
POTASSIUM SERPL-SCNC: 3.5 MMOL/L (ref 3.5–5.5)
PROT SERPL-MCNC: 6.1 G/DL (ref 6.4–8.9)
PROT UR STRIP-MCNC: NEGATIVE MG/DL
RBC # BLD AUTO: 3.85 MILLION/UL (ref 3.9–5.2)
SODIUM SERPL-SCNC: 135 MMOL/L (ref 134–143)
SP GR UR STRIP.AUTO: <=1.005 (ref 1–1.03)
UROBILINOGEN UR QL STRIP.AUTO: 0.2 E.U./DL
WBC # BLD AUTO: 6.7 THOUSAND/UL (ref 4.8–10.8)

## 2019-12-01 PROCEDURE — 85025 COMPLETE CBC W/AUTO DIFF WBC: CPT | Performed by: NURSE PRACTITIONER

## 2019-12-01 PROCEDURE — 87070 CULTURE OTHR SPECIMN AEROBIC: CPT | Performed by: HOSPITALIST

## 2019-12-01 PROCEDURE — 99232 SBSQ HOSP IP/OBS MODERATE 35: CPT | Performed by: HOSPITALIST

## 2019-12-01 PROCEDURE — 97166 OT EVAL MOD COMPLEX 45 MIN: CPT

## 2019-12-01 PROCEDURE — 80053 COMPREHEN METABOLIC PANEL: CPT | Performed by: NURSE PRACTITIONER

## 2019-12-01 PROCEDURE — 84100 ASSAY OF PHOSPHORUS: CPT | Performed by: NURSE PRACTITIONER

## 2019-12-01 PROCEDURE — G8979 MOBILITY GOAL STATUS: HCPCS

## 2019-12-01 PROCEDURE — G8978 MOBILITY CURRENT STATUS: HCPCS

## 2019-12-01 PROCEDURE — G8988 SELF CARE GOAL STATUS: HCPCS

## 2019-12-01 PROCEDURE — 99232 SBSQ HOSP IP/OBS MODERATE 35: CPT | Performed by: PHYSICIAN ASSISTANT

## 2019-12-01 PROCEDURE — 94640 AIRWAY INHALATION TREATMENT: CPT

## 2019-12-01 PROCEDURE — 94760 N-INVAS EAR/PLS OXIMETRY 1: CPT

## 2019-12-01 PROCEDURE — 81003 URINALYSIS AUTO W/O SCOPE: CPT | Performed by: HOSPITALIST

## 2019-12-01 PROCEDURE — 87205 SMEAR GRAM STAIN: CPT | Performed by: HOSPITALIST

## 2019-12-01 PROCEDURE — G8987 SELF CARE CURRENT STATUS: HCPCS

## 2019-12-01 PROCEDURE — 97163 PT EVAL HIGH COMPLEX 45 MIN: CPT

## 2019-12-01 PROCEDURE — 83735 ASSAY OF MAGNESIUM: CPT | Performed by: NURSE PRACTITIONER

## 2019-12-01 RX ORDER — FUROSEMIDE 10 MG/ML
40 INJECTION INTRAMUSCULAR; INTRAVENOUS DAILY
Status: DISCONTINUED | OUTPATIENT
Start: 2019-12-01 | End: 2019-12-03

## 2019-12-01 RX ADMIN — METOPROLOL TARTRATE 25 MG: 25 TABLET, FILM COATED ORAL at 17:11

## 2019-12-01 RX ADMIN — APIXABAN 2.5 MG: 2.5 TABLET, FILM COATED ORAL at 17:11

## 2019-12-01 RX ADMIN — PRAVASTATIN SODIUM 40 MG: 40 TABLET ORAL at 17:11

## 2019-12-01 RX ADMIN — FLUTICASONE PROPIONATE 1 SPRAY: 50 SPRAY, METERED NASAL at 09:31

## 2019-12-01 RX ADMIN — DILTIAZEM HYDROCHLORIDE 5 MG/HR: 5 INJECTION INTRAVENOUS at 00:59

## 2019-12-01 RX ADMIN — FLUTICASONE PROPIONATE 1 SPRAY: 50 SPRAY, METERED NASAL at 17:11

## 2019-12-01 RX ADMIN — HYDROXYZINE HYDROCHLORIDE 25 MG: 25 TABLET ORAL at 13:42

## 2019-12-01 RX ADMIN — ISODIUM CHLORIDE 3 ML: 0.03 SOLUTION RESPIRATORY (INHALATION) at 20:48

## 2019-12-01 RX ADMIN — PANTOPRAZOLE SODIUM 40 MG: 40 TABLET, DELAYED RELEASE ORAL at 09:52

## 2019-12-01 RX ADMIN — FUROSEMIDE 40 MG: 10 INJECTION, SOLUTION INTRAMUSCULAR; INTRAVENOUS at 09:30

## 2019-12-01 RX ADMIN — HYDROCORTISONE: 10 CREAM TOPICAL at 17:11

## 2019-12-01 RX ADMIN — OXYBUTYNIN CHLORIDE 5 MG: 5 TABLET ORAL at 09:30

## 2019-12-01 RX ADMIN — LEVALBUTEROL HYDROCHLORIDE 1.25 MG: 1.25 SOLUTION, CONCENTRATE RESPIRATORY (INHALATION) at 08:01

## 2019-12-01 RX ADMIN — SERTRALINE HYDROCHLORIDE 50 MG: 50 TABLET ORAL at 09:30

## 2019-12-01 RX ADMIN — LEVALBUTEROL HYDROCHLORIDE 1.25 MG: 1.25 SOLUTION, CONCENTRATE RESPIRATORY (INHALATION) at 20:48

## 2019-12-01 RX ADMIN — DILTIAZEM HYDROCHLORIDE 90 MG: 60 TABLET, FILM COATED ORAL at 06:40

## 2019-12-01 RX ADMIN — METOPROLOL TARTRATE 25 MG: 25 TABLET, FILM COATED ORAL at 09:29

## 2019-12-01 RX ADMIN — GUAIFENESIN 600 MG: 600 TABLET, EXTENDED RELEASE ORAL at 21:50

## 2019-12-01 RX ADMIN — HYDROXYZINE HYDROCHLORIDE 25 MG: 25 TABLET ORAL at 00:59

## 2019-12-01 RX ADMIN — METOPROLOL TARTRATE 25 MG: 25 TABLET, FILM COATED ORAL at 21:50

## 2019-12-01 RX ADMIN — ISODIUM CHLORIDE 3 ML: 0.03 SOLUTION RESPIRATORY (INHALATION) at 14:19

## 2019-12-01 RX ADMIN — APIXABAN 2.5 MG: 2.5 TABLET, FILM COATED ORAL at 09:29

## 2019-12-01 RX ADMIN — HYDROXYZINE HYDROCHLORIDE 25 MG: 25 TABLET ORAL at 06:40

## 2019-12-01 RX ADMIN — HYDROXYZINE HYDROCHLORIDE 25 MG: 25 TABLET ORAL at 19:20

## 2019-12-01 RX ADMIN — LEVALBUTEROL HYDROCHLORIDE 1.25 MG: 1.25 SOLUTION, CONCENTRATE RESPIRATORY (INHALATION) at 14:19

## 2019-12-01 RX ADMIN — ISODIUM CHLORIDE 3 ML: 0.03 SOLUTION RESPIRATORY (INHALATION) at 08:02

## 2019-12-01 RX ADMIN — DILTIAZEM HYDROCHLORIDE 90 MG: 60 TABLET, FILM COATED ORAL at 21:50

## 2019-12-01 RX ADMIN — GUAIFENESIN 600 MG: 600 TABLET, EXTENDED RELEASE ORAL at 09:30

## 2019-12-01 RX ADMIN — DILTIAZEM HYDROCHLORIDE 90 MG: 60 TABLET, FILM COATED ORAL at 13:42

## 2019-12-01 RX ADMIN — HYDROCORTISONE: 10 CREAM TOPICAL at 09:31

## 2019-12-01 NOTE — NURSING NOTE
No changes in the pts assess, no cp no sob, cont to be controlled afib on the monitor,callbell in reach of the pt, bed alarm on

## 2019-12-01 NOTE — NURSING NOTE
Patient to transfer to [facility]  Report given to [facility]

## 2019-12-01 NOTE — PROGRESS NOTES
Progress Note - Beck Acuna 1935, 81 y o  female MRN: [id number]    Unit/Bed#: -02 Encounter: [id number]    Primary Care Provider: Eulalia Ling MD   Date and time admitted to hospital: 11/30/2019  8:07 AM      ASSESSMENT/PLAN:   1  Persistent afib with RVR  · Cardizem drip titrated down and turned off around 4am  Rates have been 70-80s with brief episodes 100-120s  Will tolerate slightly higher rates in the setting of acute infection  · For now, continue lopressor 25mg TID and diltiazem 90mg TID  She is on short-acting formulations for cost reasons  Can give an add'l dose of diltiazem 90mg if needed  · Rates were difficult to control during last admission  She had significant bradycardia on higher doses of BB so only modest rate control was achieved  She deferred ppm for tachy/nikia  · Anticoagulated on Eliquis     2  Chronic diastolic HF   · BNP 7597, CXR with mild vascular congestion  No edema  She received 1L of NS and lasix was held yesterday  Would not give more fluid, especially in the setting of severe MR  · Dry weight has been 130 lbs at home- no recent weight gain    · Resume lasix      3  RSV bronchitis      4  HTN  - continue metoprolol and cardizem     5  Severe MR  · She would be a poor candidate for surgical intervention due to frailty  · She is not interested in any intervention, including less invasive clip procedure  · Ideally would be on lisinopril for afterload reduction, however her BP has been on the lower side      6  CAD  · S/p GARRET to LAD in 2007  · No recent angina    · On lopressor and pravastatin  Not on ASA as she is on Eliquis      SUBJECTIVE  HPI: The patient was seen and examined at the bedside  She is not feeling great - she continues to have a productive cough and dyspnea  Palpitations have resolved  She remains euvolemic on exam  Yesterday she reported rare lasix use but today she reports that she has been taking 40mg PO BID      Telemetry: Afib, rates mostly 70-80, intermittently 100-120s     Most recent cardiac testing:   TTE 6/27/2019:  EF 60%  Moderate LVH  Moderate MR with marked prolapse of the anterior and posterior leaflets  Mild TR with moderate pulm HTN     TTE 2017 - EF 55%, moderate MR     Cardiac cath 2012 - patent LAD stent, severe MR   No significant CAD     Current medications:     Current Facility-Administered Medications:     [MAR Hold] acetaminophen (TYLENOL) tablet 650 mg, 650 mg, Oral, Q6H PRN, NEAL Blanco    [MAR Hold] ALPRAZolam (XANAX) tablet 0 25 mg, 0 25 mg, Oral, HS PRN, NEAL Blanco    [facility] Hold] apixaban (ELIQUIS) tablet 2 5 mg, 2 5 mg, Oral, BID, NEAL Blanco, 2 5 mg at 12/01/19 0929    [MAR Hold] diltiazem (CARDIZEM) tablet 90 mg, 90 mg, Oral, Q8H [address], NEAL Garrett, 90 mg at 12/01/19 0640    [MAR Hold] fluticasone (FLONASE) 50 mcg/act nasal spray 1 spray, 1 spray, Each Nare, BID, NEAL Blanco, 1 spray at 12/01/19 0931    [MAR Hold] furosemide (LASIX) injection 40 mg, 40 mg, Intravenous, Daily, Samantha Ugarte MD, 40 mg at 12/01/19 0930    [MAR Hold] guaiFENesin (MUCINEX) 12 hr tablet 600 mg, 600 mg, Oral, Q12H [address], NEAL Blanco, 600 mg at 12/01/19 0930    [MAR Hold] guaiFENesin (ROBITUSSIN) oral solution 200 mg, 200 mg, Oral, Q4H PRN, NEAL Blanco    [MAR Hold] hydrocortisone 1 % cream, , Topical, BID, NEAL Blanco    [facility] Hold] hydrOXYzine HCL (ATARAX) tablet 25 mg, 25 mg, Oral, Q6H, NEAL Garrett, 25 mg at 12/01/19 0640    [MAR Hold] levalbuterol (Theta Jeni) inhalation solution 1 25 mg, 1 25 mg, Nebulization, TID, Samantha Ugarte MD, 1 25 mg at 12/01/19 0801    [MAR Hold] magnesium oxide (MAG-OX) tablet 400 mg, 400 mg, Oral, Once per day on Mon Wed Fri, NEAL Blanco    [facility] Hold] metoprolol tartrate (LOPRESSOR) tablet 25 mg, 25 mg, Oral, TID, NEAL Blanco, 25 mg at 12/01/19 0929    [MAR Hold] ondansetron (ZOFRAN) injection 4 mg, 4 mg, Intravenous, Q6H PRN, Lamond Pu, CRNP    [MAR Hold] oxybutynin (DITROPAN) tablet 5 mg, 5 mg, Oral, Daily, Lamond Pu, CRNP, 5 mg at 12/01/19 0930    [MAR Hold] pantoprazole (PROTONIX) EC tablet 40 mg, 40 mg, Oral, Daily, Lamond Pu, CRNP, 40 mg at 12/01/19 0952    [MAR Hold] pravastatin (PRAVACHOL) tablet 40 mg, 40 mg, Oral, Daily With Chemo May, CRNP, 40 mg at 11/30/19 1552    [MAR Hold] sertraline (ZOLOFT) tablet 50 mg, 50 mg, Oral, Daily, Lamond Pu, CRNP, 50 mg at 12/01/19 0930    [MAR Hold] sodium chloride 0 9 % inhalation solution 3 mL, 3 mL, Nebulization, TID, Shaun Gomez MD, 3 mL at 12/01/19 0802     Allergies   Allergen Reactions    Chocolate Flavor     Iodine Other (See Comments)     shellfish- difficulty breathing    Lactose     Shellfish-Derived Products     Codeine Rash       OBJECTIVE  Vitals: Blood pressure (!) 128/112, pulse 97, temperature (!) 97 3 °F (36 3 °C), temperature source Tympanic, resp  rate (!) 25, height 4' 11" (1 499 m), weight 61 kg (134 lb 7 7 oz), SpO2 98 %  , Body mass index is 27 16 kg/m² ,   Orthostatic Blood Pressures      Most Recent Value   Blood Pressure  (!) 128/112 filed at 12/01/2019 2587   Patient Position - Orthostatic VS  Lying filed at 12/01/2019 0430          Physical Exam   Physical Exam   Constitutional: She is oriented to person, place, and time  She appears well-developed and well-nourished  No distress  HENT:   Head: Normocephalic and atraumatic  Eyes: EOM are normal  No scleral icterus  Neck: Normal range of motion  Neck supple  Cardiovascular: Normal rate, S1 normal and S2 normal  An irregularly irregular rhythm present  Murmur (Grade 3-4 holosystolic murmur at the apex) heard  Pulmonary/Chest: Effort normal  She has decreased breath sounds  She has no wheezes  She has rhonchi  She has no rales  Abdominal: Soft   Bowel sounds are normal  Musculoskeletal: She exhibits no edema  Neurological: She is alert and oriented to person, place, and time  Skin: Skin is warm and dry  Psychiatric: She has a normal mood and affect  Her behavior is normal    Nursing note and vitals reviewed        Lab Results:   Troponins:   Results from last 7 days   Lab Units 11/30/19  0940   TROPONIN I ng/mL <0 03     BNP:  Results from last 6 Months   Lab Units 11/30/19  0844 06/25/19  1812   BNP pg/mL 1,541* 1,303*     CBC with diff:   Results from last 7 days   Lab Units 12/01/19  0451 11/30/19  0844   WBC Thousand/uL 6 70 8 00   HEMOGLOBIN g/dL 12 0 13 0   HEMATOCRIT % 34 8* 37 9*   PLATELETS Thousands/uL 210 227   RBC Million/uL 3 85* 4 22     CMP:  Results from last 7 days   Lab Units 12/01/19  0451 11/30/19  0941   POTASSIUM mmol/L 3 5 3 4*   CHLORIDE mmol/L 99 95*   CO2 mmol/L 28 27   BUN mg/dL 12 12   CREATININE mg/dL 0 60 0 69   CALCIUM mg/dL 8 5* 8 9   AST U/L 14 19   ALT U/L 9 12   ALK PHOS U/L 51* 60   EGFR ml/min/1 73sq m 85 81     TSH:     Coags:   Results from last 7 days   Lab Units 11/30/19  0844   INR  1 21

## 2019-12-01 NOTE — PROGRESS NOTES
Progress Note - Stacy Llamas 1935, 81 y o  female MRN: [id number]    Unit/Bed#: ICU 02 Encounter: [id number]    Primary Care Provider: Marlen Elmore MD   Date and time admitted to hospital: 11/30/2019  8:07 AM        * At[facility] with [facility] ([facility] )  Assessment & Plan  · Status post therapy with an IV Cardizem drip - drip was turned off at about 4:00 a m  This morning, patient has been rate controlled since  · Appreciate Cardiology input  · Continue Cardizem 90 mg p o  Q 8 hours  · Continue apixaban 2 5 mg twice a day for anticoagulation purposes  · Await further Cardiology input for further medication optimization  · Okay for downgrade out of the ICU to med [facility] with telemetry  · AFib with RVR was triggered off by an acute RSV infection    RSV bronchitis  Assessment & Plan  · Patient believes that she contacted this from her great-grand children   · Will continue supportive care   · Remaining infectious workup is negative thus far  · Will DC IV fluids in view of her history of CHF    Chronic diastolic heart failure ([facility] )  Assessment & Plan  Wt Readings from Last 3 Encounters:   12/01/19 61 kg (134 lb 7 7 oz)   10/18/19 62 6 kg (138 lb)   09/04/19 65 2 kg (143 lb 12 8 oz)     In no acute exacerbation   Patient actually appears to be somewhat dry as she has not been eating or drinking well due to the viral infection   Restart home Lasix        Chronic kidney disease, stage 2 (mild)  Assessment & Plan  · With baseline creatinine between 0 7-0 8 mg/dL   · Currently at baseline   · Will monitor renal function closely   · Avoid nephrotoxins     Gastroesophageal reflux disease without esophagitis  Assessment & Plan  · Continue with PPI    Mixed hyperlipidemia  Assessment & Plan  · Continue with statin         VTE Pharmacologic Prophylaxis: Pharmacologic: Apixaban (Eliquis)    Patient Centered Rounds: I have performed bedside rounds with nursing staff today      Discussions with Specialists or Other Care Team Provider:  Cardiology, case management, nursing, pharmacy  Education and Discussions with Family / Patient:  Patient was brought up to par with the plan of care for today, all questions answered to her satisfaction    Current Length of Stay: 1 day(s)    Current Patient Status: Inpatient   Certification Statement: The patient will continue to require additional inpatient hospital stay due to The need for a PT, OT evaluation, resumption of Lasix and further cardiac monitoring    Discharge Plan:  Hopeful discharge planning times 24 hours    Code Status: Level 1 - Full Code    Subjective:   Patient seen and examined, patient still feels miserable  She feels very weak  She feels her chest is very tight  She denies any pain    Objective:     Vitals:   Temp (24hrs), Av 2 °F (36 8 °C), Min:97 3 °F (36 3 °C), Max:99 1 °F (37 3 °C)    Temp:  [97 3 °F (36 3 °C)-99 1 °F (37 3 °C)] 97 3 °F (36 3 °C)  HR:  [] 76  Resp:  [15-33] 25  BP: (103-141)/() 132/83  SpO2:  [90 %-98 %] 98 %  Body mass index is 27 16 kg/m²  Input and Output Summary (last 24 hours): Intake/Output Summary (Last 24 hours) at 2019 0849  Last data filed at 2019 0446  Gross per 24 hour   Intake 321 63 ml   Output 700 ml   Net -378 37 ml       Physical Exam:     Physical Exam   Constitutional: She is oriented to person, place, and time  She appears well-developed and well-nourished  HENT:   Head: Normocephalic and atraumatic  Nose: Nose normal    Mouth/Throat: Oropharynx is clear and moist    Eyes: Pupils are equal, round, and reactive to light  Conjunctivae and EOM are normal    Neck: Normal range of motion  Neck supple  No JVD present  No thyromegaly present  Cardiovascular: Intact distal pulses  Exam reveals no gallop and no friction rub  No murmur heard    S1 plus S2, irregularly irregular, no clear-cut murmurs rubs and or gallops   Pulmonary/Chest: Effort normal and breath sounds normal  No respiratory distress  Abdominal: Soft  Bowel sounds are normal  She exhibits no distension and no mass  There is no tenderness  There is no guarding  Musculoskeletal: Normal range of motion  She exhibits no edema  Lymphadenopathy:     She has no cervical adenopathy  Neurological: She is alert and oriented to person, place, and time  No cranial nerve deficit  Skin: Skin is warm  No rash noted  No erythema  Psychiatric: She has a normal mood and affect  Her behavior is normal    Vitals reviewed  Additional Data:     Labs:    Results from last 7 days   Lab Units 12/01/19  0451   WBC Thousand/uL 6 70   HEMOGLOBIN g/dL 12 0   HEMATOCRIT % 34 8*   PLATELETS Thousands/uL 210   NEUTROS PCT % 74   LYMPHS PCT % 13*   MONOS PCT % 9   EOS PCT % 4     Results from last 7 days   Lab Units 12/01/19  0451   POTASSIUM mmol/L 3 5   CHLORIDE mmol/L 99   CO2 mmol/L 28   BUN mg/dL 12   CREATININE mg/dL 0 60   CALCIUM mg/dL 8 5*   ALK PHOS U/L 51*   ALT U/L 9   AST U/L 14     Results from last 7 days   Lab Units 11/30/19  0844   INR  1 21               * I Have Reviewed All Lab Data Listed Above  * Additional Pertinent Lab Tests Reviewed: Alex 66 Admission  Reviewed    Imaging:  Imaging Reports Reviewed Today Include:  None    Recent Cultures (last 7 days):     Results from last 7 days   Lab Units 11/30/19  0857 11/30/19  0844   BLOOD CULTURE  Received in Microbiology Lab  Culture in Progress  Received in Microbiology Lab  Culture in Progress         Last 24 Hours Medication List:     Current Facility-Administered Medications:  acetaminophen 650 mg Oral Q6H PRN Barbette Coma, CRNP   ALPRAZolam 0 25 mg Oral HS PRN Barbette Coma, CRNP   apixaban 2 5 mg Oral BID Barbette Coma, CRNP   diltiazem 90 mg Oral [facility] Barbette Coma, CRNP   fluticasone 1 spray Each Nare BID Barbette Coma, CRNP   guaiFENesin 600 mg Oral Q12H [facility] Barbette Coma, CRNP   guaiFENesin 200 mg Oral Q4H PRN Barbette Coma, CRNP hydrocortisone  Topical BID Jaimie Gene, CRNP   hydrOXYzine HCL 25 mg Oral Q6H Jaimie Gene, CRNP   levalbuterol 1 25 mg Nebulization TID Quoc Smith MD   [START ON 12/2/2019] magnesium oxide 400 mg Oral Once per day on Mon Wed Fri Jaimie Gene, CRNP   metoprolol tartrate 25 mg Oral TID Jaimie Gene, CRNP   ondansetron 4 mg Intravenous Q6H PRN Jaimie Gene, CRNP   oxybutynin 5 mg Oral Daily Jaimie Gene, CRNP   pantoprazole 40 mg Oral Daily Jaimie Gene, CRNP   pravastatin 40 mg Oral Daily With Dinner Jaimie Gene, CRNP   sertraline 50 mg Oral Daily Jaimie Gene, CRNP   sodium chloride 3 mL Nebulization TID Quoc Smith MD        Today, Patient Was Seen By: Quoc Smith MD    ** Please Note: Dictation voice to text software may have been used in the creation of this document   **

## 2019-12-01 NOTE — NURSING NOTE
AM assessment and meds completed  Remains with ongoing harsh dry coughing  A-febrile; temp 97 3  Breath sounds auscultated diminished with faint crackles to L and R lobes and + rhonchi to FRANCISCA/LLL  Encouraged to use incentive spirometer; goal 1000 with 500 achieved  Positive RSV bronchitis  Instructed to use callbell for all needs  Safety maintained

## 2019-12-01 NOTE — PLAN OF CARE
Problem: PHYSICAL THERAPY ADULT  Goal: Performs mobility at highest level of function for planned discharge setting  See evaluation for individualized goals  Description  Treatment/Interventions: Functional transfer training, LE strengthening/ROM, Elevations, Therapeutic exercise, Endurance training, Bed mobility, Gait training  Equipment Recommended: Linda Villa       See flowsheet documentation for full assessment, interventions and recommendations  Outcome: Progressing  Note:   Prognosis: Good  Problem List: Decreased strength, Decreased endurance, Impaired balance, Decreased mobility  Assessment: Pt is 81 y o  female seen for PT evaluation s/p admit to [address] [place] [address] on 11/30/2019 w/ Atrial fibrillation with RVR ([facility] )  PT consulted to assess pt's functional mobility and d/c needs  Order placed for PT eval and tx, w/ up as tolerated order  Comorbidities affecting pt's physical performance at time of assessment include: RSV bronchitis, CKD, and CHF  PTA, pt was independent w/ all functional mobility w/ RW and cane, lives w/ son in 2 level home and retired  Personal factors affecting pt at time of IE include: ambulating w/ assistive device, inability to ambulate household distances and step into bedroom  Please find objective findings from PT assessment regarding body systems outlined above with impairments and limitations including weakness, impaired balance, decreased endurance, gait deviations, decreased activity tolerance, decreased functional mobility tolerance and fall risk  The following objective measures performed on IE also reveal limitations: Barthel Index: 55/100  Pt's clinical presentation is currently unstable/unpredictable seen in pt's presentation of productive cough, droplet precautions and decline in mobility  Pt to benefit from continued PT tx to address deficits as defined above and maximize level of functional independent mobility and consistency   From PT/mobility standpoint, recommendation at time of d/c would be STR pending progress in order to facilitate return to PLOF  Recommendation: Short-term skilled PT     PT - OK to Discharge: Yes    See flowsheet documentation for full assessment     Anthony Ruiz, PT

## 2019-12-01 NOTE — PLAN OF CARE
Problem: OCCUPATIONAL THERAPY ADULT  Goal: Performs self-care activities at highest level of function for planned discharge setting  See evaluation for individualized goals  Description  Treatment Interventions: ADL retraining, Functional transfer training, UE strengthening/ROM, Endurance training, Compensatory technique education, Energy conservation          See flowsheet documentation for full assessment, interventions and recommendations  Note:   Limitation: Decreased ADL status, Decreased UE strength, Decreased UE ROM, Decreased endurance, Decreased self-care trans, Decreased high-level ADLs  Prognosis: Good  Assessment: Pt is a 81 y o  female who was admitted to [address] [place] [address] on 11/30/2019 with Atrial fibrillation with RVR ([facility] )  At this time, patient is also affected by the comorbidities of: CKD, GERD, hyperlipidemia, RSV bronchitis and chronic diastolic heart failure  Additionally, patient is affected by the following personal factors:difficulty performing ADLS and difficulty performing IADLS   Orders placed for OT evaluation/treatment with up with assistance orders  Prior to admission, Patient reporting being independent with ADLs, ambulatory with RW or SPC and lives with son in a two story house  Upon OT evaluation, patient requires minimum assist for UB ADLs and moderate assist for LB ADLs  Occupational performance is affected by the following deficits: decreased strength, decreased balance, decreased tolerance and decreased functional mobility  Based on the following information, patient would benefit from continued skilled OT treatment while in the hospital to address deficits and maximize level of functional independence with ADL's and functional mobility  Occupational Performance areas to address include: grooming, bathing/shower, toilet hygiene, dressing, functional mobility and clothing management  From OT standpoint, recommendation at time of d/c would be short term rehab  OT Discharge Recommendation: Short Term Rehab  OT - OK to Discharge: Yes(Once medically cleared)     Corine Peters, OT

## 2019-12-01 NOTE — PLAN OF CARE
Problem: CARDIOVASCULAR - ADULT  Goal: Maintains optimal cardiac output and hemodynamic stability  Description  INTERVENTIONS:  - Monitor I/O, vital signs and rhythm  - Monitor for S/S and trends of decreased cardiac output  - Administer and titrate ordered vasoactive medications to optimize hemodynamic stability  - Assess quality of pulses, skin color and temperature  - Assess for signs of decreased coronary artery perfusion  - Instruct patient to report change in severity of symptoms  Outcome: Progressing  Goal: Absence of cardiac dysrhythmias or at baseline rhythm  Description  INTERVENTIONS:  - Continuous cardiac monitoring, vital signs, obtain 12 lead EKG if ordered  - Administer antiarrhythmic and heart rate control medications as ordered  - Monitor electrolytes and administer replacement therapy as ordered  Outcome: Progressing     Problem: RESPIRATORY - ADULT  Goal: Achieves optimal ventilation and oxygenation  Description  INTERVENTIONS:  - Assess for changes in respiratory status  - Assess for changes in mentation and behavior  - Position to facilitate oxygenation and minimize respiratory effort  - Oxygen administered by appropriate delivery if ordered  - Initiate smoking cessation education as indicated  - Encourage broncho-pulmonary hygiene including cough, deep breathe, Incentive Spirometry  - Assess the need for suctioning and aspirate as needed  - Assess and instruct to report SOB or any respiratory difficulty  - Respiratory Therapy support as indicated  Outcome: Progressing     Problem: METABOLIC, FLUID AND ELECTROLYTES - ADULT  Goal: Fluid balance maintained  Description  INTERVENTIONS:  - Monitor labs   - Monitor I/O and WT  - Instruct patient on fluid and nutrition as appropriate  - Assess for signs & symptoms of volume excess or deficit  Outcome: Progressing     Problem: MUSCULOSKELETAL - ADULT  Goal: Maintain or return mobility to safest level of function  Description  INTERVENTIONS:  - Assess patient's ability to carry out ADLs; assess patient's baseline for ADL function and identify physical deficits which impact ability to perform ADLs (bathing, care of mouth/teeth, toileting, grooming, dressing, etc )  - Assess/evaluate cause of self-care deficits   - Assess range of motion  - Assess patient's mobility  - Assess patient's need for assistive devices and provide as appropriate  - Encourage maximum independence but intervene and supervise when necessary  - Involve family in performance of ADLs  - Assess for home care needs following discharge   - Consider OT consult to assist with ADL evaluation and planning for discharge  - Provide patient education as appropriate  Outcome: Progressing     Problem: PAIN - ADULT  Goal: Verbalizes/displays adequate comfort level or baseline comfort level  Description  Interventions:  - Encourage patient to monitor pain and request assistance  - Assess pain using appropriate pain scale  - Administer analgesics based on type and severity of pain and evaluate response  - Implement non-pharmacological measures as appropriate and evaluate response  - Consider cultural and social influences on pain and pain management  - Notify physician/advanced practitioner if interventions unsuccessful or patient reports new pain  Outcome: Progressing     Problem: INFECTION - ADULT  Goal: Absence or prevention of progression during hospitalization  Description  INTERVENTIONS:  - Assess and monitor for signs and symptoms of infection  - Monitor lab/diagnostic results  - Monitor all insertion sites, i e  indwelling lines, tubes, and drains  - Monitor endotracheal if appropriate and nasal secretions for changes in amount and color  - [place] appropriate cooling/warming therapies per order  - Administer medications as ordered  - Instruct and encourage patient and family to use good hand hygiene technique  - Identify and instruct in appropriate isolation precautions for identified infection/condition  Outcome: Progressing     Problem: SAFETY ADULT  Goal: Patient will remain free of falls  Description  INTERVENTIONS:  - Assess patient frequently for physical needs  -  Identify cognitive and physical deficits and behaviors that affect risk of falls  -  [place] fall precautions as indicated by assessment   - Educate patient/family on patient safety including physical limitations  - Instruct patient to call for assistance with activity based on assessment  - Modify environment to reduce risk of injury  - Consider OT/PT consult to assist with strengthening/mobility  Outcome: Progressing     Problem: Knowledge Deficit  Goal: Patient/family/caregiver demonstrates understanding of disease process, treatment plan, medications, and discharge instructions  Description  Complete learning assessment and assess knowledge base  Interventions:  - Provide teaching at level of understanding  - Provide teaching via preferred learning methods  Outcome: Progressing     Problem: Nutrition/Hydration-ADULT  Goal: Nutrient/Hydration intake appropriate for improving, restoring or maintaining nutritional needs  Description  Monitor and assess patient's nutrition/hydration status for malnutrition  Collaborate with interdisciplinary team and initiate plan and interventions as ordered  Monitor patient's weight and dietary intake as ordered or per policy  Utilize nutrition screening tool and intervene as necessary  Determine patient's food preferences and provide high-protein, high-caloric foods as appropriate       INTERVENTIONS:  - Monitor oral intake, urinary output, labs, and treatment plans  - Assess nutrition and hydration status and recommend course of action  - Evaluate amount of meals eaten  - Assist patient with eating if necessary   - Allow adequate time for meals  - Recommend/ encourage appropriate diets, oral nutritional supplements, and vitamin/mineral supplements  - Order, calculate, and assess calorie counts as needed  - Recommend, monitor, and adjust tube feedings and TPN/PPN based on assessed needs  - Assess need for intravenous fluids  - Provide specific nutrition/hydration education as appropriate  - Include patient/family/caregiver in decisions related to nutrition  Outcome: Progressing     Problem: Potential for Falls  Goal: Patient will remain free of falls  Description  INTERVENTIONS:  - Assess patient frequently for physical needs  -  Identify cognitive and physical deficits and behaviors that affect risk of falls    -  [place] fall precautions as indicated by assessment   - Educate patient/family on patient safety including physical limitations  - Instruct patient to call for assistance with activity based on assessment  - Modify environment to reduce risk of injury  - Consider OT/PT consult to assist with strengthening/mobility  Outcome: Progressing

## 2019-12-01 NOTE — ASSESSMENT & PLAN NOTE
· Patient believes that she contacted this from her great-grand children   · Will continue supportive care   · Remaining infectious workup is negative thus far  · Will DC IV fluids in view of her history of CHF

## 2019-12-01 NOTE — ASSESSMENT & PLAN NOTE
Wt Readings from Last 3 Encounters:   12/01/19 61 kg (134 lb 7 7 oz)   10/18/19 62 6 kg (138 lb)   09/04/19 65 2 kg (143 lb 12 8 oz)     In no acute exacerbation   Patient actually appears to be somewhat dry as she has not been eating or drinking well due to the viral infection   Restart home Lasix

## 2019-12-01 NOTE — ASSESSMENT & PLAN NOTE
· Status post therapy with an IV Cardizem drip - drip was turned off at about 4:00 a m   This morning, patient has been rate controlled since  · Appreciate Cardiology input  · Continue Cardizem 90 mg p o  Q 8 hours  · Continue apixaban 2 5 mg twice a day for anticoagulation purposes  · Await further Cardiology input for further medication optimization  · Okay for downgrade out of the ICU to med [facility] with telemetry  · AFib with RVR was triggered off by an acute RSV infection

## 2019-12-01 NOTE — NURSING NOTE
Pt presently in bed in no acute distress watching tv and eating dinner, no cp no sob, cont controlled afib on the monitor, giancarlobell in reach of the pt

## 2019-12-01 NOTE — PHYSICAL THERAPY NOTE
Physical Therapy Evaluation     Patient's Name: Stacy Llamas    Admitting Diagnosis  Cough [R05]  Atrial fibrillation with RVR ([facility] ) [I48 91]    Problem List  Patient Active Problem List   Diagnosis    CAD (coronary artery disease)    Mitral regurgitation    A-fib ([place] [address] )    Chronic kidney disease, stage 2 (mild)    Chronic diastolic heart failure (HCC)    HTN (hypertension)    Hyponatremia    Depression with anxiety    Gastroesophageal reflux disease without esophagitis    OAB (overactive bladder)    Anemia    Hypomagnesemia    Mixed hyperlipidemia    Atrial fibrillation with RVR ([facility] )    RSV bronchitis       Past Medical History  Past Medical History:   Diagnosis Date    Acute on chronic diastolic congestive heart failure ([facility] ) 6/27/2019    Arthritis     Chronic diastolic heart failure (HCC)     Chronic kidney disease, stage 2 (mild)     Coronary artery disease     history of stenting    Eczema     years    Edema     History of echocardiogram 07/20/2017    EF 55%, mild concentric LVH, bileaflet MVP with moderate MR, left atrial enlargement   History of transfusion     Hyperlipidemia     Hypertension     Hypo-osmolality and hyponatremia     Hypokalemia 7/11/2019    Mitral regurgitation        Past Surgical History  Past Surgical History:   Procedure Laterality Date    ABDOMINAL SURGERY      hysterectomy    CARDIAC CATHETERIZATION  04/10/2012    EF 65%, no significant CAD, patent stents, severe MR     CORONARY ANGIOPLASTY WITH STENT PLACEMENT  03/21/2007    EF 55%, GARRET to LAD      EYE SURGERY      b/l cataracts    HYSTERECTOMY      JOINT REPLACEMENT      Rt knee          12/01/19 1223   Note Type   Note type Eval only   Pain Assessment   Pain Assessment No/denies pain   Home Living   Type of [address] [place] [address] Two level;Ramped entrance  (1 step between kitchen and bedroom with GB)   Bathroom Shower/Tub Tub/shower unit   Bathroom Toilet Standard   Bathroom Equipment Grab bars around toilet  (uses patio chair in shower)   Home Equipment Walker;Cane  (uses RW in home and cane in community)   Prior Function   Level of [place] Independent with ADLs and functional mobility   Lives With Son   Receives Help From Family   ADL Assistance Independent  (sometimes daughter helps with shower)   IADLs Needs assistance   Falls in the last 6 months 0   Vocational Retired   Restrictions/Precautions   Wells Samy Bearing Precautions Per Order No   Other Precautions Droplet precautions; Fall Risk;Telemetry;O2   General   Family/Caregiver Present No   Cognition   Overall Cognitive Status WFL   Arousal/Participation Alert   Orientation Level Oriented X4   Following Commands Follows one step commands without difficulty   RLE Assessment   RLE Assessment X   Strength RLE   RLE Overall Strength 3+/5   LLE Assessment   LLE Assessment X   Strength LLE   LLE Overall Strength 3+/5   Coordination   Movements are Fluid and Coordinated 1   Sensation WFL   Bed Mobility   Supine to Sit 4  Minimal assistance   Additional items Assist x 1;Verbal cues; Increased time required   Transfers   Sit to Stand 4  Minimal assistance   Additional items Assist x 1;Bedrails;Verbal cues; Increased time required   Stand to Sit 4  Minimal assistance   Additional items Assist x 1; Armrests; Increased time required;Verbal cues   Ambulation/Elevation   Gait pattern Short stride;Decreased foot clearance; Improper Weight shift   Gait Assistance 4  Minimal assist   Additional items Assist x 1;Verbal cues   Assistive Device Rolling walker   Distance 5 ft   Balance   Static Sitting Good   Dynamic Sitting Fair +   Static Standing Fair   Dynamic Standing Fair -   Ambulatory Fair -   Endurance Deficit   Endurance Deficit Yes   Endurance Deficit Description coughing with exertion   Activity Tolerance   Activity Tolerance Patient limited by fatigue   Assessment   Prognosis Good   Problem List Decreased strength;Decreased endurance; Impaired balance;Decreased mobility   Assessment Pt is 81 y o  female seen for PT evaluation s/p admit to [address] on 11/30/2019 w/ Atrial fibrillation with RVR ([address] )  PT consulted to assess pt's functional mobility and d/c needs  Order placed for PT eval and tx, w/ up as tolerated order  Comorbidities affecting pt's physical performance at time of assessment include: RSV bronchitis, CKD, and CHF  PTA, pt was independent w/ all functional mobility w/ RW and cane, lives w/ son in 2 level home and retired  Personal factors affecting pt at time of IE include: ambulating w/ assistive device, inability to ambulate household distances and step into bedroom  Please find objective findings from PT assessment regarding body systems outlined above with impairments and limitations including weakness, impaired balance, decreased endurance, gait deviations, decreased activity tolerance, decreased functional mobility tolerance and fall risk  The following objective measures performed on IE also reveal limitations: Barthel Index: 55/100  Pt's clinical presentation is currently unstable/unpredictable seen in pt's presentation of productive cough, droplet precautions and decline in mobility  Pt to benefit from continued PT tx to address deficits as defined above and maximize level of functional independent mobility and consistency  From PT/mobility standpoint, recommendation at time of d/c would be STR pending progress in order to facilitate return to PLOF  Goals   Patient Goals to feel better   LTG Expiration Date 12/08/19   Long Term Goal #1 Pt will: Perform bed mobility tasks to modified I to improve ease of bed mobility  Perform transfers to modified I to improve ease of transfers  Perform ambulation with MI and RW for 150 ft to  increase Indep in home environment  Increase dynamic standing balance to F+ to decrease fall risk  Increase BLE strength to 4+/5 to improve functional mobility    Increase OOB activity tolerance to 10 minutes without s/s of exertion to decrease fall risk  Navigate up and down 1 step so patient can enter and exit bedroom   PT Treatment Day 0   Plan   Treatment/Interventions Functional transfer training;LE strengthening/ROM; Elevations; Therapeutic exercise; Endurance training;Bed mobility;Gait training   PT Frequency   (3-5x/wk)   Recommendation   Recommendation Short-term skilled PT   Equipment Recommended Walker   PT - OK to Discharge Yes  (to STR)   Barthel Index   Feeding 10   Bathing 0   Grooming Score 5   Dressing Score 5   Bladder Score 10   Bowels Score 10   Toilet Use Score 5   Transfers (Bed/Chair) Score 10   Mobility (Level Surface) Score 0   Stairs Score 0   Barthel Index Score 55         Martha Bartholomew, PT

## 2019-12-01 NOTE — OCCUPATIONAL THERAPY NOTE
Occupational Therapy Evaluation      Beck Acuna    12/1/2019    Patient Active Problem List   Diagnosis    CAD (coronary artery disease)    Mitral regurgitation    A-fib (HCC)    Chronic kidney disease, stage 2 (mild)    Chronic diastolic heart failure (HCC)    HTN (hypertension)    Hyponatremia    Depression with anxiety    Gastroesophageal reflux disease without esophagitis    OAB (overactive bladder)    Anemia    Hypomagnesemia    Mixed hyperlipidemia    At[facility] fibrillation with [facility]VR ([facility] )    RSV bronchitis       Past Medical History:   Diagnosis Date    Acute on chronic diastolic congestive heart failure ([facility] ) 6/27/2019    Arthritis     Chronic diastolic heart failure (HCC)     Chronic kidney disease, stage 2 (mild)     Coronary artery disease     history of stenting    Eczema     years    Edema     History of echocardiogram 07/20/2017    EF 55%, mild concentric LVH, bileaflet MVP with moderate MR, left atrial enlargement   History of transfusion     Hyperlipidemia     Hypertension     Hypo-osmolality and hyponatremia     Hypokalemia 7/11/2019    Mitral regurgitation        Past Surgical History:   Procedure Laterality Date    ABDOMINAL SURGERY      hysterectomy    CARDIAC CATHETERIZATION  04/10/2012    EF 65%, no significant CAD, patent stents, severe MR     CORONARY ANGIOPLASTY WITH STENT PLACEMENT  03/21/2007    EF 55%, GARRET to LAD   EYE SURGERY      b/l cataracts    HYSTERECTOMY      JOINT REPLACEMENT      Rt knee        12/01/19 1205   Note Type   Note type Eval only   Restrictions/Precautions   Weight Bearing Precautions Per Order No   Other Precautions Droplet precautions; Fall Risk;Telemetry;O2   Pain Assessment   Pain Assessment No/denies pain   Pain Score No Pain   Home Living   Type of Home House   Home Layout Two level;Performs ADLs on one level; Able to live on main level with bedroom/bathroom  (0 LOLA, 1 step from kitchen to bedroom )   [employer] Tub/shower unit   Bathroom Toilet Standard   Bathroom Equipment Grab bars in shower; Other (Comment)  (uses patio chair in shower)   [address] Walker;Cane  (uses RW in home and cane in community)   Prior Function   Level of [place] Independent with ADLs and functional mobility   Lives With Son   Receives Help From Family   ADL Assistance Independent  (sometimes daughter helps with shower)   IADLs Needs assistance   Falls in the last 6 months 0   Vocational Retired   Lifestyle   Autonomy Patient reporting being independent with ADLs, ambulatory with RW or SPC and lives with son in a two story house    Reciprocal Relationships family    ADL   Eating Assistance 5  Supervision/Setup   Grooming Assistance 5  Supervision/Setup   UB Bathing Assistance 4  Minimal Assistance   LB [address] 3  Moderate Assistance   [address] [facility] 4  [address] 3  Moderate [address]  4  Minimal Assistance   Bed Mobility   Supine to Sit 4  Minimal assistance   Additional items Assist x 1;Verbal cues; Increased time required   Additional Comments Pt OOB and seated in chair at end of eval with chair alarm on and call bell within reach    Transfers   Sit to Stand 4  Minimal assistance   Additional items Assist x 1;Bedrails; Increased time required;Verbal cues   Stand to Sit 4  Minimal assistance   Additional items Assist x 1; Armrests; Increased time required;Verbal cues   Stand pivot 4  Minimal assistance   Additional items Assist x 1; Armrests; Increased time required;Verbal cues   Balance   Static Sitting Good   Dynamic Sitting Fair +   Static Standing Fair   Dynamic Standing Fair -   Activity Tolerance   Activity Tolerance Patient limited by fatigue   Nurse Made Aware RN made aware of outcomes    RUE Assessment   RUE Assessment WFL  (~90 degrees shoulder flex, grossly 4-/5 MMT distally )   LUE Assessment   LUE Assessment WFL  (~90 degrees shoulder flex, grossly 4-/5 MMT distally )   Hand Function   Gross Motor Coordination Functional   Fine Motor Coordination Functional   Cognition   Overall Cognitive Status WFL   Arousal/Participation Alert; Responsive; Cooperative   Attention Within functional limits   Orientation Level Oriented X4   Memory Within functional limits   Following Commands Follows one step commands without difficulty   Comments Pt agreeable to OT eval    Assessment   Limitation Decreased ADL status; Decreased UE strength;Decreased UE ROM; Decreased endurance;Decreased self-care trans;Decreased high-level ADLs   Prognosis Good   Assessment Pt is a 81 y o  female who was admitted to [address] [place] [address] on 11/30/2019 with Atrial fibrillation with RVR ([facility] )  At this time, patient is also affected by the comorbidities of: CKD, GERD, hyperlipidemia, RSV bronchitis and chronic diastolic heart failure  Additionally, patient is affected by the following personal factors:difficulty performing ADLS and difficulty performing IADLS   Orders placed for OT evaluation/treatment with up with assistance orders  Prior to admission, Patient reporting being independent with ADLs, ambulatory with RW or SPC and lives with son in a two story house  Upon OT evaluation, patient requires minimum assist for UB ADLs and moderate assist for LB ADLs  Occupational performance is affected by the following deficits: decreased strength, decreased balance, decreased tolerance and decreased functional mobility  Based on the following information, patient would benefit from continued skilled OT treatment while in the hospital to address deficits and maximize level of functional independence with ADL's and functional mobility  Occupational Performance areas to address include: grooming, bathing/shower, toilet hygiene, dressing, functional mobility and clothing management  From OT standpoint, recommendation at time of d/c would be short term rehab     Goals Patient Goals to feel better   Plan   Treatment Interventions ADL retraining;Functional transfer training;UE strengthening/ROM; Endurance training; Compensatory technique education; Energy conservation   Goal Expiration Date 12/11/19   OT Treatment Day 0   OT Frequency 3-5x/wk   Recommendation   OT Discharge Recommendation Short Term Rehab   OT - OK to Discharge Yes  (Once medically cleared)   Barthel Index   Feeding 10   Bathing 0   Grooming Score 5   Dressing Score 5   Bladder Score 10   Bowels Score 10   Toilet Use Score 5   Transfers (Bed/Chair) Score 10   Mobility (Level Surface) Score 0   Stairs Score 0   Barthel Index Score 55     GOALS:    *ADL transfers with (I) for inc'd independence with ADLs/purposeful tasks    *UB ADL with (I) for inc'd independence with self cares    *LB ADL with (I) using AE prn for inc'd independence with self cares    *Toileting with (I) for clothing management and hygiene for return to PLOF with personal care    *Increase static stand balance to good and dyn stand balance to fair+ for inc'd safety with standing purposeful tasks    *Increase stand tolerance x10 m for inc'd tolerance with standing purposeful tasks    *Participate in 10m UE therex to increase overall stamina/activity tolerance for purposeful tasks    *Bed mobility- (I) for inc'd independence to manage own comfort and initiate EOB & OOB purposeful tasks    *Patient will verbalize 3 safety awareness/ principles to prevent falls in the home setting  *Patient will verbalize and demonstrate use of energy conservation/deep breathing techniques and work simplification skills during functional activities with no verbal cues  *Patient will increase OOB/sitting tolerance to 2-4 hours per day to increase participation in self-care and leisure tasks with no s/s of exertion           Caridad Abrams MS, OTR/L

## 2019-12-01 NOTE — NURSING NOTE
No complaints of pain /SOB,denies any palpitations  CM showing Afib  Rates varies from   Cardizem drip turned off at 446AM

## 2019-12-02 LAB
ALBUMIN SERPL BCP-MCNC: 3.2 G/DL (ref 3.5–5.7)
ALP SERPL-CCNC: 60 U/L (ref 55–165)
ALT SERPL W P-5'-P-CCNC: 7 U/L (ref 7–52)
ANION GAP SERPL CALCULATED.3IONS-SCNC: 10 MMOL/L (ref 4–13)
AST SERPL W P-5'-P-CCNC: 16 U/L (ref 13–39)
BASOPHILS # BLD AUTO: 0 THOUSANDS/ΜL (ref 0–0.1)
BASOPHILS NFR BLD AUTO: 0 % (ref 0–2)
BILIRUB SERPL-MCNC: 1 MG/DL (ref 0.2–1)
BUN SERPL-MCNC: 12 MG/DL (ref 7–25)
CALCIUM SERPL-MCNC: 8.6 MG/DL (ref 8.6–10.5)
CHLORIDE SERPL-SCNC: 95 MMOL/L (ref 98–107)
CO2 SERPL-SCNC: 25 MMOL/L (ref 21–31)
CREAT SERPL-MCNC: 0.69 MG/DL (ref 0.6–1.2)
EOSINOPHIL # BLD AUTO: 0.1 THOUSAND/ΜL (ref 0–0.61)
EOSINOPHIL NFR BLD AUTO: 2 % (ref 0–5)
ERYTHROCYTE [DISTWIDTH] IN BLOOD BY AUTOMATED COUNT: 14 % (ref 11.5–14.5)
GFR SERPL CREATININE-BSD FRML MDRD: 81 ML/MIN/1.73SQ M
GLUCOSE SERPL-MCNC: 97 MG/DL (ref 65–99)
HCT VFR BLD AUTO: 33.3 % (ref 42–47)
HGB BLD-MCNC: 11.8 G/DL (ref 12–16)
LYMPHOCYTES # BLD AUTO: 0.8 THOUSANDS/ΜL (ref 0.6–4.47)
LYMPHOCYTES NFR BLD AUTO: 9 % (ref 21–51)
MAGNESIUM SERPL-MCNC: 2.1 MG/DL (ref 1.9–2.7)
MCH RBC QN AUTO: 32.3 PG (ref 26–34)
MCHC RBC AUTO-ENTMCNC: 35.4 G/DL (ref 31–37)
MCV RBC AUTO: 91 FL (ref 81–99)
MONOCYTES # BLD AUTO: 0.7 THOUSAND/ΜL (ref 0.17–1.22)
MONOCYTES NFR BLD AUTO: 8 % (ref 2–12)
NEUTROPHILS # BLD AUTO: 6.5 THOUSANDS/ΜL (ref 1.4–6.5)
NEUTS SEG NFR BLD AUTO: 80 % (ref 42–75)
PHOSPHATE SERPL-MCNC: 2.8 MG/DL (ref 3–5.5)
PLATELET # BLD AUTO: 218 THOUSANDS/UL (ref 149–390)
PMV BLD AUTO: 7.7 FL (ref 8.6–11.7)
POTASSIUM SERPL-SCNC: 3.3 MMOL/L (ref 3.5–5.5)
PROT SERPL-MCNC: 6.3 G/DL (ref 6.4–8.9)
RBC # BLD AUTO: 3.66 MILLION/UL (ref 3.9–5.2)
SODIUM SERPL-SCNC: 130 MMOL/L (ref 134–143)
WBC # BLD AUTO: 8.1 THOUSAND/UL (ref 4.8–10.8)

## 2019-12-02 PROCEDURE — 84100 ASSAY OF PHOSPHORUS: CPT | Performed by: HOSPITALIST

## 2019-12-02 PROCEDURE — 83735 ASSAY OF MAGNESIUM: CPT | Performed by: HOSPITALIST

## 2019-12-02 PROCEDURE — 94640 AIRWAY INHALATION TREATMENT: CPT

## 2019-12-02 PROCEDURE — 97110 THERAPEUTIC EXERCISES: CPT

## 2019-12-02 PROCEDURE — 94761 N-INVAS EAR/PLS OXIMETRY MLT: CPT

## 2019-12-02 PROCEDURE — 99232 SBSQ HOSP IP/OBS MODERATE 35: CPT | Performed by: INTERNAL MEDICINE

## 2019-12-02 PROCEDURE — 80053 COMPREHEN METABOLIC PANEL: CPT | Performed by: HOSPITALIST

## 2019-12-02 PROCEDURE — 97116 GAIT TRAINING THERAPY: CPT

## 2019-12-02 PROCEDURE — 85025 COMPLETE CBC W/AUTO DIFF WBC: CPT | Performed by: HOSPITALIST

## 2019-12-02 PROCEDURE — 94760 N-INVAS EAR/PLS OXIMETRY 1: CPT

## 2019-12-02 RX ORDER — POTASSIUM CHLORIDE 20 MEQ/1
40 TABLET, EXTENDED RELEASE ORAL ONCE
Status: COMPLETED | OUTPATIENT
Start: 2019-12-02 | End: 2019-12-02

## 2019-12-02 RX ADMIN — GUAIFENESIN 600 MG: 600 TABLET, EXTENDED RELEASE ORAL at 09:22

## 2019-12-02 RX ADMIN — FUROSEMIDE 40 MG: 10 INJECTION, SOLUTION INTRAMUSCULAR; INTRAVENOUS at 09:23

## 2019-12-02 RX ADMIN — METOPROLOL TARTRATE 25 MG: 25 TABLET, FILM COATED ORAL at 21:33

## 2019-12-02 RX ADMIN — SERTRALINE HYDROCHLORIDE 50 MG: 50 TABLET ORAL at 09:22

## 2019-12-02 RX ADMIN — HYDROCORTISONE: 10 CREAM TOPICAL at 18:19

## 2019-12-02 RX ADMIN — HYDROCORTISONE: 10 CREAM TOPICAL at 09:27

## 2019-12-02 RX ADMIN — ISODIUM CHLORIDE 3 ML: 0.03 SOLUTION RESPIRATORY (INHALATION) at 13:38

## 2019-12-02 RX ADMIN — HYDROXYZINE HYDROCHLORIDE 25 MG: 25 TABLET ORAL at 18:00

## 2019-12-02 RX ADMIN — FLUTICASONE PROPIONATE 1 SPRAY: 50 SPRAY, METERED NASAL at 18:16

## 2019-12-02 RX ADMIN — POTASSIUM CHLORIDE 40 MEQ: 1500 TABLET, EXTENDED RELEASE ORAL at 13:10

## 2019-12-02 RX ADMIN — HYDROXYZINE HYDROCHLORIDE 25 MG: 25 TABLET ORAL at 13:16

## 2019-12-02 RX ADMIN — METOPROLOL TARTRATE 25 MG: 25 TABLET, FILM COATED ORAL at 15:57

## 2019-12-02 RX ADMIN — GUAIFENESIN 600 MG: 600 TABLET, EXTENDED RELEASE ORAL at 21:33

## 2019-12-02 RX ADMIN — HYDROXYZINE HYDROCHLORIDE 25 MG: 25 TABLET ORAL at 00:47

## 2019-12-02 RX ADMIN — LEVALBUTEROL HYDROCHLORIDE 1.25 MG: 1.25 SOLUTION, CONCENTRATE RESPIRATORY (INHALATION) at 13:38

## 2019-12-02 RX ADMIN — OXYBUTYNIN CHLORIDE 5 MG: 5 TABLET ORAL at 09:22

## 2019-12-02 RX ADMIN — ISODIUM CHLORIDE 3 ML: 0.03 SOLUTION RESPIRATORY (INHALATION) at 20:15

## 2019-12-02 RX ADMIN — METOPROLOL TARTRATE 25 MG: 25 TABLET, FILM COATED ORAL at 09:22

## 2019-12-02 RX ADMIN — FLUTICASONE PROPIONATE 1 SPRAY: 50 SPRAY, METERED NASAL at 09:27

## 2019-12-02 RX ADMIN — POTASSIUM CHLORIDE 40 MEQ: 1500 TABLET, EXTENDED RELEASE ORAL at 17:59

## 2019-12-02 RX ADMIN — APIXABAN 2.5 MG: 2.5 TABLET, FILM COATED ORAL at 09:21

## 2019-12-02 RX ADMIN — DILTIAZEM HYDROCHLORIDE 90 MG: 60 TABLET, FILM COATED ORAL at 13:17

## 2019-12-02 RX ADMIN — PRAVASTATIN SODIUM 40 MG: 40 TABLET ORAL at 15:57

## 2019-12-02 RX ADMIN — APIXABAN 2.5 MG: 2.5 TABLET, FILM COATED ORAL at 17:59

## 2019-12-02 RX ADMIN — LEVALBUTEROL HYDROCHLORIDE 1.25 MG: 1.25 SOLUTION, CONCENTRATE RESPIRATORY (INHALATION) at 08:47

## 2019-12-02 RX ADMIN — DILTIAZEM HYDROCHLORIDE 90 MG: 60 TABLET, FILM COATED ORAL at 05:13

## 2019-12-02 RX ADMIN — Medication 400 MG: at 09:37

## 2019-12-02 RX ADMIN — ISODIUM CHLORIDE 3 ML: 0.03 SOLUTION RESPIRATORY (INHALATION) at 08:47

## 2019-12-02 RX ADMIN — HYDROXYZINE HYDROCHLORIDE 25 MG: 25 TABLET ORAL at 07:26

## 2019-12-02 RX ADMIN — LEVALBUTEROL HYDROCHLORIDE 1.25 MG: 1.25 SOLUTION, CONCENTRATE RESPIRATORY (INHALATION) at 20:15

## 2019-12-02 RX ADMIN — PANTOPRAZOLE SODIUM 40 MG: 40 TABLET, DELAYED RELEASE ORAL at 09:22

## 2019-12-02 NOTE — SOCIAL WORK
Visit with patient in her hospital room to initiate discharge planning  Patient ambulated to bathroom without assistance and walker with steady gait  Patient lives in a 2 story home but stays on 1st floor  Denies any problem getting in home  Lives with son who is there most of the time  Has 2 daughters that are active in her life  Denies need for any services on discharge and daughter will transport her home  Uses mostly mail order for meds and denies problem obtaining them  CM reviewed d/c planning process including the following: identifying help at home, patient preference for d/c planning needs, availability of treatment team to discuss questions or concerns patient and/or family may have regarding understanding medications and recognizing signs and symptoms once discharged  CM also encouraged patient to follow up with all recommended appointments after discharge  Patient advised of importance for patient and family to participate in managing patients medical well being

## 2019-12-02 NOTE — PROGRESS NOTES
Progress Note - Devin Jeffries 1935, 81 y o  female MRN: [id number]    Unit/Bed#: -02 Encounter: [id number]    Primary Care Provider: Anni Cerrato MD   Date and time admitted to hospital: 2019  8:07 AM        RSV bronchitis  Assessment & Plan  · Numerous family members with similar complaints  · Supportive care    Chronic diastolic heart failure ([address] )  Assessment & Plan  Wt Readings from Last 3 Encounters:   19 60 1 kg (132 lb 7 9 oz)   10/18/19 62 6 kg (138 lb)   19 65 2 kg (143 lb 12 8 oz)     In no acute exacerbation   Appears euvolemic  Continue Lasix for now        Chronic kidney disease, stage 2 (mild)  Assessment & Plan  · With baseline creatinine between 0 7-0 8 mg/dL   · Currently at baseline   · Will monitor renal function closely   · Avoid nephrotoxins     * Atrial fibrillation with RVR (HCC)  Assessment & Plan  · Heart rate controlled under current regimen  · Appreciate Cardiology input  · Continue apixaban 2 5 mg twice a day for anticoagulation purposes  · Likely triggered by acute RSV infection        VTE Pharmacologic Prophylaxis: Pharmacologic: Apixaban (Eliquis)    Patient Centered Rounds: I have performed bedside rounds with nursing staff today  Discussions with Specialists or Other Care Team Provider: cardiology  Education and Discussions with Family / Patient: patient    Current Length of Stay: 2 day(s)    Current Patient Status: Inpatient   Certification Statement: The patient will continue to require additional inpatient hospital stay due to rsv bronchitis    Discharge Plan: likely home tomorrow    Code Status: Level 1 - Full Code    Subjective:   Patient seen examined  No acute events overnight    Feels somewhat improved today    Objective:     Vitals:   Temp (24hrs), Av 8 °F (36 6 °C), Min:96 9 °F (36 1 °C), Max:98 5 °F (36 9 °C)    Temp:  [96 9 °F (36 1 °C)-98 5 °F (36 9 °C)] 98 5 °F (36 9 °C)  HR:  [55-94] 94  Resp:  [18-25] 18  BP: (108-138)/(55-80) 122/68  SpO2:  [90 %-100 %] 94 %  Body mass index is 26 76 kg/m²  Input and Output Summary (last 24 hours): Intake/Output Summary (Last 24 hours) at 12/2/2019 1312  Last data filed at 12/2/2019 1121  Gross per 24 hour   Intake 1200 ml   Output 600 ml   Net 600 ml       Physical Exam:     Physical Exam   Constitutional: She is oriented to person, place, and time  She appears well-developed and well-nourished  HENT:   Head: Normocephalic and atraumatic  Eyes: Pupils are equal, round, and reactive to light  EOM are normal    Neck: Normal range of motion  Neck supple  Cardiovascular: Normal rate  Irregularly irregular   Pulmonary/Chest: Effort normal  No respiratory distress  She has no wheezes  Coarse breath sounds noted bilaterally   Abdominal: Soft  Bowel sounds are normal    Musculoskeletal: Normal range of motion  She exhibits no edema  Neurological: She is alert and oriented to person, place, and time  Skin: Skin is warm  Capillary refill takes less than 2 seconds  Psychiatric: She has a normal mood and affect  Her behavior is normal  Thought content normal    Nursing note and vitals reviewed  Additional Data:     Labs:    Results from last 7 days   Lab Units 12/02/19  0504   WBC Thousand/uL 8 10   HEMOGLOBIN g/dL 11 8*   HEMATOCRIT % 33 3*   PLATELETS Thousands/uL 218   NEUTROS PCT % 80*   LYMPHS PCT % 9*   MONOS PCT % 8   EOS PCT % 2     Results from last 7 days   Lab Units 12/02/19  0504   POTASSIUM mmol/L 3 3*   CHLORIDE mmol/L 95*   CO2 mmol/L 25   BUN mg/dL 12   CREATININE mg/dL 0 69   CALCIUM mg/dL 8 6   ALK PHOS U/L 60   ALT U/L 7   AST U/L 16     Results from last 7 days   Lab Units 11/30/19  0844   INR  1 21               * I Have Reviewed All Lab Data Listed Above  * Additional Pertinent Lab Tests Reviewed:  [address] Admission  Reviewed    Imaging:  Imaging Reports Reviewed Today Include: n/a    Recent Cultures (last 7 days): Results from last 7 days   Lab Units 12/01/19  1346 11/30/19  0857 11/30/19  0844   BLOOD CULTURE   --  No Growth at 24 hrs  No Growth at 24 hrs  GRAM STAIN RESULT  No polys seen*  2+ Gram positive cocci in clusters*  1+ Gram negative rods*  --   --        Last 24 Hours Medication List:     Current Facility-Administered Medications:  acetaminophen 650 mg Oral Q6H PRN Pelon Dozier MD   ALPRAZolam 0 25 mg Oral HS PRN Pelon Dozier MD   apixaban 2 5 mg Oral BID Pelon Dozier MD   diltiazem 90 mg Oral [facility] Pelon Dozier MD   fluticasone 1 spray Each Nare BID Pelon Dozier MD   furosemide 40 mg Intravenous Daily Pelon Dozier MD   guaiFENesin 600 mg Oral Q12H 2020 [place] Dave, MD   guaiFENesin 200 mg Oral Q4H PRN Pelon Dozier MD   hydrocortisone  Topical BID Pelon Dozier MD   hydrOXYzine HCL 25 mg Oral Q6H Pelon Dozier MD   levalbuterol 1 25 mg Nebulization TID Pelon Dozier MD   magnesium oxide 400 mg Oral Once per day on Mon Wed Fri Pelon Dozier MD   metoprolol tartrate 25 mg Oral TID Pelon Dozier MD   ondansetron 4 mg Intravenous Q6H PRN Pelon Dozier MD   oxybutynin 5 mg Oral Daily Pelon Dozier MD   pantoprazole 40 mg Oral Daily Pelon Dozier MD   potassium chloride 40 mEq Oral Once Delia Blair MD   pravastatin 40 mg Oral Daily With [address] Traylor MD   sertraline 50 mg Oral Daily Pelon Dozier MD   sodium chloride 3 mL Nebulization TID Pelon Dozier MD        Today, Patient Was Seen By: Delia Blair MD    ** Please Note: Dictation voice to text software may have been used in the creation of this document   **

## 2019-12-02 NOTE — PLAN OF CARE
Problem: PHYSICAL THERAPY ADULT  Goal: Performs mobility at highest level of function for planned discharge setting  See evaluation for individualized goals  Description  Treatment/Interventions: Functional transfer training, LE strengthening/ROM, Elevations, Therapeutic exercise, Endurance training, Bed mobility, Gait training  Equipment Recommended: Ricardo Ortiz       See flowsheet documentation for full assessment, interventions and recommendations  Outcome: Progressing  Note:   Prognosis: Good  Problem List: Decreased strength, Decreased endurance, Impaired balance, Decreased mobility  Assessment: Pt seen for PT treatment session this date with interventions consisting of gait training w/ emphasis on improving pt's ability to ambulate level surfaces x 12ftx2 with min A provided by therapist with RW and Therapeutic exercise consisting of: AROM 15 reps B LE in sitting position  Pt agreeable to PT treatment session upon arrival, pt found seated OOB in chair, in no apparent distress, A&O x 4 and responsive  In comparison to previous session, pt with improvements in distance ambulated  Post session: chair alarm engaged and all needs in reach Continue to recommend STR vs  Home PT at time of d/c in order to maximize pt's functional independence and safety w/ mobility  Pt continues to be functioning below baseline level, and remains limited 2* factors listed above and including decreased functional activity tolerance, decreased strength, decreased balance and decreased functional activity tolerance  PT will continue to see pt while here in order to address the deficits listed above and provide interventions consistent w/ POC in effort to achieve STGs  Recommendation: Short-term skilled PT(vs home PT pending progress)     PT - OK to Discharge: Yes(when medically stable)    See flowsheet documentation for full assessment

## 2019-12-02 NOTE — RESPIRATORY THERAPY NOTE
Home Oxygen Qualifying Test       Patient name: Randy Lin        : 1935   Date of Test:  2019  Diagnosis:      Home Oxygen Test:    **Medicare Guidelines require item(s) 1-5 on all ambulatory patients or 1 and 2 on non-ambulatory patients  1   Baseline SPO2 on Room Air at rest 87  %  2   SPO2 during exercise on Room Air 84 %  During exercise monitor SpO2  If SPO2 increases >=89% with ambulation do not add supplemental             oxygen  If <= 88% on room air add O2 via NC and titrate patient  Patient must be ambulated with O2 and titrated to > 88% with exertion  3   SPO2 on Oxygen at rest 88 % 1 lpm        Oxygen increased to 2 liters and Sao2 = 90 % at rest    4  SPO2 during exercise on Oxygen  94 %  a liter flow of  2 lpm     5   Exercise performed:          walking, duration 10 (min), distance 50 (feet)          [x]  Supplemental Home Oxygen is indicated  []  Client does not qualify for home oxygen  Respiratory Additional Notes- Patient  Remains on 2 liters O2  Patient was ambulated with a walker and tolerated walk well    Patient RN Sam Cid and Mely Sepulveda  NP nahun Soares, RT

## 2019-12-02 NOTE — PLAN OF CARE
Problem: CARDIOVASCULAR - ADULT  Goal: Maintains optimal cardiac output and hemodynamic stability  Description  INTERVENTIONS:  - Monitor I/O, vital signs and rhythm  - Monitor for S/S and trends of decreased cardiac output  - Administer and titrate ordered vasoactive medications to optimize hemodynamic stability  - Assess quality of pulses, skin color and temperature  - Assess for signs of decreased coronary artery perfusion  - Instruct patient to report change in severity of symptoms  Outcome: Progressing  Goal: Absence of cardiac dysrhythmias or at baseline rhythm  Description  INTERVENTIONS:  - Continuous cardiac monitoring, vital signs, obtain 12 lead EKG if ordered  - Administer antiarrhythmic and heart rate control medications as ordered  - Monitor electrolytes and administer replacement therapy as ordered  Outcome: Progressing     Problem: RESPIRATORY - ADULT  Goal: Achieves optimal ventilation and oxygenation  Description  INTERVENTIONS:  - Assess for changes in respiratory status  - Assess for changes in mentation and behavior  - Position to facilitate oxygenation and minimize respiratory effort  - Oxygen administered by appropriate delivery if ordered  - Initiate smoking cessation education as indicated  - Encourage broncho-pulmonary hygiene including cough, deep breathe, Incentive Spirometry  - Assess the need for suctioning and aspirate as needed  - Assess and instruct to report SOB or any respiratory difficulty  - Respiratory Therapy support as indicated  Outcome: Progressing     Problem: METABOLIC, FLUID AND ELECTROLYTES - ADULT  Goal: Electrolytes maintained within normal limits  Description  INTERVENTIONS:  - Monitor labs and assess patient for signs and symptoms of electrolyte imbalances  - Administer electrolyte replacement as ordered  - Monitor response to electrolyte replacements, including repeat lab results as appropriate  - Instruct patient on fluid and nutrition as appropriate  Outcome: Progressing  Goal: Fluid balance maintained  Description  INTERVENTIONS:  - Monitor labs   - Monitor I/O and WT  - Instruct patient on fluid and nutrition as appropriate  - Assess for signs & symptoms of volume excess or deficit  Outcome: Progressing     Problem: MUSCULOSKELETAL - ADULT  Goal: Maintain or return mobility to safest level of function  Description  INTERVENTIONS:  - Assess patient's ability to carry out ADLs; assess patient's baseline for ADL function and identify physical deficits which impact ability to perform ADLs (bathing, care of mouth/teeth, toileting, grooming, dressing, etc )  - Assess/evaluate cause of self-care deficits   - Assess range of motion  - Assess patient's mobility  - Assess patient's need for assistive devices and provide as appropriate  - Encourage maximum independence but intervene and supervise when necessary  - Involve family in performance of ADLs  - Assess for home care needs following discharge   - Consider OT consult to assist with ADL evaluation and planning for discharge  - Provide patient education as appropriate  Outcome: Progressing     Problem: PAIN - ADULT  Goal: Verbalizes/displays adequate comfort level or baseline comfort level  Description  Interventions:  - Encourage patient to monitor pain and request assistance  - Assess pain using appropriate pain scale  - Administer analgesics based on type and severity of pain and evaluate response  - Implement non-pharmacological measures as appropriate and evaluate response  - Consider cultural and social influences on pain and pain management  - Notify physician/advanced practitioner if interventions unsuccessful or patient reports new pain  Outcome: Progressing     Problem: INFECTION - ADULT  Goal: Absence or prevention of progression during hospitalization  Description  INTERVENTIONS:  - Assess and monitor for signs and symptoms of infection  - Monitor lab/diagnostic results  - Monitor all insertion sites, i e  indwelling lines, tubes, and drains  - Monitor endotracheal if appropriate and nasal secretions for changes in amount and color  - [place] appropriate cooling/warming therapies per order  - Administer medications as ordered  - Instruct and encourage patient and family to use good hand hygiene technique  - Identify and instruct in appropriate isolation precautions for identified infection/condition  Outcome: Progressing     Problem: SAFETY ADULT  Goal: Patient will remain free of falls  Description  INTERVENTIONS:  - Assess patient frequently for physical needs  -  Identify cognitive and physical deficits and behaviors that affect risk of falls  -  [place] fall precautions as indicated by assessment   - Educate patient/family on patient safety including physical limitations  - Instruct patient to call for assistance with activity based on assessment  - Modify environment to reduce risk of injury  - Consider OT/PT consult to assist with strengthening/mobility  Outcome: Progressing     Problem: DISCHARGE PLANNING  Goal: Discharge to home or other facility with appropriate resources  Description  INTERVENTIONS:  - Identify barriers to discharge w/patient and caregiver  - Arrange for needed discharge resources and transportation as appropriate  - Identify discharge learning needs (meds, wound care, etc )  - Arrange for interpretive services to assist at discharge as needed  - Refer to Case Management Department for coordinating discharge planning if the patient needs post-hospital services based on physician/advanced practitioner order or complex needs related to functional status, cognitive ability, or social support system  Outcome: Progressing     Problem: Knowledge Deficit  Goal: Patient/family/caregiver demonstrates understanding of disease process, treatment plan, medications, and discharge instructions  Description  Complete learning assessment and assess knowledge base    Interventions:  - Provide teaching at level of understanding  - Provide teaching via preferred learning methods  Outcome: Progressing     Problem: Prexisting or High Potential for Compromised Skin Integrity  Goal: Skin integrity is maintained or improved  Description  INTERVENTIONS:  - Identify patients at risk for skin breakdown  - Assess and monitor skin integrity  - Assess and monitor nutrition and hydration status  - Monitor labs   - Assess for incontinence   - Turn and reposition patient  - Assist with mobility/ambulation  - Relieve pressure over bony prominences  - Avoid friction and shearing  - Provide appropriate hygiene as needed including keeping skin clean and dry  - Evaluate need for skin moisturizer/barrier cream  - Collaborate with interdisciplinary team   - Patient/family teaching  - Consider wound care consult   Outcome: Progressing     Problem: Nutrition/Hydration-ADULT  Goal: Nutrient/Hydration intake appropriate for improving, restoring or maintaining nutritional needs  Description  Monitor and assess patient's nutrition/hydration status for malnutrition  Collaborate with interdisciplinary team and initiate plan and interventions as ordered  Monitor patient's weight and dietary intake as ordered or per policy  Utilize nutrition screening tool and intervene as necessary  Determine patient's food preferences and provide high-protein, high-caloric foods as appropriate       INTERVENTIONS:  - Monitor oral intake, urinary output, labs, and treatment plans  - Assess nutrition and hydration status and recommend course of action  - Evaluate amount of meals eaten  - Assist patient with eating if necessary   - Allow adequate time for meals  - Recommend/ encourage appropriate diets, oral nutritional supplements, and vitamin/mineral supplements  - Order, calculate, and assess calorie counts as needed  - Recommend, monitor, and adjust tube feedings and TPN/PPN based on assessed needs  - Assess need for intravenous fluids  - Provide specific nutrition/hydration education as appropriate  - Include patient/family/caregiver in decisions related to nutrition  Outcome: Progressing     Problem: Potential for Falls  Goal: Patient will remain free of falls  Description  INTERVENTIONS:  - Assess patient frequently for physical needs  -  Identify cognitive and physical deficits and behaviors that affect risk of falls    -  [place] fall precautions as indicated by assessment   - Educate patient/family on patient safety including physical limitations  - Instruct patient to call for assistance with activity based on assessment  - Modify environment to reduce risk of injury  - Consider OT/PT consult to assist with strengthening/mobility  Outcome: Progressing

## 2019-12-02 NOTE — PHYSICAL THERAPY NOTE
12/02/19 1028   Pain Assessment   Pain Assessment No/denies pain   Pain Score No Pain   Restrictions/Precautions   Other Precautions Droplet precautions; Telemetry;O2;Fall Risk   General   Chart Reviewed Yes   Response to Previous Treatment Patient with no complaints from previous session  Family/Caregiver Present No   Cognition   Overall Cognitive Status WFL   Arousal/Participation Alert; Responsive; Cooperative   Attention Within functional limits   Orientation Level Oriented X4   Memory Within functional limits   Following Commands Follows one step commands without difficulty   Comments Pt agreeable to PT session   Bed Mobility   Additional Comments Pt was recieved sitting OOB in chair   Transfers   Sit to Stand 4  Minimal assistance   Additional items Assist x 1; Armrests; Increased time required;Verbal cues   Stand to Sit 4  Minimal assistance   Additional items Assist x 1; Armrests; Verbal cues; Increased time required   Ambulation/Elevation   Gait pattern Decreased foot clearance; Improper Weight shift; Short stride   Gait Assistance 4  Minimal assist   Additional items Assist x 1;Verbal cues   Assistive Device Rolling walker   Distance 12 ftx2   Stair Management Assistance Not tested   Balance   Static Sitting Good   Dynamic Sitting Fair +   Static Standing Fair   Dynamic Standing Fair -   Ambulatory Fair -   Endurance Deficit   Endurance Deficit Yes   Endurance Deficit Description fatigue with exertion   Activity Tolerance   Activity Tolerance Patient limited by fatigue   Exercises   Quad Sets Sitting;15 reps;AROM; Bilateral   Heelslides Sitting;15 reps;AROM; Bilateral   Glute Sets Sitting;15 reps;AROM   Hip Flexion Sitting;15 reps;AROM; Bilateral   Hip Abduction Sitting;15 reps;AROM; Bilateral   Hip Adduction Sitting;15 reps;AROM; Bilateral   Knee AROM Long Arc Quad Sitting;15 reps;AROM; Bilateral   Ankle Pumps Sitting;15 reps;AROM; Bilateral   Marching Sitting;15 reps;AROM; Bilateral   Assessment   Prognosis Good Problem List Decreased strength;Decreased endurance; Impaired balance;Decreased mobility   Assessment Pt seen for PT treatment session this date with interventions consisting of gait training w/ emphasis on improving pt's ability to ambulate level surfaces x 12ftx2 with min A provided by therapist with RW and Therapeutic exercise consisting of: AROM 15 reps B LE in sitting position  Pt agreeable to PT treatment session upon arrival, pt found seated OOB in chair, in no apparent distress, A&O x 4 and responsive  In comparison to previous session, pt with improvements in distance ambulated  Post session: chair alarm engaged and all needs in reach Continue to recommend STR vs  Home PT at time of d/c in order to maximize pt's functional independence and safety w/ mobility  Pt continues to be functioning below baseline level, and remains limited 2* factors listed above and including decreased functional activity tolerance, decreased strength, decreased balance and decreased functional activity tolerance  PT will continue to see pt while here in order to address the deficits listed above and provide interventions consistent w/ POC in effort to achieve STGs  Goals   PT Treatment Day 1   Plan   Treatment/Interventions Functional transfer training;LE strengthening/ROM; Therapeutic exercise; Endurance training;Bed mobility;Gait training   Progress Slow progress, medical status limitations   PT Frequency Other (Comment)  (3-5x/wk)   Recommendation   Recommendation Short-term skilled PT  (vs home PT pending progress)   Equipment Recommended Walker   PT - OK to Discharge Yes  (when medically stable)   Keisha Meredith, PTA

## 2019-12-02 NOTE — NURSING NOTE
Pt is alert and oriented, pleasant and cooperative  Heart is afib on telemetry, trace edema noted  Lungs decreased, rhonchi throughout, on 2L, no home oxygen  Moist productive cough, moderate amount of green sputum  Pt states she does feel better  Denies any pain  Hourly rounding discussed, no needs at this time  Call bell within reach

## 2019-12-02 NOTE — ASSESSMENT & PLAN NOTE
Wt Readings from Last 3 Encounters:   12/02/19 60 1 kg (132 lb 7 9 oz)   10/18/19 62 6 kg (138 lb)   09/04/19 65 2 kg (143 lb 12 8 oz)     In no acute exacerbation   Appears euvolemic  Continue Lasix for now

## 2019-12-02 NOTE — ASSESSMENT & PLAN NOTE
· Heart rate controlled under current regimen  · Appreciate Cardiology input  · Continue apixaban 2 5 mg twice a day for anticoagulation purposes  · Likely triggered by acute RSV infection

## 2019-12-03 PROBLEM — J96.01 ACUTE RESPIRATORY FAILURE WITH HYPOXIA (HCC): Status: ACTIVE | Noted: 2019-12-03

## 2019-12-03 LAB
BACTERIA SPT RESP CULT: ABNORMAL
BACTERIA SPT RESP CULT: ABNORMAL
GRAM STN SPEC: ABNORMAL

## 2019-12-03 PROCEDURE — 99232 SBSQ HOSP IP/OBS MODERATE 35: CPT | Performed by: INTERNAL MEDICINE

## 2019-12-03 PROCEDURE — 97110 THERAPEUTIC EXERCISES: CPT

## 2019-12-03 PROCEDURE — 97116 GAIT TRAINING THERAPY: CPT

## 2019-12-03 PROCEDURE — 94640 AIRWAY INHALATION TREATMENT: CPT

## 2019-12-03 PROCEDURE — 94760 N-INVAS EAR/PLS OXIMETRY 1: CPT

## 2019-12-03 RX ORDER — FUROSEMIDE 40 MG/1
40 TABLET ORAL DAILY
Status: DISCONTINUED | OUTPATIENT
Start: 2019-12-04 | End: 2019-12-04 | Stop reason: HOSPADM

## 2019-12-03 RX ADMIN — ISODIUM CHLORIDE 3 ML: 0.03 SOLUTION RESPIRATORY (INHALATION) at 13:41

## 2019-12-03 RX ADMIN — HYDROXYZINE HYDROCHLORIDE 25 MG: 25 TABLET ORAL at 14:36

## 2019-12-03 RX ADMIN — METOPROLOL TARTRATE 25 MG: 25 TABLET, FILM COATED ORAL at 20:57

## 2019-12-03 RX ADMIN — PANTOPRAZOLE SODIUM 40 MG: 40 TABLET, DELAYED RELEASE ORAL at 08:45

## 2019-12-03 RX ADMIN — FUROSEMIDE 40 MG: 10 INJECTION, SOLUTION INTRAMUSCULAR; INTRAVENOUS at 08:45

## 2019-12-03 RX ADMIN — HYDROXYZINE HYDROCHLORIDE 25 MG: 25 TABLET ORAL at 20:57

## 2019-12-03 RX ADMIN — HYDROCORTISONE: 10 CREAM TOPICAL at 08:45

## 2019-12-03 RX ADMIN — FLUTICASONE PROPIONATE 1 SPRAY: 50 SPRAY, METERED NASAL at 08:46

## 2019-12-03 RX ADMIN — LEVALBUTEROL HYDROCHLORIDE 1.25 MG: 1.25 SOLUTION, CONCENTRATE RESPIRATORY (INHALATION) at 07:37

## 2019-12-03 RX ADMIN — LEVALBUTEROL HYDROCHLORIDE 1.25 MG: 1.25 SOLUTION, CONCENTRATE RESPIRATORY (INHALATION) at 20:18

## 2019-12-03 RX ADMIN — DILTIAZEM HYDROCHLORIDE 90 MG: 60 TABLET, FILM COATED ORAL at 22:14

## 2019-12-03 RX ADMIN — PRAVASTATIN SODIUM 40 MG: 40 TABLET ORAL at 17:37

## 2019-12-03 RX ADMIN — HYDROXYZINE HYDROCHLORIDE 25 MG: 25 TABLET ORAL at 08:56

## 2019-12-03 RX ADMIN — GUAIFENESIN 600 MG: 600 TABLET, EXTENDED RELEASE ORAL at 20:57

## 2019-12-03 RX ADMIN — ISODIUM CHLORIDE 3 ML: 0.03 SOLUTION RESPIRATORY (INHALATION) at 07:37

## 2019-12-03 RX ADMIN — GUAIFENESIN 600 MG: 600 TABLET, EXTENDED RELEASE ORAL at 08:45

## 2019-12-03 RX ADMIN — OXYBUTYNIN CHLORIDE 5 MG: 5 TABLET ORAL at 08:45

## 2019-12-03 RX ADMIN — LEVALBUTEROL HYDROCHLORIDE 1.25 MG: 1.25 SOLUTION, CONCENTRATE RESPIRATORY (INHALATION) at 13:41

## 2019-12-03 RX ADMIN — FLUTICASONE PROPIONATE 1 SPRAY: 50 SPRAY, METERED NASAL at 17:40

## 2019-12-03 RX ADMIN — ISODIUM CHLORIDE 3 ML: 0.03 SOLUTION RESPIRATORY (INHALATION) at 20:18

## 2019-12-03 RX ADMIN — METOPROLOL TARTRATE 25 MG: 25 TABLET, FILM COATED ORAL at 08:45

## 2019-12-03 RX ADMIN — SERTRALINE HYDROCHLORIDE 50 MG: 50 TABLET ORAL at 08:45

## 2019-12-03 RX ADMIN — HYDROXYZINE HYDROCHLORIDE 25 MG: 25 TABLET ORAL at 01:13

## 2019-12-03 RX ADMIN — APIXABAN 2.5 MG: 2.5 TABLET, FILM COATED ORAL at 08:45

## 2019-12-03 RX ADMIN — DILTIAZEM HYDROCHLORIDE 90 MG: 60 TABLET, FILM COATED ORAL at 05:04

## 2019-12-03 RX ADMIN — HYDROCORTISONE: 10 CREAM TOPICAL at 17:40

## 2019-12-03 RX ADMIN — APIXABAN 2.5 MG: 2.5 TABLET, FILM COATED ORAL at 17:37

## 2019-12-03 RX ADMIN — DILTIAZEM HYDROCHLORIDE 90 MG: 60 TABLET, FILM COATED ORAL at 14:39

## 2019-12-03 NOTE — ASSESSMENT & PLAN NOTE
· Secondary to RSV bronchitis  · Qualifies for home oxygen  · Will discuss with case management about making appropriate arrangements

## 2019-12-03 NOTE — ASSESSMENT & PLAN NOTE
Wt Readings from Last 3 Encounters:   12/03/19 60 7 kg (133 lb 13 1 oz)   10/18/19 62 6 kg (138 lb)   09/04/19 65 2 kg (143 lb 12 8 oz)     In no acute exacerbation   Appears euvolemic  Continue Lasix for now

## 2019-12-03 NOTE — PROGRESS NOTES
Progress Note - Analia Becerra 1935, 81 y o  female MRN: [id number]    Unit/Bed#: -02 Encounter: [id number]    Primary Care Provider: Heidy Marquis MD   Date and time admitted to hospital: 11/30/2019  8:07 AM        Acute respiratory failure with hypoxia [facility])  Assessment & Plan  · Secondary to RSV bronchitis  · Qualifies for home oxygen  · Will discuss with case management about making appropriate arrangements    RSV bronchitis  Assessment & Plan  · Numerous family members with similar complaints  · Supportive care    Chronic diastolic heart failure ([address] )  Assessment & Plan  Wt Readings from Last 3 Encounters:   12/03/19 60 7 kg (133 lb 13 1 oz)   10/18/19 62 6 kg (138 lb)   09/04/19 65 2 kg (143 lb 12 8 oz)     In no acute exacerbation   Appears euvolemic  Continue Lasix for now        Chronic kidney disease, stage 2 (mild)  Assessment & Plan  · With baseline creatinine between 0 7-0 8 mg/dL   · Currently at baseline   · Will monitor renal function closely   · Avoid nephrotoxins     * Atrial fibrillation with RVR (HCC)  Assessment & Plan  · Heart rate controlled under current regimen  · Appreciate Cardiology input  · Continue apixaban 2 5 mg twice a day for anticoagulation purposes  · Likely triggered by acute RSV infection        VTE Pharmacologic Prophylaxis: Pharmacologic: Apixaban (Eliquis)    Patient Centered Rounds: I have performed bedside rounds with nursing staff today  Discussions with Specialists or Other Care Team Provider: n/a  Education and Discussions with Family / Patient: patient    Current Length of Stay: 3 day(s)    Current Patient Status: Inpatient   Certification Statement: The patient will continue to require additional inpatient hospital stay due to RSV bronchitis    Discharge Plan:  Likely home tomorrow    Code Status: Level 1 - Full Code    Subjective:   Patient seen examined  No acute events overnight  Still feeling weak      Objective:     Vitals:   Temp (24hrs), Av 2 °F (36 8 °C), Min:97 5 °F (36 4 °C), Max:98 5 °F (36 9 °C)    Temp:  [97 5 °F (36 4 °C)-98 5 °F (36 9 °C)] 97 5 °F (36 4 °C)  HR:  [] 100  Resp:  [18-20] 20  BP: (108-120)/(62-82) 115/76  SpO2:  [93 %-97 %] 96 %  Body mass index is 27 03 kg/m²  Input and Output Summary (last 24 hours): Intake/Output Summary (Last 24 hours) at 12/3/2019 1136  Last data filed at 2019 1736  Gross per 24 hour   Intake 300 ml   Output    Net 300 ml       Physical Exam:     Physical Exam   Constitutional: She is oriented to person, place, and time  She appears well-developed and well-nourished  HENT:   Head: Normocephalic and atraumatic  Eyes: Pupils are equal, round, and reactive to light  EOM are normal    Neck: Normal range of motion  Neck supple  Cardiovascular: Normal rate  Irregularly irregular   Pulmonary/Chest: Effort normal and breath sounds normal    Coarse breath sounds noted bilaterally   Abdominal: Soft  Bowel sounds are normal    Musculoskeletal: Normal range of motion  She exhibits no edema  Neurological: She is alert and oriented to person, place, and time  Skin: Skin is warm  Capillary refill takes less than 2 seconds  Psychiatric: She has a normal mood and affect  Her behavior is normal  Thought content normal    Nursing note and vitals reviewed  Additional Data:     Labs:    Results from last 7 days   Lab Units 19  0504   WBC Thousand/uL 8 10   HEMOGLOBIN g/dL 11 8*   HEMATOCRIT % 33 3*   PLATELETS Thousands/uL 218   NEUTROS PCT % 80*   LYMPHS PCT % 9*   MONOS PCT % 8   EOS PCT % 2     Results from last 7 days   Lab Units 19  0504   POTASSIUM mmol/L 3 3*   CHLORIDE mmol/L 95*   CO2 mmol/L 25   BUN mg/dL 12   CREATININE mg/dL 0 69   CALCIUM mg/dL 8 6   ALK PHOS U/L 60   ALT U/L 7   AST U/L 16     Results from last 7 days   Lab Units 19  0844   INR  1 21               * I Have Reviewed All Lab Data Listed Above    * Additional Pertinent Lab Tests Reviewed: Alex 66 Admission  Reviewed    Imaging:  Imaging Reports Reviewed Today Include: n/a    Recent Cultures (last 7 days):     Results from last 7 days   Lab Units 12/01/19  1346 11/30/19  0857 11/30/19  0844   BLOOD CULTURE   --  No Growth at 48 hrs  No Growth at 48 hrs  SPUTUM CULTURE  3+ Growth of   Commensal respiratory corry only; No significant growth of Staph aureus/MRSA or Pseudomonas aeruginosa  --   --    GRAM STAIN RESULT  No polys seen*  2+ Gram positive cocci in clusters*  1+ Gram negative rods*  --   --        Last 24 Hours Medication List:     Current Facility-Administered Medications:  acetaminophen 650 mg Oral Q6H PRN Henny Salazar MD   ALPRAZolam 0 25 mg Oral HS PRN Henny Salazar MD   apixaban 2 5 mg Oral BID Henny Salazar MD   diltiazem 90 mg Oral [facility] Henny Salazar MD   fluticasone 1 spray Each Nare BID Henny Salazar MD   [START ON 12/4/2019] furosemide 40 mg Oral Daily Zamzam Cr MD   guaiFENesin 600 mg Oral Q12H 2020 [place] Dave, MD   guaiFENesin 200 mg Oral Q4H PRN Henny Salazar MD   hydrocortisone  Topical BID Henny Salazar MD   hydrOXYzine HCL 25 mg Oral Q6H Henny Salazar MD   levalbuterol 1 25 mg Nebulization TID Henny Salazar MD   magnesium oxide 400 mg Oral Once per day on Mon Wed Fri eHnny Salazar MD   metoprolol tartrate 25 mg Oral TID Henny Salazar MD   ondansetron 4 mg Intravenous Q6H PRN Henny Salazar MD   oxybutynin 5 mg Oral Daily Henny Salazar MD   pantoprazole 40 mg Oral Daily Henny Salazar MD   pravastatin 40 mg Oral Daily With [address] [facility] Ana Maria Traylor MD   sertraline 50 mg Oral Daily Henny Salazar MD   sodium chloride 3 mL Nebulization TID Henny Salazar MD        Today, Patient Was Seen By: Zamzam Cr MD    ** Please Note: Dictation voice to text software may have been used in the creation of this document  **

## 2019-12-03 NOTE — SOCIAL WORK
Pt was accepted by [facility], information and rx was faxed to [facility] at 15:20, I received a call from [facility] stating she received everything and they tera reach out to the family with the time of the delivery, cm will continue to follow , pt is decling f rehab, pt is agreeable to [facility], pt lives with her son, I dis call Alva Bryant at 14:45 to make her aware of the tentative d/c and need for oxygen, she stated the pt does have a walker at home,Imade her aware the the pt declined snf rehab, cm will continue to follow, d/c plan was discussed at [address] [place] [address],[address] coordination rounds today

## 2019-12-03 NOTE — PHYSICAL THERAPY NOTE
12/03/19 1325   Pain Assessment   Pain Assessment No/denies pain   Pain Score No Pain   Restrictions/Precautions   Weight Bearing Precautions Per Order No   Other Precautions Droplet precautions;Multiple lines;O2;Fall Risk   General   Chart Reviewed Yes   Response to Previous Treatment Patient with no complaints from previous session  Family/Caregiver Present No   Cognition   Overall Cognitive Status WFL   Arousal/Participation Alert; Cooperative   Attention Within functional limits   Orientation Level Oriented X4   Memory Within functional limits   Following Commands Follows all commands and directions without difficulty   Transfers   Sit to Stand   (CGA)   Additional items Assist x 1; Armrests; Increased time required;Verbal cues   Stand to Sit   (CGA)   Additional items Assist x 1; Armrests; Increased time required;Verbal cues   Ambulation/Elevation   Gait pattern Decreased foot clearance; Improper Weight shift; Short stride   Gait Assistance   (CGA)   Additional items Assist x 1;Verbal cues; Tactile cues   Assistive Device Rolling walker   Distance 40 ft   Balance   Static Sitting Good   Dynamic Sitting Fair +   Static Standing Fair   Dynamic Standing Fair -   Ambulatory Fair -   Endurance Deficit   Endurance Deficit Yes   Activity Tolerance   Activity Tolerance Patient limited by fatigue   Exercises   Quad Sets Sitting;25 reps;AROM; Bilateral   Glute Sets Sitting;25 reps;AROM; Bilateral   Hip Flexion Sitting;25 reps;AROM; Bilateral   Hip Abduction Sitting;25 reps;AROM; Bilateral   Hip Adduction Sitting;25 reps;AROM; Bilateral   Knee AROM Long Arc Quad Sitting;25 reps;AROM; Bilateral   Ankle Pumps Sitting;25 reps;AROM; Bilateral   Assessment   Prognosis Good   Problem List Decreased strength;Decreased endurance; Impaired balance;Decreased mobility   Assessment Pt  found resting OOB in stationary chair willing to partake in PT treatment    Pt  transfered from sit to stand with CGAX1 and Ambulated CGAX1 with RW 40 ft with occational cues for safety and direction  Pt  was then assisted back to chair where she performed 25 reps of active ther ex as per flow sheet requiring occational restperiods due to fatigue  Pt  tolerated treatment well and denied any pain throughout treatment  Pt  would benefit from STR to help improve her functional mobility and activity tolerance to be able to return to her [facility]  Pt  was positioed for comfor and left with all needs in reach with SCD's re-applied  Continue current POC and progress as tolerated  Goals   Patient Goals to get better   PT Treatment Day 2   Plan   Treatment/Interventions Functional transfer training;LE strengthening/ROM; Therapeutic exercise; Endurance training;Patient/family training;Bed mobility;Gait training   Progress Slow progress, medical status limitations   PT Frequency Other (Comment)  (3-5x/wk)   Recommendation   Recommendation Short-term skilled PT   Equipment Recommended Walker   PT - OK to Discharge Yes   Additional Comments when medically stable   Parker Borjas, PTA

## 2019-12-03 NOTE — PLAN OF CARE
Problem: PHYSICAL THERAPY ADULT  Goal: Performs mobility at highest level of function for planned discharge setting  See evaluation for individualized goals  Description  Treatment/Interventions: Functional transfer training, LE strengthening/ROM, Elevations, Therapeutic exercise, Endurance training, Bed mobility, Gait training  Equipment Recommended: Roshni Naidu       See flowsheet documentation for full assessment, interventions and recommendations  Outcome: Progressing  Note:   Prognosis: Good  Problem List: Decreased strength, Decreased endurance, Impaired balance, Decreased mobility  Assessment: Pt  found resting OOB in stationary chair willing to partake in PT treatment  Pt  transfered from sit to stand with CGAX1 and Ambulated CGAX1 with RW 40 ft with occational cues for safety and direction  Pt  was then assisted back to chair where she performed 25 reps of active ther ex as per flow sheet requiring occational restperiods due to fatigue  Pt  tolerated treatment well and denied any pain throughout treatment  Pt  would benefit from STR to help improve her functional mobility and activity tolerance to be able to return to her [facility]  Pt  was positioed for comfor and left with all needs in reach with SCD's re-applied  Continue current POC and progress as tolerated  Recommendation: Short-term skilled PT     PT - OK to Discharge: Yes    See flowsheet documentation for full assessment     Oneal Castillo, PTA

## 2019-12-04 VITALS
BODY MASS INDEX: 28.58 KG/M2 | OXYGEN SATURATION: 98 % | HEART RATE: 63 BPM | RESPIRATION RATE: 18 BRPM | DIASTOLIC BLOOD PRESSURE: 64 MMHG | WEIGHT: 141.76 LBS | TEMPERATURE: 96.8 F | HEIGHT: 59 IN | SYSTOLIC BLOOD PRESSURE: 120 MMHG

## 2019-12-04 DIAGNOSIS — Z71.89 COMPLEX CARE COORDINATION: Primary | ICD-10-CM

## 2019-12-04 LAB
ANION GAP SERPL CALCULATED.3IONS-SCNC: 7 MMOL/L (ref 4–13)
BUN SERPL-MCNC: 12 MG/DL (ref 7–25)
CALCIUM SERPL-MCNC: 8.5 MG/DL (ref 8.6–10.5)
CHLORIDE SERPL-SCNC: 96 MMOL/L (ref 98–107)
CO2 SERPL-SCNC: 29 MMOL/L (ref 21–31)
CREAT SERPL-MCNC: 0.62 MG/DL (ref 0.6–1.2)
ERYTHROCYTE [DISTWIDTH] IN BLOOD BY AUTOMATED COUNT: 13.9 % (ref 11.5–14.5)
GFR SERPL CREATININE-BSD FRML MDRD: 84 ML/MIN/1.73SQ M
GLUCOSE SERPL-MCNC: 89 MG/DL (ref 65–99)
HCT VFR BLD AUTO: 32.2 % (ref 42–47)
HGB BLD-MCNC: 11.4 G/DL (ref 12–16)
MCH RBC QN AUTO: 32.7 PG (ref 26–34)
MCHC RBC AUTO-ENTMCNC: 35.4 G/DL (ref 31–37)
MCV RBC AUTO: 93 FL (ref 81–99)
PLATELET # BLD AUTO: 238 THOUSANDS/UL (ref 149–390)
PMV BLD AUTO: 7.8 FL (ref 8.6–11.7)
POTASSIUM SERPL-SCNC: 3.4 MMOL/L (ref 3.5–5.5)
RBC # BLD AUTO: 3.48 MILLION/UL (ref 3.9–5.2)
SODIUM SERPL-SCNC: 132 MMOL/L (ref 134–143)
WBC # BLD AUTO: 6.6 THOUSAND/UL (ref 4.8–10.8)

## 2019-12-04 PROCEDURE — 94640 AIRWAY INHALATION TREATMENT: CPT

## 2019-12-04 PROCEDURE — 94760 N-INVAS EAR/PLS OXIMETRY 1: CPT

## 2019-12-04 PROCEDURE — 97116 GAIT TRAINING THERAPY: CPT

## 2019-12-04 PROCEDURE — 80048 BASIC METABOLIC PNL TOTAL CA: CPT | Performed by: INTERNAL MEDICINE

## 2019-12-04 PROCEDURE — 85027 COMPLETE CBC AUTOMATED: CPT | Performed by: INTERNAL MEDICINE

## 2019-12-04 PROCEDURE — 97110 THERAPEUTIC EXERCISES: CPT

## 2019-12-04 PROCEDURE — 99239 HOSP IP/OBS DSCHRG MGMT >30: CPT | Performed by: INTERNAL MEDICINE

## 2019-12-04 RX ORDER — DILTIAZEM HCL 90 MG
90 TABLET ORAL EVERY 8 HOURS SCHEDULED
Qty: 90 TABLET | Refills: 0 | Status: SHIPPED | OUTPATIENT
Start: 2019-12-04 | End: 2019-12-27 | Stop reason: SDUPTHER

## 2019-12-04 RX ORDER — GUAIFENESIN 600 MG
600 TABLET, EXTENDED RELEASE 12 HR ORAL EVERY 12 HOURS SCHEDULED
Qty: 60 TABLET | Refills: 0 | Status: ON HOLD | OUTPATIENT
Start: 2019-12-04 | End: 2020-03-05 | Stop reason: SDUPTHER

## 2019-12-04 RX ORDER — POTASSIUM CHLORIDE 20 MEQ/1
40 TABLET, EXTENDED RELEASE ORAL ONCE
Status: COMPLETED | OUTPATIENT
Start: 2019-12-04 | End: 2019-12-04

## 2019-12-04 RX ADMIN — HYDROXYZINE HYDROCHLORIDE 25 MG: 25 TABLET ORAL at 12:39

## 2019-12-04 RX ADMIN — HYDROXYZINE HYDROCHLORIDE 25 MG: 25 TABLET ORAL at 03:35

## 2019-12-04 RX ADMIN — POTASSIUM CHLORIDE 40 MEQ: 1500 TABLET, EXTENDED RELEASE ORAL at 12:39

## 2019-12-04 RX ADMIN — PANTOPRAZOLE SODIUM 40 MG: 40 TABLET, DELAYED RELEASE ORAL at 08:44

## 2019-12-04 RX ADMIN — GUAIFENESIN 600 MG: 600 TABLET, EXTENDED RELEASE ORAL at 08:44

## 2019-12-04 RX ADMIN — METOPROLOL TARTRATE 25 MG: 25 TABLET, FILM COATED ORAL at 08:44

## 2019-12-04 RX ADMIN — SERTRALINE HYDROCHLORIDE 50 MG: 50 TABLET ORAL at 08:45

## 2019-12-04 RX ADMIN — FLUTICASONE PROPIONATE 1 SPRAY: 50 SPRAY, METERED NASAL at 08:45

## 2019-12-04 RX ADMIN — DILTIAZEM HYDROCHLORIDE 90 MG: 60 TABLET, FILM COATED ORAL at 14:08

## 2019-12-04 RX ADMIN — HYDROCORTISONE: 10 CREAM TOPICAL at 08:45

## 2019-12-04 RX ADMIN — APIXABAN 2.5 MG: 2.5 TABLET, FILM COATED ORAL at 08:44

## 2019-12-04 RX ADMIN — LEVALBUTEROL HYDROCHLORIDE 1.25 MG: 1.25 SOLUTION, CONCENTRATE RESPIRATORY (INHALATION) at 07:33

## 2019-12-04 RX ADMIN — ISODIUM CHLORIDE 3 ML: 0.03 SOLUTION RESPIRATORY (INHALATION) at 14:44

## 2019-12-04 RX ADMIN — DILTIAZEM HYDROCHLORIDE 90 MG: 60 TABLET, FILM COATED ORAL at 05:15

## 2019-12-04 RX ADMIN — Medication 400 MG: at 08:58

## 2019-12-04 RX ADMIN — ISODIUM CHLORIDE 3 ML: 0.03 SOLUTION RESPIRATORY (INHALATION) at 07:33

## 2019-12-04 RX ADMIN — GUAIFENESIN 200 MG: 100 SOLUTION ORAL at 12:43

## 2019-12-04 RX ADMIN — HYDROXYZINE HYDROCHLORIDE 25 MG: 25 TABLET ORAL at 08:44

## 2019-12-04 RX ADMIN — OXYBUTYNIN CHLORIDE 5 MG: 5 TABLET ORAL at 08:44

## 2019-12-04 RX ADMIN — FUROSEMIDE 40 MG: 40 TABLET ORAL at 08:44

## 2019-12-04 RX ADMIN — LEVALBUTEROL HYDROCHLORIDE 1.25 MG: 1.25 SOLUTION, CONCENTRATE RESPIRATORY (INHALATION) at 14:44

## 2019-12-04 NOTE — SOCIAL WORK
D/c order written, I made Alcides's aware of the d/c & [facility], I called Duane at 11:50 am to # [phone] to make her aware of the d/c , she asked me to call the house # [phone] to speak with the pt;s son Christelle Celestin I spoke with him at 11:55 am , I instructed him to wait for the delivery of the oxygen and then call me when it has been delivered, pt  Called and staed the oxygen was delivered and he was instructed to come transport the pt home, pt again declined snf rehab, she was in agreement with [facility], pt's son will transport her home and he does live with her, resp dept delivered a portable tank to the pt's room for the transport home, pt and family in agreement with the d/c and d/c plan home,

## 2019-12-04 NOTE — ASSESSMENT & PLAN NOTE
Wt Readings from Last 3 Encounters:   12/04/19 64 3 kg (141 lb 12 1 oz)   10/18/19 62 6 kg (138 lb)   09/04/19 65 2 kg (143 lb 12 8 oz)     In no acute exacerbation   Appears euvolemic  Continue home Lasix regimen

## 2019-12-04 NOTE — SOCIAL WORK
Pt was recommended snf and she declined,, she was given a list of [facility]hc and she had made her choice to be [facility], and I did send a referral as requested and to was accepted, ptis in agreement with the d/c plan,

## 2019-12-04 NOTE — RESPIRATORY THERAPY NOTE
Pt  is being discharged home on oxygen  Pt  given an oxygen tank from [facility]  Pt  was educated on the safe and proper use of the oxygen tank

## 2019-12-04 NOTE — ASSESSMENT & PLAN NOTE
· Heart rate controlled under current regimen  · Appreciate Cardiology input  · Follow-up with Cardiology as an outpatient  · Continue apixaban 2 5 mg twice a day for anticoagulation purposes  · Likely triggered by acute RSV infection

## 2019-12-04 NOTE — PLAN OF CARE
Problem: PHYSICAL THERAPY ADULT  Goal: Performs mobility at highest level of function for planned discharge setting  See evaluation for individualized goals  Description  Treatment/Interventions: Functional transfer training, LE strengthening/ROM, Elevations, Therapeutic exercise, Endurance training, Bed mobility, Gait training  Equipment Recommended: Prasanna Hunt       See flowsheet documentation for full assessment, interventions and recommendations  Outcome: Progressing  Note:   Prognosis: Good  Problem List: Decreased strength, Decreased endurance, Impaired balance, Decreased mobility  Assessment: Pt seen for PT treatment session this date with interventions consisting of gait training w/ emphasis on improving pt's ability to ambulate level surfaces x 40ft with CGA provided by therapist with RW and Therapeutic exercise consisting of: AROM 20 reps B LE in sitting position  Pt agreeable to PT treatment session upon arrival, pt found seated OOB in chair, in no apparent distress, A&O x 4 and responsive  In comparison to previous session, pt with improvements in continued participation in PT treatment  Post session: pt returned back to recliner and all needs in reach Continue to recommend STR vs  Home PT at time of d/c in order to maximize pt's functional independence and safety w/ mobility  Pt continues to be functioning below baseline level, and remains limited 2* factors listed above and including decreased functional activity tolerance    PT will continue to see pt while here in order to address the deficits listed above and provide interventions consistent w/ POC in effort to achieve STGs  Recommendation: Short-term skilled PT     PT - OK to Discharge: Yes(when medically stable)    See flowsheet documentation for full assessment

## 2019-12-04 NOTE — PHYSICAL THERAPY NOTE
12/04/19 0948   Pain Assessment   Pain Assessment No/denies pain   Pain Score No Pain   Restrictions/Precautions   Weight Bearing Precautions Per Order No   Other Precautions Droplet precautions;O2;Fall Risk   General   Chart Reviewed Yes   Response to Previous Treatment Patient with no complaints from previous session  Family/Caregiver Present No   Cognition   Overall Cognitive Status WFL   Arousal/Participation Alert; Responsive   Attention Within functional limits   Orientation Level Oriented X4   Memory Within functional limits   Following Commands Follows all commands and directions without difficulty   Bed Mobility   Additional Comments pt recieved sitting OOB in chair   Transfers   Sit to Stand 5  Supervision   Additional items Assist x 1; Armrests; Verbal cues; Increased time required   Stand to Sit 5  Supervision   Additional items Assist x 1; Armrests; Increased time required;Verbal cues   Ambulation/Elevation   Gait pattern Decreased foot clearance; Short stride; Improper Weight shift   Gait Assistance   (CGA)   Additional items Assist x 1;Verbal cues   Assistive Device Rolling walker   Distance 40 ft in room   Stair Management Assistance Not tested   Balance   Static Sitting Good   Dynamic Sitting Fair +   Static Standing Fair   Dynamic Standing Fair   Ambulatory Fair -   Endurance Deficit   Endurance Deficit Yes   Activity Tolerance   Activity Tolerance Patient limited by fatigue   Exercises   Quad Sets Sitting;20 reps;AROM; Bilateral   Glute Sets Sitting;20 reps;AROM; Bilateral   Hip Flexion Sitting;20 reps;AROM; Bilateral   Hip Abduction Sitting;20 reps;AROM; Bilateral   Hip Adduction Sitting;20 reps;AROM; Bilateral   Knee AROM Long Arc Quad Sitting;20 reps;AROM; Bilateral   Ankle Pumps Sitting;20 reps;AROM; Bilateral   Assessment   Prognosis Good   Problem List Decreased strength;Decreased endurance; Impaired balance;Decreased mobility   Assessment Pt seen for PT treatment session this date with interventions consisting of gait training w/ emphasis on improving pt's ability to ambulate level surfaces x 40ft with CGA provided by therapist with RW and Therapeutic exercise consisting of: AROM 20 reps B LE in sitting position  Pt agreeable to PT treatment session upon arrival, pt found seated OOB in chair, in no apparent distress, A&O x 4 and responsive  In comparison to previous session, pt with improvements in continued participation in PT treatment  Post session: pt returned back to recliner and all needs in reach Continue to recommend STR vs  Home PT at time of d/c in order to maximize pt's functional independence and safety w/ mobility  Pt continues to be functioning below baseline level, and remains limited 2* factors listed above and including decreased functional activity tolerance    PT will continue to see pt while here in order to address the deficits listed above and provide interventions consistent w/ POC in effort to achieve STGs  Goals   Patient Goals to feel better   PT Treatment Day 3   Plan   Treatment/Interventions Functional transfer training;LE strengthening/ROM; Therapeutic exercise; Endurance training;Bed mobility;Gait training   Progress Slow progress, medical status limitations   PT Frequency Other (Comment)  (3-5x/week)   Recommendation   Recommendation Short-term skilled PT   Equipment Recommended Walker   PT - OK to Discharge Yes  (when medically stable)   Keisha Reyes Chol, PTA

## 2019-12-04 NOTE — PLAN OF CARE
Problem: DISCHARGE PLANNING - CARE MANAGEMENT  Goal: Discharge to post-acute care or home with appropriate resources  Description  INTERVENTIONS:  - Conduct assessment to determine patient/family and health care team treatment goals, and need for post-acute services based on payer coverage, community resources, and patient preferences, and barriers to discharge  - Address psychosocial, clinical, and financial barriers to discharge as identified in assessment in conjunction with the patient/family and health care team  - Arrange appropriate level of post-acute services according to patient's   needs and preference and payer coverage in collaboration with the physician and health care team  - Communicate with and update the patient/family, physician, and health care team regarding progress on the discharge plan  - Arrange appropriate transportation to post-acute venues  Pt's goal is to return home with her family and [facility], oxygen was delivered   Outcome: Completed

## 2019-12-04 NOTE — ASSESSMENT & PLAN NOTE
· Secondary to RSV bronchitis  · Qualifies for home oxygen  · Appropriate arrangements have been made for home oxygen

## 2019-12-04 NOTE — DISCHARGE SUMMARY
Discharge- Jesi Blackmon 1935, 81 y o  female MRN: [id number]    Unit/Bed#: -02 Encounter: [id number]    Primary Care Provider: Jerry Grissom MD   Date and time admitted to hospital: 11/30/2019  8:07 AM        Acute respiratory failure with hypoxia [facility])  Assessment & Plan  · Secondary to RSV bronchitis  · Qualifies for home oxygen  · Appropriate arrangements have been made for home oxygen    RSV bronchitis  Assessment & Plan  · Numerous family members with similar complaints  · Supportive care    Chronic diastolic heart failure ([address] )  Assessment & Plan  Wt Readings from Last 3 Encounters:   12/04/19 64 3 kg (141 lb 12 1 oz)   10/18/19 62 6 kg (138 lb)   09/04/19 65 2 kg (143 lb 12 8 oz)     In no acute exacerbation   Appears euvolemic  Continue home Lasix regimen        Chronic kidney disease, stage 2 (mild)  Assessment & Plan  · With baseline creatinine between 0 7-0 8 mg/dL   · Currently at baseline   · Will monitor renal function closely   · Avoid nephrotoxins     * Atrial fibrillation with RVR (HCC)  Assessment & Plan  · Heart rate controlled under current regimen  · Appreciate Cardiology input  · Follow-up with Cardiology as an outpatient  · Continue apixaban 2 5 mg twice a day for anticoagulation purposes  · Likely triggered by acute RSV infection      Addendum:  Acute respiratory failure with hypoxia was present on admission related to her RSV bronchitis, and requires home oxygen as evidenced by hypoxia at rest    Discharging Physician / Practitioner: Jaida May MD  PCP: Jerry Grissom MD  Admission Date:   Admission Orders (From admission, onward)     Ordered        11/30/19 1037  Inpatient Admission (expected length of stay for this patient Order details is greater than two midnights)  Once                   Discharge Date: 12/04/19    Resolved Problems  Date Reviewed: 12/3/2019    None          Consultations During Hospital Stay:  · Cardiology    Procedures Performed: · n/a    Significant Findings / Test Results:   · RSV +    Incidental Findings:   · n/a     Test Results Pending at Discharge (will require follow up):   · n/a     Outpatient Tests Requested:  · n/a    Complications:     none    Reason for Admission:  Cough and shortness of breath    Hospital Course:     Ashtyn Cedillo is a 81 y o  female patient who originally presented to the hospital on 11/30/2019 due to cough and shortness of breath  Patient was found to be in atrial fibrillation with rapid ventricular rate and treated with a Cardizem drip  Thankfully she was able to be titrated off of the drip to her home rate controlling agents  The trigger for her AFib with RVR was RSV bronchitis  She was treated with supportive care  Unfortunately numerous family members with similar complaints likely related to RSV  As her symptoms improved, she was deemed stable for discharge  She did require oxygen on her desat study which was range for her with case management  Please see above list of diagnoses and related plan for additional information  Condition at Discharge: fair     Discharge Day Visit / Exam:     Subjective:  Patient seen examined  No events overnight  Feels better, wants to go home  Vitals: Blood Pressure: 123/59 (12/04/19 0726)  Pulse: 63 (12/04/19 0726)  Temperature: (!) 96 8 °F (36 °C) (12/04/19 0726)  Temp Source: Temporal (12/04/19 0726)  Respirations: 18 (12/04/19 0726)  Height: 4' 11" (149 9 cm) (12/02/19 1240)  Weight - Scale: 64 3 kg (141 lb 12 1 oz) (12/04/19 0555)  SpO2: 94 % (12/04/19 0733)  Exam:   Physical Exam   Constitutional: She is oriented to person, place, and time  She appears well-developed and well-nourished  HENT:   Head: Normocephalic and atraumatic  Eyes: Pupils are equal, round, and reactive to light  EOM are normal    Neck: Normal range of motion  Neck supple  Cardiovascular: Normal rate     Irregularly irregular   Pulmonary/Chest: Effort normal  No respiratory distress  Coarse breath sounds noted bilaterally   Abdominal: Soft  Bowel sounds are normal    Musculoskeletal: Normal range of motion  She exhibits no edema  Neurological: She is alert and oriented to person, place, and time  Skin: Skin is warm  Capillary refill takes less than 2 seconds  Psychiatric: She has a normal mood and affect  Her behavior is normal  Thought content normal    Nursing note and vitals reviewed  Discussion with Family: n/a    Discharge instructions/Information to patient and family:   See after visit summary for information provided to patient and family  Provisions for Follow-Up Care:  See after visit summary for information related to follow-up care and any pertinent home health orders  Disposition:     Home    For Discharges to [facility] SNF:   · Not Applicable to this Patient - Not Applicable to this Patient    Planned Readmission:    n/a     Discharge Statement:  I spent 43 minutes discharging the patient  This time was spent on the day of discharge  I had direct contact with the patient on the day of discharge  Greater than 50% of the total time was spent examining patient, answering all patient questions, arranging and discussing plan of care with patient as well as directly providing post-discharge instructions  Additional time then spent on discharge activities  Discharge Medications:  See after visit summary for reconciled discharge medications provided to patient and family        ** Please Note: This note has been constructed using a voice recognition system **

## 2019-12-05 LAB
BACTERIA BLD CULT: NORMAL
BACTERIA BLD CULT: NORMAL

## 2019-12-06 ENCOUNTER — PATIENT OUTREACH (OUTPATIENT)
Dept: CASE MANAGEMENT | Facility: OTHER | Age: 84
End: 2019-12-06

## 2019-12-06 NOTE — PROGRESS NOTES
Inbasket notification received for new bundle  Chart review completed  To be opened with [facility] for SN / PT / OT services  First nursing visit scheduled for 12/7/19  Patient lives with son in a 2 story home but stays on 1st floor  Unsuccessful attempt at reaching patient  Left message introducing self asking for return call  Provided call back number  This CM will continue to monitor via chart review and through [facility]

## 2019-12-09 ENCOUNTER — PATIENT OUTREACH (OUTPATIENT)
Dept: CASE MANAGEMENT | Facility: OTHER | Age: 84
End: 2019-12-09

## 2019-12-09 NOTE — PROGRESS NOTES
Mohinder notification received from [facility] indicating final DRG is non BPCI eligible:  final DRG [address] coordination note updated then removed   CM removed self from care team

## 2019-12-19 ENCOUNTER — OFFICE VISIT (OUTPATIENT)
Dept: CARDIOLOGY CLINIC | Facility: CLINIC | Age: 84
End: 2019-12-19
Payer: MEDICARE

## 2019-12-19 VITALS
HEIGHT: 59 IN | BODY MASS INDEX: 27.62 KG/M2 | HEART RATE: 96 BPM | SYSTOLIC BLOOD PRESSURE: 132 MMHG | DIASTOLIC BLOOD PRESSURE: 68 MMHG | WEIGHT: 137 LBS

## 2019-12-19 DIAGNOSIS — I34.0 MITRAL VALVE INSUFFICIENCY, UNSPECIFIED ETIOLOGY: ICD-10-CM

## 2019-12-19 DIAGNOSIS — I48.91 ATRIAL FIBRILLATION, UNSPECIFIED TYPE (HCC): Primary | ICD-10-CM

## 2019-12-19 DIAGNOSIS — I50.32 CHRONIC DIASTOLIC HEART FAILURE (HCC): ICD-10-CM

## 2019-12-19 PROCEDURE — 99214 OFFICE O/P EST MOD 30 MIN: CPT | Performed by: PHYSICIAN ASSISTANT

## 2019-12-19 NOTE — PROGRESS NOTES
[facility]     Padmini Botello / 81 y o  female / : 1935 / MRN: [id number]  Date of Visit: 19    ASSESSMENT/PLAN  1  Persistent afib   · Rate controlled on lopressor 25mg BID and diltiazem 90mg TID - she is on short-acting formulations for cost reasons   · We have tolerated slightly higher rates, as she became significantly bradycardic on higher doses of AV andrew blockers in the past   · Anticoagulated on Eliquis     2  Chronic diastolic HF   · Weight stable at home - 130-135 lbs     · Continue lasix 40mg daily - advised her to take an extra dose for weight gain of 3 lbs in 24h or 5 lbs in 1 week     3  HTN  - continue metoprolol and cardizem     4  Severe MR  · Poor candidate for surgical intervention due to frailty   · She is not interested in any intervention, including less invasive clip procedure      5  CAD  · S/p GARRET to LAD in   · No recent angina    · On lopressor and pravastatin  Not on ASA as she is on Eliquis    Follow up in 6 months  Advised her to call sooner with any issues/concerns  SUBJECTIVE:  HPI: Padmini Botello is a 81 y o  female who presents for follow-up regarding recent admission at [facility]  She had RSV bronchitis and afib with RVR  Diltiazem was discontinued due to urticarial reaction, however it was unclear which (if any) medication was causing the reaction, so it was restarted during admission  She has been tolerating it  Weight at home has been stable  No recent edema or orthopnea  Dyspnea is at baseline  She has no complaints today  Cardiac testing:   TTE 2019:  EF 60%  Moderate LVH  Moderate MR with marked prolapse of the anterior and posterior leaflets  Mild TR with moderate pulm HTN     TTE 2017 - EF 55%, moderate MR     Cardiac cath 2012 - patent LAD stent, severe MR   No significant CAD       Allergies   Allergen Reactions    Chocolate Flavor     Iodine Other (See Comments)     shellfish- difficulty breathing    Lactose     Shellfish-Derived Products     Codeine Rash         Current Outpatient Medications:     ALPRAZolam (XANAX) 0 25 mg tablet, Take 0 25 mg by mouth daily at bedtime as needed for anxiety, Disp: , Rfl:     apixaban (ELIQUIS) 2 5 mg, Take 1 tablet (2 5 mg total) by mouth 2 (two) times a day, Disp: 180 tablet, Rfl: 3    diltiazem (CARDIZEM) 90 mg tablet, Take 1 tablet (90 mg total) by mouth every 8 (eight) hours, Disp: 90 tablet, Rfl: 0    furosemide (LASIX) 40 mg tablet, Take 1 tablet (40 mg total) by mouth daily, Disp: 90 tablet, Rfl: 3    guaiFENesin (MUCINEX) 600 mg 12 hr tablet, Take 1 tablet (600 mg total) by mouth every 12 (twelve) hours (Patient taking differently: Take 600 mg by mouth as needed ), Disp: 60 tablet, Rfl: 0    hydrocortisone 1 % cream, Apply to affected area 2 times daily, Disp: 15 g, Rfl: 0    hydrOXYzine HCL (ATARAX) 25 mg tablet, Take 1 tablet (25 mg total) by mouth every 6 (six) hours, Disp: 12 tablet, Rfl: 0    magnesium oxide (MAG-OX) 400 mg, Take 1 tablet (400 mg total) by mouth 3 (three) times a week, Disp: 30 tablet, Rfl: 0    metoprolol tartrate (LOPRESSOR) 25 mg tablet, Take 1 tablet (25 mg total) by mouth 3 (three) times a day (Patient taking differently: Take 25 mg by mouth 2 (two) times a day ), Disp: 90 tablet, Rfl: 0    oxybutynin (DITROPAN) 5 mg tablet, Take 5 mg by mouth daily, Disp: , Rfl:     PANTOPRAZOLE SODIUM PO, Take by mouth daily, Disp: , Rfl:     pravastatin (PRAVACHOL) 40 mg tablet, Take 1 tablet (40 mg total) by mouth daily with dinner, Disp: 30 tablet, Rfl: 0    sertraline (ZOLOFT) 50 mg tablet, Take 50 mg by mouth daily, Disp: , Rfl:     Past Medical History:   Diagnosis Date    Acute on chronic diastolic congestive heart failure (HCC) 6/27/2019    Arthritis     Chronic diastolic heart failure (HCC)     Chronic kidney disease, stage 2 (mild)     Coronary artery disease     history of stenting    Eczema     years    Edema  History of echocardiogram 07/20/2017    EF 55%, mild concentric LVH, bileaflet MVP with moderate MR, left atrial enlargement   History of transfusion     Hyperlipidemia     Hypertension     Hypo-osmolality and hyponatremia     Hypokalemia 7/11/2019    Mitral regurgitation        Family History   Problem Relation Age of Onset    Arthritis Mother     Cancer Mother     Heart disease Mother     Hypertension Mother     Alcohol abuse Father     Arthritis Father     Birth defects Son     Hearing loss Son     Diabetes Daughter     Stroke Maternal Grandmother     Asthma Maternal Grandfather        Past Surgical History:   Procedure Laterality Date    ABDOMINAL SURGERY      hysterectomy    CARDIAC CATHETERIZATION  04/10/2012    EF 65%, no significant CAD, patent stents, severe MR     CORONARY ANGIOPLASTY WITH STENT PLACEMENT  03/21/2007    EF 55%, GARRET to LAD   EYE SURGERY      b/l cataracts    HYSTERECTOMY      JOINT REPLACEMENT      Rt knee       Social History     Socioeconomic History    Marital status:       Spouse name: Not on file    Number of children: Not on file    Years of education: Not on file    Highest education level: Not on file   Occupational History    Not on file   Social Needs    Financial resource strain: Not on file    Food insecurity:     Worry: Not on file     Inability: Not on file    Transportation needs:     Medical: Not on file     Non-medical: Not on file   Tobacco Use    Smoking status: Never Smoker    Smokeless tobacco: Never Used   Substance and Sexual Activity    Alcohol use: Not Currently    Drug use: Never    Sexual activity: Not Currently   Lifestyle    Physical activity:     Days per week: Not on file     Minutes per session: Not on file    Stress: Not on file   Relationships    Social connections:     Talks on phone: Not on file     Gets together: Not on file     Attends Sabianist service: Not on file     Active member of club or organization: Not on file     Attends meetings of clubs or organizations: Not on file     Relationship status: Not on file    Intimate partner violence:     Fear of current or ex partner: Not on file     Emotionally abused: Not on file     Physically abused: Not on file     Forced sexual activity: Not on file   Other Topics Concern    Not on file   Social History Narrative    Not on file       Review of Systems   Constitution: Negative  HENT: Negative  Eyes: Negative  Cardiovascular: Positive for dyspnea on exertion (at baseline)  Negative for chest pain, claudication, irregular heartbeat, leg swelling, near-syncope, orthopnea, palpitations, paroxysmal nocturnal dyspnea and syncope  Respiratory: Negative for cough, shortness of breath and wheezing  Endocrine: Negative  Skin: Negative  Musculoskeletal: Negative  Gastrointestinal: Negative  Genitourinary: Negative  Neurological: Negative for dizziness and light-headedness  Psychiatric/Behavioral: Negative  OBJECTIVE:  Vitals: /68   Pulse 96   Ht 4' 11" (1 499 m)   Wt 62 1 kg (137 lb)   BMI 27 67 kg/m²     Physical exam:   Physical Exam   Constitutional: She is oriented to person, place, and time  She appears well-developed and well-nourished  No distress  HENT:   Head: Normocephalic and atraumatic  Eyes: EOM are normal  No scleral icterus  Neck: Normal range of motion  Neck supple  Cardiovascular: Normal rate, S1 normal and S2 normal  An irregularly irregular rhythm present  Murmur (grade 3-4 holosystolic murmur at the apex) heard  Pulmonary/Chest: Effort normal and breath sounds normal  She has no wheezes  She has no rales  Abdominal: Soft  Bowel sounds are normal    Musculoskeletal: She exhibits no edema  Neurological: She is alert and oriented to person, place, and time  Skin: Skin is warm and dry  Psychiatric: She has a normal mood and affect   Her behavior is normal    Nursing note and vitals reviewed        Lab Results:   No results found for: HGBA1C  Lab Results   Component Value Date    CHOL 152 04/05/2018     Lab Results   Component Value Date    HDL 61 (H) 03/15/2019    HDL 56 04/05/2018     Lab Results   Component Value Date    LDLCALC 69 03/15/2019    LDLCALC 72 1 (L) 04/05/2018     Lab Results   Component Value Date    TRIG 94 03/15/2019    TRIG 119 04/05/2018     No results found for: CHOLHDL

## 2019-12-27 DIAGNOSIS — I50.32 CHRONIC DIASTOLIC HEART FAILURE (HCC): ICD-10-CM

## 2019-12-27 DIAGNOSIS — I34.0 MITRAL VALVE INSUFFICIENCY, UNSPECIFIED ETIOLOGY: ICD-10-CM

## 2019-12-27 DIAGNOSIS — I48.91 ATRIAL FIBRILLATION WITH RVR (HCC): ICD-10-CM

## 2019-12-27 RX ORDER — DILTIAZEM HCL 90 MG
90 TABLET ORAL EVERY 8 HOURS SCHEDULED
Qty: 270 TABLET | Refills: 3 | Status: SHIPPED | OUTPATIENT
Start: 2019-12-27 | End: 2020-07-20 | Stop reason: SDUPTHER

## 2019-12-27 RX ORDER — FUROSEMIDE 40 MG/1
40 TABLET ORAL DAILY
Qty: 90 TABLET | Refills: 3 | Status: SHIPPED | OUTPATIENT
Start: 2019-12-27 | End: 2020-06-17

## 2020-02-28 ENCOUNTER — APPOINTMENT (INPATIENT)
Dept: CT IMAGING | Facility: HOSPITAL | Age: 85
DRG: 871 | End: 2020-02-28
Payer: MEDICARE

## 2020-02-28 ENCOUNTER — APPOINTMENT (EMERGENCY)
Dept: RADIOLOGY | Facility: HOSPITAL | Age: 85
DRG: 871 | End: 2020-02-28
Payer: MEDICARE

## 2020-02-28 ENCOUNTER — HOSPITAL ENCOUNTER (INPATIENT)
Facility: HOSPITAL | Age: 85
LOS: 7 days | Discharge: HOME HEALTH | DRG: 871 | End: 2020-03-06
Attending: EMERGENCY MEDICINE | Admitting: INTERNAL MEDICINE
Payer: MEDICARE

## 2020-02-28 DIAGNOSIS — I10 ESSENTIAL HYPERTENSION: ICD-10-CM

## 2020-02-28 DIAGNOSIS — J10.1 INFLUENZA A: Primary | ICD-10-CM

## 2020-02-28 DIAGNOSIS — E87.1 ACUTE HYPONATREMIA: ICD-10-CM

## 2020-02-28 DIAGNOSIS — E87.6 ACUTE HYPOKALEMIA: ICD-10-CM

## 2020-02-28 DIAGNOSIS — J10.00 PNEUMONIA OF BOTH LUNGS DUE TO INFLUENZA A VIRUS, UNSPECIFIED PART OF LUNG: ICD-10-CM

## 2020-02-28 DIAGNOSIS — I50.33 ACUTE ON CHRONIC DIASTOLIC HEART FAILURE (HCC): ICD-10-CM

## 2020-02-28 DIAGNOSIS — J20.5 RSV BRONCHITIS: ICD-10-CM

## 2020-02-28 PROBLEM — M17.9 OSTEOARTHRITIS OF KNEE: Status: ACTIVE | Noted: 2020-02-28

## 2020-02-28 PROBLEM — J18.9 PNEUMONIA DUE TO INFECTIOUS ORGANISM: Status: ACTIVE | Noted: 2020-02-28

## 2020-02-28 PROBLEM — A41.9 SEPSIS (HCC): Status: ACTIVE | Noted: 2020-02-28

## 2020-02-28 PROBLEM — M17.10 OSTEOARTHRITIS OF KNEE: Status: ACTIVE | Noted: 2020-02-28

## 2020-02-28 PROBLEM — R77.8 ELEVATED TROPONIN I LEVEL: Status: ACTIVE | Noted: 2020-02-28

## 2020-02-28 LAB
ALBUMIN SERPL BCP-MCNC: 3.9 G/DL (ref 3.5–5.7)
ALP SERPL-CCNC: 54 U/L (ref 55–165)
ALT SERPL W P-5'-P-CCNC: 8 U/L (ref 7–52)
ANION GAP SERPL CALCULATED.3IONS-SCNC: 7 MMOL/L (ref 4–13)
APTT PPP: 35 SECONDS (ref 23–37)
AST SERPL W P-5'-P-CCNC: 19 U/L (ref 13–39)
BASOPHILS # BLD AUTO: 0 THOUSANDS/ΜL (ref 0–0.1)
BASOPHILS NFR BLD AUTO: 0 % (ref 0–2)
BILIRUB SERPL-MCNC: 1 MG/DL (ref 0.2–1)
BNP SERPL-MCNC: 1954 PG/ML (ref 1–100)
BUN SERPL-MCNC: 16 MG/DL (ref 7–25)
CALCIUM SERPL-MCNC: 9 MG/DL (ref 8.6–10.5)
CHLORIDE SERPL-SCNC: 91 MMOL/L (ref 98–107)
CO2 SERPL-SCNC: 33 MMOL/L (ref 21–31)
CREAT SERPL-MCNC: 0.79 MG/DL (ref 0.6–1.2)
EOSINOPHIL # BLD AUTO: 0 THOUSAND/ΜL (ref 0–0.61)
EOSINOPHIL NFR BLD AUTO: 0 % (ref 0–5)
ERYTHROCYTE [DISTWIDTH] IN BLOOD BY AUTOMATED COUNT: 15.3 % (ref 11.5–14.5)
FLUAV RNA NPH QL NAA+PROBE: DETECTED
FLUBV RNA NPH QL NAA+PROBE: ABNORMAL
GFR SERPL CREATININE-BSD FRML MDRD: 69 ML/MIN/1.73SQ M
GLUCOSE SERPL-MCNC: 140 MG/DL (ref 65–99)
HCT VFR BLD AUTO: 35 % (ref 42–47)
HGB BLD-MCNC: 11.7 G/DL (ref 12–16)
INR PPP: 1.36 (ref 0.9–1.5)
LACTATE SERPL-SCNC: 1.1 MMOL/L (ref 0.5–2)
LYMPHOCYTES # BLD AUTO: 0.4 THOUSANDS/ΜL (ref 0.6–4.47)
LYMPHOCYTES NFR BLD AUTO: 9 % (ref 21–51)
MAGNESIUM SERPL-MCNC: 1.8 MG/DL (ref 1.9–2.7)
MCH RBC QN AUTO: 30.1 PG (ref 26–34)
MCHC RBC AUTO-ENTMCNC: 33.4 G/DL (ref 31–37)
MCV RBC AUTO: 90 FL (ref 81–99)
MONOCYTES # BLD AUTO: 0.8 THOUSAND/ΜL (ref 0.17–1.22)
MONOCYTES NFR BLD AUTO: 15 % (ref 2–12)
NEUTROPHILS # BLD AUTO: 3.8 THOUSANDS/ΜL (ref 1.4–6.5)
NEUTS SEG NFR BLD AUTO: 75 % (ref 42–75)
PLATELET # BLD AUTO: 203 THOUSANDS/UL (ref 149–390)
PMV BLD AUTO: 7.9 FL (ref 8.6–11.7)
POTASSIUM SERPL-SCNC: 2.7 MMOL/L (ref 3.5–5.5)
PROT SERPL-MCNC: 7.2 G/DL (ref 6.4–8.9)
PROTHROMBIN TIME: 15.8 SECONDS (ref 10.2–13)
RBC # BLD AUTO: 3.88 MILLION/UL (ref 3.9–5.2)
RSV RNA NPH QL NAA+PROBE: ABNORMAL
SODIUM SERPL-SCNC: 131 MMOL/L (ref 134–143)
TROPONIN I SERPL-MCNC: 0.04 NG/ML
TROPONIN I SERPL-MCNC: 0.06 NG/ML
WBC # BLD AUTO: 5 THOUSAND/UL (ref 4.8–10.8)

## 2020-02-28 PROCEDURE — 94640 AIRWAY INHALATION TREATMENT: CPT | Performed by: PHYSICIAN ASSISTANT

## 2020-02-28 PROCEDURE — 85730 THROMBOPLASTIN TIME PARTIAL: CPT

## 2020-02-28 PROCEDURE — 87040 BLOOD CULTURE FOR BACTERIA: CPT

## 2020-02-28 PROCEDURE — 87631 RESP VIRUS 3-5 TARGETS: CPT

## 2020-02-28 PROCEDURE — 71046 X-RAY EXAM CHEST 2 VIEWS: CPT

## 2020-02-28 PROCEDURE — 84145 PROCALCITONIN (PCT): CPT

## 2020-02-28 PROCEDURE — 99223 1ST HOSP IP/OBS HIGH 75: CPT | Performed by: PHYSICIAN ASSISTANT

## 2020-02-28 PROCEDURE — 96365 THER/PROPH/DIAG IV INF INIT: CPT

## 2020-02-28 PROCEDURE — 85025 COMPLETE CBC W/AUTO DIFF WBC: CPT

## 2020-02-28 PROCEDURE — 96361 HYDRATE IV INFUSION ADD-ON: CPT

## 2020-02-28 PROCEDURE — 99284 EMERGENCY DEPT VISIT MOD MDM: CPT | Performed by: PHYSICIAN ASSISTANT

## 2020-02-28 PROCEDURE — 80053 COMPREHEN METABOLIC PANEL: CPT

## 2020-02-28 PROCEDURE — 71250 CT THORAX DX C-: CPT

## 2020-02-28 PROCEDURE — 36415 COLL VENOUS BLD VENIPUNCTURE: CPT

## 2020-02-28 PROCEDURE — 85610 PROTHROMBIN TIME: CPT

## 2020-02-28 PROCEDURE — 83735 ASSAY OF MAGNESIUM: CPT | Performed by: PHYSICIAN ASSISTANT

## 2020-02-28 PROCEDURE — 83880 ASSAY OF NATRIURETIC PEPTIDE: CPT | Performed by: PHYSICIAN ASSISTANT

## 2020-02-28 PROCEDURE — 84145 PROCALCITONIN (PCT): CPT | Performed by: PHYSICIAN ASSISTANT

## 2020-02-28 PROCEDURE — 99285 EMERGENCY DEPT VISIT HI MDM: CPT

## 2020-02-28 PROCEDURE — 93005 ELECTROCARDIOGRAM TRACING: CPT

## 2020-02-28 PROCEDURE — 84484 ASSAY OF TROPONIN QUANT: CPT | Performed by: PHYSICIAN ASSISTANT

## 2020-02-28 PROCEDURE — 94640 AIRWAY INHALATION TREATMENT: CPT

## 2020-02-28 PROCEDURE — 96368 THER/DIAG CONCURRENT INF: CPT

## 2020-02-28 PROCEDURE — 83605 ASSAY OF LACTIC ACID: CPT

## 2020-02-28 RX ORDER — HYDROXYZINE HYDROCHLORIDE 25 MG/1
25 TABLET, FILM COATED ORAL EVERY 6 HOURS
Status: DISCONTINUED | OUTPATIENT
Start: 2020-02-28 | End: 2020-03-06 | Stop reason: HOSPADM

## 2020-02-28 RX ORDER — FUROSEMIDE 40 MG/1
40 TABLET ORAL DAILY
Status: DISCONTINUED | OUTPATIENT
Start: 2020-02-29 | End: 2020-02-29

## 2020-02-28 RX ORDER — MAGNESIUM SULFATE HEPTAHYDRATE 40 MG/ML
2 INJECTION, SOLUTION INTRAVENOUS ONCE
Status: DISCONTINUED | OUTPATIENT
Start: 2020-02-28 | End: 2020-02-28

## 2020-02-28 RX ORDER — POTASSIUM CHLORIDE 14.9 MG/ML
20 INJECTION INTRAVENOUS ONCE
Status: COMPLETED | OUTPATIENT
Start: 2020-02-28 | End: 2020-02-28

## 2020-02-28 RX ORDER — GUAIFENESIN 600 MG
600 TABLET, EXTENDED RELEASE 12 HR ORAL EVERY 12 HOURS SCHEDULED
Status: DISCONTINUED | OUTPATIENT
Start: 2020-02-28 | End: 2020-03-06 | Stop reason: HOSPADM

## 2020-02-28 RX ORDER — IPRATROPIUM BROMIDE AND ALBUTEROL SULFATE 2.5; .5 MG/3ML; MG/3ML
3 SOLUTION RESPIRATORY (INHALATION)
Status: DISCONTINUED | OUTPATIENT
Start: 2020-02-28 | End: 2020-03-01

## 2020-02-28 RX ORDER — CEFTRIAXONE 1 G/50ML
1000 INJECTION, SOLUTION INTRAVENOUS EVERY 24 HOURS
Status: DISCONTINUED | OUTPATIENT
Start: 2020-02-29 | End: 2020-02-29

## 2020-02-28 RX ORDER — OXYBUTYNIN CHLORIDE 5 MG/1
5 TABLET ORAL DAILY
Status: DISCONTINUED | OUTPATIENT
Start: 2020-02-29 | End: 2020-03-06 | Stop reason: HOSPADM

## 2020-02-28 RX ORDER — ACETAMINOPHEN 325 MG/1
650 TABLET ORAL EVERY 6 HOURS PRN
Status: DISCONTINUED | OUTPATIENT
Start: 2020-02-28 | End: 2020-03-06 | Stop reason: HOSPADM

## 2020-02-28 RX ORDER — OSELTAMIVIR PHOSPHATE 75 MG/1
75 CAPSULE ORAL ONCE
Status: COMPLETED | OUTPATIENT
Start: 2020-02-28 | End: 2020-02-28

## 2020-02-28 RX ORDER — PANTOPRAZOLE SODIUM 40 MG/1
40 TABLET, DELAYED RELEASE ORAL
Status: DISCONTINUED | OUTPATIENT
Start: 2020-02-29 | End: 2020-03-06 | Stop reason: HOSPADM

## 2020-02-28 RX ORDER — OSELTAMIVIR PHOSPHATE 30 MG/1
30 CAPSULE ORAL EVERY 12 HOURS SCHEDULED
Status: COMPLETED | OUTPATIENT
Start: 2020-02-29 | End: 2020-03-04

## 2020-02-28 RX ORDER — ALPRAZOLAM 0.25 MG/1
0.25 TABLET ORAL
Status: DISCONTINUED | OUTPATIENT
Start: 2020-02-28 | End: 2020-03-06 | Stop reason: HOSPADM

## 2020-02-28 RX ORDER — PRAVASTATIN SODIUM 40 MG
40 TABLET ORAL
Status: DISCONTINUED | OUTPATIENT
Start: 2020-02-29 | End: 2020-03-06 | Stop reason: HOSPADM

## 2020-02-28 RX ORDER — POTASSIUM CHLORIDE 20 MEQ/1
20 TABLET, EXTENDED RELEASE ORAL DAILY
Status: DISCONTINUED | OUTPATIENT
Start: 2020-02-29 | End: 2020-03-06 | Stop reason: HOSPADM

## 2020-02-28 RX ORDER — POTASSIUM CHLORIDE 20 MEQ/1
40 TABLET, EXTENDED RELEASE ORAL ONCE
Status: COMPLETED | OUTPATIENT
Start: 2020-02-28 | End: 2020-02-28

## 2020-02-28 RX ORDER — CEFEPIME HYDROCHLORIDE 2 G/50ML
2000 INJECTION, SOLUTION INTRAVENOUS ONCE
Status: COMPLETED | OUTPATIENT
Start: 2020-02-28 | End: 2020-02-28

## 2020-02-28 RX ORDER — ONDANSETRON 2 MG/ML
4 INJECTION INTRAMUSCULAR; INTRAVENOUS EVERY 6 HOURS PRN
Status: DISCONTINUED | OUTPATIENT
Start: 2020-02-28 | End: 2020-03-06 | Stop reason: HOSPADM

## 2020-02-28 RX ADMIN — SODIUM CHLORIDE 1000 ML: 0.9 INJECTION, SOLUTION INTRAVENOUS at 18:20

## 2020-02-28 RX ADMIN — APIXABAN 2.5 MG: 2.5 TABLET, FILM COATED ORAL at 22:41

## 2020-02-28 RX ADMIN — CEFEPIME HYDROCHLORIDE 2000 MG: 2 INJECTION, SOLUTION INTRAVENOUS at 19:10

## 2020-02-28 RX ADMIN — GUAIFENESIN 600 MG: 600 TABLET, EXTENDED RELEASE ORAL at 22:41

## 2020-02-28 RX ADMIN — OSELTAMIVIR PHOSPHATE 75 MG: 75 CAPSULE ORAL at 20:22

## 2020-02-28 RX ADMIN — POTASSIUM CHLORIDE 40 MEQ: 1500 TABLET, EXTENDED RELEASE ORAL at 19:11

## 2020-02-28 RX ADMIN — POTASSIUM CHLORIDE 20 MEQ: 14.9 INJECTION, SOLUTION INTRAVENOUS at 19:10

## 2020-02-28 RX ADMIN — AZITHROMYCIN MONOHYDRATE 500 MG: 500 INJECTION, POWDER, LYOPHILIZED, FOR SOLUTION INTRAVENOUS at 23:32

## 2020-02-28 RX ADMIN — HYDROXYZINE HYDROCHLORIDE 25 MG: 25 TABLET, FILM COATED ORAL at 22:41

## 2020-02-28 RX ADMIN — ALBUTEROL SULFATE 5 MG: 2.5 SOLUTION RESPIRATORY (INHALATION) at 18:30

## 2020-02-28 RX ADMIN — METOPROLOL TARTRATE 25 MG: 25 TABLET, FILM COATED ORAL at 22:40

## 2020-02-28 RX ADMIN — MAGNESIUM GLUCONATE 500 MG ORAL TABLET 400 MG: 500 TABLET ORAL at 23:31

## 2020-02-28 RX ADMIN — DILTIAZEM HYDROCHLORIDE 90 MG: 60 TABLET, FILM COATED ORAL at 22:40

## 2020-02-28 RX ADMIN — IPRATROPIUM BROMIDE 0.5 MG: 0.5 SOLUTION RESPIRATORY (INHALATION) at 18:35

## 2020-02-28 NOTE — ED PROVIDER NOTES
History  Chief Complaint   Patient presents with    Cough     productive yellow sputum     Fever - 76 years or older    Weakness - Generalized     50-year-old female history atrial fibrillation, diastolic heart failure, chronic kidney disease presents complaining of generalized weakness and cough  Cough is productive with yellow phlegm  Patient has not taken anything to help her symptoms  She states that the symptoms have been going on for approximately 4 days  She is accompanied by her daughter that states that the patient did not want to come to the emergency department however the daughter called 911 and the patient was brought in via EMS  EMS states the patient had a temperature of a 103° F in route and was given 1 g of Tylenol  Patient presents that she is weak all over however is able to move all 4 extremities and has no visual field deficits  Patient is alert and oriented x3 and has NIH of 0  She denies chest pain, shortness of breath, abdominal pain, headache, urinary burning, frequency or melena hematochezia  Prior to Admission Medications   Prescriptions Last Dose Informant Patient Reported? Taking?    ALPRAZolam (XANAX) 0 25 mg tablet   Yes No   Sig: Take 0 25 mg by mouth daily at bedtime as needed for anxiety   PANTOPRAZOLE SODIUM PO   Yes No   Sig: Take by mouth daily   apixaban (ELIQUIS) 2 5 mg   No No   Sig: Take 1 tablet (2 5 mg total) by mouth 2 (two) times a day   diltiazem (CARDIZEM) 90 mg tablet   No No   Sig: Take 1 tablet (90 mg total) by mouth every 8 (eight) hours   furosemide (LASIX) 40 mg tablet   No No   Sig: Take 1 tablet (40 mg total) by mouth daily   guaiFENesin (MUCINEX) 600 mg 12 hr tablet   No No   Sig: Take 1 tablet (600 mg total) by mouth every 12 (twelve) hours   Patient taking differently: Take 600 mg by mouth as needed    hydrOXYzine HCL (ATARAX) 25 mg tablet   No No   Sig: Take 1 tablet (25 mg total) by mouth every 6 (six) hours   hydrocortisone 1 % cream   No No   Sig: Apply to affected area 2 times daily   magnesium oxide (MAG-OX) 400 mg   No No   Sig: Take 1 tablet (400 mg total) by mouth 3 (three) times a week   metoprolol tartrate (LOPRESSOR) 25 mg tablet   No No   Sig: Take 1 tablet (25 mg total) by mouth 3 (three) times a day   Patient taking differently: Take 25 mg by mouth 2 (two) times a day    oxybutynin (DITROPAN) 5 mg tablet   Yes No   Sig: Take 5 mg by mouth daily   pravastatin (PRAVACHOL) 40 mg tablet   No No   Sig: Take 1 tablet (40 mg total) by mouth daily with dinner   sertraline (ZOLOFT) 50 mg tablet   Yes No   Sig: Take 50 mg by mouth daily      Facility-Administered Medications: None       Past Medical History:   Diagnosis Date    Acute on chronic diastolic congestive heart failure (HCC) 6/27/2019    Arthritis     Chronic diastolic heart failure (HCC)     Chronic kidney disease, stage 2 (mild)     Coronary artery disease     history of stenting    Eczema     years    Edema     History of echocardiogram 07/20/2017    EF 55%, mild concentric LVH, bileaflet MVP with moderate MR, left atrial enlargement   History of transfusion     Hyperlipidemia     Hypertension     Hypo-osmolality and hyponatremia     Hypokalemia 7/11/2019    Mitral regurgitation        Past Surgical History:   Procedure Laterality Date    ABDOMINAL SURGERY      hysterectomy    CARDIAC CATHETERIZATION  04/10/2012    EF 65%, no significant CAD, patent stents, severe MR     CORONARY ANGIOPLASTY WITH STENT PLACEMENT  03/21/2007    EF 55%, GARRET to LAD      EYE SURGERY      b/l cataracts    HYSTERECTOMY      JOINT REPLACEMENT      Rt knee       Family History   Problem Relation Age of Onset    Arthritis Mother     Cancer Mother     Heart disease Mother     Hypertension Mother     Alcohol abuse Father     Arthritis Father     Birth defects Son     Hearing loss Son     Diabetes Daughter     Stroke Maternal Grandmother     Asthma Maternal Grandfather      I have reviewed and agree with the history as documented  E-Cigarette/Vaping     E-Cigarette/Vaping Substances     Social History     Tobacco Use    Smoking status: Never Smoker    Smokeless tobacco: Never Used   Substance Use Topics    Alcohol use: Not Currently    Drug use: Never       Review of Systems   HENT: Negative for congestion, postnasal drip, sinus pressure and sore throat  Eyes: Negative for pain  Respiratory: Positive for cough  Negative for shortness of breath, wheezing and stridor  Cardiovascular: Negative for chest pain, palpitations and leg swelling  Gastrointestinal: Negative for abdominal pain, diarrhea, nausea and vomiting  Genitourinary: Negative for dysuria  Musculoskeletal: Positive for myalgias  Negative for neck pain and neck stiffness  Neurological: Positive for weakness  Negative for dizziness, syncope, light-headedness and headaches  Physical Exam  Physical Exam   Constitutional: She is oriented to person, place, and time  She appears well-developed and well-nourished  HENT:   Head: Normocephalic and atraumatic  Mouth/Throat: No oropharyngeal exudate  Eyes: EOM are normal    Neck: Normal range of motion  Cardiovascular: Normal rate, regular rhythm and normal heart sounds  Pulmonary/Chest: She has wheezes  Scattered rhonchi and wheeze bilaterally   Abdominal: Soft  Bowel sounds are normal  There is no tenderness  Musculoskeletal: Normal range of motion  Neurological: She is alert and oriented to person, place, and time  Skin: Skin is warm  Capillary refill takes less than 2 seconds  Psychiatric: She has a normal mood and affect         Vital Signs  ED Triage Vitals   Temperature Pulse Respirations Blood Pressure SpO2   02/28/20 1810 02/28/20 1810 02/28/20 1810 02/28/20 1945 02/28/20 1810   (!) 100 6 °F (38 1 °C) 90 18 99/62 99 %      Temp Source Heart Rate Source Patient Position - Orthostatic VS BP Location FiO2 (%)   02/28/20 1810 02/28/20 1810 -- -- --   Tympanic Monitor         Pain Score       02/28/20 1810       No Pain           Vitals:    02/28/20 2000 02/28/20 2015 02/28/20 2030 02/28/20 2045   BP:   111/81    Pulse: (!) 117 (!) 111 102 (!) 109         Visual Acuity      ED Medications  Medications   sodium chloride 0 9 % bolus 1,000 mL (0 mL Intravenous Stopped 2/28/20 2045)   albuterol inhalation solution 5 mg (5 mg Nebulization Given 2/28/20 1830)   ipratropium (ATROVENT) 0 02 % inhalation solution 0 5 mg (0 5 mg Nebulization Given 2/28/20 1835)   cefepime (MAXIPIME) IVPB (premix) 2,000 mg (0 mg Intravenous Stopped 2/28/20 1947)   potassium chloride (K-DUR,KLOR-CON) CR tablet 40 mEq (40 mEq Oral Given 2/28/20 1911)   potassium chloride 20 mEq IVPB (premix) (20 mEq Intravenous New Bag 2/28/20 1910)   oseltamivir (TAMIFLU) capsule 75 mg (75 mg Oral Given 2/28/20 2022)       Diagnostic Studies  Results Reviewed     Procedure Component Value Units Date/Time    Magnesium [[id number]]  (Abnormal) Collected:  02/28/20 1820    Lab Status:  Final result Specimen:  Blood from Arm, Right Updated:  02/28/20 1910     Magnesium 1 8 mg/dL     Influenza A/B and RSV PCR [[id number]]  (Abnormal) Collected:  02/28/20 1820    Lab Status:  Final result Specimen:  Nose Updated:  02/28/20 1904     INFLUENZA A PCR Detected     INFLUENZA B PCR None Detected     RSV PCR None Detected    B-Type Natriuretic Peptide ([address] [place] [address]) [[id number]]  (Abnormal) Collected:  02/28/20 1829    Lab Status:  Final result Specimen:  Blood from Arm, Right Updated:  02/28/20 1903     BNP 1,954 pg/mL     Troponin I [[id number]]  (Abnormal) Collected:  02/28/20 1829    Lab Status:  Final result Specimen:  Blood from Arm, Right Updated:  02/28/20 1852     Troponin I 0 04 ng/mL     Comprehensive metabolic panel [[id number]]  (Abnormal) Collected:  02/28/20 1820    Lab Status:  Final result Specimen:  Blood from Arm, Right Updated:  02/28/20 1848     Sodium 131 mmol/L      Potassium 2 7 mmol/L Chloride 91 mmol/L      CO2 33 mmol/L      ANION GAP 7 mmol/L      BUN 16 mg/dL      Creatinine 0 79 mg/dL      Glucose 140 mg/dL      Calcium 9 0 mg/dL      AST 19 U/L      ALT 8 U/L      Alkaline Phosphatase 54 U/L      Total Protein 7 2 g/dL      Albumin 3 9 g/dL      Total Bilirubin 1 00 mg/dL      eGFR 69 ml/min/1 73sq m     Narrative:       [place] guidelines for Chronic Kidney Disease (CKD):     Stage 1 with normal or high GFR (GFR > 90 mL/min/1 73 square meters)    Stage 2 Mild CKD (GFR = 60-89 mL/min/1 73 square meters)    Stage 3A Moderate CKD (GFR = 45-59 mL/min/1 73 square meters)    Stage 3B Moderate CKD (GFR = 30-44 mL/min/1 73 square meters)    Stage 4 Severe CKD (GFR = 15-29 mL/min/1 73 square meters)    Stage 5 End Stage CKD (GFR <15 mL/min/1 73 square meters)  Note: GFR calculation is accurate only with a steady state creatinine    Lactic acid x2 [[id number]]  (Normal) Collected:  02/28/20 1820    Lab Status:  Final result Specimen:  Blood from Arm, Right Updated:  02/28/20 1847     LACTIC ACID 1 1 mmol/L     Narrative:       Result may be elevated if tourniquet was used during collection      APTT [[id number]]  (Normal) Collected:  02/28/20 1820    Lab Status:  Final result Specimen:  Blood from Arm, Right Updated:  02/28/20 1839     PTT 35 seconds     Protime-INR [[id number]]  (Abnormal) Collected:  02/28/20 1820    Lab Status:  Final result Specimen:  Blood from Arm, Right Updated:  02/28/20 1839     Protime 15 8 seconds      INR 1 36    CBC and differential [[id number]]  (Abnormal) Collected:  02/28/20 1820    Lab Status:  Final result Specimen:  Blood from Arm, Right Updated:  02/28/20 1836     WBC 5 00 Thousand/uL      RBC 3 88 Million/uL      Hemoglobin 11 7 g/dL      Hematocrit 35 0 %      MCV 90 fL      MCH 30 1 pg      MCHC 33 4 g/dL      RDW 15 3 %      MPV 7 9 fL      Platelets 628 Thousands/uL      Neutrophils Relative 75 %      Lymphocytes Relative 9 % Monocytes Relative 15 %      Eosinophils Relative 0 %      Basophils Relative 0 %      Neutrophils Absolute 3 80 Thousands/µL      Lymphocytes Absolute 0 40 Thousands/µL      Monocytes Absolute 0 80 Thousand/µL      Eosinophils Absolute 0 00 Thousand/µL      Basophils Absolute 0 00 Thousands/µL     Blood culture #1 [[id number]] Collected:  02/28/20 1829    Lab Status: In process Specimen:  Blood from Hand, Right Updated:  02/28/20 1836    Blood culture #2 [[id number]] Collected:  02/28/20 1820    Lab Status: In process Specimen:  Blood from Arm, Right Updated:  02/28/20 1835    Procalcitonin [[id number]] Collected:  02/28/20 1820    Lab Status: In process Specimen:  Blood from Arm, Right Updated:  02/28/20 1826    Lactic acid x2 [[id number]]     Lab Status:  No result Specimen:  Blood     UA w Reflex to Microscopic w Reflex to Culture [[id number]]     Lab Status:  No result Specimen:  Urine                  XR chest 2 views    (Results Pending)   CT chest without contrast    (Results Pending)              Procedures  ECG 12 Lead Documentation Only  Date/Time: 2/28/2020 6:30 PM  Performed by: Nicky Segura PA-C  Authorized by: Nicky Segura PA-C     Indications / Diagnosis:  Weakness  ECG reviewed by me, the ED Provider: yes    Patient location:  ED  Previous ECG:     Previous ECG:  Compared to current    Similarity:  No change    Comparison to cardiac monitor: Yes    Interpretation:     Interpretation: normal    Rate:     ECG rate assessment: tachycardic    Rhythm:     Rhythm: atrial fibrillation    Ectopy:     Ectopy: PVCs    QRS:     QRS axis:  Normal  Conduction:     Conduction: normal    ST segments:     ST segments:  Normal  T waves:     T waves: normal    Comments:      No evidence of acute cardiac ischemia             ED Course                               MDM  Number of Diagnoses or Management Options  Influenza A:   Diagnosis management comments: Patient was found be positive for influenza A   She had a possible pneumonia and will be assessed with a noncontrast CT of chest   After discussion with hospitalist patient will be placed in ICU  Her most recent blood pressure was 99 systolic after approximately 500 mL of fluid  Given patient's CHF history and BNP of greater than 1900 careful consideration of fluid overload was considered  Given the patient's risk for septic shock ICU was deemed appropriate and need for pressors will be assessed by and patient staffed  Disposition  Final diagnoses:   Influenza A     Time reflects when diagnosis was documented in both MDM as applicable and the Disposition within this note     Time User Action Codes Description Comment    2/28/2020  8:10 PM Catrina Alvarenga Add [J10 1] Influenza A       ED Disposition     ED Disposition Condition Date/Time Comment    Admit Stable Fri Feb 28, 2020  8:10 PM Case was discussed with Eyal Forrest and the patient's admission status was agreed to be Admission Status: inpatient status to the service of Dr Carissa Yeung  Follow-up Information    None         Patient's Medications   Discharge Prescriptions    No medications on file     No discharge procedures on file      PDMP Review     None          ED Provider  Electronically Signed by           Majorie Cheadle, PA-C  02/28/20 6950

## 2020-02-29 PROBLEM — J96.01 ACUTE RESPIRATORY FAILURE WITH HYPOXIA (HCC): Status: ACTIVE | Noted: 2020-02-29

## 2020-02-29 LAB
ANION GAP SERPL CALCULATED.3IONS-SCNC: 7 MMOL/L (ref 4–13)
BASOPHILS # BLD AUTO: 0 THOUSANDS/ΜL (ref 0–0.1)
BASOPHILS NFR BLD AUTO: 0 % (ref 0–2)
BILIRUB UR QL STRIP: NEGATIVE
BUN SERPL-MCNC: 22 MG/DL (ref 7–25)
CALCIUM SERPL-MCNC: 8.6 MG/DL (ref 8.6–10.5)
CHLORIDE SERPL-SCNC: 96 MMOL/L (ref 98–107)
CLARITY UR: CLEAR
CO2 SERPL-SCNC: 31 MMOL/L (ref 21–31)
COLOR UR: YELLOW
CREAT SERPL-MCNC: 0.85 MG/DL (ref 0.6–1.2)
EOSINOPHIL # BLD AUTO: 0 THOUSAND/ΜL (ref 0–0.61)
EOSINOPHIL NFR BLD AUTO: 0 % (ref 0–5)
ERYTHROCYTE [DISTWIDTH] IN BLOOD BY AUTOMATED COUNT: 15.5 % (ref 11.5–14.5)
GFR SERPL CREATININE-BSD FRML MDRD: 63 ML/MIN/1.73SQ M
GLUCOSE SERPL-MCNC: 88 MG/DL (ref 65–99)
GLUCOSE UR STRIP-MCNC: NEGATIVE MG/DL
HCT VFR BLD AUTO: 33.6 % (ref 42–47)
HGB BLD-MCNC: 11.2 G/DL (ref 12–16)
HGB UR QL STRIP.AUTO: NEGATIVE
KETONES UR STRIP-MCNC: NEGATIVE MG/DL
L PNEUMO1 AG UR QL IA.RAPID: NEGATIVE
LEUKOCYTE ESTERASE UR QL STRIP: NEGATIVE
LYMPHOCYTES # BLD AUTO: 0.5 THOUSANDS/ΜL (ref 0.6–4.47)
LYMPHOCYTES NFR BLD AUTO: 12 % (ref 21–51)
MCH RBC QN AUTO: 30 PG (ref 26–34)
MCHC RBC AUTO-ENTMCNC: 33.4 G/DL (ref 31–37)
MCV RBC AUTO: 90 FL (ref 81–99)
MONOCYTES # BLD AUTO: 0.7 THOUSAND/ΜL (ref 0.17–1.22)
MONOCYTES NFR BLD AUTO: 16 % (ref 2–12)
NEUTROPHILS # BLD AUTO: 3.2 THOUSANDS/ΜL (ref 1.4–6.5)
NEUTS SEG NFR BLD AUTO: 72 % (ref 42–75)
NITRITE UR QL STRIP: NEGATIVE
PH UR STRIP.AUTO: 6 [PH]
PLATELET # BLD AUTO: 183 THOUSANDS/UL (ref 149–390)
PMV BLD AUTO: 7.8 FL (ref 8.6–11.7)
POTASSIUM SERPL-SCNC: 3.3 MMOL/L (ref 3.5–5.5)
PROCALCITONIN SERPL-MCNC: 0.1 NG/ML
PROCALCITONIN SERPL-MCNC: 0.11 NG/ML
PROCALCITONIN SERPL-MCNC: <0.05 NG/ML
PROT UR STRIP-MCNC: NEGATIVE MG/DL
RBC # BLD AUTO: 3.74 MILLION/UL (ref 3.9–5.2)
S PNEUM AG UR QL: NEGATIVE
SODIUM SERPL-SCNC: 134 MMOL/L (ref 134–143)
SP GR UR STRIP.AUTO: 1.01 (ref 1–1.03)
TROPONIN I SERPL-MCNC: 0.05 NG/ML
UROBILINOGEN UR QL STRIP.AUTO: 0.2 E.U./DL
WBC # BLD AUTO: 4.4 THOUSAND/UL (ref 4.8–10.8)

## 2020-02-29 PROCEDURE — 94664 DEMO&/EVAL PT USE INHALER: CPT

## 2020-02-29 PROCEDURE — 94640 AIRWAY INHALATION TREATMENT: CPT

## 2020-02-29 PROCEDURE — 94760 N-INVAS EAR/PLS OXIMETRY 1: CPT

## 2020-02-29 PROCEDURE — 99233 SBSQ HOSP IP/OBS HIGH 50: CPT | Performed by: INTERNAL MEDICINE

## 2020-02-29 PROCEDURE — 84145 PROCALCITONIN (PCT): CPT | Performed by: PHYSICIAN ASSISTANT

## 2020-02-29 PROCEDURE — 87449 NOS EACH ORGANISM AG IA: CPT | Performed by: PHYSICIAN ASSISTANT

## 2020-02-29 PROCEDURE — 85025 COMPLETE CBC W/AUTO DIFF WBC: CPT | Performed by: PHYSICIAN ASSISTANT

## 2020-02-29 PROCEDURE — 84484 ASSAY OF TROPONIN QUANT: CPT | Performed by: PHYSICIAN ASSISTANT

## 2020-02-29 PROCEDURE — 81003 URINALYSIS AUTO W/O SCOPE: CPT | Performed by: PHYSICIAN ASSISTANT

## 2020-02-29 PROCEDURE — 80048 BASIC METABOLIC PNL TOTAL CA: CPT | Performed by: PHYSICIAN ASSISTANT

## 2020-02-29 RX ORDER — SODIUM CHLORIDE 9 MG/ML
75 INJECTION, SOLUTION INTRAVENOUS CONTINUOUS
Status: DISCONTINUED | OUTPATIENT
Start: 2020-02-29 | End: 2020-02-29

## 2020-02-29 RX ADMIN — APIXABAN 2.5 MG: 2.5 TABLET, FILM COATED ORAL at 08:59

## 2020-02-29 RX ADMIN — OSELTAMIVIR PHOSPHATE 30 MG: 30 CAPSULE ORAL at 22:35

## 2020-02-29 RX ADMIN — CEFTRIAXONE 1000 MG: 1 INJECTION, SOLUTION INTRAVENOUS at 06:27

## 2020-02-29 RX ADMIN — HYDROXYZINE HYDROCHLORIDE 25 MG: 25 TABLET, FILM COATED ORAL at 22:32

## 2020-02-29 RX ADMIN — OXYBUTYNIN CHLORIDE 5 MG: 5 TABLET ORAL at 09:00

## 2020-02-29 RX ADMIN — IPRATROPIUM BROMIDE AND ALBUTEROL SULFATE 3 ML: 2.5; .5 SOLUTION RESPIRATORY (INHALATION) at 14:14

## 2020-02-29 RX ADMIN — IPRATROPIUM BROMIDE AND ALBUTEROL SULFATE 3 ML: 2.5; .5 SOLUTION RESPIRATORY (INHALATION) at 08:45

## 2020-02-29 RX ADMIN — METOPROLOL TARTRATE 25 MG: 25 TABLET, FILM COATED ORAL at 09:00

## 2020-02-29 RX ADMIN — HYDROXYZINE HYDROCHLORIDE 25 MG: 25 TABLET, FILM COATED ORAL at 09:00

## 2020-02-29 RX ADMIN — IPRATROPIUM BROMIDE AND ALBUTEROL SULFATE 3 ML: 2.5; .5 SOLUTION RESPIRATORY (INHALATION) at 01:14

## 2020-02-29 RX ADMIN — PRAVASTATIN SODIUM 40 MG: 40 TABLET ORAL at 17:11

## 2020-02-29 RX ADMIN — HYDROXYZINE HYDROCHLORIDE 25 MG: 25 TABLET, FILM COATED ORAL at 04:43

## 2020-02-29 RX ADMIN — APIXABAN 2.5 MG: 2.5 TABLET, FILM COATED ORAL at 17:12

## 2020-02-29 RX ADMIN — OSELTAMIVIR PHOSPHATE 30 MG: 30 CAPSULE ORAL at 09:00

## 2020-02-29 RX ADMIN — POTASSIUM CHLORIDE 20 MEQ: 1500 TABLET, EXTENDED RELEASE ORAL at 08:57

## 2020-02-29 RX ADMIN — GUAIFENESIN 600 MG: 600 TABLET, EXTENDED RELEASE ORAL at 09:00

## 2020-02-29 RX ADMIN — SERTRALINE HYDROCHLORIDE 50 MG: 50 TABLET ORAL at 09:01

## 2020-02-29 RX ADMIN — SODIUM CHLORIDE 75 ML/HR: 0.9 INJECTION, SOLUTION INTRAVENOUS at 08:54

## 2020-02-29 RX ADMIN — GUAIFENESIN 600 MG: 600 TABLET, EXTENDED RELEASE ORAL at 22:35

## 2020-02-29 RX ADMIN — PANTOPRAZOLE SODIUM 40 MG: 40 TABLET, DELAYED RELEASE ORAL at 06:10

## 2020-02-29 RX ADMIN — HYDROXYZINE HYDROCHLORIDE 25 MG: 25 TABLET, FILM COATED ORAL at 17:12

## 2020-02-29 RX ADMIN — METOPROLOL TARTRATE 25 MG: 25 TABLET, FILM COATED ORAL at 17:12

## 2020-02-29 RX ADMIN — IPRATROPIUM BROMIDE AND ALBUTEROL SULFATE 3 ML: 2.5; .5 SOLUTION RESPIRATORY (INHALATION) at 19:21

## 2020-02-29 NOTE — PROGRESS NOTES
Progress Note - Veronica Becerra 1935, 81 y o  female MRN: [id number]    Unit/Bed#: ICU 01 Encounter: [id number]    Primary Care Provider: Teresa Quintana MD   Date and time admitted to hospital: 2/28/2020  6:05 PM        * Sepsis [facility])  Assessment & Plan  · Secondary to influenza A  · Procalcitonin negative  · Discontinue antibiotics  · Patient is not receiving sepsis protocol IV fluid resuscitation secondary to history of congestive heart failure  · Continue Tamiflu    Influenza A  Assessment & Plan  · Continue Tamiflu  · Continue droplet precautions for now  · Patient stable for transfer to [facility] surg    Acute respiratory failure with hypoxia ([address] )  Assessment & Plan  · Secondary to influenza and viral pneumonia  · Titrate oxygen as able    Elevated troponin I level  Assessment & Plan  · No rise or fall, essentially negative  · Denies any chest pain    Pneumonia due to infectious organism  Assessment & Plan  · Being treated as per above, will obtain urine for strep pneumo and Legionella as well as sputum culture Gram stain and treat symptomatically with supportive care  Hypokalemia  Assessment & Plan  · Repleted    Hyponatremia  Assessment & Plan  · Likely hypovolemic hyponatremia  · Improved    HTN (hypertension)  Assessment & Plan  · Continue pre-hospital Lopressor 25 mg p o  B i d , Cardizem 90 mg p o  Q 8 hours  · Lasix on hold in light of sepsis    Chronic diastolic heart failure (HCC)  Assessment & Plan  Wt Readings from Last 3 Encounters:   02/29/20 57 6 kg (126 lb 15 8 oz)   12/19/19 62 1 kg (137 lb)   12/04/19 64 3 kg (141 lb 12 1 oz)     · Patient appears dry in light of her sepsis  · Will hold her Lasix        A-fib (HCC)  Assessment & Plan  · Continue pre-hospital Eliquis 2 5 mg p o  B i d , Lopressor 25 mg p o  B i d   And Cardizem 90 mg p o  Q 8 hours      VTE Pharmacologic Prophylaxis: Pharmacologic: Apixaban (Eliquis)    Patient Centered Rounds: I have performed bedside rounds with nursing staff today  Discussions with Specialists or Other Care Team Provider: n/a  Education and Discussions with Family / Patient: patient and daughter    Current Length of Stay: 1 day(s)    Current Patient Status: Inpatient   Certification Statement: The patient will continue to require additional inpatient hospital stay due to sepsis, influenza    Discharge Plan: pending hospital course    Code Status: Level 1 - Full Code    Subjective:   Patient seen examined  No acute events overnight  Feels better this morning    Objective:     Vitals:   Temp (24hrs), Av 3 °F (37 4 °C), Min:98 5 °F (36 9 °C), Max:100 6 °F (38 1 °C)    Temp:  [98 5 °F (36 9 °C)-100 6 °F (38 1 °C)] 98 5 °F (36 9 °C)  HR:  [] 68  Resp:  [18-24] 20  BP: ()/(48-81) 103/60  SpO2:  [91 %-99 %] 93 %  Body mass index is 25 65 kg/m²  Input and Output Summary (last 24 hours): Intake/Output Summary (Last 24 hours) at 2020 0848  Last data filed at 2020 0601  Gross per 24 hour   Intake 1786 67 ml   Output 350 ml   Net 1436 67 ml       Physical Exam:     Physical Exam   Constitutional: She is oriented to person, place, and time  She appears well-developed and well-nourished  Frail, elderly female   HENT:   Head: Normocephalic and atraumatic  Eyes: Pupils are equal, round, and reactive to light  EOM are normal    Neck: Normal range of motion  Neck supple  Cardiovascular: Normal rate  Irregularly irregular   Pulmonary/Chest: Effort normal  She has rales  Abdominal: Soft  Bowel sounds are normal    Musculoskeletal: Normal range of motion  She exhibits no edema  Neurological: She is alert and oriented to person, place, and time  Skin: Skin is warm  Capillary refill takes less than 2 seconds  Psychiatric: She has a normal mood and affect  Her behavior is normal  Thought content normal    Nursing note and vitals reviewed        Additional Data:     Labs:    Results from last 7 days   Lab Units 20  3749 WBC Thousand/uL 4 40*   HEMOGLOBIN g/dL 11 2*   HEMATOCRIT % 33 6*   PLATELETS Thousands/uL 183   NEUTROS PCT % 72   LYMPHS PCT % 12*   MONOS PCT % 16*   EOS PCT % 0     Results from last 7 days   Lab Units 02/29/20  0450 02/28/20  1820   POTASSIUM mmol/L 3 3* 2 7*   CHLORIDE mmol/L 96* 91*   CO2 mmol/L 31 33*   BUN mg/dL 22 16   CREATININE mg/dL 0 85 0 79   CALCIUM mg/dL 8 6 9 0   ALK PHOS U/L  --  54*   ALT U/L  --  8   AST U/L  --  19     Results from last 7 days   Lab Units 02/28/20  1820   INR  1 36               * I Have Reviewed All Lab Data Listed Above  * Additional Pertinent Lab Tests Reviewed: Alex Stone Admission  Reviewed    Imaging:  Imaging Reports Reviewed Today Include: n/a    Recent Cultures (last 7 days):     Results from last 7 days   Lab Units 02/28/20  1829 02/28/20  1820   BLOOD CULTURE  Received in Microbiology Lab  Culture in Progress  Received in Microbiology Lab  Culture in Progress         Last 24 Hours Medication List:     Current Facility-Administered Medications:  acetaminophen 650 mg Oral Q6H PRN Mare Dominion, PA-C   ALPRAZolam 0 25 mg Oral HS PRN [facility] Dominion, PA-C   apixaban 2 5 mg Oral BID [facility] Dominion, PA-C   diltiazem 90 mg Oral [facility] Dominion, PA-C   guaiFENesin 600 mg Oral Q12H [facility] Dominion, PA-C   hydrOXYzine HCL 25 mg Oral Q6H [facility] Dominion, PA-C   ipratropium-albuterol 3 mL Nebulization Q6H [facility] Dominion, PA-C   magnesium oxide 400 mg Oral Once per day on Mon Wed Fri Mare Dominion, PA-C   metoprolol tartrate 25 mg Oral BID Mare Dominion, PA-C   ondansetron 4 mg Intravenous Q6H PRN [facility] Dominion, PA-C   oseltamivir 30 mg Oral Q12H [facility] Dominion, PA-C   oxybutynin 5 mg Oral Daily [facility] Dominion, PA-C   pantoprazole 40 mg Oral Early Morning [facility] Dominion, PA-C   potassium chloride 20 mEq Oral Daily Fern Moore PA-C   pravastatin 40 mg Oral Daily With Mary Nicholson PA-C   sertraline 50 mg Oral Daily Fern Moore PA-C   sodium chloride 75 mL/hr Intravenous Continuous Michael Patel MD        Today, Patient Was Seen By: Michael Patel MD    ** Please Note: Dictation voice to text software may have been used in the creation of this document   **

## 2020-02-29 NOTE — SOCIAL WORK
CM discussed discharge planning with pt daughter Silvana Lopez  Pt lives in a 2 story house with her son Ron Spring, stays on 1st floor; 1 LOLA  Pt uses RW to ambulate  She is independent with ADLs in the home  Her PCP is Dr Teresa Quintana  She uses [facility] for medications and no barriers were reported  Pt has no history of HHC, STR, Mh and D&A treatment  CM awaiting hospital course to determine discharge needs  Will continue to follow  Patient/caregiver received discharge checklist   Content reviewed  Patient/caregiver encouraged to participate in discharge plan of care prior to discharge home  CM reviewed d/c planning process including the following: identifying help at home, patient preference for d/c planning needs, availability of treatment team to discuss questions or concerns patient and/or family may have regarding understanding medications and recognizing signs and symptoms once discharged  CM also encouraged patient to follow up with all recommended appointments after discharge  Patient advised of importance for patient and family to participate in managing patients medical well being

## 2020-02-29 NOTE — RESPIRATORY THERAPY NOTE
RT Protocol Note  Rupali Elaine 81 y o  female MRN: [id number]  Unit/Bed#: ICU 01 Encounter: [id number]    Assessment    Principal Problem:    Sepsis ([facility] [address] )  Active Problems:    [facility] )    Chronic diastolic heart failure (HCC)    HTN (hypertension)    Hyponatremia    Depression with anxiety    Gastroesophageal reflux disease without esophagitis    OAB (overactive bladder)    Anemia    Hypokalemia    Hypomagnesemia    Mixed hyperlipidemia    Influenza A    Pneumonia due to infectious organism    Elevated troponin I level      Home Pulmonary Medications:  None    Home Devices/Therapy: Home O2    Past Medical History:   Diagnosis Date    Acute on chronic diastolic congestive heart failure (HCC) 6/27/2019    Arthritis     Chronic diastolic heart failure (HCC)     Chronic kidney disease, stage 2 (mild)     Coronary artery disease     history of stenting    Eczema     years    Edema     History of echocardiogram 07/20/2017    EF 55%, mild concentric LVH, bileaflet MVP with moderate MR, left atrial enlargement   History of transfusion     Hyperlipidemia     Hypertension     Hypo-osmolality and hyponatremia     Hypokalemia 7/11/2019    Mitral regurgitation      Social History     Socioeconomic History    Marital status:       Spouse name: None    Number of children: None    Years of education: None    Highest education level: None   Occupational History    None   Social Needs    Financial resource strain: None    Food insecurity:     Worry: None     Inability: None    Transportation needs:     Medical: None     Non-medical: None   Tobacco Use    Smoking status: Never Smoker    Smokeless tobacco: Never Used   Substance and Sexual Activity    Alcohol use: Never     Frequency: Never    Drug use: Never    Sexual activity: Not Currently     Partners: Male   Lifestyle    Physical activity:     Days per week: None     Minutes per session: None    Stress: None   Relationships    Social connections:     Talks on phone: None     Gets together: None     Attends Buddhist service: None     Active member of club or organization: None     Attends meetings of clubs or organizations: None     Relationship status: None    Intimate partner violence:     Fear of current or ex partner: None     Emotionally abused: None     Physically abused: None     Forced sexual activity: None   Other Topics Concern    None   Social History Narrative    None       Subjective         Objective    Physical Exam:   Assessment Type: Pre-treatment  General Appearance: Drowsy  Respiratory Pattern: Dyspnea with exertion  Chest Assessment: Chest expansion symmetrical, Trachea midline  Bilateral Breath Sounds: Diminished  R Breath Sounds: Diminished, Tubular  L Breath Sounds: Diminished  Cough: None  O2 Device: 2 lpm N/C    Vitals:  Blood pressure 91/52, pulse 65, temperature 98 5 °F (36 9 °C), temperature source Tympanic, resp  rate 20, height 4' 11" (1 499 m), weight 57 5 kg (126 lb 12 2 oz), SpO2 96 %  Imaging and other studies: I have personally reviewed pertinent reports  O2 Device: 2 lpm N/C     Plan    Respiratory Plan: Mild Distress pathway        Resp Comments: (P) Pt assessed as per protocol; observed resting comfortably on 2 lpm N/C  Pt uses home o2 @ 2 lpm h s  She does not use any pulmonary medications as per chart  BBS decreased with some tubular sounds RLL  Admitting dx for influenza A and sepsis  CXR shows patchy airspace opacities in RLL  Will continue to monitor as per protocol for any adverse changes in status

## 2020-02-29 NOTE — NURSING NOTE
Received into ICU via litter from the ER accompanied by 1 RN  Assisted to ambulate from the litter to the standing scale to ICU bed #1 with min assist x 1 - gait sltly unsteady  Denies pain, nausea and sob  Connected to the ICU monitors and made comf

## 2020-02-29 NOTE — ED ATTENDING ATTESTATION
2/28/2020  Nicky WORKMAN MD, saw and evaluated the patient  I have discussed the patient with the resident/non-physician practitioner and agree with the resident's/non-physician practitioner's findings, Plan of Care, and MDM as documented in the resident's/non-physician practitioner's note, except where noted  All available labs and Radiology studies were reviewed  I was present for key portions of any procedure(s) performed by the resident/non-physician practitioner and I was immediately available to provide assistance  At this point I agree with the current assessment done in the Emergency Department  I have conducted an independent evaluation of this patient a history and physical is as follows:    ED Course         Critical Care Time  Procedures    Saw patient in conjunction with PA  Patient with flu      + crackles on exam      Concern for some degree of heart failure as well  Pressure soft  Will treat for pneumonia and flu  Admit to inpatient

## 2020-02-29 NOTE — NURSING NOTE
Awake, alert and oriented x4  Denies pain or nausea  States mild sob on exertion  95% on 2l n/c  Lungs with scattered rhonchi and crackles  Pt has a moist np cough  States she isn't coughing anything out  Assessment as noted in computer charting  Pt on resp isolation for FLU  Safety in place

## 2020-02-29 NOTE — SEPSIS NOTE
Sepsis Note   Shanelle Mg 81 y o  female MRN: [id number]  Unit/Bed#: ICU 01 Encounter: [id number]      qSOFA     Row Name 02/29/20 0100 02/29/20 0000 02/28/20 2345 02/28/20 2330 02/28/20 2315    Altered mental status GCS < 15  --  --  --  --  --    Respiratory Rate > / =22  0  0  1  0  0    Systolic BP < / =703  1  0  0  0  0    Q Sofa Score  1  0  1  0  0    Row Name 02/28/20 2300 02/28/20 2245 02/28/20 2240 02/28/20 2230 02/28/20 2215    Altered mental status GCS < 15  --  --  --  --  --    Respiratory Rate > / =22  0  1  --  1  0    Systolic BP < / =699  0  0  0  0  0    Q Sofa Score  0  1  0  1  0    [facility] Name 02/28/20 2200 02/28/20 2145 02/28/20 2130 02/28/20 2045 02/28/20 2030    Altered mental status GCS < 15  --  --  --  --  --    Respiratory Rate > / =22  0  1  1  0  1    Systolic BP < / =295  0  0  0  --  0    Q Sofa Score  0  1  1  1  1    Row Name 02/28/20 2015 02/28/20 2000 02/28/20 1945 02/28/20 1930 02/28/20 1915    Altered mental status GCS < 15  --  --  --  --  --    Respiratory Rate > / =22  0  0  0  0  0    Systolic BP < / =540  --  --  1  --  --    Q Sofa Score  1  1  1  --  --    Row Name 02/28/20 1810                Altered mental status GCS < 15  --        Respiratory Rate > / =37  0        Systolic BP < / =737  --        Q Sofa Score  --                     Default Flowsheet Data (last 720 hours)      Sepsis Reassess     Row Name 02/29/20 0307                   Repeat Volume Status and Tissue Perfusion Assessment Performed    Repeat Volume Status and Tissue Perfusion Assessment Performed  --           Volume Status and Tissue Perfusion Post Fluid Resuscitation * Must Document All *    Vital Signs Reviewed (HR, RR, BP, T)  Yes  -ES        Shock Index Reviewed  Yes  -ES        Arterial Oxygen Saturation Reviewed (POx, SaO2 or SpO2)  Yes (comment %) 96  -ES        Cardio  (!) Normal S1/S2; Irregular rhythm  -ES        Pulmonary  (!) Normal effort;Rhonchi  -ES        Capillary Refill Brisk  -ES        Peripheral Pulses  Radial;Dorsalis Pedis  -ES        Peripheral Pulse  +2  -ES        Dorsalis Pedis  +2  -ES        Skin  Warm;Dry  -ES        Urine output assessed  Adequate  -ES           *OR*   Intensive Monitoring- Must Document One of the Following Four *:    Vital Signs Reviewed  --        * Central Venous Pressure (CVP or RAP)  --        * Central Venous Oxygen (SVO2, ScvO2 or Oxygen saturation via central catheter)  --        * Bedside Cardiovascular US in IVC diameter and % collapse  --        * Passive Leg Raise OR Crystalloid Challenge  --          User Key  (r) = Recorded By, (t) = Taken By, (c) = Cosigned By    Initials Name Provider Type    ES Leonard Jenkins PA-C Physician

## 2020-02-29 NOTE — H&P
H&P- Janet Schumacher 1935, 81 y o  female MRN: [id number]    Unit/Bed#: ICU 01 Encounter: [id number]    Primary Care Provider: Momo Chen MD   Date and time admitted to hospital: 2/28/2020  6:05 PM        * Sepsis [facility])  Assessment & Plan  · Admit to ICU  · Trend procalcitonin lactic acid  · Blood cultures urine culture currently pending  · Patient is not receiving sepsis protocol IV fluid resuscitation secondary to history of congestive heart failure  · Give Zithromax 500 mg IV and Rocephin 1 g IV daily  · Patient met sepsis criteria that she was tachycardic with heart rate of 122, febrile with T-max 100 6° and a known source of infection being multi lobar pneumonia  Pneumonia due to infectious organism  Assessment & Plan  Being treated as per above, will obtain urine for strep pneumo and Legionella as well as sputum culture Gram stain and treat symptomatically with supportive care  Influenza A  Assessment & Plan  · Admit ICU  · Place on respiratory protocol and give DuoNebs q 6  · Give Tamiflu 30 mg p o  Q 12 hours  · Give Mucinex 600 mg p o  B i d   · Place on droplet precautions    Elevated troponin I level  Assessment & Plan  In the setting of sepsis, will trend troponin place on telemetry  Hyponatremia  Assessment & Plan  This is chronic, patient has a history of congestive heart failure so will avoid fluid resuscitation continue monitor with repeat labs in a m  Hypokalemia  Assessment & Plan  Patient received IV as well as p o  Repletion and ER, will continue K-Dur 20 mEq p o  Daily and continue monitor with repeat labs  Mixed hyperlipidemia  Assessment & Plan  Continue pre-hospital Pravachol 40 mg p o   Daily    Hypomagnesemia  Assessment & Plan  Continue pre-hospital Mag oxide 400 mg p o  3 times weekly    Anemia  Assessment & Plan  This appears to be at her baseline with today's hemoglobin 11 7 with last available for review of 11 4, will continue monitor with repeat labs in a  m     OAB (overactive bladder)  Assessment & Plan  Continue pre-hospital Ditropan 5 mg p o  Daily    Gastroesophageal reflux disease without esophagitis  Assessment & Plan  Continue pre-hospital pantoprazole 40 mg p o  Daily    Depression with anxiety  Assessment & Plan  Continue pre-hospital Zoloft 50 mg p o  Daily, Atarax 25 mg p o  Q 6 hours and Xanax 0 25 mg p o  Q h s  P r n  HTN (hypertension)  Assessment & Plan  Continue pre-hospital Lopressor 25 mg p o  B i d , Cardizem 90 mg p o  Q 8 hours and Lasix 40 mg p o  Daily    Chronic diastolic heart failure (HCC)  Assessment & Plan  Wt Readings from Last 3 Encounters:   02/28/20 68 kg (150 lb)   12/19/19 62 1 kg (137 lb)   12/04/19 64 3 kg (141 lb 12 1 oz)     Obtain daily weights, continue pre-hospital Lopressor 25 mg p o  B i d , Cardizem 90 mg p o  Q 8 hours and Lasix 40 mg p o  Daily        A-fib [facility])  Assessment & Plan  Continue pre-hospital Eliquis 2 5 mg p o  B i d , Lopressor 25 mg p o  B i d  And Cardizem 90 mg p o  Q 8 hours      VTE Prophylaxis: Apixaban (Eliquis)  Code Status:  Level 1  POLST: There is no POLST form on file for this patient (pre-hospital)  Discussion with family:  None present at bedside at time of exam    Anticipated Length of Stay:  Patient will be admitted on an Inpatient basis with an anticipated length of stay of  > 2 midnights  Justification for Hospital Stay:  Sepsis secondary to influenza a and multilobar pneumonia requiring IV antibiotics, supportive care    Total Time for Visit, including Counseling / Coordination of Care: 1 hour  Greater than 50% of this total time spent on direct patient counseling and coordination of care  Chief Complaint:   Cough, fever and weakness x3 weeks    History of Present Illness:    Maryjane Warren is a 81 y o  female who presents with cough, fever weakness x3 weeks    Patient presents ER for further evaluation treatment of her 3 week history of cough which is productive of yellowish-greenish sputum as well as the subjective fever and increasing weakness that has been ongoing over the past 3 weeks  Patient reports that she has had exposure to multiple family members were positive for the flu and she unable to receive the flu vaccine secondary to an ache allergy  Patient states that she has had progressive weakness over the past 3 weeks to the point where it is difficult for her to get up and ambulate to the bathroom but denies any muscle aches or body aches  Patient reports some increasing dyspnea with activity and states that she normally wears O2 2 L nasal cannula at night but has been using this sometimes throughout the day to aid in her dyspnea  Patient takes no respiratory medications at home other than previously mentioned O2  Review of Systems:  Review of Systems   Constitutional: Positive for chills, fatigue and fever  HENT: Negative for congestion, rhinorrhea and sore throat  Respiratory: Positive for cough and shortness of breath  Negative for wheezing  Cardiovascular: Negative for chest pain and palpitations  Gastrointestinal: Negative for abdominal pain, diarrhea, nausea and vomiting  Genitourinary: Negative for dysuria, frequency, hematuria and urgency  Neurological: Positive for weakness  Negative for dizziness, syncope, light-headedness, numbness and headaches  All other systems reviewed and are negative  Past Medical and Surgical History:   Past Medical History:   Diagnosis Date    Acute on chronic diastolic congestive heart failure ([facility] [address] ) 6/27/2019    Arthritis     Chronic diastolic heart failure (HCC)     Chronic kidney disease, stage 2 (mild)     Coronary artery disease     history of stenting    Eczema     years    Edema     History of echocardiogram 07/20/2017    EF 55%, mild concentric LVH, bileaflet MVP with moderate MR, left atrial enlargement      History of transfusion     Hyperlipidemia     Hypertension     Hypo-osmolality and hyponatremia     Hypokalemia 7/11/2019    Mitral regurgitation        Past Surgical History:   Procedure Laterality Date    ABDOMINAL SURGERY      hysterectomy    CARDIAC CATHETERIZATION  04/10/2012    EF 65%, no significant CAD, patent stents, severe MR     CORONARY ANGIOPLASTY WITH STENT PLACEMENT  03/21/2007    EF 55%, GARRET to LAD   EYE SURGERY      b/l cataracts    HYSTERECTOMY      JOINT REPLACEMENT      Rt knee       Meds/Allergies:  Prior to Admission medications    Medication Sig Start Date End Date Taking?  Authorizing Provider   ALPRAZolam Rojean Million) 0 25 mg tablet Take 0 25 mg by mouth daily at bedtime as needed for anxiety    Historical Provider, MD   apixaban (ELIQUIS) 2 5 mg Take 1 tablet (2 5 mg total) by mouth 2 (two) times a day 8/8/19   Terri Vera PA-C   diltiazem (CARDIZEM) 90 mg tablet Take 1 tablet (90 mg total) by mouth every 8 (eight) hours 12/27/19   Terri Vera PA-C   furosemide (LASIX) 40 mg tablet Take 1 tablet (40 mg total) by mouth daily 12/27/19   Terri Vera PA-C   guaiFENesin (MUCINEX) 600 mg 12 hr tablet Take 1 tablet (600 mg total) by mouth every 12 (twelve) hours  Patient taking differently: Take 600 mg by mouth as needed  12/4/19   Jennette Severin, MD   hydrocortisone 1 % cream Apply to affected area 2 times daily 10/18/19   Lexus Quintana MD   hydrOXYzine HCL (ATARAX) 25 mg tablet Take 1 tablet (25 mg total) by mouth every 6 (six) hours 10/18/19   Lexus Quintana MD   magnesium oxide (MAG-OX) 400 mg Take 1 tablet (400 mg total) by mouth 3 (three) times a week 9/4/19   Ti Stephenson DO   metoprolol tartrate (LOPRESSOR) 25 mg tablet Take 1 tablet (25 mg total) by mouth 3 (three) times a day  Patient taking differently: Take 25 mg by mouth 2 (two) times a day  7/12/19   Olga Lidia Golden MD   oxybutynin (DITROPAN) 5 mg tablet Take 5 mg by mouth daily    Historical Provider, MD   PANTOPRAZOLE SODIUM PO Take by mouth daily    Historical Provider, MD   pravastatin (PRAVACHOL) 40 mg tablet Take 1 tablet (40 mg total) by mouth daily with dinner 7/12/19   Olga Washington MD   sertraline (ZOLOFT) 50 mg tablet Take 50 mg by mouth daily    Historical Provider, MD     I have reviewed home medications with patient personally  Allergies: Allergies   Allergen Reactions    Chocolate Flavor Itching    Shellfish-Derived Products Shortness Of Breath    Iodine Other (See Comments)     shellfish- difficulty breathing    Codeine Rash       Social History:  Marital Status:    Occupation:  Retired [employer]  Patient Pre-hospital Living Situation:  Resides at home with son  Patient Pre-hospital Level of Mobility:  Full with assist of a walker  Patient Pre-hospital Diet Restrictions:  Low-salt  Substance Use History:     Social History     Substance and Sexual Activity   Alcohol Use Never    Frequency: Never     Social History     Tobacco Use   Smoking Status Never Smoker   Smokeless Tobacco Never Used     Social History     Substance and Sexual Activity   Drug Use Never       Family History:  I have reviewed the patients family history    Physical Exam:   Vitals:   Blood Pressure: 121/72 (02/28/20 2240)  Pulse: (!) 122 (02/28/20 2240)  Temperature: 99 2 °F (37 3 °C) (02/28/20 2015)  Temp Source: Tympanic (02/28/20 1810)  Respirations: 20 (02/28/20 2045)  Weight - Scale: 68 kg (150 lb) (02/28/20 1810)  SpO2: 98 % (02/28/20 2045)    Physical Exam   Constitutional: She is oriented to person, place, and time  She appears well-developed and well-nourished  HENT:   Head: Normocephalic and atraumatic  Mouth/Throat: No oropharyngeal exudate  Eyes: Pupils are equal, round, and reactive to light  EOM are normal  No scleral icterus  Neck: Normal range of motion  Neck supple  No JVD present  Cardiovascular: An irregularly irregular rhythm present  Tachycardia present  Murmur heard  Pulmonary/Chest: Effort normal  No respiratory distress   She has decreased breath sounds  She has no wheezes  She has rhonchi  She has no rales  Abdominal: Soft  Bowel sounds are normal  There is no tenderness  There is no rebound and no guarding  Musculoskeletal: Normal range of motion  She exhibits no edema  Lymphadenopathy:     She has no cervical adenopathy  Neurological: She is alert and oriented to person, place, and time  Skin: Skin is warm and dry  No rash noted  No erythema  Psychiatric: She has a normal mood and affect  Her behavior is normal    Nursing note and vitals reviewed  Additional Data:   Lab Results: I have personally reviewed pertinent reports  Results from last 7 days   Lab Units 02/28/20  1820   WBC Thousand/uL 5 00   HEMOGLOBIN g/dL 11 7*   HEMATOCRIT % 35 0*   PLATELETS Thousands/uL 203   NEUTROS PCT % 75   LYMPHS PCT % 9*   MONOS PCT % 15*   EOS PCT % 0     Results from last 7 days   Lab Units 02/28/20  1820   POTASSIUM mmol/L 2 7*   CHLORIDE mmol/L 91*   CO2 mmol/L 33*   BUN mg/dL 16   CREATININE mg/dL 0 79   CALCIUM mg/dL 9 0   ALK PHOS U/L 54*   ALT U/L 8   AST U/L 19     Results from last 7 days   Lab Units 02/28/20  1820   INR  1 36               Imaging: I have personally reviewed pertinent reports  CT chest without contrast   Final Result by Shiv Peraza MD (02/28 2220)         1  Multifocal pneumonia in the right lower and left upper lobes  2   Small bilateral pleural effusions  Workstation performed: [id number]         XR chest 2 views    (Results Pending)       EKG, Pathology, and Other Studies Reviewed on Admission:   · EKG:  Atrial fibrillation with PVCs    [employer] Records Reviewed: Yes     ** Please Note: This note has been constructed using a voice recognition system   **

## 2020-03-01 LAB
ANION GAP SERPL CALCULATED.3IONS-SCNC: 7 MMOL/L (ref 4–13)
ATRIAL RATE: 109 BPM
BUN SERPL-MCNC: 18 MG/DL (ref 7–25)
CALCIUM SERPL-MCNC: 8.8 MG/DL (ref 8.6–10.5)
CHLORIDE SERPL-SCNC: 97 MMOL/L (ref 98–107)
CO2 SERPL-SCNC: 29 MMOL/L (ref 21–31)
CREAT SERPL-MCNC: 0.69 MG/DL (ref 0.6–1.2)
ERYTHROCYTE [DISTWIDTH] IN BLOOD BY AUTOMATED COUNT: 15.4 % (ref 11.5–14.5)
GFR SERPL CREATININE-BSD FRML MDRD: 80 ML/MIN/1.73SQ M
GLUCOSE SERPL-MCNC: 84 MG/DL (ref 65–99)
HCT VFR BLD AUTO: 35.5 % (ref 42–47)
HGB BLD-MCNC: 11.6 G/DL (ref 12–16)
MCH RBC QN AUTO: 29.8 PG (ref 26–34)
MCHC RBC AUTO-ENTMCNC: 32.6 G/DL (ref 31–37)
MCV RBC AUTO: 91 FL (ref 81–99)
PLATELET # BLD AUTO: 175 THOUSANDS/UL (ref 149–390)
PMV BLD AUTO: 8.3 FL (ref 8.6–11.7)
POTASSIUM SERPL-SCNC: 3.5 MMOL/L (ref 3.5–5.5)
PR INTERVAL: 152 MS
PROCALCITONIN SERPL-MCNC: 0.06 NG/ML
QRS AXIS: -27 DEGREES
QRSD INTERVAL: 94 MS
QT INTERVAL: 346 MS
QTC INTERVAL: 465 MS
RBC # BLD AUTO: 3.89 MILLION/UL (ref 3.9–5.2)
SODIUM SERPL-SCNC: 133 MMOL/L (ref 134–143)
T WAVE AXIS: 31 DEGREES
VENTRICULAR RATE: 109 BPM
WBC # BLD AUTO: 3.5 THOUSAND/UL (ref 4.8–10.8)

## 2020-03-01 PROCEDURE — 94760 N-INVAS EAR/PLS OXIMETRY 1: CPT

## 2020-03-01 PROCEDURE — 94640 AIRWAY INHALATION TREATMENT: CPT

## 2020-03-01 PROCEDURE — 99233 SBSQ HOSP IP/OBS HIGH 50: CPT | Performed by: INTERNAL MEDICINE

## 2020-03-01 PROCEDURE — 84145 PROCALCITONIN (PCT): CPT | Performed by: INTERNAL MEDICINE

## 2020-03-01 PROCEDURE — 80048 BASIC METABOLIC PNL TOTAL CA: CPT | Performed by: INTERNAL MEDICINE

## 2020-03-01 PROCEDURE — 85027 COMPLETE CBC AUTOMATED: CPT | Performed by: INTERNAL MEDICINE

## 2020-03-01 PROCEDURE — 93010 ELECTROCARDIOGRAM REPORT: CPT | Performed by: INTERNAL MEDICINE

## 2020-03-01 RX ORDER — ALBUTEROL SULFATE 2.5 MG/3ML
2.5 SOLUTION RESPIRATORY (INHALATION) EVERY 4 HOURS PRN
Status: DISCONTINUED | OUTPATIENT
Start: 2020-03-01 | End: 2020-03-06 | Stop reason: HOSPADM

## 2020-03-01 RX ORDER — IPRATROPIUM BROMIDE AND ALBUTEROL SULFATE 2.5; .5 MG/3ML; MG/3ML
3 SOLUTION RESPIRATORY (INHALATION)
Status: DISCONTINUED | OUTPATIENT
Start: 2020-03-01 | End: 2020-03-02

## 2020-03-01 RX ADMIN — IPRATROPIUM BROMIDE AND ALBUTEROL SULFATE 3 ML: 2.5; .5 SOLUTION RESPIRATORY (INHALATION) at 14:10

## 2020-03-01 RX ADMIN — METOPROLOL TARTRATE 25 MG: 25 TABLET, FILM COATED ORAL at 18:00

## 2020-03-01 RX ADMIN — IPRATROPIUM BROMIDE AND ALBUTEROL SULFATE 3 ML: 2.5; .5 SOLUTION RESPIRATORY (INHALATION) at 02:33

## 2020-03-01 RX ADMIN — ACETAMINOPHEN 650 MG: 325 TABLET ORAL at 20:35

## 2020-03-01 RX ADMIN — IPRATROPIUM BROMIDE AND ALBUTEROL SULFATE 3 ML: 2.5; .5 SOLUTION RESPIRATORY (INHALATION) at 07:57

## 2020-03-01 RX ADMIN — HYDROXYZINE HYDROCHLORIDE 25 MG: 25 TABLET, FILM COATED ORAL at 22:41

## 2020-03-01 RX ADMIN — DILTIAZEM HYDROCHLORIDE 90 MG: 60 TABLET, FILM COATED ORAL at 14:36

## 2020-03-01 RX ADMIN — GUAIFENESIN 600 MG: 600 TABLET, EXTENDED RELEASE ORAL at 08:28

## 2020-03-01 RX ADMIN — HYDROXYZINE HYDROCHLORIDE 25 MG: 25 TABLET, FILM COATED ORAL at 18:00

## 2020-03-01 RX ADMIN — GUAIFENESIN 600 MG: 600 TABLET, EXTENDED RELEASE ORAL at 20:36

## 2020-03-01 RX ADMIN — Medication 1 SPRAY: at 22:40

## 2020-03-01 RX ADMIN — IPRATROPIUM BROMIDE AND ALBUTEROL SULFATE 3 ML: 2.5; .5 SOLUTION RESPIRATORY (INHALATION) at 20:10

## 2020-03-01 RX ADMIN — POTASSIUM CHLORIDE 20 MEQ: 1500 TABLET, EXTENDED RELEASE ORAL at 08:28

## 2020-03-01 RX ADMIN — DILTIAZEM HYDROCHLORIDE 90 MG: 60 TABLET, FILM COATED ORAL at 22:41

## 2020-03-01 RX ADMIN — OSELTAMIVIR PHOSPHATE 30 MG: 30 CAPSULE ORAL at 08:27

## 2020-03-01 RX ADMIN — APIXABAN 2.5 MG: 2.5 TABLET, FILM COATED ORAL at 08:27

## 2020-03-01 RX ADMIN — DILTIAZEM HYDROCHLORIDE 90 MG: 60 TABLET, FILM COATED ORAL at 05:46

## 2020-03-01 RX ADMIN — METOPROLOL TARTRATE 25 MG: 25 TABLET, FILM COATED ORAL at 08:28

## 2020-03-01 RX ADMIN — HYDROXYZINE HYDROCHLORIDE 25 MG: 25 TABLET, FILM COATED ORAL at 09:51

## 2020-03-01 RX ADMIN — PRAVASTATIN SODIUM 40 MG: 40 TABLET ORAL at 18:00

## 2020-03-01 RX ADMIN — PANTOPRAZOLE SODIUM 40 MG: 40 TABLET, DELAYED RELEASE ORAL at 05:42

## 2020-03-01 RX ADMIN — SERTRALINE HYDROCHLORIDE 50 MG: 50 TABLET ORAL at 08:28

## 2020-03-01 RX ADMIN — OSELTAMIVIR PHOSPHATE 30 MG: 30 CAPSULE ORAL at 20:36

## 2020-03-01 RX ADMIN — HYDROXYZINE HYDROCHLORIDE 25 MG: 25 TABLET, FILM COATED ORAL at 05:43

## 2020-03-01 RX ADMIN — APIXABAN 2.5 MG: 2.5 TABLET, FILM COATED ORAL at 18:00

## 2020-03-01 RX ADMIN — OXYBUTYNIN CHLORIDE 5 MG: 5 TABLET ORAL at 08:28

## 2020-03-01 NOTE — PROGRESS NOTES
Progress Note - Ashtyn Mamadou 1935, 81 y o  female MRN: [id number]    Unit/Bed#: -01 Encounter: [id number]    Primary Care Provider: Meli Gorman MD   Date and time admitted to hospital: 2/28/2020  6:05 PM        * Sepsis [facility])  Assessment & Plan  · Secondary to influenza A  · Procalcitonin negative  · Discontinue antibiotics  · Patient is not receiving sepsis protocol IV fluid resuscitation secondary to history of congestive heart failure  · Continue Tamiflu    Influenza A  Assessment & Plan  · Continue Tamiflu  · Continue droplet precautions    Acute respiratory failure with hypoxia ([facility] [address] )  Assessment & Plan  · Secondary to influenza and viral pneumonia  · Titrate oxygen as able    Elevated troponin I level  Assessment & Plan  · No rise or fall, essentially negative  · Denies any chest pain    Pneumonia due to infectious organism  Assessment & Plan  · Viral pneumonia  · Procalcitonin negative x2    Hypokalemia  Assessment & Plan  · Repleted    HTN (hypertension)  Assessment & Plan  · Continue pre-hospital Lopressor 25 mg p o  B i d , Cardizem 90 mg p o  Q 8 hours  · Lasix on hold in light of sepsis    Chronic diastolic heart failure (HCC)  Assessment & Plan  Wt Readings from Last 3 Encounters:   03/01/20 57 5 kg (126 lb 10 5 oz)   12/19/19 62 1 kg (137 lb)   12/04/19 64 3 kg (141 lb 12 1 oz)     · Patient appears dry in light of her sepsis  · Will hold her Lasix        A-fib (HCC)  Assessment & Plan  · Continue pre-hospital Eliquis 2 5 mg p o  B i d , Lopressor 25 mg p o  B i d  And Cardizem 90 mg p o  Q 8 hours        VTE Pharmacologic Prophylaxis: Pharmacologic: Apixaban (Eliquis)    Patient Centered Rounds: I have performed bedside rounds with nursing staff today  Discussions with Specialists or Other Care Team Provider: n/a  Education and Discussions with Family / Patient: patient    Current Length of Stay: 2 day(s)    Current Patient Status: Inpatient   Certification Statement:  The patient will continue to require additional inpatient hospital stay due to respiratory failure    Discharge Plan: pending hospital course    Code Status: Level 1 - Full Code    Subjective:   Patient seen examined  No acute events overnight  Feeling somewhat improved today    Objective:     Vitals:   Temp (24hrs), Av 6 °F (37 °C), Min:97 4 °F (36 3 °C), Max:99 5 °F (37 5 °C)    Temp:  [97 4 °F (36 3 °C)-99 5 °F (37 5 °C)] 97 4 °F (36 3 °C)  HR:  [] 98  Resp:  [14-29] 16  BP: ()/(58-88) 138/62  SpO2:  [93 %-98 %] 93 %  Body mass index is 25 58 kg/m²  Input and Output Summary (last 24 hours): Intake/Output Summary (Last 24 hours) at 3/1/2020 1130  Last data filed at 3/1/2020 1108  Gross per 24 hour   Intake 600 ml   Output 900 ml   Net -300 ml       Physical Exam:     Physical Exam   Constitutional: She is oriented to person, place, and time  She appears well-developed and well-nourished  HENT:   Head: Normocephalic and atraumatic  Eyes: Pupils are equal, round, and reactive to light  EOM are normal    Neck: Normal range of motion  Neck supple  Cardiovascular: Normal rate and regular rhythm  Pulmonary/Chest: Effort normal  No respiratory distress  Coarse breath sounds noted bilaterally   Abdominal: Soft  Bowel sounds are normal    Musculoskeletal: Normal range of motion  She exhibits no edema  Neurological: She is alert and oriented to person, place, and time  Skin: Skin is warm  Capillary refill takes less than 2 seconds  Psychiatric: She has a normal mood and affect  Her behavior is normal  Thought content normal    Nursing note and vitals reviewed        Additional Data:     Labs:    Results from last 7 days   Lab Units 20  0541 20  0450   WBC Thousand/uL 3 50* 4 40*   HEMOGLOBIN g/dL 11 6* 11 2*   HEMATOCRIT % 35 5* 33 6*   PLATELETS Thousands/uL 175 183   NEUTROS PCT %  --  72   LYMPHS PCT %  --  12*   MONOS PCT %  --  16*   EOS PCT %  --  0     Results from last 7 days   Lab Units 03/01/20  0541  02/28/20  1820   POTASSIUM mmol/L 3 5   < > 2 7*   CHLORIDE mmol/L 97*   < > 91*   CO2 mmol/L 29   < > 33*   BUN mg/dL 18   < > 16   CREATININE mg/dL 0 69   < > 0 79   CALCIUM mg/dL 8 8   < > 9 0   ALK PHOS U/L  --   --  54*   ALT U/L  --   --  8   AST U/L  --   --  19    < > = values in this interval not displayed  Results from last 7 days   Lab Units 02/28/20  1820   INR  1 36               * I Have Reviewed All Lab Data Listed Above  * Additional Pertinent Lab Tests Reviewed: [address] Admission  Reviewed    Imaging:  Imaging Reports Reviewed Today Include: n/a    Recent Cultures (last 7 days):     Results from last 7 days   Lab Units 02/29/20  0048 02/28/20  1829 02/28/20  1820   BLOOD CULTURE   --  No Growth at 24 hrs  No Growth at 24 hrs     LEGIONELLA URINARY ANTIGEN  Negative  --   --        Last 24 Hours Medication List:     Current Facility-Administered Medications:  acetaminophen 650 mg Oral Q6H PRN Julia Madrigal MD   albuterol 2 5 mg Nebulization Q4H PRN Julia Madrigal MD   ALPRAZolam 0 25 mg Oral HS PRN Julia Madrigal MD   apixaban 2 5 mg Oral BID Julia Madrigal MD   diltiazem 90 mg Oral [facility] Julia Madrigal MD   guaiFENesin 600 mg Oral Q12H 300 Carter Leary MD   hydrOXYzine HCL 25 mg Oral Key Kay MD   ipratropium-albuterol 3 mL Nebulization Q6H While awake Julia Madrigal MD   magnesium oxide 400 mg Oral Once per day on Mon Wed Fri Julia Madrigal MD   metoprolol tartrate 25 mg Oral BID Julia Madrigal MD   ondansetron 4 mg Intravenous Q6H PRN Julia Madrigal MD   oseltamivir 30 mg Oral Q12H 300 Carter Leary MD   oxybutynin 5 mg Oral Daily Julia Madrigal MD   pantoprazole 40 mg Oral Early Morning Julia Madrigal MD   potassium chloride 20 mEq Oral Daily Julia Madrigal MD   pravastatin 40 mg Oral Daily With Marcin Simmons MD   sertraline 50 mg Oral Daily Julia Madrigal MD        Today, Patient Was Seen By: Julia Madrigal MD    ** Please Note: Dictation voice to text software may have been used in the creation of this document   **

## 2020-03-01 NOTE — PLAN OF CARE
Problem: Potential for Falls  Goal: Patient will remain free of falls  Description  INTERVENTIONS:  - Assess patient frequently for physical needs  -  Identify cognitive and physical deficits and behaviors that affect risk of falls  -  [place] fall precautions as indicated by assessment   - Educate patient/family on patient safety including physical limitations  - Instruct patient to call for assistance with activity based on assessment  - Modify environment to reduce risk of injury  - Consider OT/PT consult to assist with strengthening/mobility  Outcome: Progressing     Problem: Prexisting or High Potential for Compromised Skin Integrity  Goal: Skin integrity is maintained or improved  Description  INTERVENTIONS:  - Identify patients at risk for skin breakdown  - Assess and monitor skin integrity  - Assess and monitor nutrition and hydration status  - Monitor labs   - Assess for incontinence   - Turn and reposition patient  - Assist with mobility/ambulation  - Relieve pressure over bony prominences  - Avoid friction and shearing  - Provide appropriate hygiene as needed including keeping skin clean and dry  - Evaluate need for skin moisturizer/barrier cream  - Collaborate with interdisciplinary team   - Patient/family teaching  - Consider wound care consult   Outcome: Progressing     Problem: Nutrition/Hydration-ADULT  Goal: Nutrient/Hydration intake appropriate for improving, restoring or maintaining nutritional needs  Description  Monitor and assess patient's nutrition/hydration status for malnutrition  Collaborate with interdisciplinary team and initiate plan and interventions as ordered  Monitor patient's weight and dietary intake as ordered or per policy  Utilize nutrition screening tool and intervene as necessary  Determine patient's food preferences and provide high-protein, high-caloric foods as appropriate       INTERVENTIONS:  - Monitor oral intake, urinary output, labs, and treatment plans  - Assess nutrition and hydration status and recommend course of action  - Evaluate amount of meals eaten  - Assist patient with eating if necessary   - Allow adequate time for meals  - Recommend/ encourage appropriate diets, oral nutritional supplements, and vitamin/mineral supplements  - Order, calculate, and assess calorie counts as needed  - Recommend, monitor, and adjust tube feedings and TPN/PPN based on assessed needs  - Assess need for intravenous fluids  - Provide specific nutrition/hydration education as appropriate  - Include patient/family/caregiver in decisions related to nutrition  Outcome: Progressing     Problem: INFECTION - ADULT  Goal: Absence or prevention of progression during hospitalization  Description  INTERVENTIONS:  - Assess and monitor for signs and symptoms of infection  - Monitor lab/diagnostic results  - Monitor all insertion sites, i e  indwelling lines, tubes, and drains  - Monitor endotracheal if appropriate and nasal secretions for changes in amount and color  - [place] appropriate cooling/warming therapies per order  - Administer medications as ordered  - Instruct and encourage patient and family to use good hand hygiene technique  - Identify and instruct in appropriate isolation precautions for identified infection/condition  Outcome: Progressing  Goal: Absence of fever/infection during neutropenic period  Description  INTERVENTIONS:  - Monitor WBC    Outcome: Progressing     Problem: SAFETY ADULT  Goal: Maintain or return to baseline ADL function  Description  INTERVENTIONS:  -  Assess patient's ability to carry out ADLs; assess patient's baseline for ADL function and identify physical deficits which impact ability to perform ADLs (bathing, care of mouth/teeth, toileting, grooming, dressing, etc )  - Assess/evaluate cause of self-care deficits   - Assess range of motion  - Assess patient's mobility; develop plan if impaired  - Assess patient's need for assistive devices and provide as appropriate  - Encourage maximum independence but intervene and supervise when necessary  - Involve family in performance of ADLs  - Assess for home care needs following discharge   - Consider OT consult to assist with ADL evaluation and planning for discharge  - Provide patient education as appropriate  Outcome: Progressing  Goal: Maintain or return mobility status to optimal level  Description  INTERVENTIONS:  - Assess patient's baseline mobility status (ambulation, transfers, stairs, etc )    - Identify cognitive and physical deficits and behaviors that affect mobility  - Identify mobility aids required to assist with transfers and/or ambulation (gait belt, sit-to-stand, lift, walker, cane, etc )  - [place] fall precautions as indicated by assessment  - Record patient progress and toleration of activity level on Mobility SBAR; progress patient to next Phase/Stage  - Instruct patient to call for assistance with activity based on assessment  - Consider rehabilitation consult to assist with strengthening/weightbearing, etc   Outcome: Progressing     Problem: DISCHARGE PLANNING  Goal: Discharge to home or other facility with appropriate resources  Description  INTERVENTIONS:  - Identify barriers to discharge w/patient and caregiver  - Arrange for needed discharge resources and transportation as appropriate  - Identify discharge learning needs (meds, wound care, etc )  - Arrange for interpretive services to assist at discharge as needed  - Refer to Case Management Department for coordinating discharge planning if the patient needs post-hospital services based on physician/advanced practitioner order or complex needs related to functional status, cognitive ability, or social support system  Outcome: Progressing     Problem: Knowledge Deficit  Goal: Patient/family/caregiver demonstrates understanding of disease process, treatment plan, medications, and discharge instructions  Description  Complete learning assessment and assess knowledge base    Interventions:  - Provide teaching at level of understanding  - Provide teaching via preferred learning methods  Outcome: Progressing

## 2020-03-02 PROBLEM — J10.00 PNEUMONIA OF BOTH LUNGS DUE TO INFLUENZA A VIRUS: Status: ACTIVE | Noted: 2020-02-28

## 2020-03-02 PROBLEM — E87.6 ACUTE HYPOKALEMIA: Status: ACTIVE | Noted: 2019-07-02

## 2020-03-02 PROBLEM — R65.20 SEPSIS DUE TO HAEMOPHILUS INFLUENZAE WITH ACUTE HYPOXIC RESPIRATORY FAILURE WITHOUT SEPTIC SHOCK (HCC): Status: ACTIVE | Noted: 2020-02-28

## 2020-03-02 PROBLEM — I48.11 LONGSTANDING PERSISTENT ATRIAL FIBRILLATION (HCC): Status: ACTIVE | Noted: 2019-06-25

## 2020-03-02 PROBLEM — J96.01 SEPSIS DUE TO HAEMOPHILUS INFLUENZAE WITH ACUTE HYPOXIC RESPIRATORY FAILURE WITHOUT SEPTIC SHOCK (HCC): Status: ACTIVE | Noted: 2020-02-28

## 2020-03-02 PROBLEM — A41.3 SEPSIS DUE TO HAEMOPHILUS INFLUENZAE WITH ACUTE HYPOXIC RESPIRATORY FAILURE WITHOUT SEPTIC SHOCK (HCC): Status: ACTIVE | Noted: 2020-02-28

## 2020-03-02 PROBLEM — J11.00 PNEUMONIA OF BOTH LOWER LOBES DUE TO INFLUENZA A VIRUS: Status: ACTIVE | Noted: 2020-02-28

## 2020-03-02 LAB
ANION GAP SERPL CALCULATED.3IONS-SCNC: 7 MMOL/L (ref 4–13)
BUN SERPL-MCNC: 14 MG/DL (ref 7–25)
CALCIUM SERPL-MCNC: 9.1 MG/DL (ref 8.6–10.5)
CHLORIDE SERPL-SCNC: 100 MMOL/L (ref 98–107)
CO2 SERPL-SCNC: 28 MMOL/L (ref 21–31)
CREAT SERPL-MCNC: 0.66 MG/DL (ref 0.6–1.2)
ERYTHROCYTE [DISTWIDTH] IN BLOOD BY AUTOMATED COUNT: 15 % (ref 11.5–14.5)
GFR SERPL CREATININE-BSD FRML MDRD: 81 ML/MIN/1.73SQ M
GLUCOSE SERPL-MCNC: 107 MG/DL (ref 65–99)
HCT VFR BLD AUTO: 35.7 % (ref 42–47)
HGB BLD-MCNC: 12.1 G/DL (ref 12–16)
MCH RBC QN AUTO: 30.4 PG (ref 26–34)
MCHC RBC AUTO-ENTMCNC: 33.9 G/DL (ref 31–37)
MCV RBC AUTO: 90 FL (ref 81–99)
PLATELET # BLD AUTO: 179 THOUSANDS/UL (ref 149–390)
PMV BLD AUTO: 8.2 FL (ref 8.6–11.7)
POTASSIUM SERPL-SCNC: 3.6 MMOL/L (ref 3.5–5.5)
RBC # BLD AUTO: 3.99 MILLION/UL (ref 3.9–5.2)
SODIUM SERPL-SCNC: 135 MMOL/L (ref 134–143)
WBC # BLD AUTO: 3.4 THOUSAND/UL (ref 4.8–10.8)

## 2020-03-02 PROCEDURE — 94640 AIRWAY INHALATION TREATMENT: CPT

## 2020-03-02 PROCEDURE — 80048 BASIC METABOLIC PNL TOTAL CA: CPT | Performed by: INTERNAL MEDICINE

## 2020-03-02 PROCEDURE — 85027 COMPLETE CBC AUTOMATED: CPT | Performed by: INTERNAL MEDICINE

## 2020-03-02 PROCEDURE — 94760 N-INVAS EAR/PLS OXIMETRY 1: CPT

## 2020-03-02 PROCEDURE — 99233 SBSQ HOSP IP/OBS HIGH 50: CPT | Performed by: NURSE PRACTITIONER

## 2020-03-02 RX ORDER — SODIUM CHLORIDE FOR INHALATION 0.9 %
3 VIAL, NEBULIZER (ML) INHALATION
Status: DISCONTINUED | OUTPATIENT
Start: 2020-03-02 | End: 2020-03-02

## 2020-03-02 RX ORDER — LEVALBUTEROL 1.25 MG/.5ML
1.25 SOLUTION, CONCENTRATE RESPIRATORY (INHALATION)
Status: DISCONTINUED | OUTPATIENT
Start: 2020-03-02 | End: 2020-03-06 | Stop reason: HOSPADM

## 2020-03-02 RX ADMIN — HYDROXYZINE HYDROCHLORIDE 25 MG: 25 TABLET, FILM COATED ORAL at 05:11

## 2020-03-02 RX ADMIN — DILTIAZEM HYDROCHLORIDE 90 MG: 60 TABLET, FILM COATED ORAL at 14:56

## 2020-03-02 RX ADMIN — HYDROXYZINE HYDROCHLORIDE 25 MG: 25 TABLET, FILM COATED ORAL at 22:03

## 2020-03-02 RX ADMIN — MAGNESIUM GLUCONATE 500 MG ORAL TABLET 400 MG: 500 TABLET ORAL at 10:20

## 2020-03-02 RX ADMIN — SERTRALINE HYDROCHLORIDE 50 MG: 50 TABLET ORAL at 10:12

## 2020-03-02 RX ADMIN — OXYBUTYNIN CHLORIDE 5 MG: 5 TABLET ORAL at 10:12

## 2020-03-02 RX ADMIN — IPRATROPIUM BROMIDE 0.5 MG: 0.5 SOLUTION RESPIRATORY (INHALATION) at 21:32

## 2020-03-02 RX ADMIN — GUAIFENESIN 600 MG: 600 TABLET, EXTENDED RELEASE ORAL at 10:12

## 2020-03-02 RX ADMIN — HYDROXYZINE HYDROCHLORIDE 25 MG: 25 TABLET, FILM COATED ORAL at 10:12

## 2020-03-02 RX ADMIN — IPRATROPIUM BROMIDE 0.5 MG: 0.5 SOLUTION RESPIRATORY (INHALATION) at 14:41

## 2020-03-02 RX ADMIN — APIXABAN 2.5 MG: 2.5 TABLET, FILM COATED ORAL at 10:12

## 2020-03-02 RX ADMIN — LEVALBUTEROL HYDROCHLORIDE 1.25 MG: 1.25 SOLUTION, CONCENTRATE RESPIRATORY (INHALATION) at 14:41

## 2020-03-02 RX ADMIN — APIXABAN 2.5 MG: 2.5 TABLET, FILM COATED ORAL at 18:00

## 2020-03-02 RX ADMIN — PRAVASTATIN SODIUM 40 MG: 40 TABLET ORAL at 17:59

## 2020-03-02 RX ADMIN — LEVALBUTEROL HYDROCHLORIDE 1.25 MG: 1.25 SOLUTION, CONCENTRATE RESPIRATORY (INHALATION) at 21:32

## 2020-03-02 RX ADMIN — HYDROXYZINE HYDROCHLORIDE 25 MG: 25 TABLET, FILM COATED ORAL at 17:59

## 2020-03-02 RX ADMIN — METOPROLOL TARTRATE 25 MG: 25 TABLET, FILM COATED ORAL at 17:59

## 2020-03-02 RX ADMIN — OSELTAMIVIR PHOSPHATE 30 MG: 30 CAPSULE ORAL at 10:12

## 2020-03-02 RX ADMIN — POTASSIUM CHLORIDE 20 MEQ: 1500 TABLET, EXTENDED RELEASE ORAL at 10:12

## 2020-03-02 RX ADMIN — PANTOPRAZOLE SODIUM 40 MG: 40 TABLET, DELAYED RELEASE ORAL at 05:11

## 2020-03-02 RX ADMIN — GUAIFENESIN 600 MG: 600 TABLET, EXTENDED RELEASE ORAL at 22:02

## 2020-03-02 RX ADMIN — IPRATROPIUM BROMIDE AND ALBUTEROL SULFATE 3 ML: 2.5; .5 SOLUTION RESPIRATORY (INHALATION) at 07:16

## 2020-03-02 RX ADMIN — DILTIAZEM HYDROCHLORIDE 90 MG: 60 TABLET, FILM COATED ORAL at 22:02

## 2020-03-02 RX ADMIN — METOPROLOL TARTRATE 25 MG: 25 TABLET, FILM COATED ORAL at 10:12

## 2020-03-02 RX ADMIN — DILTIAZEM HYDROCHLORIDE 90 MG: 60 TABLET, FILM COATED ORAL at 05:11

## 2020-03-02 RX ADMIN — OSELTAMIVIR PHOSPHATE 30 MG: 30 CAPSULE ORAL at 22:02

## 2020-03-02 NOTE — SOCIAL WORK
Pt not yet stable for discharge per Dr Mery Rai  CM requested PT/OPT orders be placed to assist with discharge planning  IMM provided  CM will continue to follow

## 2020-03-02 NOTE — PROGRESS NOTES
Progress Note - Ray Remedies 1935, 81 y o  female MRN: [id number]    Unit/Bed#: -01 Encounter: [id number]    Primary Care Provider: Merlinda Graces, MD   Date and time admitted to hospital: 2/28/2020  6:05 PM        * Sepsis due to Haemophilus influenzae with acute hypoxic respiratory failure without septic shock ([facility] [address] )  Assessment & Plan  · Secondary to influenza A and right lower lobe pneumonia with left upper lobe pneumonia  · Procalcitonin negative  · Discontinue antibiotics  · Patient is not receiving sepsis protocol IV fluid resuscitation secondary to history of congestive heart failure  · Continue Tamiflu  · Continue nebulizer treatments      Influenza A  Assessment & Plan  · Continue Tamiflu  · Continue droplet precautions    Pneumonia of both lungs due to influenza A virus  Assessment & Plan  · Viral pneumonia  · Procalcitonin negative x2  · Nebulizer treatments    Acute respiratory failure with hypoxia (HCC)  Assessment & Plan  · Secondary to influenza and viral pneumonia  · Titrate oxygen as able    Acute on chronic diastolic heart failure (HCC)  Assessment & Plan  Wt Readings from Last 3 Encounters:   03/02/20 59 6 kg (131 lb 6 3 oz)   12/19/19 62 1 kg (137 lb)   12/04/19 64 3 kg (141 lb 12 1 oz)     · Patient appears dry in light of her sepsis  · Will hold her Lasix  · Continue daily weights  · BNP on admission was 1954  · Patient does have past medical history of severe MR, CAD with GARRET to LAD        Elevated troponin I level  Assessment & Plan  · No rise or fall, essentially negative  · Denies any chest pain    Longstanding persistent atrial fibrillation  Assessment & Plan  · Continue pre-hospital Eliquis 2 5 mg p o  B i d , Lopressor 25 mg p o  B i d   And Cardizem 90 mg p o  Q 8 hours    Acute hypokalemia  Assessment & Plan  · Likely hypovolemic hyponatremia  · Improved with supplementation  · Continue to monitor     Essential hypertension  Assessment & Plan  · Continue pre-hospital Lopressor 25 mg p o  B i d , Cardizem 90 mg p o  Q 8 hours  · Lasix on hold in light of sepsis    Depression with anxiety  Assessment & Plan  · Continue pre-hospital Zoloft 50 mg p o  Daily, Atarax 25 mg p o  Q 6 hours and Xanax 0 25 mg p o  Q h s  P r n  Gastroesophageal reflux disease without esophagitis  Assessment & Plan  · Continue pre-hospital pantoprazole 40 mg p o  Daily    OAB (overactive bladder)  Assessment & Plan  · Continue pre-hospital Ditropan 5 mg p o  Daily    Mixed hyperlipidemia  Assessment & Plan  · Continue pre-hospital Pravachol 40 mg p o  Daily    Hypomagnesemia  Assessment & Plan  · Chronic condition  · Continue pre-hospital Mag oxide 400 mg p o  3 times weekly      VTE Pharmacologic Prophylaxis: Pharmacologic: Apixaban (Eliquis)    Patient Centered Rounds: I have performed bedside rounds with nursing staff today  Discussions with Specialists or Other Care Team Provider: nursing, respiratory therapy  Education and Discussions with Family / Patient: discussed with the patient     Current Length of Stay: 3 day(s)    Current Patient Status: Inpatient   Certification Statement: The patient will continue to require additional inpatient hospital stay due to continued need for respiratory therapy and monitoring of electrolytes  Discharge Plan: home when appropriate    Code Status: Level 1 - Full Code    Subjective:   Patient is lying in bed  She states that she has a nonproductive cough, but does feel improved  Objective:     Vitals:   Temp (24hrs), Av 8 °F (36 6 °C), Min:97 5 °F (36 4 °C), Max:98 1 °F (36 7 °C)    Temp:  [97 5 °F (36 4 °C)-98 1 °F (36 7 °C)] 97 5 °F (36 4 °C)  HR:  [] 88  Resp:  [16-19] 19  BP: (106-124)/(60-87) 122/78  SpO2:  [94 %-100 %] 95 %  Body mass index is 26 54 kg/m²  Input and Output Summary (last 24 hours):        Intake/Output Summary (Last 24 hours) at 3/2/2020 1551  Last data filed at 3/2/2020 1219  Gross per 24 hour   Intake 680 ml   Output --   Net 680 ml       Physical Exam:     Physical Exam   Constitutional: She is oriented to person, place, and time  She appears well-developed  She is cooperative  She appears ill  HENT:   Head: Normocephalic and atraumatic  Nose: Nose normal    Mouth/Throat: Oropharynx is clear and moist and mucous membranes are normal    Eyes: Conjunctivae and EOM are normal    Neck: Full passive range of motion without pain  Cardiovascular: Regular rhythm, normal heart sounds and normal pulses  Pulmonary/Chest: Tachypnea noted  She has rales in the right lower field and the left lower field  Abdominal: Soft  Normal appearance and bowel sounds are normal    Musculoskeletal:   Generalized weakness   Neurological: She is alert and oriented to person, place, and time  Skin: Skin is warm  Psychiatric: She has a normal mood and affect  Her speech is normal and behavior is normal        Additional Data:     Labs:    Results from last 7 days   Lab Units 03/02/20  0510  02/29/20  0450   WBC Thousand/uL 3 40*   < > 4 40*   HEMOGLOBIN g/dL 12 1   < > 11 2*   HEMATOCRIT % 35 7*   < > 33 6*   PLATELETS Thousands/uL 179   < > 183   NEUTROS PCT %  --   --  72   LYMPHS PCT %  --   --  12*   MONOS PCT %  --   --  16*   EOS PCT %  --   --  0    < > = values in this interval not displayed  Results from last 7 days   Lab Units 03/02/20  0510  02/28/20  1820   POTASSIUM mmol/L 3 6   < > 2 7*   CHLORIDE mmol/L 100   < > 91*   CO2 mmol/L 28   < > 33*   BUN mg/dL 14   < > 16   CREATININE mg/dL 0 66   < > 0 79   CALCIUM mg/dL 9 1   < > 9 0   ALK PHOS U/L  --   --  54*   ALT U/L  --   --  8   AST U/L  --   --  19    < > = values in this interval not displayed  Results from last 7 days   Lab Units 02/28/20  1820   INR  1 36               * I Have Reviewed All Lab Data Listed Above  * Additional Pertinent Lab Tests Reviewed:  All Labs For Current Hospital Admission  Reviewed    Imaging:  Imaging Reports Reviewed Today Include: none    Recent Cultures (last 7 days):     Results from last 7 days   Lab Units 02/29/20  0048 02/28/20  1829 02/28/20  1820   BLOOD CULTURE   --  No Growth at 48 hrs  No Growth at 48 hrs  LEGIONELLA URINARY ANTIGEN  Negative  --   --        Last 24 Hours Medication List:     Current Facility-Administered Medications:  acetaminophen 650 mg Oral Q6H PRN Tony Marti MD   albuterol 2 5 mg Nebulization Q4H PRN Tony Marti MD   ALPRAZolam 0 25 mg Oral HS PRN Tony Marti MD   apixaban 2 5 mg Oral BID Tony Marti MD   diltiazem 90 mg Oral [facility] Tony Marti MD   guaiFENesin 600 mg Oral Q12H 300 Carter Leary MD   hydrOXYzine HCL 25 mg Oral Q6H Tony Marti MD   ipratropium 0 5 mg Nebulization TID Tony Marti MD   levalbuterol 1 25 mg Nebulization TID Tony Marti MD   magnesium oxide 400 mg Oral Once per day on Mon Wed Fri Tony Marti MD   metoprolol tartrate 25 mg Oral BID Tony Marti MD   ondansetron 4 mg Intravenous Q6H PRN Tony Marti MD   oseltamivir 30 mg Oral Q12H 300 Carter Leary MD   oxybutynin 5 mg Oral Daily Tony Marti MD   pantoprazole 40 mg Oral Early Morning Tony Marti MD   phenol 1 spray Mouth/Throat Q2H PRN Stephy Randall PA-C   potassium chloride 20 mEq Oral Daily Tony Marti MD   pravastatin 40 mg Oral Daily With Brandyn Luke MD   sertraline 50 mg Oral Daily Tony Marti MD        Today, Patient Was Seen By: NEAL Babin    ** Please Note: Dictation voice to text software may have been used in the creation of this document   **

## 2020-03-03 ENCOUNTER — APPOINTMENT (INPATIENT)
Dept: RADIOLOGY | Facility: HOSPITAL | Age: 85
DRG: 871 | End: 2020-03-03
Payer: MEDICARE

## 2020-03-03 LAB
ANION GAP SERPL CALCULATED.3IONS-SCNC: 9 MMOL/L (ref 4–13)
BASOPHILS # BLD AUTO: 0 THOUSANDS/ΜL (ref 0–0.1)
BASOPHILS NFR BLD AUTO: 1 % (ref 0–2)
BUN SERPL-MCNC: 13 MG/DL (ref 7–25)
CALCIUM SERPL-MCNC: 9.3 MG/DL (ref 8.6–10.5)
CHLORIDE SERPL-SCNC: 98 MMOL/L (ref 98–107)
CO2 SERPL-SCNC: 26 MMOL/L (ref 21–31)
CREAT SERPL-MCNC: 0.63 MG/DL (ref 0.6–1.2)
EOSINOPHIL # BLD AUTO: 0 THOUSAND/ΜL (ref 0–0.61)
EOSINOPHIL NFR BLD AUTO: 0 % (ref 0–5)
ERYTHROCYTE [DISTWIDTH] IN BLOOD BY AUTOMATED COUNT: 14.9 % (ref 11.5–14.5)
GFR SERPL CREATININE-BSD FRML MDRD: 83 ML/MIN/1.73SQ M
GLUCOSE SERPL-MCNC: 113 MG/DL (ref 65–99)
HCT VFR BLD AUTO: 36.5 % (ref 42–47)
HGB BLD-MCNC: 12.3 G/DL (ref 12–16)
LYMPHOCYTES # BLD AUTO: 0.6 THOUSANDS/ΜL (ref 0.6–4.47)
LYMPHOCYTES NFR BLD AUTO: 10 % (ref 21–51)
MCH RBC QN AUTO: 30.1 PG (ref 26–34)
MCHC RBC AUTO-ENTMCNC: 33.6 G/DL (ref 31–37)
MCV RBC AUTO: 90 FL (ref 81–99)
MONOCYTES # BLD AUTO: 0.6 THOUSAND/ΜL (ref 0.17–1.22)
MONOCYTES NFR BLD AUTO: 10 % (ref 2–12)
NEUTROPHILS # BLD AUTO: 4.9 THOUSANDS/ΜL (ref 1.4–6.5)
NEUTS SEG NFR BLD AUTO: 79 % (ref 42–75)
PLATELET # BLD AUTO: 198 THOUSANDS/UL (ref 149–390)
PMV BLD AUTO: 8.4 FL (ref 8.6–11.7)
POTASSIUM SERPL-SCNC: 4.6 MMOL/L (ref 3.5–5.5)
RBC # BLD AUTO: 4.08 MILLION/UL (ref 3.9–5.2)
SODIUM SERPL-SCNC: 133 MMOL/L (ref 134–143)
WBC # BLD AUTO: 6.3 THOUSAND/UL (ref 4.8–10.8)

## 2020-03-03 PROCEDURE — 71046 X-RAY EXAM CHEST 2 VIEWS: CPT

## 2020-03-03 PROCEDURE — 94640 AIRWAY INHALATION TREATMENT: CPT

## 2020-03-03 PROCEDURE — 85025 COMPLETE CBC W/AUTO DIFF WBC: CPT | Performed by: NURSE PRACTITIONER

## 2020-03-03 PROCEDURE — 94760 N-INVAS EAR/PLS OXIMETRY 1: CPT

## 2020-03-03 PROCEDURE — 99233 SBSQ HOSP IP/OBS HIGH 50: CPT | Performed by: NURSE PRACTITIONER

## 2020-03-03 PROCEDURE — 80048 BASIC METABOLIC PNL TOTAL CA: CPT | Performed by: NURSE PRACTITIONER

## 2020-03-03 RX ORDER — FUROSEMIDE 20 MG/1
20 TABLET ORAL ONCE
Status: COMPLETED | OUTPATIENT
Start: 2020-03-03 | End: 2020-03-03

## 2020-03-03 RX ORDER — GUAIFENESIN 100 MG/5ML
400 SOLUTION ORAL EVERY 4 HOURS PRN
Status: DISCONTINUED | OUTPATIENT
Start: 2020-03-03 | End: 2020-03-06 | Stop reason: HOSPADM

## 2020-03-03 RX ORDER — CEFDINIR 300 MG/1
300 CAPSULE ORAL EVERY 12 HOURS SCHEDULED
Status: DISCONTINUED | OUTPATIENT
Start: 2020-03-03 | End: 2020-03-06 | Stop reason: HOSPADM

## 2020-03-03 RX ORDER — FUROSEMIDE 20 MG/1
20 TABLET ORAL DAILY
Status: DISCONTINUED | OUTPATIENT
Start: 2020-03-04 | End: 2020-03-05

## 2020-03-03 RX ORDER — PREDNISONE 20 MG/1
40 TABLET ORAL 2 TIMES DAILY WITH MEALS
Status: DISCONTINUED | OUTPATIENT
Start: 2020-03-03 | End: 2020-03-06

## 2020-03-03 RX ADMIN — FUROSEMIDE 20 MG: 20 TABLET ORAL at 17:45

## 2020-03-03 RX ADMIN — GUAIFENESIN 600 MG: 600 TABLET, EXTENDED RELEASE ORAL at 20:23

## 2020-03-03 RX ADMIN — ONDANSETRON 4 MG: 2 INJECTION INTRAMUSCULAR; INTRAVENOUS at 14:06

## 2020-03-03 RX ADMIN — CEFDINIR 300 MG: 300 CAPSULE ORAL at 20:22

## 2020-03-03 RX ADMIN — DILTIAZEM HYDROCHLORIDE 90 MG: 60 TABLET, FILM COATED ORAL at 05:40

## 2020-03-03 RX ADMIN — OSELTAMIVIR PHOSPHATE 30 MG: 30 CAPSULE ORAL at 10:05

## 2020-03-03 RX ADMIN — METOPROLOL TARTRATE 25 MG: 25 TABLET, FILM COATED ORAL at 10:03

## 2020-03-03 RX ADMIN — LEVALBUTEROL HYDROCHLORIDE 1.25 MG: 1.25 SOLUTION, CONCENTRATE RESPIRATORY (INHALATION) at 20:26

## 2020-03-03 RX ADMIN — HYDROXYZINE HYDROCHLORIDE 25 MG: 25 TABLET, FILM COATED ORAL at 04:14

## 2020-03-03 RX ADMIN — POTASSIUM CHLORIDE 20 MEQ: 1500 TABLET, EXTENDED RELEASE ORAL at 10:04

## 2020-03-03 RX ADMIN — OXYBUTYNIN CHLORIDE 5 MG: 5 TABLET ORAL at 10:03

## 2020-03-03 RX ADMIN — SERTRALINE HYDROCHLORIDE 50 MG: 50 TABLET ORAL at 10:05

## 2020-03-03 RX ADMIN — PANTOPRAZOLE SODIUM 40 MG: 40 TABLET, DELAYED RELEASE ORAL at 05:40

## 2020-03-03 RX ADMIN — LEVALBUTEROL HYDROCHLORIDE 1.25 MG: 1.25 SOLUTION, CONCENTRATE RESPIRATORY (INHALATION) at 15:56

## 2020-03-03 RX ADMIN — PRAVASTATIN SODIUM 40 MG: 40 TABLET ORAL at 17:52

## 2020-03-03 RX ADMIN — APIXABAN 2.5 MG: 2.5 TABLET, FILM COATED ORAL at 17:45

## 2020-03-03 RX ADMIN — DILTIAZEM HYDROCHLORIDE 90 MG: 60 TABLET, FILM COATED ORAL at 13:49

## 2020-03-03 RX ADMIN — PREDNISONE 40 MG: 20 TABLET ORAL at 17:48

## 2020-03-03 RX ADMIN — LEVALBUTEROL HYDROCHLORIDE 1.25 MG: 1.25 SOLUTION, CONCENTRATE RESPIRATORY (INHALATION) at 07:54

## 2020-03-03 RX ADMIN — GUAIFENESIN 600 MG: 600 TABLET, EXTENDED RELEASE ORAL at 10:03

## 2020-03-03 RX ADMIN — HYDROXYZINE HYDROCHLORIDE 25 MG: 25 TABLET, FILM COATED ORAL at 15:35

## 2020-03-03 RX ADMIN — APIXABAN 2.5 MG: 2.5 TABLET, FILM COATED ORAL at 10:05

## 2020-03-03 RX ADMIN — IPRATROPIUM BROMIDE 0.5 MG: 0.5 SOLUTION RESPIRATORY (INHALATION) at 20:26

## 2020-03-03 RX ADMIN — Medication 1 SPRAY: at 13:48

## 2020-03-03 RX ADMIN — IPRATROPIUM BROMIDE 0.5 MG: 0.5 SOLUTION RESPIRATORY (INHALATION) at 15:56

## 2020-03-03 RX ADMIN — HYDROXYZINE HYDROCHLORIDE 25 MG: 25 TABLET, FILM COATED ORAL at 10:04

## 2020-03-03 RX ADMIN — OSELTAMIVIR PHOSPHATE 30 MG: 30 CAPSULE ORAL at 20:22

## 2020-03-03 RX ADMIN — METOPROLOL TARTRATE 25 MG: 25 TABLET, FILM COATED ORAL at 17:46

## 2020-03-03 NOTE — NURSING NOTE
Alert and oriented times 4  Able to make needs known  Follows commands  (+) motor/sensory function noted  HRR  (+)2 pulses palpated  SOB noted at rest and with exertion  Lungs Decrease bilaterally all lobes with scattered rhonchi and expiratory wheezes noted  O2 remains applied  Call bell is within reach bed is in the lowest position  Will monitor

## 2020-03-03 NOTE — PROGRESS NOTES
Progress Note - Boris Self 1935, 81 y o  female MRN: [id number]    Unit/Bed#: -02 Encounter: [id number]    Primary Care Provider: April Valiente MD   Date and time admitted to hospital: 2/28/2020  6:05 PM        * Sepsis due to Haemophilus influenzae with acute hypoxic respiratory failure without septic shock ([facility] [address] )  Assessment & Plan  · Secondary to influenza A and right lower lobe pneumonia with left upper lobe pneumonia  · Procalcitonin negative  · Discontinue IV antibiotics will start patient on oral cefdinir  · Will start patient on prednisone taper  · Patient is not receiving sepsis protocol IV fluid resuscitation secondary to history of congestive heart failure  · Continue Tamiflu  · Continue nebulizer treatments      Influenza A  Assessment & Plan  · Continue Tamiflu  · Continue droplet precautions    Pneumonia of both lungs due to influenza A virus  Assessment & Plan  · Viral pneumonia  · Procalcitonin negative x2  · Nebulizer treatments  · Start patient on oral cefdinir with prednisone taper  · Patient with minimal to no improvement will order chest x-ray PA and lateral    Acute respiratory failure with hypoxia ([facility] [address] )  Assessment & Plan  · Secondary to influenza and viral pneumonia  · Titrate oxygen as able  · Respiratory protocol  · Patient may need a desat study    Acute on chronic diastolic heart failure ([facility] [address] )  Assessment & Plan  Wt Readings from Last 3 Encounters:   03/03/20 58 5 kg (128 lb 15 5 oz)   12/19/19 62 1 kg (137 lb)   12/04/19 64 3 kg (141 lb 12 1 oz)     · Patient appears dry in light of her sepsis  · Lasix was held for the past 4 days  · Based on patient not improving from pneumonias status, Lasix 20 mg daily will be started on 03/03/2020, and then be daily    · Chest x-ray PA and lateral will also be ordered  · Continue daily weights  · BNP on admission was 1953  · Patient does have past medical history of severe MR, CAD with GARRET to LAD        Elevated troponin I level  Assessment & Plan  · No rise or fall, essentially negative  · Denies any chest pain    Longstanding persistent atrial fibrillation  Assessment & Plan  · Continue pre-hospital Eliquis 2 5 mg p o  B i d , Lopressor 25 mg p o  B i d  And Cardizem 90 mg p o  Q 8 hours    Acute hypokalemia  Assessment & Plan  · Likely hypovolemic hyponatremia  · Improved with supplementation  · Continue to monitor     Essential hypertension  Assessment & Plan  · Continue pre-hospital Lopressor 25 mg p o  B i d , Cardizem 90 mg p o  Q 8 hours  · Lasix on hold in light of sepsis, but will be restarted at half the dose  Depression with anxiety  Assessment & Plan  · Continue pre-hospital Zoloft 50 mg p o  Daily, Atarax 25 mg p o  Q 6 hours and Xanax 0 25 mg p o  Q h s  P r n  Gastroesophageal reflux disease without esophagitis  Assessment & Plan  · Continue pre-hospital pantoprazole 40 mg p o  Daily    OAB (overactive bladder)  Assessment & Plan  · Continue pre-hospital Ditropan 5 mg p o  Daily    Mixed hyperlipidemia  Assessment & Plan  · Continue pre-hospital Pravachol 40 mg p o  Daily    Hypomagnesemia  Assessment & Plan  · Chronic condition  · Continue pre-hospital Mag oxide 400 mg p o  3 times weekly      VTE Pharmacologic Prophylaxis: Pharmacologic: Apixaban (Eliquis)    Patient Centered Rounds: I have performed bedside rounds with nursing staff today  Discussions with Specialists or Other Care Team Provider:  Nursing, respiratory therapy  Education and Discussions with Family / Patient:  Discussed with patient, daughter, granddaughter at the bedside    Current Length of Stay: 4 day(s)    Current Patient Status: Inpatient   Certification Statement: The patient will continue to require additional inpatient hospital stay due to Continue respiratory therapy, monitoring of electrolytes, restarting diuretic, evaluation of chest x-ray, need for desat study      Discharge Plan:  Home when appropriate    Code Status: Level 1 - Full Code    Subjective:   Patient is lying in bed  She tells me that she does not have any improvement from yesterday  She does have a nonproductive cough that does sound moist   We will obtain a chest x-ray PA and lateral, restart Lasix at 20 mg daily, and start the patient on prednisone p o , and cefdinir p o  Deanna Ovalles There is any that the patient may need a desat study prior to any discharge discussion  Objective:     Vitals:   Temp (24hrs), Av 9 °F (36 6 °C), Min:96 5 °F (35 8 °C), Max:98 8 °F (37 1 °C)    Temp:  [96 5 °F (35 8 °C)-98 8 °F (37 1 °C)] 96 5 °F (35 8 °C)  HR:  [] 107  Resp:  [16-18] 16  BP: (110-177)/(58-90) 121/70  SpO2:  [85 %-97 %] 94 %  Body mass index is 26 05 kg/m²  Input and Output Summary (last 24 hours): Intake/Output Summary (Last 24 hours) at 3/3/2020 1635  Last data filed at 3/2/2020 1730  Gross per 24 hour   Intake 220 ml   Output --   Net 220 ml       Physical Exam:     Physical Exam   Constitutional: She is oriented to person, place, and time  She appears cachectic  She is cooperative  She appears ill  HENT:   Head: Normocephalic and atraumatic  Nose: Nose normal    Mouth/Throat: Oropharynx is clear and moist and mucous membranes are normal    Eyes: Conjunctivae and EOM are normal    Neck: Full passive range of motion without pain  Cardiovascular: Normal rate, regular rhythm, normal heart sounds and normal pulses  Pulmonary/Chest: Tachypnea noted  She has rhonchi in the right middle field, the right lower field, the left middle field and the left lower field  Moist nonproductive cough  Abdominal: Soft  Normal appearance and bowel sounds are normal    Musculoskeletal:   Generalized weakness  Neurological: She is alert and oriented to person, place, and time  Periods of forgetfulness  Skin: Skin is warm  Psychiatric: She has a normal mood and affect   Her speech is normal and behavior is normal        Additional Data:     Labs:    Results from last 7 days   Lab Units 03/03/20  0545   WBC Thousand/uL 6 30   HEMOGLOBIN g/dL 12 3   HEMATOCRIT % 36 5*   PLATELETS Thousands/uL 198   NEUTROS PCT % 79*   LYMPHS PCT % 10*   MONOS PCT % 10   EOS PCT % 0     Results from last 7 days   Lab Units 03/03/20  0545  02/28/20  1820   POTASSIUM mmol/L 4 6   < > 2 7*   CHLORIDE mmol/L 98   < > 91*   CO2 mmol/L 26   < > 33*   BUN mg/dL 13   < > 16   CREATININE mg/dL 0 63   < > 0 79   CALCIUM mg/dL 9 3   < > 9 0   ALK PHOS U/L  --   --  54*   ALT U/L  --   --  8   AST U/L  --   --  19    < > = values in this interval not displayed  Results from last 7 days   Lab Units 02/28/20  1820   INR  1 36               * I Have Reviewed All Lab Data Listed Above  * Additional Pertinent Lab Tests Reviewed: [address] [facility] Admission  Reviewed    Imaging:  Imaging Reports Reviewed Today Include:  Patient will have chest x-ray PA and lateral     Recent Cultures (last 7 days):     Results from last 7 days   Lab Units 02/29/20  0048 02/28/20  1829 02/28/20  1820   BLOOD CULTURE   --  No Growth at 72 hrs  No Growth at 72 hrs     LEGIONELLA URINARY ANTIGEN  Negative  --   --        Last 24 Hours Medication List:     Current Facility-Administered Medications:  acetaminophen 650 mg Oral Q6H PRN Michael Patel MD   albuterol 2 5 mg Nebulization Q4H PRN Michael Patel MD   ALPRAZolam 0 25 mg Oral HS PRN Michael Patel MD   apixaban 2 5 mg Oral BID Michael Patel MD   cefdinir 300 mg Oral Q12H [facility] NEAL Gonsales   diltiazem 90 mg Oral [facility] Patel MD   furosemide 20 mg Oral Once [employer], CRNP   [START ON 3/4/2020] furosemide 20 mg Oral Daily NEAL Gonsales   guaiFENesin 600 mg Oral Q12H 300 MD wendi AlvaradoaiFENesin 400 mg Oral Q4H PRN NEAL Gonsales   hydrOXYzine HCL 25 mg Oral Q6H Michael Patel MD   ipratropium 0 5 mg Nebulization TID Michael Escort, MD   levalbuterol 1 25 mg Nebulization TID Michael Patel MD   magnesium oxide 400 mg Oral Once per day on Mon Wed Fri Leni Sierra MD   metoprolol tartrate 25 mg Oral BID Leni Sierra MD   ondansetron 4 mg Intravenous Q6H PRN Leni Sierra MD   oseltamivir 30 mg Oral Q12H 300 Carter Leary MD   oxybutynin 5 mg Oral Daily Leni Sierra MD   pantoprazole 40 mg Oral Early Morning Leni Sierra MD   phenol 1 spray Mouth/Throat Q2H PRN Concha Gonzalez PA-C   potassium chloride 20 mEq Oral Daily Leni Sierra MD   pravastatin 40 mg Oral Daily With Lianna Underwood MD   predniSONE 40 mg Oral BID With [employer] NEAL Gonsales   sertraline 50 mg Oral Daily Leni Sierra MD        Today, Patient Was Seen By: NEAL Jasso    ** Please Note: Dictation voice to text software may have been used in the creation of this document   **

## 2020-03-04 LAB
ALBUMIN SERPL BCP-MCNC: 3.6 G/DL (ref 3.5–5.7)
ALP SERPL-CCNC: 52 U/L (ref 55–165)
ALT SERPL W P-5'-P-CCNC: 9 U/L (ref 7–52)
ANION GAP SERPL CALCULATED.3IONS-SCNC: 9 MMOL/L (ref 4–13)
AST SERPL W P-5'-P-CCNC: 16 U/L (ref 13–39)
BACTERIA BLD CULT: NORMAL
BACTERIA BLD CULT: NORMAL
BASOPHILS # BLD AUTO: 0 THOUSANDS/ΜL (ref 0–0.1)
BASOPHILS NFR BLD AUTO: 0 % (ref 0–2)
BILIRUB SERPL-MCNC: 0.9 MG/DL (ref 0.2–1)
BUN SERPL-MCNC: 19 MG/DL (ref 7–25)
CALCIUM SERPL-MCNC: 9.1 MG/DL (ref 8.6–10.5)
CHLORIDE SERPL-SCNC: 97 MMOL/L (ref 98–107)
CO2 SERPL-SCNC: 26 MMOL/L (ref 21–31)
CREAT SERPL-MCNC: 0.73 MG/DL (ref 0.6–1.2)
EOSINOPHIL # BLD AUTO: 0 THOUSAND/ΜL (ref 0–0.61)
EOSINOPHIL NFR BLD AUTO: 0 % (ref 0–5)
ERYTHROCYTE [DISTWIDTH] IN BLOOD BY AUTOMATED COUNT: 15.1 % (ref 11.5–14.5)
GFR SERPL CREATININE-BSD FRML MDRD: 76 ML/MIN/1.73SQ M
GLUCOSE SERPL-MCNC: 155 MG/DL (ref 65–99)
GLUCOSE SERPL-MCNC: 169 MG/DL (ref 65–140)
HCT VFR BLD AUTO: 36 % (ref 42–47)
HGB BLD-MCNC: 11.9 G/DL (ref 12–16)
LYMPHOCYTES # BLD AUTO: 0.4 THOUSANDS/ΜL (ref 0.6–4.47)
LYMPHOCYTES NFR BLD AUTO: 10 % (ref 21–51)
MAGNESIUM SERPL-MCNC: 2 MG/DL (ref 1.9–2.7)
MCH RBC QN AUTO: 29.7 PG (ref 26–34)
MCHC RBC AUTO-ENTMCNC: 33 G/DL (ref 31–37)
MCV RBC AUTO: 90 FL (ref 81–99)
MONOCYTES # BLD AUTO: 0.3 THOUSAND/ΜL (ref 0.17–1.22)
MONOCYTES NFR BLD AUTO: 8 % (ref 2–12)
NEUTROPHILS # BLD AUTO: 3.2 THOUSANDS/ΜL (ref 1.4–6.5)
NEUTS SEG NFR BLD AUTO: 82 % (ref 42–75)
PLATELET # BLD AUTO: 165 THOUSANDS/UL (ref 149–390)
PMV BLD AUTO: 8.5 FL (ref 8.6–11.7)
POTASSIUM SERPL-SCNC: 4.3 MMOL/L (ref 3.5–5.5)
PROT SERPL-MCNC: 7 G/DL (ref 6.4–8.9)
RBC # BLD AUTO: 4 MILLION/UL (ref 3.9–5.2)
SODIUM SERPL-SCNC: 132 MMOL/L (ref 134–143)
WBC # BLD AUTO: 4 THOUSAND/UL (ref 4.8–10.8)

## 2020-03-04 PROCEDURE — 80053 COMPREHEN METABOLIC PANEL: CPT | Performed by: NURSE PRACTITIONER

## 2020-03-04 PROCEDURE — 94760 N-INVAS EAR/PLS OXIMETRY 1: CPT

## 2020-03-04 PROCEDURE — 83735 ASSAY OF MAGNESIUM: CPT | Performed by: NURSE PRACTITIONER

## 2020-03-04 PROCEDURE — 85025 COMPLETE CBC W/AUTO DIFF WBC: CPT | Performed by: NURSE PRACTITIONER

## 2020-03-04 PROCEDURE — 99233 SBSQ HOSP IP/OBS HIGH 50: CPT | Performed by: NURSE PRACTITIONER

## 2020-03-04 PROCEDURE — 94640 AIRWAY INHALATION TREATMENT: CPT

## 2020-03-04 PROCEDURE — 82948 REAGENT STRIP/BLOOD GLUCOSE: CPT

## 2020-03-04 RX ADMIN — GUAIFENESIN 400 MG: 100 SOLUTION ORAL at 23:00

## 2020-03-04 RX ADMIN — PREDNISONE 40 MG: 20 TABLET ORAL at 06:32

## 2020-03-04 RX ADMIN — PREDNISONE 40 MG: 20 TABLET ORAL at 16:53

## 2020-03-04 RX ADMIN — HYDROXYZINE HYDROCHLORIDE 25 MG: 25 TABLET, FILM COATED ORAL at 22:36

## 2020-03-04 RX ADMIN — ACETAMINOPHEN 650 MG: 325 TABLET ORAL at 23:00

## 2020-03-04 RX ADMIN — APIXABAN 2.5 MG: 2.5 TABLET, FILM COATED ORAL at 16:58

## 2020-03-04 RX ADMIN — IPRATROPIUM BROMIDE 0.5 MG: 0.5 SOLUTION RESPIRATORY (INHALATION) at 20:20

## 2020-03-04 RX ADMIN — MAGNESIUM GLUCONATE 500 MG ORAL TABLET 400 MG: 500 TABLET ORAL at 09:50

## 2020-03-04 RX ADMIN — IPRATROPIUM BROMIDE 0.5 MG: 0.5 SOLUTION RESPIRATORY (INHALATION) at 15:04

## 2020-03-04 RX ADMIN — LEVALBUTEROL HYDROCHLORIDE 1.25 MG: 1.25 SOLUTION, CONCENTRATE RESPIRATORY (INHALATION) at 15:05

## 2020-03-04 RX ADMIN — HYDROXYZINE HYDROCHLORIDE 25 MG: 25 TABLET, FILM COATED ORAL at 00:41

## 2020-03-04 RX ADMIN — APIXABAN 2.5 MG: 2.5 TABLET, FILM COATED ORAL at 09:42

## 2020-03-04 RX ADMIN — HYDROXYZINE HYDROCHLORIDE 25 MG: 25 TABLET, FILM COATED ORAL at 06:31

## 2020-03-04 RX ADMIN — HYDROXYZINE HYDROCHLORIDE 25 MG: 25 TABLET, FILM COATED ORAL at 09:42

## 2020-03-04 RX ADMIN — IPRATROPIUM BROMIDE 0.5 MG: 0.5 SOLUTION RESPIRATORY (INHALATION) at 07:48

## 2020-03-04 RX ADMIN — POTASSIUM CHLORIDE 20 MEQ: 1500 TABLET, EXTENDED RELEASE ORAL at 09:42

## 2020-03-04 RX ADMIN — LEVALBUTEROL HYDROCHLORIDE 1.25 MG: 1.25 SOLUTION, CONCENTRATE RESPIRATORY (INHALATION) at 20:20

## 2020-03-04 RX ADMIN — OXYBUTYNIN CHLORIDE 5 MG: 5 TABLET ORAL at 09:42

## 2020-03-04 RX ADMIN — METOPROLOL TARTRATE 25 MG: 25 TABLET, FILM COATED ORAL at 09:42

## 2020-03-04 RX ADMIN — DILTIAZEM HYDROCHLORIDE 90 MG: 60 TABLET, FILM COATED ORAL at 06:32

## 2020-03-04 RX ADMIN — DILTIAZEM HYDROCHLORIDE 90 MG: 60 TABLET, FILM COATED ORAL at 00:41

## 2020-03-04 RX ADMIN — OSELTAMIVIR PHOSPHATE 30 MG: 30 CAPSULE ORAL at 09:42

## 2020-03-04 RX ADMIN — LEVALBUTEROL HYDROCHLORIDE 1.25 MG: 1.25 SOLUTION, CONCENTRATE RESPIRATORY (INHALATION) at 07:48

## 2020-03-04 RX ADMIN — CEFDINIR 300 MG: 300 CAPSULE ORAL at 09:42

## 2020-03-04 RX ADMIN — METOPROLOL TARTRATE 25 MG: 25 TABLET, FILM COATED ORAL at 16:54

## 2020-03-04 RX ADMIN — FUROSEMIDE 20 MG: 20 TABLET ORAL at 09:42

## 2020-03-04 RX ADMIN — DILTIAZEM HYDROCHLORIDE 90 MG: 60 TABLET, FILM COATED ORAL at 22:42

## 2020-03-04 RX ADMIN — PRAVASTATIN SODIUM 40 MG: 40 TABLET ORAL at 16:53

## 2020-03-04 RX ADMIN — DILTIAZEM HYDROCHLORIDE 90 MG: 60 TABLET, FILM COATED ORAL at 13:59

## 2020-03-04 RX ADMIN — GUAIFENESIN 600 MG: 600 TABLET, EXTENDED RELEASE ORAL at 09:42

## 2020-03-04 RX ADMIN — SERTRALINE HYDROCHLORIDE 50 MG: 50 TABLET ORAL at 09:42

## 2020-03-04 RX ADMIN — PANTOPRAZOLE SODIUM 40 MG: 40 TABLET, DELAYED RELEASE ORAL at 06:32

## 2020-03-04 RX ADMIN — HYDROXYZINE HYDROCHLORIDE 25 MG: 25 TABLET, FILM COATED ORAL at 16:53

## 2020-03-04 RX ADMIN — GUAIFENESIN 600 MG: 600 TABLET, EXTENDED RELEASE ORAL at 22:36

## 2020-03-04 RX ADMIN — CEFDINIR 300 MG: 300 CAPSULE ORAL at 22:37

## 2020-03-04 NOTE — PROGRESS NOTES
03/04/20 1220   Charting Type   Charting Type Shift assessment   Neurological   Neuro (WDL) X   Level of Consciousness Alert/awake   Orientation Level Oriented X4   Cognition Follows commands; Short term memory loss   Extraocular Movements Full   Facial Symmetry Symmetrical   Swallow Able to swallow solids and liquids without difficulty   Neuro Symptoms Forgetful; Fatigue   Relieved by Rest   Neuro Additional Assessments No   HEENT   HEENT (WDL) X   R Eye Mildly impaired vision   L Eye Mildly impaired vision   Vision - Corrective Lenses (at bedside) Glasses   R Ear Mildly impaired hearing   L Ear Mildly impaired hearing   Teeth Missing teeth   Respiratory   Respiratory (WDL) WDL   Respiratory Pattern Dyspnea with exertion   Chest Assessment Chest expansion symmetrical   Bilateral Breath Sounds Coarse;Diminished;Rhonchi   R Breath Sounds Coarse;Diminished; Expiratory wheezes   L Breath Sounds Diminished;Coarse; Expiratory wheezes   Respiratory Additional Assessments No   Cough Description   Sputum Amount Small   Sputum Color Green   Sputum Consistency Thick   Respiratory Interventions   Respiratory Interventions Cough and deep breathe;Incentive spirometry   Cough and Deep Breathe   Cough and Deep Breathe Yes   Cardiac   Cardiac (WDL) X   Cardiac Regularity Irregular   Heart Sounds No adventitious heart sounds   Jugular Venous Distention (JVD) No   Cardiac Symptoms None   Chest Pain Present No   Pacemaker/ICD No   Pain Assessment   Pain Assessment 0-10   Pain Score No Pain   Peripheral Vascular   Peripheral Vascular (WDL) WDL   Cyanosis None   PVS Additional Assessments No   RUE Neurovascular Assessment   R Radial Pulse +2   LUE Neurovascular Assessment   L Radial Pulse +2   RLE Neurovascular Assessment   R Pedal Pulse +2   LLE Neurovascular Assessment   L Pedal Pulse +2   Integumentary   Integumentary (WDL) X   Skin Color Appropriate for ethnicity   Skin Condition/Temp Warm;Dry   Skin Integrity Bruising   Skin Location scattered   Skin Turgor Epidermis thin with loss of subcutaneous tissue   Integumentary Additional Assessments No   Tattoos/Piercings   Does patient have tattoos? No   Piercings Remaining No   Juan Scale   Sensory Perceptions 3   Moisture 3   Activity 2   Mobility 2   Nutrition 1   Friction and Shear 2   Juan Scale Score 13   Musculoskeletal   Musculoskeletal (WDL) X   Level of Assistance Moderate assist, patient does 50-74%   RLE Limited movement   LLE Limited movement   Musculoskeletal Additional Assessments No   Gastrointestinal   Gastrointestinal (WDL) X   Bowel Sounds (All Quadrants) Normoactive   Tenderness Soft;Nontender   GI Symptoms None   Gastrointestinal Additional Assessments No   Stool Assessment   Bowel Incontinence No   Genitourinary   Genitourinary (WDL) WDL   Urine Assessment   Urinary Incontinence No   Genitalia   Female Genitalia Intact   Genitourinary Additional Assessments   Genitourinary Additional Assessments No   Anal/Rectal   Anal/Rectal (WDL) WDL   Psychosocial   Psychosocial (WDL) X   Needs Expressed Denies   Ability to Express Feelings Able to express   Ability to Express Needs Able to express   Ability to Express Thoughts Able to express   Ability to Understand Others Usually understands   Adali Suicide Severity Rating Scale   1  Wish to be Dead No   2  Suicidal Thoughts Never   6   Suicide Behavior Question Never   *Risk Level* Low Risk   Cough   Cough Productive

## 2020-03-04 NOTE — PROGRESS NOTES
Progress Note - Analia Becerra 1935, 81 y o  female MRN: [id number]    Unit/Bed#: -02 Encounter: [id number]    Primary Care Provider: Heidy Marquis MD   Date and time admitted to hospital: 2/28/2020  6:05 PM        * Sepsis due to Haemophilus influenzae with acute hypoxic respiratory failure without septic shock ([facility] [address] )  Assessment & Plan  · Secondary to influenza A and right lower lobe pneumonia with left upper lobe pneumonia  · Procalcitonin negative  · Discontinue IV antibiotics will start patient on oral cefdinir - day 2  · Will start patient on prednisone taper  · Patient is not receiving sepsis protocol IV fluid resuscitation secondary to history of congestive heart failure  · Continue Tamiflu  · Continue nebulizer treatments      Influenza A  Assessment & Plan  · Continue Tamiflu  · Continue droplet precautions    Pneumonia of both lungs due to influenza A virus  Assessment & Plan  · Viral pneumonia  · Procalcitonin negative x2  · Nebulizer treatments  · Start patient on oral cefdinir with prednisone taper  · Patient with minimal to no improvement will order chest x-ray PA and lateral - bilateral lower lobe opacities with slightly worsening    Acute respiratory failure with hypoxia ([facility] [address] )  Assessment & Plan  · Secondary to influenza and viral pneumonia  · Titrate oxygen as able  · Respiratory protocol  · Patient may need a desat study    Acute on chronic diastolic heart failure ([facility] [address] )  Assessment & Plan  Wt Readings from Last 3 Encounters:   03/04/20 57 3 kg (126 lb 5 2 oz)   12/19/19 62 1 kg (137 lb)   12/04/19 64 3 kg (141 lb 12 1 oz)     · Patient appears dry in light of her sepsis  · Lasix was held for the past 4 days  · Based on patient not improving from pneumonias status, Lasix 20 mg daily will be started on 03/03/2020, and then be daily    · Chest x-ray PA and lateral will also be ordered  · Continue daily weights  · BNP on admission was 1953  · Patient does have past medical history of severe MR, CAD with GARRET to LAD  · Daily weights        Elevated troponin I level  Assessment & Plan  · No rise or fall, essentially negative  · Denies any chest pain    Longstanding persistent atrial fibrillation  Assessment & Plan  · Continue pre-hospital Eliquis 2 5 mg p o  B i d , Lopressor 25 mg p o  B i d  And Cardizem 90 mg p o  Q 8 hours    Acute hypokalemia  Assessment & Plan  · Likely hypovolemic hyponatremia  · Improved with supplementation  · Continue to monitor     Essential hypertension  Assessment & Plan  · Continue pre-hospital Lopressor 25 mg p o  B i d , Cardizem 90 mg p o  Q 8 hours  · Lasix on hold in light of sepsis, but will be restarted at half the dose  Depression with anxiety  Assessment & Plan  · Continue pre-hospital Zoloft 50 mg p o  Daily, Atarax 25 mg p o  Q 6 hours and Xanax 0 25 mg p o  Q h s  P r n  Gastroesophageal reflux disease without esophagitis  Assessment & Plan  · Continue pre-hospital pantoprazole 40 mg p o  Daily    OAB (overactive bladder)  Assessment & Plan  · Continue pre-hospital Ditropan 5 mg p o  Daily    Mixed hyperlipidemia  Assessment & Plan  · Continue pre-hospital Pravachol 40 mg p o  Daily    Hypomagnesemia  Assessment & Plan  · Chronic condition  · Continue pre-hospital Mag oxide 400 mg p o  3 times weekly      VTE Pharmacologic Prophylaxis: Pharmacologic: Apixaban (Eliquis)    Patient Centered Rounds: I have performed bedside rounds with nursing staff today      Discussions with Specialists or Other Care Team Provider: nursing, respiratory therapy  Education and Discussions with Family / Patient: discussed with the patient    Current Length of Stay: 5 day(s)    Current Patient Status: Inpatient   Certification Statement: The patient will continue to require additional inpatient hospital stay due to continued need for antibiotics and respiratory therapy    Discharge Plan:  Home when appropriate    Code Status: Level 1 - Full Code    Subjective:   Patient is sitting in bed  She states that she has a nonproductive harsh cough and feels extremely tired today  She did tell me that she thought some of her family members were in the room earlier but they were not  Nursing did let me know that she was speaking to her son on the telephone, and the son called to the nurse's station to let us know that he thought his mother was hallucinating  Objective:     Vitals:   Temp (24hrs), Av 4 °F (36 9 °C), Min:97 8 °F (36 6 °C), Max:99 °F (37 2 °C)    Temp:  [97 8 °F (36 6 °C)-99 °F (37 2 °C)] 98 4 °F (36 9 °C)  HR:  [] 106  Resp:  [16-18] 16  BP: (104-126)/(57-76) 126/74  SpO2:  [89 %-97 %] 95 %  Body mass index is 25 51 kg/m²  Input and Output Summary (last 24 hours): Intake/Output Summary (Last 24 hours) at 3/4/2020 1805  Last data filed at 3/4/2020 0725  Gross per 24 hour   Intake --   Output 400 ml   Net -400 ml       Physical Exam:     Physical Exam   Constitutional: She appears well-developed  She is cooperative  She appears ill  HENT:   Head: Normocephalic and atraumatic  Nose: Nose normal    Mouth/Throat: Oropharynx is clear and moist and mucous membranes are normal    Eyes: Conjunctivae and EOM are normal    Neck: Full passive range of motion without pain  Cardiovascular: Normal rate, regular rhythm, normal heart sounds and normal pulses  Pulmonary/Chest: Tachypnea noted  She has rhonchi in the right middle field, the right lower field, the left middle field and the left lower field  Harsh nonproductive cough   Abdominal: Soft  Normal appearance and bowel sounds are normal    Musculoskeletal:   Generalized weakness   Neurological: She is alert  Periods of forgetfulness   Skin: Skin is warm  Psychiatric: She has a normal mood and affect   Her speech is normal and behavior is normal        Additional Data:     Labs:    Results from last 7 days   Lab Units 20  0608   WBC Thousand/uL 4 00*   HEMOGLOBIN g/dL 11 9*   HEMATOCRIT % 36 0* PLATELETS Thousands/uL 165   NEUTROS PCT % 82*   LYMPHS PCT % 10*   MONOS PCT % 8   EOS PCT % 0     Results from last 7 days   Lab Units 03/04/20  0608   POTASSIUM mmol/L 4 3   CHLORIDE mmol/L 97*   CO2 mmol/L 26   BUN mg/dL 19   CREATININE mg/dL 0 73   CALCIUM mg/dL 9 1   ALK PHOS U/L 52*   ALT U/L 9   AST U/L 16     Results from last 7 days   Lab Units 02/28/20  1820   INR  1 36               * I Have Reviewed All Lab Data Listed Above  * Additional Pertinent Lab Tests Reviewed: All Labs For Current Hospital Admission  Reviewed    Imaging:  Imaging Reports Reviewed Today Include:  Chest x-ray shows by basilar airspace opacities, slightly worsening  Recent Cultures (last 7 days):     Results from last 7 days   Lab Units 02/29/20  0048 02/28/20  1829 02/28/20  1820   BLOOD CULTURE   --  No Growth After 4 Days  No Growth After 4 Days     LEGIONELLA URINARY ANTIGEN  Negative  --   --        Last 24 Hours Medication List:     Current Facility-Administered Medications:  acetaminophen 650 mg Oral Q6H PRN Marcella Nicholson MD   albuterol 2 5 mg Nebulization Q4H PRN Marcella Nicholson MD   ALPRAZolam 0 25 mg Oral HS PRN Marcella Nicholson MD   apixaban 2 5 mg Oral BID Marcella Nicholson MD   cefdinir 300 mg Oral Q12H [facility] NEAL G[place]   diltiazem 90 mg Oral [facility] Marcella Nicholson MD   furosemide 20 mg Oral Daily NEAL [place]ales   guaiFENesin 600 mg Oral Q12H [facility] Marcella Nicholson MD   guaiFENesin 400 mg Oral Q4H PRN NEAL Gonsales   hydrOXYzine HCL 25 mg Oral Q6H Marcella Nicholson MD   ipratropium 0 5 mg Nebulization TID Marcella Nicholson MD   levalbuterol 1 25 mg Nebulization TID Marcella Nicholson MD   magnesium oxide 400 mg Oral Once per day on Mon Wed Fri Marcella Nicholson MD   metoprolol tartrate 25 mg Oral BID Marcella Nicholson MD   ondansetron 4 mg Intravenous Q6H PRN Marcella Nicholson MD   oxybutynin 5 mg Oral Daily Marcella Nicholson MD   pantoprazole 40 mg Oral Early Morning Marcella Nicholson MD   phenol 1 spray Mouth/Throat Q2H PRN Hussein Stacy PA-C potassium chloride 20 mEq Oral Daily Micah Davis MD   pravastatin 40 mg Oral Daily With Dejah Schwarz MD   predniSONE 40 mg Oral BID With Meals NEAL Gonsales   sertraline 50 mg Oral Daily Micah Davis MD        Today, Patient Was Seen By: NEAL Dc    ** Please Note: Dictation voice to text software may have been used in the creation of this document   **

## 2020-03-04 NOTE — PROGRESS NOTES
Confused and haluccinating, thought she was in a hotel  Dr Jessica Lewis made aware  Safety tab alarm and safety precautions maintained  Assist OOB to [facility], voiding qs armand urine  Continue same plan of care

## 2020-03-04 NOTE — PLAN OF CARE
Problem: Potential for Falls  Goal: Patient will remain free of falls  Description  INTERVENTIONS:  - Assess patient frequently for physical needs  -  Identify cognitive and physical deficits and behaviors that affect risk of falls  -  [place] fall precautions as indicated by assessment   - Educate patient/family on patient safety including physical limitations  - Instruct patient to call for assistance with activity based on assessment  - Modify environment to reduce risk of injury  - Consider OT/PT consult to assist with strengthening/mobility  Outcome: Progressing     Problem: Prexisting or High Potential for Compromised Skin Integrity  Goal: Skin integrity is maintained or improved  Description  INTERVENTIONS:  - Identify patients at risk for skin breakdown  - Assess and monitor skin integrity  - Assess and monitor nutrition and hydration status  - Monitor labs   - Assess for incontinence   - Turn and reposition patient  - Assist with mobility/ambulation  - Relieve pressure over bony prominences  - Avoid friction and shearing  - Provide appropriate hygiene as needed including keeping skin clean and dry  - Evaluate need for skin moisturizer/barrier cream  - Collaborate with interdisciplinary team   - Patient/family teaching  - Consider wound care consult   Outcome: Progressing     Problem: Nutrition/Hydration-ADULT  Goal: Nutrient/Hydration intake appropriate for improving, restoring or maintaining nutritional needs  Description  Monitor and assess patient's nutrition/hydration status for malnutrition  Collaborate with interdisciplinary team and initiate plan and interventions as ordered  Monitor patient's weight and dietary intake as ordered or per policy  Utilize nutrition screening tool and intervene as necessary  Determine patient's food preferences and provide high-protein, high-caloric foods as appropriate       INTERVENTIONS:  - Monitor oral intake, urinary output, labs, and treatment plans  - Assess nutrition and hydration status and recommend course of action  - Evaluate amount of meals eaten  - Assist patient with eating if necessary   - Allow adequate time for meals  - Recommend/ encourage appropriate diets, oral nutritional supplements, and vitamin/mineral supplements  - Order, calculate, and assess calorie counts as needed  - Recommend, monitor, and adjust tube feedings and TPN/PPN based on assessed needs  - Assess need for intravenous fluids  - Provide specific nutrition/hydration education as appropriate  - Include patient/family/caregiver in decisions related to nutrition  Outcome: Progressing     Problem: INFECTION - ADULT  Goal: Absence or prevention of progression during hospitalization  Description  INTERVENTIONS:  - Assess and monitor for signs and symptoms of infection  - Monitor lab/diagnostic results  - Monitor all insertion sites, i e  indwelling lines, tubes, and drains  - Monitor endotracheal if appropriate and nasal secretions for changes in amount and color  - [place] appropriate cooling/warming therapies per order  - Administer medications as ordered  - Instruct and encourage patient and family to use good hand hygiene technique  - Identify and instruct in appropriate isolation precautions for identified infection/condition  Outcome: Progressing  Goal: Absence of fever/infection during neutropenic period  Description  INTERVENTIONS:  - Monitor WBC    Outcome: Progressing     Problem: SAFETY ADULT  Goal: Maintain or return to baseline ADL function  Description  INTERVENTIONS:  -  Assess patient's ability to carry out ADLs; assess patient's baseline for ADL function and identify physical deficits which impact ability to perform ADLs (bathing, care of mouth/teeth, toileting, grooming, dressing, etc )  - Assess/evaluate cause of self-care deficits   - Assess range of motion  - Assess patient's mobility; develop plan if impaired  - Assess patient's need for assistive devices and provide as appropriate  - Encourage maximum independence but intervene and supervise when necessary  - Involve family in performance of ADLs  - Assess for home care needs following discharge   - Consider OT consult to assist with ADL evaluation and planning for discharge  - Provide patient education as appropriate  Outcome: Progressing  Goal: Maintain or return mobility status to optimal level  Description  INTERVENTIONS:  - Assess patient's baseline mobility status (ambulation, transfers, stairs, etc )    - Identify cognitive and physical deficits and behaviors that affect mobility  - Identify mobility aids required to assist with transfers and/or ambulation (gait belt, sit-to-stand, lift, walker, cane, etc )  - [place] fall precautions as indicated by assessment  - Record patient progress and toleration of activity level on Mobility SBAR; progress patient to next Phase/Stage  - Instruct patient to call for assistance with activity based on assessment  - Consider rehabilitation consult to assist with strengthening/weightbearing, etc   Outcome: Progressing     Problem: DISCHARGE PLANNING  Goal: Discharge to home or other facility with appropriate resources  Description  INTERVENTIONS:  - Identify barriers to discharge w/patient and caregiver  - Arrange for needed discharge resources and transportation as appropriate  - Identify discharge learning needs (meds, wound care, etc )  - Arrange for interpretive services to assist at discharge as needed  - Refer to Case Management Department for coordinating discharge planning if the patient needs post-hospital services based on physician/advanced practitioner order or complex needs related to functional status, cognitive ability, or social support system  Outcome: Progressing     Problem: Knowledge Deficit  Goal: Patient/family/caregiver demonstrates understanding of disease process, treatment plan, medications, and discharge instructions  Description  Complete learning assessment and assess knowledge base    Interventions:  - Provide teaching at level of understanding  - Provide teaching via preferred learning methods  Outcome: Progressing

## 2020-03-04 NOTE — PLAN OF CARE
Problem: Potential for Falls  Goal: Patient will remain free of falls  Description  INTERVENTIONS:  - Assess patient frequently for physical needs  -  Identify cognitive and physical deficits and behaviors that affect risk of falls  -  [place] fall precautions as indicated by assessment   - Educate patient/family on patient safety including physical limitations  - Instruct patient to call for assistance with activity based on assessment  - Modify environment to reduce risk of injury  - Consider OT/PT consult to assist with strengthening/mobility  Outcome: Progressing     Problem: Prexisting or High Potential for Compromised Skin Integrity  Goal: Skin integrity is maintained or improved  Description  INTERVENTIONS:  - Identify patients at risk for skin breakdown  - Assess and monitor skin integrity  - Assess and monitor nutrition and hydration status  - Monitor labs   - Assess for incontinence   - Turn and reposition patient  - Assist with mobility/ambulation  - Relieve pressure over bony prominences  - Avoid friction and shearing  - Provide appropriate hygiene as needed including keeping skin clean and dry  - Evaluate need for skin moisturizer/barrier cream  - Collaborate with interdisciplinary team   - Patient/family teaching  - Consider wound care consult   Outcome: Progressing     Problem: Nutrition/Hydration-ADULT  Goal: Nutrient/Hydration intake appropriate for improving, restoring or maintaining nutritional needs  Description  Monitor and assess patient's nutrition/hydration status for malnutrition  Collaborate with interdisciplinary team and initiate plan and interventions as ordered  Monitor patient's weight and dietary intake as ordered or per policy  Utilize nutrition screening tool and intervene as necessary  Determine patient's food preferences and provide high-protein, high-caloric foods as appropriate       INTERVENTIONS:  - Monitor oral intake, urinary output, labs, and treatment plans  - Assess nutrition and hydration status and recommend course of action  - Evaluate amount of meals eaten  - Assist patient with eating if necessary   - Allow adequate time for meals  - Recommend/ encourage appropriate diets, oral nutritional supplements, and vitamin/mineral supplements  - Order, calculate, and assess calorie counts as needed  - Recommend, monitor, and adjust tube feedings and TPN/PPN based on assessed needs  - Assess need for intravenous fluids  - Provide specific nutrition/hydration education as appropriate  - Include patient/family/caregiver in decisions related to nutrition  Outcome: Progressing     Problem: INFECTION - ADULT  Goal: Absence or prevention of progression during hospitalization  Description  INTERVENTIONS:  - Assess and monitor for signs and symptoms of infection  - Monitor lab/diagnostic results  - Monitor all insertion sites, i e  indwelling lines, tubes, and drains  - Monitor endotracheal if appropriate and nasal secretions for changes in amount and color  - [place] appropriate cooling/warming therapies per order  - Administer medications as ordered  - Instruct and encourage patient and family to use good hand hygiene technique  - Identify and instruct in appropriate isolation precautions for identified infection/condition  Outcome: Progressing  Goal: Absence of fever/infection during neutropenic period  Description  INTERVENTIONS:  - Monitor WBC    Outcome: Progressing     Problem: SAFETY ADULT  Goal: Maintain or return to baseline ADL function  Description  INTERVENTIONS:  -  Assess patient's ability to carry out ADLs; assess patient's baseline for ADL function and identify physical deficits which impact ability to perform ADLs (bathing, care of mouth/teeth, toileting, grooming, dressing, etc )  - Assess/evaluate cause of self-care deficits   - Assess range of motion  - Assess patient's mobility; develop plan if impaired  - Assess patient's need for assistive devices and provide as appropriate  - Encourage maximum independence but intervene and supervise when necessary  - Involve family in performance of ADLs  - Assess for home care needs following discharge   - Consider OT consult to assist with ADL evaluation and planning for discharge  - Provide patient education as appropriate  Outcome: Progressing  Goal: Maintain or return mobility status to optimal level  Description  INTERVENTIONS:  - Assess patient's baseline mobility status (ambulation, transfers, stairs, etc )    - Identify cognitive and physical deficits and behaviors that affect mobility  - Identify mobility aids required to assist with transfers and/or ambulation (gait belt, sit-to-stand, lift, walker, cane, etc )  - [place] fall precautions as indicated by assessment  - Record patient progress and toleration of activity level on Mobility SBAR; progress patient to next Phase/Stage  - Instruct patient to call for assistance with activity based on assessment  - Consider rehabilitation consult to assist with strengthening/weightbearing, etc   Outcome: Progressing     Problem: DISCHARGE PLANNING  Goal: Discharge to home or other facility with appropriate resources  Description  INTERVENTIONS:  - Identify barriers to discharge w/patient and caregiver  - Arrange for needed discharge resources and transportation as appropriate  - Identify discharge learning needs (meds, wound care, etc )  - Arrange for interpretive services to assist at discharge as needed  - Refer to Case Management Department for coordinating discharge planning if the patient needs post-hospital services based on physician/advanced practitioner order or complex needs related to functional status, cognitive ability, or social support system  Outcome: Progressing     Problem: Knowledge Deficit  Goal: Patient/family/caregiver demonstrates understanding of disease process, treatment plan, medications, and discharge instructions  Description  Complete learning assessment and assess knowledge base    Interventions:  - Provide teaching at level of understanding  - Provide teaching via preferred learning methods  Outcome: Progressing

## 2020-03-04 NOTE — PLAN OF CARE
Problem: Potential for Falls  Goal: Patient will remain free of falls  Description  INTERVENTIONS:  - Assess patient frequently for physical needs  -  Identify cognitive and physical deficits and behaviors that affect risk of falls  -  [place] fall precautions as indicated by assessment   - Educate patient/family on patient safety including physical limitations  - Instruct patient to call for assistance with activity based on assessment  - Modify environment to reduce risk of injury  - Consider OT/PT consult to assist with strengthening/mobility  3/3/2020 2044 by Chavez Nuñez RN  Outcome: Progressing  3/3/2020 2043 by Chavez Nuñez RN  Outcome: Progressing     Problem: Prexisting or High Potential for Compromised Skin Integrity  Goal: Skin integrity is maintained or improved  Description  INTERVENTIONS:  - Identify patients at risk for skin breakdown  - Assess and monitor skin integrity  - Assess and monitor nutrition and hydration status  - Monitor labs   - Assess for incontinence   - Turn and reposition patient  - Assist with mobility/ambulation  - Relieve pressure over bony prominences  - Avoid friction and shearing  - Provide appropriate hygiene as needed including keeping skin clean and dry  - Evaluate need for skin moisturizer/barrier cream  - Collaborate with interdisciplinary team   - Patient/family teaching  - Consider wound care consult   3/3/2020 2044 by Chavez Nuñez RN  Outcome: Progressing  3/3/2020 2043 by Chavez Nuñez RN  Outcome: Progressing     Problem: Nutrition/Hydration-ADULT  Goal: Nutrient/Hydration intake appropriate for improving, restoring or maintaining nutritional needs  Description  Monitor and assess patient's nutrition/hydration status for malnutrition  Collaborate with interdisciplinary team and initiate plan and interventions as ordered  Monitor patient's weight and dietary intake as ordered or per policy  Utilize nutrition screening tool and intervene as necessary  Determine patient's food preferences and provide high-protein, high-caloric foods as appropriate       INTERVENTIONS:  - Monitor oral intake, urinary output, labs, and treatment plans  - Assess nutrition and hydration status and recommend course of action  - Evaluate amount of meals eaten  - Assist patient with eating if necessary   - Allow adequate time for meals  - Recommend/ encourage appropriate diets, oral nutritional supplements, and vitamin/mineral supplements  - Order, calculate, and assess calorie counts as needed  - Recommend, monitor, and adjust tube feedings and TPN/PPN based on assessed needs  - Assess need for intravenous fluids  - Provide specific nutrition/hydration education as appropriate  - Include patient/family/caregiver in decisions related to nutrition  3/3/2020 2044 by Hubert Mattson RN  Outcome: Progressing  3/3/2020 2043 by Hubert Mattson RN  Outcome: Progressing     Problem: INFECTION - ADULT  Goal: Absence or prevention of progression during hospitalization  Description  INTERVENTIONS:  - Assess and monitor for signs and symptoms of infection  - Monitor lab/diagnostic results  - Monitor all insertion sites, i e  indwelling lines, tubes, and drains  - Monitor endotracheal if appropriate and nasal secretions for changes in amount and color  - [place] appropriate cooling/warming therapies per order  - Administer medications as ordered  - Instruct and encourage patient and family to use good hand hygiene technique  - Identify and instruct in appropriate isolation precautions for identified infection/condition  3/3/2020 2044 by Hubert Mattson RN  Outcome: Progressing  3/3/2020 2043 by Hubert Mattson RN  Outcome: Progressing  Goal: Absence of fever/infection during neutropenic period  Description  INTERVENTIONS:  - Monitor WBC    3/3/2020 2044 by Hubert Mattson RN  Outcome: Progressing  3/3/2020 2043 by Hubert Mattson RN  Outcome: Progressing     Problem: SAFETY ADULT  Goal: Maintain or return to baseline ADL function  Description  INTERVENTIONS:  -  Assess patient's ability to carry out ADLs; assess patient's baseline for ADL function and identify physical deficits which impact ability to perform ADLs (bathing, care of mouth/teeth, toileting, grooming, dressing, etc )  - Assess/evaluate cause of self-care deficits   - Assess range of motion  - Assess patient's mobility; develop plan if impaired  - Assess patient's need for assistive devices and provide as appropriate  - Encourage maximum independence but intervene and supervise when necessary  - Involve family in performance of ADLs  - Assess for home care needs following discharge   - Consider OT consult to assist with ADL evaluation and planning for discharge  - Provide patient education as appropriate  3/3/2020 2044 by Dixon Pennington RN  Outcome: Progressing  3/3/2020 2043 by Dixon Pennington RN  Outcome: Progressing  Goal: Maintain or return mobility status to optimal level  Description  INTERVENTIONS:  - Assess patient's baseline mobility status (ambulation, transfers, stairs, etc )    - Identify cognitive and physical deficits and behaviors that affect mobility  - Identify mobility aids required to assist with transfers and/or ambulation (gait belt, sit-to-stand, lift, walker, cane, etc )  - [place] fall precautions as indicated by assessment  - Record patient progress and toleration of activity level on Mobility SBAR; progress patient to next Phase/Stage  - Instruct patient to call for assistance with activity based on assessment  - Consider rehabilitation consult to assist with strengthening/weightbearing, etc   3/3/2020 2044 by Dixon Pennington RN  Outcome: Progressing  3/3/2020 2043 by Dixon Pennington RN  Outcome: Progressing     Problem: DISCHARGE PLANNING  Goal: Discharge to home or other facility with appropriate resources  Description  INTERVENTIONS:  - Identify barriers to discharge w/patient and caregiver  - Arrange for needed discharge resources and transportation as appropriate  - Identify discharge learning needs (meds, wound care, etc )  - Arrange for interpretive services to assist at discharge as needed  - Refer to Case Management Department for coordinating discharge planning if the patient needs post-hospital services based on physician/advanced practitioner order or complex needs related to functional status, cognitive ability, or social support system  3/3/2020 2044 by Levi Robledo RN  Outcome: Progressing  3/3/2020 2043 by Levi Robledo RN  Outcome: Progressing     Problem: Knowledge Deficit  Goal: Patient/family/caregiver demonstrates understanding of disease process, treatment plan, medications, and discharge instructions  Description  Complete learning assessment and assess knowledge base    Interventions:  - Provide teaching at level of understanding  - Provide teaching via preferred learning methods  3/3/2020 2044 by Levi Robledo RN  Outcome: Progressing  3/3/2020 2043 by Levi Robledo RN  Outcome: Progressing

## 2020-03-04 NOTE — SOCIAL WORK
No d/c for today as discussed at care coordination rounds , pt needs to reassessed for d/c needs after pt had been assessed by pt/to, cm will continue to follow

## 2020-03-05 PROBLEM — E87.1 ACUTE HYPONATREMIA: Status: ACTIVE | Noted: 2020-03-05

## 2020-03-05 LAB
ALBUMIN SERPL BCP-MCNC: 3.6 G/DL (ref 3.5–5.7)
ALP SERPL-CCNC: 46 U/L (ref 55–165)
ALT SERPL W P-5'-P-CCNC: 9 U/L (ref 7–52)
ANION GAP SERPL CALCULATED.3IONS-SCNC: 7 MMOL/L (ref 4–13)
AST SERPL W P-5'-P-CCNC: 15 U/L (ref 13–39)
BASOPHILS # BLD AUTO: 0 THOUSANDS/ΜL (ref 0–0.1)
BASOPHILS NFR BLD AUTO: 0 % (ref 0–2)
BILIRUB SERPL-MCNC: 0.7 MG/DL (ref 0.2–1)
BUN SERPL-MCNC: 25 MG/DL (ref 7–25)
CALCIUM SERPL-MCNC: 9.2 MG/DL (ref 8.6–10.5)
CHLORIDE SERPL-SCNC: 94 MMOL/L (ref 98–107)
CO2 SERPL-SCNC: 28 MMOL/L (ref 21–31)
CREAT SERPL-MCNC: 0.83 MG/DL (ref 0.6–1.2)
EOSINOPHIL # BLD AUTO: 0 THOUSAND/ΜL (ref 0–0.61)
EOSINOPHIL NFR BLD AUTO: 0 % (ref 0–5)
ERYTHROCYTE [DISTWIDTH] IN BLOOD BY AUTOMATED COUNT: 14.9 % (ref 11.5–14.5)
GFR SERPL CREATININE-BSD FRML MDRD: 65 ML/MIN/1.73SQ M
GLUCOSE SERPL-MCNC: 150 MG/DL (ref 65–99)
HCT VFR BLD AUTO: 33.6 % (ref 42–47)
HGB BLD-MCNC: 11.3 G/DL (ref 12–16)
LYMPHOCYTES # BLD AUTO: 0.5 THOUSANDS/ΜL (ref 0.6–4.47)
LYMPHOCYTES NFR BLD AUTO: 11 % (ref 21–51)
MCH RBC QN AUTO: 29.9 PG (ref 26–34)
MCHC RBC AUTO-ENTMCNC: 33.5 G/DL (ref 31–37)
MCV RBC AUTO: 89 FL (ref 81–99)
MONOCYTES # BLD AUTO: 0.4 THOUSAND/ΜL (ref 0.17–1.22)
MONOCYTES NFR BLD AUTO: 9 % (ref 2–12)
NEUTROPHILS # BLD AUTO: 3.5 THOUSANDS/ΜL (ref 1.4–6.5)
NEUTS SEG NFR BLD AUTO: 80 % (ref 42–75)
PLATELET # BLD AUTO: 180 THOUSANDS/UL (ref 149–390)
PMV BLD AUTO: 9 FL (ref 8.6–11.7)
POTASSIUM SERPL-SCNC: 4.7 MMOL/L (ref 3.5–5.5)
PROT SERPL-MCNC: 7 G/DL (ref 6.4–8.9)
RBC # BLD AUTO: 3.77 MILLION/UL (ref 3.9–5.2)
SODIUM SERPL-SCNC: 129 MMOL/L (ref 134–143)
WBC # BLD AUTO: 4.4 THOUSAND/UL (ref 4.8–10.8)

## 2020-03-05 PROCEDURE — 94640 AIRWAY INHALATION TREATMENT: CPT

## 2020-03-05 PROCEDURE — 80053 COMPREHEN METABOLIC PANEL: CPT | Performed by: NURSE PRACTITIONER

## 2020-03-05 PROCEDURE — 99232 SBSQ HOSP IP/OBS MODERATE 35: CPT | Performed by: NURSE PRACTITIONER

## 2020-03-05 PROCEDURE — 97167 OT EVAL HIGH COMPLEX 60 MIN: CPT

## 2020-03-05 PROCEDURE — 94760 N-INVAS EAR/PLS OXIMETRY 1: CPT

## 2020-03-05 PROCEDURE — 97163 PT EVAL HIGH COMPLEX 45 MIN: CPT

## 2020-03-05 PROCEDURE — 85025 COMPLETE CBC W/AUTO DIFF WBC: CPT | Performed by: NURSE PRACTITIONER

## 2020-03-05 RX ORDER — FUROSEMIDE 40 MG/1
40 TABLET ORAL DAILY
Status: DISCONTINUED | OUTPATIENT
Start: 2020-03-05 | End: 2020-03-06 | Stop reason: HOSPADM

## 2020-03-05 RX ORDER — PREDNISONE 10 MG/1
TABLET ORAL
Qty: 47 TABLET | Refills: 0 | Status: SHIPPED | OUTPATIENT
Start: 2020-03-05 | End: 2020-03-18

## 2020-03-05 RX ORDER — CEFDINIR 300 MG/1
300 CAPSULE ORAL EVERY 12 HOURS SCHEDULED
Qty: 14 CAPSULE | Refills: 0 | Status: SHIPPED | OUTPATIENT
Start: 2020-03-05 | End: 2020-03-12

## 2020-03-05 RX ORDER — GUAIFENESIN 100 MG/5ML
400 SOLUTION ORAL EVERY 4 HOURS PRN
Qty: 473 ML | Refills: 0 | Status: SHIPPED | OUTPATIENT
Start: 2020-03-05 | End: 2020-03-15

## 2020-03-05 RX ORDER — GUAIFENESIN 600 MG
600 TABLET, EXTENDED RELEASE 12 HR ORAL EVERY 12 HOURS SCHEDULED
Qty: 60 TABLET | Refills: 0 | Status: SHIPPED | OUTPATIENT
Start: 2020-03-05 | End: 2020-04-04

## 2020-03-05 RX ORDER — POTASSIUM CHLORIDE 20 MEQ/1
20 TABLET, EXTENDED RELEASE ORAL DAILY
Qty: 30 TABLET | Refills: 0 | Status: SHIPPED | OUTPATIENT
Start: 2020-03-06 | End: 2020-06-17

## 2020-03-05 RX ADMIN — CEFDINIR 300 MG: 300 CAPSULE ORAL at 21:31

## 2020-03-05 RX ADMIN — METOPROLOL TARTRATE 25 MG: 25 TABLET, FILM COATED ORAL at 18:26

## 2020-03-05 RX ADMIN — GUAIFENESIN 600 MG: 600 TABLET, EXTENDED RELEASE ORAL at 21:31

## 2020-03-05 RX ADMIN — SERTRALINE HYDROCHLORIDE 50 MG: 50 TABLET ORAL at 09:36

## 2020-03-05 RX ADMIN — HYDROXYZINE HYDROCHLORIDE 25 MG: 25 TABLET, FILM COATED ORAL at 09:37

## 2020-03-05 RX ADMIN — GUAIFENESIN 600 MG: 600 TABLET, EXTENDED RELEASE ORAL at 09:36

## 2020-03-05 RX ADMIN — IPRATROPIUM BROMIDE 0.5 MG: 0.5 SOLUTION RESPIRATORY (INHALATION) at 07:51

## 2020-03-05 RX ADMIN — LEVALBUTEROL HYDROCHLORIDE 1.25 MG: 1.25 SOLUTION, CONCENTRATE RESPIRATORY (INHALATION) at 13:52

## 2020-03-05 RX ADMIN — SODIUM CHLORIDE 500 ML: 0.9 INJECTION, SOLUTION INTRAVENOUS at 09:36

## 2020-03-05 RX ADMIN — APIXABAN 2.5 MG: 2.5 TABLET, FILM COATED ORAL at 18:26

## 2020-03-05 RX ADMIN — DILTIAZEM HYDROCHLORIDE 90 MG: 60 TABLET, FILM COATED ORAL at 05:30

## 2020-03-05 RX ADMIN — HYDROXYZINE HYDROCHLORIDE 25 MG: 25 TABLET, FILM COATED ORAL at 05:30

## 2020-03-05 RX ADMIN — PREDNISONE 40 MG: 20 TABLET ORAL at 18:26

## 2020-03-05 RX ADMIN — CEFDINIR 300 MG: 300 CAPSULE ORAL at 09:36

## 2020-03-05 RX ADMIN — HYDROXYZINE HYDROCHLORIDE 25 MG: 25 TABLET, FILM COATED ORAL at 21:31

## 2020-03-05 RX ADMIN — PRAVASTATIN SODIUM 40 MG: 40 TABLET ORAL at 18:26

## 2020-03-05 RX ADMIN — PANTOPRAZOLE SODIUM 40 MG: 40 TABLET, DELAYED RELEASE ORAL at 05:30

## 2020-03-05 RX ADMIN — DILTIAZEM HYDROCHLORIDE 90 MG: 60 TABLET, FILM COATED ORAL at 21:31

## 2020-03-05 RX ADMIN — LEVALBUTEROL HYDROCHLORIDE 1.25 MG: 1.25 SOLUTION, CONCENTRATE RESPIRATORY (INHALATION) at 07:51

## 2020-03-05 RX ADMIN — METOPROLOL TARTRATE 25 MG: 25 TABLET, FILM COATED ORAL at 09:36

## 2020-03-05 RX ADMIN — APIXABAN 2.5 MG: 2.5 TABLET, FILM COATED ORAL at 09:36

## 2020-03-05 RX ADMIN — IPRATROPIUM BROMIDE 0.5 MG: 0.5 SOLUTION RESPIRATORY (INHALATION) at 19:46

## 2020-03-05 RX ADMIN — IPRATROPIUM BROMIDE 0.5 MG: 0.5 SOLUTION RESPIRATORY (INHALATION) at 13:52

## 2020-03-05 RX ADMIN — FUROSEMIDE 40 MG: 40 TABLET ORAL at 09:36

## 2020-03-05 RX ADMIN — OXYBUTYNIN CHLORIDE 5 MG: 5 TABLET ORAL at 09:36

## 2020-03-05 RX ADMIN — POTASSIUM CHLORIDE 20 MEQ: 1500 TABLET, EXTENDED RELEASE ORAL at 09:36

## 2020-03-05 RX ADMIN — LEVALBUTEROL HYDROCHLORIDE 1.25 MG: 1.25 SOLUTION, CONCENTRATE RESPIRATORY (INHALATION) at 19:46

## 2020-03-05 RX ADMIN — HYDROXYZINE HYDROCHLORIDE 25 MG: 25 TABLET, FILM COATED ORAL at 18:26

## 2020-03-05 RX ADMIN — DILTIAZEM HYDROCHLORIDE 90 MG: 60 TABLET, FILM COATED ORAL at 14:40

## 2020-03-05 RX ADMIN — PREDNISONE 40 MG: 20 TABLET ORAL at 09:36

## 2020-03-05 NOTE — PLAN OF CARE
Problem: PHYSICAL THERAPY ADULT  Goal: Performs mobility at highest level of function for planned discharge setting  See evaluation for individualized goals  Description  Treatment/Interventions: Functional transfer training, LE strengthening/ROM, Therapeutic exercise, Endurance training, Patient/family training, Equipment eval/education, Bed mobility, Gait training, Spoke to nursing, Spoke to case management          See flowsheet documentation for full assessment, interventions and recommendations  Note:   Prognosis: Fair  Problem List: Decreased strength, Decreased endurance, Impaired balance, Decreased mobility, Decreased safety awareness  Assessment: Pt is 81 y o  female seen for PT evaluation on 3/5/2020 s/p admit to [address] on 2/28/2020 w/ Sepsis due to Haemophilus influenzae with acute hypoxic respiratory failure without septic shock ([address] )  PT consulted to assess pt's functional mobility and d/c needs  Order placed for PT eval and tx, w/ up w/ A order  Performed at least 2 patient identifiers during session: Name and wristband  Comorbidities affecting pt's physical performance at time of assessment include: A-fib, CHF, HTN, hypokalemia, depression, anxiety, GERD, OAB, hypomagnesemia, hyperlipidemia, Influenza A, pneumonia, elevated troponin and hypoxia  PTA, pt was independent with ADLs, ambulatory with rollator and lives with son in a multi-level house  Personal factors affecting pt at time of IE include: ambulating w/ assistive device, inability to navigate level surfaces w/o external assistance, unable to perform dynamic tasks in community, decreased initiation and engagement, unable to perform physical activity, inability to perform IADLs and inability to perform ADLs   Please find objective findings from PT assessment regarding body systems outlined above with impairments and limitations including weakness, impaired balance, decreased endurance, decreased activity tolerance, decreased functional mobility tolerance and fall risk, as well as mobility assessment (need for cueing for mobility technique)  The following objective measures performed on IE also reveal limitations: Barthel Index: 55/100  Pt's clinical presentation is currently unstable/unpredictable seen in pt's presentation of need for input for task focus and mobility technique and ongoing medical assessment  Pt to benefit from continued PT tx to address deficits as defined above and maximize level of functional independent mobility and consistency  From PT/mobility standpoint, recommendation at time of d/c would be Home PT with family support pending progress in order to facilitate return to PLOF  Barriers to Discharge: Inaccessible home environment     Recommendation: Home PT, Home with family support     PT - OK to Discharge: No    See flowsheet documentation for full assessment

## 2020-03-05 NOTE — DISCHARGE SUMMARY
Discharge- Latonia Mata 1935, 81 y o  female MRN: [id number]    Unit/Bed#: -02 Encounter: [id number]    Primary Care Provider: Heather Jama MD   Date and time admitted to hospital: 2/28/2020  6:05 PM        * Sepsis due to Haemophilus influenzae with acute hypoxic respiratory failure without septic shock ([facility] [address] )  Assessment & Plan  · Secondary to influenza A and right lower lobe pneumonia with left upper lobe pneumonia  · Procalcitonin negative  · Discontinue IV antibiotics will start patient on oral cefdinir - day 3  · Will start patient on prednisone taper  · Patient is not receiving sepsis protocol IV fluid resuscitation secondary to history of congestive heart failure  · Continue Tamiflu  · Continue nebulizer treatments      Influenza A  Assessment & Plan  · Continue Tamiflu  · Continue droplet precautions    Pneumonia of both lungs due to influenza A virus  Assessment & Plan  · Viral pneumonia  · Procalcitonin negative x2  · Nebulizer treatments  · Start patient on oral cefdinir with prednisone taper  · Patient with minimal to no improvement will order chest x-ray PA and lateral - bilateral lower lobe opacities with slightly worsening  · Will have patient have outpatient chest x-ray in follow-up  · Patient is currently at baseline oxygen of 2 L nasal cannula    Acute respiratory failure with hypoxia ([facility] [address] )  Assessment & Plan  · Secondary to influenza and viral pneumonia  · Titrate oxygen as able  · Respiratory protocol  · Patient may need a desat study - no, patient is at baseline 2 L nasal cannula which she wears at all times      Acute on chronic diastolic heart failure (HCC)  Assessment & Plan  Wt Readings from Last 3 Encounters:   03/05/20 59 5 kg (131 lb 2 8 oz)   12/19/19 62 1 kg (137 lb)   12/04/19 64 3 kg (141 lb 12 1 oz)     · Patient appears dry in light of her sepsis  · Lasix was held for the past 4 days  · Based on patient not improving from pneumonias status, Lasix 20 mg daily will be started on 03/03/2020, and then be daily  · Chest x-ray PA and lateral will also be ordered as an outpatient for follow-up  · Continue daily weights  · BNP on admission was 1953  · Patient does have past medical history of severe MR, CAD with GARRET to LAD  · Daily weights        Elevated troponin I level  Assessment & Plan  · No rise or fall, essentially negative  · Denies any chest pain    Longstanding persistent atrial fibrillation  Assessment & Plan  · Continue pre-hospital Eliquis 2 5 mg p o  B i d , Lopressor 25 mg p o  B i d  And Cardizem 90 mg p o  Q 8 hours    Acute hypokalemia  Assessment & Plan  · Likely hypovolemic hyponatremia  · Improved with supplementation  · Continue to monitor     Essential hypertension  Assessment & Plan  · Continue pre-hospital Lopressor 25 mg p o  B i d , Cardizem 90 mg p o  Q 8 hours  · Lasix on hold in light of sepsis, but will be restarted at half the dose  Depression with anxiety  Assessment & Plan  · Continue pre-hospital Zoloft 50 mg p o  Daily, Atarax 25 mg p o  Q 6 hours and Xanax 0 25 mg p o  Q h s  P r n  Gastroesophageal reflux disease without esophagitis  Assessment & Plan  · Continue pre-hospital pantoprazole 40 mg p o  Daily    OAB (overactive bladder)  Assessment & Plan  · Continue pre-hospital Ditropan 5 mg p o  Daily    Mixed hyperlipidemia  Assessment & Plan  · Continue pre-hospital Pravachol 40 mg p o  Daily    Hypomagnesemia  Assessment & Plan  · Chronic condition  · Continue pre-hospital Mag oxide 400 mg p o  3 times weekly    Acute hyponatremia  Assessment & Plan  · Patient will receive normal saline bolus of 500 mL x1  · Will have blood work to follow-up as an outpatient  · Hypovolemic hyponatremia          Discharging Physician / Practitioner: NEAL De Los Santos  PCP: Nash Law MD  Admission Date:   Admission Orders (From admission, onward)     Ordered        02/28/20 2011  Inpatient Admission  Once                   Discharge Date: 03/05/20    Resolved Problems  Date Reviewed: 3/5/2020    None          Consultations During Hospital Stay:  · Respiratory therapy  · Case management  · PT OT    Procedures Performed:   · 02/28/2020:CHEST x-ray     INDICATION:   generalized weakness with fever      COMPARISON:  11/30/2019     EXAM PERFORMED/VIEWS:  XR CHEST PA & LATERAL  Images: 2     FINDINGS:     Stable cardiomediastinal structures  Extensive calcification of the mitral annulus      Mild central pulmonary vascular congestion  Bibasilar opacities are noted with trace effusions  Opacity slightly more pronounced in the right lung base  The upper lung fields are clear      Osseous structures appear within normal limits for patient age      IMPRESSION:     Central vascular congestion with basilar opacities and trace effusions suggestive of pulmonary edema  Given patient's history of weakness and fever, superimposed right basilar pneumonia suspected  · 02/28/2020:CT CHEST WITHOUT IV CONTRAST     INDICATION:   Cough      COMPARISON:  2/28/2020      TECHNIQUE: CT examination of the chest was performed without intravenous contrast   Axial, sagittal, and coronal 2D reformatted images were created from the source data and submitted for interpretation      Radiation dose length product (DLP) for this visit:  146 8 mGy-cm   This examination, like all CT scans performed in the [facility], was performed utilizing techniques to minimize radiation dose exposure, including the use of iterative   reconstruction and automated exposure control       FINDINGS:     LUNGS:  Patchy airspace opacities in the right lower lobe  Few  Minimal subsegmental atelectasis at the lung bases perihilar opacities in the left upper lobe  There is no tracheal or endobronchial lesion      PLEURA:  Small bilateral pleural effusions      HEART/GREAT VESSELS:  Cardiomegaly  Mitral valve and coronary artery calcifications    No thoracic aortic aneurysm      MEDIASTINUM AND GE:  Small mediastinal and hilar lymph nodes      CHEST WALL AND LOWER NECK:   Unremarkable      VISUALIZED STRUCTURES IN THE UPPER ABDOMEN:  Small hiatal hernia      OSSEOUS STRUCTURES:  No acute fracture or destructive osseous lesion      IMPRESSION:     1   Multifocal pneumonia in the right lower and left upper lobes  2   Small bilateral pleural effusions  · 03/03/2020:CHEST x-ray     INDICATION:   pneumonia and exacerbation of chf      COMPARISON:  Chest x-ray 2/28/20     EXAM PERFORMED/VIEWS:  XR CHEST PA & LATERAL      FINDINGS:     Heart shadow is enlarged but unchanged from prior exam      Bibasilar airspace opacities, right greater than left, and slightly increased when compared to the prior study  No pneumothorax  Left costophrenic angle is obscured, possibly due to airspace  Normal pulmonary vasculature     Osseous structures appear within normal limits for patient age      IMPRESSION:     Bibasal airspace opacities, slightly worsening    Significant Findings / Test Results:   · Patient was found to be positive for influenza A  · BNP on admission was 1953    Incidental Findings:   · None     Test Results Pending at Discharge (will require follow up): · None     Outpatient Tests Requested:  · None    Complications:     None    Reason for Admission: Cough, fever and weakness x3 weeks    Hospital Course:     Adithya Santo is a 81 y o  female patient who originally presented to the hospital on 2/28/2020 due to cough, fever weakness x3 weeks  with productive of yellowish-greenish sputum as well as the subjective fever and increasing weakness that has been ongoing over the past 3 weeks    Patient reports that she has had exposure to multiple family members were positive for the flu and she unable to receive the flu vaccine secondary to an EGG allergy       Patient states that she has had progressive weakness over the past 3 weeks to the point where it is difficult for her to get up and ambulate to the bathroom but denies any muscle aches or body aches  Patient reports some increasing dyspnea with activity and states that she normally wears O2 2 L nasal cannula at night but has been using this sometimes throughout the day to aid in her dyspnea  Patient takes no respiratory medications at home other than previously mentioned O2  Per initial radiology exams, patient was found to have a right lower lobe pneumonia with a left upper lobe pneumonia, and was positive for influenza A  Patient did receive Tamiflu for total of 5 days  Patient did require increased amounts of oxygen therapy, but has returned down to her 2 L nasal cannula  Patient has had a productive cough which has decreased in production, as patient has increased in improvement  Secondary to her sepsis criteria, patient's Lasix was held  The patient did have a BNP on admission of 1953, and had been restarted on her oral Lasix  Patient was transition to oral antibiotics, and started on a prednisone taper, which will continue as an outpatient  Patient did have issues with both hyponatremia and hypokalemia during her stay, and will need to have follow-up blood work in 1 week  Please see above list of diagnoses and related plan for additional information  Condition at Discharge: fair     Discharge Day Visit / Exam:     Subjective:  Patient is sitting up in her chair  She is very awake and alert, stating that she has not felt this good in many weeks  She is currently on 2 L nasal cannula, which she states is her baseline oxygen requirement      Vitals: Blood Pressure: 130/82 (03/05/20 0753)  Pulse: 101 (03/05/20 0753)  Temperature: 97 5 °F (36 4 °C) (03/05/20 0753)  Temp Source: Tympanic (03/05/20 0753)  Respirations: 16 (03/05/20 0753)  Height: 4' 11" (149 9 cm) (02/28/20 2130)  Weight - Scale: 59 5 kg (131 lb 2 8 oz) (03/05/20 0557)  SpO2: 96 % (03/05/20 0753)  Exam:   Physical Exam Constitutional: She is oriented to person, place, and time  She appears well-developed  She is cooperative  HENT:   Head: Normocephalic and atraumatic  Nose: Nose normal    Mouth/Throat: Oropharynx is clear and moist and mucous membranes are normal    Eyes: Conjunctivae and EOM are normal    Neck: Full passive range of motion without pain  Cardiovascular: Normal rate, regular rhythm, normal heart sounds and normal pulses  Pulmonary/Chest: Effort normal  She has rhonchi in the right lower field and the left lower field  Patient with lung sounds at have significantly improved  She has trace to him minimal rhonchi in the bilateral lower lobes  Her cough has decreased, and her oxygen requirements are at her baseline  Abdominal: Soft  Normal appearance and bowel sounds are normal    Musculoskeletal:   Some generalized weakness, but has improved, PT OT do suggest home health care  Neurological: She is alert and oriented to person, place, and time  Occasional forgetfulness  Skin: Skin is warm  Psychiatric: She has a normal mood and affect  Her speech is normal and behavior is normal        Discussion with Family:  Discussed with the patient, will discuss with family when they arrive  Discharge instructions/Information to patient and family:   See after visit summary for information provided to patient and family  Provisions for Follow-Up Care:  See after visit summary for information related to follow-up care and any pertinent home health orders  Disposition:     Home with VNA Services (Reminder: Complete face to face encounter)    For Discharges to [facility] SNF:   · Not Applicable to this Patient - Not Applicable to this Patient    Planned Readmission:    No      Discharge Statement:  I spent 35 minutes discharging the patient  This time was spent on the day of discharge  I had direct contact with the patient on the day of discharge   Greater than 50% of the total time was spent examining patient, answering all patient questions, arranging and discussing plan of care with patient as well as directly providing post-discharge instructions  Additional time then spent on discharge activities  Discharge Medications:  See after visit summary for reconciled discharge medications provided to patient and family        ** Please Note: This note has been constructed using a voice recognition system **

## 2020-03-05 NOTE — DISCHARGE INSTR - AVS FIRST PAGE
TAKE ALL OF THE ANTIBIOTICS THAT I ORDER FOR YOU     CONTINUE TAKING THE PREDNISONE IN A TAPERED FASHION TO HELP YOU BREATHE BETTER  PLEASE GET BLOOD WORK DONE IN ABOUT 1 WEEK, AND GET A REPEAT CHEST X-RAY IN ABOUT 1 WEEK, BEFORE YOU GO SEE DR Larita Cogan  WEIGHT YOURSELF AT THE SAME TIME EVERY DAY, WEARING APPROXIMATELY THE SAME CLOTHES, USING THE SAME SCALE  KEEP YOUR WEIGHT IS ON A DOCUMENT THAT YOU CAN SHOW TO YOUR PCP  THIS WILL HELP DECIDE HOW MUCH OF THE LASIX (WATER PILL) YOU NEED ON A DAILY BASIS  KEEP TAKING THE POTASSIUM SUPPLEMENT WHILE YOU ARE TAKING YOUR LASIX  THE BLOOD WORK YOU GET IN ABOUT 1 WEEK WILL ALSO HELP DECIDE IF YOU NEED TO STAY ON THIS MEDICATION  YOUR BLOOD PRESSURE MEDICATIONS HAVE BEEN CHANGED, YOUR METOPROLOL WILL BE 25 MG TWICE A DAY

## 2020-03-05 NOTE — OCCUPATIONAL THERAPY NOTE
Occupational Therapy Evaluation      Devin Jeffries    3/5/2020    Patient Active Problem List   Diagnosis    CAD (coronary artery disease)    Mitral regurgitation    Longstanding persistent atrial fibrillation    Acute on chronic diastolic heart failure (HCC)    Essential hypertension    Acute hypokalemia    Depression with anxiety    Gastroesophageal reflux disease without esophagitis    OAB (overactive bladder)    Anemia    Hypokalemia    Hypomagnesemia    Mixed hyperlipidemia    Osteoarthritis of knee    Influenza A    Sepsis due to Haemophilus influenzae with acute hypoxic respiratory failure without septic shock (HCC)    Pneumonia of both lungs due to influenza A virus    Elevated troponin I level    Acute respiratory failure with hypoxia (HCC)       Past Medical History:   Diagnosis Date    Acute on chronic diastolic congestive heart failure (HCC) 6/27/2019    Arthritis     Chronic diastolic heart failure (HCC)     Chronic kidney disease, stage 2 (mild)     Coronary artery disease     history of stenting    Eczema     years    Edema     History of echocardiogram 07/20/2017    EF 55%, mild concentric LVH, bileaflet MVP with moderate MR, left atrial enlargement   History of transfusion     Hyperlipidemia     Hypertension     Hypo-osmolality and hyponatremia     Hypokalemia 7/11/2019    Mitral regurgitation        Past Surgical History:   Procedure Laterality Date    ABDOMINAL SURGERY      hysterectomy    CARDIAC CATHETERIZATION  04/10/2012    EF 65%, no significant CAD, patent stents, severe MR     CORONARY ANGIOPLASTY WITH STENT PLACEMENT  03/21/2007    EF 55%, GARRET to LAD      EYE SURGERY      b/l cataracts    HYSTERECTOMY      JOINT REPLACEMENT      Rt knee        03/05/20 4407   Note Type   Note type Eval only   Restrictions/Precautions   Weight Bearing Precautions Per Order No   Other Precautions Fall Risk;O2;Pain  (2 L O2 via NC- used at baseline )   Pain Assessment   Pain Assessment 0-10   Pain Score 4   Pain Type Chronic pain   Pain Location Head;Neck   Pain Descriptors [facility] Pain Intervention(s) Repositioned; Ambulation/increased activity; Medication (See MAR)   Home Living   Type of [address] Multi-level;Performs ADLs on one level; Able to live on main level with bedroom/bathroom; Ramped entrance   Bathroom Shower/Tub Tub/shower unit   [employer] Grab bars in shower; Shower chair;Grab bars around toilet   [address]  (utilizes rollator at baseline )   Prior Function   Level of [place] Independent with ADLs and functional mobility   Lives With [employer] Help From Family   ADL Assistance Independent   IADLs Needs assistance   Falls in the last 6 months 0   Vocational Retired   Comments (-) driving- son assists with transportation    Lifestyle   Autonomy Patient reporting being independent with ADLs, ambulatory with rollator and lives with son in a multi-level house    Reciprocal Relationships supportive son who assists with IADLs   Intrinsic Gratification enjoys watching TV   ADL   Eating Assistance 6  Modified independent   Grooming Assistance 6  Modified Independent   UB Bathing Assistance 5  Supervision/Setup   LB Bathing Assistance 4  Minimal Assistance   [address] 5  Supervision/Setup   LB Dressing Assistance 4  [address]  4  Minimal Assistance   Bed Mobility   Supine to Sit 5  Supervision   Additional items Assist x 1;HOB elevated; Increased time required;Verbal cues   Additional Comments Pt reported feeling dizzy upon sitting at EOB  Dizziness resided after ~30 seconds of rest  Pt transferred to chair with no c/o dizziness  Pt OOB and seated in chair at end of eval with call bell within reach    Transfers   Sit to Stand 4  Minimal assistance   Additional items Assist x 1; Increased time required;Verbal cues   Stand to Sit 4  Minimal assistance   Additional items Assist x 1; Armrests; Increased time required   Stand pivot 4  Minimal assistance   Additional items Assist x 1; Armrests; Increased time required   Balance   Static Sitting Good   Dynamic Sitting Fair +   Static Standing Fair   Dynamic Standing Fair -   Activity Tolerance   Activity Tolerance Patient limited by pain; Patient limited by fatigue   Nurse Made Aware MARY Nam verbalized pt appropriate for therapy    RUE Assessment   RUE Assessment WFL  (full AROM, grossly 4-/5 MMT)   LUE Assessment   LUE Assessment WFL  (full AROM, grossly 4-/5 MMT)   Hand Function   Gross Motor Coordination Functional   Fine Motor Coordination Functional   Sensation   Light Touch Partial deficits in the RUE;Partial deficits in the LUE  (slight numbness in B hands )   Vision-Basic Assessment   Current Vision Wears glasses only for reading   Vision - Complex Assessment   Acuity Able to read clock/calendar on wall without difficulty   Cognition   Overall Cognitive Status [facility]   Arousal/Participation Alert; Responsive; Cooperative   Attention Within functional limits   Orientation Level Oriented X4   Memory Within functional limits   Following Commands Follows one step commands without difficulty   Comments Pt agreeable to OT eval    Assessment   Limitation Decreased ADL status; Decreased UE strength;Decreased Safe judgement during ADL;Decreased endurance;Decreased self-care trans;Decreased high-level ADLs   Prognosis Good   Assessment Pt is a 81 y o  female who was admitted to [address] [place] [address] on 2/28/2020 with Sepsis due to Haemophilus influenzae with acute hypoxic respiratory failure without septic shock ([facility] [address] )  At this time, patient is also affected by the comorbidities of: A-fib, CHF, HTN, hypokalemia, depression, anxiety, GERD, OAB, hypomagnesemia, hyperlipidemia, Influenza A, pneumonia, elevated troponin and hypoxia   Additionally, patient is affected by the following personal factors:difficulty performing ADLS and difficulty performing IADLS   Orders placed for OT evaluation/treatment with up with assistance orders  Prior to admission, Patient reporting being independent with ADLs, ambulatory with rollator and lives with son in a multi-level house  Upon OT evaluation, patient requires supervision/set-up for UB ADLs and minimum assist for LB ADLs  Occupational performance is affected by the following deficits: decreased strength, decreased balance, decreased tolerance, impaired sensation and increased pain  Based on the following information, patient would benefit from continued skilled OT treatment while in the hospital to address deficits and maximize level of functional independence with ADL's and functional mobility  Occupational Performance areas to address include: grooming, bathing/shower, toilet hygiene, dressing, functional mobility and clothing management  From OT standpoint, recommendation at time of d/c would be home OT  Goals   Patient Goals to go home soon    Plan   Treatment Interventions ADL retraining;UE strengthening/ROM; Functional transfer training; Endurance training;Patient/family training; Compensatory technique education; Energy conservation; Activityengagement   Goal Expiration Date 03/15/20   OT Treatment Day 0   OT Frequency 3-5x/wk   Recommendation   OT Discharge Recommendation Home OT   OT - OK to Discharge Yes  (Once medically cleared )   Barthel Index   Feeding 10   Bathing 0   Grooming Score 5   Dressing Score 5   Bladder Score 10   Bowels Score 10   Toilet Use Score 5   Transfers (Bed/Chair) Score 10   Mobility (Level Surface) Score 0   Stairs Score 0   Barthel Index Score 55   GOALS:    *ADL transfers with (I) for inc'd independence with ADLs/purposeful tasks    *UB ADL with (I) for inc'd independence with self cares    *LB ADL with (I) using AE prn for inc'd independence with self cares    *Toileting with (I) for clothing management and hygiene for return to PLOF with personal care    *Increase static stand balance to good and dyn stand balance to good- for inc'd safety with standing purposeful tasks    *Increase stand tolerance x8 m for inc'd tolerance with standing purposeful tasks    *Participate in 10m UE therex to increase overall stamina/activity tolerance for purposeful tasks    *Bed mobility- (I) for inc'd independence to manage own comfort and initiate EOB & OOB purposeful tasks    *Patient will verbalize and demonstrate use of energy conservation/deep breathing techniques and work simplification skills during functional activities with no verbal cues  *Patient will increase OOB/sitting tolerance to 2-4 hours per day to increase participation in self-care and leisure tasks with no s/s of exertion           Violeta Mendoza MS, OTR/L

## 2020-03-05 NOTE — PROGRESS NOTES
Progress Note - Rupali Elaine 1935, 81 y o  female MRN: [id number]    Unit/Bed#: -02 Encounter: [id number]    Primary Care Provider: Hussein Reza MD   Date and time admitted to hospital: 2/28/2020  6:05 PM        * Sepsis due to Haemophilus influenzae with acute hypoxic respiratory failure without septic shock ([facility] [address] )  Assessment & Plan  · Secondary to influenza A and right lower lobe pneumonia with left upper lobe pneumonia  · Procalcitonin negative  · Discontinue IV antibiotics will start patient on oral cefdinir - day 3  · Will start patient on prednisone taper  · Patient is not receiving sepsis protocol IV fluid resuscitation secondary to history of congestive heart failure  · Continue Tamiflu  · Continue nebulizer treatments      Influenza A  Assessment & Plan  · Continue Tamiflu  · Continue droplet precautions    Pneumonia of both lungs due to influenza A virus  Assessment & Plan  · Viral pneumonia  · Procalcitonin negative x2  · Nebulizer treatments  · Start patient on oral cefdinir with prednisone taper  · Patient with minimal to no improvement will order chest x-ray PA and lateral - bilateral lower lobe opacities with slightly worsening  · Will have patient have outpatient chest x-ray in follow-up  · Patient is currently at baseline oxygen of 2 L nasal cannula    Acute respiratory failure with hypoxia ([facility] [address] )  Assessment & Plan  · Secondary to influenza and viral pneumonia  · Titrate oxygen as able  · Respiratory protocol  · Patient may need a desat study - no, patient is at baseline 2 L nasal cannula which she wears at all times      Acute on chronic diastolic heart failure (HCC)  Assessment & Plan  Wt Readings from Last 3 Encounters:   03/05/20 59 5 kg (131 lb 2 8 oz)   12/19/19 62 1 kg (137 lb)   12/04/19 64 3 kg (141 lb 12 1 oz)     · Patient appears dry in light of her sepsis  · Lasix was held for the past 4 days  · Based on patient not improving from pneumonias status, Lasix 20 mg daily will be started on 03/03/2020, and then be daily  · Chest x-ray PA and lateral will also be ordered as an outpatient for follow-up  · Continue daily weights  · BNP on admission was 1953  · Patient does have past medical history of severe MR, CAD with GARRET to LAD  · Daily weights        Elevated troponin I level  Assessment & Plan  · No rise or fall, essentially negative  · Denies any chest pain    Longstanding persistent atrial fibrillation  Assessment & Plan  · Continue pre-hospital Eliquis 2 5 mg p o  B i d , Lopressor 25 mg p o  B i d  And Cardizem 90 mg p o  Q 8 hours    Acute hypokalemia  Assessment & Plan  · Likely hypovolemic hyponatremia  · Improved with supplementation  · Continue to monitor     Essential hypertension  Assessment & Plan  · Continue pre-hospital Lopressor 25 mg p o  B i d , Cardizem 90 mg p o  Q 8 hours  · Lasix on hold in light of sepsis, but will be restarted at half the dose  Depression with anxiety  Assessment & Plan  · Continue pre-hospital Zoloft 50 mg p o  Daily, Atarax 25 mg p o  Q 6 hours and Xanax 0 25 mg p o  Q h s  P r n  Gastroesophageal reflux disease without esophagitis  Assessment & Plan  · Continue pre-hospital pantoprazole 40 mg p o  Daily    OAB (overactive bladder)  Assessment & Plan  · Continue pre-hospital Ditropan 5 mg p o  Daily    Mixed hyperlipidemia  Assessment & Plan  · Continue pre-hospital Pravachol 40 mg p o  Daily    Hypomagnesemia  Assessment & Plan  · Chronic condition  · Continue pre-hospital Mag oxide 400 mg p o  3 times weekly    Acute hyponatremia  Assessment & Plan  · Patient will receive normal saline bolus of 500 mL x1  · Will have blood work to follow-up as an outpatient  · Hypovolemic hyponatremia  VTE Pharmacologic Prophylaxis: Pharmacologic: Apixaban (Eliquis)    Patient Centered Rounds: I have performed bedside rounds with nursing staff today      Discussions with Specialists or Other Care Team Provider:  Nursing, respiratory therapy, PT OT  Education and Discussions with Family / Patient:  Discussed with the patient    Current Length of Stay: 6 day(s)    Current Patient Status: Inpatient   Certification Statement: The patient will continue to require additional inpatient hospital stay due to Patient is technically medically stable to be discharged home however her daughter who was discharged yesterday, had returned back to [address] and was readmitted  Therefore patient is unable to return home as there is no one to come get her or be with her at the house  Discharge Plan:  Hopeful discharge home on 2020  Code Status: Level 1 - Full Code    Subjective:   Patient is sitting in her chair she states that she feels significantly better and wants to go home  I did discuss with her the options of being discharged home which she said she felt appropriate to continue taking oral antibiotics and her oral steroids  I was informed few minutes later that her daughter who would come and get her/who she lives with, was actually discharged on 2020 from this facility, was actually taken to [address] and was admitted for the same diagnosis of pneumonia with influenza A  Therefore the patient is unable to go home  The patient is understanding of the situation  Objective:     Vitals:   Temp (24hrs), Av 9 °F (36 6 °C), Min:97 5 °F (36 4 °C), Max:98 6 °F (37 °C)    Temp:  [97 5 °F (36 4 °C)-98 6 °F (37 °C)] 97 5 °F (36 4 °C)  HR:  [] 101  Resp:  [16] 16  BP: (111-130)/(64-82) 124/76  SpO2:  [94 %-97 %] 97 %  Body mass index is 26 49 kg/m²  Input and Output Summary (last 24 hours): Intake/Output Summary (Last 24 hours) at 3/5/2020 1827  Last data filed at 3/5/2020 1222  Gross per 24 hour   Intake 240 ml   Output --   Net 240 ml       Physical Exam:     Physical Exam   Constitutional: She is oriented to person, place, and time  She appears well-developed  She is cooperative     HENT:   Head: Normocephalic and atraumatic  Nose: Nose normal    Mouth/Throat: Oropharynx is clear and moist and mucous membranes are normal    Eyes: Conjunctivae and EOM are normal    Neck: Full passive range of motion without pain  Cardiovascular: Normal rate, regular rhythm, normal heart sounds and normal pulses  Pulmonary/Chest: Effort normal  She has rhonchi in the right lower field and the left lower field  Patient is on her at home baseline 2 L nasal cannula  Abdominal: Soft  Normal appearance and bowel sounds are normal    Musculoskeletal: Normal range of motion  Patient will have home health care upon discharge   Neurological: She is alert and oriented to person, place, and time  Periods of forgetfulness  Skin: Skin is warm  Psychiatric: She has a normal mood and affect  Her speech is normal and behavior is normal        Additional Data:     Labs:    Results from last 7 days   Lab Units 03/05/20  0453   WBC Thousand/uL 4 40*   HEMOGLOBIN g/dL 11 3*   HEMATOCRIT % 33 6*   PLATELETS Thousands/uL 180   NEUTROS PCT % 80*   LYMPHS PCT % 11*   MONOS PCT % 9   EOS PCT % 0     Results from last 7 days   Lab Units 03/05/20  0453   POTASSIUM mmol/L 4 7   CHLORIDE mmol/L 94*   CO2 mmol/L 28   BUN mg/dL 25   CREATININE mg/dL 0 83   CALCIUM mg/dL 9 2   ALK PHOS U/L 46*   ALT U/L 9   AST U/L 15     Results from last 7 days   Lab Units 02/28/20  1820   INR  1 36     Results from last 7 days   Lab Units 03/04/20  2005   POC GLUCOSE mg/dl 169*           * I Have Reviewed All Lab Data Listed Above  * Additional Pertinent Lab Tests Reviewed: [facility] Admission  Reviewed    Imaging:  Imaging Reports Reviewed Today Include:  None    Recent Cultures (last 7 days):     Results from last 7 days   Lab Units 02/29/20  0048 02/28/20  1829 02/28/20  1820   BLOOD CULTURE   --  No Growth After 5 Days  No Growth After 5 Days     LEGIONELLA URINARY ANTIGEN  Negative  --   --        Last 24 Hours Medication List: Current Facility-Administered Medications:  acetaminophen 650 mg Oral Q6H PRN Cayden Ortiz MD   albuterol 2 5 mg Nebulization Q4H PRN Cayden Ortiz MD   ALPRAZolam 0 25 mg Oral HS PRN Cayden Ortiz MD   apixaban 2 5 mg Oral BID Cayden Ortiz MD   cefdinir 300 mg Oral Q12H [facility] NEAL Gonsales   diltiazem 90 mg Oral [facility] Cayden Ortiz MD   furosemide 40 mg Oral Daily NEAL Gonsales   guaiFENesin 600 mg Oral Q12H [facility] Cayden Ortiz MD   guaiFENesin 400 mg Oral Q4H PRN NEAL Gonsales   hydrOXYzine HCL 25 mg Oral Q6H Cayden Ortiz MD   ipratropium 0 5 mg Nebulization TID Cayden Ortiz MD   levalbuterol 1 25 mg Nebulization TID Cayden Ortiz MD   magnesium oxide 400 mg Oral Once per day on Mon Wed Fri Cayden Ortiz MD   metoprolol tartrate 25 mg Oral BID Cayden Ortiz MD   ondansetron 4 mg Intravenous Q6H PRN Cayden Ortiz MD   oxybutynin 5 mg Oral Daily Cayden Ortiz MD   pantoprazole 40 mg Oral Early Morning Cayden Ortiz MD   phenol 1 spray Mouth/Throat Q2H PRN Fern Moore PA-C   potassium chloride 20 mEq Oral Daily Cayden Ortiz MD   pravastatin 40 mg Oral Daily With Zohreh Ness MD   [START ON 3/6/2020] predniSONE 30 mg Oral BID With Meals NEAL Gonsales   predniSONE 40 mg Oral BID With Meals NEAL Gonsales   sertraline 50 mg Oral Daily Cayden Ortiz MD        Today, Patient Was Seen By: NEAL Reyes    ** Please Note: Dictation voice to text software may have been used in the creation of this document   **

## 2020-03-05 NOTE — PHYSICAL THERAPY NOTE
Physical Therapy Evaluation     Patient's Name: Maryjane Warren    Admitting Diagnosis  Influenza A [J10 1]  Flu-like symptoms [R68 89]    Problem List  Patient Active Problem List   Diagnosis    CAD (coronary artery disease)    Mitral regurgitation    Longstanding persistent atrial fibrillation    Acute on chronic diastolic heart failure (HCC)    Essential hypertension    Acute hypokalemia    Depression with anxiety    Gastroesophageal reflux disease without esophagitis    OAB (overactive bladder)    Anemia    Hypokalemia    Hypomagnesemia    Mixed hyperlipidemia    Osteoarthritis of knee    Influenza A    Sepsis due to Haemophilus influenzae with acute hypoxic respiratory failure without septic shock ([facility])    Pneumonia of both lungs due to influenza A virus    Elevated troponin I level    Acute respiratory failure with hypoxia ([facility] [address] )       Past Medical History  Past Medical History:   Diagnosis Date    Acute on chronic diastolic congestive heart failure ([facility] [address] ) 6/27/2019    Arthritis     Chronic diastolic heart failure (HCC)     Chronic kidney disease, stage 2 (mild)     Coronary artery disease     history of stenting    Eczema     years    Edema     History of echocardiogram 07/20/2017    EF 55%, mild concentric LVH, bileaflet MVP with moderate MR, left atrial enlargement   History of transfusion     Hyperlipidemia     Hypertension     Hypo-osmolality and hyponatremia     Hypokalemia 7/11/2019    Mitral regurgitation        Past Surgical History  Past Surgical History:   Procedure Laterality Date    ABDOMINAL SURGERY      hysterectomy    CARDIAC CATHETERIZATION  04/10/2012    EF 65%, no significant CAD, patent stents, severe MR     CORONARY ANGIOPLASTY WITH STENT PLACEMENT  03/21/2007    EF 55%, GARRET to LAD      EYE SURGERY      b/l cataracts    HYSTERECTOMY      JOINT REPLACEMENT      Rt knee          03/05/20 1108   Note Type   Note type Eval/Treat   Pain Assessment   Pain Assessment 0-10   Pain Score 4   Pain Type Chronic pain   Pain Location Head;Neck   Pain Descriptors [facility] Pain Intervention(s) Repositioned; Ambulation/increased activity; Emotional support   Home Living   Type of [address] Multi-level;Performs ADLs on one level; Able to live on main level with bedroom/bathroom; Ramped entrance   Bathroom Shower/Tub Tub/shower unit   [employer] Grab bars in shower;Grab bars around toilet; Shower chair   [address] Other (Comment)  (utilizes rollator at baseline )   Prior Function   Level of [place] Independent with ADLs and functional mobility   Lives With Son   Receives Help From Family   ADL Assistance Independent   IADLs Needs assistance   Falls in the last 6 months 0   Vocational Retired   Comments (-) driving- son assists with transportation    Restrictions/Precautions   [facility] Bearing Precautions Per Order No   Other Precautions Chair Alarm; Bed Alarm;Multiple lines;O2;Fall Risk;Pain  (2 L O2 via NC- used at baseline )   General   Family/Caregiver Present No   Cognition   Overall Cognitive Status WFL   Arousal/Participation Alert   Attention Within functional limits   Orientation Level Oriented X4   Memory Within functional limits   Following Commands Follows one step commands without difficulty   Comments pt agreeable to PT session   RUE Assessment   RUE Assessment WFL  (full AROM, grossly 4-/5 MMT)   LUE Assessment   LUE Assessment WFL  (full AROM, grossly 4-/5 MMT)   RLE Assessment   RLE Assessment WFL  (full AROM, grossly 4-/5 MMT)   LLE Assessment   LLE Assessment WFL  (full AROM, grossly 4-/5 MMT)   Coordination   Movements are Fluid and Coordinated 1   Sensation WFL   Bed Mobility   Sit to Supine 5  Supervision   Additional items Assist x 1; Increased time required;Verbal cues   Additional Comments pt found seated OOB on BSC upon arrival to room, requesting to return back to bed 2/2 fatigue   Transfers   Sit to Stand 4  Minimal assistance   Additional items Assist x 1; Increased time required;Verbal cues   Stand to Sit 4  Minimal assistance   Additional items Assist x 1; Increased time required;Verbal cues   Ambulation/Elevation   Gait pattern Improper Weight shift;Decreased foot clearance;Shuffling; Short stride; Excessively slow   Gait Assistance 4  Minimal assist   Additional items Assist x 1;Verbal cues; Tactile cues   Assistive Device Other (Comment)  (HHA)   Distance 5 ft from BSC>bed   Stair Management Assistance Not tested   Balance   Static Sitting Good   Dynamic Sitting Fair +   Static Standing Fair -   Dynamic Standing Poor +   Ambulatory Poor +   Endurance Deficit   Endurance Deficit Yes   Activity Tolerance   Activity Tolerance Patient limited by pain; Patient limited by fatigue   Nurse Made Aware RN Nicanor Bridegroom verbalized pt appropriate for therapy    Assessment   Prognosis Fair   Problem List Decreased strength;Decreased endurance; Impaired balance;Decreased mobility; Decreased safety awareness   Assessment Pt is 81 y o  female seen for PT evaluation on 3/5/2020 s/p admit [address] on 2/28/2020 w/ Sepsis due to Haemophilus influenzae with acute hypoxic respiratory failure without septic shock ([facility] [address] )  PT consulted to assess pt's functional mobility and d/c needs  Order placed for PT eval and tx, w/ up w/ A order  Performed at least 2 patient identifiers during session: Name and wristband  Comorbidities affecting pt's physical performance at time of assessment include: A-fib, CHF, HTN, hypokalemia, depression, anxiety, GERD, OAB, hypomagnesemia, hyperlipidemia, Influenza A, pneumonia, elevated troponin and hypoxia  PTA, pt was independent with ADLs, ambulatory with rollator and lives with son in a multi-level house   Personal factors affecting pt at time of IE include: ambulating w/ assistive device, inability to navigate level surfaces w/o external assistance, unable to perform dynamic tasks in community, decreased initiation and engagement, unable to perform physical activity, inability to perform IADLs and inability to perform ADLs  Please find objective findings from PT assessment regarding body systems outlined above with impairments and limitations including weakness, impaired balance, decreased endurance, decreased activity tolerance, decreased functional mobility tolerance and fall risk, as well as mobility assessment (need for cueing for mobility technique)  The following objective measures performed on IE also reveal limitations: Barthel Index: 55/100  Pt's clinical presentation is currently unstable/unpredictable seen in pt's presentation of need for input for task focus and mobility technique and ongoing medical assessment  Pt to benefit from continued PT tx to address deficits as defined above and maximize level of functional independent mobility and consistency  From PT/mobility standpoint, recommendation at time of d/c would be Home PT with family support pending progress in order to facilitate return to PLOF  Barriers to Discharge Inaccessible home environment   Goals   Patient Goals "to go home soon"   [facility] Expiration Date 03/15/20   Short Term Goal #1 In 7-10 days: Increase bilateral LE strength 1/2 grade to facilitate independent mobility, Perform all bed mobility tasks modified independent to decrease caregiver burden, Perform all transfers modified independent to improve independence, Ambulate > 150 ft  with least restrictive assistive device modified independent w/o LOB and w/ normalized gait pattern 100% of the time, Increase all balance 1/2 grade to decrease risk for falls, Complete exercise program independently and Tolerate 4 hr OOB to faciliate upright tolerance   PT Treatment Day 0   Plan   Treatment/Interventions Functional transfer training;LE strengthening/ROM; Therapeutic exercise; Endurance training;Patient/family training;Equipment eval/education; Bed mobility;Gait training;Spoke to nursing;Spoke to case management   PT Frequency   (3-5x/wk)   Recommendation   Recommendation Home PT; Home with family support   PT - OK to Discharge No   Modified Lindy Scale   Modified Bradford Scale 4   Barthel Index   Feeding 10   Bathing 0   Grooming Score 5   Dressing Score 5   Bladder Score 10   Bowels Score 10   Toilet Use Score 5   Transfers (Bed/Chair) Score 10   Mobility (Level Surface) Score 0   Stairs Score 0   Barthel Index Score 55         Maricarmen Guerra, PT

## 2020-03-05 NOTE — PLAN OF CARE
Problem: OCCUPATIONAL THERAPY ADULT  Goal: Performs self-care activities at highest level of function for planned discharge setting  See evaluation for individualized goals  Description  Treatment Interventions: ADL retraining, UE strengthening/ROM, Functional transfer training, Endurance training, Patient/family training, Compensatory technique education, Energy conservation, Activityengagement          See flowsheet documentation for full assessment, interventions and recommendations  Note:   Limitation: Decreased ADL status, Decreased UE strength, Decreased Safe judgement during ADL, Decreased endurance, Decreased self-care trans, Decreased high-level ADLs  Prognosis: Good  Assessment: Pt is a 81 y o  female who was admitted to [address] [place] [address] on 2/28/2020 with Sepsis due to Haemophilus influenzae with acute hypoxic respiratory failure without septic shock ([facility] [address] )  At this time, patient is also affected by the comorbidities of: A-fib, CHF, HTN, hypokalemia, depression, anxiety, GERD, OAB, hypomagnesemia, hyperlipidemia, Influenza A, pneumonia, elevated troponin and hypoxia  Additionally, patient is affected by the following personal factors:difficulty performing ADLS and difficulty performing IADLS   Orders placed for OT evaluation/treatment with up with assistance orders  Prior to admission, Patient reporting being independent with ADLs, ambulatory with rollator and lives with son in a multi-level house  Upon OT evaluation, patient requires supervision/set-up for UB ADLs and minimum assist for LB ADLs  Occupational performance is affected by the following deficits: decreased strength, decreased balance, decreased tolerance, impaired sensation and increased pain  Based on the following information, patient would benefit from continued skilled OT treatment while in the hospital to address deficits and maximize level of functional independence with ADL's and functional mobility   Occupational Performance areas to address include: grooming, bathing/shower, toilet hygiene, dressing, functional mobility and clothing management  From OT standpoint, recommendation at time of d/c would be home OT       OT Discharge Recommendation: Home OT  OT - OK to Discharge: Yes(Once medically cleared )     Ladonna Collins OT

## 2020-03-06 VITALS
HEIGHT: 59 IN | SYSTOLIC BLOOD PRESSURE: 132 MMHG | OXYGEN SATURATION: 96 % | WEIGHT: 127.21 LBS | TEMPERATURE: 97.2 F | DIASTOLIC BLOOD PRESSURE: 68 MMHG | BODY MASS INDEX: 25.64 KG/M2 | RESPIRATION RATE: 18 BRPM | HEART RATE: 105 BPM

## 2020-03-06 LAB
ANION GAP SERPL CALCULATED.3IONS-SCNC: 7 MMOL/L (ref 4–13)
BUN SERPL-MCNC: 25 MG/DL (ref 7–25)
CALCIUM SERPL-MCNC: 9.3 MG/DL (ref 8.6–10.5)
CHLORIDE SERPL-SCNC: 97 MMOL/L (ref 98–107)
CO2 SERPL-SCNC: 28 MMOL/L (ref 21–31)
CREAT SERPL-MCNC: 0.7 MG/DL (ref 0.6–1.2)
GFR SERPL CREATININE-BSD FRML MDRD: 80 ML/MIN/1.73SQ M
GLUCOSE SERPL-MCNC: 143 MG/DL (ref 65–99)
POTASSIUM SERPL-SCNC: 4.3 MMOL/L (ref 3.5–5.5)
SODIUM SERPL-SCNC: 132 MMOL/L (ref 134–143)

## 2020-03-06 PROCEDURE — 94640 AIRWAY INHALATION TREATMENT: CPT

## 2020-03-06 PROCEDURE — 97530 THERAPEUTIC ACTIVITIES: CPT

## 2020-03-06 PROCEDURE — 80048 BASIC METABOLIC PNL TOTAL CA: CPT | Performed by: NURSE PRACTITIONER

## 2020-03-06 PROCEDURE — 97116 GAIT TRAINING THERAPY: CPT

## 2020-03-06 PROCEDURE — 99239 HOSP IP/OBS DSCHRG MGMT >30: CPT | Performed by: NURSE PRACTITIONER

## 2020-03-06 PROCEDURE — 94760 N-INVAS EAR/PLS OXIMETRY 1: CPT

## 2020-03-06 RX ADMIN — IPRATROPIUM BROMIDE 0.5 MG: 0.5 SOLUTION RESPIRATORY (INHALATION) at 07:31

## 2020-03-06 RX ADMIN — PREDNISONE 30 MG: 10 TABLET ORAL at 09:24

## 2020-03-06 RX ADMIN — FUROSEMIDE 40 MG: 40 TABLET ORAL at 09:24

## 2020-03-06 RX ADMIN — CEFDINIR 300 MG: 300 CAPSULE ORAL at 09:24

## 2020-03-06 RX ADMIN — SERTRALINE HYDROCHLORIDE 50 MG: 50 TABLET ORAL at 09:24

## 2020-03-06 RX ADMIN — POTASSIUM CHLORIDE 20 MEQ: 1500 TABLET, EXTENDED RELEASE ORAL at 09:24

## 2020-03-06 RX ADMIN — APIXABAN 2.5 MG: 2.5 TABLET, FILM COATED ORAL at 09:24

## 2020-03-06 RX ADMIN — HYDROXYZINE HYDROCHLORIDE 25 MG: 25 TABLET, FILM COATED ORAL at 09:24

## 2020-03-06 RX ADMIN — GUAIFENESIN 600 MG: 600 TABLET, EXTENDED RELEASE ORAL at 09:24

## 2020-03-06 RX ADMIN — MAGNESIUM GLUCONATE 500 MG ORAL TABLET 400 MG: 500 TABLET ORAL at 09:33

## 2020-03-06 RX ADMIN — METOPROLOL TARTRATE 25 MG: 25 TABLET, FILM COATED ORAL at 09:24

## 2020-03-06 RX ADMIN — LEVALBUTEROL HYDROCHLORIDE 1.25 MG: 1.25 SOLUTION, CONCENTRATE RESPIRATORY (INHALATION) at 07:31

## 2020-03-06 RX ADMIN — HYDROXYZINE HYDROCHLORIDE 25 MG: 25 TABLET, FILM COATED ORAL at 03:21

## 2020-03-06 RX ADMIN — PANTOPRAZOLE SODIUM 40 MG: 40 TABLET, DELAYED RELEASE ORAL at 05:29

## 2020-03-06 RX ADMIN — DILTIAZEM HYDROCHLORIDE 90 MG: 60 TABLET, FILM COATED ORAL at 05:29

## 2020-03-06 RX ADMIN — OXYBUTYNIN CHLORIDE 5 MG: 5 TABLET ORAL at 09:24

## 2020-03-06 NOTE — NURSING NOTE
Pt sleeping most of shift, no s/s of pain or distress  Precautions maintained  Call bell within reach

## 2020-03-06 NOTE — PLAN OF CARE
Problem: DISCHARGE PLANNING - CARE MANAGEMENT  Goal: Discharge to post-acute care or home with appropriate resources  Description  INTERVENTIONS:  - Conduct assessment to determine patient/family and health care team treatment goals, and need for post-acute services based on payer coverage, community resources, and patient preferences, and barriers to discharge  - Address psychosocial, clinical, and financial barriers to discharge as identified in assessment in conjunction with the patient/family and health care team  - Arrange appropriate level of post-acute services according to patient's   needs and preference and payer coverage in collaboration with the physician and health care team  - Communicate with and update the patient/family, physician, and health care team regarding progress on the discharge plan  - Arrange appropriate transportation to post-acute venues    Pt's goal is to return home with [facility]   Outcome: Completed

## 2020-03-06 NOTE — SOCIAL WORK
D/c  Order written, I placed a call to Kristine Dominguez to # [phone] and made her aware of the d/c at 11:30 am, she stated she will come to transport the pt home at 1 pm , rn was made aware, peter was made aware of the d/c for today, Kristine Dominguez was reminded to bring the pt's portable oxygen tank from home for the transport, pt and family in agreement with the d/c plan home with son and hhc,

## 2020-03-06 NOTE — PHYSICAL THERAPY NOTE
03/06/20 0844   Pain Assessment   Pain Assessment 0-10   Pain Score 5   Pain Type Chronic pain   Pain Location Knee   Pain Orientation Bilateral   Pain Descriptors Aching   Pain Frequency Constant/continuous   Pain Onset Ongoing   Clinical Progression Not changed   Patient's Stated Pain Goal No pain   Hospital Pain Intervention(s) Ambulation/increased activity;Repositioned   Restrictions/Precautions   Weight Bearing Precautions Per Order No   Other Precautions Chair Alarm;O2;Fall Risk;Pain   General   Chart Reviewed Yes   Response to Previous Treatment Patient with no complaints from previous session  Family/Caregiver Present No   Cognition   Overall Cognitive Status WFL   Arousal/Participation Alert; Cooperative   Attention Within functional limits   Orientation Level Oriented X4   Memory Within functional limits   Following Commands Follows one step commands without difficulty   Subjective   Subjective "I think I may need to use the commode "   Bed Mobility   Rolling R 6  Modified independent   Additional items Increased time required; Bedrails;HOB elevated   Rolling L 6  Modified independent   Additional items HOB elevated; Bedrails; Increased time required   Supine to Sit 5  Supervision   Additional items Assist x 1; Increased time required;HOB elevated; Bedrails;Verbal cues   Transfers   Sit to Stand   (CGA)   Additional items Assist x 1;Bedrails; Increased time required;Verbal cues   Stand to Sit   (CGA)   Additional items Assist x 1; Armrests; Increased time required;Verbal cues   Stand pivot   (CGA)   Additional items Assist x 1; Armrests; Increased time required;Verbal cues   Toilet transfer   (CGA)   Additional items Assist x 1; Armrests; Increased time required;Verbal cues; Commode   Ambulation/Elevation   Gait pattern Improper Weight shift;Decreased foot clearance; Short stride; Excessively slow   Gait Assistance   (CGA)   Additional items Assist x 1;Verbal cues; Tactile cues   Assistive Device Rolling walker Distance 20 ft   Stair Management Assistance Not tested   Balance   Static Sitting Good   Dynamic Sitting Fair +   Static Standing Fair   Dynamic Standing Fair -   Ambulatory Poor +   Endurance Deficit   Endurance Deficit Yes   Activity Tolerance   Activity Tolerance Patient limited by fatigue;Patient limited by pain   Assessment   Prognosis Fair   Problem List Decreased strength;Decreased endurance; Impaired balance;Decreased mobility; Decreased safety awareness;Pain   Assessment Pt  found resting in bed but willing to participate with PT treatment  Pt  Performed bed mobility tasks including rolling from sie to side with MI, grabbing for BSR's to assist   Pt  then transfered from supine to sit with S with cues for safety and direction  Pt  required a brief rest period at EOB  Pt  reported pain level 5/10 in B knees today during treatment  Pt  reported the need to toilet  Pt  performed SPS onto bed side commode with CGAx1 and cues for safety  Pt  required min Ax1 with clothing management but was I with pericare  Pt  then transfered from sit to stand with CGAx1 and ambulated 20 ft in room with O2 per order with RW and CGAx1  Pt was then assisted into stationary chair and positioned for comfort with all needs in reach  SCD's were applied and chair alarm was activated  Pt  tolerated treatment well with occational restperiods due to decreased activity tolerance and would benefit from HHPT once D/C'ed to home with family support to continue to improve her functional activity tolerance  Continue current POC and progress as tolerated  Goals   PT Treatment Day 1   Plan   Treatment/Interventions Functional transfer training;LE strengthening/ROM; Therapeutic exercise; Endurance training;Patient/family training;Bed mobility;Gait training   Progress Progressing toward goals   PT Frequency   (3-5x/wk)   Recommendation   Recommendation Home PT; Home with family support   Equipment Recommended Janay Trujillo   PT - OK to Discharge Yes   Additional Comments when medically stable   Vaishali Ugalde, ADONIS

## 2020-03-12 ENCOUNTER — APPOINTMENT (OUTPATIENT)
Dept: RADIOLOGY | Facility: CLINIC | Age: 85
End: 2020-03-12
Payer: MEDICARE

## 2020-03-12 DIAGNOSIS — J10.00 PNEUMONIA OF BOTH LUNGS DUE TO INFLUENZA A VIRUS, UNSPECIFIED PART OF LUNG: ICD-10-CM

## 2020-03-12 PROCEDURE — 71046 X-RAY EXAM CHEST 2 VIEWS: CPT

## 2020-03-13 ENCOUNTER — APPOINTMENT (OUTPATIENT)
Dept: LAB | Facility: CLINIC | Age: 85
End: 2020-03-13
Payer: MEDICARE

## 2020-03-13 ENCOUNTER — TRANSCRIBE ORDERS (OUTPATIENT)
Dept: LAB | Facility: CLINIC | Age: 85
End: 2020-03-13

## 2020-03-13 DIAGNOSIS — J10.01 INFLUENZA DUE TO OTHER IDENTIFIED INFLUENZA VIRUS WITH THE SAME OTHER IDENTIFIED INFLUENZA VIRUS PNEUMONIA: ICD-10-CM

## 2020-03-13 DIAGNOSIS — J10.01 INFLUENZA DUE TO OTHER IDENTIFIED INFLUENZA VIRUS WITH THE SAME OTHER IDENTIFIED INFLUENZA VIRUS PNEUMONIA: Primary | ICD-10-CM

## 2020-03-13 LAB
ANION GAP SERPL CALCULATED.3IONS-SCNC: 4 MMOL/L (ref 4–13)
BUN SERPL-MCNC: 31 MG/DL (ref 5–25)
CALCIUM SERPL-MCNC: 9.3 MG/DL (ref 8.3–10.1)
CHLORIDE SERPL-SCNC: 93 MMOL/L (ref 100–108)
CO2 SERPL-SCNC: 37 MMOL/L (ref 21–32)
CREAT SERPL-MCNC: 0.9 MG/DL (ref 0.6–1.3)
GFR SERPL CREATININE-BSD FRML MDRD: 59 ML/MIN/1.73SQ M
GLUCOSE P FAST SERPL-MCNC: 90 MG/DL (ref 65–99)
POTASSIUM SERPL-SCNC: 3.6 MMOL/L (ref 3.5–5.3)
SODIUM SERPL-SCNC: 134 MMOL/L (ref 136–145)

## 2020-03-13 PROCEDURE — 36415 COLL VENOUS BLD VENIPUNCTURE: CPT

## 2020-03-13 PROCEDURE — 80048 BASIC METABOLIC PNL TOTAL CA: CPT

## 2020-03-16 ENCOUNTER — PATIENT OUTREACH (OUTPATIENT)
Dept: CASE MANAGEMENT | Facility: OTHER | Age: 85
End: 2020-03-16

## 2020-03-16 NOTE — PROGRESS NOTES
Mohinder notification received for new bundle  Chart review completed  [facility]28/[facility]/o cough, fever and weakness for three weeks  Principle Problem: Sepsis due to Haemophilus influenzae with acute hypoxic respiratory failure without septic shock  Patient with past medical history of GERD, CAD, AFIB, CHF, depression with anxiety  See problem list for complete list of medical diagnosis  Review of IP SW note from 2/29/20 indicates patient lives with son in 2 story house with first floor set up  Patient reportedly independent with ADLs prior to admission and uses a RW for ambulation  Patient opened with [facility] 3/8/20 for SN / PT / OT services and has (15) skilled nursing visits scheduled  This CM will continue to monitor electronically via chart review and through [facility], outreaching upon discharge

## 2020-04-02 ENCOUNTER — PATIENT OUTREACH (OUTPATIENT)
Dept: CASE MANAGEMENT | Facility: OTHER | Age: 85
End: 2020-04-02

## 2020-04-23 ENCOUNTER — PATIENT OUTREACH (OUTPATIENT)
Dept: CASE MANAGEMENT | Facility: OTHER | Age: 85
End: 2020-04-23

## 2020-06-03 ENCOUNTER — PATIENT OUTREACH (OUTPATIENT)
Dept: CASE MANAGEMENT | Facility: OTHER | Age: 85
End: 2020-06-03

## 2020-06-17 ENCOUNTER — OFFICE VISIT (OUTPATIENT)
Dept: CARDIOLOGY CLINIC | Facility: CLINIC | Age: 85
End: 2020-06-17
Payer: MEDICARE

## 2020-06-17 VITALS
HEART RATE: 108 BPM | SYSTOLIC BLOOD PRESSURE: 112 MMHG | BODY MASS INDEX: 24.6 KG/M2 | WEIGHT: 122 LBS | HEIGHT: 59 IN | DIASTOLIC BLOOD PRESSURE: 78 MMHG

## 2020-06-17 DIAGNOSIS — J96.01 ACUTE RESPIRATORY FAILURE WITH HYPOXIA (HCC): Primary | ICD-10-CM

## 2020-06-17 DIAGNOSIS — E87.6 ACUTE HYPOKALEMIA: ICD-10-CM

## 2020-06-17 DIAGNOSIS — I34.0 MITRAL VALVE INSUFFICIENCY, UNSPECIFIED ETIOLOGY: ICD-10-CM

## 2020-06-17 DIAGNOSIS — I50.32 CHRONIC DIASTOLIC HEART FAILURE (HCC): ICD-10-CM

## 2020-06-17 DIAGNOSIS — I48.11 LONGSTANDING PERSISTENT ATRIAL FIBRILLATION (HCC): ICD-10-CM

## 2020-06-17 PROCEDURE — 93000 ELECTROCARDIOGRAM COMPLETE: CPT | Performed by: INTERNAL MEDICINE

## 2020-06-17 PROCEDURE — 99214 OFFICE O/P EST MOD 30 MIN: CPT | Performed by: INTERNAL MEDICINE

## 2020-06-17 RX ORDER — FUROSEMIDE 40 MG/1
40 TABLET ORAL DAILY PRN
Qty: 90 TABLET | Refills: 5 | Status: ON HOLD
Start: 2020-06-17 | End: 2021-07-03 | Stop reason: SDUPTHER

## 2020-06-17 RX ORDER — POTASSIUM CHLORIDE 20 MEQ/1
20 TABLET, EXTENDED RELEASE ORAL DAILY PRN
Qty: 90 TABLET | Refills: 5
Start: 2020-06-17 | End: 2021-07-01 | Stop reason: ALTCHOICE

## 2020-06-18 ENCOUNTER — APPOINTMENT (OUTPATIENT)
Dept: LAB | Facility: CLINIC | Age: 85
End: 2020-06-18
Payer: MEDICARE

## 2020-06-18 DIAGNOSIS — J96.01 ACUTE RESPIRATORY FAILURE WITH HYPOXIA (HCC): ICD-10-CM

## 2020-06-18 PROCEDURE — 36415 COLL VENOUS BLD VENIPUNCTURE: CPT

## 2020-06-18 PROCEDURE — 86769 SARS-COV-2 COVID-19 ANTIBODY: CPT

## 2020-06-19 LAB — SARS-COV-2 IGG SERPL QL IA: NEGATIVE

## 2020-06-23 ENCOUNTER — APPOINTMENT (EMERGENCY)
Dept: CT IMAGING | Facility: HOSPITAL | Age: 85
DRG: 604 | End: 2020-06-23
Payer: MEDICARE

## 2020-06-23 ENCOUNTER — APPOINTMENT (INPATIENT)
Dept: CT IMAGING | Facility: HOSPITAL | Age: 85
DRG: 604 | End: 2020-06-23
Payer: MEDICARE

## 2020-06-23 ENCOUNTER — APPOINTMENT (EMERGENCY)
Dept: RADIOLOGY | Facility: HOSPITAL | Age: 85
DRG: 604 | End: 2020-06-23
Payer: MEDICARE

## 2020-06-23 ENCOUNTER — HOSPITAL ENCOUNTER (INPATIENT)
Facility: HOSPITAL | Age: 85
LOS: 1 days | DRG: 604 | End: 2020-06-24
Attending: EMERGENCY MEDICINE | Admitting: FAMILY MEDICINE
Payer: MEDICARE

## 2020-06-23 DIAGNOSIS — I48.91 ATRIAL FIBRILLATION WITH RVR (HCC): ICD-10-CM

## 2020-06-23 DIAGNOSIS — S20.211A RIB CONTUSION, RIGHT, INITIAL ENCOUNTER: Primary | ICD-10-CM

## 2020-06-23 DIAGNOSIS — J18.9 PNEUMONIA: ICD-10-CM

## 2020-06-23 PROBLEM — N32.81 OAB (OVERACTIVE BLADDER): Chronic | Status: ACTIVE | Noted: 2019-07-02

## 2020-06-23 PROBLEM — E87.1 ACUTE HYPONATREMIA: Status: RESOLVED | Noted: 2020-03-05 | Resolved: 2020-06-23

## 2020-06-23 PROBLEM — I50.32 CHRONIC DIASTOLIC HEART FAILURE (HCC): Chronic | Status: ACTIVE | Noted: 2019-06-27

## 2020-06-23 PROBLEM — I10 ESSENTIAL HYPERTENSION: Chronic | Status: ACTIVE | Noted: 2019-07-02

## 2020-06-23 PROBLEM — J96.01 ACUTE RESPIRATORY FAILURE WITH HYPOXIA (HCC): Status: RESOLVED | Noted: 2020-02-29 | Resolved: 2020-06-23

## 2020-06-23 PROBLEM — A41.3 SEPSIS DUE TO HAEMOPHILUS INFLUENZAE WITH ACUTE HYPOXIC RESPIRATORY FAILURE WITHOUT SEPTIC SHOCK (HCC): Status: RESOLVED | Noted: 2020-02-28 | Resolved: 2020-06-23

## 2020-06-23 PROBLEM — J96.01 SEPSIS DUE TO HAEMOPHILUS INFLUENZAE WITH ACUTE HYPOXIC RESPIRATORY FAILURE WITHOUT SEPTIC SHOCK (HCC): Status: RESOLVED | Noted: 2020-02-28 | Resolved: 2020-06-23

## 2020-06-23 PROBLEM — R65.20 SEPSIS DUE TO HAEMOPHILUS INFLUENZAE WITH ACUTE HYPOXIC RESPIRATORY FAILURE WITHOUT SEPTIC SHOCK (HCC): Status: RESOLVED | Noted: 2020-02-28 | Resolved: 2020-06-23

## 2020-06-23 PROBLEM — J10.1 INFLUENZA A: Status: RESOLVED | Noted: 2020-02-28 | Resolved: 2020-06-23

## 2020-06-23 PROBLEM — J10.00 PNEUMONIA OF BOTH LUNGS DUE TO INFLUENZA A VIRUS: Status: RESOLVED | Noted: 2020-02-28 | Resolved: 2020-06-23

## 2020-06-23 PROBLEM — R53.81 DEBILITY: Status: ACTIVE | Noted: 2020-06-23

## 2020-06-23 PROBLEM — D64.9 ANEMIA: Status: RESOLVED | Noted: 2019-07-08 | Resolved: 2020-06-23

## 2020-06-23 PROBLEM — R77.8 ELEVATED TROPONIN I LEVEL: Status: RESOLVED | Noted: 2020-02-28 | Resolved: 2020-06-23

## 2020-06-23 PROBLEM — I48.11 LONGSTANDING PERSISTENT ATRIAL FIBRILLATION (HCC): Chronic | Status: ACTIVE | Noted: 2019-06-25

## 2020-06-23 LAB
ALBUMIN SERPL BCP-MCNC: 4 G/DL (ref 3.5–5.7)
ALP SERPL-CCNC: 57 U/L (ref 55–165)
ALT SERPL W P-5'-P-CCNC: 13 U/L (ref 7–52)
ANION GAP SERPL CALCULATED.3IONS-SCNC: 7 MMOL/L (ref 4–13)
APTT PPP: 28 SECONDS (ref 23–37)
AST SERPL W P-5'-P-CCNC: 16 U/L (ref 13–39)
BASOPHILS # BLD AUTO: 0.1 THOUSANDS/ΜL (ref 0–0.1)
BASOPHILS NFR BLD AUTO: 1 % (ref 0–2)
BILIRUB SERPL-MCNC: 0.8 MG/DL (ref 0.2–1)
BUN SERPL-MCNC: 14 MG/DL (ref 7–25)
CALCIUM SERPL-MCNC: 9.5 MG/DL (ref 8.6–10.5)
CHLORIDE SERPL-SCNC: 101 MMOL/L (ref 98–107)
CO2 SERPL-SCNC: 28 MMOL/L (ref 21–31)
CREAT SERPL-MCNC: 0.75 MG/DL (ref 0.6–1.2)
EOSINOPHIL # BLD AUTO: 0.1 THOUSAND/ΜL (ref 0–0.61)
EOSINOPHIL NFR BLD AUTO: 1 % (ref 0–5)
ERYTHROCYTE [DISTWIDTH] IN BLOOD BY AUTOMATED COUNT: 15 % (ref 11.5–14.5)
GFR SERPL CREATININE-BSD FRML MDRD: 73 ML/MIN/1.73SQ M
GLUCOSE SERPL-MCNC: 108 MG/DL (ref 65–99)
HCT VFR BLD AUTO: 37.2 % (ref 42–47)
HGB BLD-MCNC: 12.6 G/DL (ref 12–16)
INR PPP: 1.15 (ref 0.84–1.19)
LYMPHOCYTES # BLD AUTO: 1 THOUSANDS/ΜL (ref 0.6–4.47)
LYMPHOCYTES NFR BLD AUTO: 12 % (ref 21–51)
MAGNESIUM SERPL-MCNC: 2 MG/DL (ref 1.9–2.7)
MCH RBC QN AUTO: 30.5 PG (ref 26–34)
MCHC RBC AUTO-ENTMCNC: 34 G/DL (ref 31–37)
MCV RBC AUTO: 90 FL (ref 81–99)
MONOCYTES # BLD AUTO: 0.7 THOUSAND/ΜL (ref 0.17–1.22)
MONOCYTES NFR BLD AUTO: 9 % (ref 2–12)
NEUTROPHILS # BLD AUTO: 6.3 THOUSANDS/ΜL (ref 1.4–6.5)
NEUTS SEG NFR BLD AUTO: 78 % (ref 42–75)
PLATELET # BLD AUTO: 223 THOUSANDS/UL (ref 149–390)
PMV BLD AUTO: 8.1 FL (ref 8.6–11.7)
POTASSIUM SERPL-SCNC: 3 MMOL/L (ref 3.5–5.5)
PROT SERPL-MCNC: 7 G/DL (ref 6.4–8.9)
PROTHROMBIN TIME: 14.2 SECONDS (ref 11.6–14.5)
RBC # BLD AUTO: 4.13 MILLION/UL (ref 3.9–5.2)
SODIUM SERPL-SCNC: 136 MMOL/L (ref 134–143)
TROPONIN I SERPL-MCNC: <0.03 NG/ML
WBC # BLD AUTO: 8.1 THOUSAND/UL (ref 4.8–10.8)

## 2020-06-23 PROCEDURE — 36415 COLL VENOUS BLD VENIPUNCTURE: CPT | Performed by: EMERGENCY MEDICINE

## 2020-06-23 PROCEDURE — 93005 ELECTROCARDIOGRAM TRACING: CPT

## 2020-06-23 PROCEDURE — 83735 ASSAY OF MAGNESIUM: CPT | Performed by: PHYSICIAN ASSISTANT

## 2020-06-23 PROCEDURE — 99285 EMERGENCY DEPT VISIT HI MDM: CPT | Performed by: EMERGENCY MEDICINE

## 2020-06-23 PROCEDURE — 72125 CT NECK SPINE W/O DYE: CPT

## 2020-06-23 PROCEDURE — 96361 HYDRATE IV INFUSION ADD-ON: CPT

## 2020-06-23 PROCEDURE — 87040 BLOOD CULTURE FOR BACTERIA: CPT | Performed by: EMERGENCY MEDICINE

## 2020-06-23 PROCEDURE — 71045 X-RAY EXAM CHEST 1 VIEW: CPT

## 2020-06-23 PROCEDURE — 74176 CT ABD & PELVIS W/O CONTRAST: CPT

## 2020-06-23 PROCEDURE — 84484 ASSAY OF TROPONIN QUANT: CPT | Performed by: EMERGENCY MEDICINE

## 2020-06-23 PROCEDURE — 96360 HYDRATION IV INFUSION INIT: CPT

## 2020-06-23 PROCEDURE — 71250 CT THORAX DX C-: CPT

## 2020-06-23 PROCEDURE — 87449 NOS EACH ORGANISM AG IA: CPT | Performed by: PHYSICIAN ASSISTANT

## 2020-06-23 PROCEDURE — 84145 PROCALCITONIN (PCT): CPT | Performed by: EMERGENCY MEDICINE

## 2020-06-23 PROCEDURE — 70450 CT HEAD/BRAIN W/O DYE: CPT

## 2020-06-23 PROCEDURE — 85025 COMPLETE CBC W/AUTO DIFF WBC: CPT | Performed by: EMERGENCY MEDICINE

## 2020-06-23 PROCEDURE — 99285 EMERGENCY DEPT VISIT HI MDM: CPT

## 2020-06-23 PROCEDURE — 85610 PROTHROMBIN TIME: CPT | Performed by: EMERGENCY MEDICINE

## 2020-06-23 PROCEDURE — 1124F ACP DISCUSS-NO DSCNMKR DOCD: CPT | Performed by: NURSE PRACTITIONER

## 2020-06-23 PROCEDURE — 85730 THROMBOPLASTIN TIME PARTIAL: CPT | Performed by: EMERGENCY MEDICINE

## 2020-06-23 PROCEDURE — 80053 COMPREHEN METABOLIC PANEL: CPT | Performed by: EMERGENCY MEDICINE

## 2020-06-23 PROCEDURE — 71275 CT ANGIOGRAPHY CHEST: CPT

## 2020-06-23 RX ORDER — ONDANSETRON 2 MG/ML
4 INJECTION INTRAMUSCULAR; INTRAVENOUS EVERY 6 HOURS PRN
Status: DISCONTINUED | OUTPATIENT
Start: 2020-06-23 | End: 2020-06-23

## 2020-06-23 RX ORDER — METOPROLOL TARTRATE 5 MG/5ML
5 INJECTION INTRAVENOUS EVERY 6 HOURS PRN
Status: DISCONTINUED | OUTPATIENT
Start: 2020-06-23 | End: 2020-06-23

## 2020-06-23 RX ORDER — POTASSIUM CHLORIDE 20 MEQ/1
40 TABLET, EXTENDED RELEASE ORAL ONCE
Status: DISCONTINUED | OUTPATIENT
Start: 2020-06-23 | End: 2020-06-23

## 2020-06-23 RX ORDER — POTASSIUM CHLORIDE 20 MEQ/1
40 TABLET, EXTENDED RELEASE ORAL ONCE
Status: COMPLETED | OUTPATIENT
Start: 2020-06-23 | End: 2020-06-23

## 2020-06-23 RX ORDER — PRAVASTATIN SODIUM 40 MG
40 TABLET ORAL
Status: CANCELLED | OUTPATIENT
Start: 2020-06-24

## 2020-06-23 RX ORDER — DILTIAZEM HYDROCHLORIDE 5 MG/ML
10 INJECTION INTRAVENOUS ONCE
Status: COMPLETED | OUTPATIENT
Start: 2020-06-23 | End: 2020-06-23

## 2020-06-23 RX ORDER — CEFEPIME HYDROCHLORIDE 1 G/50ML
1000 INJECTION, SOLUTION INTRAVENOUS ONCE
Status: COMPLETED | OUTPATIENT
Start: 2020-06-23 | End: 2020-06-23

## 2020-06-23 RX ORDER — OXYBUTYNIN CHLORIDE 5 MG/1
5 TABLET ORAL DAILY
Status: CANCELLED | OUTPATIENT
Start: 2020-06-24

## 2020-06-23 RX ORDER — ACETAMINOPHEN 325 MG/1
650 TABLET ORAL EVERY 4 HOURS PRN
Status: DISCONTINUED | OUTPATIENT
Start: 2020-06-23 | End: 2020-06-23

## 2020-06-23 RX ORDER — METHYLPREDNISOLONE SODIUM SUCCINATE 125 MG/2ML
125 INJECTION, POWDER, LYOPHILIZED, FOR SOLUTION INTRAMUSCULAR; INTRAVENOUS ONCE
Status: COMPLETED | OUTPATIENT
Start: 2020-06-23 | End: 2020-06-23

## 2020-06-23 RX ORDER — CEFEPIME HYDROCHLORIDE 2 G/50ML
2000 INJECTION, SOLUTION INTRAVENOUS EVERY 12 HOURS
Status: DISCONTINUED | OUTPATIENT
Start: 2020-06-24 | End: 2020-06-23

## 2020-06-23 RX ORDER — ALPRAZOLAM 0.5 MG/1
0.25 TABLET ORAL
Status: CANCELLED | OUTPATIENT
Start: 2020-06-23

## 2020-06-23 RX ORDER — DIPHENHYDRAMINE HYDROCHLORIDE 50 MG/ML
50 INJECTION INTRAMUSCULAR; INTRAVENOUS ONCE
Status: COMPLETED | OUTPATIENT
Start: 2020-06-23 | End: 2020-06-23

## 2020-06-23 RX ORDER — SODIUM CHLORIDE 9 MG/ML
125 INJECTION, SOLUTION INTRAVENOUS CONTINUOUS
Status: DISCONTINUED | OUTPATIENT
Start: 2020-06-23 | End: 2020-06-23

## 2020-06-23 RX ADMIN — CEFEPIME HYDROCHLORIDE 1000 MG: 1 INJECTION, SOLUTION INTRAVENOUS at 19:35

## 2020-06-23 RX ADMIN — IOHEXOL 85 ML: 350 INJECTION, SOLUTION INTRAVENOUS at 21:25

## 2020-06-23 RX ADMIN — DILTIAZEM HYDROCHLORIDE 10 MG/HR: 5 INJECTION INTRAVENOUS at 22:28

## 2020-06-23 RX ADMIN — POTASSIUM CHLORIDE 20 MEQ: 1500 TABLET, EXTENDED RELEASE ORAL at 19:37

## 2020-06-23 RX ADMIN — DILTIAZEM HYDROCHLORIDE 10 MG: 5 INJECTION INTRAVENOUS at 21:57

## 2020-06-23 RX ADMIN — SODIUM CHLORIDE 500 ML: 0.9 INJECTION, SOLUTION INTRAVENOUS at 17:04

## 2020-06-23 RX ADMIN — SODIUM CHLORIDE 500 ML: 0.9 INJECTION, SOLUTION INTRAVENOUS at 19:55

## 2020-06-23 RX ADMIN — METHYLPREDNISOLONE SODIUM SUCCINATE 125 MG: 125 INJECTION, POWDER, FOR SOLUTION INTRAMUSCULAR; INTRAVENOUS at 19:37

## 2020-06-23 RX ADMIN — DIPHENHYDRAMINE HYDROCHLORIDE 50 MG: 50 INJECTION INTRAMUSCULAR; INTRAVENOUS at 20:36

## 2020-06-24 ENCOUNTER — HOSPITAL ENCOUNTER (INPATIENT)
Facility: HOSPITAL | Age: 85
LOS: 3 days | Discharge: NURSING, REHAB OR LONG-TERM CARE | DRG: 309 | End: 2020-06-27
Attending: INTERNAL MEDICINE | Admitting: INTERNAL MEDICINE
Payer: MEDICARE

## 2020-06-24 VITALS
TEMPERATURE: 97.9 F | SYSTOLIC BLOOD PRESSURE: 162 MMHG | HEART RATE: 106 BPM | OXYGEN SATURATION: 96 % | RESPIRATION RATE: 25 BRPM | DIASTOLIC BLOOD PRESSURE: 67 MMHG

## 2020-06-24 DIAGNOSIS — S22.39XA RIB FRACTURE: ICD-10-CM

## 2020-06-24 DIAGNOSIS — K59.00 CONSTIPATION: ICD-10-CM

## 2020-06-24 DIAGNOSIS — R42 DIZZINESS: ICD-10-CM

## 2020-06-24 DIAGNOSIS — I48.91 A-FIB (HCC): Primary | ICD-10-CM

## 2020-06-24 LAB
ANION GAP SERPL CALCULATED.3IONS-SCNC: 10 MMOL/L (ref 4–13)
ATRIAL RATE: 127 BPM
ATRIAL RATE: 63 BPM
ATRIAL RATE: 94 BPM
BUN SERPL-MCNC: 14 MG/DL (ref 5–25)
CALCIUM SERPL-MCNC: 8.6 MG/DL (ref 8.3–10.1)
CHLORIDE SERPL-SCNC: 105 MMOL/L (ref 100–108)
CO2 SERPL-SCNC: 24 MMOL/L (ref 21–32)
CREAT SERPL-MCNC: 0.63 MG/DL (ref 0.6–1.3)
ERYTHROCYTE [DISTWIDTH] IN BLOOD BY AUTOMATED COUNT: 14.2 % (ref 11.6–15.1)
GFR SERPL CREATININE-BSD FRML MDRD: 83 ML/MIN/1.73SQ M
GLUCOSE SERPL-MCNC: 171 MG/DL (ref 65–140)
HCT VFR BLD AUTO: 37.9 % (ref 34.8–46.1)
HGB BLD-MCNC: 12.1 G/DL (ref 11.5–15.4)
L PNEUMO1 AG UR QL IA.RAPID: NEGATIVE
MAGNESIUM SERPL-MCNC: 2 MG/DL (ref 1.6–2.6)
MCH RBC QN AUTO: 30 PG (ref 26.8–34.3)
MCHC RBC AUTO-ENTMCNC: 31.9 G/DL (ref 31.4–37.4)
MCV RBC AUTO: 94 FL (ref 82–98)
PHOSPHATE SERPL-MCNC: 3.8 MG/DL (ref 2.3–4.1)
PLATELET # BLD AUTO: 209 THOUSANDS/UL (ref 149–390)
PMV BLD AUTO: 9.4 FL (ref 8.9–12.7)
POTASSIUM SERPL-SCNC: 3.3 MMOL/L (ref 3.5–5.3)
PROCALCITONIN SERPL-MCNC: 0.06 NG/ML
PROCALCITONIN SERPL-MCNC: <0.05 NG/ML
QRS AXIS: -22 DEGREES
QRS AXIS: -38 DEGREES
QRS AXIS: -49 DEGREES
QRSD INTERVAL: 84 MS
QRSD INTERVAL: 90 MS
QRSD INTERVAL: 96 MS
QT INTERVAL: 354 MS
QT INTERVAL: 354 MS
QT INTERVAL: 386 MS
QTC INTERVAL: 483 MS
QTC INTERVAL: 512 MS
QTC INTERVAL: 522 MS
RBC # BLD AUTO: 4.03 MILLION/UL (ref 3.81–5.12)
S PNEUM AG UR QL: NEGATIVE
SODIUM SERPL-SCNC: 139 MMOL/L (ref 136–145)
T WAVE AXIS: 26 DEGREES
T WAVE AXIS: 27 DEGREES
T WAVE AXIS: 6 DEGREES
TSH SERPL DL<=0.05 MIU/L-ACNC: 0.9 UIU/ML (ref 0.36–3.74)
VENTRICULAR RATE: 110 BPM
VENTRICULAR RATE: 112 BPM
VENTRICULAR RATE: 126 BPM
WBC # BLD AUTO: 6.72 THOUSAND/UL (ref 4.31–10.16)

## 2020-06-24 PROCEDURE — 84145 PROCALCITONIN (PCT): CPT | Performed by: NURSE PRACTITIONER

## 2020-06-24 PROCEDURE — 83735 ASSAY OF MAGNESIUM: CPT | Performed by: NURSE PRACTITIONER

## 2020-06-24 PROCEDURE — 99223 1ST HOSP IP/OBS HIGH 75: CPT | Performed by: NURSE PRACTITIONER

## 2020-06-24 PROCEDURE — 97163 PT EVAL HIGH COMPLEX 45 MIN: CPT

## 2020-06-24 PROCEDURE — 80048 BASIC METABOLIC PNL TOTAL CA: CPT | Performed by: NURSE PRACTITIONER

## 2020-06-24 PROCEDURE — 93010 ELECTROCARDIOGRAM REPORT: CPT | Performed by: INTERNAL MEDICINE

## 2020-06-24 PROCEDURE — 85027 COMPLETE CBC AUTOMATED: CPT | Performed by: NURSE PRACTITIONER

## 2020-06-24 PROCEDURE — 84443 ASSAY THYROID STIM HORMONE: CPT | Performed by: INTERNAL MEDICINE

## 2020-06-24 PROCEDURE — 84100 ASSAY OF PHOSPHORUS: CPT | Performed by: NURSE PRACTITIONER

## 2020-06-24 PROCEDURE — 99221 1ST HOSP IP/OBS SF/LOW 40: CPT | Performed by: INTERNAL MEDICINE

## 2020-06-24 PROCEDURE — 97167 OT EVAL HIGH COMPLEX 60 MIN: CPT

## 2020-06-24 RX ORDER — PRAVASTATIN SODIUM 40 MG
40 TABLET ORAL
Status: DISCONTINUED | OUTPATIENT
Start: 2020-06-24 | End: 2020-06-27 | Stop reason: HOSPADM

## 2020-06-24 RX ORDER — ACETAMINOPHEN 325 MG/1
650 TABLET ORAL EVERY 6 HOURS PRN
Status: DISCONTINUED | OUTPATIENT
Start: 2020-06-24 | End: 2020-06-24

## 2020-06-24 RX ORDER — FUROSEMIDE 40 MG/1
40 TABLET ORAL ONCE
Status: COMPLETED | OUTPATIENT
Start: 2020-06-24 | End: 2020-06-24

## 2020-06-24 RX ORDER — POTASSIUM CHLORIDE 20 MEQ/1
40 TABLET, EXTENDED RELEASE ORAL ONCE
Status: COMPLETED | OUTPATIENT
Start: 2020-06-24 | End: 2020-06-24

## 2020-06-24 RX ORDER — AMOXICILLIN 250 MG
1 CAPSULE ORAL
Status: DISCONTINUED | OUTPATIENT
Start: 2020-06-24 | End: 2020-06-27 | Stop reason: HOSPADM

## 2020-06-24 RX ORDER — LIDOCAINE 50 MG/G
1 PATCH TOPICAL DAILY
Status: DISCONTINUED | OUTPATIENT
Start: 2020-06-24 | End: 2020-06-27 | Stop reason: HOSPADM

## 2020-06-24 RX ORDER — OXYBUTYNIN CHLORIDE 5 MG/1
5 TABLET ORAL DAILY
Status: DISCONTINUED | OUTPATIENT
Start: 2020-06-24 | End: 2020-06-27 | Stop reason: HOSPADM

## 2020-06-24 RX ORDER — DILTIAZEM HCL 90 MG
90 TABLET ORAL EVERY 8 HOURS SCHEDULED
Status: DISCONTINUED | OUTPATIENT
Start: 2020-06-24 | End: 2020-06-27 | Stop reason: HOSPADM

## 2020-06-24 RX ORDER — PANTOPRAZOLE SODIUM 40 MG/1
40 TABLET, DELAYED RELEASE ORAL
Status: DISCONTINUED | OUTPATIENT
Start: 2020-06-24 | End: 2020-06-27 | Stop reason: HOSPADM

## 2020-06-24 RX ORDER — OXYCODONE HYDROCHLORIDE 5 MG/1
5 TABLET ORAL EVERY 6 HOURS PRN
Status: DISCONTINUED | OUTPATIENT
Start: 2020-06-24 | End: 2020-06-27 | Stop reason: HOSPADM

## 2020-06-24 RX ORDER — ACETAMINOPHEN 325 MG/1
975 TABLET ORAL EVERY 8 HOURS SCHEDULED
Status: DISCONTINUED | OUTPATIENT
Start: 2020-06-24 | End: 2020-06-27 | Stop reason: HOSPADM

## 2020-06-24 RX ORDER — OXYCODONE HYDROCHLORIDE 10 MG/1
10 TABLET ORAL EVERY 6 HOURS PRN
Status: DISCONTINUED | OUTPATIENT
Start: 2020-06-24 | End: 2020-06-24

## 2020-06-24 RX ORDER — OXYCODONE HYDROCHLORIDE 5 MG/1
2.5 TABLET ORAL EVERY 6 HOURS PRN
Status: DISCONTINUED | OUTPATIENT
Start: 2020-06-24 | End: 2020-06-27 | Stop reason: HOSPADM

## 2020-06-24 RX ORDER — OXYCODONE HYDROCHLORIDE 5 MG/1
5 TABLET ORAL EVERY 6 HOURS PRN
Status: DISCONTINUED | OUTPATIENT
Start: 2020-06-24 | End: 2020-06-24

## 2020-06-24 RX ADMIN — PANTOPRAZOLE SODIUM 40 MG: 40 TABLET, DELAYED RELEASE ORAL at 05:57

## 2020-06-24 RX ADMIN — DILTIAZEM HYDROCHLORIDE 90 MG: 90 TABLET, FILM COATED ORAL at 09:58

## 2020-06-24 RX ADMIN — SENNOSIDES AND DOCUSATE SODIUM 1 TABLET: 8.6; 5 TABLET ORAL at 21:40

## 2020-06-24 RX ADMIN — APIXABAN 2.5 MG: 2.5 TABLET, FILM COATED ORAL at 18:03

## 2020-06-24 RX ADMIN — Medication 10 MG/HR: at 03:39

## 2020-06-24 RX ADMIN — OXYCODONE HYDROCHLORIDE 2.5 MG: 5 TABLET ORAL at 21:43

## 2020-06-24 RX ADMIN — ACETAMINOPHEN 975 MG: 325 TABLET, FILM COATED ORAL at 20:03

## 2020-06-24 RX ADMIN — POTASSIUM CHLORIDE 40 MEQ: 1500 TABLET, EXTENDED RELEASE ORAL at 09:59

## 2020-06-24 RX ADMIN — DILTIAZEM HYDROCHLORIDE 90 MG: 90 TABLET, FILM COATED ORAL at 20:03

## 2020-06-24 RX ADMIN — METOPROLOL TARTRATE 25 MG: 25 TABLET, FILM COATED ORAL at 12:43

## 2020-06-24 RX ADMIN — SERTRALINE HYDROCHLORIDE 50 MG: 50 TABLET ORAL at 08:23

## 2020-06-24 RX ADMIN — METOPROLOL TARTRATE 25 MG: 25 TABLET, FILM COATED ORAL at 21:44

## 2020-06-24 RX ADMIN — OXYBUTYNIN CHLORIDE 5 MG: 5 TABLET ORAL at 08:23

## 2020-06-24 RX ADMIN — FUROSEMIDE 40 MG: 40 TABLET ORAL at 12:43

## 2020-06-24 RX ADMIN — PRAVASTATIN SODIUM 40 MG: 40 TABLET ORAL at 18:03

## 2020-06-24 RX ADMIN — OXYCODONE HYDROCHLORIDE 5 MG: 5 TABLET ORAL at 02:54

## 2020-06-24 RX ADMIN — ACETAMINOPHEN 975 MG: 325 TABLET, FILM COATED ORAL at 10:01

## 2020-06-24 RX ADMIN — LIDOCAINE 1 PATCH: 50 PATCH TOPICAL at 09:58

## 2020-06-24 RX ADMIN — OXYCODONE HYDROCHLORIDE 10 MG: 10 TABLET ORAL at 08:31

## 2020-06-24 RX ADMIN — APIXABAN 2.5 MG: 2.5 TABLET, FILM COATED ORAL at 08:23

## 2020-06-24 NOTE — SOCIAL WORK
CM met with the patient at bedside to review the CM role and discuss possible dc needs  At time of interview pt is AAOx4, pt lives with her son in a 2 story home with a ramped entrance in [place]  Pt was needing additional assistance as of late with her ADL's    Pt has DME of a walker and is ambulatory without assistance  Pt has home O2, pt sts she is not sure of the company name, [facility] [place] [facility] " Pt has a first floor set up  Pt denies any history of drug/etoh abuse, mental illness or inpatient psych admissions  Pt sts she has utilized [facility] in the past   Pt does have a POA, her daughter,Mackenzie  Pt sts she has a LW  CM requesting copies of both  Pt sts her grand daughter and grand son are available for transportation needs, sts both are supportive and available to assist pt at time of d/c  [facility]  Contact: Daniella Burns, daughter, [phone]  PCP: Dr Shruti Felder    CM reviewed d/c planning process including the following: identifying help at home, patient preference for d/c planning needs, availability of treatment team to discuss questions or concerns patient and/or family may have regarding understanding medications and recognizing signs and symptoms once discharged  CM also encouraged patient to follow up with all recommended appointments after discharge  Patient advised of importance for patient and family to participate in managing patients medical well being

## 2020-06-24 NOTE — PLAN OF CARE
Problem: OCCUPATIONAL THERAPY ADULT  Goal: Performs self-care activities at highest level of function for planned discharge setting  See evaluation for individualized goals  Description  Treatment Interventions: ADL retraining, Functional transfer training, UE strengthening/ROM, Endurance training, Patient/family training, Equipment evaluation/education, Activityengagement          See flowsheet documentation for full assessment, interventions and recommendations  Note:   Limitation: Decreased ADL status, Decreased UE strength, Decreased Safe judgement during ADL, Decreased endurance, Decreased self-care trans, Decreased high-level ADLs     Assessment: Pt is a 81 y o  female seen for OT evaluation s/p admit to [facility] on 6/24/2020 w/ Longstanding persistent atrial fibrillation ([facility] [address] )  Comorbidities affecting pt's functional performance at time of assessment include: HTN, CHF and hyperlipidemia, CAD, arthritis,  eczema, hypokalemia, CKD  Personal factors affecting pt at time of IE include:limited home support, difficulty performing ADLS, difficulty performing IADLS , limited insight into deficits and decreased initiation and engagement   Prior to admission, pt was (I) with ADLs and (A) with IADLs and use of RW during functional mobility  Upon evaluation: Pt requires (S)-min (A) level with use of RW during functional mobility 2* the following deficits impacting occupational performance: weakness, decreased strength, decreased balance, decreased tolerance, impaired initiation, decreased safety awareness, increased pain and impaired interpersonal skills  Pt to benefit from continued skilled OT tx while in the hospital to address deficits as defined above and maximize level of functional independence w ADL's and functional mobility  Occupational Performance areas to address include: grooming, bathing/shower, toilet hygiene, dressing, functional mobility, community mobility and clothing management   From OT standpoint, recommendation at time of d/c would be short term rehab         OT Discharge Recommendation: Post-Acute Rehabilitation Services

## 2020-06-24 NOTE — PHYSICAL THERAPY NOTE
Physical Therapy Evaluation    Patient Name: Feli HUYNH'X Date: 6/24/2020     Problem List  Principal Problem:    Longstanding persistent atrial fibrillation ([facility] )  Active Problems:    CAD (coronary artery disease)    Chronic diastolic heart failure ([facility] )    Essential hypertension    Acute hypokalemia    Depression with anxiety    Gastroesophageal reflux disease without esophagitis    OAB (overactive bladder)    Mixed hyperlipidemia    Pneumonia    Debility       Past Medical History  Past Medical History:   Diagnosis Date    Acute on chronic diastolic congestive heart failure ([facility] ) 6/27/2019    Arthritis     Chronic diastolic heart failure (HCC)     Chronic kidney disease, stage 2 (mild)     Coronary artery disease     history of stenting    Eczema     years    Edema     History of echocardiogram 07/20/2017    EF 55%, mild concentric LVH, bileaflet MVP with moderate MR, left atrial enlargement   History of transfusion     Hyperlipidemia     Hypertension     Hypo-osmolality and hyponatremia     Hypokalemia 7/11/2019    Mitral regurgitation         Past Surgical History  Past Surgical History:   Procedure Laterality Date    ABDOMINAL SURGERY      hysterectomy    CARDIAC CATHETERIZATION  04/10/2012    EF 65%, no significant CAD, patent stents, severe MR     CORONARY ANGIOPLASTY WITH STENT PLACEMENT  03/21/2007    EF 55%, GARRET to LAD   EYE SURGERY      b/l cataracts    HYSTERECTOMY      JOINT REPLACEMENT      Rt knee           06/24/20 1417   Note Type   Note type Eval/Treat   Pain Assessment   Pain Assessment Tool 0-10   Pain Score Worst Possible Pain   Pain Location/Orientation Orientation: Left; Location: Leg   Home Living   Type of [address] [place] [address]; Able to live on main level with bedroom/bathroom; Ramped entrance   Bathroom Shower/Tub Tub/shower unit   [address] bars in shower; Shower chair;Grab bars around toilet   [address] Other (Comment)  (5BJ)   Additional Comments pt reports use of 4WW at baseline   Prior Function   Level of [place] Independent with ADLs and functional mobility; Needs assistance with IADLs   Lives With Son   Receives Help From Family   ADL Assistance Independent   IADLs Needs assistance   Falls in the last 6 months 1 to 4   Vocational Retired   Comments son drives   Restrictions/Precautions   [employer]earing Precautions Per Order No   Other Precautions Pain; Fall Risk;O2;Telemetry;Multiple lines   General   Family/Caregiver Present No   Cognition   Overall Cognitive Status WFL   Orientation Level Oriented X4   Following Commands Follows all commands and directions without difficulty   RLE Assessment   RLE Assessment X  (4-/5 grossly)   LLE Assessment   LLE Assessment X  (4-/5 grossly)   Bed Mobility   Additional Comments pt OOB at start/end of session   Transfers   Sit to Stand 4  Minimal assistance   Additional items Assist x 1; Increased time required;Verbal cues;Armrests   Stand to Sit 4  Minimal assistance   Additional items Assist x 1; Increased time required;Verbal cues;Armrests   Additional Comments RW used for increased stability   Ambulation/Elevation   Gait pattern Not appropriate; Not tested   Balance   Static Sitting Good   Dynamic Sitting Good   Static Standing Fair   Dynamic Standing Fair   Endurance Deficit   Endurance Deficit Yes   Endurance Deficit Description 2* to dropping BP and increased symptoms upon standing   Activity Tolerance   Activity Tolerance Patient limited by pain; Patient limited by fatigue;Treatment limited secondary to medical complications (Comment)   Assessment   Prognosis Good   Problem List Decreased strength;Decreased endurance; Impaired balance;Decreased mobility;Pain   Assessment Patient is a 81 y o  female evaluated by Physical Therapy s/p admit [facility] on 6/24/2020 with admitting diagnosis of: New onset a-fib, and principal problem of: Longstanding persistent atrial fibrillation  PT was consulted to assess patient's functional mobility and discharge needs  Ordered are PT Evaluation and treatment with activity level of: up with assistance  Comorbidities affecting patient's physical performance at time of assessment include: a-fib, CAD, CHF, HTN, hypokalemia, GERD, osteoarthritis of knee, mitral regurgitation  Personal factors affecting the patient at time of IE include: lives in 2 story home, ambulating with assistive device, inability to navigate community distances, history of fall(s), inability/difficulty performing IADLs and inability/difficulty performing ADLs  Please locate objective findings from PT assessment regarding body systems outlined above  Upon evaluation, pt OOB in recliner and in agreement with PT intervention  Pt able to stand at [facility] x 1 but required seated rest break following 10 seconds of static standing d/t reports of severe dizziness  While seated, BP assessed and noted to be 87/60, likely causing symptoms  Pt able to perform one more sit <> stand transfer but ambulation deferred this session d/t safety concerns  Pt demonstrating good motivation to participate in PT session but is limited by medical status at this time  HR and SpO2 remained WFL on 2L O2 with exertion  Pt will require continued PT intervention during LOS to address current deficits, increase LOF, and facilitate safe d/c to next level of care when medically appropriate  D/c recommendation at this time is post-acute rehabilitation services  Goals   Patient Goals to feel better   Short Term Goal #1 Pt will participate in B LE strengthening exercises to facilitate improved functional mobility  LTG Expiration Date 07/08/20   Long Term Goal #1 Pt will perform all functional transfers and bed mobility with SUP and good safety awareness     Long Term Goal #2 Pt will ambulate 76' with RW and SUP while maintaining good functional dynamic balance  Plan   Treatment/Interventions Functional transfer training;LE strengthening/ROM; Therapeutic exercise; Endurance training;Bed mobility;Gait training   PT Frequency Other (Comment)  (3-5x/week)   Recommendation   PT Discharge Recommendation Post-Acute Rehabilitation Services     Pt seated in recliner at end of session with B SCDs applied and all needs in reach

## 2020-06-24 NOTE — CONSULTS
Consultation - Cardiology   Feli Ace 81 y o  female MRN: [id number]  Unit/Bed#:  Encounter: [id number]  06/24/20  11:26 AM            Impression:    Patient is an 52-year-old LAD with known moderate to severe mitral regurgitation for medical management per her wishes, coronary artery disease with LAD stenting but no obstructive disease on last coronary angiography in 2012, known atrial fibrillation on Eliquis for anticoagulation, presented with mechanical fall and consequent rib pain without any fractures, with negative troponin, Cardiology was consulted for atrial fibrillation at the time of arrival to the ER, currently controlled off diltiazem drip  Plan:    Atrial fibrillation:  Rate controlled, currently off diltiazem drip, would simply resume her home AV andrew blockers-delineated above  Continue anticoagulation with Eliquis    Hypertension:  Currently controlled    Mitral regurgitation:  She understands and has always elected for medical management and not any procedures  She reiterates the same opinion now  Chest findings suggesting pneumonia in the setting of chronic diastolic CHF:  Clinically no evidence of pneumonia and hence being monitored period  Was recently taken off her furosemide, on 40 mg as needed at this time  Has JVD but no lower extremity edema no pulmonary edema  Will give her 1 dose of p o  Lasix  I would not call this acute heart failure  This    If rates are controlled, will see patient as needed  Assessment:  Principal Problem:    Longstanding persistent atrial fibrillation (HCC)  Active Problems:    Mixed hyperlipidemia    CAD (coronary artery disease)    Chronic diastolic heart failure (HCC)    Essential hypertension    Acute hypokalemia    Depression with anxiety    Gastroesophageal reflux disease without esophagitis    OAB (overactive bladder)    Pneumonia    Debility    No chief complaint on file      Admitting diagnosis:  New onset a-fib ([address] ) [I48 [id number]]    History of Present Illness   Physician Requesting Consult: Rohini Falcon MD   Reason for Consult / Principal Problem:  Atrial fibrillation  HPI: Mya Pete is a 81-year-old who presented after mechanical fall to the emergency room with right-sided rib pain  In this regard, she feels much better now  Was also found to be in atrial fibrillation with mildly rapid rates and in diltiazem drip was initiated  At baseline she is on diltiazem 90 mg every 8 hours and metoprolol 25 mg twice daily  She also has a prior history of coronary artery disease and LAD PCI in the remote past but most recent coronary angiography in 2012 did not demonstrate any obstructive disease-per her cardiologist's notes  She also has mitral regurgitation-regard to be severe on last catheterization but moderate on last echocardiogram from 2019  She had always elected for a medical management strategy  Currently she is in rate controlled atrial fibrillation, diltiazem drip was stopped, at that time she was on diltiazem 5 milligrams/hour  On further review, she had a purely mechanical fall, tripped near her bed  She also admits to shortness of breath and cough and was suggested to have pneumonia based on chest imaging but since procalcitonin was negative and no other evidence, being monitored  Inpatient consult to Cardiology  Consult performed by: Ramy Ruiz MD  Consult ordered by: NEAL Short          Review of Systems:  fatigued  Review of Systems   Constitutional: Negative  HENT: Negative  Eyes: Negative  Respiratory: Negative  Cardiovascular: Negative for palpitations and leg swelling  Endocrine: Negative  Musculoskeletal: Negative  Neurological: Negative          Historical Information   Past Medical History:   Diagnosis Date    Acute on chronic diastolic congestive heart failure ([facility] ) 6/27/2019    Arthritis     Chronic diastolic heart failure (HCC)     Chronic kidney disease, stage 2 (mild)     Coronary artery disease     history of stenting    Eczema     years    Edema     History of echocardiogram 07/20/2017    EF 55%, mild concentric LVH, bileaflet MVP with moderate MR, left atrial enlargement   History of transfusion     Hyperlipidemia     Hypertension     Hypo-osmolality and hyponatremia     Hypokalemia 7/11/2019    Mitral regurgitation      Past Surgical History:   Procedure Laterality Date    ABDOMINAL SURGERY      hysterectomy    CARDIAC CATHETERIZATION  04/10/2012    EF 65%, no significant CAD, patent stents, severe MR     CORONARY ANGIOPLASTY WITH STENT PLACEMENT  03/21/2007    EF 55%, GARRET to LAD      EYE SURGERY      b/l cataracts    HYSTERECTOMY      JOINT REPLACEMENT      Rt knee     Social History     Substance and Sexual Activity   Alcohol Use Never    Alcohol/week: 0 0 standard drinks    Frequency: Never    Drinks per session: Patient refused    Binge frequency: Never     Social History     Substance and Sexual Activity   Drug Use Never     Social History     Tobacco Use   Smoking Status Never Smoker   Smokeless Tobacco Never Used     Family History:   Family History   Problem Relation Age of Onset    Arthritis Mother     Cancer Mother     Heart disease Mother     Hypertension Mother     Alcohol abuse Father     Arthritis Father     Birth defects Son     Hearing loss Son     Diabetes Daughter     Stroke Maternal Grandmother     Asthma Maternal Grandfather      No family history of premature CAD or Sudden Cardiac Death    Meds/Allergies     Current Facility-Administered Medications:     acetaminophen (TYLENOL) tablet 975 mg, 975 mg, Oral, Q8H [facility], Svetlana Marvin MD, 975 mg at 06/24/20 1001    apixaban (ELIQUIS) tablet 2 5 mg, 2 5 mg, Oral, BID, [place], NEAL, 2 5 mg at 06/24/20 0823    diltiazem (CARDIZEM) tablet 90 mg, 90 mg, Oral, Q8H [facility], Svetlana Marvin MD, 90 mg at 06/24/20 0958    lidocaine (LIDODERM) 5 % patch 1 patch, 1 patch, Topical, Daily, Migdalia Hearn MD, 1 patch at 06/24/20 0958    oxybutynin (DITROPAN) tablet 5 mg, 5 mg, Oral, Daily, [place] Swank, CRNP, 5 mg at 06/24/20 0682    oxyCODONE (ROXICODONE) IR tablet 2 5 mg, 2 5 mg, Oral, Q6H PRN, Migdalia Hearn MD    oxyCODONE (ROXICODONE) IR tablet 5 mg, 5 mg, Oral, Q6H PRN, Migdalia Hearn MD    pantoprazole (PROTONIX) EC tablet 40 mg, 40 mg, Oral, Early Morning, [place] Y Swank, CRNP, 40 mg at 06/24/20 0557    pravastatin (PRAVACHOL) tablet 40 mg, 40 mg, Oral, Daily With Dinner, [place] Y Swank, CRNP    sertraline (ZOLOFT) tablet 50 mg, 50 mg, Oral, Daily, [place] Y Swank, CRNP, 50 mg at 06/24/20 1923  Allergies   Allergen Reactions    Chocolate Flavor Itching    Shellfish-Derived Products Shortness Of Breath    Iodine Other (See Comments)     shellfish- difficulty breathing    Codeine Rash       Objective   Vitals: Blood pressure 113/67, pulse 101, temperature 97 5 °F (36 4 °C), temperature source Temporal, resp  rate 18, height 4' 11" (1 499 m), weight 61 1 kg (134 lb 11 2 oz), SpO2 95 %  , Body mass index is 27 21 kg/m² ,   Orthostatic Blood Pressures      Most Recent Value   Blood Pressure  113/67 filed at 06/24/2020 0900   Patient Position - Orthostatic VS  Lying filed at 06/24/2020 0900            Intake/Output Summary (Last 24 hours) at 6/24/2020 1126  Last data filed at 6/24/2020 4447  Gross per 24 hour   Intake 639 08 ml   Output 350 ml   Net 289 08 ml       Weight (last 2 days)     Date/Time   Weight    06/24/20 0203   61 1 (134 7)              Invasive Devices     Peripheral Intravenous Line            Peripheral IV 06/23/20 Right Antecubital less than 1 day                  Physical Exam:  GEN: Sumanth Myers appears well, alert and oriented x 3, pleasant and cooperative   HEENT: pupils equal, round, and reactive to light; extraocular muscles intact  NECK: supple, no carotid bruits or JVD  HEART: irregular rhythm, normal S1 and S2, mitral regurgitation murmurs, no clicks, gallops or rubs   LUNGS: clear to auscultation bilaterally; no wheezes or rhonchi, no rales  ABDOMEN/GI: normal bowel sounds, soft, no tenderness, no distention  EXTREMITIES/Musculoskeltal: peripheral pulses normal; no clubbing, cyanosis, or edema  NEURO: no focal motor findings   SKIN: normal without suspicious lesions on exposed skin      Lab Results:   Admission on 06/24/2020   Component Date Value    Phosphorus 06/24/2020 3 8     Procalcitonin 06/24/2020 0 06     Sodium 06/24/2020 139     Potassium 06/24/2020 3 3*    Chloride 06/24/2020 105     CO2 06/24/2020 24     ANION GAP 06/24/2020 10     BUN 06/24/2020 14     Creatinine 06/24/2020 0 63     Glucose 06/24/2020 171*    Calcium 06/24/2020 8 6     eGFR 06/24/2020 83     Magnesium 06/24/2020 2 0     WBC 06/24/2020 6 72     RBC 06/24/2020 4 03     Hemoglobin 06/24/2020 12 1     Hematocrit 06/24/2020 37 9     MCV 06/24/2020 94     MCH 06/24/2020 30 0     MCHC 06/24/2020 31 9     RDW 06/24/2020 14 2     Platelets 92/34/4656 209     MPV 06/24/2020 9 4     TSH 3RD GENERATON 06/24/2020 0 904    Admission on 06/23/2020, Discharged on 06/24/2020   Component Date Value    WBC 06/23/2020 8 10     RBC 06/23/2020 4 13     Hemoglobin 06/23/2020 12 6     Hematocrit 06/23/2020 37 2*    MCV 06/23/2020 90     MCH 06/23/2020 30 5     MCHC 06/23/2020 34 0     RDW 06/23/2020 15 0*    MPV 06/23/2020 8 1*    Platelets 77/48/6563 223     Neutrophils Relative 06/23/2020 78*    Lymphocytes Relative 06/23/2020 12*    Monocytes Relative 06/23/2020 9     Eosinophils Relative 06/23/2020 1     Basophils Relative 06/23/2020 1     Neutrophils Absolute 06/23/2020 6 30     Lymphocytes Absolute 06/23/2020 1 00     Monocytes Absolute 06/23/2020 0 70     Eosinophils Absolute 06/23/2020 0 10     Basophils Absolute 06/23/2020 0 10     Protime 06/23/2020 14 2     INR 06/23/2020 1 15     PTT 06/23/2020 28     Sodium 06/23/2020 136     Potassium 06/23/2020 3 0*    Chloride 06/23/2020 101     CO2 06/23/2020 28     ANION GAP 06/23/2020 7     BUN 06/23/2020 14     Creatinine 06/23/2020 0 75     Glucose 06/23/2020 108*    Calcium 06/23/2020 9 5     AST 06/23/2020 16     ALT 06/23/2020 13     Alkaline Phosphatase 06/23/2020 57     Total Protein 06/23/2020 7 0     Albumin 06/23/2020 4 0     Total Bilirubin 06/23/2020 0 80     eGFR 06/23/2020 73     Troponin I 06/23/2020 <0 03     Blood Culture 06/23/2020 Received in Microbiology Lab  Culture in Progress   Blood Culture 06/23/2020 Received in Microbiology Lab  Culture in Progress   Procalcitonin 06/23/2020 <0 05     Magnesium 06/23/2020 2 0          Imaging: I have personally reviewed pertinent reports  EKG/Telemetry:  No events, except for rate controlled atrial fibrillation    Counseling / Coordination of Care    Thank you for allowing us to participate in their care     Meryle Peacemaker, MD

## 2020-06-24 NOTE — PLAN OF CARE
Problem: Potential for Falls  Goal: Patient will remain free of falls  Description  INTERVENTIONS:  - Assess patient frequently for physical needs  -  Identify cognitive and physical deficits and behaviors that affect risk of falls    -  [place] fall precautions as indicated by assessment   - Educate patient/family on patient safety including physical limitations  - Instruct patient to call for assistance with activity based on assessment  - Modify environment to reduce risk of injury  - Consider OT/PT consult to assist with strengthening/mobility  Outcome: Progressing     Problem: Prexisting or High Potential for Compromised Skin Integrity  Goal: Skin integrity is maintained or improved  Description  INTERVENTIONS:  - Identify patients at risk for skin breakdown  - Assess and monitor skin integrity  - Assess and monitor nutrition and hydration status  - Monitor labs   - Assess for incontinence   - Turn and reposition patient  - Assist with mobility/ambulation  - Relieve pressure over bony prominences  - Avoid friction and shearing  - Provide appropriate hygiene as needed including keeping skin clean and dry  - Evaluate need for skin moisturizer/barrier cream  - Collaborate with interdisciplinary team   - Patient/family teaching  - Consider wound care consult   Outcome: Progressing     Problem: CARDIOVASCULAR - ADULT  Goal: Maintains optimal cardiac output and hemodynamic stability  Description  INTERVENTIONS:  - Monitor I/O, vital signs and rhythm  - Monitor for S/S and trends of decreased cardiac output  - Administer and titrate ordered vasoactive medications to optimize hemodynamic stability  - Assess quality of pulses, skin color and temperature  - Assess for signs of decreased coronary artery perfusion  - Instruct patient to report change in severity of symptoms  Outcome: Progressing  Goal: Absence of cardiac dysrhythmias or at baseline rhythm  Description  INTERVENTIONS:  - Continuous cardiac monitoring, vital signs, obtain 12 lead EKG if ordered  - Administer antiarrhythmic and heart rate control medications as ordered  - Monitor electrolytes and administer replacement therapy as ordered  Outcome: Progressing     Problem: METABOLIC, FLUID AND ELECTROLYTES - ADULT  Goal: Electrolytes maintained within normal limits  Description  INTERVENTIONS:  - Monitor labs and assess patient for signs and symptoms of electrolyte imbalances  - Administer electrolyte replacement as ordered  - Monitor response to electrolyte replacements, including repeat lab results as appropriate  - Instruct patient on fluid and nutrition as appropriate  Outcome: Progressing  Goal: Fluid balance maintained  Description  INTERVENTIONS:  - Monitor labs   - Monitor I/O and WT  - Instruct patient on fluid and nutrition as appropriate  - Assess for signs & symptoms of volume excess or deficit  Outcome: Progressing  Goal: Glucose maintained within target range  Description  INTERVENTIONS:  - Monitor Blood Glucose as ordered  - Assess for signs and symptoms of hyperglycemia and hypoglycemia  - Administer ordered medications to maintain glucose within target range  - Assess nutritional intake and initiate nutrition service referral as needed  Outcome: Progressing     Problem: HEMATOLOGIC - ADULT  Goal: Maintains hematologic stability  Description  INTERVENTIONS  - Assess for signs and symptoms of bleeding or hemorrhage  - Monitor labs  - Administer supportive blood products/factors as ordered and appropriate  Outcome: Progressing

## 2020-06-24 NOTE — H&P
H&P- Aarti Long 1935, 81 y o  female MRN: [id number]    Unit/Bed#:  Encounter: [id number]    Primary Care Provider: Sue Youssef MD   Date and time admitted to hospital: 6/24/2020  2:01 AM        * Longstanding persistent atrial fibrillation ([facility] [address] )  Assessment & Plan  · Patient with elevated rate in the ER -AFib with RVR  · Cardizem bolus and drip initiated emergency room-continue  · Admit to step-down 1 continuous cardiopulmonary monitoring  · Cardiology consulted  · Continue prior to admission Eliquis b i d  Debility  Assessment & Plan  · Rib pain secondary to fall at home-  No fractures noted  · PT/OT consulted    Pneumonia  Assessment & Plan  · CT: Medial right lower lobe consolidation suggesting infiltrate/pneumonia  This appears improved compared to the prior CT February 28, 2020 although there remains atelectasis /scarring, I cannot exclude superimposed lesion  Follow-up unenhanced chest CT recommended in 6-12 months  ER concern for active infection - cefepime started emergency room-will hold off antibiotics for now  · Check procalcitonin, WBCs were normal, collect sputum and blood cultures and urine antigens  · Respiratory protocol in place  · Supportive care provide supportive care    Chronic diastolic heart failure [facility])  Assessment & Plan  Wt Readings from Last 3 Encounters:   06/24/20 61 1 kg (134 lb 11 2 oz)   06/17/20 55 3 kg (122 lb)   03/06/20 57 7 kg (127 lb 3 3 oz)     · Currently holding prior to admission p o   Cardizem, Lopressor and Lasix  · Accurate I&Os  · Daily weights  · Currently no signs of acute exacerbation  · Cardiac diet        Acute hypokalemia  Assessment & Plan  · As evidence by potassium level 3 0  · Replete with 40 mEq IV  · Trend potassium level  · Replete as needed    Gastroesophageal reflux disease without esophagitis  Assessment & Plan  Continue prior to admission PPI    Depression with anxiety  Assessment & Plan  Provide supportive care  Continue prior to admission medication    Essential hypertension  Assessment & Plan  · Blood pressure currently stable  · Admit to step-down level 1-continuous cardiopulmonary monitoring  · Continue prior to admission medication  · Monitor per protocol    Mixed hyperlipidemia  Assessment & Plan  Continue prior to admittance statin    CAD (coronary artery disease)  Assessment & Plan  Continue prior to admission statin Eliquis    OAB (overactive bladder)  Assessment & Plan  Continue prior to admission Ditropan          VTE Prophylaxis: Apixaban (Eliquis)  / sequential compression device   Code Status: FULL  POLST: POLST is not applicable to this patient     Anticipated Length of Stay:  Patient will be admitted on an Inpatient basis with an anticipated length of stay of  Greater than 2 midnights  Justification for Hospital Stay:  AFib with RVR  And hypokalemia     Total Time for Visit, including Counseling / Coordination of Care: 1 hour  Greater than 50% of this total time spent on direct patient counseling and coordination of care      Chief Complaint:   Jose Koroma out of bed approximately 3 days ago landing on left side     History of Present Illness:     Russ Jolly is a 81 y o  female who [facility] room for evaluation of right-sided rib pain  Chart review indicates patient states approximately 3 days ago she fell getting out of bed, states that when she stood up she fell again hitting this side of the corner of a table  Patient denies LOC  Patient denies trauma to the head denies headache blurred vision lightheadedness dizziness, admits to right-sided lower rib pain denies, chest pain shortness of breath GERD:  Denies abdominal pain to lose fluid alert  Denies dysuria urgency or frequency denies melena or hematochezia  Images and labs were collected in the emergency room    Significant findings included patient was currently in AFib with RVR Cardizem drip initiated patient was transferred to [facility] for ICU care      Review of Systems:     Review of Systems     Past Medical and Surgical History:           Past Medical History:   Diagnosis Date    Acute on chronic diastolic congestive heart failure ([address] ) 6/27/2019    Arthritis      Chronic diastolic heart failure (HCC)      Chronic kidney disease, stage 2 (mild)      Coronary artery disease       history of stenting    Eczema       years    Edema      History of echocardiogram 07/20/2017     EF 55%, mild concentric LVH, bileaflet MVP with moderate MR, left atrial enlargement   History of transfusion      Hyperlipidemia      Hypertension      Hypo-osmolality and hyponatremia      Hypokalemia 7/11/2019    Mitral regurgitation                 Past Surgical History:   Procedure Laterality Date    ABDOMINAL SURGERY         hysterectomy    CARDIAC CATHETERIZATION   04/10/2012     EF 65%, no significant CAD, patent stents, severe MR     CORONARY ANGIOPLASTY WITH STENT PLACEMENT   03/21/2007     EF 55%, GARRET to LAD   EYE SURGERY         b/l cataracts    HYSTERECTOMY        JOINT REPLACEMENT         Rt knee         Meds/Allergies:             Prior to Admission medications    Medication Sig Start Date End Date Taking?  Authorizing Provider   ALPRAZolam Merlinda Sang) 0 25 mg tablet Take 0 25 mg by mouth daily at bedtime as needed for anxiety       Historical Provider, MD   apixaban (ELIQUIS) 2 5 mg Take 1 tablet (2 5 mg total) by mouth 2 (two) times a day 8/8/19     Cleopatra Fontanez PA-C   diltiazem (CARDIZEM) 90 mg tablet Take 1 tablet (90 mg total) by mouth every 8 (eight) hours 12/27/19     Sinai Fontanez PA-C   furosemide (LASIX) 40 mg tablet Take 1 tablet (40 mg total) by mouth daily as needed (Only take for morning weight more than 125 pounds ) 6/17/20     Laureano Randall MD   hydrocortisone 1 % cream Apply to affected area 2 times daily  Patient not taking: Reported on 6/24/2020 10/18/19     Manolo Randall MD   hydrOXYzine HCL (ATARAX) 25 mg tablet Take 1 tablet (25 mg total) by mouth every 6 (six) hours 10/18/19     Iftikhar Torres MD   magnesium oxide (MAG-OX) 400 mg Take 1 tablet (400 mg total) by mouth 3 (three) times a week 9/4/19     Elba Escobar DO   metoprolol tartrate (LOPRESSOR) 25 mg tablet Take 1 tablet (25 mg total) by mouth 2 (two) times a day 3/5/20 6/17/20   NEAL Gonsales   oxybutynin (DITROPAN) 5 mg tablet Take 5 mg by mouth daily       Historical Provider, MD   PANTOPRAZOLE SODIUM PO Take by mouth daily       Historical Provider, MD   potassium chloride (K-DUR,KLOR-CON) 20 mEq tablet Take 1 tablet (20 mEq total) by mouth daily as needed (Only take for morning weight more than 125 pounds ) 6/17/20 7/17/20   Aby Martin MD   pravastatin (PRAVACHOL) 40 mg tablet Take 1 tablet (40 mg total) by mouth daily with dinner 7/12/19     Daniel Montague MD   sertraline (ZOLOFT) 50 mg tablet Take 50 mg by mouth daily       Historical Provider, MD      I have reviewed home medications using allscripts      Allergies: Allergies   Allergen Reactions    Chocolate Flavor Itching    Shellfish-Derived Products Shortness Of Breath    Iodine Other (See Comments)       shellfish- difficulty breathing    Codeine Rash         Social History:     Marital Status:     Occupation:  Retired  Patient Pre-hospital Living Situation: independent  Patient Pre-hospital Level of Mobility:  Limited  Patient Pre-hospital Diet Restrictions:  Denies  Substance Use History:   Social History           Substance and Sexual Activity   Alcohol Use Never    Frequency: Never      Social History          Tobacco Use   Smoking Status Never Smoker   Smokeless Tobacco Never Used      Social History          Substance and Sexual Activity   Drug Use Never         Family History:           Family History   Problem Relation Age of Onset    Arthritis Mother      Cancer Mother      Heart disease Mother      Hypertension Mother      Alcohol abuse Father      Arthritis Father      Birth defects Son      Hearing loss Son      Diabetes Daughter      Stroke Maternal Grandmother      Asthma Maternal Grandfather             Review of Systems   All other systems reviewed and are negative  Except pain in LUQ and left abd region     Physical Exam:      Vitals:   Blood Pressure: 162/67 (06/24/20 0100)  Pulse: (!) 106 (06/24/20 0100)  Temperature: 97 9 °F (36 6 °C) (06/24/20 0100)  Temp Source: Temporal (06/24/20 0100)  Respirations: (!) 25 (06/24/20 0100)  SpO2: 96 % (06/24/20 0100)     Physical Exam   Constitutional: She is oriented to person, place, and time  She appears well-developed and well-nourished  HENT:   Head: Normocephalic and atraumatic  Right Ear: External ear normal    Left Ear: External ear normal    Eyes: Pupils are equal, round, and reactive to light  Right eye exhibits no discharge  Left eye exhibits no discharge  Neck: Normal range of motion  Neck supple  No JVD present  Cardiovascular: Normal rate, regular rhythm and normal heart sounds  Exam reveals no gallop and no friction rub  No murmur heard  Pulmonary/Chest: Effort normal and breath sounds normal  No stridor  No respiratory distress  She has no wheezes  She has no rales  She exhibits tenderness  Abdominal: She exhibits no distension and no mass  There is tenderness  There is no rebound and no guarding  No hernia  Point tender to several ribs on the left, luq tenderness  No peritoneal signs  Musculoskeletal: Normal range of motion  She exhibits no edema, tenderness or deformity  Neurological: She is alert and oriented to person, place, and time  She displays normal reflexes  No cranial nerve deficit or sensory deficit  She exhibits normal muscle tone  Coordination normal    Skin: Skin is warm  Capillary refill takes less than 2 seconds  No rash noted  No erythema     Psychiatric: She has a normal mood and affect          Additional Data:      Lab Results: I have personally reviewed pertinent reports             Results from last 7 days   Lab Units 06/23/20  1704   WBC Thousand/uL 8 10   HEMOGLOBIN g/dL 12 6   HEMATOCRIT % 37 2*   PLATELETS Thousands/uL 223   NEUTROS PCT % 78*   LYMPHS PCT % 12*   MONOS PCT % 9   EOS PCT % 1           Results from last 7 days   Lab Units 06/23/20  1704   POTASSIUM mmol/L 3 0*   CHLORIDE mmol/L 101   CO2 mmol/L 28   BUN mg/dL 14   CREATININE mg/dL 0 75   CALCIUM mg/dL 9 5   ALK PHOS U/L 57   ALT U/L 13   AST U/L 16           Results from last 7 days   Lab Units 06/23/20  1704   INR   1 15         Imaging: I have personally reviewed pertinent reports        Ct Chest Abdomen Pelvis Wo Contrast     Result Date: 6/23/2020  Narrative: CT CHEST, ABDOMEN AND PELVIS WITHOUT IV CONTRAST INDICATION:   trauma, significant right  sided pain  significant right sided rib tendenress  tachy 120s  COMPARISON:  None  TECHNIQUE: CT examination of the chest, abdomen and pelvis was performed without intravenous contrast   Axial, sagittal, and coronal 2D reformatted images were created from the source data and submitted for interpretation  Radiation dose length product (DLP) for this visit:  653 mGy-cm   This examination, like all CT scans performed in the [facility], was performed utilizing techniques to minimize radiation dose exposure, including the use of iterative reconstruction and automated exposure control  Enteric contrast was administered  FINDINGS: CHEST LUNGS:  Medial right lower lobe consolidation suggesting infiltrate/pneumonia  This appears improved compared to the prior CT February 28, 2020 although there remains atelectasis /scarring, I cannot exclude overlying lesion  Follow-up unenhanced chest CT  recommended in 6-12 months  PLEURA:  Small right pleural effusion HEART/GREAT VESSELS:  Mild cardiomegaly  Mitral valve calcification MEDIASTINUM AND GE:  Unremarkable  CHEST WALL AND LOWER NECK:   Unremarkable   ABDOMEN LIVER/BILIARY TREE:  Unremarkable  GALLBLADDER:  No calcified gallstones  No pericholecystic inflammatory change  SPLEEN:  Unremarkable  PANCREAS:  Unremarkable  ADRENAL GLANDS:  Left adrenal gland thickening  KIDNEYS/URETERS:  Unremarkable  No hydronephrosis  STOMACH AND BOWEL:  Mild to moderate-sized hiatal hernia  APPENDIX:  No findings to suggest appendicitis  ABDOMINOPELVIC CAVITY:  No ascites  No pneumoperitoneum  No lymphadenopathy  VESSELS:  Severe abdominal aortic atherosclerotic calcification  PELVIS REPRODUCTIVE ORGANS:  Unremarkable for patient's age  URINARY BLADDER:  Nondistended limited evaluation  ABDOMINAL WALL/INGUINAL REGIONS:  Unremarkable  OSSEOUS STRUCTURES:  No displaced fractures  Scoliosis and right femoral intramedullary laureano appears intact  Grade 1 anterolisthesis of L5 on S1 likely from facet arthritic changes       Impression: 1  No new acute CT findings compared to the prior CT February 28, 2020  2  Medial right lower lobe consolidation suggesting infiltrate/pneumonia  This appears improved compared to the prior CT February 28, 2020 although there remains atelectasis /scarring, I cannot exclude superimposed lesion  Follow-up unenhanced chest CT recommended in 6-12 months  3  Small right pleural effusion appear similar to the prior exam  Other chronic findings as above  The study was marked in [facility] for follow-up chest CT notification  Workstation performed: [id number]      Xr Chest 1 View Portable     Result Date: 6/23/2020  Narrative: CHEST INDICATION:   Chest pain and trauma  COMPARISON:  3/12/2020 EXAM PERFORMED/VIEWS:  XR CHEST PORTABLE FINDINGS: Stable cardiomegaly  No evidence of hemothorax  Pulmonary vascular congestion without interstitial edema  Few right infrahilar airspace opacities  No pneumothorax  Diffuse osteopenia  Thoracolumbar scoliosis  No visualized fracture; however, evaluation is limited      Impression: 1    Right infrahilar airspace opacities could represent mild aspiration or pneumonia  2   Diffuse osteopenia limits evaluation for fracture  Workstation performed: [id number]      Ct Head Without Contrast     Result Date: 6/23/2020  Narrative: CT BRAIN - WITHOUT CONTRAST INDICATION:   fall  COMPARISON:  None  TECHNIQUE:  CT examination of the brain was performed  In addition to axial images, coronal 2D reformatted images were created and submitted for interpretation  Radiation dose length product (DLP) for this visit:  879 4 mGy-cm   This examination, like all CT scans performed in the [place], was performed utilizing techniques to minimize radiation dose exposure, including the use of iterative reconstruction and automated exposure control  IMAGE QUALITY:  Diagnostic  FINDINGS: PARENCHYMA: Age-related central atrophy  Decreased attenuation is noted in periventricular and subcortical white matter demonstrating an appearance that is statistically most likely to represent moderate microangiopathic change  Age-indeterminate but probably old encephalomalacia right frontal lobe with ex vacuo dilatation of local sulci  VENTRICLES AND EXTRA-AXIAL SPACES:  Normal for the patient's age  VISUALIZED ORBITS AND PARANASAL SINUSES:  Unremarkable  CALVARIUM AND EXTRACRANIAL SOFT TISSUES:  Normal       Impression: No acute intracranial hemorrhage  Region of encephalomalacia in the right frontal lobe is likely old  Workstation performed: [id number]      Ct Spine Cervical Without Contrast     Result Date: 6/23/2020  Narrative: CT CERVICAL SPINE - WITHOUT CONTRAST INDICATION:   fall  COMPARISON:  None  TECHNIQUE:  CT examination of the cervical spine was performed without intravenous contrast   Contiguous axial images were obtained  Sagittal and coronal reconstructions were performed  Radiation dose length product (DLP) for this visit:  220 5 mGy-cm     This examination, like all CT scans performed in the [place], was performed utilizing techniques to minimize radiation dose exposure, including the use of iterative reconstruction and automated exposure control  IMAGE QUALITY:  Diagnostic  FINDINGS: ALIGNMENT:  Normal lordosis  No significant scoliosis  Grade 1 anterolisthesis of C5 on C6 which appears to be degenerative  No jumped or perched facets  VERTEBRAL BODIES:  No fracture  DEGENERATIVE CHANGES:  Mild multilevel cervical degenerative changes are noted without critical central canal stenosis  Partially Uncovered bulging disc at C5-C6 contributes to mild spinal canal narrowing  PREVERTEBRAL AND PARASPINAL SOFT TISSUES:  Unremarkable  THORACIC INLET:  Blurred parenchymal markings both lung apices       Impression: No cervical spine fracture or traumatic malalignment  Workstation performed: [id number]      Cta Ed Chest Pe Study     Result Date: 6/23/2020  Narrative: CTA - CHEST WITH IV CONTRAST - PULMONARY ANGIOGRAM INDICATION:   taachy and sob  81-year-old female  COMPARISON: CT of the chest, abdomen and pelvis from earlier in the day  TECHNIQUE: CTA examination of the chest was performed using angiographic technique according to a protocol specifically tailored to evaluate for pulmonary embolism  Axial, sagittal, and coronal 2D reformatted images were created from the source data and  submitted for interpretation  In addition, coronal 3D MIP postprocessing was performed on the acquisition scanner  Radiation dose length product (DLP) for this visit:  190 4 mGy-cm   This examination, like all CT scans performed in the [facility], was performed utilizing techniques to minimize radiation dose exposure, including the use of iterative reconstruction and automated exposure control  IV Contrast:  85 mL of iohexol (OMNIPAQUE)  FINDINGS: Image quality limited by excessive respiratory motion artifact  PULMONARY ARTERIAL TREE:  No pulmonary embolus is seen   LUNGS:  Bandlike opacities in both lower lobes, consistent with subsegmental atelectasis and/or scar  No mass or consolidation  PLEURA:  Small right pleural effusion  No pneumothorax  HEART/GREAT VESSELS:  Heart mildly enlarged  Coronary artery calcifications  Thoracic aorta normal in caliber  No dissection  MEDIASTINUM AND GE: No lymphadenopathy or mass  Small hiatal hernia  Esophagus unremarkable  Trachea and main stem bronchi normal  CHEST WALL AND LOWER NECK:   Unremarkable  VISUALIZED STRUCTURES IN THE UPPER ABDOMEN:  Unremarkable  OSSEOUS STRUCTURES:  Scoliosis  No fracture or destructive lesion       Impression: 1  No evidence of pulmonary embolus  2   Subsegmental atelectasis and/or scarring in both lower lobes  3   Small right pleural effusion  Workstation performed: [id number]         EKG, Pathology, and Other Studies Reviewed on Admission:   · EKG:  AFib with RVR     Epic / Care Everywhere Records Reviewed: Yes      ** Please Note: This note has been constructed using a voice recognition system   **

## 2020-06-24 NOTE — ASSESSMENT & PLAN NOTE
· Blood pressure currently stable  · Admit to step-down level 1-continuous cardiopulmonary monitoring  · Continue prior to admission medication  · Monitor per protocol

## 2020-06-24 NOTE — OCCUPATIONAL THERAPY NOTE
Occupational Therapy Evaluation     Patient Name: Olimpia Enamorado  IYNAI'I Date: 6/24/2020  Problem List  Principal Problem:    Longstanding persistent atrial fibrillation ([facility] [address] )  Active Problems:    CAD (coronary artery disease)    Chronic diastolic heart failure ([facility] [address] )    Essential hypertension    Acute hypokalemia    Depression with anxiety    Gastroesophageal reflux disease without esophagitis    OAB (overactive bladder)    Mixed hyperlipidemia    Pneumonia    Debility    Past Medical History  Past Medical History:   Diagnosis Date    Acute on chronic diastolic congestive heart failure ([facility] [address] ) 6/27/2019    Arthritis     Chronic diastolic heart failure (HCC)     Chronic kidney disease, stage 2 (mild)     Coronary artery disease     history of stenting    Eczema     years    Edema     History of echocardiogram 07/20/2017    EF 55%, mild concentric LVH, bileaflet MVP with moderate MR, left atrial enlargement   History of transfusion     Hyperlipidemia     Hypertension     Hypo-osmolality and hyponatremia     Hypokalemia 7/11/2019    Mitral regurgitation      Past Surgical History  Past Surgical History:   Procedure Laterality Date    ABDOMINAL SURGERY      hysterectomy    CARDIAC CATHETERIZATION  04/10/2012    EF 65%, no significant CAD, patent stents, severe MR     CORONARY ANGIOPLASTY WITH STENT PLACEMENT  03/21/2007    EF 55%, GARRET to LAD   EYE SURGERY      b/l cataracts    HYSTERECTOMY      JOINT REPLACEMENT      Rt knee             06/24/20 1415   Note Type   Note type Eval/Treat   Restrictions/Precautions   Weight Bearing Precautions Per Order No   Other Precautions Multiple lines;Telemetry; Fall Risk;O2;Pain   Pain Assessment   Pain Assessment Tool 0-10   Pain Score Worst Possible Pain   Pain Location/Orientation Orientation: Left; Other (Comment)  (side)   Home Living   Type of [address] Multi-level;Performs ADLs on one level; Able to live on main level with bedroom/bathroom; Ramped entrance   Bathroom Shower/Tub Tub/shower unit   [employer] Grab bars in shower; Shower chair;Grab bars around toilet   [address] [facility]; Other (Comment);Grab bars  (4ww)   Additional Comments pt utilizes 4ww at baseline during functional mobility   Prior Function   Level of [place] Independent with ADLs and functional mobility   Lives With Son   Receives Help From Family   ADL Assistance Independent   IADLs Needs assistance   Falls in the last 6 months 1 to 4   Vocational Retired   Comments pt's son drives   Psychosocial   Psychosocial (WDL) X   Patient Behaviors/Mood Flat affect; Sad   Subjective   Subjective "I feel dizzy"   Bed Mobility   Additional Comments pt seated in chair at start and end of session; SpO2 WFL on 2L O2; no complaints of SOB   Transfers   Sit to Stand 4  Minimal assistance   Additional items Assist x 1; Increased time required;Verbal cues  (RW)   Stand to Sit 4  Minimal assistance   Additional items Assist x 1; Increased time required;Verbal cues  (RW)   Additional Comments pt performs transfers x2; unable to advance forward due to dizziness and BP drop   Balance   Static Sitting Good   Dynamic Sitting Good   Static Standing Fair   Dynamic Standing Fair   Ambulatory Fair -   Activity Tolerance   Activity Tolerance Patient limited by fatigue;Patient limited by pain   RUE Assessment   RUE Assessment X  (4/5 grossly; WFL AROM)   LUE Assessment   LUE Assessment X  (4/5 grossly; WFL AROM)   Hand Function   Gross Motor Coordination Functional   Fine Motor Coordination Functional   Sensation   Light Touch No apparent deficits   Sharp/Dull No apparent deficits   Cognition   Overall Cognitive Status WFL   Arousal/Participation Alert   Attention Within functional limits   Orientation Level Oriented X4   Memory Within functional limits   Following Commands Follows all commands and directions without difficulty   Assessment   Limitation Decreased ADL status; Decreased UE strength;Decreased Safe judgement during ADL;Decreased endurance;Decreased self-care trans;Decreased high-level ADLs   Assessment Pt is a 81 y o  female seen for OT evaluation s/p admit to [facility] on 6/24/2020 w/ Longstanding persistent atrial fibrillation ([facility] [address] )  Comorbidities affecting pt's functional performance at time of assessment include: HTN, CHF and hyperlipidemia, CAD, arthritis,  eczema, hypokalemia, CKD  Personal factors affecting pt at time of IE include:limited home support, difficulty performing ADLS, difficulty performing IADLS , limited insight into deficits and decreased initiation and engagement   Prior to admission, pt was (I) with ADLs and (A) with IADLs and use of RW during functional mobility  Upon evaluation: Pt requires (S)-min (A) level with use of RW during functional mobility 2* the following deficits impacting occupational performance: weakness, decreased strength, decreased balance, decreased tolerance, impaired initiation, decreased safety awareness, increased pain and impaired interpersonal skills  Pt to benefit from continued skilled OT tx while in the hospital to address deficits as defined above and maximize level of functional independence w ADL's and functional mobility  Occupational Performance areas to address include: grooming, bathing/shower, toilet hygiene, dressing, functional mobility, community mobility and clothing management  From OT standpoint, recommendation at time of d/c would be short term rehab       Goals   Patient Goals to go home    Short Term Goal  pt will perform UE strengthening exercises    Long Term Goal #1 pt will demonstrate UB/LB bathing and grooming tasks seated in chair at (I) level    Long Term Goal #2 pt will demonstrate functional mobility with RW to mod (I) level    Long Term Goal pt will demonstrate toilet transfers and hygiene at (I) level    Plan   Treatment Interventions ADL retraining;Functional transfer training;UE strengthening/ROM; Endurance training;Patient/family training;Equipment evaluation/education; Activityengagement   Goal Expiration Date 07/08/20   OT Frequency 3-5x/wk   Recommendation   OT Discharge Recommendation Post-Acute Rehabilitation Services   Barthel Index   Feeding 10   Bathing 0   Grooming Score 0   Dressing Score 5   Bladder Score 10   Bowels Score 10   Toilet Use Score 5   Transfers (Bed/Chair) Score 10   Mobility (Level Surface) Score 10   Stairs Score 0   Barthel Index Score 60     Pt will benefit from continued OT services in order to maximize (I) c ADL performance, FM c RW, and improve overall endurance/strength required to complete functional tasks in preparation for d/c  Pt left seated in chair at end of session; all needs within reach; all lines intact; scds connected and turned on

## 2020-06-24 NOTE — ASSESSMENT & PLAN NOTE
Wt Readings from Last 3 Encounters:   06/24/20 61 1 kg (134 lb 11 2 oz)   06/17/20 55 3 kg (122 lb)   03/06/20 57 7 kg (127 lb 3 3 oz)     · Currently holding prior to admission p o   Cardizem, Lopressor and Lasix  · Accurate I&Os  · Daily weights  · Currently no signs of acute exacerbation  · Cardiac diet

## 2020-06-24 NOTE — PLAN OF CARE
Problem: PHYSICAL THERAPY ADULT  Goal: Performs mobility at highest level of function for planned discharge setting  See evaluation for individualized goals  Description  Treatment/Interventions: Functional transfer training, LE strengthening/ROM, Therapeutic exercise, Endurance training, Bed mobility, Gait training          See flowsheet documentation for full assessment, interventions and recommendations  Note:   Prognosis: Good  Problem List: Decreased strength, Decreased endurance, Impaired balance, Decreased mobility, Pain  Assessment: Patient is a 81 y o  female evaluated by Physical Therapy s/p admit to [facility] on 6/24/2020 with admitting diagnosis of: New onset a-fib, and principal problem of: Longstanding persistent atrial fibrillation  PT was consulted to assess patient's functional mobility and discharge needs  Ordered are PT Evaluation and treatment with activity level of: up with assistance  Comorbidities affecting patient's physical performance at time of assessment include: a-fib, CAD, CHF, HTN, hypokalemia, GERD, osteoarthritis of knee, mitral regurgitation  Personal factors affecting the patient at time of IE include: lives in 2 story home, ambulating with assistive device, inability to navigate community distances, history of fall(s), inability/difficulty performing IADLs and inability/difficulty performing ADLs  Please locate objective findings from PT assessment regarding body systems outlined above  Upon evaluation, pt OOB in recliner and in agreement with PT intervention  Pt able to stand at [facility] with Justice x 1 but required seated rest break following 10 seconds of static standing d/t reports of severe dizziness  While seated, BP assessed and noted to be 87/60, likely causing symptoms  Pt able to perform one more sit <> stand transfer but ambulation deferred this session d/t safety concerns   Pt demonstrating good motivation to participate in PT session but is limited by medical status at this time  HR and SpO2 remained WFL on 2L O2 with exertion  Pt will require continued PT intervention during LOS to address current deficits, increase LOF, and facilitate safe d/c to next level of care when medically appropriate  D/c recommendation at this time is post-acute rehabilitation services  PT Discharge Recommendation: Post-Acute Rehabilitation Services          See flowsheet documentation for full assessment

## 2020-06-24 NOTE — ASSESSMENT & PLAN NOTE
· As evidence by potassium level 3 0  · Replete with 40 mEq IV  · Trend potassium level  · Replete as needed

## 2020-06-24 NOTE — ASSESSMENT & PLAN NOTE
· CT: Medial right lower lobe consolidation suggesting infiltrate/pneumonia  This appears improved compared to the prior CT February 28, 2020 although there remains atelectasis /scarring, I cannot exclude superimposed lesion  Follow-up unenhanced chest CT recommended in 6-12 months    ER concern for active infection - cefepime started emergency room-will hold off antibiotics for now  · Check procalcitonin, WBCs were normal, collect sputum and blood cultures and urine antigens  · Respiratory protocol in place  · Supportive care provide supportive care

## 2020-06-24 NOTE — ASSESSMENT & PLAN NOTE
· Patient with elevated rate in the ER -AFib with RVR  · Cardizem bolus and drip initiated emergency room-continue  · Admit to step-down 1 continuous cardiopulmonary monitoring  · Cardiology consulted  · Continue prior to admission Eliquis b i d

## 2020-06-25 PROBLEM — R42 DIZZINESS: Status: ACTIVE | Noted: 2020-06-25

## 2020-06-25 LAB
ANION GAP SERPL CALCULATED.3IONS-SCNC: 7 MMOL/L (ref 4–13)
BASOPHILS # BLD AUTO: 0.02 THOUSANDS/ΜL (ref 0–0.1)
BASOPHILS NFR BLD AUTO: 0 % (ref 0–1)
BUN SERPL-MCNC: 18 MG/DL (ref 5–25)
CALCIUM SERPL-MCNC: 9.1 MG/DL (ref 8.3–10.1)
CHLORIDE SERPL-SCNC: 103 MMOL/L (ref 100–108)
CO2 SERPL-SCNC: 28 MMOL/L (ref 21–32)
CREAT SERPL-MCNC: 0.84 MG/DL (ref 0.6–1.3)
EOSINOPHIL # BLD AUTO: 0.02 THOUSAND/ΜL (ref 0–0.61)
EOSINOPHIL NFR BLD AUTO: 0 % (ref 0–6)
ERYTHROCYTE [DISTWIDTH] IN BLOOD BY AUTOMATED COUNT: 14.4 % (ref 11.6–15.1)
GFR SERPL CREATININE-BSD FRML MDRD: 64 ML/MIN/1.73SQ M
GLUCOSE SERPL-MCNC: 119 MG/DL (ref 65–140)
HCT VFR BLD AUTO: 39.1 % (ref 34.8–46.1)
HGB BLD-MCNC: 12.5 G/DL (ref 11.5–15.4)
IMM GRANULOCYTES # BLD AUTO: 0.06 THOUSAND/UL (ref 0–0.2)
IMM GRANULOCYTES NFR BLD AUTO: 1 % (ref 0–2)
LYMPHOCYTES # BLD AUTO: 0.95 THOUSANDS/ΜL (ref 0.6–4.47)
LYMPHOCYTES NFR BLD AUTO: 9 % (ref 14–44)
MAGNESIUM SERPL-MCNC: 2 MG/DL (ref 1.6–2.6)
MCH RBC QN AUTO: 30.6 PG (ref 26.8–34.3)
MCHC RBC AUTO-ENTMCNC: 32 G/DL (ref 31.4–37.4)
MCV RBC AUTO: 96 FL (ref 82–98)
MONOCYTES # BLD AUTO: 0.68 THOUSAND/ΜL (ref 0.17–1.22)
MONOCYTES NFR BLD AUTO: 7 % (ref 4–12)
NEUTROPHILS # BLD AUTO: 8.72 THOUSANDS/ΜL (ref 1.85–7.62)
NEUTS SEG NFR BLD AUTO: 83 % (ref 43–75)
NRBC BLD AUTO-RTO: 0 /100 WBCS
PLATELET # BLD AUTO: 247 THOUSANDS/UL (ref 149–390)
PMV BLD AUTO: 9.7 FL (ref 8.9–12.7)
POTASSIUM SERPL-SCNC: 3.6 MMOL/L (ref 3.5–5.3)
RBC # BLD AUTO: 4.09 MILLION/UL (ref 3.81–5.12)
SODIUM SERPL-SCNC: 138 MMOL/L (ref 136–145)
WBC # BLD AUTO: 10.45 THOUSAND/UL (ref 4.31–10.16)

## 2020-06-25 PROCEDURE — 85025 COMPLETE CBC W/AUTO DIFF WBC: CPT | Performed by: FAMILY MEDICINE

## 2020-06-25 PROCEDURE — 80048 BASIC METABOLIC PNL TOTAL CA: CPT | Performed by: FAMILY MEDICINE

## 2020-06-25 PROCEDURE — 83735 ASSAY OF MAGNESIUM: CPT | Performed by: FAMILY MEDICINE

## 2020-06-25 PROCEDURE — 99233 SBSQ HOSP IP/OBS HIGH 50: CPT | Performed by: FAMILY MEDICINE

## 2020-06-25 RX ORDER — DIPHENHYDRAMINE HYDROCHLORIDE 50 MG/ML
25 INJECTION INTRAMUSCULAR; INTRAVENOUS ONCE
Status: COMPLETED | OUTPATIENT
Start: 2020-06-25 | End: 2020-06-25

## 2020-06-25 RX ORDER — FUROSEMIDE 40 MG/1
40 TABLET ORAL DAILY PRN
Status: DISCONTINUED | OUTPATIENT
Start: 2020-06-25 | End: 2020-06-27 | Stop reason: HOSPADM

## 2020-06-25 RX ORDER — POLYETHYLENE GLYCOL 3350 17 G/17G
17 POWDER, FOR SOLUTION ORAL DAILY
Status: DISCONTINUED | OUTPATIENT
Start: 2020-06-25 | End: 2020-06-27 | Stop reason: HOSPADM

## 2020-06-25 RX ORDER — MECLIZINE HYDROCHLORIDE 25 MG/1
25 TABLET ORAL EVERY 8 HOURS PRN
Status: DISCONTINUED | OUTPATIENT
Start: 2020-06-25 | End: 2020-06-27 | Stop reason: HOSPADM

## 2020-06-25 RX ADMIN — ACETAMINOPHEN 975 MG: 325 TABLET, FILM COATED ORAL at 22:00

## 2020-06-25 RX ADMIN — SERTRALINE HYDROCHLORIDE 50 MG: 50 TABLET ORAL at 08:59

## 2020-06-25 RX ADMIN — PRAVASTATIN SODIUM 40 MG: 40 TABLET ORAL at 15:59

## 2020-06-25 RX ADMIN — APIXABAN 2.5 MG: 2.5 TABLET, FILM COATED ORAL at 17:28

## 2020-06-25 RX ADMIN — APIXABAN 2.5 MG: 2.5 TABLET, FILM COATED ORAL at 09:54

## 2020-06-25 RX ADMIN — LIDOCAINE 1 PATCH: 50 PATCH TOPICAL at 08:59

## 2020-06-25 RX ADMIN — DILTIAZEM HYDROCHLORIDE 90 MG: 90 TABLET, FILM COATED ORAL at 05:40

## 2020-06-25 RX ADMIN — PANTOPRAZOLE SODIUM 40 MG: 40 TABLET, DELAYED RELEASE ORAL at 05:40

## 2020-06-25 RX ADMIN — METOPROLOL TARTRATE 25 MG: 25 TABLET, FILM COATED ORAL at 08:59

## 2020-06-25 RX ADMIN — DILTIAZEM HYDROCHLORIDE 90 MG: 90 TABLET, FILM COATED ORAL at 22:00

## 2020-06-25 RX ADMIN — POLYETHYLENE GLYCOL 3350 17 G: 17 POWDER, FOR SOLUTION ORAL at 16:13

## 2020-06-25 RX ADMIN — METOPROLOL TARTRATE 25 MG: 25 TABLET, FILM COATED ORAL at 21:49

## 2020-06-25 RX ADMIN — ACETAMINOPHEN 975 MG: 325 TABLET, FILM COATED ORAL at 15:57

## 2020-06-25 RX ADMIN — SENNOSIDES AND DOCUSATE SODIUM 1 TABLET: 8.6; 5 TABLET ORAL at 21:49

## 2020-06-25 RX ADMIN — OXYCODONE HYDROCHLORIDE 5 MG: 5 TABLET ORAL at 17:20

## 2020-06-25 RX ADMIN — DIPHENHYDRAMINE HYDROCHLORIDE 25 MG: 50 INJECTION INTRAMUSCULAR; INTRAVENOUS at 02:15

## 2020-06-25 RX ADMIN — OXYBUTYNIN CHLORIDE 5 MG: 5 TABLET ORAL at 08:59

## 2020-06-25 RX ADMIN — ACETAMINOPHEN 975 MG: 325 TABLET, FILM COATED ORAL at 05:40

## 2020-06-25 RX ADMIN — DILTIAZEM HYDROCHLORIDE 90 MG: 90 TABLET, FILM COATED ORAL at 15:59

## 2020-06-25 NOTE — ASSESSMENT & PLAN NOTE
· CT: Medial right lower lobe consolidation suggesting infiltrate/pneumonia  This appears improved compared to the prior CT February 28, 2020 although there remains atelectasis /scarring, I cannot exclude superimposed lesion  Follow-up unenhanced chest CT recommended in 6-12 months    ER concern for active infection - cefepime started emergency room-will hold off antibiotics for now  · Ruled out - no clinical picture with absence of respiratory symptoms, or infectious signs such as fever leukocytosis, procalcitonin remains normal

## 2020-06-25 NOTE — SOCIAL WORK
During inner disciplinary discharge planning  meeting PT is recommending STR   A post acute care recommendation was made by your care team for STR  Discussed Freedom of Choice with patient  List of facilities given to patient via in person  patient aware the list is custom filtered for them by preference  and that [facility] post acute providers are designated  Pt would like me to make a referral to [facility]  Referral made as requested

## 2020-06-25 NOTE — ASSESSMENT & PLAN NOTE
Normal orthostatic BP  Meclizine PRN  PT/OT recommending rehab  Will obtain MRI brain to r/o posterior circulation infarction/embolic event in setting of hx of a fib

## 2020-06-25 NOTE — ASSESSMENT & PLAN NOTE
· Patient with elevated rate in the ER -AFib with RVR  · Initial requiring Cardizem drip now condition to home Cardizem  · Downgrade to med surge  · Cardiology consulted  · Continue prior to admission Eliquis b i d

## 2020-06-25 NOTE — PROGRESS NOTES
Progress Note - [place] 1935, 81 y o  female MRN: [id number]    Unit/Bed#:  Encounter: [id number]    Primary Care Provider: Neoma Nissen, MD   Date and time admitted to hospital: 6/24/2020  2:01 AM        * Longstanding persistent atrial fibrillation ([address] )  Assessment & Plan  · Patient with elevated rate in the ER -AFib with RVR  · Initial requiring Cardizem drip now condition to home Cardizem  · Downgrade to med surge  · Cardiology consulted  · Continue prior to admission Eliquis b i d  Dizziness  Assessment & Plan  Normal orthostatic BP  Meclizine PRN  PT/OT recommending rehab  Will obtain MRI brain to r/o posterior circulation infarction/embolic event in setting of hx of a fib     Debility  Assessment & Plan  · Rib pain secondary to fall at home-  No fractures noted  · PT/OT consulted    Pneumonia  Assessment & Plan  · CT: Medial right lower lobe consolidation suggesting infiltrate/pneumonia  This appears improved compared to the prior CT February 28, 2020 although there remains atelectasis /scarring, I cannot exclude superimposed lesion  Follow-up unenhanced chest CT recommended in 6-12 months    ER concern for active infection - cefepime started emergency room-will hold off antibiotics for now  · Ruled out - no clinical picture with absence of respiratory symptoms, or infectious signs such as fever leukocytosis, procalcitonin remains normal      Gastroesophageal reflux disease without esophagitis  Assessment & Plan  Continue prior to admission PPI    Acute hypokalemia  Assessment & Plan  · Resolved with replacement  · Monitor BMP    Essential hypertension  Assessment & Plan  · Continue home medications  · Patient reports dizziness, orthostatic VS normal     Chronic diastolic heart failure (HCC)  Assessment & Plan  Wt Readings from Last 3 Encounters:   06/25/20 59 8 kg (131 lb 13 4 oz)   06/17/20 55 3 kg (122 lb)   03/06/20 57 7 kg (127 lb 3 3 oz)     · Resume home PO Lasix · Daily weights  · Low Na diet        CAD (coronary artery disease)  Assessment & Plan  Continue home medications    VTE Pharmacologic Prophylaxis:   Pharmacologic: Apixaban (Eliquis)  Mechanical VTE Prophylaxis in Place: Yes    Patient Centered Rounds: I have performed bedside rounds with nursing staff today  Discussions with Specialists or Other Care Team Provider: multidisciplinary meeting    Education and Discussions with Family / Patient: patient    Time Spent for Care: 30 minutes  More than 50% of total time spent on counseling and coordination of care as described above  Current Length of Stay: 1 day(s)    Current Patient Status: Inpatient   Certification Statement: The patient will continue to require additional inpatient hospital stay due to close monitoring     Discharge Plan: anticipate STR    Code Status: Level 1 - Full Code      Subjective:   Patient seen and examined  She continues to complain of dizziness especially getting up and walking but times rest   Denies chest pain, shortness of breath palpitations  Objective:     Vitals:   Temp (24hrs), Av °F (36 7 °C), Min:97 2 °F (36 2 °C), Max:98 5 °F (36 9 °C)    Temp:  [97 2 °F (36 2 °C)-98 5 °F (36 9 °C)] 97 6 °F (36 4 °C)  HR:  [] 89  Resp:  [16-44] 22  BP: (103-140)/(56-88) 127/69  SpO2:  [94 %-99 %] 96 %  Body mass index is 26 63 kg/m²  Input and Output Summary (last 24 hours): Intake/Output Summary (Last 24 hours) at 2020 1607  Last data filed at 2020 1456  Gross per 24 hour   Intake 600 ml   Output 1850 ml   Net -1250 ml       Physical Exam:     Physical Exam   Constitutional: She is oriented to person, place, and time  No distress  HENT:   Head: Normocephalic and atraumatic  Eyes: Conjunctivae are normal    Neck: No JVD present  Cardiovascular: Normal rate  An irregularly irregular rhythm present  Pulmonary/Chest: Effort normal  No respiratory distress  She has no wheezes  She has no rales  Abdominal: Soft  She exhibits no distension  There is no tenderness  There is no guarding  Musculoskeletal: She exhibits no edema  Neurological: She is alert and oriented to person, place, and time  Skin: Skin is warm and dry  Psychiatric: She has a normal mood and affect  Additional Data:     Labs:    Results from last 7 days   Lab Units 06/25/20  0542   WBC Thousand/uL 10 45*   HEMOGLOBIN g/dL 12 5   HEMATOCRIT % 39 1   PLATELETS Thousands/uL 247   NEUTROS PCT % 83*   LYMPHS PCT % 9*   MONOS PCT % 7   EOS PCT % 0     Results from last 7 days   Lab Units 06/25/20  0542  06/23/20  1704   SODIUM mmol/L 138   < > 136   POTASSIUM mmol/L 3 6   < > 3 0*   CHLORIDE mmol/L 103   < > 101   CO2 mmol/L 28   < > 28   BUN mg/dL 18   < > 14   CREATININE mg/dL 0 84   < > 0 75   ANION GAP mmol/L 7   < > 7   CALCIUM mg/dL 9 1   < > 9 5   ALBUMIN g/dL  --   --  4 0   TOTAL BILIRUBIN mg/dL  --   --  0 80   ALK PHOS U/L  --   --  57   ALT U/L  --   --  13   AST U/L  --   --  16   GLUCOSE RANDOM mg/dL 119   < > 108*    < > = values in this interval not displayed  Results from last 7 days   Lab Units 06/23/20  1704   INR  1 15             Results from last 7 days   Lab Units 06/24/20 0227 06/23/20 1919   PROCALCITONIN ng/ml 0 06 <0 05         * I Have Reviewed All Lab Data Listed Above  * Additional Pertinent Lab Tests Reviewed: [facility]    Imaging:    Imaging Reports Reviewed Today Include: will review MRI brain      Recent Cultures (last 7 days):     Results from last 7 days   Lab Units 06/23/20 2113 06/23/20 1919   BLOOD CULTURE   --  No Growth at 24 hrs  No Growth at 24 hrs     LEGIONELLA URINARY ANTIGEN  Negative  --        Last 24 Hours Medication List:     Current Facility-Administered Medications:  acetaminophen 975 mg Oral Q8H [address] Svetlana Marvin MD   apixaban 2 5 mg Oral BID [place]   bisacodyl 5 mg Oral Daily PRN Guilherme Stiles MD   diltiazem 90 mg Oral Q8H [facility] Nasir Sin MD   furosemide 40 mg Oral Daily PRN Nasir Sin MD   lidocaine 1 patch Topical Daily Nasir Sin MD   meclizine 25 mg Oral Q8H PRN Nasir Sin MD   metoprolol tartrate 25 mg Oral Q12H Brie Callahan MD   oxybutynin 5 mg Oral Daily [place] Swank, CRRENETTA   oxyCODONE 2 5 mg Oral Q6H PRN Nasir Sin MD   oxyCODONE 5 mg Oral Q6H PRN Nasir Sin MD   pantoprazole 40 mg Oral Early Morning [place] Swank, NEAL   polyethylene glycol 17 g Oral Daily Nasir Sin MD   pravastatin 40 mg Oral Daily With Dinner NEAL Johnson   senna-docusate sodium 1 tablet Oral HS Nasir Sin MD   sertraline 50 mg Oral Daily NEAL Johnson        Today, Patient Was Seen By: Alfreda Bailey MD    ** Please Note: Dictation voice to text software may have been used in the creation of this document   **

## 2020-06-25 NOTE — ASSESSMENT & PLAN NOTE
Wt Readings from Last 3 Encounters:   06/25/20 59 8 kg (131 lb 13 4 oz)   06/17/20 55 3 kg (122 lb)   03/06/20 57 7 kg (127 lb 3 3 oz)     · Resume home PO Lasix   · Daily weights  · Low Na diet

## 2020-06-26 ENCOUNTER — APPOINTMENT (INPATIENT)
Dept: MRI IMAGING | Facility: HOSPITAL | Age: 85
DRG: 309 | End: 2020-06-26
Payer: MEDICARE

## 2020-06-26 LAB
ANION GAP SERPL CALCULATED.3IONS-SCNC: 4 MMOL/L (ref 4–13)
BASOPHILS # BLD AUTO: 0.05 THOUSANDS/ΜL (ref 0–0.1)
BASOPHILS NFR BLD AUTO: 1 % (ref 0–1)
BUN SERPL-MCNC: 18 MG/DL (ref 5–25)
CALCIUM SERPL-MCNC: 8.9 MG/DL (ref 8.3–10.1)
CHLORIDE SERPL-SCNC: 102 MMOL/L (ref 100–108)
CO2 SERPL-SCNC: 30 MMOL/L (ref 21–32)
CREAT SERPL-MCNC: 0.87 MG/DL (ref 0.6–1.3)
EOSINOPHIL # BLD AUTO: 0.18 THOUSAND/ΜL (ref 0–0.61)
EOSINOPHIL NFR BLD AUTO: 2 % (ref 0–6)
ERYTHROCYTE [DISTWIDTH] IN BLOOD BY AUTOMATED COUNT: 14.2 % (ref 11.6–15.1)
GFR SERPL CREATININE-BSD FRML MDRD: 61 ML/MIN/1.73SQ M
GLUCOSE SERPL-MCNC: 83 MG/DL (ref 65–140)
HCT VFR BLD AUTO: 39 % (ref 34.8–46.1)
HGB BLD-MCNC: 12.2 G/DL (ref 11.5–15.4)
IMM GRANULOCYTES # BLD AUTO: 0.04 THOUSAND/UL (ref 0–0.2)
IMM GRANULOCYTES NFR BLD AUTO: 0 % (ref 0–2)
LYMPHOCYTES # BLD AUTO: 1.65 THOUSANDS/ΜL (ref 0.6–4.47)
LYMPHOCYTES NFR BLD AUTO: 18 % (ref 14–44)
MCH RBC QN AUTO: 29.8 PG (ref 26.8–34.3)
MCHC RBC AUTO-ENTMCNC: 31.3 G/DL (ref 31.4–37.4)
MCV RBC AUTO: 95 FL (ref 82–98)
MONOCYTES # BLD AUTO: 0.65 THOUSAND/ΜL (ref 0.17–1.22)
MONOCYTES NFR BLD AUTO: 7 % (ref 4–12)
NEUTROPHILS # BLD AUTO: 6.74 THOUSANDS/ΜL (ref 1.85–7.62)
NEUTS SEG NFR BLD AUTO: 72 % (ref 43–75)
NRBC BLD AUTO-RTO: 0 /100 WBCS
PLATELET # BLD AUTO: 234 THOUSANDS/UL (ref 149–390)
PMV BLD AUTO: 9.6 FL (ref 8.9–12.7)
POTASSIUM SERPL-SCNC: 4.2 MMOL/L (ref 3.5–5.3)
RBC # BLD AUTO: 4.09 MILLION/UL (ref 3.81–5.12)
SODIUM SERPL-SCNC: 136 MMOL/L (ref 136–145)
WBC # BLD AUTO: 9.31 THOUSAND/UL (ref 4.31–10.16)

## 2020-06-26 PROCEDURE — 80048 BASIC METABOLIC PNL TOTAL CA: CPT | Performed by: FAMILY MEDICINE

## 2020-06-26 PROCEDURE — 99233 SBSQ HOSP IP/OBS HIGH 50: CPT | Performed by: FAMILY MEDICINE

## 2020-06-26 PROCEDURE — 97116 GAIT TRAINING THERAPY: CPT

## 2020-06-26 PROCEDURE — 97530 THERAPEUTIC ACTIVITIES: CPT

## 2020-06-26 PROCEDURE — 85025 COMPLETE CBC W/AUTO DIFF WBC: CPT | Performed by: FAMILY MEDICINE

## 2020-06-26 PROCEDURE — 70551 MRI BRAIN STEM W/O DYE: CPT

## 2020-06-26 PROCEDURE — 97110 THERAPEUTIC EXERCISES: CPT

## 2020-06-26 RX ORDER — ONDANSETRON 2 MG/ML
4 INJECTION INTRAMUSCULAR; INTRAVENOUS EVERY 6 HOURS PRN
Status: DISCONTINUED | OUTPATIENT
Start: 2020-06-26 | End: 2020-06-27 | Stop reason: HOSPADM

## 2020-06-26 RX ADMIN — DILTIAZEM HYDROCHLORIDE 90 MG: 90 TABLET, FILM COATED ORAL at 21:53

## 2020-06-26 RX ADMIN — ACETAMINOPHEN 975 MG: 325 TABLET, FILM COATED ORAL at 21:53

## 2020-06-26 RX ADMIN — METOPROLOL TARTRATE 25 MG: 25 TABLET, FILM COATED ORAL at 09:03

## 2020-06-26 RX ADMIN — PRAVASTATIN SODIUM 40 MG: 40 TABLET ORAL at 17:30

## 2020-06-26 RX ADMIN — APIXABAN 2.5 MG: 2.5 TABLET, FILM COATED ORAL at 09:04

## 2020-06-26 RX ADMIN — DILTIAZEM HYDROCHLORIDE 90 MG: 90 TABLET, FILM COATED ORAL at 16:00

## 2020-06-26 RX ADMIN — ACETAMINOPHEN 975 MG: 325 TABLET, FILM COATED ORAL at 06:37

## 2020-06-26 RX ADMIN — LIDOCAINE 1 PATCH: 50 PATCH TOPICAL at 09:05

## 2020-06-26 RX ADMIN — DILTIAZEM HYDROCHLORIDE 90 MG: 90 TABLET, FILM COATED ORAL at 06:38

## 2020-06-26 RX ADMIN — OXYCODONE HYDROCHLORIDE 5 MG: 5 TABLET ORAL at 09:04

## 2020-06-26 RX ADMIN — PANTOPRAZOLE SODIUM 40 MG: 40 TABLET, DELAYED RELEASE ORAL at 06:38

## 2020-06-26 RX ADMIN — OXYBUTYNIN CHLORIDE 5 MG: 5 TABLET ORAL at 09:02

## 2020-06-26 RX ADMIN — MECLIZINE HYDROCHLORIDE 25 MG: 25 TABLET ORAL at 09:02

## 2020-06-26 RX ADMIN — SENNOSIDES AND DOCUSATE SODIUM 1 TABLET: 8.6; 5 TABLET ORAL at 21:53

## 2020-06-26 RX ADMIN — METOPROLOL TARTRATE 25 MG: 25 TABLET, FILM COATED ORAL at 21:53

## 2020-06-26 RX ADMIN — OXYCODONE HYDROCHLORIDE 5 MG: 5 TABLET ORAL at 00:20

## 2020-06-26 RX ADMIN — POLYETHYLENE GLYCOL 3350 17 G: 17 POWDER, FOR SOLUTION ORAL at 09:05

## 2020-06-26 RX ADMIN — ACETAMINOPHEN 975 MG: 325 TABLET, FILM COATED ORAL at 16:00

## 2020-06-26 RX ADMIN — APIXABAN 2.5 MG: 2.5 TABLET, FILM COATED ORAL at 18:47

## 2020-06-26 RX ADMIN — SERTRALINE HYDROCHLORIDE 50 MG: 50 TABLET ORAL at 09:04

## 2020-06-26 NOTE — NURSING NOTE
Patieint reported to Dr Annetta Adamson that she is dizzy anitvert given as ordered, Patient reported to RN that when she stands she gets dizzy

## 2020-06-26 NOTE — SOCIAL WORK
Pt is tentatively scheduled to [address] [facility]  [address] [facility] chair Roxanne Salinas will pick her up 12 noon on 6/27/20 to transport her

## 2020-06-26 NOTE — PROGRESS NOTES
Progress Note - Des Herrera 1935, 81 y o  female MRN: [id number]    Unit/Bed#:  Encounter: [id number]    Primary Care Provider: Neoma Nissen, MD   Date and time admitted to hospital: 6/24/2020  2:01 AM        * Longstanding persistent atrial fibrillation ([address] )  Assessment & Plan  · Patient with elevated rate in the ER -AFib with RVR  · Initial requiring Cardizem drip now transitioned to home Cardizem with heart rates controlled  · Cardiology consulted  · Continue prior to admission Eliquis b i d  Dizziness  Assessment & Plan  Normal orthostatic BP  Meclizine PRN  PT/OT recommending rehab  MRI brain was obtained to r/o posterior circulation infarction/embolic event in setting of hx of a fib with the following results:   "-No acute infarction   -Chronic right frontal lobe gliosis and encephalomalacia likely related to remote infarction  -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 "      Debility  Assessment & Plan  · Rib pain secondary to fall at home-  No fractures noted  · PT/OT consulted    Pneumonia  Assessment & Plan  · CT: Medial right lower lobe consolidation suggesting infiltrate/pneumonia  This appears improved compared to the prior CT February 28, 2020 although there remains atelectasis /scarring, I cannot exclude superimposed lesion  Follow-up unenhanced chest CT recommended in 6-12 months    ER concern for active infection - cefepime started emergency room-will hold off antibiotics for now  · Ruled out - no clinical picture with absence of respiratory symptoms, or infectious signs such as fever leukocytosis, procalcitonin remains normal      Gastroesophageal reflux disease without esophagitis  Assessment & Plan  Continue prior to admission PPI    Depression with anxiety  Assessment & Plan  Provide supportive care  Continue prior to admission medication    Acute hypokalemia  Assessment & Plan  · Resolved with replacement  · Monitor BMP    Essential hypertension  Assessment & Plan  · Continue home medications  · Patient reports dizziness, orthostatic VS normal     Chronic diastolic heart failure (HCC)  Assessment & Plan  Wt Readings from Last 3 Encounters:   20 58 3 kg (128 lb 8 5 oz)   20 58 3 kg (128 lb 8 5 oz)   20 55 3 kg (122 lb)     · Resume home PO Lasix   · Daily weights  · Low Na diet        CAD (coronary artery disease)  Assessment & Plan  Continue home medications      VTE Pharmacologic Prophylaxis:   Pharmacologic: Apixaban (Eliquis)  Mechanical VTE Prophylaxis in Place: Yes    Patient Centered Rounds: I have performed bedside rounds with nursing staff today  Discussions with Specialists or Other Care Team Provider: CM    Education and Discussions with Family / Patient: Patient    Time Spent for Care: 45 minutes  More than 50% of total time spent on counseling and coordination of care as described above  Current Length of Stay: 2 day(s)    Current Patient Status: Inpatient   Certification Statement: The patient will continue to require additional inpatient hospital stay due to close monitoring, placement      Discharge Plan: TBD    Code Status: Level 1 - Full Code      Subjective:   Patient seen and examined  She continues to complain of dizziness  She is also complaining of constipation  Otherwise no complaints, overnight events  Heart rate controlled  Objective:     Vitals:   Temp (24hrs), Av 7 °F (36 5 °C), Min:96 8 °F (36 °C), Max:98 4 °F (36 9 °C)    Temp:  [96 8 °F (36 °C)-98 4 °F (36 9 °C)] 96 8 °F (36 °C)  HR:  [61-96] 72  Resp:  [16-28] 16  BP: (120-135)/(62-83) 130/62  SpO2:  [94 %-98 %] 96 %  Body mass index is 25 96 kg/m²  Input and Output Summary (last 24 hours):        Intake/Output Summary (Last 24 hours) at 2020 1433  Last data filed at 2020 1340  Gross per 24 hour   Intake 120 ml   Output 2150 ml   Net -2030 ml       Physical Exam:     Physical Exam   Constitutional: She is oriented to person, place, and time  No distress  HENT:   Head: Normocephalic and atraumatic  Eyes: Conjunctivae are normal    Neck: No JVD present  Cardiovascular: Normal rate and regular rhythm  No murmur heard  Pulmonary/Chest: Effort normal  No respiratory distress  She has no wheezes  She has no rales  Abdominal: Soft  She exhibits no distension  There is no tenderness  There is no guarding  Musculoskeletal: She exhibits no edema  Neurological: She is alert and oriented to person, place, and time  Skin: Skin is warm and dry  Psychiatric: She has a normal mood and affect  Additional Data:     Labs:    Results from last 7 days   Lab Units 06/26/20  0640   WBC Thousand/uL 9 31   HEMOGLOBIN g/dL 12 2   HEMATOCRIT % 39 0   PLATELETS Thousands/uL 234   NEUTROS PCT % 72   LYMPHS PCT % 18   MONOS PCT % 7   EOS PCT % 2     Results from last 7 days   Lab Units 06/26/20  0640  06/23/20  1704   SODIUM mmol/L 136   < > 136   POTASSIUM mmol/L 4 2   < > 3 0*   CHLORIDE mmol/L 102   < > 101   CO2 mmol/L 30   < > 28   BUN mg/dL 18   < > 14   CREATININE mg/dL 0 87   < > 0 75   ANION GAP mmol/L 4   < > 7   CALCIUM mg/dL 8 9   < > 9 5   ALBUMIN g/dL  --   --  4 0   TOTAL BILIRUBIN mg/dL  --   --  0 80   ALK PHOS U/L  --   --  57   ALT U/L  --   --  13   AST U/L  --   --  16   GLUCOSE RANDOM mg/dL 83   < > 108*    < > = values in this interval not displayed  Results from last 7 days   Lab Units 06/23/20  1704   INR  1 15             Results from last 7 days   Lab Units 06/24/20 0227 06/23/20 1919   PROCALCITONIN ng/ml 0 06 <0 05       * I Have Reviewed All Lab Data Listed Above  * Additional Pertinent Lab Tests Reviewed: [address] Admission Reviewed    Imaging:    Imaging Reports Reviewed Today Include: MRI barin      Recent Cultures (last 7 days):     Results from last 7 days   Lab Units 06/23/20 2113 06/23/20 1919   BLOOD CULTURE   --  No Growth at 48 hrs  No Growth at 48 hrs  LEGIONELLA URINARY ANTIGEN  Negative  --        Last 24 Hours Medication List:     Current Facility-Administered Medications:  acetaminophen 975 mg Oral Q8H [address] Svetlana Marvin MD   apixaban 2 5 mg Oral BID Cheyenne Y Moiraank, NEAL   bisacodyl 5 mg Oral Daily PRN Valeriy Koehler MD   diltiazem 90 mg Oral Q8H [address] Valeriy Koehler MD   furosemide 40 mg Oral Daily PRN Valeriy Koehler, MD   lidocaine 1 patch Topical Daily Valeriy Koehler MD   meclizine 25 mg Oral Q8H PRN Valeriy Koehler MD   metoprolol tartrate 25 mg Oral Q12H Laura Feldman MD   ondansetron 4 mg Intravenous Q6H PRN Valeriy Koehler MD   oxybutynin 5 mg Oral Daily Cheyenne Y Swank, NEAL   oxyCODONE 2 5 mg Oral Q6H PRN Valeriy Koehler MD   oxyCODONE 5 mg Oral Q6H PRN Valeriy Koehler MD   pantoprazole 40 mg Oral Early Morning Cheyenne Y Moiraank, NEAL   polyethylene glycol 17 g Oral Daily Valeriy Koehler MD   pravastatin 40 mg Oral Daily With Dinner NEAL Patricio   senna-docusate sodium 1 tablet Oral HS Valeriy Koehler MD   sertraline 50 mg Oral Daily NEAL Patricio        Today, Patient Was Seen By: Socorro Hooker MD    ** Please Note: Dictation voice to text software may have been used in the creation of this document   **

## 2020-06-26 NOTE — SOCIAL WORK
Pt was accepted at [facility]   As per physician during inner disciplinary meeting patient should be ready for discharge in AM to [facility]

## 2020-06-26 NOTE — ASSESSMENT & PLAN NOTE
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

## 2020-06-26 NOTE — PLAN OF CARE
Problem: PHYSICAL THERAPY ADULT  Goal: Performs mobility at highest level of function for planned discharge setting  See evaluation for individualized goals  Description  Treatment/Interventions: Functional transfer training, LE strengthening/ROM, Therapeutic exercise, Endurance training, Bed mobility, Gait training          See flowsheet documentation for full assessment, interventions and recommendations  Outcome: Progressing  Note:   Prognosis: Good  Problem List: Decreased strength, Decreased endurance, Impaired balance, Decreased mobility, Pain  Assessment: Pt  seen for PT treatment session this date with interventions consisting of  therapeutic exercise, bed mobility, transfers and gait training w/ emphasis on improving pt's ability to ambulate  Pt  Currently performing  tx and ambulation at (min ) x 1 level of function  Utilizing RW with fair balance and stability  Transfer training to increase functional task performance and  to promote safe sit/stand and stand/sit mobility  Pt  education  for hand postion and safety and technique with transfer training  In comparison to previous session, Pt  With improvements in activity tolerance  c/o dizziness after first trail ambulation  /70  Nurse made aware  Limited by weakness, fatigue and pain  Pt is in need of continued activity in PT to improve strength balance endurance mobility transfers and ambulation with return to maximize LOF  From PT/mobility standpoint, recommendation at time of d/c would be post acute rehab  in order to promote return to PLOF and independence  PT Discharge Recommendation: Post-Acute Rehabilitation Services          See flowsheet documentation for full assessment

## 2020-06-26 NOTE — PHYSICAL THERAPY NOTE
PHYSICAL THERAPY NOTE          Patient Name: Sabas CARLTON'W Date: 6/26/2020 06/26/20 5703   Pain Assessment   Pain Score 8   Pain Location/Orientation Orientation: Left; Location: Rib Cage   Restrictions/Precautions   Weight Bearing Precautions Per Order No   Other Precautions   (Fall Risk;O2;Pain)   General   Family/Caregiver Present No   Subjective   Subjective States she didn't sleep well last night and has not been able to have BM in 3 days  c/o feling dizzy when ambulating  Bed Mobility   Supine to Sit 4  Minimal assistance   Additional items Assist x 1;HOB elevated; Increased time required;Verbal cues   Transfers   Sit to Stand 4  Minimal assistance   Additional items Assist x 1;Bedrails; Increased time required;Verbal cues   Stand to Sit 4  Minimal assistance   Additional items Assist x 1; Armrests; Increased time required;Verbal cues   Ambulation/Elevation   Gait pattern Foward flexed; Short stride; Excessively slow   Gait Assistance 4  Minimal assist   Additional items Assist x 1;Verbal cues   Assistive Device Rolling walker   Distance 8' x 1, 22' x 1 and 45' x 1 with seated rest  (BP checked after first trial amb  132/70)   Balance   Ambulatory Fair  (with RW)   Endurance Deficit   Endurance Deficit Yes   Activity Tolerance   Activity Tolerance Patient limited by fatigue   Exercises   Heelslides Supine;20 reps   Hip Flexion Sitting;20 reps   Hip Abduction Sitting;Supine;15 reps   Hip Adduction Supine;Sitting;15 reps   Knee AROM Long Arc Quad Sitting;20 reps   Ankle Pumps Supine;Sitting;20 reps   Assessment   Prognosis Good   Problem List Decreased strength;Decreased endurance; Impaired balance;Decreased mobility;Pain   Assessment Pt  seen for PT treatment session this date with interventions consisting of  therapeutic exercise, bed mobility, transfers and gait training w/ emphasis on improving pt's ability to ambulate  Pt  Currently performing  tx and ambulation at (min ) x 1 level of function  Utilizing RW with fair balance and stability  Transfer training to increase functional task performance and  to promote safe sit/stand and stand/sit mobility  Pt  education  for hand postion and safety and technique with transfer training  In comparison to previous session, Pt  With improvements in activity tolerance  c/o dizziness after first trail ambulation  /70  Nurse made aware  Limited by weakness, fatigue and pain  Pt is in need of continued activity in PT to improve strength balance endurance mobility transfers and ambulation with return to maximize LOF  From PT/mobility standpoint, recommendation at time of d/c would be post acute rehab  in order to promote return to PLOF and independence  Goals   LTG Expiration Date 07/08/20   PT Treatment Day 1   Plan   Treatment/Interventions Functional transfer training;LE strengthening/ROM; Therapeutic exercise; Endurance training;Bed mobility;Gait training   Progress Progressing toward goals   Recommendation   PT Discharge Recommendation Post-Acute Rehabilitation Services   Pt  OOB in chair   with call bell within reach, scd's connected and turned on and alarm on at end of PT session  Discussed with Rivera Meléndez PT today's treatment and patient's current level of function for care coordination

## 2020-06-26 NOTE — ASSESSMENT & PLAN NOTE
· Patient with elevated rate in the ER -AFib with RVR  · Initial requiring Cardizem drip now transitioned to home Cardizem with heart rates controlled  · Cardiology consulted  · Continue prior to admission Eliquis b i d

## 2020-06-26 NOTE — ASSESSMENT & PLAN NOTE
Wt Readings from Last 3 Encounters:   06/26/20 58 3 kg (128 lb 8 5 oz)   06/26/20 58 3 kg (128 lb 8 5 oz)   06/17/20 55 3 kg (122 lb)     · Resume home PO Lasix   · Daily weights  · Low Na diet

## 2020-06-27 VITALS
DIASTOLIC BLOOD PRESSURE: 87 MMHG | HEART RATE: 107 BPM | HEIGHT: 59 IN | OXYGEN SATURATION: 95 % | BODY MASS INDEX: 26.67 KG/M2 | TEMPERATURE: 97.5 F | WEIGHT: 132.28 LBS | SYSTOLIC BLOOD PRESSURE: 145 MMHG | RESPIRATION RATE: 18 BRPM

## 2020-06-27 LAB
ANION GAP SERPL CALCULATED.3IONS-SCNC: 7 MMOL/L (ref 4–13)
BASOPHILS # BLD AUTO: 0.06 THOUSANDS/ΜL (ref 0–0.1)
BASOPHILS NFR BLD AUTO: 1 % (ref 0–1)
BUN SERPL-MCNC: 14 MG/DL (ref 5–25)
CALCIUM SERPL-MCNC: 8.9 MG/DL (ref 8.3–10.1)
CHLORIDE SERPL-SCNC: 102 MMOL/L (ref 100–108)
CO2 SERPL-SCNC: 27 MMOL/L (ref 21–32)
CREAT SERPL-MCNC: 0.74 MG/DL (ref 0.6–1.3)
EOSINOPHIL # BLD AUTO: 0.15 THOUSAND/ΜL (ref 0–0.61)
EOSINOPHIL NFR BLD AUTO: 2 % (ref 0–6)
ERYTHROCYTE [DISTWIDTH] IN BLOOD BY AUTOMATED COUNT: 14.2 % (ref 11.6–15.1)
GFR SERPL CREATININE-BSD FRML MDRD: 75 ML/MIN/1.73SQ M
GLUCOSE SERPL-MCNC: 117 MG/DL (ref 65–140)
HCT VFR BLD AUTO: 40.2 % (ref 34.8–46.1)
HGB BLD-MCNC: 12.9 G/DL (ref 11.5–15.4)
IMM GRANULOCYTES # BLD AUTO: 0.04 THOUSAND/UL (ref 0–0.2)
IMM GRANULOCYTES NFR BLD AUTO: 0 % (ref 0–2)
LYMPHOCYTES # BLD AUTO: 1.18 THOUSANDS/ΜL (ref 0.6–4.47)
LYMPHOCYTES NFR BLD AUTO: 12 % (ref 14–44)
MAGNESIUM SERPL-MCNC: 2.1 MG/DL (ref 1.6–2.6)
MCH RBC QN AUTO: 29.9 PG (ref 26.8–34.3)
MCHC RBC AUTO-ENTMCNC: 32.1 G/DL (ref 31.4–37.4)
MCV RBC AUTO: 93 FL (ref 82–98)
MONOCYTES # BLD AUTO: 0.81 THOUSAND/ΜL (ref 0.17–1.22)
MONOCYTES NFR BLD AUTO: 9 % (ref 4–12)
NEUTROPHILS # BLD AUTO: 7.25 THOUSANDS/ΜL (ref 1.85–7.62)
NEUTS SEG NFR BLD AUTO: 76 % (ref 43–75)
NRBC BLD AUTO-RTO: 0 /100 WBCS
PLATELET # BLD AUTO: 232 THOUSANDS/UL (ref 149–390)
PMV BLD AUTO: 9.3 FL (ref 8.9–12.7)
POTASSIUM SERPL-SCNC: 3.6 MMOL/L (ref 3.5–5.3)
RBC # BLD AUTO: 4.32 MILLION/UL (ref 3.81–5.12)
SARS-COV-2 RNA RESP QL NAA+PROBE: NEGATIVE
SODIUM SERPL-SCNC: 136 MMOL/L (ref 136–145)
WBC # BLD AUTO: 9.49 THOUSAND/UL (ref 4.31–10.16)

## 2020-06-27 PROCEDURE — 87635 SARS-COV-2 COVID-19 AMP PRB: CPT | Performed by: FAMILY MEDICINE

## 2020-06-27 PROCEDURE — 99239 HOSP IP/OBS DSCHRG MGMT >30: CPT | Performed by: FAMILY MEDICINE

## 2020-06-27 PROCEDURE — 85025 COMPLETE CBC W/AUTO DIFF WBC: CPT | Performed by: FAMILY MEDICINE

## 2020-06-27 PROCEDURE — 83735 ASSAY OF MAGNESIUM: CPT | Performed by: FAMILY MEDICINE

## 2020-06-27 PROCEDURE — 80048 BASIC METABOLIC PNL TOTAL CA: CPT | Performed by: FAMILY MEDICINE

## 2020-06-27 RX ORDER — LIDOCAINE 50 MG/G
1 PATCH TOPICAL DAILY
Refills: 0
Start: 2020-06-28 | End: 2021-02-15 | Stop reason: HOSPADM

## 2020-06-27 RX ORDER — MECLIZINE HYDROCHLORIDE 25 MG/1
25 TABLET ORAL EVERY 8 HOURS PRN
Qty: 30 TABLET | Refills: 0 | Status: ON HOLD
Start: 2020-06-27 | End: 2020-08-16

## 2020-06-27 RX ORDER — POLYETHYLENE GLYCOL 3350 17 G/17G
17 POWDER, FOR SOLUTION ORAL DAILY
Qty: 14 EACH | Refills: 0
Start: 2020-06-27 | End: 2022-08-09

## 2020-06-27 RX ORDER — ACETAMINOPHEN 325 MG/1
975 TABLET ORAL EVERY 8 HOURS SCHEDULED
Qty: 30 TABLET | Refills: 0
Start: 2020-06-27 | End: 2022-01-01

## 2020-06-27 RX ORDER — AMOXICILLIN 250 MG
1 CAPSULE ORAL
Refills: 0 | Status: ON HOLD
Start: 2020-06-27 | End: 2020-08-16

## 2020-06-27 RX ADMIN — DILTIAZEM HYDROCHLORIDE 90 MG: 90 TABLET, FILM COATED ORAL at 05:16

## 2020-06-27 RX ADMIN — SERTRALINE HYDROCHLORIDE 50 MG: 50 TABLET ORAL at 08:43

## 2020-06-27 RX ADMIN — PANTOPRAZOLE SODIUM 40 MG: 40 TABLET, DELAYED RELEASE ORAL at 05:11

## 2020-06-27 RX ADMIN — METOPROLOL TARTRATE 25 MG: 25 TABLET, FILM COATED ORAL at 08:56

## 2020-06-27 RX ADMIN — APIXABAN 2.5 MG: 2.5 TABLET, FILM COATED ORAL at 08:43

## 2020-06-27 RX ADMIN — OXYCODONE HYDROCHLORIDE 5 MG: 5 TABLET ORAL at 03:15

## 2020-06-27 RX ADMIN — ACETAMINOPHEN 975 MG: 325 TABLET, FILM COATED ORAL at 05:11

## 2020-06-27 RX ADMIN — OXYBUTYNIN CHLORIDE 5 MG: 5 TABLET ORAL at 08:43

## 2020-06-27 RX ADMIN — LIDOCAINE 1 PATCH: 50 PATCH TOPICAL at 08:43

## 2020-06-27 NOTE — ASSESSMENT & PLAN NOTE
Wt Readings from Last 3 Encounters:   06/27/20 60 kg (132 lb 4 4 oz)   06/26/20 58 3 kg (128 lb 8 5 oz)   06/17/20 55 3 kg (122 lb)     · Continue home PO Lasix   · Daily weights  · Low Na diet

## 2020-06-27 NOTE — DISCHARGE SUMMARY
Discharge- Sonia Mahoney 1935, 81 y o  female MRN: [id number]    Unit/Bed#:  Encounter: [id number]    Primary Care Provider: Ramon Castro MD   Date and time admitted to hospital: 6/24/2020  2:01 AM        * Longstanding persistent atrial fibrillation ([facility] [address] )  Assessment & Plan  · Patient with elevated rate in the ER -AFib with RVR  · Initial requiring Cardizem drip now transitioned to home Cardizem with heart rates controlled  · Cardiology consulted - resume outpatient f/u   · Continue prior to admission Eliquis b i d  Dizziness  Assessment & Plan  Normal orthostatic BP  Meclizine PRN  PT/OT recommending rehab  MRI brain was obtained to r/o posterior circulation infarction/embolic event in setting of hx of a fib with the following results:   "-No acute infarction   -Chronic right frontal lobe gliosis and encephalomalacia likely related to remote infarction  -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 "      Debility  Assessment & Plan  · Rib pain secondary to fall at home-  No fractures noted  · PT/OT consulted    Pneumonia  Assessment & Plan  · CT: Medial right lower lobe consolidation suggesting infiltrate/pneumonia  This appears improved compared to the prior CT February 28, 2020 although there remains atelectasis /scarring, I cannot exclude superimposed lesion  Follow-up unenhanced chest CT recommended in 6-12 months    ER concern for active infection - cefepime started emergency room-will hold off antibiotics for now  · Ruled out - no clinical picture with absence of respiratory symptoms, or infectious signs such as fever leukocytosis, procalcitonin remains normal      Mixed hyperlipidemia  Assessment & Plan  Continue prior to admittance statin    OAB (overactive bladder)  Assessment & Plan  Continue prior to admission Ditropan      Gastroesophageal reflux disease without esophagitis  Assessment & Plan  Continue PPI    Depression with anxiety  Assessment & Plan  Provide supportive care  Continue prior to admission medication    Acute hypokalemia  Assessment & Plan  · Resolved with replacement  · Monitor BMP periodically outpatient     Essential hypertension  Assessment & Plan  · Continue home medications  · Patient reports dizziness, orthostatic VS normal     Chronic diastolic heart failure (HCC)  Assessment & Plan  Wt Readings from Last 3 Encounters:   06/27/20 60 kg (132 lb 4 4 oz)   06/26/20 58 3 kg (128 lb 8 5 oz)   06/17/20 55 3 kg (122 lb)     · Continue home PO Lasix   · Daily weights  · Low Na diet        CAD (coronary artery disease)  Assessment & Plan  Continue home medications        Discharging Physician / Practitioner: Fabián Isaac MD  PCP: Lian Haddad MD  Admission Date:   Admission Orders (From admission, onward)     Ordered        06/24/20 0209  Inpatient Admission  Once                   Discharge Date: 06/27/20    Resolved Problems  Date Reviewed: 6/27/2020    None          Consultations During Hospital Stay:  · Cardiology, CM, PT, OT    Procedures Performed:   MRI brain wo contrast   Final Result by Chano Hogan MD (06/26 1159)      No acute infarction  Chronic right frontal lobe gliosis and encephalomalacia likely related to remote infarction  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  Workstation performed: [id number]               Significant Findings / Test Results:   Results from last 7 days   Lab Units 06/27/20  0513   WBC Thousand/uL 9 49   HEMOGLOBIN g/dL 12 9   HEMATOCRIT % 40 2   PLATELETS Thousands/uL 232     Results from last 7 days   Lab Units 06/27/20  0513   SODIUM mmol/L 136   POTASSIUM mmol/L 3 6   CHLORIDE mmol/L 102   CO2 mmol/L 27   BUN mg/dL 14   CREATININE mg/dL 0 74   CALCIUM mg/dL 8 9       Incidental Findings:   · None      Test Results Pending at Discharge (will require follow up):    None      Outpatient Tests Requested:  · None    Complications:  None    Reason for Admission: Fall, rapid afib    Hospital Course:     Feli Ace is a 81 y o  female patient with past medical history of atrial fibrillation, hypertension, diastolic CHF who originally presented to the hospital on 6/24/2020 due to fall  Extensive imaging was done and the patient was found to have a nondisplaced rib fracture managed conservatively  On admission the patient was also found to have rapid afib initially managed with Cardizem drip, the following data patient was able to be transitioned to her home Cardizem regimen with her heart rate remains controlled  On imaging obtain admission, she was noted for an abnormal CT of the chest suggestive of possible pneumonia  Due to absence of clinical picture of pneumonia with a negative infectious workup, pneumonia was ruled out and no antibiotics were indicated  Due to patient's fall, history of AFib with risk for stroke and persistent dizziness an MRI of the brain was obtained to rule out an embolic stroke  Fortunately, patient's MRI of the brain was unremarkable other than age-related ischemic small-vessel changes  She has patient's vital signs remained stable and her orthostatic blood pressure was normal   The patient's dizziness improved prior to her discharge  She was discharged to short-term rehab for further therapies per PT/OT recommendations  Please see above list of diagnoses and related plan for additional information  Condition at Discharge: stable     Discharge Day Visit / Exam:     * Please refer to separate progress note for these details *    Discussion with Family:  Left VM for family members with a call back number     Discharge instructions/Information to patient and family:   See after visit summary for information provided to patient and family  Provisions for Follow-Up Care:  See after visit summary for information related to follow-up care and any pertinent home health orders        Disposition:     [facility] SN[facility]:   · Not Applicable to this Patient - Not Applicable to this Patient    Planned Readmission: No     Discharge Statement:  I spent 35 minutes discharging the patient  This time was spent on the day of discharge  I had direct contact with the patient on the day of discharge  Greater than 50% of the total time was spent examining patient, answering all patient questions, arranging and discussing plan of care with patient as well as directly providing post-discharge instructions  Additional time then spent on discharge activities  Discharge Medications:  See after visit summary for reconciled discharge medications provided to patient and family        ** Please Note: This note has been constructed using a voice recognition system **

## 2020-06-27 NOTE — ASSESSMENT & PLAN NOTE
· Patient with elevated rate in the ER -AFib with RVR  · Initial requiring Cardizem drip now transitioned to home Cardizem with heart rates controlled  · Cardiology consulted - resume outpatient f/u   · Continue prior to admission Eliquis b i d

## 2020-06-28 LAB
BACTERIA BLD CULT: NORMAL
BACTERIA BLD CULT: NORMAL

## 2020-07-17 DIAGNOSIS — I48.91 ATRIAL FIBRILLATION WITH RVR (HCC): ICD-10-CM

## 2020-07-17 RX ORDER — DILTIAZEM HCL 90 MG
90 TABLET ORAL EVERY 8 HOURS SCHEDULED
Qty: 42 TABLET | Refills: 0 | Status: CANCELLED | OUTPATIENT
Start: 2020-07-17

## 2020-07-20 DIAGNOSIS — I48.91 ATRIAL FIBRILLATION WITH RVR (HCC): ICD-10-CM

## 2020-07-20 RX ORDER — DILTIAZEM HCL 90 MG
90 TABLET ORAL EVERY 8 HOURS SCHEDULED
Qty: 42 TABLET | Refills: 0 | Status: SHIPPED | OUTPATIENT
Start: 2020-07-20 | End: 2022-01-01 | Stop reason: HOSPADM

## 2020-08-16 ENCOUNTER — APPOINTMENT (EMERGENCY)
Dept: CT IMAGING | Facility: HOSPITAL | Age: 85
End: 2020-08-16
Payer: MEDICARE

## 2020-08-16 ENCOUNTER — APPOINTMENT (EMERGENCY)
Dept: RADIOLOGY | Facility: HOSPITAL | Age: 85
End: 2020-08-16
Payer: MEDICARE

## 2020-08-16 ENCOUNTER — HOSPITAL ENCOUNTER (EMERGENCY)
Facility: HOSPITAL | Age: 85
End: 2020-08-16
Attending: EMERGENCY MEDICINE
Payer: MEDICARE

## 2020-08-16 ENCOUNTER — HOSPITAL ENCOUNTER (INPATIENT)
Facility: HOSPITAL | Age: 85
LOS: 5 days | Discharge: NURSING, REHAB OR LONG-TERM CARE | DRG: 605 | End: 2020-08-21
Attending: INTERNAL MEDICINE | Admitting: FAMILY MEDICINE
Payer: MEDICARE

## 2020-08-16 VITALS
RESPIRATION RATE: 16 BRPM | TEMPERATURE: 96.7 F | SYSTOLIC BLOOD PRESSURE: 134 MMHG | DIASTOLIC BLOOD PRESSURE: 88 MMHG | OXYGEN SATURATION: 96 % | HEART RATE: 120 BPM

## 2020-08-16 DIAGNOSIS — S20.211A CHEST WALL CONTUSION, RIGHT, INITIAL ENCOUNTER: ICD-10-CM

## 2020-08-16 DIAGNOSIS — F41.9 ANXIETY: ICD-10-CM

## 2020-08-16 DIAGNOSIS — I27.20 PULMONARY HYPERTENSION (HCC): Primary | ICD-10-CM

## 2020-08-16 DIAGNOSIS — R07.89 CHEST WALL PAIN: Primary | ICD-10-CM
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
## 2020-08-16 LAB
ALBUMIN SERPL BCP-MCNC: 4 G/DL (ref 3.5–5.7)
ALP SERPL-CCNC: 58 U/L (ref 55–165)
ALT SERPL W P-5'-P-CCNC: 8 U/L (ref 7–52)
ANION GAP SERPL CALCULATED.3IONS-SCNC: 9 MMOL/L (ref 4–13)
AST SERPL W P-5'-P-CCNC: 12 U/L (ref 13–39)
BASOPHILS # BLD AUTO: 0.1 THOUSANDS/ΜL (ref 0–0.1)
BASOPHILS NFR BLD AUTO: 1 % (ref 0–2)
BILIRUB SERPL-MCNC: 1.1 MG/DL (ref 0.2–1)
BUN SERPL-MCNC: 14 MG/DL (ref 7–25)
CALCIUM SERPL-MCNC: 9.6 MG/DL (ref 8.6–10.5)
CHLORIDE SERPL-SCNC: 104 MMOL/L (ref 98–107)
CO2 SERPL-SCNC: 28 MMOL/L (ref 21–31)
CREAT SERPL-MCNC: 0.78 MG/DL (ref 0.6–1.2)
EOSINOPHIL # BLD AUTO: 0.2 THOUSAND/ΜL (ref 0–0.61)
EOSINOPHIL NFR BLD AUTO: 3 % (ref 0–5)
ERYTHROCYTE [DISTWIDTH] IN BLOOD BY AUTOMATED COUNT: 15.9 % (ref 11.5–14.5)
GFR SERPL CREATININE-BSD FRML MDRD: 70 ML/MIN/1.73SQ M
GLUCOSE SERPL-MCNC: 91 MG/DL (ref 65–99)
HCT VFR BLD AUTO: 37.2 % (ref 42–47)
HGB BLD-MCNC: 12.6 G/DL (ref 12–16)
LYMPHOCYTES # BLD AUTO: 1.1 THOUSANDS/ΜL (ref 0.6–4.47)
LYMPHOCYTES NFR BLD AUTO: 14 % (ref 21–51)
MCH RBC QN AUTO: 30.5 PG (ref 26–34)
MCHC RBC AUTO-ENTMCNC: 33.9 G/DL (ref 31–37)
MCV RBC AUTO: 90 FL (ref 81–99)
MONOCYTES # BLD AUTO: 0.5 THOUSAND/ΜL (ref 0.17–1.22)
MONOCYTES NFR BLD AUTO: 7 % (ref 2–12)
NEUTROPHILS # BLD AUTO: 5.8 THOUSANDS/ΜL (ref 1.4–6.5)
NEUTS SEG NFR BLD AUTO: 74 % (ref 42–75)
PLATELET # BLD AUTO: 200 THOUSANDS/UL (ref 149–390)
PMV BLD AUTO: 7.8 FL (ref 8.6–11.7)
POTASSIUM SERPL-SCNC: 3.7 MMOL/L (ref 3.5–5.5)
PROT SERPL-MCNC: 6.9 G/DL (ref 6.4–8.9)
RBC # BLD AUTO: 4.14 MILLION/UL (ref 3.9–5.2)
SODIUM SERPL-SCNC: 141 MMOL/L (ref 134–143)
TROPONIN I SERPL-MCNC: <0.03 NG/ML
WBC # BLD AUTO: 7.7 THOUSAND/UL (ref 4.8–10.8)

## 2020-08-16 PROCEDURE — 96376 TX/PRO/DX INJ SAME DRUG ADON: CPT

## 2020-08-16 PROCEDURE — 71045 X-RAY EXAM CHEST 1 VIEW: CPT

## 2020-08-16 PROCEDURE — 85025 COMPLETE CBC W/AUTO DIFF WBC: CPT

## 2020-08-16 PROCEDURE — 93005 ELECTROCARDIOGRAM TRACING: CPT

## 2020-08-16 PROCEDURE — 99285 EMERGENCY DEPT VISIT HI MDM: CPT | Performed by: EMERGENCY MEDICINE

## 2020-08-16 PROCEDURE — 99285 EMERGENCY DEPT VISIT HI MDM: CPT

## 2020-08-16 PROCEDURE — 96374 THER/PROPH/DIAG INJ IV PUSH: CPT

## 2020-08-16 PROCEDURE — 80053 COMPREHEN METABOLIC PANEL: CPT

## 2020-08-16 PROCEDURE — 36415 COLL VENOUS BLD VENIPUNCTURE: CPT

## 2020-08-16 PROCEDURE — 84484 ASSAY OF TROPONIN QUANT: CPT

## 2020-08-16 PROCEDURE — 71250 CT THORAX DX C-: CPT

## 2020-08-16 PROCEDURE — G1004 CDSM NDSC: HCPCS

## 2020-08-16 PROCEDURE — 74176 CT ABD & PELVIS W/O CONTRAST: CPT

## 2020-08-16 RX ORDER — MORPHINE SULFATE 4 MG/ML
4 INJECTION, SOLUTION INTRAMUSCULAR; INTRAVENOUS ONCE
Status: COMPLETED | OUTPATIENT
Start: 2020-08-16 | End: 2020-08-16

## 2020-08-16 RX ADMIN — MORPHINE SULFATE 4 MG: 4 INJECTION INTRAVENOUS at 17:03

## 2020-08-16 RX ADMIN — MORPHINE SULFATE 4 MG: 4 INJECTION INTRAVENOUS at 18:15

## 2020-08-16 NOTE — ED PROVIDER NOTES
History  Chief Complaint   Patient presents with   Leonor Noriega Fall     States that two days ago she was talking with her walker and she fell hitting the right side of her ribs and is having in pain   Chest Pain     HPI    81 y o  F hx of afib on eliquis, chf, ckd hld htn presenting to the ed for eval after a fall  Patient was walking with walker and tripped over bed spread, fell onto her chest  No head strike no loc no neck pain  Since the injury, patient has been having pain in her chest wall, radiating down to back  Patient did break ribs on left a month ago, for which she went to rehab, with superimposed pna  Patient has no cough  No sob  No syncopal events  Pain 8/10 took 3 tylenol this am  deneis other symptoms on ros  Prior to Admission Medications   Prescriptions Last Dose Informant Patient Reported? Taking?    ALPRAZolam (XANAX) 0 25 mg tablet   Yes No   Sig: Take 0 25 mg by mouth daily at bedtime as needed for anxiety   PANTOPRAZOLE SODIUM PO   Yes No   Sig: Take by mouth daily   acetaminophen (TYLENOL) 325 mg tablet   No No   Sig: Take 3 tablets (975 mg total) by mouth every 8 (eight) hours   apixaban (ELIQUIS) 2 5 mg   No No   Sig: Take 1 tablet (2 5 mg total) by mouth 2 (two) times a day   bisacodyl (DULCOLAX) 5 mg EC tablet   No No   Sig: Take 1 tablet (5 mg total) by mouth daily as needed for constipation   diltiazem (CARDIZEM) 90 mg tablet   No No   Sig: Take 1 tablet (90 mg total) by mouth every 8 (eight) hours   furosemide (LASIX) 40 mg tablet   No No   Sig: Take 1 tablet (40 mg total) by mouth daily as needed (Only take for morning weight more than 125 pounds )   lidocaine (LIDODERM) 5 %   No No   Sig: Apply 1 patch topically daily Remove & Discard patch within 12 hours or as directed by MD   metoprolol tartrate (LOPRESSOR) 25 mg tablet   No No   Sig: Take 1 tablet (25 mg total) by mouth 2 (two) times a day   oxybutynin (DITROPAN) 5 mg tablet   Yes No   Sig: Take 5 mg by mouth daily   polyethylene glycol (MIRALAX) 17 g packet   No No   Sig: Take 17 g by mouth daily   potassium chloride (K-DUR,KLOR-CON) 20 mEq tablet   No No   Sig: Take 1 tablet (20 mEq total) by mouth daily as needed (Only take for morning weight more than 125 pounds )   pravastatin (PRAVACHOL) 40 mg tablet   No No   Sig: Take 1 tablet (40 mg total) by mouth daily with dinner   sertraline (ZOLOFT) 50 mg tablet   Yes No   Sig: Take 50 mg by mouth daily      Facility-Administered Medications: None       Past Medical History:   Diagnosis Date    Acute on chronic diastolic congestive heart failure ([facility] [address] ) 6/27/2019    Arthritis     Chest wall contusion, right, subsequent encounter 8/17/2020    Chronic diastolic heart failure (HCC)     Chronic kidney disease, stage 2 (mild)     Coronary artery disease     history of stenting    Eczema     years    Edema     History of echocardiogram 07/20/2017    EF 55%, mild concentric LVH, bileaflet MVP with moderate MR, left atrial enlargement   History of transfusion     Hyperlipidemia     Hypertension     Hypo-osmolality and hyponatremia     Hypokalemia 7/11/2019    Mitral regurgitation        Past Surgical History:   Procedure Laterality Date    ABDOMINAL SURGERY      hysterectomy    CARDIAC CATHETERIZATION  04/10/2012    EF 65%, no significant CAD, patent stents, severe MR     CORONARY ANGIOPLASTY WITH STENT PLACEMENT  03/21/2007    EF 55%, GARRET to LAD   EYE SURGERY      b/l cataracts    HYSTERECTOMY      JOINT REPLACEMENT      Rt knee       Family History   Problem Relation Age of Onset    Arthritis Mother     Cancer Mother     Heart disease Mother     Hypertension Mother     Alcohol abuse Father     Arthritis Father     Birth defects Son     Hearing loss Son     Diabetes Daughter     Stroke Maternal Grandmother     Asthma Maternal Grandfather      I have reviewed and agree with the history as documented      E-Cigarette/Vaping    E-Cigarette Use Never User E-Cigarette/Vaping Substances     Social History     Tobacco Use    Smoking status: Never Smoker    Smokeless tobacco: Never Used   Substance Use Topics    Alcohol use: Never     Alcohol/week: 0 0 standard drinks     Frequency: Never     Drinks per session: Patient refused     Binge frequency: Never    Drug use: Never       Review of Systems   Constitutional: Negative for chills, fatigue and fever  HENT: Negative for sore throat  Eyes: Negative for redness and visual disturbance  Respiratory: Negative for cough and shortness of breath  Cardiovascular: Positive for chest pain  Gastrointestinal: Negative for abdominal pain, diarrhea and nausea  Genitourinary: Negative for difficulty urinating, dysuria and pelvic pain  Musculoskeletal: Negative for back pain  Skin: Negative for rash  Neurological: Negative for syncope, weakness and headaches  All other systems reviewed and are negative  Physical Exam  Physical Exam  Vitals signs and nursing note reviewed  Constitutional:       General: She is not in acute distress  Appearance: She is well-developed  HENT:      Head: Normocephalic and atraumatic  Eyes:      Conjunctiva/sclera: Conjunctivae normal    Neck:      Musculoskeletal: Normal range of motion  Cardiovascular:      Rate and Rhythm: Normal rate  Rhythm irregular  Pulmonary:      Effort: Pulmonary effort is normal  No respiratory distress  Breath sounds: Normal breath sounds  Abdominal:      General: Bowel sounds are normal       Palpations: Abdomen is soft  Tenderness: There is no abdominal tenderness  Musculoskeletal: Normal range of motion  Skin:     General: Skin is warm and dry  Findings: Bruising present  No rash  Neurological:      Mental Status: She is alert and oriented to person, place, and time  Cranial Nerves: No cranial nerve deficit  Sensory: No sensory deficit  Motor: No abnormal muscle tone        Coordination: Coordination normal              Vital Signs  ED Triage Vitals   Temperature Pulse Respirations Blood Pressure SpO2   08/16/20 1459 08/16/20 1459 08/16/20 1459 08/16/20 1459 08/16/20 1459   (!) 96 5 °F (35 8 °C) (!) 106 18 131/84 95 %      Temp Source Heart Rate Source Patient Position - Orthostatic VS BP Location FiO2 (%)   08/16/20 1459 08/16/20 1459 08/16/20 1459 08/16/20 1459 --   Tympanic Monitor Lying Right arm       Pain Score       08/16/20 1703       8           Vitals:    08/16/20 1852 08/16/20 2100 08/16/20 2130 08/16/20 2151   BP: 129/80 125/81 134/88 134/88   Pulse: (!) 120 102 (!) 124 (!) 120   Patient Position - Orthostatic VS: Lying  Lying Lying         Visual Acuity      ED Medications  Medications   morphine (PF) 4 mg/mL injection 4 mg (4 mg Intravenous Given 8/16/20 1703)   morphine (PF) 4 mg/mL injection 4 mg (4 mg Intravenous Given 8/16/20 1815)       Diagnostic Studies  Results Reviewed     Procedure Component Value Units Date/Time    Comprehensive metabolic panel [[id number]]  (Abnormal) Collected:  08/16/20 1543    Lab Status:  Final result Specimen:  Blood from Arm, Right Updated:  08/16/20 1616     Sodium 141 mmol/L      Potassium 3 7 mmol/L      Chloride 104 mmol/L      CO2 28 mmol/L      ANION GAP 9 mmol/L      BUN 14 mg/dL      Creatinine 0 78 mg/dL      Glucose 91 mg/dL      Calcium 9 6 mg/dL      AST 12 U/L      ALT 8 U/L      Alkaline Phosphatase 58 U/L      Total Protein 6 9 g/dL      Albumin 4 0 g/dL      Total Bilirubin 1 10 mg/dL      eGFR 70 ml/min/1 73sq m     Narrative:       Harsh guidelines for Chronic Kidney Disease (CKD):     Stage 1 with normal or high GFR (GFR > 90 mL/min/1 73 square meters)    Stage 2 Mild CKD (GFR = 60-89 mL/min/1 73 square meters)    Stage 3A Moderate CKD (GFR = 45-59 mL/min/1 73 square meters)    Stage 3B Moderate CKD (GFR = 30-44 mL/min/1 73 square meters)    Stage 4 Severe CKD (GFR = 15-29 mL/min/1 73 square meters)    Stage 5 End Stage CKD (GFR <15 mL/min/1 73 square meters)  Note: GFR calculation is accurate only with a steady state creatinine    Troponin I [[id number]]  (Normal) Collected:  08/16/20 1543    Lab Status:  Final result Specimen:  Blood from Arm, Right Updated:  08/16/20 1614     Troponin I <0 03 ng/mL     CBC and differential [[id number]]  (Abnormal) Collected:  08/16/20 1543    Lab Status:  Final result Specimen:  Blood from Arm, Right Updated:  08/16/20 1558     WBC 7 70 Thousand/uL      RBC 4 14 Million/uL      Hemoglobin 12 6 g/dL      Hematocrit 37 2 %      MCV 90 fL      MCH 30 5 pg      MCHC 33 9 g/dL      RDW 15 9 %      MPV 7 8 fL      Platelets 567 Thousands/uL      Neutrophils Relative 74 %      Lymphocytes Relative 14 %      Monocytes Relative 7 %      Eosinophils Relative 3 %      Basophils Relative 1 %      Neutrophils Absolute 5 80 Thousands/µL      Lymphocytes Absolute 1 10 Thousands/µL      Monocytes Absolute 0 50 Thousand/µL      Eosinophils Absolute 0 20 Thousand/µL      Basophils Absolute 0 10 Thousands/µL                  CT chest abdomen pelvis wo contrast   Final Result by Edouard Clay MD (08/16 1753)         1  No acute visceral injury in the chest, abdomen, pelvis accounting for limitations of a nonenhanced study  2   Several healing left-sided rib fractures with no acute osseous injuries  3   Chronic small right pleural effusion  Workstation performed: [id number]         XR chest 1 view portable   Final Result by Janice Mckinley MD (08/16 1616)      No acute cardiopulmonary disease  No displaced right rib fractures with no pneumothorax              Workstation performed: [id number]                    Procedures  Procedures         ED Course  ED Course as of Aug 17 0117   Ana Theodore Aug 16, 2020   1623 Troponin I: <0 03   1914 Baseline tachycardia    Pulse(!): 106               MDM  81-year-old female who presents to the ER for evaluation of chest wall pain in the setting of a fall  The patient had negative troponin, no acute change to her EKG  The patient had a x-ray which showed no acute fracture, moved 100 chest CT, given her fall and severe pain  CT scan showed no acute pathology  The patient required continuous pain control for her chest pain  Given her recent fall with rib fractures on her left side, and superimposed pneumonia, the patient will be admitted for pain control  The case was discussed with Trauma, who states the patient was stable for Medicine admission  The patient was admitted to Medicine for for the management clean pain control and incentive spirometry  Disposition  Final diagnoses:   Chest wall pain   Fall, initial encounter     Time reflects when diagnosis was documented in both MDM as applicable and the Disposition within this note     Time User Action Codes Description Comment    8/17/2020  1:17 AM Janece Hashimoto Add [R07 89] Chest wall pain     8/17/2020  1:17 AM Janece Hashimoto Add [W19  Poly Dotson, initial encounter       ED Disposition     ED Disposition Condition Date/Time Comment    Transfer to Another Facility-In [facility] Aug 16, 2020  6:12 PM Meganwill Song should be transferred out to [facility]          MD Documentation      Most Recent Value   Patient Condition  The patient has been stabilized such that within reasonable medical probability, no material deterioration of the patient condition or the condition of the unborn child(valentin) is likely to result from the transfer   Reason for Transfer  Level of Care needed not available at this facility   Benefits of Transfer  Specialized equipment and/or services available at the receiving facility (Include comment)________________________ [out of beds at [address]   Risks of Transfer  Potential for delay in receiving treatment, Potential deterioration of medical condition, Increased discomfort during transfer, Possible worsening of condition or death during transfer, Loss of IV   Accepting Physician  [address] [facility] [address] Name, [place] Edmondson   Sending MD  Alexy Rao MD   Provider Certification  General risk, such as traffic hazards, adverse weather conditions, rough terrain or turbulence, possible failure of equipment (including vehicle or aircraft), or consequences of actions of persons outside the control of the transport personnel, Unanticipated needs of medical equipment and personnel during transport, Risk of worsening condition, The possibility of a transport vehicle being unavailable      RN Documentation      Most [facility] Name, Richmond Edmondson      Follow-up Information    None         Discharge Medication List as of 8/16/2020 10:09 PM      CONTINUE these medications which have NOT CHANGED    Details   acetaminophen (TYLENOL) 325 mg tablet Take 3 tablets (975 mg total) by mouth every 8 (eight) hours, Starting Sat 6/27/2020, No Print      ALPRAZolam (XANAX) 0 25 mg tablet Take 0 25 mg by mouth daily at bedtime as needed for anxiety, Historical Med      apixaban (ELIQUIS) 2 5 mg Take 1 tablet (2 5 mg total) by mouth 2 (two) times a day, Starting Thu 8/8/2019, Normal      bisacodyl (DULCOLAX) 5 mg EC tablet Take 1 tablet (5 mg total) by mouth daily as needed for constipation, Starting Sat 6/27/2020, No Print      diltiazem (CARDIZEM) 90 mg tablet Take 1 tablet (90 mg total) by mouth every 8 (eight) hours, Starting Mon 7/20/2020, Normal      furosemide (LASIX) 40 mg tablet Take 1 tablet (40 mg total) by mouth daily as needed (Only take for morning weight more than 125 pounds ), Starting Wed 6/17/2020, No Print      lidocaine (LIDODERM) 5 % Apply 1 patch topically daily Remove & Discard patch within 12 hours or as directed by MD, Starting Sun 6/28/2020, No Print      metoprolol tartrate (LOPRESSOR) 25 mg tablet Take 1 tablet (25 mg total) by mouth 2 (two) times a day, Starting Thu 3/5/2020, Until Wed 6/17/2020, Normal      oxybutynin (DITROPAN) 5 mg tablet Take 5 mg by mouth daily, Historical Med      PANTOPRAZOLE SODIUM PO Take by mouth daily, Historical Med      polyethylene glycol (MIRALAX) 17 g packet Take 17 g by mouth daily, Starting Sat 6/27/2020, No Print      potassium chloride (K-DUR,KLOR-CON) 20 mEq tablet Take 1 tablet (20 mEq total) by mouth daily as needed (Only take for morning weight more than 125 pounds ), Starting Wed 6/17/2020, Until Fri 7/17/2020, No Print      pravastatin (PRAVACHOL) 40 mg tablet Take 1 tablet (40 mg total) by mouth daily with dinner, Starting Fri 7/12/2019, Print      sertraline (ZOLOFT) 50 mg tablet Take 50 mg by mouth daily, Historical Med      hydrocortisone 1 % cream Apply to affected area 2 times daily, Print      hydrOXYzine HCL (ATARAX) 25 mg tablet Take 1 tablet (25 mg total) by mouth every 6 (six) hours, Starting Fri 10/18/2019, Print      magnesium oxide (MAG-OX) 400 mg Take 1 tablet (400 mg total) by mouth 3 (three) times a week, Starting Wed 9/4/2019, Print      meclizine (ANTIVERT) 25 mg tablet Take 1 tablet (25 mg total) by mouth every 8 (eight) hours as needed for dizziness, Starting Sat 6/27/2020, No Print      senna-docusate sodium (SENOKOT S) 8 6-50 mg per tablet Take 1 tablet by mouth daily at bedtime, Starting Sat 6/27/2020, No Print           No discharge procedures on file      PDMP Review       Value Time User    PDMP Reviewed  Yes 3/5/2020  1:47 PM [facility] Provider  Electronically Signed by           Jaylen Hurtado MD  08/17/20 7095

## 2020-08-16 NOTE — EMTALA/ACUTE CARE TRANSFER
[facility]  [address] [id number] [phone]  Dept: [phone]      [address] TRANSFER CONSENT    NAME Tika MORRIS 1935                              MRN [id number]    I have been informed of my rights regarding examination, treatment, and transfer   by Dr Cathy Terry MD    Benefits: Specialized equipment and/or services available at the receiving facility (Include comment)________________________(out of beds at [facility]    Risks: Potential for delay in receiving treatment, Potential deterioration of medical condition, Increased discomfort during transfer, Possible worsening of condition or death during transfer, Loss of IV      Consent for Transfer:  I acknowledge that my medical condition has been evaluated and explained to me by the emergency department physician or other qualified medical person and/or my attending physician, who has recommended that I be transferred to the service of  Accepting Physician: [place] at [address] [place] [address] Name, [address] : General acute hospital  The above potential benefits of such transfer, the potential risks associated with such transfer, and the probable risks of not being transferred have been explained to me, and I fully understand them  The doctor has explained that, in my case, the benefits of transfer outweigh the risks  I agree to be transferred  I authorize the performance of emergency medical procedures and treatments upon me in both transit and upon arrival at the receiving facility  Additionally, I authorize the release of any and all medical records to the receiving facility and request they be transported with me, if possible  I understand that the safest mode of transportation during a medical emergency is an ambulance and that the Hospital advocates the use of this mode of transport   Risks of traveling to the receiving facility by car, including absence of medical control, life sustaining equipment, such as oxygen, and medical personnel has been explained to me and I fully understand them  (RANDALL CORRECT BOX BELOW)  [  ]  I consent to the stated transfer and to be transported by ambulance/helicopter  [  ]  I consent to the stated transfer, but refuse transportation by ambulance and accept full responsibility for my transportation by car  I understand the risks of non-ambulance transfers and I exonerate the Hospital and its staff from any deterioration in my condition that results from this refusal     X___________________________________________    DATE  20  TIME________  Signature of patient or legally responsible individual signing on patient behalf           RELATIONSHIP TO PATIENT_________________________          Provider Certification    NAME Maria G Trinh                                        [facility] 1935                              MRN [id number]    A medical screening exam was performed on the above named patient  Based on the examination:    Condition Necessitating Transfer There were no encounter diagnoses      Patient Condition: The patient has been stabilized such that within reasonable medical probability, no material deterioration of the patient condition or the condition of the unborn child(valentin) is likely to result from the transfer    Reason for Transfer: Level of Care needed not available at this facility    Transfer Requirements: [facility]   · Space available and qualified personnel available for treatment as acknowledged by    · Agreed to accept transfer and to provide appropriate medical treatment as acknowledged by       [facility]  · Appropriate medical records of the examination and treatment of the patient are provided at the time of transfer   [address] _______  · Transfer will be performed by qualified personnel from    and appropriate transfer equipment as required, including the use of necessary and appropriate life support measures  Provider Certification: I have examined the patient and explained the following risks and benefits of being transferred/refusing transfer to the patient/family:  General risk, such as traffic hazards, adverse weather conditions, rough terrain or turbulence, possible failure of equipment (including vehicle or aircraft), or consequences of actions of persons outside the control of the transport personnel, Unanticipated needs of medical equipment and personnel during transport, Risk of worsening condition, The possibility of a transport vehicle being unavailable      Based on these reasonable risks and benefits to the patient and/or the unborn child(valentin), and based upon the information available at the time of the patients examination, I certify that the medical benefits reasonably to be expected from the provision of appropriate medical treatments at another medical facility outweigh the increasing risks, if any, to the individuals medical condition, and in the case of labor to the unborn child, from effecting the transfer      X____________________________________________ DATE 08/16/20        TIME_______      ORIGINAL - SEND TO MEDICAL RECORDS   COPY - SEND WITH PATIENT DURING TRANSFER

## 2020-08-17 ENCOUNTER — APPOINTMENT (INPATIENT)
Dept: CT IMAGING | Facility: HOSPITAL | Age: 85
DRG: 605 | End: 2020-08-17
Payer: MEDICARE

## 2020-08-17 ENCOUNTER — APPOINTMENT (INPATIENT)
Dept: NON INVASIVE DIAGNOSTICS | Facility: HOSPITAL | Age: 85
DRG: 605 | End: 2020-08-17
Payer: MEDICARE

## 2020-08-17 PROBLEM — S20.211D: Status: ACTIVE | Noted: 2020-08-17

## 2020-08-17 PROBLEM — S22.49XA RIB FRACTURES: Status: ACTIVE | Noted: 2020-08-17

## 2020-08-17 PROBLEM — N18.2 CKD (CHRONIC KIDNEY DISEASE), STAGE II: Status: ACTIVE | Noted: 2020-08-17

## 2020-08-17 PROBLEM — E87.6 HYPOKALEMIA: Status: ACTIVE | Noted: 2020-08-17

## 2020-08-17 PROBLEM — K76.9 LIVER LESION: Status: ACTIVE | Noted: 2020-08-17

## 2020-08-17 PROBLEM — S20.211A CHEST WALL CONTUSION, RIGHT, INITIAL ENCOUNTER: Status: ACTIVE | Noted: 2020-08-17

## 2020-08-17 PROBLEM — R26.2 AMBULATORY DYSFUNCTION: Chronic | Status: ACTIVE | Noted: 2020-08-17

## 2020-08-17 PROBLEM — I27.20 PULMONARY HYPERTENSION (HCC): Status: ACTIVE | Noted: 2020-08-17

## 2020-08-17 LAB
ALBUMIN SERPL BCP-MCNC: 3.5 G/DL (ref 3.5–5)
ALP SERPL-CCNC: 73 U/L (ref 46–116)
ALT SERPL W P-5'-P-CCNC: 16 U/L (ref 12–78)
ANION GAP SERPL CALCULATED.3IONS-SCNC: 10 MMOL/L (ref 4–13)
AST SERPL W P-5'-P-CCNC: 20 U/L (ref 5–45)
ATRIAL RATE: 82 BPM
BASOPHILS # BLD AUTO: 0.09 THOUSANDS/ΜL (ref 0–0.1)
BASOPHILS NFR BLD AUTO: 1 % (ref 0–1)
BILIRUB SERPL-MCNC: 1.1 MG/DL (ref 0.2–1)
BUN SERPL-MCNC: 16 MG/DL (ref 5–25)
CALCIUM SERPL-MCNC: 9.6 MG/DL (ref 8.3–10.1)
CHLORIDE SERPL-SCNC: 103 MMOL/L (ref 100–108)
CO2 SERPL-SCNC: 27 MMOL/L (ref 21–32)
CREAT SERPL-MCNC: 0.73 MG/DL (ref 0.6–1.3)
EOSINOPHIL # BLD AUTO: 0.23 THOUSAND/ΜL (ref 0–0.61)
EOSINOPHIL NFR BLD AUTO: 3 % (ref 0–6)
ERYTHROCYTE [DISTWIDTH] IN BLOOD BY AUTOMATED COUNT: 15.4 % (ref 11.6–15.1)
GFR SERPL CREATININE-BSD FRML MDRD: 76 ML/MIN/1.73SQ M
GLUCOSE SERPL-MCNC: 74 MG/DL (ref 65–140)
HCT VFR BLD AUTO: 40.4 % (ref 34.8–46.1)
HGB BLD-MCNC: 12.9 G/DL (ref 11.5–15.4)
IMM GRANULOCYTES # BLD AUTO: 0.04 THOUSAND/UL (ref 0–0.2)
IMM GRANULOCYTES NFR BLD AUTO: 1 % (ref 0–2)
LYMPHOCYTES # BLD AUTO: 1.37 THOUSANDS/ΜL (ref 0.6–4.47)
LYMPHOCYTES NFR BLD AUTO: 16 % (ref 14–44)
MAGNESIUM SERPL-MCNC: 2 MG/DL (ref 1.6–2.6)
MCH RBC QN AUTO: 30 PG (ref 26.8–34.3)
MCHC RBC AUTO-ENTMCNC: 31.9 G/DL (ref 31.4–37.4)
MCV RBC AUTO: 94 FL (ref 82–98)
MONOCYTES # BLD AUTO: 0.73 THOUSAND/ΜL (ref 0.17–1.22)
MONOCYTES NFR BLD AUTO: 8 % (ref 4–12)
NEUTROPHILS # BLD AUTO: 6.36 THOUSANDS/ΜL (ref 1.85–7.62)
NEUTS SEG NFR BLD AUTO: 71 % (ref 43–75)
NRBC BLD AUTO-RTO: 0 /100 WBCS
PHOSPHATE SERPL-MCNC: 4.1 MG/DL (ref 2.3–4.1)
PLATELET # BLD AUTO: 215 THOUSANDS/UL (ref 149–390)
PMV BLD AUTO: 10.1 FL (ref 8.9–12.7)
POTASSIUM SERPL-SCNC: 3.4 MMOL/L (ref 3.5–5.3)
PROT SERPL-MCNC: 7.6 G/DL (ref 6.4–8.2)
QRS AXIS: -39 DEGREES
QRSD INTERVAL: 88 MS
QT INTERVAL: 430 MS
QTC INTERVAL: 450 MS
RBC # BLD AUTO: 4.3 MILLION/UL (ref 3.81–5.12)
SARS-COV-2 RNA RESP QL NAA+PROBE: NEGATIVE
SODIUM SERPL-SCNC: 140 MMOL/L (ref 136–145)
T WAVE AXIS: 47 DEGREES
VENTRICULAR RATE: 66 BPM
WBC # BLD AUTO: 8.82 THOUSAND/UL (ref 4.31–10.16)

## 2020-08-17 PROCEDURE — G1004 CDSM NDSC: HCPCS

## 2020-08-17 PROCEDURE — 84100 ASSAY OF PHOSPHORUS: CPT | Performed by: FAMILY MEDICINE

## 2020-08-17 PROCEDURE — 83735 ASSAY OF MAGNESIUM: CPT | Performed by: FAMILY MEDICINE

## 2020-08-17 PROCEDURE — 85025 COMPLETE CBC W/AUTO DIFF WBC: CPT | Performed by: FAMILY MEDICINE

## 2020-08-17 PROCEDURE — 74177 CT ABD & PELVIS W/CONTRAST: CPT

## 2020-08-17 PROCEDURE — 97163 PT EVAL HIGH COMPLEX 45 MIN: CPT

## 2020-08-17 PROCEDURE — 87635 SARS-COV-2 COVID-19 AMP PRB: CPT | Performed by: INTERNAL MEDICINE

## 2020-08-17 PROCEDURE — 97167 OT EVAL HIGH COMPLEX 60 MIN: CPT

## 2020-08-17 PROCEDURE — 80053 COMPREHEN METABOLIC PANEL: CPT | Performed by: FAMILY MEDICINE

## 2020-08-17 PROCEDURE — 93306 TTE W/DOPPLER COMPLETE: CPT | Performed by: INTERNAL MEDICINE

## 2020-08-17 PROCEDURE — 99222 1ST HOSP IP/OBS MODERATE 55: CPT | Performed by: FAMILY MEDICINE

## 2020-08-17 PROCEDURE — 93306 TTE W/DOPPLER COMPLETE: CPT

## 2020-08-17 PROCEDURE — 70450 CT HEAD/BRAIN W/O DYE: CPT

## 2020-08-17 PROCEDURE — 93010 ELECTROCARDIOGRAM REPORT: CPT | Performed by: INTERNAL MEDICINE

## 2020-08-17 PROCEDURE — 71260 CT THORAX DX C+: CPT

## 2020-08-17 PROCEDURE — 87081 CULTURE SCREEN ONLY: CPT | Performed by: FAMILY MEDICINE

## 2020-08-17 RX ORDER — POTASSIUM CHLORIDE 20 MEQ/1
40 TABLET, EXTENDED RELEASE ORAL ONCE
Status: COMPLETED | OUTPATIENT
Start: 2020-08-17 | End: 2020-08-17

## 2020-08-17 RX ORDER — LIDOCAINE 50 MG/G
1 PATCH TOPICAL DAILY
Status: DISCONTINUED | OUTPATIENT
Start: 2020-08-17 | End: 2020-08-21 | Stop reason: HOSPADM

## 2020-08-17 RX ORDER — DIPHENHYDRAMINE HYDROCHLORIDE 50 MG/ML
50 INJECTION INTRAMUSCULAR; INTRAVENOUS ONCE
Status: COMPLETED | OUTPATIENT
Start: 2020-08-17 | End: 2020-08-17

## 2020-08-17 RX ORDER — OXYBUTYNIN CHLORIDE 5 MG/1
5 TABLET ORAL DAILY
Status: DISCONTINUED | OUTPATIENT
Start: 2020-08-17 | End: 2020-08-21 | Stop reason: HOSPADM

## 2020-08-17 RX ORDER — POLYETHYLENE GLYCOL 3350 17 G/17G
17 POWDER, FOR SOLUTION ORAL DAILY
Status: DISCONTINUED | OUTPATIENT
Start: 2020-08-17 | End: 2020-08-21 | Stop reason: HOSPADM

## 2020-08-17 RX ORDER — OXYCODONE HYDROCHLORIDE 5 MG/1
5 TABLET ORAL EVERY 4 HOURS PRN
Status: DISCONTINUED | OUTPATIENT
Start: 2020-08-17 | End: 2020-08-18

## 2020-08-17 RX ORDER — DILTIAZEM HCL 90 MG
90 TABLET ORAL EVERY 8 HOURS SCHEDULED
Status: DISCONTINUED | OUTPATIENT
Start: 2020-08-17 | End: 2020-08-21 | Stop reason: HOSPADM

## 2020-08-17 RX ORDER — ONDANSETRON 2 MG/ML
4 INJECTION INTRAMUSCULAR; INTRAVENOUS EVERY 4 HOURS PRN
Status: DISCONTINUED | OUTPATIENT
Start: 2020-08-17 | End: 2020-08-21 | Stop reason: HOSPADM

## 2020-08-17 RX ORDER — PRAVASTATIN SODIUM 40 MG
40 TABLET ORAL
Status: DISCONTINUED | OUTPATIENT
Start: 2020-08-17 | End: 2020-08-21 | Stop reason: HOSPADM

## 2020-08-17 RX ORDER — METOPROLOL TARTRATE 5 MG/5ML
5 INJECTION INTRAVENOUS EVERY 6 HOURS PRN
Status: DISCONTINUED | OUTPATIENT
Start: 2020-08-17 | End: 2020-08-21 | Stop reason: HOSPADM

## 2020-08-17 RX ORDER — ALPRAZOLAM 0.25 MG/1
0.25 TABLET ORAL
Status: DISCONTINUED | OUTPATIENT
Start: 2020-08-17 | End: 2020-08-21 | Stop reason: HOSPADM

## 2020-08-17 RX ORDER — POTASSIUM CHLORIDE 20 MEQ/1
20 TABLET, EXTENDED RELEASE ORAL DAILY
Status: DISCONTINUED | OUTPATIENT
Start: 2020-08-17 | End: 2020-08-17

## 2020-08-17 RX ORDER — HYDROMORPHONE HCL/PF 1 MG/ML
0.5 SYRINGE (ML) INJECTION EVERY 4 HOURS PRN
Status: DISCONTINUED | OUTPATIENT
Start: 2020-08-17 | End: 2020-08-21 | Stop reason: HOSPADM

## 2020-08-17 RX ORDER — SODIUM CHLORIDE 9 MG/ML
75 INJECTION, SOLUTION INTRAVENOUS CONTINUOUS
Status: DISCONTINUED | OUTPATIENT
Start: 2020-08-17 | End: 2020-08-18

## 2020-08-17 RX ORDER — PANTOPRAZOLE SODIUM 40 MG/1
40 TABLET, DELAYED RELEASE ORAL
Status: DISCONTINUED | OUTPATIENT
Start: 2020-08-17 | End: 2020-08-21 | Stop reason: HOSPADM

## 2020-08-17 RX ORDER — FUROSEMIDE 40 MG/1
40 TABLET ORAL DAILY PRN
Status: DISCONTINUED | OUTPATIENT
Start: 2020-08-17 | End: 2020-08-17

## 2020-08-17 RX ORDER — MAGNESIUM HYDROXIDE/ALUMINUM HYDROXICE/SIMETHICONE 120; 1200; 1200 MG/30ML; MG/30ML; MG/30ML
30 SUSPENSION ORAL EVERY 6 HOURS PRN
Status: DISCONTINUED | OUTPATIENT
Start: 2020-08-17 | End: 2020-08-21 | Stop reason: HOSPADM

## 2020-08-17 RX ADMIN — DILTIAZEM HYDROCHLORIDE 90 MG: 90 TABLET, FILM COATED ORAL at 21:28

## 2020-08-17 RX ADMIN — DILTIAZEM HYDROCHLORIDE 90 MG: 90 TABLET, FILM COATED ORAL at 14:26

## 2020-08-17 RX ADMIN — METOPROLOL TARTRATE 25 MG: 25 TABLET, FILM COATED ORAL at 17:17

## 2020-08-17 RX ADMIN — OXYCODONE HYDROCHLORIDE 5 MG: 5 TABLET ORAL at 17:30

## 2020-08-17 RX ADMIN — SODIUM CHLORIDE 75 ML/HR: 0.9 INJECTION, SOLUTION INTRAVENOUS at 09:26

## 2020-08-17 RX ADMIN — POLYETHYLENE GLYCOL 3350 17 G: 17 POWDER, FOR SOLUTION ORAL at 08:37

## 2020-08-17 RX ADMIN — SERTRALINE HYDROCHLORIDE 50 MG: 50 TABLET ORAL at 08:37

## 2020-08-17 RX ADMIN — HYDROCORTISONE SODIUM SUCCINATE 200 MG: 100 INJECTION, POWDER, FOR SOLUTION INTRAMUSCULAR; INTRAVENOUS at 09:19

## 2020-08-17 RX ADMIN — PRAVASTATIN SODIUM 40 MG: 40 TABLET ORAL at 17:17

## 2020-08-17 RX ADMIN — PANTOPRAZOLE SODIUM 40 MG: 40 TABLET, DELAYED RELEASE ORAL at 05:52

## 2020-08-17 RX ADMIN — LIDOCAINE 5% 1 PATCH: 700 PATCH TOPICAL at 08:37

## 2020-08-17 RX ADMIN — POTASSIUM CHLORIDE 40 MEQ: 20 TABLET, EXTENDED RELEASE ORAL at 08:36

## 2020-08-17 RX ADMIN — DIPHENHYDRAMINE HYDROCHLORIDE 50 MG: 50 INJECTION, SOLUTION INTRAMUSCULAR; INTRAVENOUS at 11:49

## 2020-08-17 RX ADMIN — METOPROLOL TARTRATE 25 MG: 25 TABLET, FILM COATED ORAL at 08:36

## 2020-08-17 RX ADMIN — IOHEXOL 100 ML: 350 INJECTION, SOLUTION INTRAVENOUS at 13:50

## 2020-08-17 RX ADMIN — OXYCODONE HYDROCHLORIDE 5 MG: 5 TABLET ORAL at 11:49

## 2020-08-17 RX ADMIN — APIXABAN 2.5 MG: 2.5 TABLET, FILM COATED ORAL at 08:37

## 2020-08-17 RX ADMIN — DILTIAZEM HYDROCHLORIDE 90 MG: 90 TABLET, FILM COATED ORAL at 05:52

## 2020-08-17 RX ADMIN — OXYBUTYNIN CHLORIDE 5 MG: 5 TABLET ORAL at 08:37

## 2020-08-17 RX ADMIN — HYDROMORPHONE HYDROCHLORIDE 0.5 MG: 1 INJECTION, SOLUTION INTRAMUSCULAR; INTRAVENOUS; SUBCUTANEOUS at 14:15

## 2020-08-17 NOTE — QUICK NOTE
Check a trauma scan, CT scan of the chest/abdomen/pelvis with IV contrast   The patient will require a medication prep for her CT contrast dye allergy  Check a CT scan of the head without contrast   Hold eliquis for now  Utilize lovenox 40 mg SQ every 24 hours starting on 08/18/2020 if there is no sign of hemorrhage  Check an MRI of the liver to further evaluation of the liver lesions observed on CT scan  Check an AFP-tumor marker  Pain control  Utilize incentive spirometry 10 times per hour while awake  Consult PT/OT  The patient will require short-term rehabilitation placement upon discharge

## 2020-08-17 NOTE — H&P
H&P Exam - Mike Lamas 81 y o  female MRN: [id number]    Unit/Bed#: 373-10 Encounter: [id number]      Assessment/Plan       * Chest wall contusion, right, initial encounter  Assessment & Plan  Patient presented to ER at [facility] for right chest wall discomfort following fall at home 2 days ago  She reportedly tripped while walking in her bedroom and fell onto the floor  She was using a walker at the time  She had a similar fall in June, sustaining nondisplaced left rib fractures, and subsequently went to post-acute rehab facility for approximately 3 weeks  Physical exam shows prominent ecchymosis on upper right chest   Patient taking relatively shallow breaths due to discomfort  No new fractures or other significant injury on chest CT  Will admit for clinical monitoring  Consider post-acute rehab care  Ambulatory dysfunction  Assessment & Plan  Patient has longstanding multifactorial ambulatory dysfunction, primarily due to physical deconditioning  She normally ambulates with walker at home  Will consult PT/OT for evaluation, treatment, and recommendations regarding post-acute care  Chronic diastolic heart failure [facility])  Assessment & Plan  Wt Readings from Last 3 Encounters:   08/16/20 59 kg (130 lb 1 1 oz)   06/27/20 60 kg (132 lb 4 4 oz)   06/26/20 58 3 kg (128 lb 8 5 oz)       Chronic stable condition  No evidence of acute exacerbation  Continue outpatient medication regimen  Monitor I/O and daily weight  Longstanding persistent atrial fibrillation [facility])  Assessment & Plan  Chronic condition  Heart rate currently normal; controlled with metoprolol  Patient currently on Eliquis for anticoagulation  Continue outpatient medication regimen, but strongly consider stopping Eliquis based on recurrent history of falls  History of Present Illness   HPI:  Mike Lamas is a 81 y o  female who presents for right chest wall discomfort following fall at home 2 days ago    She reportedly tripped while walking in her bedroom and fell onto the floor  She was using a walker at the time  She had a similar fall in June, sustaining nondisplaced left rib fractures, and subsequently went to post-acute rehab facility for approximately 3 weeks  PCP: Tamra Wilcox MD    Review of Systems   Constitutional: Negative for chills, diaphoresis and fever  Respiratory: Negative for cough and shortness of breath  Cardiovascular: Negative for palpitations and leg swelling  Chest wall pain   All other systems reviewed and are negative  Historical Information   Past Medical History:   Diagnosis Date    Acute on chronic diastolic congestive heart failure ([facility] [address] ) 6/27/2019    Arthritis     Chest wall contusion, right, subsequent encounter 8/17/2020    Chronic diastolic heart failure (HCC)     Chronic kidney disease, stage 2 (mild)     Coronary artery disease     history of stenting    Eczema     years    Edema     History of echocardiogram 07/20/2017    EF 55%, mild concentric LVH, bileaflet MVP with moderate MR, left atrial enlargement   History of transfusion     Hyperlipidemia     Hypertension     Hypo-osmolality and hyponatremia     Hypokalemia 7/11/2019    Mitral regurgitation      Past Surgical History:   Procedure Laterality Date    ABDOMINAL SURGERY      hysterectomy    CARDIAC CATHETERIZATION  04/10/2012    EF 65%, no significant CAD, patent stents, severe MR     CORONARY ANGIOPLASTY WITH STENT PLACEMENT  03/21/2007    EF 55%, GARRET to LAD      EYE SURGERY      b/l cataracts    HYSTERECTOMY      JOINT REPLACEMENT      Rt knee     Social History   Social History     Substance and Sexual Activity   Alcohol Use Never    Alcohol/week: 0 0 standard drinks    Frequency: Never    Drinks per session: Patient refused    Binge frequency: Never     Social History     Substance and Sexual Activity   Drug Use Never     Social History     Tobacco Use   Smoking Status Never Smoker   Smokeless Tobacco Never Used     E-Cigarette/Vaping    E-Cigarette Use Never User       E-Cigarette/Vaping Substances     Family History: non-contributory    Meds/Allergies   all medications and allergies reviewed  Allergies   Allergen Reactions    Chocolate Flavor Itching    Shellfish-Derived Products Shortness Of Breath    Iodine Other (See Comments)     shellfish- difficulty breathing    Codeine Rash       Objective   Vitals: Blood pressure 149/77, pulse (!) 128, temperature 98 3 °F (36 8 °C), temperature source Oral, resp  rate 18, height 4' 11" (1 499 m), weight 59 kg (130 lb 1 1 oz), SpO2 93 %  No intake or output data in the 24 hours ending 08/17/20 0120    Invasive Devices     None                 Physical Exam  Vitals signs and nursing note reviewed  Constitutional:       General: She is not in acute distress  Appearance: She is well-developed  She is not diaphoretic  Comments: Frail elderly female lying in hospital bed  HENT:      Head: Normocephalic and atraumatic  Right Ear: External ear normal       Left Ear: External ear normal       Nose: Nose normal    Eyes:      General: No scleral icterus  Right eye: No discharge  Left eye: No discharge  Conjunctiva/sclera: Conjunctivae normal       Pupils: Pupils are equal, round, and reactive to light  Neck:      Thyroid: No thyromegaly  Vascular: No JVD  Trachea: No tracheal deviation  Cardiovascular:      Rate and Rhythm: Normal rate and regular rhythm  Heart sounds: Normal heart sounds  No murmur  No friction rub  No gallop  Pulmonary:      Effort: Pulmonary effort is normal  No respiratory distress  Breath sounds: Normal breath sounds  No stridor  No wheezing or rales  Chest:      Chest wall: No tenderness  Abdominal:      General: Bowel sounds are normal  There is no distension  Palpations: Abdomen is soft  There is no mass  Tenderness:  There is no abdominal tenderness  There is no guarding or rebound  Musculoskeletal:         General: No tenderness or deformity  Lymphadenopathy:      Cervical: No cervical adenopathy  Skin:     General: Skin is warm and dry  Capillary Refill: Capillary refill takes less than 2 seconds  Coloration: Skin is not pale  Findings: Bruising present  No erythema or rash  Comments: Bruising on right upper chest; see media tab for associated photo  Neurological:      Mental Status: She is alert and oriented to person, place, and time  Psychiatric:         Behavior: Behavior normal          Thought Content: Thought content normal          Judgment: Judgment normal          Lab Results: I have personally reviewed pertinent reports  Imaging: I have personally reviewed pertinent reports  and I have personally reviewed pertinent films in PACS  EKG, Pathology, and Other Studies: I have personally reviewed pertinent reports  Code Status: Level 1 - Full Code  Advance Directive and Living Will:      Power of :    POLST:      Counseling / Coordination of Care  Total floor / unit time spent today 45 minutes  Greater than 50% of total time was spent with the patient and / or family counseling and / or coordination of care  A description of the counseling / coordination of care:  Greater than 25 minutes spent with this patient discussing diagnosis, prognosis, and plan of care

## 2020-08-17 NOTE — PHYSICAL THERAPY NOTE
Physical Therapy Evaluation    Patient Name: Maria G Trinh    [id number]'[id number] Date: 8/17/2020     Problem List  Principal Problem:    Chest wall contusion, right, initial encounter  Active Problems:    Longstanding persistent atrial fibrillation ([facility] [address] )    Chronic diastolic heart failure ([facility] [address] )    Ambulatory dysfunction    Hypokalemia       Past Medical History  Past Medical History:   Diagnosis Date    Acute on chronic diastolic congestive heart failure ([facility] [address] ) 6/27/2019    Ambulatory dysfunction 8/17/2020    Arthritis     Chest wall contusion, right, initial encounter 8/17/2020    Chest wall contusion, right, subsequent encounter 8/17/2020    Chronic diastolic heart failure (HCC)     Chronic kidney disease, stage 2 (mild)     Coronary artery disease     history of stenting    Eczema     years    Edema     History of echocardiogram 07/20/2017    EF 55%, mild concentric LVH, bileaflet MVP with moderate MR, left atrial enlargement   History of transfusion     Hyperlipidemia     Hypertension     Hypo-osmolality and hyponatremia     Hypokalemia 7/11/2019    Mitral regurgitation         Past Surgical History  Past Surgical History:   Procedure Laterality Date    ABDOMINAL SURGERY      hysterectomy    CARDIAC CATHETERIZATION  04/10/2012    EF 65%, no significant CAD, patent stents, severe MR     CORONARY ANGIOPLASTY WITH STENT PLACEMENT  03/21/2007    EF 55%, GARRET to LAD   EYE SURGERY      b/l cataracts    HYSTERECTOMY      JOINT REPLACEMENT      Rt knee           08/17/20 6320   Note Type   Note type Eval/Treat   Pain Assessment   Pain Assessment Tool 0-10   Pain Score 7   Pain Location/Orientation Orientation: Left;Orientation: Lower; Location: Back; Location: Chest   Home Living   Type of [address] [place] [address]; Able to live on main level with bedroom/bathroom; Ramped entrance   Bathroom Shower/Tub Tub/shower unit   [employer] Standard   Bathroom Equipment Grab bars in shower; Shower chair;Grab bars around toilet   [address] Other (Comment)  (2SL)   Additional Comments pt reports use of 4WW at baseline   Prior Function   Level of [place] Independent with ADLs and functional mobility; Needs assistance with IADLs   Lives With Son   Receives Help From Family   ADL Assistance Independent   IADLs Needs assistance   Falls in the last 6 months 1 to 4   Vocational Retired   Comments son drives   Restrictions/Precautions   [place] Bearing Precautions Per Order No   Other Precautions Fall Risk;Pain;O2;Bed Alarm; Chair Alarm   General   Family/Caregiver Present No   Cognition   Overall Cognitive Status WFL   Orientation Level Oriented X4   Following Commands Follows one step commands without difficulty   RLE Assessment   RLE Assessment X  (4-/5 grossly)   LLE Assessment   LLE Assessment X  (4-/5 grossly)   Bed Mobility   Supine to Sit 3  Moderate assistance   Additional items Assist x 1; Increased time required;Verbal cues   Sit to Supine   (pt seated in recliner at end of session)   Transfers   Sit to Stand 4  Minimal assistance   Additional items Assist x 1; Increased time required;Verbal cues   Stand to Sit 4  Minimal assistance   Additional items Assist x 1; Increased time required;Verbal cues   Additional Comments RW used   Ambulation/Elevation   Gait pattern Excessively slow; Short stride; Foward flexed;Decreased foot clearance; Improper Weight shift   Gait Assistance 4  Minimal assist   Additional items Assist x 1;Verbal cues   Assistive Device Rolling walker   Distance 3'   Stair Management Assistance Not tested   Balance   Static Sitting Good   Dynamic Sitting Good   Static Standing Fair   Dynamic Standing [id number]  (with RW)   Endurance Deficit   Endurance Deficit Yes   Endurance Deficit Description pt poorly tolerating ambulation d/t pain   Activity Tolerance   Activity Tolerance Patient limited by pain; Patient limited by fatigue   Assessment   Prognosis Good   Problem List Decreased strength;Decreased endurance; Impaired balance;Decreased mobility;Pain;Decreased safety awareness   Assessment Patient is a 81 y o  female evaluated by Physical Therapy s/p admit to [facility] on 8/16/2020 with admitting diagnosis of: Chest wall pain, Fall at home, and principal problem of: Chest wall contusion, right, initial encounter  PT was consulted to assess patient's functional mobility and discharge needs  Ordered are PT Evaluation and treatment with activity level of: up with assistance  Comorbidities affecting patient's physical performance at time of assessment include: a-fib, chronic diastolic heart failure, ambulatory dysfunction, HTN, CAD  Personal factors affecting the patient at time of IE include: lives in multi story home, ambulating with assistive device, inability to navigate community distances, history of fall(s), decreased initiation and engagement, inability/difficulty performing IADLs and inability/difficulty performing ADLs  Please locate objective findings from PT assessment regarding body systems outlined above  Upon evaluation, pt requiring min-modA x 1 to perform all functional mobility  Pt initially requiring increased encouragement to participate d/t reports of severe pain  When provided with education about importance of OOB activity, pt in agreement with ambulating short distance to chair  Pt able to ambulate 3' with RW and Justice from bed to chair  No LOB experienced, however pt has increased fear of falling  HR and SpO2 remained WFL on RA with exertion (O2 reapplied at end of session)  Pt will benefit from continued PT intervention during LOS to address current deficits, increase LOF, and facilitate safe d/c to next level of care when medically appropriate  D/c recommendation at this time is post-acute rehabilitation services     Goals   Patient Goals to feel better   Short Term Goal #1 Pt will participate in B LE strengthening exercises to facilitate improved functional mobility  LTG Expiration Date 08/31/20   Long Term Goal #1 Pt will perform all functional transfers and bed mobility with SUP and good safety awareness  Long Term Goal #2 Pt will ambulate 48' with RW and SUP while maintaining good functional dynamic balance  Plan   Treatment/Interventions Functional transfer training;LE strengthening/ROM; Therapeutic exercise; Endurance training;Bed mobility;Gait training   PT Frequency Other (Comment)  (3-5x/week)   Recommendation   PT Discharge Recommendation Post-Acute Rehabilitation Services     Pt seated in recliner at end of session with all needs in reach

## 2020-08-17 NOTE — PLAN OF CARE
Problem: OCCUPATIONAL THERAPY ADULT  Goal: Performs self-care activities at highest level of function for planned discharge setting  See evaluation for individualized goals  Description: Treatment Interventions: ADL retraining, UE strengthening/ROM, Functional transfer training, Endurance training, Patient/family training, Equipment evaluation/education, Energy conservation, Activityengagement          See flowsheet documentation for full assessment, interventions and recommendations  Note: Limitation: Decreased ADL status, Decreased UE strength, Decreased Safe judgement during ADL, Decreased endurance, Decreased self-care trans, Decreased high-level ADLs     Assessment: Pt is a 81 y o  female seen for OT evaluation s/p admit to [facility] on 8/16/2020 w/ Chest wall contusion, right, initial encounter  Comorbidities affecting pt's functional performance at time of assessment include: HTN, CHF and mitral regurgitation, hyperlipidemia, CAD, edema, CKD, eczema, edema  Personal factors affecting pt at time of IE include:limited home support, difficulty performing ADLS, difficulty performing IADLS , limited insight into deficits, decreased initiation and engagement  and health management   Prior to admission, pt was (I) with ADLs and (A) with IADLs with use of RW during functional mobility  Upon evaluation: Pt requires (S)-max (A) level with use of RW during functional mobility 2* the following deficits impacting occupational performance: weakness, decreased strength, decreased balance, decreased tolerance, impaired memory, impaired problem solving, decreased safety awareness, increased pain and impaired interpersonal skills  Pt to benefit from continued skilled OT tx while in the hospital to address deficits as defined above and maximize level of functional independence w ADL's and functional mobility   Occupational Performance areas to address include: grooming, bathing/shower, toilet hygiene, dressing, functional mobility, community mobility and clothing management  From OT standpoint, recommendation at time of d/c would be short of breath       OT Discharge Recommendation: Post-Acute Rehabilitation Services

## 2020-08-17 NOTE — ASSESSMENT & PLAN NOTE
Wt Readings from Last 3 Encounters:   08/16/20 59 kg (130 lb 1 1 oz)   06/27/20 60 kg (132 lb 4 4 oz)   06/26/20 58 3 kg (128 lb 8 5 oz)     No evidence of acute exacerbation  Continue outpatient medication regimen  Monitor I/O and daily weight

## 2020-08-17 NOTE — SOCIAL WORK
Met with pt to discuss role as  in helping pt to develop discharge plan and to help pt carry out their plan  Pt lives in a house with her son   Pt has a ramp on the outside  Pt has everything set up on the first floor  Pt has a walker which she uses to ambulate  Pt is independent with ADL's  Pt's son does the cooking and cleaning  Pt's grandson and grand daughter drive her to appointments  Pt has never been to drug or etoh rehab   Pt has never had any Psyc stays  Pt has a history of having Shreyas SALAZARA in the past  Pt's PCP is Madonna Crespo  Pt uses the [facility]  Pt was given discharge check list  List was reviewed with A good understanding   Will continue to follow for any Case Management needs

## 2020-08-17 NOTE — PLAN OF CARE
Problem: PHYSICAL THERAPY ADULT  Goal: Performs mobility at highest level of function for planned discharge setting  See evaluation for individualized goals  Description: Treatment/Interventions: Functional transfer training, LE strengthening/ROM, Therapeutic exercise, Endurance training, Bed mobility, Gait training          See flowsheet documentation for full assessment, interventions and recommendations  Note: Prognosis: Good  Problem List: Decreased strength, Decreased endurance, Impaired balance, Decreased mobility, Pain, Decreased safety awareness  Assessment: Patient is a 81 y o  female evaluated by Physical Therapy s/p admit to [address] [place] [address],[address] on 8/16/2020 with admitting diagnosis of: Chest wall pain, Fall at home, and principal problem of: Chest wall contusion, right, initial encounter  PT was consulted to assess patient's functional mobility and discharge needs  Ordered are PT Evaluation and treatment with activity level of: up with assistance  Comorbidities affecting patient's physical performance at time of assessment include: a-fib, chronic diastolic heart failure, ambulatory dysfunction, HTN, CAD  Personal factors affecting the patient at time of IE include: lives in multi story home, ambulating with assistive device, inability to navigate community distances, history of fall(s), decreased initiation and engagement, inability/difficulty performing IADLs and inability/difficulty performing ADLs  Please locate objective findings from PT assessment regarding body systems outlined above  Upon evaluation, pt requiring min-modA x 1 to perform all functional mobility  Pt initially requiring increased encouragement to participate d/t reports of severe pain  When provided with education about importance of OOB activity, pt in agreement with ambulating short distance to chair  Pt able to ambulate 3' with RW and Justice from bed to chair  No LOB experienced, however pt has increased fear of falling   HR and SpO2 remained WFL on RA with exertion (O2 reapplied at end of session)  Pt will benefit from continued PT intervention during LOS to address current deficits, increase LOF, and facilitate safe d/c to next level of care when medically appropriate  D/c recommendation at this time is post-acute rehabilitation services  PT Discharge Recommendation: Post-Acute Rehabilitation Services          See flowsheet documentation for full assessment

## 2020-08-17 NOTE — PLAN OF CARE
Problem: Potential for Falls  Goal: Patient will remain free of falls  Description: INTERVENTIONS:  - Assess patient frequently for physical needs  -  Identify cognitive and physical deficits and behaviors that affect risk of falls  -  [place] fall precautions as indicated by assessment   - Educate patient/family on patient safety including physical limitations  - Instruct patient to call for assistance with activity based on assessment  - Modify environment to reduce risk of injury  - Consider OT/PT consult to assist with strengthening/mobility  Outcome: Progressing     Problem: Nutrition/Hydration-ADULT  Goal: Nutrient/Hydration intake appropriate for improving, restoring or maintaining nutritional needs  Description: Monitor and assess patient's nutrition/hydration status for malnutrition  Collaborate with interdisciplinary team and initiate plan and interventions as ordered  Monitor patient's weight and dietary intake as ordered or per policy  Utilize nutrition screening tool and intervene as necessary  Determine patient's food preferences and provide high-protein, high-caloric foods as appropriate       INTERVENTIONS:  - Monitor oral intake, urinary output, labs, and treatment plans  - Assess nutrition and hydration status and recommend course of action  - Evaluate amount of meals eaten  - Assist patient with eating if necessary   - Allow adequate time for meals  - Recommend/ encourage appropriate diets, oral nutritional supplements, and vitamin/mineral supplements  - Order, calculate, and assess calorie counts as needed  - Recommend, monitor, and adjust tube feedings and TPN/PPN based on assessed needs  - Assess need for intravenous fluids  - Provide specific nutrition/hydration education as appropriate  - Include patient/family/caregiver in decisions related to nutrition  Outcome: Progressing     Problem: PAIN - ADULT  Goal: Verbalizes/displays adequate comfort level or baseline comfort level  Description: Interventions:  - Encourage patient to monitor pain and request assistance  - Assess pain using appropriate pain scale  - Administer analgesics based on type and severity of pain and evaluate response  - Implement non-pharmacological measures as appropriate and evaluate response  - Consider cultural and social influences on pain and pain management  - Notify physician/advanced practitioner if interventions unsuccessful or patient reports new pain  Outcome: Progressing     Problem: INFECTION - ADULT  Goal: Absence or prevention of progression during hospitalization  Description: INTERVENTIONS:  - Assess and monitor for signs and symptoms of infection  - Monitor lab/diagnostic results  - Monitor all insertion sites, i e  indwelling lines, tubes, and drains  - Monitor endotracheal if appropriate and nasal secretions for changes in amount and color  - [place] appropriate cooling/warming therapies per order  - Administer medications as ordered  - Instruct and encourage patient and family to use good hand hygiene technique  - Identify and instruct in appropriate isolation precautions for identified infection/condition  Outcome: Progressing     Problem: SAFETY ADULT  Goal: Patient will remain free of falls  Description: INTERVENTIONS:  - Assess patient frequently for physical needs  -  Identify cognitive and physical deficits and behaviors that affect risk of falls    -  [place] fall precautions as indicated by assessment   - Educate patient/family on patient safety including physical limitations  - Instruct patient to call for assistance with activity based on assessment  - Modify environment to reduce risk of injury  - Consider OT/PT consult to assist with strengthening/mobility  Outcome: Progressing  Goal: Maintain or return to baseline ADL function  Description: INTERVENTIONS:  -  Assess patient's ability to carry out ADLs; assess patient's baseline for ADL function and identify physical deficits which impact ability to perform ADLs (bathing, care of mouth/teeth, toileting, grooming, dressing, etc )  - Assess/evaluate cause of self-care deficits   - Assess range of motion  - Assess patient's mobility; develop plan if impaired  - Assess patient's need for assistive devices and provide as appropriate  - Encourage maximum independence but intervene and supervise when necessary  - Involve family in performance of ADLs  - Assess for home care needs following discharge   - Consider OT consult to assist with ADL evaluation and planning for discharge  - Provide patient education as appropriate  Outcome: Progressing  Goal: Maintain or return mobility status to optimal level  Description: INTERVENTIONS:  - Assess patient's baseline mobility status (ambulation, transfers, stairs, etc )    - Identify cognitive and physical deficits and behaviors that affect mobility  - Identify mobility aids required to assist with transfers and/or ambulation (gait belt, sit-to-stand, lift, walker, cane, etc )  - [place] fall precautions as indicated by assessment  - Record patient progress and toleration of activity level on Mobility SBAR; progress patient to next Phase/Stage  - Instruct patient to call for assistance with activity based on assessment  - Consider rehabilitation consult to assist with strengthening/weightbearing, etc   Outcome: Progressing     Problem: DISCHARGE PLANNING  Goal: Discharge to home or other facility with appropriate resources  Description: INTERVENTIONS:  - Identify barriers to discharge w/patient and caregiver  - Arrange for needed discharge resources and transportation as appropriate  - Identify discharge learning needs (meds, wound care, etc )  - Arrange for interpretive services to assist at discharge as needed  - Refer to Case Management Department for coordinating discharge planning if the patient needs post-hospital services based on physician/advanced practitioner order or complex needs related to functional status, cognitive ability, or social support system  Outcome: Progressing     Problem: Knowledge Deficit  Goal: Patient/family/caregiver demonstrates understanding of disease process, treatment plan, medications, and discharge instructions  Description: Complete learning assessment and assess knowledge base    Interventions:  - Provide teaching at level of understanding  - Provide teaching via preferred learning methods  Outcome: Progressing

## 2020-08-17 NOTE — OCCUPATIONAL THERAPY NOTE
Occupational Therapy Evaluation     Patient Name: Megan Rivera  GQOBH'C Date: 8/17/2020  Problem List  Principal Problem:    Chest wall contusion, right, initial encounter  Active Problems:    Longstanding persistent atrial fibrillation ([facility] [address] )    Chronic diastolic heart failure ([facility] [address] )    Ambulatory dysfunction    Hypokalemia    Past Medical History  Past Medical History:   Diagnosis Date    Acute on chronic diastolic congestive heart failure ([facility] [address] ) 6/27/2019    Ambulatory dysfunction 8/17/2020    Arthritis     Chest wall contusion, right, initial encounter 8/17/2020    Chest wall contusion, right, subsequent encounter 8/17/2020    Chronic diastolic heart failure (HCC)     Chronic kidney disease, stage 2 (mild)     Coronary artery disease     history of stenting    Eczema     years    Edema     History of echocardiogram 07/20/2017    EF 55%, mild concentric LVH, bileaflet MVP with moderate MR, left atrial enlargement   History of transfusion     Hyperlipidemia     Hypertension     Hypo-osmolality and hyponatremia     Hypokalemia 7/11/2019    Mitral regurgitation      Past Surgical History  Past Surgical History:   Procedure Laterality Date    ABDOMINAL SURGERY      hysterectomy    CARDIAC CATHETERIZATION  04/10/2012    EF 65%, no significant CAD, patent stents, severe MR     CORONARY ANGIOPLASTY WITH STENT PLACEMENT  03/21/2007    EF 55%, GARRET to LAD   EYE SURGERY      b/l cataracts    HYSTERECTOMY      JOINT REPLACEMENT      Rt knee             08/17/20 0955   Note Type   Note type Eval/Treat   Restrictions/Precautions   Weight Bearing Precautions Per Order No   Other Precautions Telemetry; Fall Risk;Multiple lines;Pain;O2;Chair Alarm; Bed Alarm   Pain Assessment   Pain Assessment Tool 0-10   Pain Score 7   Pain Location/Orientation Orientation: Left; Location: Chest   Home Living   Type of [address] Multi-level;Performs ADLs on one level; Able to live on main level with bedroom/bathroom; Ramped entrance   Bathroom Shower/Tub Tub/shower unit   Bathroom Toilet Standard   Bathroom Equipment Grab bars in shower;Grab bars around toilet   [address] Other (Comment);Grab bars  (4ww)   Additional Comments pt performed functional mobility with 4ww at baseline    Prior Function   Level of [place] Independent with ADLs and functional mobility; Needs assistance with IADLs   Lives With Son   Receives Help From Family   ADL Assistance Independent   IADLs Needs assistance   Falls in the last 6 months 1 to 4   Vocational Retired   Comments pt's son drives   Psychosocial   Psychosocial (WDL) X   Patient Behaviors/Mood [place] affect; Fearful   Subjective   Subjective "I don't think I can get up"   ADL   Where Assessed Edge of bed   LB Dressing Assistance 2  Maximal Assistance   LB Dressing Deficit Don/doff R sock; Don/doff L sock   Additional Comments pain when bending forward to attempt donning socks   Bed Mobility   Supine to Sit 3  Moderate assistance   Additional items Assist x 1; Increased time required;LE management;Verbal cues   Sit to Supine   (seated in chair at end of session)   Additional Comments pt on RA during session; Spo2 WFL; no complaints of SOB   Transfers   Sit to Stand 4  Minimal assistance   Additional items Assist x 1; Increased time required;Verbal cues  (RW)   Stand to Sit 4  Minimal assistance   Additional items Assist x 1; Increased time required;Verbal cues  (RW)   Additional Comments pt performed functional transfers with RW; limited by fear and no significant LOB   Functional Mobility   Functional Mobility 5  Supervision   Additional Comments pt performed functional mobility to chair ~2ft with RW; pt with decreased endurance and increase time to mobilize LEs   Additional items Rolling walker   Balance   Static Sitting Good   Dynamic Sitting Good   Static Standing Fair +   Dynamic Standing Fair   Ambulatory Fair   Activity Tolerance Activity Tolerance Patient limited by fatigue;Patient limited by pain   RUE Assessment   RUE Assessment WFL   LUE Assessment   LUE Assessment X  (WFL AROM; strength testing limited due to pain)   Hand Function   Gross Motor Coordination Functional   Fine Motor Coordination Functional   Sensation   Light Touch No apparent deficits   Sharp/Dull No apparent deficits   Cognition   Overall Cognitive Status WFL   Arousal/Participation Alert   Attention Within functional limits   Orientation Level Oriented X4   Memory Within functional limits   Following Commands Follows one step commands without difficulty   Assessment   Limitation Decreased ADL status; Decreased UE strength;Decreased Safe judgement during ADL;Decreased endurance;Decreased self-care trans;Decreased high-level ADLs   Assessment Pt is a 81 y o  female seen for OT evaluation s/p admit to [facility] on 8/16/2020 w/ Chest wall contusion, right, initial encounter  Comorbidities affecting pt's functional performance at time of assessment include: HTN, CHF and mitral regurgitation, hyperlipidemia, CAD, edema, CKD, eczema, edema  Personal factors affecting pt at time of IE include:limited home support, difficulty performing ADLS, difficulty performing IADLS , limited insight into deficits, decreased initiation and engagement  and health management   Prior to admission, pt was (I) with ADLs and (A) with IADLs with use of RW during functional mobility  Upon evaluation: Pt requires (S)-max (A) level with use of RW during functional mobility 2* the following deficits impacting occupational performance: weakness, decreased strength, decreased balance, decreased tolerance, impaired memory, impaired problem solving, decreased safety awareness, increased pain and impaired interpersonal skills  Pt to benefit from continued skilled OT tx while in the hospital to address deficits as defined above and maximize level of functional independence w ADL's and functional mobility  Occupational Performance areas to address include: grooming, bathing/shower, toilet hygiene, dressing, functional mobility, community mobility and clothing management  From OT standpoint, recommendation at time of d/c would be short of breath  Goals   Patient Goals to go home    Short Term Goal  pt will perform UE strengthening exercises    Long Term Goal #1 pt will demonstrate UB/LB bathing and grooming tasks seated in chair at min (A) level   Long Term Goal #2 pt will demonstrate toilet transfers and hygiene at (I) level    Long Term Goal pt will demonstrate UB/LB bathing and grooming tasks seated in chair at min (A) level   Plan   Treatment Interventions ADL retraining;UE strengthening/ROM; Functional transfer training; Endurance training;Patient/family training;Equipment evaluation/education; Energy conservation; Activityengagement   Goal Expiration Date 08/31/20   OT Frequency 3-5x/wk   Recommendation   OT Discharge Recommendation Post-Acute Rehabilitation Services   Barthel Index   Feeding 5   Bathing 0   Grooming Score 0   Dressing Score 5   Bladder Score 5   Bowels Score 5   Toilet Use Score 5   Transfers (Bed/Chair) Score 10   Mobility (Level Surface) Score 10   Stairs Score 0   Barthel Index Score 45     Pt will benefit from continued OT services in order to maximize (I) c ADL performance, FM c RW, and improve overall endurance/strength required to complete functional tasks in preparation for d/c  Pt left seated in chair at end of session; all needs within reach; all lines intact; scds connected and turned on

## 2020-08-17 NOTE — ASSESSMENT & PLAN NOTE
Heart rate currently normal; controlled with metoprolol  Patient currently on Eliquis for anticoagulation

## 2020-08-17 NOTE — PLAN OF CARE
Problem: Potential for Falls  Goal: Patient will remain free of falls  Description: INTERVENTIONS:  - Assess patient frequently for physical needs  -  Identify cognitive and physical deficits and behaviors that affect risk of falls  -  [place] fall precautions as indicated by assessment   - Educate patient/family on patient safety including physical limitations  - Instruct patient to call for assistance with activity based on assessment  - Modify environment to reduce risk of injury  - Consider OT/PT consult to assist with strengthening/mobility  Outcome: Progressing     Problem: Nutrition/Hydration-ADULT  Goal: Nutrient/Hydration intake appropriate for improving, restoring or maintaining nutritional needs  Description: Monitor and assess patient's nutrition/hydration status for malnutrition  Collaborate with interdisciplinary team and initiate plan and interventions as ordered  Monitor patient's weight and dietary intake as ordered or per policy  Utilize nutrition screening tool and intervene as necessary  Determine patient's food preferences and provide high-protein, high-caloric foods as appropriate       INTERVENTIONS:  - Monitor oral intake, urinary output, labs, and treatment plans  - Assess nutrition and hydration status and recommend course of action  - Evaluate amount of meals eaten  - Assist patient with eating if necessary   - Allow adequate time for meals  - Recommend/ encourage appropriate diets, oral nutritional supplements, and vitamin/mineral supplements  - Order, calculate, and assess calorie counts as needed  - Recommend, monitor, and adjust tube feedings and TPN/PPN based on assessed needs  - Assess need for intravenous fluids  - Provide specific nutrition/hydration education as appropriate  - Include patient/family/caregiver in decisions related to nutrition  Outcome: Progressing     Problem: PAIN - ADULT  Goal: Verbalizes/displays adequate comfort level or baseline comfort level  Description: Interventions:  - Encourage patient to monitor pain and request assistance  - Assess pain using appropriate pain scale  - Administer analgesics based on type and severity of pain and evaluate response  - Implement non-pharmacological measures as appropriate and evaluate response  - Consider cultural and social influences on pain and pain management  - Notify physician/advanced practitioner if interventions unsuccessful or patient reports new pain  Outcome: Progressing     Problem: INFECTION - ADULT  Goal: Absence or prevention of progression during hospitalization  Description: INTERVENTIONS:  - Assess and monitor for signs and symptoms of infection  - Monitor lab/diagnostic results  - Monitor all insertion sites, i e  indwelling lines, tubes, and drains  - Monitor endotracheal if appropriate and nasal secretions for changes in amount and color  - [place] appropriate cooling/warming therapies per order  - Administer medications as ordered  - Instruct and encourage patient and family to use good hand hygiene technique  - Identify and instruct in appropriate isolation precautions for identified infection/condition  Outcome: Progressing     Problem: SAFETY ADULT  Goal: Patient will remain free of falls  Description: INTERVENTIONS:  - Assess patient frequently for physical needs  -  Identify cognitive and physical deficits and behaviors that affect risk of falls    -  [place] fall precautions as indicated by assessment   - Educate patient/family on patient safety including physical limitations  - Instruct patient to call for assistance with activity based on assessment  - Modify environment to reduce risk of injury  - Consider OT/PT consult to assist with strengthening/mobility  Outcome: Progressing  Goal: Maintain or return to baseline ADL function  Description: INTERVENTIONS:  -  Assess patient's ability to carry out ADLs; assess patient's baseline for ADL function and identify physical deficits which impact ability to perform ADLs (bathing, care of mouth/teeth, toileting, grooming, dressing, etc )  - Assess/evaluate cause of self-care deficits   - Assess range of motion  - Assess patient's mobility; develop plan if impaired  - Assess patient's need for assistive devices and provide as appropriate  - Encourage maximum independence but intervene and supervise when necessary  - Involve family in performance of ADLs  - Assess for home care needs following discharge   - Consider OT consult to assist with ADL evaluation and planning for discharge  - Provide patient education as appropriate  Outcome: Progressing  Goal: Maintain or return mobility status to optimal level  Description: INTERVENTIONS:  - Assess patient's baseline mobility status (ambulation, transfers, stairs, etc )    - Identify cognitive and physical deficits and behaviors that affect mobility  - Identify mobility aids required to assist with transfers and/or ambulation (gait belt, sit-to-stand, lift, walker, cane, etc )  - [place] fall precautions as indicated by assessment  - Record patient progress and toleration of activity level on Mobility SBAR; progress patient to next Phase/Stage  - Instruct patient to call for assistance with activity based on assessment  - Consider rehabilitation consult to assist with strengthening/weightbearing, etc   Outcome: Progressing     Problem: DISCHARGE PLANNING  Goal: Discharge to home or other facility with appropriate resources  Description: INTERVENTIONS:  - Identify barriers to discharge w/patient and caregiver  - Arrange for needed discharge resources and transportation as appropriate  - Identify discharge learning needs (meds, wound care, etc )  - Arrange for interpretive services to assist at discharge as needed  - Refer to Case Management Department for coordinating discharge planning if the patient needs post-hospital services based on physician/advanced practitioner order or complex needs related to functional status, cognitive ability, or social support system  Outcome: Progressing     Problem: Knowledge Deficit  Goal: Patient/family/caregiver demonstrates understanding of disease process, treatment plan, medications, and discharge instructions  Description: Complete learning assessment and assess knowledge base    Interventions:  - Provide teaching at level of understanding  - Provide teaching via preferred learning methods  Outcome: Progressing

## 2020-08-17 NOTE — ASSESSMENT & PLAN NOTE
Patient has longstanding multifactorial ambulatory dysfunction, primarily due to physical deconditioning  She normally ambulates with walker at home  Will consult PT/OT for evaluation, treatment, and recommendations regarding post-acute care

## 2020-08-17 NOTE — ASSESSMENT & PLAN NOTE
Patient presented to ER at [facility] for right chest wall discomfort following fall at home 2 days ago  She reportedly tripped while walking in her bedroom and fell onto the floor  She was using a walker at the time  She had a similar fall in June, sustaining nondisplaced left rib fractures, and subsequently went to post-acute rehab facility for approximately 3 weeks    Trauma scans reviewed, no evidence of bleeding  Adjust pain medication regimen  Plan for short-term rehab placement

## 2020-08-18 LAB
ALBUMIN SERPL BCP-MCNC: 3.2 G/DL (ref 3.5–5)
ALP SERPL-CCNC: 60 U/L (ref 46–116)
ALT SERPL W P-5'-P-CCNC: 18 U/L (ref 12–78)
ANION GAP SERPL CALCULATED.3IONS-SCNC: 8 MMOL/L (ref 4–13)
AST SERPL W P-5'-P-CCNC: 15 U/L (ref 5–45)
BASOPHILS # BLD AUTO: 0.03 THOUSANDS/ΜL (ref 0–0.1)
BASOPHILS NFR BLD AUTO: 0 % (ref 0–1)
BILIRUB SERPL-MCNC: 0.9 MG/DL (ref 0.2–1)
BUN SERPL-MCNC: 21 MG/DL (ref 5–25)
CALCIUM SERPL-MCNC: 9.1 MG/DL (ref 8.3–10.1)
CHLORIDE SERPL-SCNC: 103 MMOL/L (ref 100–108)
CO2 SERPL-SCNC: 24 MMOL/L (ref 21–32)
CREAT SERPL-MCNC: 0.79 MG/DL (ref 0.6–1.3)
EOSINOPHIL # BLD AUTO: 0.04 THOUSAND/ΜL (ref 0–0.61)
EOSINOPHIL NFR BLD AUTO: 1 % (ref 0–6)
ERYTHROCYTE [DISTWIDTH] IN BLOOD BY AUTOMATED COUNT: 15.5 % (ref 11.6–15.1)
GFR SERPL CREATININE-BSD FRML MDRD: 69 ML/MIN/1.73SQ M
GLUCOSE SERPL-MCNC: 119 MG/DL (ref 65–140)
HCT VFR BLD AUTO: 33.3 % (ref 34.8–46.1)
HGB BLD-MCNC: 10.7 G/DL (ref 11.5–15.4)
IMM GRANULOCYTES # BLD AUTO: 0.05 THOUSAND/UL (ref 0–0.2)
IMM GRANULOCYTES NFR BLD AUTO: 1 % (ref 0–2)
LACTATE SERPL-SCNC: 0.9 MMOL/L (ref 0.5–2)
LYMPHOCYTES # BLD AUTO: 1.06 THOUSANDS/ΜL (ref 0.6–4.47)
LYMPHOCYTES NFR BLD AUTO: 15 % (ref 14–44)
MAGNESIUM SERPL-MCNC: 2 MG/DL (ref 1.6–2.6)
MCH RBC QN AUTO: 30.1 PG (ref 26.8–34.3)
MCHC RBC AUTO-ENTMCNC: 32.1 G/DL (ref 31.4–37.4)
MCV RBC AUTO: 94 FL (ref 82–98)
MONOCYTES # BLD AUTO: 0.84 THOUSAND/ΜL (ref 0.17–1.22)
MONOCYTES NFR BLD AUTO: 12 % (ref 4–12)
MRSA NOSE QL CULT: NORMAL
NEUTROPHILS # BLD AUTO: 5.27 THOUSANDS/ΜL (ref 1.85–7.62)
NEUTS SEG NFR BLD AUTO: 71 % (ref 43–75)
NRBC BLD AUTO-RTO: 0 /100 WBCS
PHOSPHATE SERPL-MCNC: 3.6 MG/DL (ref 2.3–4.1)
PLATELET # BLD AUTO: 185 THOUSANDS/UL (ref 149–390)
PMV BLD AUTO: 9.8 FL (ref 8.9–12.7)
POTASSIUM SERPL-SCNC: 4.3 MMOL/L (ref 3.5–5.3)
PROCALCITONIN SERPL-MCNC: <0.05 NG/ML
PROT SERPL-MCNC: 6.6 G/DL (ref 6.4–8.2)
RBC # BLD AUTO: 3.56 MILLION/UL (ref 3.81–5.12)
SODIUM SERPL-SCNC: 135 MMOL/L (ref 136–145)
TROPONIN I SERPL-MCNC: <0.02 NG/ML
WBC # BLD AUTO: 7.29 THOUSAND/UL (ref 4.31–10.16)

## 2020-08-18 PROCEDURE — 97535 SELF CARE MNGMENT TRAINING: CPT

## 2020-08-18 PROCEDURE — 80053 COMPREHEN METABOLIC PANEL: CPT | Performed by: INTERNAL MEDICINE

## 2020-08-18 PROCEDURE — 97110 THERAPEUTIC EXERCISES: CPT

## 2020-08-18 PROCEDURE — 99233 SBSQ HOSP IP/OBS HIGH 50: CPT | Performed by: FAMILY MEDICINE

## 2020-08-18 PROCEDURE — 84145 PROCALCITONIN (PCT): CPT | Performed by: INTERNAL MEDICINE

## 2020-08-18 PROCEDURE — 84100 ASSAY OF PHOSPHORUS: CPT | Performed by: INTERNAL MEDICINE

## 2020-08-18 PROCEDURE — 83735 ASSAY OF MAGNESIUM: CPT | Performed by: INTERNAL MEDICINE

## 2020-08-18 PROCEDURE — 85025 COMPLETE CBC W/AUTO DIFF WBC: CPT | Performed by: INTERNAL MEDICINE

## 2020-08-18 PROCEDURE — 83605 ASSAY OF LACTIC ACID: CPT | Performed by: INTERNAL MEDICINE

## 2020-08-18 PROCEDURE — 97530 THERAPEUTIC ACTIVITIES: CPT

## 2020-08-18 PROCEDURE — 84484 ASSAY OF TROPONIN QUANT: CPT | Performed by: INTERNAL MEDICINE

## 2020-08-18 PROCEDURE — 97116 GAIT TRAINING THERAPY: CPT

## 2020-08-18 RX ORDER — OXYCODONE HYDROCHLORIDE 10 MG/1
10 TABLET ORAL EVERY 4 HOURS PRN
Status: DISCONTINUED | OUTPATIENT
Start: 2020-08-18 | End: 2020-08-21 | Stop reason: HOSPADM

## 2020-08-18 RX ORDER — OXYCODONE HYDROCHLORIDE 5 MG/1
5 TABLET ORAL EVERY 4 HOURS PRN
Status: DISCONTINUED | OUTPATIENT
Start: 2020-08-18 | End: 2020-08-21 | Stop reason: HOSPADM

## 2020-08-18 RX ORDER — ACETAMINOPHEN 325 MG/1
975 TABLET ORAL EVERY 8 HOURS SCHEDULED
Status: DISCONTINUED | OUTPATIENT
Start: 2020-08-18 | End: 2020-08-21 | Stop reason: HOSPADM

## 2020-08-18 RX ORDER — METHOCARBAMOL 500 MG/1
500 TABLET, FILM COATED ORAL EVERY 8 HOURS SCHEDULED
Status: DISCONTINUED | OUTPATIENT
Start: 2020-08-18 | End: 2020-08-21 | Stop reason: HOSPADM

## 2020-08-18 RX ORDER — LIDOCAINE 50 MG/G
1 PATCH TOPICAL DAILY
Status: DISCONTINUED | OUTPATIENT
Start: 2020-08-18 | End: 2020-08-21 | Stop reason: HOSPADM

## 2020-08-18 RX ADMIN — LIDOCAINE 5% 1 PATCH: 700 PATCH TOPICAL at 11:30

## 2020-08-18 RX ADMIN — SODIUM CHLORIDE 75 ML/HR: 0.9 INJECTION, SOLUTION INTRAVENOUS at 00:57

## 2020-08-18 RX ADMIN — METHOCARBAMOL TABLETS 500 MG: 500 TABLET, COATED ORAL at 14:56

## 2020-08-18 RX ADMIN — OXYCODONE HYDROCHLORIDE 10 MG: 10 TABLET ORAL at 18:11

## 2020-08-18 RX ADMIN — PRAVASTATIN SODIUM 40 MG: 40 TABLET ORAL at 17:30

## 2020-08-18 RX ADMIN — HYDROMORPHONE HYDROCHLORIDE 0.5 MG: 1 INJECTION, SOLUTION INTRAMUSCULAR; INTRAVENOUS; SUBCUTANEOUS at 10:15

## 2020-08-18 RX ADMIN — METHOCARBAMOL TABLETS 500 MG: 500 TABLET, COATED ORAL at 21:02

## 2020-08-18 RX ADMIN — METOPROLOL TARTRATE 25 MG: 25 TABLET, FILM COATED ORAL at 08:33

## 2020-08-18 RX ADMIN — ACETAMINOPHEN 975 MG: 325 TABLET, FILM COATED ORAL at 14:56

## 2020-08-18 RX ADMIN — OXYBUTYNIN CHLORIDE 5 MG: 5 TABLET ORAL at 08:33

## 2020-08-18 RX ADMIN — SERTRALINE HYDROCHLORIDE 50 MG: 50 TABLET ORAL at 08:33

## 2020-08-18 RX ADMIN — OXYCODONE HYDROCHLORIDE 5 MG: 5 TABLET ORAL at 06:15

## 2020-08-18 RX ADMIN — METOPROLOL TARTRATE 25 MG: 25 TABLET, FILM COATED ORAL at 18:07

## 2020-08-18 RX ADMIN — POLYETHYLENE GLYCOL 3350 17 G: 17 POWDER, FOR SOLUTION ORAL at 08:34

## 2020-08-18 RX ADMIN — DILTIAZEM HYDROCHLORIDE 90 MG: 90 TABLET, FILM COATED ORAL at 06:15

## 2020-08-18 RX ADMIN — DILTIAZEM HYDROCHLORIDE 90 MG: 90 TABLET, FILM COATED ORAL at 14:56

## 2020-08-18 RX ADMIN — ENOXAPARIN SODIUM 40 MG: 40 INJECTION SUBCUTANEOUS at 08:34

## 2020-08-18 RX ADMIN — ACETAMINOPHEN 975 MG: 325 TABLET, FILM COATED ORAL at 21:02

## 2020-08-18 RX ADMIN — PANTOPRAZOLE SODIUM 40 MG: 40 TABLET, DELAYED RELEASE ORAL at 06:15

## 2020-08-18 RX ADMIN — LIDOCAINE 5% 1 PATCH: 700 PATCH TOPICAL at 08:34

## 2020-08-18 NOTE — PLAN OF CARE
Problem: PHYSICAL THERAPY ADULT  Goal: Performs mobility at highest level of function for planned discharge setting  See evaluation for individualized goals  Description: Treatment/Interventions: Functional transfer training, LE strengthening/ROM, Therapeutic exercise, Endurance training, Bed mobility, Gait training          See flowsheet documentation for full assessment, interventions and recommendations  Outcome: Progressing  Note: Prognosis: Good  Problem List: Decreased strength, Decreased endurance, Impaired balance, Decreased mobility, Decreased safety awareness  Assessment: Pt  seen for PT treatment session this date with interventions consisting of therapeutic exercise, bed mobility, transfers gait training w/ emphasis on improving pt's ability to ambulate  Pt  Currently performing  tx and ambulation at (mod-min ) x 1-2 level of function  Utilizing RW with fair balance and stability  Transfer training to increase functional task performance and  to promote safe sit/stand and stand/sit mobility  Pt  education  for hand postion and safety and technique with transfer training  In comparison to previous session, Pt  With improvements in activity tolerance  Tolerated increased distance ambulation  Pt is in need of continued activity in PT to improve strength balance endurance mobility transfers and ambulation with return to maximize LOF  From PT/mobility standpoint, recommendation at time of d/c would be post acute rehab  in order to promote return to PLOF and independence  PT Discharge Recommendation: Post-Acute Rehabilitation Services          See flowsheet documentation for full assessment

## 2020-08-18 NOTE — SOCIAL WORK
During inner disciplinary discharge planning meeting PT is recommending that patient should go to [facility]  Pt said she was just at [facility] about 3 weeks to 1 month ago  She was at rehab for approx 15 days   She does not want to go to [facility] again but she will think about it tonight and let me know in the am

## 2020-08-18 NOTE — PLAN OF CARE
Problem: PAIN - ADULT  Goal: Verbalizes/displays adequate comfort level or baseline comfort level  Description: Interventions:  - Encourage patient to monitor pain and request assistance  - Assess pain using appropriate pain scale  - Administer analgesics based on type and severity of pain and evaluate response  - Implement non-pharmacological measures as appropriate and evaluate response  - Consider cultural and social influences on pain and pain management  - Notify physician/advanced practitioner if interventions unsuccessful or patient reports new pain  Outcome: Progressing     Problem: INFECTION - ADULT  Goal: Absence or prevention of progression during hospitalization  Description: INTERVENTIONS:  - Assess and monitor for signs and symptoms of infection  - Monitor lab/diagnostic results  - Monitor all insertion sites, i e  indwelling lines, tubes, and drains  - Monitor endotracheal if appropriate and nasal secretions for changes in amount and color  - [place] appropriate cooling/warming therapies per order  - Administer medications as ordered  - Instruct and encourage patient and family to use good hand hygiene technique  - Identify and instruct in appropriate isolation precautions for identified infection/condition  Outcome: Progressing

## 2020-08-18 NOTE — PHYSICAL THERAPY NOTE
PHYSICAL THERAPY NOTE          Patient Name: Oskar Montilla  FOXCD'S Date: 8/18/2020 08/18/20 1127   Restrictions/Precautions   Weight Bearing Precautions Per Order No   Other Precautions   (O2;Fall Risk;Pain;Bed Alarm; Chair Alarm;Telemetry;Multiple l)   General   Family/Caregiver Present No   Subjective   Subjective My chest is sore  Agreable to therapy after encouragment  Bed Mobility   Supine to Sit 3  Moderate assistance   Additional items Assist x 2; Increased time required;Verbal cues;LE management   Transfers   Sit to Stand 4  Minimal assistance   Additional items Assist x 2; Increased time required;Verbal cues   Stand to Sit 4  Minimal assistance   Additional items Assist x 2; Increased time required;Verbal cues   Stand pivot 4  Minimal assistance   Additional items Verbal cues   Ambulation/Elevation   Gait pattern Excessively slow; Short stride; Foward flexed  (Decreased foot clearance; Improper Weight shift )   Gait Assistance 4  Minimal assist   Additional items Assist x 1;Verbal cues   Assistive Device Rolling walker   Distance 18'   Balance   Dynamic Standing Fair   Ambulatory Fair   Endurance Deficit   Endurance Deficit Yes   Activity Tolerance   Activity Tolerance Patient limited by fatigue;Patient limited by pain   Exercises   Hip Flexion Sitting;20 reps   Hip Abduction Sitting;20 reps   Hip Adduction Sitting;20 reps   Knee AROM Long Arc Quad Sitting;20 reps   Ankle Pumps Sitting;20 reps   Assessment   Prognosis Good   Problem List Decreased strength;Decreased endurance; Impaired balance;Decreased mobility; Decreased safety awareness   Assessment Pt  seen for PT treatment session this date with interventions consisting of therapeutic exercise, bed mobility, transfers gait training w/ emphasis on improving pt's ability to ambulate  Pt  Currently performing  tx and ambulation at (mod-min ) x 1-2 level of function  Utilizing RW with fair balance and stability  Transfer training to increase functional task performance and  to promote safe sit/stand and stand/sit mobility  Pt  education  for hand postion and safety and technique with transfer training  In comparison to previous session, Pt  With improvements in activity tolerance  Tolerated increased distance ambulation  Pt is in need of continued activity in PT to improve strength balance endurance mobility transfers and ambulation with return to maximize LOF  From PT/mobility standpoint, recommendation at time of d/c would be post acute rehab  in order to promote return to PLOF and independence  Goals   LTG Expiration Date 08/31/20   PT Treatment Day 1   Plan   Treatment/Interventions Functional transfer training;LE strengthening/ROM; Endurance training; Therapeutic exercise; Bed mobility;Gait training   Progress Slow progress, decreased activity tolerance   Recommendation   PT Discharge Recommendation Post-Acute Rehabilitation Services   Pt  OOB in chair  with call bell within reach, scd's connected and turned on and alarm on at end of PT session  Discussed with Sandy Dong, PT today's treatment and patient's current level of function for care coordination

## 2020-08-18 NOTE — ASSESSMENT & PLAN NOTE
Per CT of the chest, "Unchanged alignment at the subacute left 8th through 10th rib fractures"  Adjust pain medication regimen

## 2020-08-18 NOTE — ASSESSMENT & PLAN NOTE
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

## 2020-08-18 NOTE — PROGRESS NOTES
Progress Note - Yazmin Cody 1935, 81 y o  female MRN: [id number]    Unit/Bed#: 073-68 Encounter: [id number]    Primary Care Provider: Isabel Childs MD   Date and time admitted to hospital: 8/16/2020 10:33 PM        * Chest wall contusion, right, initial encounter  [address] [place] [address]  Patient presented to ER at [facility] for right chest wall discomfort following fall at home 2 days ago  She reportedly tripped while walking in her bedroom and fell onto the floor  She was using a walker at the time  She had a similar fall in June, sustaining nondisplaced left rib fractures, and subsequently went to post-acute rehab facility for approximately 3 weeks  Trauma scans reviewed, no evidence of bleeding  Adjust pain medication regimen  Plan for short-term rehab placement      Pulmonary hypertension [facility])  Assessment & Plan  Outpatient Pulmonology evaluation  Outpatient sleep study to check for obstructive sleep apnea    Rib fractures  Assessment & Plan  Per CT of the chest, "Unchanged alignment at the subacute left 8th through 10th rib fractures"  Adjust pain medication regimen    Liver lesion  Assessment & Plan  Per CT of the chest, abdomen, pelvis with contrast report "Hyperenhancing foci in the 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, but warrant further evaluation with abdominal MRI without and with contrast to exclude more aggressive lesions  " - this can be done on outpatient basis     Ambulatory dysfunction  Assessment & Plan  Patient has longstanding multifactorial ambulatory dysfunction, primarily due to physical deconditioning  She normally ambulates with walker at home  Will consult PT/OT for evaluation, treatment, and recommendations regarding post-acute care        Chronic diastolic heart failure [facility])  Assessment & Plan  Wt Readings from Last 3 Encounters:   08/16/20 59 kg (130 lb 1 1 oz)   06/27/20 60 kg (132 lb 4 4 oz)   06/26/20 58 3 kg (128 lb 8 5 oz)     No evidence of acute exacerbation  Continue outpatient medication regimen  Monitor I/O and daily weight  Longstanding persistent atrial fibrillation (HCC)  Assessment & Plan  Heart rate currently normal; controlled with metoprolol  Patient currently on Eliquis for anticoagulation  VTE Pharmacologic Prophylaxis:   Pharmacologic: Enoxaparin (Lovenox)  Mechanical VTE Prophylaxis in Place: Yes    Patient Centered Rounds: I have performed bedside rounds with nursing staff today  Discussions with Specialists or Other Care Team Provider:  Multidisciplinary meeting    Education and Discussions with Family / Patient: Patient    Time Spent for Care: 45 minutes  More than 50% of total time spent on counseling and coordination of care as described above  Current Length of Stay: 2 day(s)    Current Patient Status: Inpatient   Certification Statement: The patient will continue to require additional inpatient hospital stay due to close monitoring, placement, adjustment of pain meds     Discharge Plan: STR    Code Status: Level 1 - Full Code      Subjective:   Patient seen and examined  She states that her pain is uncontrolled with chest and back pain secondary to her fall  Denies shortness of breath  No overnight events  Objective:     Vitals:   Temp (24hrs), Av 7 °F (36 5 °C), Min:97 4 °F (36 3 °C), Max:98 °F (36 7 °C)    Temp:  [97 4 °F (36 3 °C)-98 °F (36 7 °C)] 98 °F (36 7 °C)  HR:  [] 92  Resp:  [16-18] 18  BP: (103-129)/(67-76) 109/76  SpO2:  [91 %-98 %] 91 %  Body mass index is 27 21 kg/m²  Input and Output Summary (last 24 hours): Intake/Output Summary (Last 24 hours) at 2020 1636  Last data filed at 2020 1301  Gross per 24 hour   Intake 2923 75 ml   Output 400 ml   Net 2523 75 ml       Physical Exam:     Physical Exam  Constitutional:       General: She is not in acute distress  HENT:      Head: Normocephalic and atraumatic     Eyes:      Conjunctiva/sclera: Conjunctivae normal  Cardiovascular:      Rate and Rhythm: Normal rate and regular rhythm  Heart sounds: No murmur  Comments: Chest wall ecchymosis   Pulmonary:      Effort: Pulmonary effort is normal       Breath sounds: No wheezing  Abdominal:      General: There is no distension  Tenderness: There is no abdominal tenderness  Skin:     General: Skin is warm and dry  Neurological:      Mental Status: She is oriented to person, place, and time  Psychiatric:         Mood and Affect: Mood normal          Additional Data:     Labs:    Results from last 7 days   Lab Units 08/18/20  0548   WBC Thousand/uL 7 29   HEMOGLOBIN g/dL 10 7*   HEMATOCRIT % 33 3*   PLATELETS Thousands/uL 185   NEUTROS PCT % 71   LYMPHS PCT % 15   MONOS PCT % 12   EOS PCT % 1     Results from last 7 days   Lab Units 08/18/20  0548   SODIUM mmol/L 135*   POTASSIUM mmol/L 4 3   CHLORIDE mmol/L 103   CO2 mmol/L 24   BUN mg/dL 21   CREATININE mg/dL 0 79   ANION GAP mmol/L 8   CALCIUM mg/dL 9 1   ALBUMIN g/dL 3 2*   TOTAL BILIRUBIN mg/dL 0 90   ALK PHOS U/L 60   ALT U/L 18   AST U/L 15   GLUCOSE RANDOM mg/dL 119                 Results from last 7 days   Lab Units 08/18/20  0548   LACTIC ACID mmol/L 0 9   PROCALCITONIN ng/ml <0 05           * I Have Reviewed All Lab Data Listed Above  * Additional Pertinent Lab Tests Reviewed:  Alex [facility] Admission Reviewed    Imaging:    Imaging Reports Reviewed Today Include: CT chest, abd, pelvis with contrast       Recent Cultures (last 7 days):           Last 24 Hours Medication List:   Current Facility-Administered Medications   Medication Dose Route Frequency Provider Last Rate    acetaminophen  975 mg Oral Q8H [facility] Snehal Robles MD      ALPRAZolam  0 25 mg Oral HS PRN Beatriz Kim MD      aluminum-magnesium hydroxide-simethicone  30 mL Oral Q6H PRN Beatriz Kim MD      bisacodyl  5 mg Oral Daily PRN Beatriz Kim MD      diltiazem  90 mg Oral [id number] [place],       enoxaparin  40 mg Subcutaneous Q24H [facility] Malena Gallardo, DO      HYDROmorphone  0 5 mg Intravenous Q4H PRN Malena Gallardo, DO      lidocaine  1 patch Topical Daily Pily Perez MD      lidocaine  1 patch Topical Daily Nicholas Addison MD      methocarbamol  500 mg Oral [facility] Nicholas Addison MD      metoprolol  5 mg Intravenous Q6H PRN Malena Gallardo, DO      metoprolol tartrate  25 mg Oral BID Malena Gallardo, DO      ondansetron  4 mg Intravenous Q4H PRN Pily Perez MD      oxybutynin  5 mg Oral Daily Pily Perez MD      oxyCODONE  5 mg Oral Q4H PRN Nicholas Addison MD      Or   Mariana Maldonado oxyCODONE  10 mg Oral Q4H PRN Nicholas Addison MD      pantoprazole  40 mg Oral Early Morning Pily Perez MD      polyethylene glycol  17 g Oral Daily Malena Gallardo, DO      pravastatin  40 mg Oral Daily With Trav Abrams MD      sertraline  50 mg Oral Daily Pily Perez MD      sodium chloride  75 mL/hr Intravenous Continuous Malena Gallardo DO 75 mL/hr (08/18/20 0057)        Today, Patient Was Seen By: Rey Kanner, MD    ** Please Note: Dictation voice to text software may have been used in the creation of this document   **

## 2020-08-18 NOTE — PLAN OF CARE
Problem: OCCUPATIONAL THERAPY ADULT  Goal: Performs self-care activities at highest level of function for planned discharge setting  See evaluation for individualized goals  Description: Treatment Interventions: ADL retraining, UE strengthening/ROM, Functional transfer training, Endurance training, Patient/family training, Equipment evaluation/education, Energy conservation, Activityengagement          See flowsheet documentation for full assessment, interventions and recommendations  Outcome: Progressing  Note: Limitation: Decreased ADL status, Decreased UE strength, Decreased Safe judgement during ADL, Decreased endurance, Decreased self-care trans, Decreased high-level ADLs     Assessment: Patient in agreement to participate in therapy at this time with increased motivation  Patient limit due to pain in chest from fall  Nursing aware of patients reports and patient receiving something prior to treatment  Supine to sit txfrs with Mod Ax2 and increased time, sit to stand txfrs from bed with Min Ax2, standing balance/functional mobility with RW and Min Ax2  To increase posture, dynamic standing balance, balance during self cares, use of BUE  LE dressing Max A to shlomo  socks, Grooming Min A to comb back of hair  Discussed with OT patients current status, continue plan of care progressing as tolerated       OT Discharge Recommendation: Post-Acute Rehabilitation Services

## 2020-08-18 NOTE — OCCUPATIONAL THERAPY NOTE
[address] Progress Note     Patient Name: Sabas Delgado  VDXCX'J Date: 8/18/2020  Problem List  Principal Problem:    Chest wall contusion, right, initial encounter  Active Problems:    Longstanding persistent atrial fibrillation (HCC)    Chronic diastolic heart failure ([facility] [address] )    Ambulatory dysfunction    Hypokalemia    Liver lesion    Rib fractures    CKD (chronic kidney disease), stage II    Pulmonary hypertension ([facility] )          08/18/20 1051   Restrictions/Precautions   Weight Bearing Precautions Per Order No   Other Precautions O2;Fall Risk;Pain;Bed Alarm; Chair Alarm;Telemetry;Multiple lines   Pain Assessment   Pain Assessment Tool 0-10   Pain Score Worst Possible Pain   Pain Location/Orientation Location: Chest   ADL   Where Assessed Edge of bed   Grooming Assistance 4  Minimal Assistance   Grooming Deficit Brushing hair   Grooming Comments Patient required Min A to comb back of hair due to patient reporting pain in chest area   LB Dressing Assistance 2  Maximal Assistance   LB Dressing Deficit Don/doff L sock; Don/doff R sock   Bed Mobility   Supine to Sit 3  Moderate assistance   Additional items Assist x 2; Increased time required;Verbal cues;LE management   Transfers   Sit to Stand 4  Minimal assistance   Additional items Assist x 2; Increased time required;Verbal cues   Stand to Sit 4  Minimal assistance   Additional items Assist x 2; Increased time required;Verbal cues   Functional Mobility   Functional Mobility 4  Minimal assistance  (Ax2)   Additional Comments Patient completed standing balance/functional mobility with RW and Min Ax2 with increased time to complete due to pain  Additional items Rolling walker   Cognition   Overall Cognitive Status WFL   Arousal/Participation Alert; Responsive; Cooperative   Attention Within functional limits   Orientation Level Oriented X4   Memory Within functional limits   Following Commands Follows all commands and directions without difficulty   Activity Tolerance   Activity Tolerance Patient limited by pain   Assessment   Assessment Patient in agreement to participate in therapy at this time with increased motivation  Patient limit due to pain in chest from fall  Nursing aware of patients reports and patient receiving something prior to treatment  Supine to sit txfrs with Mod Ax2 and increased time, sit to stand txfrs from bed with Min Ax2, standing balance/functional mobility with RW and Min Ax2  To increase posture, dynamic standing balance, balance during self cares, use of BUE  LE dressing Max A to shlomo  socks, Grooming Min A to comb back of hair  Discussed with OT patients current status, continue plan of care progressing as tolerated  Plan   Goal Expiration Date 08/31/20   OT Treatment Day 1   OT Frequency 3-5x/wk   Recommendation   OT Discharge Recommendation Post-Acute Rehabilitation Services     Patient left in ugo chair with chair alarm on and all needs in reach

## 2020-08-19 ENCOUNTER — APPOINTMENT (INPATIENT)
Dept: RADIOLOGY | Facility: HOSPITAL | Age: 85
DRG: 605 | End: 2020-08-19
Payer: MEDICARE

## 2020-08-19 PROCEDURE — 97116 GAIT TRAINING THERAPY: CPT

## 2020-08-19 PROCEDURE — 97530 THERAPEUTIC ACTIVITIES: CPT

## 2020-08-19 PROCEDURE — 71045 X-RAY EXAM CHEST 1 VIEW: CPT

## 2020-08-19 PROCEDURE — 97535 SELF CARE MNGMENT TRAINING: CPT

## 2020-08-19 PROCEDURE — 97110 THERAPEUTIC EXERCISES: CPT

## 2020-08-19 RX ADMIN — OXYCODONE HYDROCHLORIDE 10 MG: 10 TABLET ORAL at 06:06

## 2020-08-19 RX ADMIN — SERTRALINE HYDROCHLORIDE 50 MG: 50 TABLET ORAL at 08:48

## 2020-08-19 RX ADMIN — METHOCARBAMOL TABLETS 500 MG: 500 TABLET, COATED ORAL at 14:23

## 2020-08-19 RX ADMIN — DILTIAZEM HYDROCHLORIDE 90 MG: 90 TABLET, FILM COATED ORAL at 14:23

## 2020-08-19 RX ADMIN — DILTIAZEM HYDROCHLORIDE 90 MG: 90 TABLET, FILM COATED ORAL at 21:06

## 2020-08-19 RX ADMIN — PRAVASTATIN SODIUM 40 MG: 40 TABLET ORAL at 16:58

## 2020-08-19 RX ADMIN — PANTOPRAZOLE SODIUM 40 MG: 40 TABLET, DELAYED RELEASE ORAL at 05:01

## 2020-08-19 RX ADMIN — DILTIAZEM HYDROCHLORIDE 90 MG: 90 TABLET, FILM COATED ORAL at 05:01

## 2020-08-19 RX ADMIN — POLYETHYLENE GLYCOL 3350 17 G: 17 POWDER, FOR SOLUTION ORAL at 08:49

## 2020-08-19 RX ADMIN — ACETAMINOPHEN 975 MG: 325 TABLET, FILM COATED ORAL at 05:01

## 2020-08-19 RX ADMIN — OXYBUTYNIN CHLORIDE 5 MG: 5 TABLET ORAL at 08:49

## 2020-08-19 RX ADMIN — METOPROLOL TARTRATE 25 MG: 25 TABLET, FILM COATED ORAL at 08:54

## 2020-08-19 RX ADMIN — METHOCARBAMOL TABLETS 500 MG: 500 TABLET, COATED ORAL at 21:06

## 2020-08-19 RX ADMIN — ENOXAPARIN SODIUM 40 MG: 40 INJECTION SUBCUTANEOUS at 08:48

## 2020-08-19 RX ADMIN — LIDOCAINE 5% 1 PATCH: 700 PATCH TOPICAL at 08:49

## 2020-08-19 RX ADMIN — ACETAMINOPHEN 975 MG: 325 TABLET, FILM COATED ORAL at 21:06

## 2020-08-19 RX ADMIN — ACETAMINOPHEN 975 MG: 325 TABLET, FILM COATED ORAL at 14:22

## 2020-08-19 RX ADMIN — METOPROLOL TARTRATE 25 MG: 25 TABLET, FILM COATED ORAL at 18:10

## 2020-08-19 RX ADMIN — METHOCARBAMOL TABLETS 500 MG: 500 TABLET, COATED ORAL at 05:01

## 2020-08-19 NOTE — PHYSICAL THERAPY NOTE
PHYSICAL THERAPY NOTE          Patient Name: Jacques Love  LXCEJ'C Date: 8/19/2020 08/19/20 1120   Restrictions/Precautions   Weight Bearing Precautions Per Order No   Other Precautions   (Fall Risk;O2;Multiple lines; Bed Alarm; Chair Alarm;Pain )   Cognition   Overall Cognitive Status [facility]   Arousal/Participation Alert; Cooperative   Following Commands Follows all commands and directions without difficulty   Subjective   Subjective Reports the pain is not as bad today  Agreeable to therapy   Transfers   Sit to Stand 4  Minimal assistance   Additional items Assist x 1; Armrests; Increased time required   Stand to Sit 4  Minimal assistance   Additional items Assist x 1; Armrests; Increased time required   Stand pivot 4  Minimal assistance   Additional items Verbal cues   Ambulation/Elevation   Gait pattern   (Excessively slow; Short stride; Foward flexed;Decreased foot c)   Gait Assistance 4  Minimal assist   Additional items Assist x 1;Verbal cues   Assistive Device Rolling walker   Distance 15' x 1, 30' x 1   Balance   Dynamic Standing Fair  (RW)   Ambulatory Fair  (RW)   Endurance Deficit   Endurance Deficit Yes   Activity Tolerance   Activity Tolerance Patient limited by fatigue;Patient limited by pain   Exercises   Hip Flexion Sitting;20 reps   Hip Abduction Sitting;20 reps   Hip Adduction Sitting;20 reps   Knee AROM Long Arc Quad Sitting;20 reps   Ankle Pumps Sitting;20 reps   Assessment   Prognosis Good   Problem List Decreased strength;Decreased endurance; Impaired balance;Decreased mobility;Pain;Decreased safety awareness   Assessment Pt  seen for PT treatment session this date with interventions consisting of therapeutic exercise, bed mobility, transfers gait training w/ emphasis on improving pt's ability to ambulate   Pt  Currently performing  tx and ambulation at (min ) x 1-2 level of function   Utilizing RW with fair balance and stability  Transfer training to increase functional task performance and  to promote safe sit/stand and stand/sit mobility  Pt  education  for hand postion and safety and technique with transfer training   In comparison to previous session, Pt  With improvements in activity tolerance  Tolerated increased distance ambulation  Requiring decreased assist with transfers and ambulation     Pt is in need of continued activity in PT to improve strength balance endurance mobility transfers and ambulation with return to maximize LOF  From PT/mobility standpoint, recommendation at time of d/c would be post acute rehab  in order to promote return to PLOF and independence  Goals   LTG Expiration Date 08/31/20   PT Treatment Day 2   Plan   Treatment/Interventions Functional transfer training;LE strengthening/ROM; Therapeutic exercise; Endurance training;Bed mobility;Gait training   Progress Slow progress, decreased activity tolerance   Recommendation   PT Discharge Recommendation Post-Acute Rehabilitation Services   Pt  OOB in chair  with call bell within reach, scd's connected and turned on and alarm on at end of PT session  Discussed with Nick Bowman, PT today's treatment and patient's current level of function for care coordination

## 2020-08-19 NOTE — PLAN OF CARE
Problem: OCCUPATIONAL THERAPY ADULT  Goal: Performs self-care activities at highest level of function for planned discharge setting  See evaluation for individualized goals  Description: Treatment Interventions: ADL retraining, UE strengthening/ROM, Functional transfer training, Endurance training, Patient/family training, Equipment evaluation/education, Energy conservation, Activityengagement          See flowsheet documentation for full assessment, interventions and recommendations  Outcome: Progressing  Note: Limitation: Decreased ADL status, Decreased UE strength, Decreased Safe judgement during ADL, Decreased endurance, Decreased self-care trans, Decreased high-level ADLs     Assessment: Patient in agreement to complete therapy at this time  Patient reporting pain in chest area from fall but not as much pain as yesterday  Supine to sit Min A with HOB elevated and increased time to complete, Sit to stand txfrs from bed with Min Ax1-2, standing balance/functional mobility with RW and Min Ax1-2 with no LOB noted, patients distance limited due to fatigue  To increase posture, dynamic standing balance, balance during self cares, use of BUE  LE dressing Mod A to shlomo pull up with patient required assistance to string B LEs in pull up  Grooming SUP to comb hair and teeth hygiene  Discussed with OT patients current status, continue plan of care progressing as tolerated       OT Discharge Recommendation: Post-Acute Rehabilitation Services

## 2020-08-19 NOTE — PLAN OF CARE
Problem: PHYSICAL THERAPY ADULT  Goal: Performs mobility at highest level of function for planned discharge setting  See evaluation for individualized goals  Description: Treatment/Interventions: Functional transfer training, LE strengthening/ROM, Therapeutic exercise, Endurance training, Bed mobility, Gait training          See flowsheet documentation for full assessment, interventions and recommendations  Outcome: Progressing  Note: Prognosis: Good  Problem List: Decreased strength, Decreased endurance, Impaired balance, Decreased mobility, Pain, Decreased safety awareness  Assessment: Pt  seen for PT treatment session this date with interventions consisting of therapeutic exercise, bed mobility, transfers gait training w/ emphasis on improving pt's ability to ambulate  Pt  Currently performing  tx and ambulation at (min ) x 1-2 level of function  Utilizing RW with fair balance and stability  Transfer training to increase functional task performance and  to promote safe sit/stand and stand/sit mobility  Pt  education  for hand postion and safety and technique with transfer training  In comparison to previous session, Pt  With improvements in activity tolerance  Tolerated increased distance ambulation  Requiring decreased assist with transfers and ambulation  Pt is in need of continued activity in PT to improve strength balance endurance mobility transfers and ambulation with return to maximize LOF  From PT/mobility standpoint, recommendation at time of d/c would be post acute rehab  in order to promote return to PLOF and independence  PT Discharge Recommendation: Post-Acute Rehabilitation Services          See flowsheet documentation for full assessment

## 2020-08-19 NOTE — OCCUPATIONAL THERAPY NOTE
[address] Progress Note     Patient Name: Tamara Guardian  [id number] Date: 8/19/2020  Problem List  Principal Problem:    Chest wall contusion, right, initial encounter  Active Problems:    Longstanding persistent atrial fibrillation (HCC)    Chronic diastolic heart failure ([facility] [address] )    Ambulatory dysfunction    Hypokalemia    Liver lesion    Rib fractures    CKD (chronic kidney disease), stage II    Pulmonary hypertension ([facility] [address] )       08/19/20 1118   Restrictions/Precautions   Weight Bearing Precautions Per Order No   Other Precautions Fall Risk;O2;Multiple lines; Bed Alarm; Chair Alarm;Pain   Pain Assessment   Pain Assessment Tool 0-10   Pain Location/Orientation Location: Chest   ADL   Where Assessed Edge of bed   Grooming Assistance 5  Supervision/Setup   Grooming Deficit Setup   Grooming Comments Patient required set up of grooming tasks with comb hair and use mouth wash  LB Dressing Assistance 3  Moderate Assistance   LB Dressing Deficit Setup;Supervision/safety; Increased time to complete; Thread RLE into underwear; Thread LLE into underwear;Verbal cueing   Bed Mobility   Supine to Sit 4  Minimal assistance   Additional items Assist x 1; Increased time required;Verbal cues;HOB elevated   Transfers   Sit to Stand 4  Minimal assistance   Additional items Assist x 1;Assist x 2;Verbal cues; Increased time required   Stand to Sit 4  Minimal assistance   Additional items Assist x 2;Assist x 1; Increased time required;Verbal cues   Functional Mobility   Functional Mobility 4  Minimal assistance  (Ax2)   Additional Comments Patient completed standing balance/functional mobility with RW and Min Ax1-2 with no LOB noted with decreased distance due to fatigue  Cognition   Overall Cognitive Status WFL   Arousal/Participation Alert; Responsive; Cooperative   Attention Within functional limits   Orientation Level Oriented X4   Memory Within functional limits   Following Commands Follows all commands and directions without difficulty   Activity Tolerance   Activity Tolerance Patient limited by fatigue;Patient limited by pain   Assessment   Assessment Patient in agreement to complete therapy at this time  Patient reporting pain in chest area from fall but not as much pain as yesterday  Supine to sit Min A with HOB elevated and increased time to complete, Sit to stand txfrs from bed with Min Ax1-2, standing balance/functional mobility with RW and Min Ax1-2 with no LOB noted, patients distance limited due to fatigue  To increase posture, dynamic standing balance, balance during self cares, use of BUE  LE dressing Mod A to shlomo pull up with patient required assistance to string B LEs in pull up  Grooming SUP to comb hair and teeth hygiene  Discussed with OT patients current status, continue plan of care progressing as tolerated  Plan   Goal Expiration Date 08/31/20   OT Treatment Day 2   OT Frequency 3-5x/wk   Recommendation   OT Discharge Recommendation Post-Acute Rehabilitation Services     Patient left in ugo chair with all needs in reach

## 2020-08-19 NOTE — SOCIAL WORK
Spoke with patient and notified her that PT feels she still needs rehab  Pt is refusing to go to Rehab because she has been there several times and does not feel they can make her any stronger  I asked patient if I could call one of her children and patient said that she did not want me to do that because she makes her own decisions

## 2020-08-20 LAB
ANION GAP SERPL CALCULATED.3IONS-SCNC: 11 MMOL/L (ref 4–13)
BASOPHILS # BLD AUTO: 0.07 THOUSANDS/ΜL (ref 0–0.1)
BASOPHILS NFR BLD AUTO: 1 % (ref 0–1)
BUN SERPL-MCNC: 14 MG/DL (ref 5–25)
CALCIUM SERPL-MCNC: 9 MG/DL (ref 8.3–10.1)
CHLORIDE SERPL-SCNC: 102 MMOL/L (ref 100–108)
CO2 SERPL-SCNC: 25 MMOL/L (ref 21–32)
CREAT SERPL-MCNC: 0.91 MG/DL (ref 0.6–1.3)
EOSINOPHIL # BLD AUTO: 0.15 THOUSAND/ΜL (ref 0–0.61)
EOSINOPHIL NFR BLD AUTO: 2 % (ref 0–6)
ERYTHROCYTE [DISTWIDTH] IN BLOOD BY AUTOMATED COUNT: 15.7 % (ref 11.6–15.1)
GFR SERPL CREATININE-BSD FRML MDRD: 58 ML/MIN/1.73SQ M
GLUCOSE SERPL-MCNC: 176 MG/DL (ref 65–140)
HCT VFR BLD AUTO: 36.9 % (ref 34.8–46.1)
HGB BLD-MCNC: 11.8 G/DL (ref 11.5–15.4)
IMM GRANULOCYTES # BLD AUTO: 0.05 THOUSAND/UL (ref 0–0.2)
IMM GRANULOCYTES NFR BLD AUTO: 1 % (ref 0–2)
LYMPHOCYTES # BLD AUTO: 0.85 THOUSANDS/ΜL (ref 0.6–4.47)
LYMPHOCYTES NFR BLD AUTO: 12 % (ref 14–44)
MCH RBC QN AUTO: 30.1 PG (ref 26.8–34.3)
MCHC RBC AUTO-ENTMCNC: 32 G/DL (ref 31.4–37.4)
MCV RBC AUTO: 94 FL (ref 82–98)
MONOCYTES # BLD AUTO: 0.63 THOUSAND/ΜL (ref 0.17–1.22)
MONOCYTES NFR BLD AUTO: 9 % (ref 4–12)
NEUTROPHILS # BLD AUTO: 5.3 THOUSANDS/ΜL (ref 1.85–7.62)
NEUTS SEG NFR BLD AUTO: 75 % (ref 43–75)
NRBC BLD AUTO-RTO: 0 /100 WBCS
PLATELET # BLD AUTO: 212 THOUSANDS/UL (ref 149–390)
PMV BLD AUTO: 9.2 FL (ref 8.9–12.7)
POTASSIUM SERPL-SCNC: 3.7 MMOL/L (ref 3.5–5.3)
RBC # BLD AUTO: 3.92 MILLION/UL (ref 3.81–5.12)
SODIUM SERPL-SCNC: 138 MMOL/L (ref 136–145)
WBC # BLD AUTO: 7.05 THOUSAND/UL (ref 4.31–10.16)

## 2020-08-20 PROCEDURE — 97535 SELF CARE MNGMENT TRAINING: CPT

## 2020-08-20 PROCEDURE — 99232 SBSQ HOSP IP/OBS MODERATE 35: CPT | Performed by: FAMILY MEDICINE

## 2020-08-20 PROCEDURE — 97116 GAIT TRAINING THERAPY: CPT

## 2020-08-20 PROCEDURE — 85025 COMPLETE CBC W/AUTO DIFF WBC: CPT | Performed by: FAMILY MEDICINE

## 2020-08-20 PROCEDURE — 80048 BASIC METABOLIC PNL TOTAL CA: CPT | Performed by: FAMILY MEDICINE

## 2020-08-20 RX ADMIN — ACETAMINOPHEN 975 MG: 325 TABLET, FILM COATED ORAL at 14:09

## 2020-08-20 RX ADMIN — OXYCODONE HYDROCHLORIDE 5 MG: 5 TABLET ORAL at 13:30

## 2020-08-20 RX ADMIN — POLYETHYLENE GLYCOL 3350 17 G: 17 POWDER, FOR SOLUTION ORAL at 08:19

## 2020-08-20 RX ADMIN — OXYCODONE HYDROCHLORIDE 10 MG: 10 TABLET ORAL at 00:10

## 2020-08-20 RX ADMIN — LIDOCAINE 5% 1 PATCH: 700 PATCH TOPICAL at 08:18

## 2020-08-20 RX ADMIN — ACETAMINOPHEN 975 MG: 325 TABLET, FILM COATED ORAL at 21:09

## 2020-08-20 RX ADMIN — PANTOPRAZOLE SODIUM 40 MG: 40 TABLET, DELAYED RELEASE ORAL at 05:29

## 2020-08-20 RX ADMIN — DILTIAZEM HYDROCHLORIDE 90 MG: 90 TABLET, FILM COATED ORAL at 21:09

## 2020-08-20 RX ADMIN — METOPROLOL TARTRATE 25 MG: 25 TABLET, FILM COATED ORAL at 17:41

## 2020-08-20 RX ADMIN — METHOCARBAMOL TABLETS 500 MG: 500 TABLET, COATED ORAL at 05:29

## 2020-08-20 RX ADMIN — METHOCARBAMOL TABLETS 500 MG: 500 TABLET, COATED ORAL at 21:09

## 2020-08-20 RX ADMIN — DILTIAZEM HYDROCHLORIDE 90 MG: 90 TABLET, FILM COATED ORAL at 05:29

## 2020-08-20 RX ADMIN — OXYBUTYNIN CHLORIDE 5 MG: 5 TABLET ORAL at 08:17

## 2020-08-20 RX ADMIN — SERTRALINE HYDROCHLORIDE 50 MG: 50 TABLET ORAL at 08:17

## 2020-08-20 RX ADMIN — ACETAMINOPHEN 975 MG: 325 TABLET, FILM COATED ORAL at 05:29

## 2020-08-20 RX ADMIN — ENOXAPARIN SODIUM 40 MG: 40 INJECTION SUBCUTANEOUS at 08:18

## 2020-08-20 RX ADMIN — PRAVASTATIN SODIUM 40 MG: 40 TABLET ORAL at 16:45

## 2020-08-20 RX ADMIN — DILTIAZEM HYDROCHLORIDE 90 MG: 90 TABLET, FILM COATED ORAL at 14:08

## 2020-08-20 RX ADMIN — METHOCARBAMOL TABLETS 500 MG: 500 TABLET, COATED ORAL at 14:08

## 2020-08-20 RX ADMIN — APIXABAN 2.5 MG: 2.5 TABLET, FILM COATED ORAL at 17:41

## 2020-08-20 RX ADMIN — METOPROLOL TARTRATE 25 MG: 25 TABLET, FILM COATED ORAL at 08:17

## 2020-08-20 NOTE — SOCIAL WORK
Pt was accepted at [facility] for admission tomorrow  I will arrange wheel chair Woody Garcia transport for tomorrow

## 2020-08-20 NOTE — SOCIAL WORK
Pt is agreeable to go STR at [facility] [place]  Pt would like me to Send a referral to [facility] [place], Referral sent as requested

## 2020-08-20 NOTE — ASSESSMENT & PLAN NOTE
Patient presented to ER at [address] [place] [address] for right chest wall discomfort following fall at home 2 days ago  She reportedly tripped while walking in her bedroom and fell onto the floor  She was using a walker at the time  She had a similar fall in June, sustaining nondisplaced left rib fractures, and subsequently went to post-acute rehab facility for approximately 3 weeks    Trauma scans reviewed, no evidence of bleeding  Adjust pain medication regimen  Plan for short-term rehab placement at [facility]

## 2020-08-20 NOTE — PROGRESS NOTES
Progress Note - Mary Dailey 1935, 81 y o  female MRN: [id number]    Unit/Bed#: 012-98 Encounter: [id number]    Primary Care Provider: Dario Madison MD   Date and time admitted to hospital: 8/16/2020 10:33 PM        * Chest wall contusion, right, initial encounter  [address] [place] [address]  Patient presented to ER at [facility] for right chest wall discomfort following fall at home 2 days ago  She reportedly tripped while walking in her bedroom and fell onto the floor  She was using a walker at the time  She had a similar fall in June, sustaining nondisplaced left rib fractures, and subsequently went to post-acute rehab facility for approximately 3 weeks  Trauma scans reviewed, no evidence of bleeding  Adjust pain medication regimen  Plan for short-term rehab placement at [facility] tomorrow       Pulmonary hypertension [facility])  Assessment & Plan  Outpatient Pulmonology evaluation  Outpatient sleep study to check for obstructive sleep apnea    Ambulatory dysfunction  Assessment & Plan  Patient has longstanding multifactorial ambulatory dysfunction, primarily due to physical deconditioning  She normally ambulates with walker at home  Will consult PT/OT for evaluation, treatment, and recommendations regarding post-acute care  Plan to discharge to [facility] tomorrow      Chronic diastolic heart failure [facility])  Assessment & Plan  Wt Readings from Last 3 Encounters:   08/20/20 61 5 kg (135 lb 9 3 oz)   08/17/20 59 kg (130 lb 1 1 oz)   06/27/20 60 kg (132 lb 4 4 oz)     No evidence of acute exacerbation  Continue outpatient medication regimen  Monitor I/O and daily weight  Longstanding persistent atrial fibrillation (HCC)  Assessment & Plan  Rate controlled  Continue Cardizem and metoprolol  Patient currently on Eliquis for anticoagulation          VTE Pharmacologic Prophylaxis:   Pharmacologic: Apixaban (Eliquis)  Mechanical VTE Prophylaxis in Place: Yes    Patient Centered Rounds: I have performed bedside rounds with nursing staff today  Discussions with Specialists or Other Care Team Provider:  Case management    Education and Discussions with Family / Patient:  Patient    Time Spent for Care: 45 minutes  More than 50% of total time spent on counseling and coordination of care as described above  Current Length of Stay: 4 day(s)    Current Patient Status: Inpatient   Certification Statement: The patient will continue to require additional inpatient hospital stay due to Placement    Discharge Plan:  Tomorrow    Code Status: Level 1 - Full Code      Subjective:   Patient reports persistent pain status post fall but states that it is relieved with her medication regimen  Denies shortness of breath  No overnight events    Objective:     Vitals:   Temp (24hrs), Av 1 °F (36 7 °C), Min:97 8 °F (36 6 °C), Max:98 5 °F (36 9 °C)    Temp:  [97 8 °F (36 6 °C)-98 5 °F (36 9 °C)] 97 8 °F (36 6 °C)  HR:  [] 122  Resp:  [18-21] 21  BP: (117-139)/(61-96) 137/96  SpO2:  [96 %-100 %] 96 %  Body mass index is 27 38 kg/m²  Input and Output Summary (last 24 hours): Intake/Output Summary (Last 24 hours) at 2020 1632  Last data filed at 2020 1238  Gross per 24 hour   Intake 660 ml   Output 720 ml   Net -60 ml       Physical Exam:     Physical Exam  Constitutional:       General: She is not in acute distress  HENT:      Head: Normocephalic and atraumatic  Nose: No congestion  Mouth/Throat:      Mouth: Mucous membranes are moist    Eyes:      Conjunctiva/sclera: Conjunctivae normal    Cardiovascular:      Rate and Rhythm: Normal rate  Rhythm irregular  Heart sounds: No murmur  Pulmonary:      Effort: Pulmonary effort is normal    Chest:      Chest wall: Tenderness (R-sided ecchymosis ) present  Abdominal:      General: There is no distension  Musculoskeletal:      Left lower leg: No edema  Skin:     General: Skin is warm and dry     Neurological:      Mental Status: She is oriented to person, place, and time  Psychiatric:         Mood and Affect: Mood normal          Additional Data:     Labs:    Results from last 7 days   Lab Units 08/20/20  0944   WBC Thousand/uL 7 05   HEMOGLOBIN g/dL 11 8   HEMATOCRIT % 36 9   PLATELETS Thousands/uL 212   NEUTROS PCT % 75   LYMPHS PCT % 12*   MONOS PCT % 9   EOS PCT % 2     Results from last 7 days   Lab Units 08/20/20  0944 08/18/20  0548   SODIUM mmol/L 138 135*   POTASSIUM mmol/L 3 7 4 3   CHLORIDE mmol/L 102 103   CO2 mmol/L 25 24   BUN mg/dL 14 21   CREATININE mg/dL 0 91 0 79   ANION GAP mmol/L 11 8   CALCIUM mg/dL 9 0 9 1   ALBUMIN g/dL  --  3 2*   TOTAL BILIRUBIN mg/dL  --  0 90   ALK PHOS U/L  --  60   ALT U/L  --  18   AST U/L  --  15   GLUCOSE RANDOM mg/dL 176* 119                 Results from last 7 days   Lab Units 08/18/20  0548   LACTIC ACID mmol/L 0 9   PROCALCITONIN ng/ml <0 05           * I Have Reviewed All Lab Data Listed Above  * Additional Pertinent Lab Tests Reviewed:  Alex [address] Admission Reviewed    Imaging:    Imaging Reports Reviewed Today Include: CXR      Recent Cultures (last 7 days):           Last 24 Hours Medication List:   Current Facility-Administered Medications   Medication Dose Route Frequency Provider Last Rate    acetaminophen  975 mg Oral Q8H [address] Snehal Robles MD      ALPRAZolam  0 25 mg Oral HS PRN Beatriz Kim MD      aluminum-magnesium hydroxide-simethicone  30 mL Oral Q6H PRN Beatriz Kim MD      bisacodyl  5 mg Oral Daily PRN Beatriz Kim MD      diltiazem  90 mg Oral [facility] Jese Lepe DO      enoxaparin  40 mg Subcutaneous Q24H [address] Jese Lepe DO      HYDROmorphone  0 5 mg Intravenous Q4H PRN Jese Lepe DO      lidocaine  1 patch Topical Daily Beatriz Kim MD      lidocaine  1 patch Topical Daily Snehal Robles MD      methocarbamol  500 mg Oral Q8H [facility], MD      metoprolol  5 mg Intravenous Q6H PRN Silvio Segura, DO      metoprolol tartrate  25 mg Oral BID Silvio Segura, DO      ondansetron  4 mg Intravenous Q4H PRN Alin Rodriguez MD      oxybutynin  5 mg Oral Daily Alin Rodriguez MD      oxyCODONE  5 mg Oral Q4H PRN Valeriy Koehler MD      Or   Fina Cuevas oxyCODONE  10 mg Oral Q4H PRN Valeriy Koehler MD      pantoprazole  40 mg Oral Early Morning Alin Rodriguez MD      polyethylene glycol  17 g Oral Daily Silvio Segura, DO      pravastatin  40 mg Oral Daily With Darwin Cook MD      sertraline  50 mg Oral Daily Alin Rodriguez MD          Today, Patient Was Seen By: Socorro Hooker MD    ** Please Note: Dictation voice to text software may have been used in the creation of this document   **

## 2020-08-20 NOTE — OCCUPATIONAL THERAPY NOTE
[address] Progress Note     Patient Name: Amirah OTOOLE Date: 8/20/2020  Problem List  Principal Problem:    Chest wall contusion, right, initial encounter  Active Problems:    Longstanding persistent atrial fibrillation (HCC)    Chronic diastolic heart failure ([facility] [address] )    Ambulatory dysfunction    Hypokalemia    Liver lesion    Rib fractures    CKD (chronic kidney disease), stage II    Pulmonary hypertension ([facility] [address] )       08/20/20 1335   Restrictions/Precautions   Weight Bearing Precautions Per Order No   Other Precautions Fall Risk;Pain; Chair Alarm   Pain Assessment   Pain Assessment Tool 0-10   Pain Score 8   Pain Location/Orientation Location: Chest;Location: Back   ADL   Where Assessed Edge of bed   LB Dressing Assistance 3  Moderate Assistance   LB Dressing Deficit Thread RLE into underwear; Thread LLE into underwear;Pull up over hips   LB Dressing Comments pt requires encouragement to complete at increased independent level; pt with decreased endurance    Bed Mobility   Additional Comments pt on RA during session; SpO2 WFL with no complaints of SOB; pt seated in chair at start and end of session   Transfers   Sit to Stand 4  Minimal assistance   Additional items Assist x 1; Increased time required;Verbal cues  (RW)   Stand to Sit 4  Minimal assistance   Additional items Assist x 1; Increased time required;Verbal cues  (RW)   Additional Comments performed with increased time and encouragement; no significant LOB or instability   Functional Mobility   Functional Mobility 4  Minimal assistance   Additional Comments x1; pt performed functional mobility in room; limited due to endurance   Additional items Rolling walker   Cognition   Overall Cognitive Status [facility]   Arousal/Participation Alert; Cooperative   Attention Within functional limits   Orientation Level Oriented X4   Memory Within functional limits   Following Commands Follows all commands and directions without difficulty   Activity Tolerance   Activity Tolerance Patient limited by fatigue   Assessment   Assessment pt performed functional mobility and dressing; performed mobility with decreased endurance and will continue to require skilled OT intervention during hospital admission; will recommend STR upon discharge; will continue as POC   Plan   Goal Expiration Date 08/31/20   OT Treatment Day 3   OT Frequency 3-5x/wk   Recommendation   OT Discharge Recommendation Post-Acute Rehabilitation Services     Pt will benefit from continued OT services in order to maximize (I) c ADL performance, FM c RW, and improve overall endurance/strength required to complete functional tasks in preparation for d/c  Pt left seated in chair at end of session; all needs within reach; all lines intact; scds connected and turned on

## 2020-08-20 NOTE — PLAN OF CARE
Problem: PHYSICAL THERAPY ADULT  Goal: Performs mobility at highest level of function for planned discharge setting  See evaluation for individualized goals  Description: Treatment/Interventions: Functional transfer training, LE strengthening/ROM, Therapeutic exercise, Endurance training, Bed mobility, Gait training          See flowsheet documentation for full assessment, interventions and recommendations  Outcome: Progressing  Note: Prognosis: Good  Problem List: Decreased strength, Decreased endurance, Impaired balance, Decreased mobility, Pain  Assessment: Pt seen this date for PT treatment session to increase functional activity tolerance and overall level of mobility  Pt reporting 8/10 R chest/shoulder pain but in agreement with ambulating within room  Pt able to ambulate 30' with RW before requiring seated rest break; no LOB experienced  HR and SpO2 remained WFL on RA  Occasional verbal cues provided throughout session for safety awareness, sequencing, and encouragement  Pt reports she is feeling a little better each day  Pt will benefit from continued PT intervention to address remaining deficits and facilitate safe d/c when medically appropriate  D/c recommendation continues to be post-acute rehabilitation services  PT Discharge Recommendation: Post-Acute Rehabilitation Services          See flowsheet documentation for full assessment

## 2020-08-20 NOTE — ASSESSMENT & PLAN NOTE
Patient has longstanding multifactorial ambulatory dysfunction, primarily due to physical deconditioning  She normally ambulates with walker at home  Will consult PT/OT for evaluation, treatment, and recommendations regarding post-acute care    Plan to discharge to [facility] tomorrow

## 2020-08-20 NOTE — PHYSICAL THERAPY NOTE
Physical Therapy Treatment    Patient Name: Tika Anne    [id number] Date: 8/20/2020     Problem List  Principal Problem:    Chest wall contusion, right, initial encounter  Active Problems:    Longstanding persistent atrial fibrillation (HCC)    [facility] ([facility] )    Ambulatory dysfunction    Hypokalemia    Liver lesion    Rib fractures    CKD (chronic kidney disease), stage II    Pulmonary hypertension ([facility] [address] )       Past Medical History  Past Medical History:   Diagnosis Date    Acute on chronic diastolic congestive heart failure ([facility] [address] ) 6/27/2019    Ambulatory dysfunction 8/17/2020    Arthritis     Chest wall contusion, right, initial encounter 8/17/2020    Chest wall contusion, right, subsequent encounter 8/17/2020    Chronic diastolic heart failure (HCC)     Chronic kidney disease, stage 2 (mild)     Coronary artery disease     history of stenting    Eczema     years    Edema     History of echocardiogram 07/20/2017    EF 55%, mild concentric LVH, bileaflet MVP with moderate MR, left atrial enlargement   History of transfusion     Hyperlipidemia     Hypertension     Hypo-osmolality and hyponatremia     Hypokalemia 7/11/2019    Mitral regurgitation         Past Surgical History  Past Surgical History:   Procedure Laterality Date    ABDOMINAL SURGERY      hysterectomy    CARDIAC CATHETERIZATION  04/10/2012    EF 65%, no significant CAD, patent stents, severe MR     CORONARY ANGIOPLASTY WITH STENT PLACEMENT  03/21/2007    EF 55%, GARRET to LAD   EYE SURGERY      b/l cataracts    HYSTERECTOMY      JOINT REPLACEMENT      Rt knee           08/20/20 1334   Pain Assessment   Pain Assessment Tool 0-10   Pain Score 8   Pain Location/Orientation Orientation: Left; Location: Chest   Restrictions/Precautions   Weight Bearing Precautions Per Order No   Other Precautions Fall Risk;Pain; Chair Alarm   General   Chart Reviewed Yes Family/Caregiver Present No   Cognition   Overall Cognitive Status WFL   Arousal/Participation Alert   Attention Attends with cues to redirect   Orientation Level Oriented X4   Following Commands Follows all commands and directions without difficulty   Subjective   Subjective "Once I'm up I'm okay"   Bed Mobility   Additional Comments pt OOB at start/end of session   Transfers   Sit to Stand 4  Minimal assistance   Additional items Assist x 1; Armrests; Increased time required;Verbal cues   Stand to Sit 4  Minimal assistance   Additional items Assist x 1; Increased time required;Verbal cues;Armrests   Additional Comments RW used   Ambulation/Elevation   Gait pattern Excessively slow; Short stride; Foward flexed;Decreased foot clearance; Antalgic   Gait Assistance 4  Minimal assist   Additional items Assist x 1;Verbal cues   Assistive Device Rolling walker   Distance 30'   Stair Management Assistance Not tested   Balance   Static Sitting Good   Dynamic Sitting Good   Static Standing Fair +   Dynamic Standing Fair   Ambulatory Fair  (with RW)   Endurance Deficit   Endurance Deficit Yes   Activity Tolerance   Activity Tolerance Patient limited by fatigue;Patient limited by pain   Assessment   Prognosis Good   Problem List Decreased strength;Decreased endurance; Impaired balance;Decreased mobility;Pain   Assessment Pt seen this date for PT treatment session to increase functional activity tolerance and overall level of mobility  Pt reporting 8/10 R chest/shoulder pain but in agreement with ambulating within room  Pt able to ambulate 30' with RW before requiring seated rest break; no LOB experienced  HR and SpO2 remained WFL on RA  Occasional verbal cues provided throughout session for safety awareness, sequencing, and encouragement  Pt reports she is feeling a little better each day  Pt will benefit from continued PT intervention to address remaining deficits and facilitate safe d/c when medically appropriate   D/c recommendation continues to be post-acute rehabilitation services  Goals   LTG Expiration Date 08/31/20   PT Treatment Day 3   Plan   Treatment/Interventions Functional transfer training;LE strengthening/ROM; Therapeutic exercise; Endurance training;Bed mobility;Gait training   Progress Slow progress, decreased activity tolerance   PT Frequency Other (Comment)  (3-5x/week)   Recommendation   PT Discharge Recommendation Post-Acute Rehabilitation Services     Pt seated in recliner at end of session with B SCDs applied and all needs in reach

## 2020-08-20 NOTE — PLAN OF CARE
Problem: OCCUPATIONAL THERAPY ADULT  Goal: Performs self-care activities at highest level of function for planned discharge setting  See evaluation for individualized goals  Description: Treatment Interventions: ADL retraining, UE strengthening/ROM, Functional transfer training, Endurance training, Patient/family training, Equipment evaluation/education, Energy conservation, Activityengagement          See flowsheet documentation for full assessment, interventions and recommendations     Outcome: Progressing  Note: Limitation: Decreased ADL status, Decreased UE strength, Decreased Safe judgement during ADL, Decreased endurance, Decreased self-care trans, Decreased high-level ADLs     Assessment: pt performed functional mobility and dressing; performed mobility with decreased endurance and will continue to require skilled OT intervention during hospital admission; will recommend STR upon discharge; will continue as [address]     OT Discharge Recommendation: Post-Acute Rehabilitation Services

## 2020-08-20 NOTE — PLAN OF CARE
Problem: Potential for Falls  Goal: Patient will remain free of falls  Description: INTERVENTIONS:  - Assess patient frequently for physical needs  -  Identify cognitive and physical deficits and behaviors that affect risk of falls  -  [place] fall precautions as indicated by assessment   - Educate patient/family on patient safety including physical limitations  - Instruct patient to call for assistance with activity based on assessment  - Modify environment to reduce risk of injury  - Consider OT/PT consult to assist with strengthening/mobility  Outcome: Progressing     Problem: Nutrition/Hydration-ADULT  Goal: Nutrient/Hydration intake appropriate for improving, restoring or maintaining nutritional needs  Description: Monitor and assess patient's nutrition/hydration status for malnutrition  Collaborate with interdisciplinary team and initiate plan and interventions as ordered  Monitor patient's weight and dietary intake as ordered or per policy  Utilize nutrition screening tool and intervene as necessary  Determine patient's food preferences and provide high-protein, high-caloric foods as appropriate       INTERVENTIONS:  - Monitor oral intake, urinary output, labs, and treatment plans  - Assess nutrition and hydration status and recommend course of action  - Evaluate amount of meals eaten  - Assist patient with eating if necessary   - Allow adequate time for meals  - Recommend/ encourage appropriate diets, oral nutritional supplements, and vitamin/mineral supplements  - Order, calculate, and assess calorie counts as needed  - Recommend, monitor, and adjust tube feedings and TPN/PPN based on assessed needs  - Assess need for intravenous fluids  - Provide specific nutrition/hydration education as appropriate  - Include patient/family/caregiver in decisions related to nutrition  Outcome: Progressing     Problem: PAIN - ADULT  Goal: Verbalizes/displays adequate comfort level or baseline comfort level  Description: Interventions:  - Encourage patient to monitor pain and request assistance  - Assess pain using appropriate pain scale  - Administer analgesics based on type and severity of pain and evaluate response  - Implement non-pharmacological measures as appropriate and evaluate response  - Consider cultural and social influences on pain and pain management  - Notify physician/advanced practitioner if interventions unsuccessful or patient reports new pain  Outcome: Progressing     Problem: INFECTION - ADULT  Goal: Absence or prevention of progression during hospitalization  Description: INTERVENTIONS:  - Assess and monitor for signs and symptoms of infection  - Monitor lab/diagnostic results  - Monitor all insertion sites, i e  indwelling lines, tubes, and drains  - Monitor endotracheal if appropriate and nasal secretions for changes in amount and color  - [place] appropriate cooling/warming therapies per order  - Administer medications as ordered  - Instruct and encourage patient and family to use good hand hygiene technique  - Identify and instruct in appropriate isolation precautions for identified infection/condition  Outcome: Progressing     Problem: SAFETY ADULT  Goal: Patient will remain free of falls  Description: INTERVENTIONS:  - Assess patient frequently for physical needs  -  Identify cognitive and physical deficits and behaviors that affect risk of falls    -  [place] fall precautions as indicated by assessment   - Educate patient/family on patient safety including physical limitations  - Instruct patient to call for assistance with activity based on assessment  - Modify environment to reduce risk of injury  - Consider OT/PT consult to assist with strengthening/mobility  Outcome: Progressing  Goal: Maintain or return to baseline ADL function  Description: INTERVENTIONS:  -  Assess patient's ability to carry out ADLs; assess patient's baseline for ADL function and identify physical deficits which impact ability to perform ADLs (bathing, care of mouth/teeth, toileting, grooming, dressing, etc )  - Assess/evaluate cause of self-care deficits   - Assess range of motion  - Assess patient's mobility; develop plan if impaired  - Assess patient's need for assistive devices and provide as appropriate  - Encourage maximum independence but intervene and supervise when necessary  - Involve family in performance of ADLs  - Assess for home care needs following discharge   - Consider OT consult to assist with ADL evaluation and planning for discharge  - Provide patient education as appropriate  Outcome: Progressing  Goal: Maintain or return mobility status to optimal level  Description: INTERVENTIONS:  - Assess patient's baseline mobility status (ambulation, transfers, stairs, etc )    - Identify cognitive and physical deficits and behaviors that affect mobility  - Identify mobility aids required to assist with transfers and/or ambulation (gait belt, sit-to-stand, lift, walker, cane, etc )  - [place] fall precautions as indicated by assessment  - Record patient progress and toleration of activity level on Mobility SBAR; progress patient to next Phase/Stage  - Instruct patient to call for assistance with activity based on assessment  - Consider rehabilitation consult to assist with strengthening/weightbearing, etc   Outcome: Progressing     Problem: DISCHARGE PLANNING  Goal: Discharge to home or other facility with appropriate resources  Description: INTERVENTIONS:  - Identify barriers to discharge w/patient and caregiver  - Arrange for needed discharge resources and transportation as appropriate  - Identify discharge learning needs (meds, wound care, etc )  - Arrange for interpretive services to assist at discharge as needed  - Refer to Case Management Department for coordinating discharge planning if the patient needs post-hospital services based on physician/advanced practitioner order or complex needs related to functional status, cognitive ability, or social support system  Outcome: Progressing     Problem: Knowledge Deficit  Goal: Patient/family/caregiver demonstrates understanding of disease process, treatment plan, medications, and discharge instructions  Description: Complete learning assessment and assess knowledge base    Interventions:  - Provide teaching at level of understanding  - Provide teaching via preferred learning methods  Outcome: Progressing

## 2020-08-20 NOTE — ASSESSMENT & PLAN NOTE
Rate controlled  Continue Cardizem and metoprolol  Patient currently on Eliquis for anticoagulation

## 2020-08-20 NOTE — ASSESSMENT & PLAN NOTE
Wt Readings from Last 3 Encounters:   08/20/20 61 5 kg (135 lb 9 3 oz)   08/17/20 59 kg (130 lb 1 1 oz)   06/27/20 60 kg (132 lb 4 4 oz)     No evidence of acute exacerbation  Continue outpatient medication regimen  Monitor I/O and daily weight

## 2020-08-20 NOTE — NURSING NOTE
Annual Wellness Visit    Braulio Zuniga is a 86 year old male of Bhavin Resendiz MD who presents for an annual wellness visit.    Patient Active Problem List   Diagnosis   • Cardiac arrhythmia   • Congestive heart failure (CMS/HCC)   • Hyperlipidemia   • Asymptomatic PVCs   • Atherosclerosis of native coronary artery of native heart without angina pectoris   • Personal history of malignant neoplasm of bronchus and lung   • Adenocarcinoma of lung (CMS/HCC)   • Benign familial tremor   • Chronic venous hypertension (idiopathic) with inflammation of bilateral lower extremity   • Benign prostatic hyperplasia   • Irregular heartbeat   • Cancer of lung, upper lobe (CMS/HCC)   • Essential hypertension       ALLERGIES:  No Known Allergies    Current Outpatient Medications   Medication Sig   • metroNIDAZOLE (METROCREAM) 0.75 % cream Apply topically 2 times daily.   • hydrochlorothiazide (HYDRODIURIL) 25 MG tablet TAKE 1 TABLET BY MOUTH DAILY   • primidone (MYSOLINE) 50 MG tablet Take 50 mg by mouth 3 times daily.   • atorvastatin (LIPITOR) 10 MG tablet TAKE 1 TABLET BY MOUTH EVERY DAY AT BEDTIME   • clopidogrel (PLAVIX) 75 MG tablet TAKE 1 TABLET BY MOUTH DAILY   • metoPROLOL succinate (TOPROL-XL) 25 MG 24 hr tablet TAKE 1 TABLET BY MOUTH DAILY   • fluticasone (FLONASE) 50 MCG/ACT nasal spray SHAKE LIQUID AND USE 2 SPRAYS IN EACH NOSTRIL EVERY DAY   • docusate sodium (COLACE) 100 MG capsule Take 100 mg by mouth every other day.    • aspirin 81 MG tablet Take 1 tablet by mouth daily.     No current facility-administered medications for this visit.        Health Risk Assessment was reviewed.      Medicare Health Risk Assessment    Demographics:  Name: Braulio Zuniga  Age: 86 year old  Gender: male     Self Assessment:  How do you consider your health status? good  Do you feel weak? no  Are you able to carry out your daily routines? yes    Psychosocial Risks:  Depression Screening:  Over the past 2 weeks, has  Patient AAOX3, forgetful at times  Patient continues on bed and chair alarm  Vital signs stable and documented, patient continues to take off pulse ox monitor  Medicated today with oxy 5 for pain with good relief  Will continue to monitor  Jaziel Landin LPN

## 2020-08-21 VITALS
DIASTOLIC BLOOD PRESSURE: 83 MMHG | HEIGHT: 59 IN | OXYGEN SATURATION: 94 % | RESPIRATION RATE: 18 BRPM | WEIGHT: 133.82 LBS | BODY MASS INDEX: 26.98 KG/M2 | SYSTOLIC BLOOD PRESSURE: 123 MMHG | TEMPERATURE: 98.3 F | HEART RATE: 124 BPM

## 2020-08-21 LAB — SARS-COV-2 RNA RESP QL NAA+PROBE: NEGATIVE

## 2020-08-21 PROCEDURE — 97530 THERAPEUTIC ACTIVITIES: CPT

## 2020-08-21 PROCEDURE — 97116 GAIT TRAINING THERAPY: CPT

## 2020-08-21 PROCEDURE — 99239 HOSP IP/OBS DSCHRG MGMT >30: CPT | Performed by: FAMILY MEDICINE

## 2020-08-21 PROCEDURE — 87635 SARS-COV-2 COVID-19 AMP PRB: CPT | Performed by: FAMILY MEDICINE

## 2020-08-21 RX ORDER — OXYCODONE HYDROCHLORIDE 5 MG/1
5 TABLET ORAL EVERY 6 HOURS PRN
Qty: 12 TABLET | Refills: 0 | Status: SHIPPED | OUTPATIENT
Start: 2020-08-21 | End: 2020-08-25

## 2020-08-21 RX ORDER — ALPRAZOLAM 0.25 MG/1
0.25 TABLET ORAL
Qty: 5 TABLET | Refills: 0 | Status: SHIPPED | OUTPATIENT
Start: 2020-08-21 | End: 2022-08-09

## 2020-08-21 RX ADMIN — PANTOPRAZOLE SODIUM 40 MG: 40 TABLET, DELAYED RELEASE ORAL at 05:37

## 2020-08-21 RX ADMIN — APIXABAN 2.5 MG: 2.5 TABLET, FILM COATED ORAL at 08:13

## 2020-08-21 RX ADMIN — SERTRALINE HYDROCHLORIDE 50 MG: 50 TABLET ORAL at 08:13

## 2020-08-21 RX ADMIN — ACETAMINOPHEN 975 MG: 325 TABLET, FILM COATED ORAL at 05:36

## 2020-08-21 RX ADMIN — METOPROLOL TARTRATE 25 MG: 25 TABLET, FILM COATED ORAL at 08:13

## 2020-08-21 RX ADMIN — LIDOCAINE 5% 1 PATCH: 700 PATCH TOPICAL at 08:13

## 2020-08-21 RX ADMIN — OXYBUTYNIN CHLORIDE 5 MG: 5 TABLET ORAL at 08:13

## 2020-08-21 RX ADMIN — DILTIAZEM HYDROCHLORIDE 90 MG: 90 TABLET, FILM COATED ORAL at 05:36

## 2020-08-21 RX ADMIN — METHOCARBAMOL TABLETS 500 MG: 500 TABLET, COATED ORAL at 05:37

## 2020-08-21 RX ADMIN — POLYETHYLENE GLYCOL 3350 17 G: 17 POWDER, FOR SOLUTION ORAL at 08:13

## 2020-08-21 NOTE — ASSESSMENT & PLAN NOTE
Per CT of the chest, "Unchanged alignment at the subacute left 8th through 10th rib fractures"  Discharge on pain medication regimen

## 2020-08-21 NOTE — NURSING NOTE
Patient AAOX4, assist of 1 oob to chair  IV site removed  No complaints of pain this morning  All belongings packed for discharge  Report given to receiving facility [facility] rehab

## 2020-08-21 NOTE — PHYSICAL THERAPY NOTE
PHYSICAL THERAPY NOTE          Patient Name: Vero Abernathy  HXGUE'C Date: 8/21/2020 08/21/20 1135   Restrictions/Precautions   Other Precautions   (Fall Risk;Pain; Chair Alarm )   Subjective   Subjective Agreeable to therapy after encouragment   Bed Mobility   Supine to Sit 4  Minimal assistance   Additional items Assist x 1;HOB elevated; Bedrails; Increased time required;Verbal cues;LE management   Transfers   Sit to Stand 4  Minimal assistance   Additional items Assist x 1; Increased time required   Stand to Sit 4  Minimal assistance   Additional items Assist x 1; Increased time required   Stand pivot 4  Minimal assistance   Ambulation/Elevation   Gait Assistance 4  Minimal assist   Additional items Assist x 1;Verbal cues   Assistive Device Rolling walker   Distance 25'   Balance   Ambulatory Fair  (RW)   Endurance Deficit   Endurance Deficit Yes   Activity Tolerance   Activity Tolerance Patient limited by fatigue;Patient limited by pain   Assessment   Prognosis Good   Problem List Decreased strength;Decreased endurance; Impaired balance;Decreased mobility;Pain   Assessment Performing tx/ambulation min x 1 assist utilizng RW  Limited by weakness and fatigue  Compared to last session, mobility is slowly improving  Pt will benefit from continued PT intervention to address remaining deficits and facilitate safe d/c when medically appropriate  D/c recommendation continues to be post-acute rehabilitation services  Goals   LTG Expiration Date 08/31/20   PT Treatment Day 4   Plan   Treatment/Interventions Functional transfer training;LE strengthening/ROM; Therapeutic exercise; Endurance training;Bed mobility;Gait training   Progress Slow progress, decreased activity tolerance   Recommendation   PT Discharge Recommendation Post-Acute Rehabilitation Services

## 2020-08-21 NOTE — ASSESSMENT & PLAN NOTE
Wt Readings from Last 3 Encounters:   08/21/20 60 7 kg (133 lb 13 1 oz)   08/17/20 59 kg (130 lb 1 1 oz)   06/27/20 60 kg (132 lb 4 4 oz)     No evidence of acute exacerbation  Continue outpatient medication regimen    Monitor daily weight upon discharge

## 2020-08-21 NOTE — ASSESSMENT & PLAN NOTE
Patient has longstanding multifactorial ambulatory dysfunction, primarily due to physical deconditioning      Per PT/OT recommendations, discharge to [facility]note 3[facility] Colchicine Counseling:  Patient counseled regarding adverse effects including but not limited to stomach upset (nausea, vomiting, stomach pain, or diarrhea).  Patient instructed to limit alcohol consumption while taking this medication.  Colchicine may reduce blood counts especially with prolonged use.  The patient understands that monitoring of kidney function and blood counts may be required, especially at baseline. The patient verbalized understanding of the proper use and possible adverse effects of colchicine.  All of the patient's questions and concerns were addressed.

## 2020-08-21 NOTE — DISCHARGE SUMMARY
Discharge- Haleigh Jones 1935, 81 y o  female MRN: [id number]    Unit/Bed#: 297-59 Encounter: [id number]    Primary Care Provider: Babatunde Lopez MD   Date and time admitted to hospital: 8/16/2020 10:33 PM        * Chest wall contusion, right, initial encounter  [address] [place] [address]  Patient presented to ER at [facility] for right chest wall discomfort following fall at home 2 days ago  She reportedly tripped while walking in her bedroom and fell onto the floor  She was using a walker at the time  She had a similar fall in June, sustaining nondisplaced left rib fractures, and subsequently went to post-acute rehab facility for approximately 3 weeks  Trauma scans reviewed, no evidence of bleeding  Adjust pain medication regimen  Short-term rehab placement at [facility]       Pulmonary hypertension [facility])  Assessment & Plan  Outpatient Pulmonology evaluation  Outpatient sleep study to check for obstructive sleep apnea    Rib fractures  Assessment & Plan  Per CT of the chest, "Unchanged alignment at the subacute left 8th through 10th rib fractures"  Discharge on pain medication regimen     Liver lesion  Assessment & Plan  Per CT of the chest, abdomen, pelvis with contrast report "Hyperenhancing foci in the 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, but warrant further evaluation with abdominal MRI without and with contrast to exclude more aggressive lesions  " - this can be done on outpatient basis     Ambulatory dysfunction  Assessment & Plan  Patient has longstanding multifactorial ambulatory dysfunction, primarily due to physical deconditioning  Per PT/OT recommendations, discharge to STR at [facility]       Chronic diastolic heart failure [facility])  Assessment & Plan  Wt Readings from Last 3 Encounters:   08/21/20 60 7 kg (133 lb 13 1 oz)   08/17/20 59 kg (130 lb 1 1 oz)   06/27/20 60 kg (132 lb 4 4 oz)     No evidence of acute exacerbation  Continue outpatient medication regimen    Monitor daily weight upon discharge       Longstanding persistent atrial fibrillation (HCC)  Assessment & Plan  Rate controlled  Continue Cardizem and metoprolol  Patient currently on Eliquis for anticoagulation  Discharging Physician / Practitioner: Phillip Lake MD  PCP: Blair Lopez MD  Admission Date:   Admission Orders (From admission, onward)     Ordered        08/17/20 0022  Inpatient Admission  Once                   Discharge Date: 08/21/20    Resolved Problems  Date Reviewed: 8/21/2020    None          Consultations During Hospital Stay:  · PT, OT    Procedures Performed:   XR chest portable   Final Result by Cheikh Beck MD (08/19 1022)   Poor inspiration with prominent markings  No acute cardiopulmonary disease  Workstation performed: [id number]         CT chest abdomen pelvis w contrast   Final Result by  (08/21 4923)   Addendum 1 of 1 by Khurram Rowley MD (08/17 7653)   ADDENDUM:      The case was discussed with Dr Juan Mora via encrypted text at 15:33 on    8/17/2020  We discussed the timing and setting of the follow-up MRI  The    liver lesions for which MRI was recommended do not have appearance of an    acute or posttraumatic finding    and, therefore, MRI follow-up can be performed on a nonurgent, outpatient    basis  When the patient is ready for discharge, radiology staff can    assist the clinical floor staff in obtaining a follow-up outpatient    appointment for scanning  Note: Imaging follow-up reminder notification was scheduled in the    electronic medical record  Final      CT head wo contrast   Final Result by Khurram Rowley MD (08/17 4882)      No acute intracranial abnormality or significant change from prior                    Workstation performed: [id number]               Significant Findings / Test Results:   Results from last 7 days   Lab Units 08/20/20  0944   WBC Thousand/uL 7 05   HEMOGLOBIN g/dL 11 8   HEMATOCRIT % 36 9   PLATELETS Thousands/uL 212 Results from last 7 days   Lab Units 08/20/20  0944   SODIUM mmol/L 138   POTASSIUM mmol/L 3 7   CHLORIDE mmol/L 102   CO2 mmol/L 25   BUN mg/dL 14   CREATININE mg/dL 0 91   CALCIUM mg/dL 9 0       Incidental Findings:   · None     Test Results Pending at Discharge (will require follow up): · None      Outpatient Tests Requested:  · None    Complications:  None    Reason for Admission: fall, chest contusion    Hospital Course:     Oskar Montilla is a 81 y o  female patient with history of afib on anticoagulation, and ambulatory dysfunction with frequent falls who originally presented to the hospital on 8/16/2020 due to fall  Trauma scans did not reveal significant acute findings and the patient was diagnosed with chest wall contusion  She was treated supportively  She remained hemodynamically stable  Her home medications were continued during her hospitalization  Per physical and occupational therapy recommendations, the patient was discharged to a short-term rehab  She was accepted at the [facility]  COVID-19 test was done prior to her discharge in negative  Please see above list of diagnoses and related plan for additional information  Condition at Discharge: stable     Discharge Day Visit / Exam:     Subjective:  Patient seen and examined  She continues to have intermittent pain but otherwise having no complaints  Denies chest pain, shortness of breath or palpitations  No overnight events  Vitals: Blood Pressure: 123/83 (08/21/20 0824)  Pulse: (!) 124 (08/21/20 0824)  Temperature: 98 3 °F (36 8 °C) (08/21/20 0824)  Temp Source: Oral (08/21/20 0824)  Respirations: 18 (08/21/20 0824)  Height: 4' 11" (149 9 cm) (08/16/20 2234)  Weight - Scale: 60 7 kg (133 lb 13 1 oz) (08/21/20 0548)  SpO2: 94 % (08/21/20 0824)    Exam:     Physical Exam  Constitutional:       General: She is not in acute distress  HENT:      Head: Normocephalic and atraumatic  Nose: No congestion  Mouth/Throat:      Mouth: Mucous membranes are moist    Eyes:      Conjunctiva/sclera: Conjunctivae normal    Cardiovascular:      Rate and Rhythm: Normal rate  Rhythm irregular  Pulmonary:      Effort: Pulmonary effort is normal  No respiratory distress  Breath sounds: No wheezing  Comments: Chest wall ecchymosis   Abdominal:      General: There is no distension  Tenderness: There is no abdominal tenderness  Skin:     General: Skin is warm and dry  Neurological:      Mental Status: Mental status is at baseline  Psychiatric:         Mood and Affect: Mood normal          Discussion with Family: family was updated    Discharge instructions/Information to patient and family:   See after visit summary for information provided to patient and family  Provisions for Follow-Up Care:  See after visit summary for information related to follow-up care and any pertinent home health orders  Disposition:     [facility] [address] [facility]:   · Not Applicable to this Patient - Not Applicable to this Patient    Planned Readmission: No     Discharge Statement:  I spent 35 minutes discharging the patient  This time was spent on the day of discharge  I had direct contact with the patient on the day of discharge  Greater than 50% of the total time was spent examining patient, answering all patient questions, arranging and discussing plan of care with patient as well as directly providing post-discharge instructions  Additional time then spent on discharge activities  Discharge Medications:  See after visit summary for reconciled discharge medications provided to patient and family        ** Please Note: This note has been constructed using a voice recognition system **

## 2020-08-21 NOTE — ASSESSMENT & PLAN NOTE
Patient presented to ER at [address] [place] [address] for right chest wall discomfort following fall at home 2 days ago  She reportedly tripped while walking in her bedroom and fell onto the floor  She was using a walker at the time  She had a similar fall in June, sustaining nondisplaced left rib fractures, and subsequently went to post-acute rehab facility for approximately 3 weeks    Trauma scans reviewed, no evidence of bleeding  Adjust pain medication regimen  Short-term rehab placement at [facility]

## 2020-08-21 NOTE — SOCIAL WORK
Pt is being discharged to [facility] today  Pt is being transported by [facility] wheelchair van at 12:30 pm  I offered to call patient's family and notify them of transport time , pt declined and said she will let them know  AVS and covid results faxed to [facility]  I called [facility] and notified them of transport time

## 2021-01-01 ENCOUNTER — APPOINTMENT (OUTPATIENT)
Dept: LAB | Facility: MEDICAL CENTER | Age: 86
End: 2021-01-01
Payer: COMMERCIAL

## 2021-01-01 DIAGNOSIS — E78.5 HYPERLIPIDEMIA, UNSPECIFIED HYPERLIPIDEMIA TYPE: ICD-10-CM

## 2021-01-01 DIAGNOSIS — E55.9 VITAMIN D DEFICIENCY: ICD-10-CM

## 2021-01-01 DIAGNOSIS — I50.9 CONGESTIVE HEART FAILURE, UNSPECIFIED HF CHRONICITY, UNSPECIFIED HEART FAILURE TYPE (HCC): ICD-10-CM

## 2021-01-01 DIAGNOSIS — I48.91 ATRIAL FIBRILLATION, UNSPECIFIED TYPE (HCC): ICD-10-CM

## 2021-01-01 DIAGNOSIS — I10 HYPERTENSION, UNSPECIFIED TYPE: ICD-10-CM

## 2021-01-01 DIAGNOSIS — R63.4 WEIGHT LOSS: ICD-10-CM

## 2021-01-01 DIAGNOSIS — I25.10 ASCVD (ARTERIOSCLEROTIC CARDIOVASCULAR DISEASE): ICD-10-CM

## 2021-01-01 LAB
25(OH)D3 SERPL-MCNC: 45.4 NG/ML (ref 30–100)
ALBUMIN SERPL BCP-MCNC: 3.7 G/DL (ref 3.5–5)
ALP SERPL-CCNC: 108 U/L (ref 46–116)
ALT SERPL W P-5'-P-CCNC: 15 U/L (ref 12–78)
ANION GAP SERPL CALCULATED.3IONS-SCNC: 5 MMOL/L (ref 4–13)
AST SERPL W P-5'-P-CCNC: 15 U/L (ref 5–45)
BILIRUB SERPL-MCNC: 1.06 MG/DL (ref 0.2–1)
BUN SERPL-MCNC: 14 MG/DL (ref 5–25)
CALCIUM SERPL-MCNC: 9.8 MG/DL (ref 8.3–10.1)
CHLORIDE SERPL-SCNC: 100 MMOL/L (ref 100–108)
CO2 SERPL-SCNC: 31 MMOL/L (ref 21–32)
CREAT SERPL-MCNC: 0.88 MG/DL (ref 0.6–1.3)
ERYTHROCYTE [DISTWIDTH] IN BLOOD BY AUTOMATED COUNT: 14.4 % (ref 11.6–15.1)
GFR SERPL CREATININE-BSD FRML MDRD: 60 ML/MIN/1.73SQ M
GLUCOSE P FAST SERPL-MCNC: 100 MG/DL (ref 65–99)
HCT VFR BLD AUTO: 40.1 % (ref 34.8–46.1)
HGB BLD-MCNC: 12.7 G/DL (ref 11.5–15.4)
MCH RBC QN AUTO: 27.9 PG (ref 26.8–34.3)
MCHC RBC AUTO-ENTMCNC: 31.7 G/DL (ref 31.4–37.4)
MCV RBC AUTO: 88 FL (ref 82–98)
PLATELET # BLD AUTO: 241 THOUSANDS/UL (ref 149–390)
PMV BLD AUTO: 9.9 FL (ref 8.9–12.7)
POTASSIUM SERPL-SCNC: 3.4 MMOL/L (ref 3.5–5.3)
PROT SERPL-MCNC: 8.2 G/DL (ref 6.4–8.2)
RBC # BLD AUTO: 4.56 MILLION/UL (ref 3.81–5.12)
SODIUM SERPL-SCNC: 136 MMOL/L (ref 136–145)
T4 FREE SERPL-MCNC: 0.97 NG/DL (ref 0.76–1.46)
TSH SERPL DL<=0.05 MIU/L-ACNC: 2.01 UIU/ML (ref 0.36–3.74)
WBC # BLD AUTO: 6.14 THOUSAND/UL (ref 4.31–10.16)

## 2021-01-01 PROCEDURE — 84439 ASSAY OF FREE THYROXINE: CPT

## 2021-01-01 PROCEDURE — 80053 COMPREHEN METABOLIC PANEL: CPT

## 2021-01-01 PROCEDURE — 85027 COMPLETE CBC AUTOMATED: CPT

## 2021-01-01 PROCEDURE — 84443 ASSAY THYROID STIM HORMONE: CPT

## 2021-01-01 PROCEDURE — 36415 COLL VENOUS BLD VENIPUNCTURE: CPT

## 2021-01-01 PROCEDURE — 82306 VITAMIN D 25 HYDROXY: CPT

## 2021-02-03 ENCOUNTER — APPOINTMENT (EMERGENCY)
Dept: RADIOLOGY | Facility: HOSPITAL | Age: 86
DRG: 553 | End: 2021-02-03
Payer: COMMERCIAL

## 2021-02-03 ENCOUNTER — HOSPITAL ENCOUNTER (INPATIENT)
Facility: HOSPITAL | Age: 86
LOS: 12 days | Discharge: NURSING, REHAB OR LONG-TERM CARE | DRG: 553 | End: 2021-02-15
Attending: EMERGENCY MEDICINE | Admitting: INTERNAL MEDICINE
Payer: COMMERCIAL

## 2021-02-03 ENCOUNTER — APPOINTMENT (EMERGENCY)
Dept: CT IMAGING | Facility: HOSPITAL | Age: 86
DRG: 553 | End: 2021-02-03
Payer: COMMERCIAL

## 2021-02-03 DIAGNOSIS — R53.1 WEAKNESS: Primary | ICD-10-CM

## 2021-02-03 DIAGNOSIS — R44.1 VISUAL HALLUCINATIONS: ICD-10-CM

## 2021-02-03 LAB
ANION GAP SERPL CALCULATED.3IONS-SCNC: 9 MMOL/L (ref 4–13)
ATRIAL RATE: 57 BPM
BASOPHILS # BLD AUTO: 0.1 THOUSANDS/ΜL (ref 0–0.1)
BASOPHILS NFR BLD AUTO: 2 % (ref 0–2)
BUN SERPL-MCNC: 19 MG/DL (ref 7–25)
CALCIUM SERPL-MCNC: 10.5 MG/DL (ref 8.6–10.5)
CHLORIDE SERPL-SCNC: 98 MMOL/L (ref 98–107)
CO2 SERPL-SCNC: 29 MMOL/L (ref 21–31)
CREAT SERPL-MCNC: 0.74 MG/DL (ref 0.6–1.2)
EOSINOPHIL # BLD AUTO: 0.2 THOUSAND/ΜL (ref 0–0.61)
EOSINOPHIL NFR BLD AUTO: 3 % (ref 0–5)
ERYTHROCYTE [DISTWIDTH] IN BLOOD BY AUTOMATED COUNT: 15.7 % (ref 11.5–14.5)
FLUAV RNA RESP QL NAA+PROBE: NEGATIVE
FLUBV RNA RESP QL NAA+PROBE: NEGATIVE
GFR SERPL CREATININE-BSD FRML MDRD: 75 ML/MIN/1.73SQ M
GLUCOSE SERPL-MCNC: 103 MG/DL (ref 65–99)
HCT VFR BLD AUTO: 45.4 % (ref 42–47)
HGB BLD-MCNC: 14.8 G/DL (ref 12–16)
LYMPHOCYTES # BLD AUTO: 1.1 THOUSANDS/ΜL (ref 0.6–4.47)
LYMPHOCYTES NFR BLD AUTO: 16 % (ref 21–51)
MAGNESIUM SERPL-MCNC: 1.9 MG/DL (ref 1.9–2.7)
MCH RBC QN AUTO: 28.4 PG (ref 26–34)
MCHC RBC AUTO-ENTMCNC: 32.6 G/DL (ref 31–37)
MCV RBC AUTO: 87 FL (ref 81–99)
MONOCYTES # BLD AUTO: 0.7 THOUSAND/ΜL (ref 0.17–1.22)
MONOCYTES NFR BLD AUTO: 10 % (ref 2–12)
NEUTROPHILS # BLD AUTO: 5 THOUSANDS/ΜL (ref 1.4–6.5)
NEUTS SEG NFR BLD AUTO: 70 % (ref 42–75)
PLATELET # BLD AUTO: 261 THOUSANDS/UL (ref 149–390)
PMV BLD AUTO: 7.9 FL (ref 8.6–11.7)
POTASSIUM SERPL-SCNC: 3.8 MMOL/L (ref 3.5–5.5)
QRS AXIS: -50 DEGREES
QRSD INTERVAL: 88 MS
QT INTERVAL: 338 MS
QTC INTERVAL: 465 MS
RBC # BLD AUTO: 5.23 MILLION/UL (ref 3.9–5.2)
RSV RNA RESP QL NAA+PROBE: NEGATIVE
SARS-COV-2 RNA RESP QL NAA+PROBE: NEGATIVE
SODIUM SERPL-SCNC: 136 MMOL/L (ref 134–143)
T WAVE AXIS: -11 DEGREES
TROPONIN I SERPL-MCNC: <0.03 NG/ML
VENTRICULAR RATE: 114 BPM
WBC # BLD AUTO: 7.1 THOUSAND/UL (ref 4.8–10.8)

## 2021-02-03 PROCEDURE — 0241U HB NFCT DS VIR RESP RNA 4 TRGT: CPT | Performed by: EMERGENCY MEDICINE

## 2021-02-03 PROCEDURE — 73552 X-RAY EXAM OF FEMUR 2/>: CPT

## 2021-02-03 PROCEDURE — 84484 ASSAY OF TROPONIN QUANT: CPT | Performed by: EMERGENCY MEDICINE

## 2021-02-03 PROCEDURE — 99285 EMERGENCY DEPT VISIT HI MDM: CPT

## 2021-02-03 PROCEDURE — 70450 CT HEAD/BRAIN W/O DYE: CPT

## 2021-02-03 PROCEDURE — 85025 COMPLETE CBC W/AUTO DIFF WBC: CPT | Performed by: EMERGENCY MEDICINE

## 2021-02-03 PROCEDURE — G1004 CDSM NDSC: HCPCS

## 2021-02-03 PROCEDURE — 99285 EMERGENCY DEPT VISIT HI MDM: CPT | Performed by: EMERGENCY MEDICINE

## 2021-02-03 PROCEDURE — 99223 1ST HOSP IP/OBS HIGH 75: CPT | Performed by: STUDENT IN AN ORGANIZED HEALTH CARE EDUCATION/TRAINING PROGRAM

## 2021-02-03 PROCEDURE — 83735 ASSAY OF MAGNESIUM: CPT | Performed by: EMERGENCY MEDICINE

## 2021-02-03 PROCEDURE — 93005 ELECTROCARDIOGRAM TRACING: CPT

## 2021-02-03 PROCEDURE — 71045 X-RAY EXAM CHEST 1 VIEW: CPT

## 2021-02-03 PROCEDURE — 36415 COLL VENOUS BLD VENIPUNCTURE: CPT | Performed by: EMERGENCY MEDICINE

## 2021-02-03 PROCEDURE — 80048 BASIC METABOLIC PNL TOTAL CA: CPT | Performed by: EMERGENCY MEDICINE

## 2021-02-03 PROCEDURE — 93010 ELECTROCARDIOGRAM REPORT: CPT | Performed by: INTERNAL MEDICINE

## 2021-02-03 RX ORDER — HYDROXYZINE HYDROCHLORIDE 25 MG/1
25 TABLET, FILM COATED ORAL 2 TIMES DAILY
Status: DISCONTINUED | OUTPATIENT
Start: 2021-02-03 | End: 2021-02-03

## 2021-02-03 RX ORDER — FUROSEMIDE 40 MG/1
40 TABLET ORAL DAILY PRN
Status: DISCONTINUED | OUTPATIENT
Start: 2021-02-03 | End: 2021-02-09

## 2021-02-03 RX ORDER — OXYBUTYNIN CHLORIDE 5 MG/1
5 TABLET ORAL DAILY
Status: DISCONTINUED | OUTPATIENT
Start: 2021-02-03 | End: 2021-02-15 | Stop reason: HOSPADM

## 2021-02-03 RX ORDER — HYDROXYZINE HYDROCHLORIDE 25 MG/1
25 TABLET, FILM COATED ORAL 2 TIMES DAILY
COMMUNITY
End: 2022-08-09

## 2021-02-03 RX ORDER — HYDROXYZINE HYDROCHLORIDE 25 MG/1
25 TABLET, FILM COATED ORAL 2 TIMES DAILY PRN
Status: DISCONTINUED | OUTPATIENT
Start: 2021-02-03 | End: 2021-02-03

## 2021-02-03 RX ORDER — POTASSIUM CHLORIDE 20 MEQ/1
20 TABLET, EXTENDED RELEASE ORAL DAILY PRN
Status: DISCONTINUED | OUTPATIENT
Start: 2021-02-03 | End: 2021-02-10

## 2021-02-03 RX ORDER — ONDANSETRON 2 MG/ML
4 INJECTION INTRAMUSCULAR; INTRAVENOUS EVERY 6 HOURS PRN
Status: DISCONTINUED | OUTPATIENT
Start: 2021-02-03 | End: 2021-02-15

## 2021-02-03 RX ORDER — PRAVASTATIN SODIUM 40 MG
40 TABLET ORAL
Status: DISCONTINUED | OUTPATIENT
Start: 2021-02-03 | End: 2021-02-15 | Stop reason: HOSPADM

## 2021-02-03 RX ORDER — ACETAMINOPHEN 325 MG/1
650 TABLET ORAL EVERY 6 HOURS PRN
Status: DISCONTINUED | OUTPATIENT
Start: 2021-02-03 | End: 2021-02-15 | Stop reason: HOSPADM

## 2021-02-03 RX ORDER — PANTOPRAZOLE SODIUM 40 MG/1
40 TABLET, DELAYED RELEASE ORAL
Status: DISCONTINUED | OUTPATIENT
Start: 2021-02-04 | End: 2021-02-15 | Stop reason: HOSPADM

## 2021-02-03 RX ORDER — HYDROXYZINE HYDROCHLORIDE 10 MG/1
10 TABLET, FILM COATED ORAL 2 TIMES DAILY PRN
Status: DISCONTINUED | OUTPATIENT
Start: 2021-02-03 | End: 2021-02-15 | Stop reason: HOSPADM

## 2021-02-03 RX ORDER — OMEPRAZOLE 20 MG/1
20 CAPSULE, DELAYED RELEASE ORAL DAILY
COMMUNITY
End: 2022-08-09

## 2021-02-03 RX ADMIN — DILTIAZEM HYDROCHLORIDE 90 MG: 60 TABLET, FILM COATED ORAL at 16:46

## 2021-02-03 RX ADMIN — PRAVASTATIN SODIUM 40 MG: 40 TABLET ORAL at 17:31

## 2021-02-03 RX ADMIN — OXYBUTYNIN CHLORIDE 5 MG: 5 TABLET ORAL at 17:31

## 2021-02-03 RX ADMIN — SERTRALINE HYDROCHLORIDE 50 MG: 50 TABLET ORAL at 16:45

## 2021-02-03 RX ADMIN — APIXABAN 2.5 MG: 2.5 TABLET, FILM COATED ORAL at 17:31

## 2021-02-03 RX ADMIN — DILTIAZEM HYDROCHLORIDE 90 MG: 60 TABLET, FILM COATED ORAL at 23:51

## 2021-02-03 RX ADMIN — METOPROLOL TARTRATE 25 MG: 25 TABLET, FILM COATED ORAL at 17:31

## 2021-02-03 NOTE — PLAN OF CARE
Problem: Prexisting or High Potential for Compromised Skin Integrity  Goal: Skin integrity is maintained or improved  Description: INTERVENTIONS:  - Identify patients at risk for skin breakdown  - Assess and monitor skin integrity  - Assess and monitor nutrition and hydration status  - Monitor labs   - Assess for incontinence   - Turn and reposition patient  - Assist with mobility/ambulation  - Relieve pressure over bony prominences  - Avoid friction and shearing  - Provide appropriate hygiene as needed including keeping skin clean and dry  - Evaluate need for skin moisturizer/barrier cream  - Collaborate with interdisciplinary team   - Patient/family teaching  - Consider wound care consult   Outcome: Progressing     Problem: Potential for Falls  Goal: Patient will remain free of falls  Description: INTERVENTIONS:  - Assess patient frequently for physical needs  -  Identify cognitive and physical deficits and behaviors that affect risk of falls    -  [place] fall precautions as indicated by assessment   - Educate patient/family on patient safety including physical limitations  - Instruct patient to call for assistance with activity based on assessment  - Modify environment to reduce risk of injury  - Consider OT/PT consult to assist with strengthening/mobility  Outcome: Progressing     Problem: METABOLIC, FLUID AND ELECTROLYTES - ADULT  Goal: Electrolytes maintained within normal limits  Description: INTERVENTIONS:  - Monitor labs and assess patient for signs and symptoms of electrolyte imbalances  - Administer electrolyte replacement as ordered  - Monitor response to electrolyte replacements, including repeat lab results as appropriate  - Instruct patient on fluid and nutrition as appropriate  Outcome: Progressing     Problem: SKIN/TISSUE INTEGRITY - ADULT  Goal: Skin integrity remains intact  Description: INTERVENTIONS  - Identify patients at risk for skin breakdown  - Assess and monitor skin integrity  - Assess and monitor nutrition and hydration status  - Monitor labs (i e  albumin)  - Assess for incontinence   - Turn and reposition patient  - Assist with mobility/ambulation  - Relieve pressure over bony prominences  - Avoid friction and shearing  - Provide appropriate hygiene as needed including keeping skin clean and dry  - Evaluate need for skin moisturizer/barrier cream  - Collaborate with interdisciplinary team (i e  Nutrition, Rehabilitation, etc )   - Patient/family teaching  Outcome: Progressing     Problem: MUSCULOSKELETAL - ADULT  Goal: Maintain or return mobility to safest level of function  Description: INTERVENTIONS:  - Assess patient's ability to carry out ADLs; assess patient's baseline for ADL function and identify physical deficits which impact ability to perform ADLs (bathing, care of mouth/teeth, toileting, grooming, dressing, etc )  - Assess/evaluate cause of self-care deficits   - Assess range of motion  - Assess patient's mobility  - Assess patient's need for assistive devices and provide as appropriate  - Encourage maximum independence but intervene and supervise when necessary  - Involve family in performance of ADLs  - Assess for home care needs following discharge   - Consider OT consult to assist with ADL evaluation and planning for discharge  - Provide patient education as appropriate  Outcome: Progressing     Problem: PAIN - ADULT  Goal: Verbalizes/displays adequate comfort level or baseline comfort level  Description: Interventions:  - Encourage patient to monitor pain and request assistance  - Assess pain using appropriate pain scale  - Administer analgesics based on type and severity of pain and evaluate response  - Implement non-pharmacological measures as appropriate and evaluate response  - Consider cultural and social influences on pain and pain management  - Notify physician/advanced practitioner if interventions unsuccessful or patient reports new pain  Outcome: Progressing Problem: INFECTION - ADULT  Goal: Absence or prevention of progression during hospitalization  Description: INTERVENTIONS:  - Assess and monitor for signs and symptoms of infection  - Monitor lab/diagnostic results  - Monitor all insertion sites, i e  indwelling lines, tubes, and drains  - Monitor endotracheal if appropriate and nasal secretions for changes in amount and color  - [place] appropriate cooling/warming therapies per order  - Administer medications as ordered  - Instruct and encourage patient and family to use good hand hygiene technique  - Identify and instruct in appropriate isolation precautions for identified infection/condition  Outcome: Progressing     Problem: SAFETY ADULT  Goal: Patient will remain free of falls  Description: INTERVENTIONS:  - Assess patient frequently for physical needs  -  Identify cognitive and physical deficits and behaviors that affect risk of falls    -  [place] fall precautions as indicated by assessment   - Educate patient/family on patient safety including physical limitations  - Instruct patient to call for assistance with activity based on assessment  - Modify environment to reduce risk of injury  - Consider OT/PT consult to assist with strengthening/mobility  Outcome: Progressing  Goal: Maintain or return to baseline ADL function  Description: INTERVENTIONS:  -  Assess patient's ability to carry out ADLs; assess patient's baseline for ADL function and identify physical deficits which impact ability to perform ADLs (bathing, care of mouth/teeth, toileting, grooming, dressing, etc )  - Assess/evaluate cause of self-care deficits   - Assess range of motion  - Assess patient's mobility; develop plan if impaired  - Assess patient's need for assistive devices and provide as appropriate  - Encourage maximum independence but intervene and supervise when necessary  - Involve family in performance of ADLs  - Assess for home care needs following discharge   - Consider OT consult to assist with ADL evaluation and planning for discharge  - Provide patient education as appropriate  Outcome: Progressing  Goal: Maintain or return mobility status to optimal level  Description: INTERVENTIONS:  - Assess patient's baseline mobility status (ambulation, transfers, stairs, etc )    - Identify cognitive and physical deficits and behaviors that affect mobility  - Identify mobility aids required to assist with transfers and/or ambulation (gait belt, sit-to-stand, lift, walker, cane, etc )  - [place] fall precautions as indicated by assessment  - Record patient progress and toleration of activity level on Mobility SBAR; progress patient to next Phase/Stage  - Instruct patient to call for assistance with activity based on assessment  - Consider rehabilitation consult to assist with strengthening/weightbearing, etc   Outcome: Progressing     Problem: DISCHARGE PLANNING  Goal: Discharge to home or other facility with appropriate resources  Description: INTERVENTIONS:  - Identify barriers to discharge w/patient and caregiver  - Arrange for needed discharge resources and transportation as appropriate  - Identify discharge learning needs (meds, wound care, etc )  - Arrange for interpretive services to assist at discharge as needed  - Refer to Case Management Department for coordinating discharge planning if the patient needs post-hospital services based on physician/advanced practitioner order or complex needs related to functional status, cognitive ability, or social support system  Outcome: Progressing     Problem: Knowledge Deficit  Goal: Patient/family/caregiver demonstrates understanding of disease process, treatment plan, medications, and discharge instructions  Description: Complete learning assessment and assess knowledge base    Interventions:  - Provide teaching at level of understanding  - Provide teaching via preferred learning methods  Outcome: Progressing

## 2021-02-03 NOTE — H&P
H&P- Padminileo BrothersBryant 1935, 81 y o  female MRN: [id number]    Unit/Bed#: ED 02 Encounter: [id number]    Primary Care Provider: Arva Bloch, MD   Date and time admitted to hospital: 2/3/2021 12:53 PM        * Generalized weakness  Assessment & Plan  · 81-year-old female with history of AFib on Eliquis, diastolic heart failure, hypertension and ambulatory dysfunction presented with generalized weakness  · Patient brought in by her daughter who is POA  States that patient has been weak and unable to get up and walk  She previously at baseline was able to get up and take several steps with a walker    Now unable to get up with assistance  · Previously taking cared of by her son who is also unable to care for himself  · Physical deconditioning likely contributing as patient did have STR on 09/2020  · Her daughter's unable to care for her and has no other siblings that is able to  · PT OT consulted  · Case management consulted, pending STR vs SNF for placement    Osteoarthritis of knee  Assessment & Plan  History of right knee replacement  Left knee pain  PT consulted    OAB (overactive bladder)  Assessment & Plan  Continue oxybutynin    Essential hypertension  Assessment & Plan  Continue Lasix, Cardizem and Lopressor  Monitor patient blood pressure    Chronic diastolic heart failure (HCC)  Assessment & Plan  Wt Readings from Last 3 Encounters:   02/03/21 54 8 kg (120 lb 13 oz)   08/21/20 60 7 kg (133 lb 13 1 oz)   08/17/20 59 kg (130 lb 1 1 oz)     Currently euvolemic  Continue Lasix with potassium supplement  Previous echo 08/20 EF 65%, moderate MR        Longstanding persistent atrial fibrillation (HCC)  Assessment & Plan  Persistent AFib currently rate controlled  Continue Eliquis  Continue Toprol and Cardizem    VTE Prophylaxis: Apixaban (Eliquis)  / sequential compression device   Code Status: Full Code  POLST: There is no POLST form on file for this patient (pre-hospital)  Discussion with family: Patient, Daughter    Anticipated Length of Stay:  Patient will be admitted on an Inpatient basis with an anticipated length of stay of 2 midnights  Justification for Hospital Stay: Weakness, pending placement    Total Time for Visit, including Counseling / Coordination of Care: 45 minutes  Greater than 50% of this total time spent on direct patient counseling and coordination of care  Chief Complaint:   Weakness    History of Present Illness:    Kane Groves is a 81 y o  female who presents with generalized weakness  Patient brought from home by daughter who reports that she cannot take care of her anymore  She was previously taking care of by her son who was hospitalized and unable to care for her  Daughter reports that she has several other siblings and everyone is busy and not able to manage her  Reports that patient previously was able to walk several steps with walker but now she is completely bedbound and unable to get up  She cannot perform any of her ADLs  Daughter reports that if she can improve with rehab did she may consider taking her home but if not her and her siblings are considering skilled nursing facilities  Patient reports any chest pain, shortness of breath, fevers, chills, nausea, vomiting, diarrhea, or dysuria    Review of Systems:    Review of Systems   Constitutional: Negative for activity change, appetite change, chills and fever  HENT: Negative for congestion, facial swelling, sinus pain and sore throat  Respiratory: Negative for cough, shortness of breath and wheezing  Cardiovascular: Negative for chest pain, palpitations and leg swelling  Gastrointestinal: Negative for abdominal distention, abdominal pain, constipation, diarrhea, nausea and vomiting  Endocrine: Negative for cold intolerance, heat intolerance, polydipsia, polyphagia and polyuria  Genitourinary: Negative for dysuria, flank pain and urgency  Musculoskeletal: Positive for joint swelling  Skin: Negative for color change and pallor  Neurological: Negative for dizziness, syncope, weakness, light-headedness and headaches  Psychiatric/Behavioral: Negative for agitation, confusion and dysphoric mood  Past Medical and Surgical History:     Past Medical History:   Diagnosis Date    Acute on chronic diastolic congestive heart failure ([facility] [address] ) 6/27/2019    Ambulatory dysfunction 8/17/2020    Arthritis     Chest wall contusion, right, initial encounter 8/17/2020    Chest wall contusion, right, subsequent encounter 8/17/2020    Chronic diastolic heart failure (HCC)     Chronic kidney disease, stage 2 (mild)     Coronary artery disease     history of stenting    Eczema     years    Edema     History of echocardiogram 07/20/2017    EF 55%, mild concentric LVH, bileaflet MVP with moderate MR, left atrial enlargement   History of transfusion     Hyperlipidemia     Hypertension     Hypo-osmolality and hyponatremia     Hypokalemia 7/11/2019    Mitral regurgitation        Past Surgical History:   Procedure Laterality Date    ABDOMINAL SURGERY      hysterectomy    CARDIAC CATHETERIZATION  04/10/2012    EF 65%, no significant CAD, patent stents, severe MR     CORONARY ANGIOPLASTY WITH STENT PLACEMENT  03/21/2007    EF 55%, GARRET to LAD   EYE SURGERY      b/l cataracts    HYSTERECTOMY      JOINT REPLACEMENT      Rt knee       Meds/Allergies:    Prior to Admission medications    Medication Sig Start Date End Date Taking?  Authorizing Provider   hydrOXYzine HCL (ATARAX) 25 mg tablet Take 25 mg by mouth 2 (two) times a day   Yes Historical Provider, MD   omeprazole (PriLOSEC) 20 mg delayed release capsule Take 20 mg by mouth daily   Yes Historical Provider, MD   acetaminophen (TYLENOL) 325 mg tablet Take 3 tablets (975 mg total) by mouth every 8 (eight) hours 6/27/20   Robbie Castañeda MD   ALPRAZolam (XANAX) 0 25 mg tablet Take 1 tablet (0 25 mg total) by mouth daily at bedtime as needed for anxiety for up to 10 days 8/21/20 8/31/20  Asaf Orantes MD   apixaban (ELIQUIS) 2 5 mg Take 1 tablet (2 5 mg total) by mouth 2 (two) times a day 8/8/19   Moi Stover PA-C   bisacodyl (DULCOLAX) 5 mg EC tablet Take 1 tablet (5 mg total) by mouth daily as needed for constipation 6/27/20   Asaf Orantes MD   diltiazem (CARDIZEM) 90 mg tablet Take 1 tablet (90 mg total) by mouth every 8 (eight) hours 7/20/20   Megan Snow MD   furosemide (LASIX) 40 mg tablet Take 1 tablet (40 mg total) by mouth daily as needed (Only take for morning weight more than 125 pounds ) 6/17/20   Megan Snow MD   lidocaine (LIDODERM) 5 % Apply 1 patch topically daily Remove & Discard patch within 12 hours or as directed by MD 6/28/20   Asaf Orantes MD   metoprolol tartrate (LOPRESSOR) 25 mg tablet Take 1 tablet (25 mg total) by mouth 2 (two) times a day 3/5/20 6/17/20  NEAL Gonsales   oxybutynin (DITROPAN) 5 mg tablet Take 5 mg by mouth daily    Historical Provider, MD   PANTOPRAZOLE SODIUM PO Take by mouth daily    Historical Provider, MD   polyethylene glycol (MIRALAX) 17 g packet Take 17 g by mouth daily 6/27/20   Asaf Orantes MD   potassium chloride (K-DUR,KLOR-CON) 20 mEq tablet Take 1 tablet (20 mEq total) by mouth daily as needed (Only take for morning weight more than 125 pounds ) 6/17/20 7/17/20  Megan Snow MD   pravastatin (PRAVACHOL) 40 mg tablet Take 1 tablet (40 mg total) by mouth daily with dinner 7/12/19   Jesse Villasenor MD   sertraline (ZOLOFT) 50 mg tablet Take 50 mg by mouth daily    Historical Provider, MD     I have reviewed home medications with patient family member  Allergies: Allergies   Allergen Reactions    Chocolate Flavor Itching    Shellfish-Derived Products Shortness Of Breath    Iodine Other (See Comments)     shellfish- difficulty breathing    Codeine Rash       Social History:     Marital Status:     Occupation: Retired  Patient Pre-hospital Living Situation: Home with Daughter  Patient Pre-hospital Level of Mobility: Amb with walker, now bedbound  Patient Pre-hospital Diet Restrictions: None  Substance Use History:   Social History     Substance and Sexual Activity   Alcohol Use Never    Alcohol/week: 0 0 standard drinks    Frequency: Never    Drinks per session: Patient refused    Binge frequency: Never     Social History     Tobacco Use   Smoking Status Never Smoker   Smokeless Tobacco Never Used     Social History     Substance and Sexual Activity   Drug Use Never       Family History:    Family History   Problem Relation Age of Onset    Arthritis Mother     Cancer Mother     Heart disease Mother     Hypertension Mother     Alcohol abuse Father     Arthritis Father     Birth defects Son     Hearing loss Son     Diabetes Daughter     Stroke Maternal Grandmother     Asthma Maternal Grandfather        Physical Exam:     Vitals:   Blood Pressure: 120/74 (02/03/21 1445)  Pulse: (!) 126 (02/03/21 1445)  Temperature: 97 9 °F (36 6 °C) (02/03/21 1254)  Temp Source: Temporal (02/03/21 1254)  Respirations: 18 (02/03/21 1445)  Height: 4' 11" (149 9 cm) (02/03/21 1254)  Weight - Scale: 54 8 kg (120 lb 13 oz) (02/03/21 1254)  SpO2: 97 % (02/03/21 1445)    Physical Exam  Vitals signs and nursing note reviewed  Constitutional:       Appearance: Normal appearance  HENT:      Head: Normocephalic and atraumatic  Eyes:      Conjunctiva/sclera: Conjunctivae normal       Pupils: Pupils are equal, round, and reactive to light  Cardiovascular:      Rate and Rhythm: Tachycardia present  Rhythm irregular  Heart sounds: Normal heart sounds  Pulmonary:      Breath sounds: Normal breath sounds  No wheezing or rhonchi  Abdominal:      General: Bowel sounds are normal  There is no distension  Palpations: Abdomen is soft  Tenderness: There is no abdominal tenderness  Musculoskeletal: Normal range of motion           General: Tenderness (left knee) present  No swelling  Skin:     General: Skin is warm and dry  Neurological:      General: No focal deficit present  Mental Status: She is alert and oriented to person, place, and time  Mental status is at baseline  Additional Data:     Lab Results: I have personally reviewed pertinent reports  Results from last 7 days   Lab Units 02/03/21  1340   WBC Thousand/uL 7 10   HEMOGLOBIN g/dL 14 8   HEMATOCRIT % 45 4   PLATELETS Thousands/uL 261   NEUTROS PCT % 70   LYMPHS PCT % 16*   MONOS PCT % 10   EOS PCT % 3     Results from last 7 days   Lab Units 02/03/21  1340   SODIUM mmol/L 136   POTASSIUM mmol/L 3 8   CHLORIDE mmol/L 98   CO2 mmol/L 29   BUN mg/dL 19   CREATININE mg/dL 0 74   ANION GAP mmol/L 9   CALCIUM mg/dL 10 5   GLUCOSE RANDOM mg/dL 103*                       Imaging: I have personally reviewed pertinent reports  XR chest 1 view portable   Final Result by Simon Chawla DO (02/03 1433)      No acute cardiopulmonary disease  Workstation performed: [id number]         XR femur 2 views LEFT   Final Result by Simon Chawla DO (02/03 1437)      Bones are demineralized, limiting evaluation for nondisplaced fracture  No acute displaced fracture identified on the provided views  Workstation performed: [id number]         CT head without contrast   Final Result by Phillip Ferrari MD (02/03 1405)      No acute findings  Unchanged appearance of periventricular white matter disease days and a chronic right frontal infarction  Workstation performed: [id number]             EKG, Pathology, and Other Studies Reviewed on Admission:   · EKG: Afib    Allscripts / Epic Records Reviewed: Yes     ** Please Note: This note has been constructed using a voice recognition system   **

## 2021-02-03 NOTE — ED PROVIDER NOTES
History  Chief Complaint   Patient presents with    Weakness - Generalized     Accordign to the patient, she had been in a nursig home and was released for the nursing home, but the patient reports not being able to walk  Patient is a 60-year-old female with AFib on Eliquis, long history of decreased mobility, presenting with difficulty walking for the last 2 weeks  Patient has a long history of mobility issues, and was in a nursing home several months ago for similar complaint  However she also complains of left-sided hip and leg pain  She denies any falling, coughing, fever, chills, nausea, vomiting, diarrhea, numbness, tingling  Fatigue  Severity:  Moderate  Onset quality:  Gradual  Duration:  3 weeks  Timing:  Constant  Progression:  Worsening  Chronicity:  Recurrent  Relieved by:  Nothing  Worsened by:  Nothing  Ineffective treatments:  None tried  Associated symptoms: no abdominal pain, no chest pain, no cough, no diarrhea, no dysuria, no fever, no frequency, no nausea and no vomiting        Prior to Admission Medications   Prescriptions Last Dose Informant Patient Reported? Taking?    ALPRAZolam (XANAX) 0 25 mg tablet   No No   Sig: Take 1 tablet (0 25 mg total) by mouth daily at bedtime as needed for anxiety for up to 10 days   PANTOPRAZOLE SODIUM PO   Yes No   Sig: Take by mouth daily   acetaminophen (TYLENOL) 325 mg tablet   No No   Sig: Take 3 tablets (975 mg total) by mouth every 8 (eight) hours   apixaban (ELIQUIS) 2 5 mg   No No   Sig: Take 1 tablet (2 5 mg total) by mouth 2 (two) times a day   bisacodyl (DULCOLAX) 5 mg EC tablet   No No   Sig: Take 1 tablet (5 mg total) by mouth daily as needed for constipation   diltiazem (CARDIZEM) 90 mg tablet   No No   Sig: Take 1 tablet (90 mg total) by mouth every 8 (eight) hours   furosemide (LASIX) 40 mg tablet   No No   Sig: Take 1 tablet (40 mg total) by mouth daily as needed (Only take for morning weight more than 125 pounds )   hydrOXYzine HCL (ATARAX) 25 mg tablet   Yes Yes   Sig: Take 25 mg by mouth 2 (two) times a day   lidocaine (LIDODERM) 5 %   No No   Sig: Apply 1 patch topically daily Remove & Discard patch within 12 hours or as directed by MD   metoprolol tartrate (LOPRESSOR) 25 mg tablet   No No   Sig: Take 1 tablet (25 mg total) by mouth 2 (two) times a day   omeprazole (PriLOSEC) 20 mg delayed release capsule   Yes Yes   Sig: Take 20 mg by mouth daily   oxybutynin (DITROPAN) 5 mg tablet   Yes No   Sig: Take 5 mg by mouth daily   polyethylene glycol (MIRALAX) 17 g packet   No No   Sig: Take 17 g by mouth daily   potassium chloride (K-DUR,KLOR-CON) 20 mEq tablet   No No   Sig: Take 1 tablet (20 mEq total) by mouth daily as needed (Only take for morning weight more than 125 pounds )   pravastatin (PRAVACHOL) 40 mg tablet   No No   Sig: Take 1 tablet (40 mg total) by mouth daily with dinner   sertraline (ZOLOFT) 50 mg tablet   Yes No   Sig: Take 50 mg by mouth daily      Facility-Administered Medications: None       Past Medical History:   Diagnosis Date    Acute on chronic diastolic congestive heart failure ([facility] [address] ) 6/27/2019    Ambulatory dysfunction 8/17/2020    Arthritis     Chest wall contusion, right, initial encounter 8/17/2020    Chest wall contusion, right, subsequent encounter 8/17/2020    Chronic diastolic heart failure (HCC)     Chronic kidney disease, stage 2 (mild)     Coronary artery disease     history of stenting    Eczema     years    Edema     History of echocardiogram 07/20/2017    EF 55%, mild concentric LVH, bileaflet MVP with moderate MR, left atrial enlargement      History of transfusion     Hyperlipidemia     Hypertension     Hypo-osmolality and hyponatremia     Hypokalemia 7/11/2019    Mitral regurgitation        Past Surgical History:   Procedure Laterality Date    ABDOMINAL SURGERY      hysterectomy    CARDIAC CATHETERIZATION  04/10/2012    EF 65%, no significant CAD, patent stents, severe MR    Dee Dee Leavitt CORONARY ANGIOPLASTY WITH STENT PLACEMENT  03/21/2007    EF 55%, GARRET to LAD   EYE SURGERY      b/l cataracts    HYSTERECTOMY      JOINT REPLACEMENT      Rt knee       Family History   Problem Relation Age of Onset    Arthritis Mother     Cancer Mother     Heart disease Mother     Hypertension Mother     Alcohol abuse Father     Arthritis Father     Birth defects Son     Hearing loss Son     Diabetes Daughter     Stroke Maternal Grandmother     Asthma Maternal Grandfather      I have reviewed and agree with the history as documented  E-Cigarette/Vaping    E-Cigarette Use Never User      E-Cigarette/Vaping Substances     Social History     Tobacco Use    Smoking status: Never Smoker    Smokeless tobacco: Never Used   Substance Use Topics    Alcohol use: Never     Alcohol/week: 0 0 standard drinks     Frequency: Never     Drinks per session: Patient refused     Binge frequency: Never    Drug use: Never       Review of Systems   Constitutional: Positive for fatigue  Negative for chills and fever  HENT: Negative for congestion, nosebleeds, rhinorrhea and sore throat  Eyes: Negative for pain and visual disturbance  Respiratory: Negative for cough and wheezing  Cardiovascular: Negative for chest pain and leg swelling  Gastrointestinal: Negative for abdominal distention, abdominal pain, diarrhea, nausea and vomiting  Genitourinary: Negative for dysuria and frequency  Musculoskeletal: Negative for back pain and joint swelling  Skin: Negative for rash and wound  Neurological: Negative for weakness and numbness  Psychiatric/Behavioral: Negative for decreased concentration and suicidal ideas  Physical Exam  Physical Exam  Vitals signs and nursing note reviewed  Constitutional:       Appearance: She is well-developed  HENT:      Head: Normocephalic and atraumatic     Eyes:      Conjunctiva/sclera: Conjunctivae normal       Pupils: Pupils are equal, round, and reactive to light    Neck:      Musculoskeletal: Normal range of motion and neck supple  Trachea: No tracheal deviation  Cardiovascular:      Rate and Rhythm: Normal rate and regular rhythm  Heart sounds: Normal heart sounds  No murmur  Pulmonary:      Effort: Pulmonary effort is normal  No respiratory distress  Breath sounds: Normal breath sounds  No wheezing or rales  Abdominal:      General: Bowel sounds are normal  There is no distension  Palpations: Abdomen is soft  Tenderness: There is no abdominal tenderness  Musculoskeletal:         General: No deformity  Skin:     General: Skin is warm and dry  Capillary Refill: Capillary refill takes less than 2 seconds  Neurological:      General: No focal deficit present  Mental Status: She is alert and oriented to person, place, and time  Sensory: Sensation is intact  No sensory deficit  Motor: No weakness     Psychiatric:         Judgment: Judgment normal          Vital Signs  ED Triage Vitals [02/03/21 1254]   Temperature Pulse Respirations Blood Pressure SpO2   97 9 °F (36 6 °C) (!) 115 18 134/64 94 %      Temp Source Heart Rate Source Patient Position - Orthostatic VS BP Location FiO2 (%)   Temporal Monitor Lying Right arm --      Pain Score       6           Vitals:    02/03/21 1254 02/03/21 1300 02/03/21 1445   BP: 134/64 132/71 120/74   Pulse: (!) 115 103 (!) 126   Patient Position - Orthostatic VS: Lying Lying Lying         Visual Acuity      ED Medications  Medications   apixaban (ELIQUIS) tablet 2 5 mg (has no administration in time range)   furosemide (LASIX) tablet 40 mg (has no administration in time range)   metoprolol tartrate (LOPRESSOR) tablet 25 mg (has no administration in time range)   diltiazem (CARDIZEM) tablet 90 mg (has no administration in time range)   oxybutynin (DITROPAN) tablet 5 mg (has no administration in time range)   pantoprazole (PROTONIX) EC tablet 40 mg (has no administration in time range) potassium chloride (K-DUR,KLOR-CON) CR tablet 20 mEq (has no administration in time range)   pravastatin (PRAVACHOL) tablet 40 mg (has no administration in time range)   sertraline (ZOLOFT) tablet 50 mg (has no administration in time range)   acetaminophen (TYLENOL) tablet 650 mg (has no administration in time range)   ondansetron (ZOFRAN) injection 4 mg (has no administration in time range)   hydrOXYzine HCL (ATARAX) tablet 10 mg (has no administration in time range)       Diagnostic Studies  Results Reviewed     Procedure Component Value Units Date/Time    COVID19, Influenza A/B, RSV PCR, [facility] [[id number]]  (Normal) Collected: 02/03/21 1340    Lab Status: Final result Specimen: Nares from Nasopharyngeal Swab Updated: 02/03/21 1430     SARS-CoV-2 Negative     INFLUENZA A PCR Negative     INFLUENZA B PCR Negative     RSV PCR Negative    Narrative: This test has been authorized by FDA under an EUA (Emergency Use Assay) for use by authorized laboratories  Clinical caution and judgement should be used with the interpretation of these results with consideration of the clinical impression and other laboratory testing  Testing reported as "Positive" or "Negative" has been proven to be accurate according to standard laboratory validation requirements  All testing is performed with control materials showing appropriate reactivity at standard intervals      Basic metabolic panel [[id number]]  (Abnormal) Collected: 02/03/21 1340    Lab Status: Final result Specimen: Blood from Arm, Right Updated: 02/03/21 1409     Sodium 136 mmol/L      Potassium 3 8 mmol/L      Chloride 98 mmol/L      CO2 29 mmol/L      ANION GAP 9 mmol/L      BUN 19 mg/dL      Creatinine 0 74 mg/dL      Glucose 103 mg/dL      Calcium 10 5 mg/dL      eGFR 75 ml/min/1 73sq m     Narrative:      [place] guidelines for Chronic Kidney Disease (CKD):     Stage 1 with normal or high GFR (GFR > 90 mL/min/1 73 square meters)   Stage 2 Mild CKD (GFR = 60-89 mL/min/1 73 square meters)    Stage 3A Moderate CKD (GFR = 45-59 mL/min/1 73 square meters)    Stage 3B Moderate CKD (GFR = 30-44 mL/min/1 73 square meters)    Stage 4 Severe CKD (GFR = 15-29 mL/min/1 73 square meters)    Stage 5 End Stage CKD (GFR <15 mL/min/1 73 square meters)  Note: GFR calculation is accurate only with a steady state creatinine    Troponin I [[id number]]  (Normal) Collected: 02/03/21 1340    Lab Status: Final result Specimen: Blood from Arm, Right Updated: 02/03/21 1408     Troponin I <0 03 ng/mL     Magnesium [[id number]]  (Normal) Collected: 02/03/21 1340    Lab Status: Final result Specimen: Blood from Arm, Right Updated: 02/03/21 1404     Magnesium 1 9 mg/dL     CBC and differential [[id number]]  (Abnormal) Collected: 02/03/21 1340    Lab Status: Final result Specimen: Blood from Arm, Right Updated: 02/03/21 1351     WBC 7 10 Thousand/uL      RBC 5 23 Million/uL      Hemoglobin 14 8 g/dL      Hematocrit 45 4 %      MCV 87 fL      MCH 28 4 pg      MCHC 32 6 g/dL      RDW 15 7 %      MPV 7 9 fL      Platelets 903 Thousands/uL      Neutrophils Relative 70 %      Lymphocytes Relative 16 %      Monocytes Relative 10 %      Eosinophils Relative 3 %      Basophils Relative 2 %      Neutrophils Absolute 5 00 Thousands/µL      Lymphocytes Absolute 1 10 Thousands/µL      Monocytes Absolute 0 70 Thousand/µL      Eosinophils Absolute 0 20 Thousand/µL      Basophils Absolute 0 10 Thousands/µL     UA w Reflex to Microscopic w Reflex to Culture [[id number]]     Lab Status: No result Specimen: Urine                  XR chest 1 view portable   Final Result by Harsh Rios DO (02/03 1433)      No acute cardiopulmonary disease  Workstation performed: [id number]         XR femur 2 views LEFT   Final Result by Harsh Rios DO (02/03 1437)      Bones are demineralized, limiting evaluation for nondisplaced fracture    No acute displaced fracture identified on the provided views  Workstation performed: [id number]         CT head without contrast   Final Result by Deepthi Reeder MD (02/03 1405)      No acute findings  Unchanged appearance of periventricular white matter disease days and a chronic right frontal infarction                    Workstation performed: [id number]                    Procedures  ECG 12 Lead Documentation Only    Date/Time: 2/3/2021 2:10 PM  Performed by: Reginaldo Woodard DO  Authorized by: Reginaldo Woodard DO     Indications / Diagnosis:  Weakness  Interpretation:     Interpretation: non-specific    Rate:     ECG rate:  114    ECG rate assessment: tachycardic    Rhythm:     Rhythm: atrial fibrillation    Ectopy:     Ectopy: none    QRS:     QRS axis:  Normal  Conduction:     Conduction: abnormal      Abnormal conduction: incomplete RBBB    ST segments:     ST segments:  Non-specific  T waves:     T waves: non-specific               ED Course                                           MDM  Number of Diagnoses or Management Options  Weakness: new and requires workup  Diagnosis management comments: 70-year-old female with generalized weakness and left-sided lower extremity pain  This appears to be exacerbation of her chronic immobility issues and she would like to be admitted for physical therapy  She does say that her leg pain is unlike her prior episodes of immobility  I suspect some contracture or other cause of this pain  Her neuro exam is unremarkable and she has good strength bilaterally  Difficulty walking could be explained by alternative etiologies such as normal pressure hydrocephalus, however she has normal neuro status, will get CT scan  Could also have UTI or other infection which is causing this, will have to rule out alternative causes of her weakness  If workup is complete in a timely manner, may be able to get PT OT consult and physical therapy placement however this is unlikely       Amount and/or Complexity of Data Reviewed  Review and summarize past medical records: yes    Risk of Complications, Morbidity, and/or Mortality  Presenting problems: high  Diagnostic procedures: minimal  Management options: low        Disposition  Final diagnoses:   Weakness     Time reflects when diagnosis was documented in both MDM as applicable and the Disposition within this note     Time User Action Codes Description Comment    2/3/2021  3:38 PM Debra Sutton Add [R53 1] Weakness       ED Disposition     ED Disposition Condition Date/Time Comment    Admit Stable Wed Feb 3, 2021  3:38 PM Case was discussed with Kelvin Reina and the patient's admission status was agreed to be Admission Status: inpatient status to the service of Dr Kelvin Reina   Follow-up Information    None         Patient's Medications   Discharge Prescriptions    No medications on file     No discharge procedures on file      PDMP Review       Value Time User    PDMP Reviewed  Yes 3/5/2020  1:47 PM Emiliano Jasso          ED Provider  Electronically Signed by           Caleb Randall DO  02/03/21 8031

## 2021-02-03 NOTE — ASSESSMENT & PLAN NOTE
Wt Readings from Last 3 Encounters:   02/03/21 54 8 kg (120 lb 13 oz)   08/21/20 60 7 kg (133 lb 13 1 oz)   08/17/20 59 kg (130 lb 1 1 oz)     Currently euvolemic  Continue Lasix with potassium supplement  Previous echo 08/20 EF 65%, moderate MR

## 2021-02-04 LAB
ANION GAP SERPL CALCULATED.3IONS-SCNC: 8 MMOL/L (ref 4–13)
BASOPHILS # BLD AUTO: 0.1 THOUSANDS/ΜL (ref 0–0.1)
BASOPHILS NFR BLD AUTO: 2 % (ref 0–2)
BUN SERPL-MCNC: 18 MG/DL (ref 7–25)
CALCIUM SERPL-MCNC: 9.7 MG/DL (ref 8.6–10.5)
CHLORIDE SERPL-SCNC: 101 MMOL/L (ref 98–107)
CO2 SERPL-SCNC: 27 MMOL/L (ref 21–31)
CREAT SERPL-MCNC: 0.65 MG/DL (ref 0.6–1.2)
EOSINOPHIL # BLD AUTO: 0.3 THOUSAND/ΜL (ref 0–0.61)
EOSINOPHIL NFR BLD AUTO: 5 % (ref 0–5)
ERYTHROCYTE [DISTWIDTH] IN BLOOD BY AUTOMATED COUNT: 15.9 % (ref 11.5–14.5)
FLUAV RNA RESP QL NAA+PROBE: NEGATIVE
FLUBV RNA RESP QL NAA+PROBE: NEGATIVE
GFR SERPL CREATININE-BSD FRML MDRD: 82 ML/MIN/1.73SQ M
GLUCOSE SERPL-MCNC: 94 MG/DL (ref 65–99)
HCT VFR BLD AUTO: 40.3 % (ref 42–47)
HGB BLD-MCNC: 13.2 G/DL (ref 12–16)
LYMPHOCYTES # BLD AUTO: 1.1 THOUSANDS/ΜL (ref 0.6–4.47)
LYMPHOCYTES NFR BLD AUTO: 19 % (ref 21–51)
MCH RBC QN AUTO: 28.3 PG (ref 26–34)
MCHC RBC AUTO-ENTMCNC: 32.8 G/DL (ref 31–37)
MCV RBC AUTO: 86 FL (ref 81–99)
MONOCYTES # BLD AUTO: 0.6 THOUSAND/ΜL (ref 0.17–1.22)
MONOCYTES NFR BLD AUTO: 11 % (ref 2–12)
NEUTROPHILS # BLD AUTO: 3.6 THOUSANDS/ΜL (ref 1.4–6.5)
NEUTS SEG NFR BLD AUTO: 63 % (ref 42–75)
PLATELET # BLD AUTO: 235 THOUSANDS/UL (ref 149–390)
PMV BLD AUTO: 8.5 FL (ref 8.6–11.7)
POTASSIUM SERPL-SCNC: 3.4 MMOL/L (ref 3.5–5.5)
RBC # BLD AUTO: 4.66 MILLION/UL (ref 3.9–5.2)
RSV RNA RESP QL NAA+PROBE: NEGATIVE
SARS-COV-2 RNA RESP QL NAA+PROBE: NEGATIVE
SODIUM SERPL-SCNC: 136 MMOL/L (ref 134–143)
WBC # BLD AUTO: 5.7 THOUSAND/UL (ref 4.8–10.8)

## 2021-02-04 PROCEDURE — 97167 OT EVAL HIGH COMPLEX 60 MIN: CPT

## 2021-02-04 PROCEDURE — 97530 THERAPEUTIC ACTIVITIES: CPT

## 2021-02-04 PROCEDURE — 99232 SBSQ HOSP IP/OBS MODERATE 35: CPT | Performed by: STUDENT IN AN ORGANIZED HEALTH CARE EDUCATION/TRAINING PROGRAM

## 2021-02-04 PROCEDURE — 97163 PT EVAL HIGH COMPLEX 45 MIN: CPT

## 2021-02-04 PROCEDURE — 0241U HB NFCT DS VIR RESP RNA 4 TRGT: CPT | Performed by: INTERNAL MEDICINE

## 2021-02-04 PROCEDURE — 85025 COMPLETE CBC W/AUTO DIFF WBC: CPT | Performed by: STUDENT IN AN ORGANIZED HEALTH CARE EDUCATION/TRAINING PROGRAM

## 2021-02-04 PROCEDURE — 80048 BASIC METABOLIC PNL TOTAL CA: CPT | Performed by: STUDENT IN AN ORGANIZED HEALTH CARE EDUCATION/TRAINING PROGRAM

## 2021-02-04 RX ORDER — POTASSIUM CHLORIDE 20 MEQ/1
40 TABLET, EXTENDED RELEASE ORAL ONCE
Status: COMPLETED | OUTPATIENT
Start: 2021-02-04 | End: 2021-02-04

## 2021-02-04 RX ADMIN — METOPROLOL TARTRATE 25 MG: 25 TABLET, FILM COATED ORAL at 09:38

## 2021-02-04 RX ADMIN — ACETAMINOPHEN 650 MG: 325 TABLET ORAL at 09:38

## 2021-02-04 RX ADMIN — APIXABAN 2.5 MG: 2.5 TABLET, FILM COATED ORAL at 17:04

## 2021-02-04 RX ADMIN — APIXABAN 2.5 MG: 2.5 TABLET, FILM COATED ORAL at 09:38

## 2021-02-04 RX ADMIN — DILTIAZEM HYDROCHLORIDE 90 MG: 60 TABLET, FILM COATED ORAL at 05:25

## 2021-02-04 RX ADMIN — DILTIAZEM HYDROCHLORIDE 90 MG: 60 TABLET, FILM COATED ORAL at 21:55

## 2021-02-04 RX ADMIN — POTASSIUM CHLORIDE 40 MEQ: 1500 TABLET, EXTENDED RELEASE ORAL at 15:19

## 2021-02-04 RX ADMIN — PANTOPRAZOLE SODIUM 40 MG: 40 TABLET, DELAYED RELEASE ORAL at 05:25

## 2021-02-04 RX ADMIN — DILTIAZEM HYDROCHLORIDE 90 MG: 60 TABLET, FILM COATED ORAL at 15:19

## 2021-02-04 RX ADMIN — PRAVASTATIN SODIUM 40 MG: 40 TABLET ORAL at 17:04

## 2021-02-04 RX ADMIN — SERTRALINE HYDROCHLORIDE 50 MG: 50 TABLET ORAL at 09:38

## 2021-02-04 RX ADMIN — METOPROLOL TARTRATE 25 MG: 25 TABLET, FILM COATED ORAL at 17:04

## 2021-02-04 RX ADMIN — OXYBUTYNIN CHLORIDE 5 MG: 5 TABLET ORAL at 09:38

## 2021-02-04 NOTE — PROGRESS NOTES
Progress Note - Joyce Olivarez 1935, 81 y o  female MRN: [id number]    Unit/Bed#: -01 Encounter: [id number]    Primary Care Provider: Jake Parker MD   Date and time admitted to hospital: 2/3/2021 12:53 PM        * Generalized weakness  Assessment & Plan  · Likely secondary to deconditioning, recently in short-term rehab in September 2020  · Status post physical therapy evaluation recommending short-term rehab  · Patient is unable to care for self and has limited family support  · Coordinate with Care Management for short-term rehab    Osteoarthritis of knee  Assessment & Plan  · History of right knee replacement  · Left knee pain  · PT consulted, recommending short-term rehab    OAB (overactive bladder)  Assessment & Plan  · Continue oxybutynin    Essential hypertension  Assessment & Plan  · Continue Lasix, Cardizem and Lopressor  · Monitor patient blood pressure    Chronic diastolic heart failure (HCC)  Assessment & Plan  Wt Readings from Last 3 Encounters:   02/04/21 54 6 kg (120 lb 5 9 oz)   08/21/20 60 7 kg (133 lb 13 1 oz)   08/17/20 59 kg (130 lb 1 1 oz)     · Currently euvolemic  · Continue Lasix with potassium supplement  · Previous echo 08/20 EF 65%, moderate MR        Longstanding persistent atrial fibrillation (HCC)  Assessment & Plan  · Persistent AFib currently rate controlled  · Continue Eliquis  · Continue Toprol and Cardizem        VTE Pharmacologic Prophylaxis:   Pharmacologic: Apixaban (Eliquis)  Mechanical VTE Prophylaxis in Place: Yes    Patient Centered Rounds: I have performed bedside rounds with nursing staff today  Education and Discussions with Family / Patient: Left voicemail for daughter Esau Olsen    Time Spent for Care: 30 minutes  More than 50% of total time spent on counseling and coordination of care as described above      Current Length of Stay: 1 day(s)    Current Patient Status: Inpatient   Certification Statement: The patient will continue to require additional inpatient hospital stay due to weakness, placement     Discharge Plan: 24-48 hours    Code Status: Level 3 - DNAR and DNI      Subjective:   Patient feels weak, has good appetite, no chest pain or chest palpitations  Objective:     Vitals:   Temp (24hrs), Av 9 °F (36 6 °C), Min:97 1 °F (36 2 °C), Max:98 8 °F (37 1 °C)    Temp:  [97 1 °F (36 2 °C)-98 8 °F (37 1 °C)] 97 1 °F (36 2 °C)  HR:  [] 64  Resp:  [18] 18  BP: (115-134)/(60-77) 128/60  SpO2:  [94 %-98 %] 97 %  Body mass index is 24 31 kg/m²  Input and Output Summary (last 24 hours):     No intake or output data in the 24 hours ending 21 1037    Physical Exam:     Physical Exam  Vitals signs and nursing note reviewed  Constitutional:       Appearance: Normal appearance  HENT:      Head: Normocephalic and atraumatic  Nose: Nose normal  No congestion  Mouth/Throat:      Mouth: Mucous membranes are dry  Pharynx: Oropharynx is clear  Eyes:      General:         Right eye: No discharge  Left eye: No discharge  Neck:      Musculoskeletal: Normal range of motion and neck supple  Cardiovascular:      Rate and Rhythm: Normal rate and regular rhythm  Pulmonary:      Effort: Pulmonary effort is normal  No respiratory distress  Abdominal:      General: Abdomen is flat  Palpations: Abdomen is soft  Musculoskeletal:      Right lower leg: No edema  Left lower leg: No edema  Skin:     General: Skin is warm and dry  Neurological:      General: No focal deficit present  Mental Status: She is alert  Mental status is at baseline     Psychiatric:         Mood and Affect: Mood normal          Behavior: Behavior normal            Additional Data:     Labs:    Results from last 7 days   Lab Units 21  0524   WBC Thousand/uL 5 70   HEMOGLOBIN g/dL 13 2   HEMATOCRIT % 40 3*   PLATELETS Thousands/uL 235   NEUTROS PCT % 63   LYMPHS PCT % 19*   MONOS PCT % 11   EOS PCT % 5     Results from last 7 days   Lab Units 02/04/21  0525   SODIUM mmol/L 136   POTASSIUM mmol/L 3 4*   CHLORIDE mmol/L 101   CO2 mmol/L 27   BUN mg/dL 18   CREATININE mg/dL 0 65   ANION GAP mmol/L 8   CALCIUM mg/dL 9 7   GLUCOSE RANDOM mg/dL 94                           * I Have Reviewed All Lab Data Listed Above  * Additional Pertinent Lab Tests Reviewed: Alex 66 Admission Reviewed    Imaging:    Imaging Reports Reviewed Today Include: na  Imaging Personally Reviewed by Myself Includes:  na    Recent Cultures (last 7 days):           Last 24 Hours Medication List:   Current Facility-Administered Medications   Medication Dose Route Frequency Provider Last Rate    acetaminophen  650 mg Oral Q6H PRN Jany Abebe MD      apixaban  2 5 mg Oral BID Jany Abebe MD      diltiazem  90 mg Oral [facility] Jany Abebe MD      furosemide  40 mg Oral Daily PRN Jany Abebe MD      hydrOXYzine HCL  10 mg Oral BID PRN Jany Abebe MD      metoprolol tartrate  25 mg Oral BID Jany Abebe MD      ondansetron  4 mg Intravenous Q6H PRN Jany Abebe MD      oxybutynin  5 mg Oral Daily Jany Abebe MD      pantoprazole  40 mg Oral Early Morning Jany Abebe MD      potassium chloride  20 mEq Oral Daily PRN Jany Abebe MD      pravastatin  40 mg Oral Daily With Clemencia Mata MD      sertraline  50 mg Oral Daily Jany Abebe MD          Today, Patient Was Seen By: MARCUS Armando      ** Please Note: Dictation voice to text software may have been used in the creation of this document   **

## 2021-02-04 NOTE — ASSESSMENT & PLAN NOTE
Wt Readings from Last 3 Encounters:   02/04/21 54 6 kg (120 lb 5 9 oz)   08/21/20 60 7 kg (133 lb 13 1 oz)   08/17/20 59 kg (130 lb 1 1 oz)     · Currently euvolemic  · Continue Lasix with potassium supplement  · Previous echo 08/20 EF 65%, moderate MR

## 2021-02-04 NOTE — SPEECH THERAPY NOTE
Speech/Language Pathology Note    Patient Name: Michelle Mays  XFUAV'[id number] Date: 2/4/2021     Consult received and chart reviewed  Per chart review and discussion with RN, pt is tolerating regular diet with thin liquids with no s/s of aspiration  No acute cardiopulmonary disease on CXR  Speech/Language deficits also denied  Formal speech/swallow evaluation does not appear to be indicated at this time  If new concerns arise, please reconsult  Thank you

## 2021-02-04 NOTE — ASSESSMENT & PLAN NOTE
· History of right knee replacement  · Left knee pain  · PT consulted, recommending short-term rehab

## 2021-02-04 NOTE — CASE MANAGEMENT
No definitve answer from [facility] if they are able to accept pt or not at this time per [facility] has accepted pt and will have bed tomorrow  Pt expected to be stable for d/c in 24 hours  Pt and family in agreement with Str at [facility] and have accepted bed  [facility] has initiated insurance auth  Pt will need w/c [facility] to [facility]   to follow

## 2021-02-04 NOTE — CASE MANAGEMENT
LOS: 1, URR score: 17 and Green, pt is not a 30 day re-admit or Medicare bundle  CM met with pt at bedside and explained role  CM also spoke with pt daughter Coide Gandara via phone [phone]  Pt lives in a house with her son Sofia Myrick; ramp on the outside  Pt has everything set up on the first floor  Pt has a walker which she uses to ambulate  Pt requires assistance with ADL's per daughter  Pt's grandson and grand daughter drive her to appointments  Pt has no history of MH or D&A treatment  Pt has a history of having [facility] in the past  She has been to [facility] in the past for STR  Pt's PCP is Graham Raymond  Pt uses the [facility] in [place] and has no barriers to obtaining medications  Daughter reports pt has needed increased assistance with ADLs for the past few weeks due to increased weakness  Pt states her brother cannot take care of her right now due to his own medical issues  Pt will need STR per daughter and possibly long term care  A post acute care recommendation was made by your care team for STR  Discussed Freedom of Choice with both patient and caregiver  List of facilities given to both patient and caregiver via in person  both patient and caregiver aware the list is custom filtered for them by preference  and that [facility] post acute providers are designated  They are in agreement with STR and chose [facility], [facility] [facility] and [address] [place] [address]  They do not want pt to return to [facility] unless absolutely necessary  Referrals sent  Pt may need transport to [facility]  CM to follow  CM reviewed d/c planning process including the following: identifying help at home, patient preference for d/c planning needs, availability of treatment team to discuss questions or concerns patient and/or family may have regarding understanding medications and recognizing signs and symptoms once discharged  CM also encouraged patient to follow up with all recommended appointments after discharge   Patient advised of importance for patient and family to participate in managing patients medical well being

## 2021-02-04 NOTE — PLAN OF CARE
Problem: PHYSICAL THERAPY ADULT  Goal: Performs mobility at highest level of function for planned discharge setting  See evaluation for individualized goals  Description: Treatment/Interventions: Functional transfer training, LE strengthening/ROM, Therapeutic exercise, Endurance training, Patient/family training, Equipment eval/education, Bed mobility, Gait training, Spoke to nursing, Spoke to case management  Equipment Recommended: (continue RW trial)       See flowsheet documentation for full assessment, interventions and recommendations  Note: Prognosis: Fair  Problem List: Decreased strength, Decreased endurance, Impaired balance, Decreased mobility, Decreased safety awareness  Assessment: Pt is 81 y o  female seen for PT evaluation on 2/4/2021 s/p admit to [address] on 2/3/2021 w/ Generalized weakness  PT consulted to assess pt's functional mobility and d/c needs  Order placed for PT eval and tx, w/ up w/ A and up and OOB as tolerated order  PT performed at least 2 patient identifiers during session: Name and wristband  Comorbidities affecting pt's physical performance at time of assessment include: OA of knee, h/o R TKR, OAB, HTN, chronic diastolic heart failure, AFib  PTA, pt was requiring A for mobility and lives w/ family in 2 level home c 1st floor setup  Personal factors affecting pt at time of IE include: lives in 2 story house, ambulating w/ assistive device, stairs to enter home, inability to navigate level surfaces w/o external assistance, unable to perform dynamic tasks in community, positive fall history, decreased initiation and engagement, unable to perform physical activity, limited insight into impairments, inability to perform IADLs and inability to perform ADLs   Please find objective findings from PT assessment regarding body systems outlined above with impairments and limitations including weakness, impaired balance, decreased endurance, pain, decreased activity tolerance, decreased functional mobility tolerance and fall risk, as well as mobility assessment (need for cueing for mobility technique)  The following objective measures performed on IE also reveal limitations: Barthel Index: 30/100 and Modified Lindy: 4 (moderate/severe disability)  Pt's clinical presentation is currently unstable/unpredictable seen in pt's presentation of abnormal lab value(s), need for input for task focus and mobility technique and ongoing medical assessment  Pt to benefit from continued PT tx to address deficits as defined above and maximize level of functional independent mobility and consistency  From PT/mobility standpoint, recommendation at time of d/c would be IP rehab pending progress in order to facilitate return to PLOF  Barriers to Discharge: Inaccessible home environment, Decreased caregiver support  Barriers to Discharge Comments: per chart, family unable to provide care for pt at this time  PT Discharge Recommendation: [address]     PT - OK to Discharge: Yes(when medically cleared, if to [facility])    See flowsheet documentation for full assessment

## 2021-02-04 NOTE — OCCUPATIONAL THERAPY NOTE
Occupational Therapy Evaluation      Charly Ny    2/4/2021    Patient Active Problem List   Diagnosis    CAD (coronary artery disease)    Mitral regurgitation    Longstanding persistent atrial fibrillation (HCC)    [facility]    Essential hypertension    Acute hypokalemia    Depression with anxiety    Gastroesophageal reflux disease without esophagitis    OAB (overactive bladder)    Hypomagnesemia    Mixed hyperlipidemia    Osteoarthritis of knee    Pneumonia    Generalized weakness    Dizziness    Chest wall contusion, right, initial encounter    Ambulatory dysfunction    Hypokalemia    Liver lesion    Rib fractures    CKD (chronic kidney disease), stage II    Pulmonary hypertension ([facility] )       Past Medical History:   Diagnosis Date    Acute on chronic diastolic congestive heart failure ([facility] ) 6/27/2019    Ambulatory dysfunction 8/17/2020    Arthritis     Chest wall contusion, right, initial encounter 8/17/2020    Chest wall contusion, right, subsequent encounter 8/17/2020    Chronic diastolic heart failure (HCC)     Chronic kidney disease, stage 2 (mild)     Coronary artery disease     history of stenting    Eczema     years    Edema     History of echocardiogram 07/20/2017    EF 55%, mild concentric LVH, bileaflet MVP with moderate MR, left atrial enlargement   History of transfusion     Hyperlipidemia     Hypertension     Hypo-osmolality and hyponatremia     Hypokalemia 7/11/2019    Mitral regurgitation        Past Surgical History:   Procedure Laterality Date    ABDOMINAL SURGERY      hysterectomy    CARDIAC CATHETERIZATION  04/10/2012    EF 65%, no significant CAD, patent stents, severe MR     CORONARY ANGIOPLASTY WITH STENT PLACEMENT  03/21/2007    EF 55%, GARRET to LAD      EYE SURGERY      b/l cataracts    HYSTERECTOMY      JOINT REPLACEMENT      Rt knee        02/04/21 0804   OT Last Visit   OT Visit Date 02/04/21   Note Type Note type Evaluation   Restrictions/Precautions   Weight Bearing Precautions Per Order No   Other Precautions Chair Alarm; Bed Alarm; Fall Risk;Pain   Pain Assessment   Pain Assessment Tool 0-10   Pain Score 5   Pain Location/Orientation Orientation: Lower; Location: Back   Pain Onset/Description Onset: Ongoing;Frequency: Constant/Continuous; Descriptor: [address] Pain Intervention(s) Ambulation/increased activity;Repositioned;Medication (See MAR)   Home Living   [facility] of [facility] Two level;Performs ADLs on one level; Able to live on main level with bedroom/bathroom;Stairs to enter with rails  (1st floor setup; 2 LOLA)   Bathroom Shower/Tub Tub/shower unit   Bathroom Toilet Standard   Bathroom Equipment   ( "patio chair in shower")   [facility]  (pt uses RW at baseline)   Prior Function   Level of [place] Needs assistance with ADLs and functional mobility; Needs assistance with IADLs   Lives With Son   Receives Help From Family   ADL Assistance Needs assistance  ((I) dressing/toileting, (A) bathing)   IADLs Needs assistance  (son does cooking and laundry )   Falls in the last 6 months 1 to 4  (3 falls reported )   Vocational Retired   Comments (-) driving, son assists with transportation    Lifestyle   Autonomy Patient reporting being independent with most ADLs, ambulatory with RW and lives with son in a two story house with 2 LOLA   Reciprocal Relationships supportive son    Service to Others retired    ADL   Eating Assistance 5  [facility] 5  Supervision/Setup   [address] [place] [address] 5  Supervision/Setup   LB [address] 4  [address] 4  [address] 3  Moderate Assistance   [address]  3  Moderate Assistance   Bed Mobility   Additional Comments DNT bed mobility: pt seated at EOB upon arrival to room and seated in chair at end of eval  Transfers   Sit to Stand 4  Minimal assistance   Additional items Assist x 2; Increased time required;Verbal cues   Stand to Sit 4  Minimal assistance   Additional items Assist x 2; Increased time required;Verbal cues   Stand pivot 3  Moderate assistance   Additional items Assist x 2; Increased time required;Verbal cues   Toilet transfer 4  Minimal assistance   Additional items Assist x 2; Increased time required;Verbal cues; Commode;Armrests   Balance   Static Sitting Good   Dynamic Sitting Fair   Static Standing Poor +   Dynamic Standing Poor +   Activity Tolerance   Activity Tolerance Patient limited by fatigue   Nurse Made Aware RN made aware of outcomes   RUE Assessment   RUE Assessment WFL  (grossly 3+/5 MMT based on functional assessment )   LUE Assessment   LUE Assessment WFL  (grossly 3+/5 MMT based on functional assessment )   Hand Function   Gross Motor Coordination Functional   Fine Motor Coordination Functional   Cognition   Overall Cognitive Status WFL   Arousal/Participation Alert; Responsive; Cooperative   Attention Within functional limits   Orientation Level Oriented X4   Memory Decreased recall of precautions   Following Commands Follows one step commands with increased time or repetition   Comments pt agreeable to OT eval    Assessment   Limitation Decreased ADL status; Decreased UE strength;Decreased endurance;Decreased self-care trans;Decreased high-level ADLs   Prognosis Good   Assessment Patient is a 81 y o  female seen for OT evaluation s/p admit to [address] [place] [address]  on 2/3/2021 w/Generalized weakness  Commorbidities affecting patient's functional performance at time of assessment include: chronic diastolic heart failure, HTN, A-fib, OAB, and OA of knee  Orders placed for OT evaluation and treatment and up and OOB as tolerated   Performed at least two patient identifiers during session including name and wristband    Prior to admission, Patient reporting being independent with most ADLs, ambulatory with RW and lives with son in a two story house with 2 LOLA  Personal factors affecting patient at time of initial evaluation include: steps to enter, decreased initiation and engagement, difficulty performing ADLs and difficulty performing IADLs  Upon evaluation, patient requires supervision and minimal  assist for UB ADLs, minimal  and moderate assist for LB ADLs, transfers and functional ambulation in room and bathroom with moderate assist, with the use of Rolling Walker  Patient is oriented x 4 and presents with impaired judgement, inability to make safe decisions  Occupational performance is affected by the following deficits: decreased muscle strength, degenerative arthritic joint changes, dynamic sit/ stand balance deficit with poor standing tolerance time for self care and functional mobility, decreased activity tolerance, increased pain, delayed righting and equilibrium reactions and postural control and postural alignment deficit, requiring external assistance to complete transitional movements  Patient to benefit from continued Occupational Therapy treatment while in the hospital to address deficits as defined above and maximize level of functional independence with ADLs and functional mobility  Occupational Performance areas to address include: grooming , bathing/ shower, dressing, toilet hygiene, transfer to all surfaces and functional ambulation  From OT standpoint, recommendation at time of d/c would be Post-Acute Rehabilitation Services  Goals   Patient Goals to go home    Plan   Treatment Interventions ADL retraining;Functional transfer training;UE strengthening/ROM; Endurance training;Patient/family training; Compensatory technique education; Activityengagement   Goal Expiration Date 02/14/21   OT Treatment Day 0   OT Frequency 3-5x/wk   Recommendation   OT Discharge Recommendation Post-Acute Rehabilitation Services   OT - OK to Discharge Yes  (Once medically cleared )   Additional Comments  The patient's raw score on the AM-PAC Daily Activity inpatient short form is 15, standardized score is 34 69, less than 39 4  Patients at this level are likely to benefit from DC to post-acute rehabilitation services  Please refer to the recommendation of the Occupational Therapist for safe DC planning  -[facility] Daily Activity Inpatient   Lower Body Dressing 2   Bathing 2   Toileting 2   Upper Body Dressing 3   Grooming 3   Eating 3   Daily Activity Raw Score 15   Daily Activity Standardized Score (Calc for Raw Score >=11) 34 69   AM-[facility] Applied Cognition Inpatient   Following a Speech/Presentation 3   Understanding Ordinary Conversation 3   Taking Medications 2   Remembering Where Things Are Placed or Put Away 2   Remembering List of 4-5 Errands 2   Taking Care of Complicated Tasks 2   Applied Cognition Raw Score 14   Applied Cognition Standardized Score 32 02   Barthel Index   Feeding 5   Bathing 0   Grooming Score 0   Dressing Score 5   Bladder Score 5   Bowels Score 5   Toilet Use Score 5   Transfers (Bed/Chair) Score 5   Mobility (Level Surface) Score 0   Stairs Score 0   Barthel Index Score 30     GOALS:    *ADL transfers with (I) for inc'd independence with ADLs/purposeful tasks    *UB ADL with (I) for inc'd independence with self cares    *LB ADL with (S) using AE prn for inc'd independence with self cares    *Toileting with (S) for clothing management and hygiene for return to PLOF with personal care    *Increase stand tolerance x5 m for inc'd tolerance with standing purposeful tasks    *Participate in 10m UE therex to increase overall stamina/activity tolerance for purposeful tasks    *Bed mobility- (I) for inc'd independence to manage own comfort and initiate EOB & OOB purposeful tasks    *Patient will verbalize 3 safety awareness/ principles to prevent falls in the home setting       *Patient will verbalize and demonstrate use of energy conservation/deep breathing techniques and work simplification skills during functional activities with no verbal cues  *Patient will increase OOB/sitting tolerance to 2-4 hours per day to increase participation in self-care and leisure tasks with no s/s of exertion  *Patient will engage in ongoing cognitive assessment to assist with safe discharge planning/recommendations         Madie Segura MS, OTR/L

## 2021-02-04 NOTE — UTILIZATION REVIEW
Initial Clinical Review - unable to enter into [facility]  Clinical faxed  Admission: Date/Time/Statement:   Admission Orders (From admission, onward)     Ordered        02/03/21 1544  Inpatient Admission  Once                   Orders Placed This Encounter   Procedures    Inpatient Admission     Standing Status:   Standing     Number of Occurrences:   1     Order Specific Question:   Level of Care     Answer:   Med Surg [16]     Order Specific Question:   Estimated length of stay     Answer:   More than 2 Midnights     Order Specific Question:   Certification     Answer:   I certify that inpatient services are medically necessary for this patient for a duration of greater than two midnights  See H&P and MD Progress Notes for additional information about the patient's course of treatment  ED Arrival Information     Expected Arrival Acuity Means of Arrival Escorted By Service Admission Type    - 2/3/2021 12:46 Urgent Ambulance [facility] WITH [facility] Urgent    Arrival Complaint    weakness        Chief Complaint   Patient presents with    Weakness - Generalized     Accordign to the patient, she had been in a nursig home and was released for the nursing home, but the patient reports not being able to walk  HPI:   81 y o  female with PMH of CHF, HTN, right knee replacement, and atrial fibrillation on Eliquis, who presents to the ED from home with difficulty walking for the last two weeks, left sided hip and leg pain   Daughter reports that patient previously was able to walk several steps with walker but now she is completely bedbound and unable to get up  She cannot perform any of her ADLs  Physical exam: tachycardia, left knee tenderness, alert and oriented x3  Plan: Inpatient Med Surg admission for evaluation and treatment of weakness:  PT/OT consult  Continue Eliquis, Toprol and Cardizem       2/4 Internal Medicine: Patient is unable to care for herself and has limited family support  Physical Therapy recommending STR when medically cleared for discharge  Referrals placed by Case Management, patient accepted at [facility] has initiated insurance authorization  ED Triage Vitals [02/03/21 1254]   Temperature Pulse Respirations Blood Pressure SpO2   97 9 °F (36 6 °C) (!) 115 18 134/64 94 %      Temp Source Heart Rate Source Patient Position - Orthostatic VS BP Location FiO2 (%)   Temporal Monitor Lying Right arm --      Pain Score       6          Wt Readings from Last 1 Encounters:   02/04/21 54 6 kg (120 lb 5 9 oz)     Additional Vital Signs:     Date/Time  Temp  Pulse  Resp  BP  MAP  SpO2  O2 Device   02/04/21 1515  98 6 °  100  18  129/66  --  96 %  None (Room air)     Date/Time  Temp  Pulse  Resp  BP  MAP   SpO2  O2 Device   02/04/21 0938  --  64  --  128/60  --  --  --   02/04/21 0700  97 1 °F    80  18  134/64  --  97 %  --   02/04/21 0525  --  --  --  115/71  --  --  --   02/03/21 2351  98 8 °F  70  18  118/61  --  98 %  None (Room air)   02/03/21 1710  97 6 °F   75  18  128/77  --  96 %  None (Room air)   02/03/21 1445  --  126Abnormal   18  120/74  83  97 %  None (Room air)   02/03/21 1300  --  103  18  132/71  96  --  --     Date and Time Eye Opening Best Verbal Response Best Motor Response Oli Coma Scale Score     02/04/21 0938 4 5 6 15   02/03/21 2200 4 5 6 15   02/03/21 1715 4 5 6 15   02/03/21 1305 4 5 6 15         Pertinent Labs/Diagnostic Test Results:     2/3 CXR: No acute cardiopulmonary disease  2/3 left femur x-ray:  Bones are demineralized, limiting evaluation for nondisplaced fracture  No acute displaced fracture identified on the provided views  2/3 CT head:   No acute findings  Unchanged appearance of periventricular white matter disease days and a chronic right frontal infarction  2/3 EKG:  Atrial fibrillation with rapid ventricular response  Left axis deviation  onspecific ST and T wave abnormality  Abnormal ECG   When compared with ECG of 16-AUG-2020 15:27, Vent   rate has increased BY  48 BPM       Results from last 7 days   Lab Units 02/03/21  1340   SARS-COV-2  Negative     Results from last 7 days   Lab Units 02/04/21  0524 02/03/21  1340   WBC Thousand/uL 5 70 7 10   HEMOGLOBIN g/dL 13 2 14 8   HEMATOCRIT % 40 3* 45 4   PLATELETS Thousands/uL 235 261   NEUTROS ABS Thousands/µL 3 60 5 00         Results from last 7 days   Lab Units 02/04/21  0525 02/03/21  1340   SODIUM mmol/L 136 136   POTASSIUM mmol/L 3 4* 3 8   CHLORIDE mmol/L 101 98   CO2 mmol/L 27 29   ANION GAP mmol/L 8 9   BUN mg/dL 18 19   CREATININE mg/dL 0 65 0 74   EGFR ml/min/1 73sq m 82 75   CALCIUM mg/dL 9 7 10 5   MAGNESIUM mg/dL  --  1 9             Results from last 7 days   Lab Units 02/04/21  0525 02/03/21  1340   GLUCOSE RANDOM mg/dL 94 103*         Results from last 7 days   Lab Units 02/03/21  1340   TROPONIN I ng/mL <0 03         Results from last 7 days   Lab Units 02/03/21  1340   INFLUENZA A PCR  Negative   INFLUENZA B PCR  Negative   RSV PCR  Negative       ED Treatment:   Medication Administration from 02/03/2021 1246 to 02/03/2021 1655       Date/Time Order Dose Route Action Action by Comments     02/03/2021 1646 diltiazem (CARDIZEM) tablet 90 mg 90 mg Oral Given Oliver Lombard, RN      02/03/2021 1645 sertraline (ZOLOFT) tablet 50 mg 50 mg Oral Given Oliver Lombard, RN         Past Medical History:   Diagnosis Date    Acute on chronic diastolic congestive heart failure ([facility] ) 6/27/2019    Ambulatory dysfunction 8/17/2020    Arthritis     Chest wall contusion, right, initial encounter 8/17/2020    Chest wall contusion, right, subsequent encounter 8/17/2020    Chronic diastolic heart failure (HCC)     Chronic kidney disease, stage 2 (mild)     Coronary artery disease     history of stenting    Eczema     years    Edema     History of echocardiogram 07/20/2017    EF 55%, mild concentric LVH, bileaflet MVP with moderate MR, left atrial enlargement   History of transfusion     Hyperlipidemia     Hypertension     Hypo-osmolality and hyponatremia     Hypokalemia 7/11/2019    Mitral regurgitation      Present on Admission:   Longstanding persistent atrial fibrillation (HCC)   Chronic diastolic heart failure (HCC)   Essential hypertension   OAB (overactive bladder)   Osteoarthritis of knee      Admitting Diagnosis: Weakness generalized [R53 1]  Weakness [R53 1]  Age/Sex: 81 y o  female       Admission Orders:    SCD, PT/OT, Speech eval  CBC, BMP in a m  Scheduled Medications:  apixaban, 2 5 mg, Oral, BID  diltiazem, 90 mg, Oral, Q8H AGUILAR  metoprolol tartrate, 25 mg, Oral, BID  oxybutynin, 5 mg, Oral, Daily  pantoprazole, 40 mg, Oral, Early Morning  potassium chloride, 40 mEq, Oral, Once  pravastatin, 40 mg, Oral, Daily With Dinner  sertraline, 50 mg, Oral, Daily      Continuous IV Infusions: None  PRN Meds:      acetaminophen, 650 mg, Oral, Q6H PRN  furosemide, 40 mg, Oral, Daily PRN  hydrOXYzine HCL, 10 mg, Oral, BID PRN  ondansetron, 4 mg, Intravenous, Q6H PRN  potassium chloride, 20 mEq, Oral, Daily PRN        IP CONSULT TO CASE MANAGEMENT    Network Utilization Review Department  ATTENTION: Please call with any questions or concerns to [phone] and carefully listen to the prompts so that you are directed to the right person  All voicemails are confidential   Graciela Mathews all requests for admission clinical reviews, approved or denied determinations and any other requests to dedicated fax number below belonging to the campus where the patient is receiving treatment   List of dedicated fax numbers for the Facilities:  [address] DENIALS (Administrative/Medical Necessity) [phone]   [address] (Maternity/NICU/Pediatrics) 261 [facility]  [address]) [address] [place] [address] Mechelle Zhang 1481 [phone]   [address]51 [phone]

## 2021-02-04 NOTE — PLAN OF CARE
Problem: OCCUPATIONAL THERAPY ADULT  Goal: Performs self-care activities at highest level of function for planned discharge setting  See evaluation for individualized goals  Description: Treatment Interventions: ADL retraining, Functional transfer training, UE strengthening/ROM, Endurance training, Patient/family training, Compensatory technique education, Activityengagement          See flowsheet documentation for full assessment, interventions and recommendations  Note: Limitation: Decreased ADL status, Decreased UE strength, Decreased endurance, Decreased self-care trans, Decreased high-level ADLs  Prognosis: Good  Assessment: Patient is a 81 y o  female seen for OT evaluation s/p admit to [address] [place] [address]  on 2/3/2021 w/Generalized weakness  Commorbidities affecting patient's functional performance at time of assessment include: chronic diastolic heart failure, HTN, A-fib, OAB, and OA of knee  Orders placed for OT evaluation and treatment and up and OOB as tolerated   Performed at least two patient identifiers during session including name and wristband  Prior to admission, Patient reporting being independent with most ADLs, ambulatory with RW and lives with son in a two story house with 2 LOLA  Personal factors affecting patient at time of initial evaluation include: steps to enter, decreased initiation and engagement, difficulty performing ADLs and difficulty performing IADLs  Upon evaluation, patient requires supervision and minimal  assist for UB ADLs, minimal  and moderate assist for LB ADLs, transfers and functional ambulation in room and bathroom with moderate assist, with the use of Rolling Walker  Patient is oriented x 4 and presents with impaired judgement, inability to make safe decisions    Occupational performance is affected by the following deficits: decreased muscle strength, degenerative arthritic joint changes, dynamic sit/ stand balance deficit with poor standing tolerance time for self care and functional mobility, decreased activity tolerance, increased pain, delayed righting and equilibrium reactions and postural control and postural alignment deficit, requiring external assistance to complete transitional movements  Patient to benefit from continued Occupational Therapy treatment while in the hospital to address deficits as defined above and maximize level of functional independence with ADLs and functional mobility  Occupational Performance areas to address include: grooming , bathing/ shower, dressing, toilet hygiene, transfer to all surfaces and functional ambulation  From OT standpoint, recommendation at time of d/c would be Post-Acute Rehabilitation Services         OT Discharge Recommendation: Post-Acute Rehabilitation Services  OT - OK to Discharge: Yes(Once medically cleared )     Stephan Castro OT

## 2021-02-04 NOTE — PLAN OF CARE
Problem: Prexisting or High Potential for Compromised Skin Integrity  Goal: Skin integrity is maintained or improved  Description: INTERVENTIONS:  - Identify patients at risk for skin breakdown  - Assess and monitor skin integrity  - Assess and monitor nutrition and hydration status  - Monitor labs   - Assess for incontinence   - Turn and reposition patient  - Assist with mobility/ambulation  - Relieve pressure over bony prominences  - Avoid friction and shearing  - Provide appropriate hygiene as needed including keeping skin clean and dry  - Evaluate need for skin moisturizer/barrier cream  - Collaborate with interdisciplinary team   - Patient/family teaching  - Consider wound care consult   Outcome: Progressing     Problem: Potential for Falls  Goal: Patient will remain free of falls  Description: INTERVENTIONS:  - Assess patient frequently for physical needs  -  Identify cognitive and physical deficits and behaviors that affect risk of falls    -  [place] fall precautions as indicated by assessment   - Educate patient/family on patient safety including physical limitations  - Instruct patient to call for assistance with activity based on assessment  - Modify environment to reduce risk of injury  - Consider OT/PT consult to assist with strengthening/mobility  Outcome: Progressing     Problem: METABOLIC, FLUID AND ELECTROLYTES - ADULT  Goal: Electrolytes maintained within normal limits  Description: INTERVENTIONS:  - Monitor labs and assess patient for signs and symptoms of electrolyte imbalances  - Administer electrolyte replacement as ordered  - Monitor response to electrolyte replacements, including repeat lab results as appropriate  - Instruct patient on fluid and nutrition as appropriate  Outcome: Progressing     Problem: SKIN/TISSUE INTEGRITY - ADULT  Goal: Skin integrity remains intact  Description: INTERVENTIONS  - Identify patients at risk for skin breakdown  - Assess and monitor skin integrity  - Assess and monitor nutrition and hydration status  - Monitor labs (i e  albumin)  - Assess for incontinence   - Turn and reposition patient  - Assist with mobility/ambulation  - Relieve pressure over bony prominences  - Avoid friction and shearing  - Provide appropriate hygiene as needed including keeping skin clean and dry  - Evaluate need for skin moisturizer/barrier cream  - Collaborate with interdisciplinary team (i e  Nutrition, Rehabilitation, etc )   - Patient/family teaching  Outcome: Progressing     Problem: MUSCULOSKELETAL - ADULT  Goal: Maintain or return mobility to safest level of function  Description: INTERVENTIONS:  - Assess patient's ability to carry out ADLs; assess patient's baseline for ADL function and identify physical deficits which impact ability to perform ADLs (bathing, care of mouth/teeth, toileting, grooming, dressing, etc )  - Assess/evaluate cause of self-care deficits   - Assess range of motion  - Assess patient's mobility  - Assess patient's need for assistive devices and provide as appropriate  - Encourage maximum independence but intervene and supervise when necessary  - Involve family in performance of ADLs  - Assess for home care needs following discharge   - Consider OT consult to assist with ADL evaluation and planning for discharge  - Provide patient education as appropriate  Outcome: Progressing     Problem: PAIN - ADULT  Goal: Verbalizes/displays adequate comfort level or baseline comfort level  Description: Interventions:  - Encourage patient to monitor pain and request assistance  - Assess pain using appropriate pain scale  - Administer analgesics based on type and severity of pain and evaluate response  - Implement non-pharmacological measures as appropriate and evaluate response  - Consider cultural and social influences on pain and pain management  - Notify physician/advanced practitioner if interventions unsuccessful or patient reports new pain  Outcome: Progressing Problem: INFECTION - ADULT  Goal: Absence or prevention of progression during hospitalization  Description: INTERVENTIONS:  - Assess and monitor for signs and symptoms of infection  - Monitor lab/diagnostic results  - Monitor all insertion sites, i e  indwelling lines, tubes, and drains  - Monitor endotracheal if appropriate and nasal secretions for changes in amount and color  - [place] appropriate cooling/warming therapies per order  - Administer medications as ordered  - Instruct and encourage patient and family to use good hand hygiene technique  - Identify and instruct in appropriate isolation precautions for identified infection/condition  Outcome: Progressing     Problem: SAFETY ADULT  Goal: Patient will remain free of falls  Description: INTERVENTIONS:  - Assess patient frequently for physical needs  -  Identify cognitive and physical deficits and behaviors that affect risk of falls    -  [place] fall precautions as indicated by assessment   - Educate patient/family on patient safety including physical limitations  - Instruct patient to call for assistance with activity based on assessment  - Modify environment to reduce risk of injury  - Consider OT/PT consult to assist with strengthening/mobility  Outcome: Progressing  Goal: Maintain or return to baseline ADL function  Description: INTERVENTIONS:  -  Assess patient's ability to carry out ADLs; assess patient's baseline for ADL function and identify physical deficits which impact ability to perform ADLs (bathing, care of mouth/teeth, toileting, grooming, dressing, etc )  - Assess/evaluate cause of self-care deficits   - Assess range of motion  - Assess patient's mobility; develop plan if impaired  - Assess patient's need for assistive devices and provide as appropriate  - Encourage maximum independence but intervene and supervise when necessary  - Involve family in performance of ADLs  - Assess for home care needs following discharge   - Consider OT consult to assist with ADL evaluation and planning for discharge  - Provide patient education as appropriate  Outcome: Progressing  Goal: Maintain or return mobility status to optimal level  Description: INTERVENTIONS:  - Assess patient's baseline mobility status (ambulation, transfers, stairs, etc )    - Identify cognitive and physical deficits and behaviors that affect mobility  - Identify mobility aids required to assist with transfers and/or ambulation (gait belt, sit-to-stand, lift, walker, cane, etc )  - [place] fall precautions as indicated by assessment  - Record patient progress and toleration of activity level on Mobility SBAR; progress patient to next Phase/Stage  - Instruct patient to call for assistance with activity based on assessment  - Consider rehabilitation consult to assist with strengthening/weightbearing, etc   Outcome: Progressing     Problem: DISCHARGE PLANNING  Goal: Discharge to home or other facility with appropriate resources  Description: INTERVENTIONS:  - Identify barriers to discharge w/patient and caregiver  - Arrange for needed discharge resources and transportation as appropriate  - Identify discharge learning needs (meds, wound care, etc )  - Arrange for interpretive services to assist at discharge as needed  - Refer to Case Management Department for coordinating discharge planning if the patient needs post-hospital services based on physician/advanced practitioner order or complex needs related to functional status, cognitive ability, or social support system  Outcome: Progressing     Problem: Knowledge Deficit  Goal: Patient/family/caregiver demonstrates understanding of disease process, treatment plan, medications, and discharge instructions  Description: Complete learning assessment and assess knowledge base    Interventions:  - Provide teaching at level of understanding  - Provide teaching via preferred learning methods  Outcome: Progressing

## 2021-02-04 NOTE — PROGRESS NOTES
Pt reports decreased appetite at this time, reports eating about 50% of her meals  For B reports eating some of a banana and toast  Noting wt loss per chart review though pt believes this is fluid loss while following low Na diet at home  Pt presently euvolemic, will add modest 4 gm EVERT at this time  Drinks ensure daily, noting allergy to chocolate, will order ensure enlive vanilla daily  ST evaluation not yet completed, pt reports no physical difficulties chewing/swallowing though reports a dry mouth, encouraged sips of fluids, may benefit from mouth moisturizer, will relay to RN

## 2021-02-04 NOTE — PHYSICAL THERAPY NOTE
Physical Therapy Evaluation     Patient's Name: Stacy Llamas    Admitting Diagnosis  Weakness generalized [R53 1]  Weakness [R53 1]    Problem List  Patient Active Problem List   Diagnosis    CAD (coronary artery disease)    Mitral regurgitation    Longstanding persistent atrial fibrillation (HCC)    [facility]    Essential hypertension    Acute hypokalemia    Depression with anxiety    Gastroesophageal reflux disease without esophagitis    OAB (overactive bladder)    Hypomagnesemia    Mixed hyperlipidemia    Osteoarthritis of knee    Pneumonia    Generalized weakness    Dizziness    Chest wall contusion, right, initial encounter    Ambulatory dysfunction    Hypokalemia    Liver lesion    Rib fractures    CKD (chronic kidney disease), stage II    Pulmonary hypertension ([facility] )       Past Medical History  Past Medical History:   Diagnosis Date    Acute on chronic diastolic congestive heart failure ([facility] ) 6/27/2019    Ambulatory dysfunction 8/17/2020    Arthritis     Chest wall contusion, right, initial encounter 8/17/2020    Chest wall contusion, right, subsequent encounter 8/17/2020    Chronic diastolic heart failure (HCC)     Chronic kidney disease, stage 2 (mild)     Coronary artery disease     history of stenting    Eczema     years    Edema     History of echocardiogram 07/20/2017    EF 55%, mild concentric LVH, bileaflet MVP with moderate MR, left atrial enlargement   History of transfusion     Hyperlipidemia     Hypertension     Hypo-osmolality and hyponatremia     Hypokalemia 7/11/2019    Mitral regurgitation        Past Surgical History  Past Surgical History:   Procedure Laterality Date    ABDOMINAL SURGERY      hysterectomy    CARDIAC CATHETERIZATION  04/10/2012    EF 65%, no significant CAD, patent stents, severe MR     CORONARY ANGIOPLASTY WITH STENT PLACEMENT  03/21/2007    EF 55%, GARRET to LAD      EYE SURGERY      b/l cataracts  HYSTERECTOMY      JOINT REPLACEMENT      Rt knee        02/04/21 0810   PT Last Visit   PT Visit Date 02/04/21   Note Type   Note type Evaluation   Pain Assessment   Pain Assessment Tool 0-10   Pain Score 5   Pain Location/Orientation Orientation: Lower; Location: Back   Pain Onset/Description Onset: Ongoing   Patient's Stated Pain Goal No pain   Hospital Pain Intervention(s) Emotional support;Repositioned   Home Living   Type of [address] Two level;Performs ADLs on one level; Able to live on main level with bedroom/bathroom;Stairs to enter with rails  (1st floor setup; 2 LOLA)   Bathroom Shower/Tub Tub/shower unit   Bathroom Toilet Standard   Bathroom Equipment   ("patio chair in shower")   [address]  (pt uses RW at baseline)   Prior Function   Level of [place] Needs assistance with ADLs and functional mobility; Needs assistance with IADLs  ((I) dressing/toileting, (A) bathing)   Lives With Family   Receives Help From Family   ADL Assistance Needs assistance   IADLs Needs assistance   Falls in the last 6 months 1 to 4  (3 falls)   Comments family provides transport   Restrictions/Precautions   Weight Bearing Precautions Per Order No   Other Precautions Chair Alarm; Bed Alarm; Fall Risk;Pain   General   Family/Caregiver Present No   Cognition   Arousal/Participation Alert   Orientation Level Oriented X4   Memory Decreased recall of precautions   Following Commands Follows one step commands with increased time or repetition   Comments pt agreeable to PT session   RLE Assessment   RLE Assessment X   Strength RLE   RLE Overall Strength 3-/5   LLE Assessment   LLE Assessment X   Strength LLE   LLE Overall Strength 3-/5   Coordination   Movements are Fluid and Coordinated 0   Sensation WFL   Bed Mobility   Supine to Sit 4  Minimal assistance   Additional items Assist x 1;HOB elevated; Bedrails; Increased time required;Verbal cues;LE management   Sit to Supine Unable to assess  (pt remained OOB in chair post session)   Transfers   Sit to Stand 4  Minimal assistance   Additional items Assist x 2; Increased time required;Verbal cues   Stand to Sit 4  Minimal assistance   Additional items Assist x 2;Verbal cues   Stand pivot 3  Moderate assistance   Additional items Assist x 2; Increased time required;Verbal cues   Toilet transfer 4  Minimal assistance   Additional items Assist x 2;Commode;Verbal cues; Increased time required;Armrests   Ambulation/Elevation   Gait pattern Improper Weight shift;Decreased foot clearance;Shuffling; Step to;Excessively slow   Gait Assistance 3  Moderate assist   Additional items Assist x 2;Verbal cues; Tactile cues   Assistive Device   (HHA)   Distance 2 steps from bed>BSC   Stair Management Assistance Not tested   Balance   Static Sitting Good   Dynamic Sitting Fair   Static Standing Poor +   Dynamic Standing Poor +   Ambulatory Poor +   Endurance Deficit   Endurance Deficit Yes   Activity Tolerance   Activity Tolerance Patient limited by fatigue   Nurse Made Aware Yes, RN made aware of outcomes/recs   Assessment   Prognosis Fair   Problem List Decreased strength;Decreased endurance; Impaired balance;Decreased mobility; Decreased safety awareness   Assessment Pt is 81 y o  female seen for PT evaluation on 2/4/2021 s/p admit to [address] on 2/3/2021 w/ Generalized weakness  PT consulted to assess pt's functional mobility and d/c needs  Order placed for PT eval and tx, w/ up w/ A and up and OOB as tolerated order  PT performed at least 2 patient identifiers during session: Name and wristband  Comorbidities affecting pt's physical performance at time of assessment include: OA of knee, h/o R TKR, OAB, HTN, chronic diastolic heart failure, AFib  PTA, pt was requiring A for mobility and lives w/ family in 2 level home c 1st floor setup   Personal factors affecting pt at time of IE include: lives in 2 story house, ambulating w/ assistive device, stairs to enter home, inability to navigate level surfaces w/o external assistance, unable to perform dynamic tasks in community, positive fall history, decreased initiation and engagement, unable to perform physical activity, limited insight into impairments, inability to perform IADLs and inability to perform ADLs  Please find objective findings from PT assessment regarding body systems outlined above with impairments and limitations including weakness, impaired balance, decreased endurance, pain, decreased activity tolerance, decreased functional mobility tolerance and fall risk, as well as mobility assessment (need for cueing for mobility technique)  The following objective measures performed on IE also reveal limitations: Barthel Index: 30/100 and Modified [place]: 4 (moderate/severe disability)  Pt's clinical presentation is currently unstable/unpredictable seen in pt's presentation of abnormal lab value(s), need for input for task focus and mobility technique and ongoing medical assessment  Pt to benefit from continued PT tx to address deficits as defined above and maximize level of functional independent mobility and consistency  From PT/mobility standpoint, recommendation at time of d/c would be IP rehab pending progress in order to facilitate return to PLOF     Barriers to Discharge Inaccessible home environment;Decreased caregiver support   Barriers to Discharge Comments per chart, family unable to provide care for pt at this time   Goals   Patient Goals "to go home"   STG Expiration Date 02/14/21   Short Term Goal #1 In 7-10 days: Increase bilateral LE strength 1/2 grade to facilitate independent mobility, Perform all bed mobility tasks modified independent to decrease caregiver burden, Perform all transfers with close S to improve independence, Ambulate > 50 ft  with RW with close S w/o LOB and w/ normalized gait pattern 100% of the time, Increase all balance 1/2 grade to decrease risk for falls, Tolerate 4 hr OOB to faciliate upright tolerance, Complete % of the time, Tolerate seated at EOB 15 minutes to facilitate functional task performance and Tolerate standing 2 minutes to facilitate functional task performance   PT Treatment Day 1   Plan   Treatment/Interventions Functional transfer training;LE strengthening/ROM; Therapeutic exercise; Endurance training;Patient/family training;Equipment eval/education; Bed mobility;Gait training;Spoke to nursing;Spoke to case management   PT Frequency   (3-5x/wk)   Recommendation   PT Discharge Recommendation Post-Acute Rehabilitation Services   Equipment Recommended   (continue RW trial)   PT - OK to Discharge Yes  (when medically cleared, if to STR)   Additional Comments The patient's AM-PAC Basic Mobility Inpatient Short Form Low Function Raw Score 18 , Standardized Score is 29 25  A standardized score less 42 9 suggests the patient may benefit from discharge to post-acute rehab services  Please also refer to the recommendation of the Physical Therapist for safe discharge planning     AM-PAC Basic Mobility Inpatient   Turning in Bed Without Bedrails 3   Lying on Back to Sitting on Edge of Flat Bed 3   Moving Bed to Chair 1   Standing Up From Chair 1   Walk in Room 1   Climb 3-5 Stairs 1   Basic Mobility Inpatient Raw Score 10   Turning Head Towards Sound 4   Follow Simple Instructions 4   Low Function Basic Mobility Raw Score 18   Low Function Basic Mobility Standardized Score 29 25   Modified Dragoon Scale   Modified Dragoon Scale 4   Barthel Index   Feeding 5   Bathing 0   Grooming Score 0   Dressing Score 5   Bladder Score 5   Bowels Score 5   Toilet Use Score 5   Transfers (Bed/Chair) Score 5   Mobility (Level Surface) Score 0   Stairs Score 0   Barthel Index Score 30       Physical Therapy Treatment Note  Time In: 0810  Time Out: 0818  Total Time: 8 min     S:  Pt agreeable to PT treatment session s/p PT eval, in no apparent distress, A&O x 4 and responsive      O:  Pt seen for PT treatment session this date with interventions consisting of therapeutic activity consisting of training: sit<>stand transfers, static standing tolerance for 1-2 minutes w/ B UE support and vc and tactile cues for static standing posture faciliation  No increased pain reported throughout  VC required for technique      A:  Post session: pt returned back to recliner, chair alarm engaged, all needs in reach and RN notified of session findings/recommendations     P:  Continue to recommend STR at time of d/c in order to maximize pt's functional independence and safety w/ mobility  Pt continues to be functioning below baseline level, and remains limited 2* factors listed above  PT will continue to see pt while here in order to address the deficits listed above and provide interventions consistent w/ POC in effort to achieve STGs      Mayo Sanchez, PT

## 2021-02-04 NOTE — ASSESSMENT & PLAN NOTE
· Likely secondary to deconditioning, recently in short-term rehab in September 2020  · Status post physical therapy evaluation recommending short-term rehab  · Patient is unable to care for self and has limited family support  · Coordinate with Care Management for short-term rehab

## 2021-02-05 LAB
ALBUMIN SERPL BCP-MCNC: 3.8 G/DL (ref 3.5–5.7)
ALP SERPL-CCNC: 71 U/L (ref 55–165)
ALT SERPL W P-5'-P-CCNC: 7 U/L (ref 7–52)
ANION GAP SERPL CALCULATED.3IONS-SCNC: 5 MMOL/L (ref 4–13)
AST SERPL W P-5'-P-CCNC: 14 U/L (ref 13–39)
BASOPHILS # BLD AUTO: 0.1 THOUSANDS/ΜL (ref 0–0.1)
BASOPHILS NFR BLD AUTO: 1 % (ref 0–2)
BILIRUB SERPL-MCNC: 1.1 MG/DL (ref 0.2–1)
BUN SERPL-MCNC: 19 MG/DL (ref 7–25)
CALCIUM SERPL-MCNC: 10.1 MG/DL (ref 8.6–10.5)
CHLORIDE SERPL-SCNC: 101 MMOL/L (ref 98–107)
CO2 SERPL-SCNC: 30 MMOL/L (ref 21–31)
CREAT SERPL-MCNC: 0.74 MG/DL (ref 0.6–1.2)
EOSINOPHIL # BLD AUTO: 0.3 THOUSAND/ΜL (ref 0–0.61)
EOSINOPHIL NFR BLD AUTO: 5 % (ref 0–5)
ERYTHROCYTE [DISTWIDTH] IN BLOOD BY AUTOMATED COUNT: 15.9 % (ref 11.5–14.5)
GFR SERPL CREATININE-BSD FRML MDRD: 75 ML/MIN/1.73SQ M
GLUCOSE SERPL-MCNC: 92 MG/DL (ref 65–99)
HCT VFR BLD AUTO: 40.1 % (ref 42–47)
HGB BLD-MCNC: 13.3 G/DL (ref 12–16)
LYMPHOCYTES # BLD AUTO: 1.2 THOUSANDS/ΜL (ref 0.6–4.47)
LYMPHOCYTES NFR BLD AUTO: 19 % (ref 21–51)
MAGNESIUM SERPL-MCNC: 1.9 MG/DL (ref 1.9–2.7)
MCH RBC QN AUTO: 28.6 PG (ref 26–34)
MCHC RBC AUTO-ENTMCNC: 33.1 G/DL (ref 31–37)
MCV RBC AUTO: 86 FL (ref 81–99)
MONOCYTES # BLD AUTO: 0.6 THOUSAND/ΜL (ref 0.17–1.22)
MONOCYTES NFR BLD AUTO: 10 % (ref 2–12)
NEUTROPHILS # BLD AUTO: 4 THOUSANDS/ΜL (ref 1.4–6.5)
NEUTS SEG NFR BLD AUTO: 65 % (ref 42–75)
PLATELET # BLD AUTO: 240 THOUSANDS/UL (ref 149–390)
PMV BLD AUTO: 7.7 FL (ref 8.6–11.7)
POTASSIUM SERPL-SCNC: 4.3 MMOL/L (ref 3.5–5.5)
PROT SERPL-MCNC: 7.5 G/DL (ref 6.4–8.9)
RBC # BLD AUTO: 4.65 MILLION/UL (ref 3.9–5.2)
SODIUM SERPL-SCNC: 136 MMOL/L (ref 134–143)
WBC # BLD AUTO: 6.2 THOUSAND/UL (ref 4.8–10.8)

## 2021-02-05 PROCEDURE — 97110 THERAPEUTIC EXERCISES: CPT

## 2021-02-05 PROCEDURE — 97535 SELF CARE MNGMENT TRAINING: CPT

## 2021-02-05 PROCEDURE — 80053 COMPREHEN METABOLIC PANEL: CPT | Performed by: STUDENT IN AN ORGANIZED HEALTH CARE EDUCATION/TRAINING PROGRAM

## 2021-02-05 PROCEDURE — 83735 ASSAY OF MAGNESIUM: CPT | Performed by: STUDENT IN AN ORGANIZED HEALTH CARE EDUCATION/TRAINING PROGRAM

## 2021-02-05 PROCEDURE — 99232 SBSQ HOSP IP/OBS MODERATE 35: CPT | Performed by: STUDENT IN AN ORGANIZED HEALTH CARE EDUCATION/TRAINING PROGRAM

## 2021-02-05 PROCEDURE — 85025 COMPLETE CBC W/AUTO DIFF WBC: CPT | Performed by: STUDENT IN AN ORGANIZED HEALTH CARE EDUCATION/TRAINING PROGRAM

## 2021-02-05 RX ADMIN — APIXABAN 2.5 MG: 2.5 TABLET, FILM COATED ORAL at 17:29

## 2021-02-05 RX ADMIN — DILTIAZEM HYDROCHLORIDE 90 MG: 60 TABLET, FILM COATED ORAL at 21:25

## 2021-02-05 RX ADMIN — APIXABAN 2.5 MG: 2.5 TABLET, FILM COATED ORAL at 09:17

## 2021-02-05 RX ADMIN — DILTIAZEM HYDROCHLORIDE 90 MG: 60 TABLET, FILM COATED ORAL at 06:22

## 2021-02-05 RX ADMIN — OXYBUTYNIN CHLORIDE 5 MG: 5 TABLET ORAL at 09:17

## 2021-02-05 RX ADMIN — PRAVASTATIN SODIUM 40 MG: 40 TABLET ORAL at 17:30

## 2021-02-05 RX ADMIN — METOPROLOL TARTRATE 25 MG: 25 TABLET, FILM COATED ORAL at 09:17

## 2021-02-05 RX ADMIN — PANTOPRAZOLE SODIUM 40 MG: 40 TABLET, DELAYED RELEASE ORAL at 06:22

## 2021-02-05 RX ADMIN — SERTRALINE HYDROCHLORIDE 50 MG: 50 TABLET ORAL at 09:17

## 2021-02-05 RX ADMIN — METOPROLOL TARTRATE 25 MG: 25 TABLET, FILM COATED ORAL at 17:29

## 2021-02-05 NOTE — PROGRESS NOTES
Progress Note - Margret Burrell 1935, 81 y o  female MRN: [id number]    Unit/Bed#: -01 Encounter: [id number]    Primary Care Provider: Michael Blair MD   Date and time admitted to hospital: 2/3/2021 12:53 PM        * Generalized weakness  Assessment & Plan  · Likely secondary to deconditioning, recently in short-term rehab in September 2020  · Status post physical therapy evaluation recommending short-term rehab  · Patient is unable to care for self and has limited family support  · Coordinate with Care Management for short-term rehab    Osteoarthritis of knee  Assessment & Plan  · History of right knee replacement  · Left knee pain  · PT consulted, recommending short-term rehab    OAB (overactive bladder)  Assessment & Plan  · Continue oxybutynin    Essential hypertension  Assessment & Plan  · Continue Lasix, Cardizem and Lopressor  · Monitor patient blood pressure    Chronic diastolic heart failure (HCC)  Assessment & Plan  Wt Readings from Last 3 Encounters:   02/05/21 54 5 kg (120 lb 2 4 oz)   08/21/20 60 7 kg (133 lb 13 1 oz)   08/17/20 59 kg (130 lb 1 1 oz)     · Currently euvolemic  · Continue Lasix with potassium supplement  · Previous echo 08/20 EF 65%, moderate MR      Longstanding persistent atrial fibrillation (HCC)  Assessment & Plan  · Persistent AFib currently rate controlled  · Continue Eliquis  · Continue Toprol and Cardizem        VTE Pharmacologic Prophylaxis:   Pharmacologic: Apixaban (Eliquis)  Mechanical VTE Prophylaxis in Place: Yes    Patient Centered Rounds: I have performed bedside rounds with nursing staff today  Education and Discussions with Family / Patient: Called daughter Apolonia Ray, left a voicemail     Time Spent for Care: 30 minutes  More than 50% of total time spent on counseling and coordination of care as described above      Current Length of Stay: 2 day(s)    Current Patient Status: Inpatient   Certification Statement: The patient will continue to require additional inpatient hospital stay due to Placement    Discharge Plan:  24-48 hours    Code Status: Level 3 - DNAR and DNI      Subjective:   Patient feels well, has no chest pain no chest palpitations no shortness of breath  Appetite is good  Objective:     Vitals:   Temp (24hrs), Av 3 °F (36 8 °C), Min:97 6 °F (36 4 °C), Max:98 6 °F (37 °C)    Temp:  [97 6 °F (36 4 °C)-98 6 °F (37 °C)] 97 6 °F (36 4 °C)  HR:  [] 90  Resp:  [18] 18  BP: ()/(61-80) 115/61  SpO2:  [94 %-96 %] 94 %  Body mass index is 24 27 kg/m²  Input and Output Summary (last 24 hours): Intake/Output Summary (Last 24 hours) at 2021 1407  Last data filed at 2021 4335  Gross per 24 hour   Intake --   Output 200 ml   Net -200 ml       Physical Exam:     Physical Exam  Vitals signs and nursing note reviewed  Constitutional:       Appearance: Normal appearance  HENT:      Head: Normocephalic and atraumatic  Nose: Nose normal  No congestion  Mouth/Throat:      Mouth: Mucous membranes are dry  Pharynx: Oropharynx is clear  Eyes:      General:         Right eye: No discharge  Left eye: No discharge  Neck:      Musculoskeletal: Normal range of motion and neck supple  Cardiovascular:      Rate and Rhythm: Normal rate and regular rhythm  Pulmonary:      Effort: Pulmonary effort is normal  No respiratory distress  Abdominal:      General: Abdomen is flat  Palpations: Abdomen is soft  Musculoskeletal:      Right lower leg: No edema  Left lower leg: No edema  Skin:     General: Skin is warm and dry  Neurological:      General: No focal deficit present  Mental Status: She is alert  Mental status is at baseline     Psychiatric:         Mood and Affect: Mood normal          Behavior: Behavior normal            Additional Data:     Labs:    Results from last 7 days   Lab Units 21  0622   WBC Thousand/uL 6 20   HEMOGLOBIN g/dL 13 3   HEMATOCRIT % 40 1*   PLATELETS Thousands/uL 240   NEUTROS PCT % 65   LYMPHS PCT % 19*   MONOS PCT % 10   EOS PCT % 5     Results from last 7 days   Lab Units 02/05/21  0622   SODIUM mmol/L 136   POTASSIUM mmol/L 4 3   CHLORIDE mmol/L 101   CO2 mmol/L 30   BUN mg/dL 19   CREATININE mg/dL 0 74   ANION GAP mmol/L 5   CALCIUM mg/dL 10 1   ALBUMIN g/dL 3 8   TOTAL BILIRUBIN mg/dL 1 10*   ALK PHOS U/L 71   ALT U/L 7   AST U/L 14   GLUCOSE RANDOM mg/dL 92                           * I Have Reviewed All Lab Data Listed Above  * Additional Pertinent Lab Tests Reviewed: [address] Admission Reviewed    Imaging:    Imaging Reports Reviewed Today Include: na  Imaging Personally Reviewed by Myself Includes:  na    Recent Cultures (last 7 days):           Last 24 Hours Medication List:   Current Facility-Administered Medications   Medication Dose Route Frequency Provider Last Rate    acetaminophen  650 mg Oral Q6H PRN Regla Cohn MD      apixaban  2 5 mg Oral BID Regla Cohn MD      diltiazem  90 mg Oral [facility] Regla Cohn MD      furosemide  40 mg Oral Daily PRN Regla Cohn MD      hydrOXYzine HCL  10 mg Oral BID PRN Regla Cohn MD      metoprolol tartrate  25 mg Oral BID Regla Cohn MD      ondansetron  4 mg Intravenous Q6H PRN Regla Cohn MD      oxybutynin  5 mg Oral Daily Regla Cohn MD      pantoprazole  40 mg Oral Early Morning Regla Cohn MD      potassium chloride  20 mEq Oral Daily PRN Regla Cohn MD      pravastatin  40 mg Oral Daily With Walter Chavez MD      sertraline  50 mg Oral Daily Regla Cohn MD          Today, Patient Was Seen By: MARCUS Porras      ** Please Note: Dictation voice to text software may have been used in the creation of this document   **

## 2021-02-05 NOTE — PLAN OF CARE
Problem: OCCUPATIONAL THERAPY ADULT  Goal: Performs self-care activities at highest level of function for planned discharge setting  See evaluation for individualized goals  Description: Treatment Interventions: ADL retraining, Functional transfer training, UE strengthening/ROM, Endurance training, Patient/family training, Compensatory technique education, Activityengagement          See flowsheet documentation for full assessment, interventions and recommendations  Outcome: Progressing  Note: Limitation: Decreased ADL status, Decreased UE strength, Decreased endurance, Decreased self-care trans, Decreased high-level ADLs  Prognosis: Good  Assessment: Patient participated in Skilled OT session this date with interventions consisting of ADL re training with the use of correct body mechnaics and therapeutic exercise to: increase functional use of BUEs, increase BUE muscle strength    Patient agreeable to OT treatment session, upon arrival patient was found seated in bed (supported)  Patient requiring verbal cues for correct technique and frequent rest periods  Patient continues to be functioning below baseline level, occupational performance remains limited secondary to factors listed above and increased risk for falls and injury  From OT standpoint, recommendation at time of d/c would be post-acute rehabilitation services  Patient to benefit from continued Occupational Therapy treatment while in the hospital to address deficits as defined above and maximize level of functional independence with ADLs and functional mobility        OT Discharge Recommendation: Post-Acute Rehabilitation Services  OT - OK to Discharge: Yes(Once medically cleared )     VIRGIL Snell

## 2021-02-05 NOTE — ASSESSMENT & PLAN NOTE
Wt Readings from Last 3 Encounters:   02/05/21 54 5 kg (120 lb 2 4 oz)   08/21/20 60 7 kg (133 lb 13 1 oz)   08/17/20 59 kg (130 lb 1 1 oz)     · Currently euvolemic  · Continue Lasix with potassium supplement  · Previous echo 08/20 EF 65%, moderate MR

## 2021-02-05 NOTE — OCCUPATIONAL THERAPY NOTE
02/05/21 1145   OT Last Visit   OT Visit Date 02/05/21   Restrictions/Precautions   Weight Bearing Precautions Per Order No   Other Precautions Bed Alarm; Fall Risk;Pain   Lifestyle   Reciprocal Relationships spoke of brother (he says "because I fall so much, I can't come home")    believe she means Son   Pain Assessment   Pain Assessment Tool Pain Assessment not indicated - pt denies pain   ADL   Where Assessed   (patient had already been assisted with morn  self-care)   Eating Assistance 5  Supervision/Setup   Eating Deficit Setup;Supervision/safety; Increased time to complete; Other (Comment)   Eating Comments required meat to be cut for her, and Ensure to be opened    Grooming Comments patient attempted to get knots from hair with comb   (required Assist  with portions at back-most part of head)   Therapeutic Excerise-Strength   UE Strength Yes   Right Upper Extremity- Strength   R Shoulder Flexion; Horizontal ABduction   R Elbow Elbow flexion;Elbow extension   R Weight/Reps/Sets AROM 10 reps 2 sets shoulders; With 1# weight 20 reps elbows    Left Upper Extremity-Strength   L Weights/Reps/Sets Same as RUE above   Coordination   Gross Motor appears WFL   Dexterity appears WFL   Cognition   Overall Cognitive Status WFL   Arousal/Participation Alert; Cooperative   Attention Within functional limits   Following Commands Follows one step commands without difficulty   Activity Tolerance   Activity Tolerance Patient limited by fatigue   Assessment   Assessment Patient participated in Skilled OT session this date with interventions consisting of ADL re training with the use of correct body mechnaics and therapeutic exercise to: increase functional use of BUEs, increase BUE muscle strength    Patient agreeable to OT treatment session, upon arrival patient was found seated in bed (supported)  Patient requiring verbal cues for correct technique and frequent rest periods   Patient continues to be functioning below baseline level, occupational performance remains limited secondary to factors listed above and increased risk for falls and injury  From OT standpoint, recommendation at time of d/c would be post-acute rehabilitation services  Patient to benefit from continued Occupational Therapy treatment while in the hospital to address deficits as defined above and maximize level of functional independence with ADLs and functional mobility  Plan   Treatment Interventions UE strengthening/ROM; Activityengagement   Goal Expiration Date 02/14/21   OT Treatment Day 172 DON Dow/L

## 2021-02-05 NOTE — CASE MANAGEMENT
Per Arthur Amaya at [facility] they have not received auth yet  She will keep CM informed  They can take pt over the weekend if auth received  CM updated pt daughter Hector Lela THOMASON reviewed with daughter and in agreement  [facility] do [place] to [facility] once Darrick Enamorado obtained  Daughter aware of cost and willing to pay  CM to follow

## 2021-02-06 PROBLEM — R44.1 VISUAL HALLUCINATIONS: Status: ACTIVE | Noted: 2021-02-06

## 2021-02-06 LAB
ALBUMIN SERPL BCP-MCNC: 3.9 G/DL (ref 3.5–5.7)
ALP SERPL-CCNC: 70 U/L (ref 55–165)
ALT SERPL W P-5'-P-CCNC: 9 U/L (ref 7–52)
ANION GAP SERPL CALCULATED.3IONS-SCNC: 8 MMOL/L (ref 4–13)
AST SERPL W P-5'-P-CCNC: 14 U/L (ref 13–39)
BASOPHILS # BLD AUTO: 0.1 THOUSANDS/ΜL (ref 0–0.1)
BASOPHILS NFR BLD AUTO: 1 % (ref 0–2)
BILIRUB SERPL-MCNC: 1.1 MG/DL (ref 0.2–1)
BUN SERPL-MCNC: 17 MG/DL (ref 7–25)
CALCIUM SERPL-MCNC: 10.2 MG/DL (ref 8.6–10.5)
CHLORIDE SERPL-SCNC: 97 MMOL/L (ref 98–107)
CO2 SERPL-SCNC: 29 MMOL/L (ref 21–31)
CREAT SERPL-MCNC: 0.76 MG/DL (ref 0.6–1.2)
EOSINOPHIL # BLD AUTO: 0.3 THOUSAND/ΜL (ref 0–0.61)
EOSINOPHIL NFR BLD AUTO: 4 % (ref 0–5)
ERYTHROCYTE [DISTWIDTH] IN BLOOD BY AUTOMATED COUNT: 16 % (ref 11.5–14.5)
GFR SERPL CREATININE-BSD FRML MDRD: 72 ML/MIN/1.73SQ M
GLUCOSE SERPL-MCNC: 88 MG/DL (ref 65–99)
HCT VFR BLD AUTO: 41.7 % (ref 42–47)
HGB BLD-MCNC: 13.6 G/DL (ref 12–16)
LYMPHOCYTES # BLD AUTO: 1.2 THOUSANDS/ΜL (ref 0.6–4.47)
LYMPHOCYTES NFR BLD AUTO: 19 % (ref 21–51)
MAGNESIUM SERPL-MCNC: 2 MG/DL (ref 1.9–2.7)
MCH RBC QN AUTO: 28.7 PG (ref 26–34)
MCHC RBC AUTO-ENTMCNC: 32.7 G/DL (ref 31–37)
MCV RBC AUTO: 88 FL (ref 81–99)
MONOCYTES # BLD AUTO: 0.7 THOUSAND/ΜL (ref 0.17–1.22)
MONOCYTES NFR BLD AUTO: 10 % (ref 2–12)
NEUTROPHILS # BLD AUTO: 4.4 THOUSANDS/ΜL (ref 1.4–6.5)
NEUTS SEG NFR BLD AUTO: 66 % (ref 42–75)
PLATELET # BLD AUTO: 243 THOUSANDS/UL (ref 149–390)
PMV BLD AUTO: 8.3 FL (ref 8.6–11.7)
POTASSIUM SERPL-SCNC: 3.7 MMOL/L (ref 3.5–5.5)
PROT SERPL-MCNC: 7.7 G/DL (ref 6.4–8.9)
RBC # BLD AUTO: 4.75 MILLION/UL (ref 3.9–5.2)
SODIUM SERPL-SCNC: 134 MMOL/L (ref 134–143)
WBC # BLD AUTO: 6.7 THOUSAND/UL (ref 4.8–10.8)

## 2021-02-06 PROCEDURE — 80053 COMPREHEN METABOLIC PANEL: CPT | Performed by: STUDENT IN AN ORGANIZED HEALTH CARE EDUCATION/TRAINING PROGRAM

## 2021-02-06 PROCEDURE — 99233 SBSQ HOSP IP/OBS HIGH 50: CPT | Performed by: STUDENT IN AN ORGANIZED HEALTH CARE EDUCATION/TRAINING PROGRAM

## 2021-02-06 PROCEDURE — 99222 1ST HOSP IP/OBS MODERATE 55: CPT | Performed by: PHYSICIAN ASSISTANT

## 2021-02-06 PROCEDURE — 85025 COMPLETE CBC W/AUTO DIFF WBC: CPT | Performed by: STUDENT IN AN ORGANIZED HEALTH CARE EDUCATION/TRAINING PROGRAM

## 2021-02-06 PROCEDURE — 83735 ASSAY OF MAGNESIUM: CPT | Performed by: STUDENT IN AN ORGANIZED HEALTH CARE EDUCATION/TRAINING PROGRAM

## 2021-02-06 RX ADMIN — PANTOPRAZOLE SODIUM 40 MG: 40 TABLET, DELAYED RELEASE ORAL at 05:15

## 2021-02-06 RX ADMIN — DILTIAZEM HYDROCHLORIDE 90 MG: 60 TABLET, FILM COATED ORAL at 21:15

## 2021-02-06 RX ADMIN — METOPROLOL TARTRATE 25 MG: 25 TABLET, FILM COATED ORAL at 10:02

## 2021-02-06 RX ADMIN — DILTIAZEM HYDROCHLORIDE 90 MG: 60 TABLET, FILM COATED ORAL at 05:15

## 2021-02-06 RX ADMIN — OXYBUTYNIN CHLORIDE 5 MG: 5 TABLET ORAL at 10:02

## 2021-02-06 RX ADMIN — PRAVASTATIN SODIUM 40 MG: 40 TABLET ORAL at 17:21

## 2021-02-06 RX ADMIN — APIXABAN 2.5 MG: 2.5 TABLET, FILM COATED ORAL at 10:01

## 2021-02-06 RX ADMIN — SERTRALINE HYDROCHLORIDE 50 MG: 50 TABLET ORAL at 10:02

## 2021-02-06 RX ADMIN — APIXABAN 2.5 MG: 2.5 TABLET, FILM COATED ORAL at 17:21

## 2021-02-06 NOTE — ASSESSMENT & PLAN NOTE
· Patient reporting visual hallucinations today, new onset, not present on admission  · Reports seeing "little critters" scattered throughout her room  · Does not have psych history, or history of hallucinations  · Unclear etiology- afebrile, no leukocytosis, UA never collected but not having physical exam findings suggestive of UTI  · Given age, could be hospital acquired delirium  · Will order psych consult

## 2021-02-06 NOTE — ASSESSMENT & PLAN NOTE
Wt Readings from Last 3 Encounters:   02/06/21 56 3 kg (124 lb 1 9 oz)   08/21/20 60 7 kg (133 lb 13 1 oz)   08/17/20 59 kg (130 lb 1 1 oz)     · Currently euvolemic  · Continue Lasix with potassium supplement  · Previous echo 08/20 EF 65%, moderate MR  · On p r n   Lasix based on weight with holding parameters

## 2021-02-06 NOTE — ASSESSMENT & PLAN NOTE
· Likely secondary to physical deconditioning with chronic osteoarthritis, recently in short-term rehab in September 2020  · Status post physical therapy evaluation recommending short-term rehab  · Patient is unable to care for self and has limited family support  · Coordinate with Care Management for short-term rehab  · Pending insurance authorization

## 2021-02-06 NOTE — CONSULTS
Consultation - Behavioral Health     Identification Data: Janell Simon 81 y o  female MRN: [id number]  Unit/Bed#: -01 Encounter: [id number]    02/06/21  1:45 PM    Consults  Physician Requesting Consult: Kesha Harrison MD  Principal Problem:Generalized weakness    Reason for Consult:  Visual hallucinations  (Communication with pt limited secondary to loud blower in room and pt soft spoken)  HPI       Lizet Gandara is a 80 yo female with hx of depression, CHF, right frontal infarction of brain, pulmonary hypertension, ambulatory dysfunction, afib  Psychiatric consult requested for visual hallucinations  Lizet Gandara tells Andrew that she has been having VH off and on x one year  States earlier today she saw "dust flying off the furniture"  Denies auditory hallucinations  Lizet Gandara says she is back at the hospital because "no one wants me"  Reports being discharged from [facility] just prior to admission  (Admitted to nursing rehab at [facility] from this hospital in August)  Says she does not feel sick and denies depressed mood  Admits to being treated for depression with zoloft for past couple of years  Denies SI  Reports some anxiety because she is in the hospital- she is now in isolation secondary to covid exposure and pending covid test   Admits to poor sleep and poor appetite        Historical Information     Past Psychiatric History:     Past Inpatient Psychiatric Treatment:   No history of past inpatient psychiatric admissions  Past Outpatient Psychiatric Treatment:    treated with zoloft x 2 yrs; no hx of psychiatric consults done at [facility]  Past Suicide Attempts: no          Social History:        Recently discharged from [facility] and under the care of her daughter prior to admission        Past Medical History:      Past Medical History:   Diagnosis Date    Acute on chronic diastolic congestive heart failure ([facility] [address] ) 6/27/2019    Ambulatory dysfunction 8/17/2020    Arthritis     Chest wall contusion, right, initial encounter 8/17/2020    Chest wall contusion, right, subsequent encounter 8/17/2020    Chronic diastolic heart failure (HCC)     Chronic kidney disease, stage 2 (mild)     Coronary artery disease     history of stenting    Eczema     years    Edema     History of echocardiogram 07/20/2017    EF 55%, mild concentric LVH, bileaflet MVP with moderate MR, left atrial enlargement   History of transfusion     Hyperlipidemia     Hypertension     Hypo-osmolality and hyponatremia     Hypokalemia 7/11/2019    Mitral regurgitation      Past Surgical History:   Procedure Laterality Date    ABDOMINAL SURGERY      hysterectomy    CARDIAC CATHETERIZATION  04/10/2012    EF 65%, no significant CAD, patent stents, severe MR     CORONARY ANGIOPLASTY WITH STENT PLACEMENT  03/21/2007    EF 55%, GARRET to LAD   EYE SURGERY      b/l cataracts    HYSTERECTOMY      JOINT REPLACEMENT      Rt knee         Medical Review Of Systems:    Review of Systems   Constitutional: Positive for appetite change  Musculoskeletal: Positive for arthralgias and gait problem  Psychiatric/Behavioral: Positive for sleep disturbance  Allergies:     Allergies   Allergen Reactions    Chocolate Flavor Itching    Shellfish-Derived Products Shortness Of Breath    Iodine Other (See Comments)     shellfish- difficulty breathing    Codeine Rash         Objective     Vital signs in last 24 hours:    Temp:  [96 9 °F (36 1 °C)-98 °F (36 7 °C)] 96 9 °F (36 1 °C)  HR:  [] 64  Resp:  [16-18] 18  BP: (102-123)/(55-79) 110/55  No intake or output data in the 24 hours ending 02/06/21 1345    Mental Status Evaluation:    Appearance:  wearing hospital clothes   Behavior:  cooperative, calm   Speech:  soft   Mood:  depressed   Affect:  constricted   Language: naming objects   Thought Process:  goal directed   Associations: intact associations   Thought Content:  no overt delusions   Perceptual Disturbances: recent VH as above   Risk Potential: Suicidal ideation - None  Homicidal ideation - None  Potential for aggression - No   Sensorium:  oriented to person, place and time/date   Memory:  recent and remote memory grossly intact   Consciousness:  alert and awake    Attention: attention span and concentration are age appropriate   Intellect: within normal limits   Fund of Knowledge: awareness of current events: yes   Insight:  good   Judgment: good   Muscle Strength Muscle Tone: normal and not assessed  not assessed   Gait/Station: in bed   Motor Activity: no abnormal movements     Laboratory Results: today: H&H 13 6/41 7; RDW 16 0    Imaging Studies: 2/3/21: CT Brain without contrast: No acute findings   Unchanged appearance of periventricular white matter disease and chronic right frontal infarction    Code Status: Level 3 - DNAR and DNI    Scheduled Meds:  Current Facility-Administered Medications   Medication Dose Route Frequency Provider Last Rate    acetaminophen  650 mg Oral Q6H PRN Maura Alexandra MD      apixaban  2 5 mg Oral BID Maura Alexandra MD      diltiazem  90 mg Oral [facility] Maura Alexandra MD      furosemide  40 mg Oral Daily PRN Maura Alexandra MD      hydrOXYzine HCL  10 mg Oral BID PRN Maura Alexandra MD      metoprolol tartrate  25 mg Oral BID Maura Alexandra MD      ondansetron  4 mg Intravenous Q6H PRN Maura Alexandra MD      oxybutynin  5 mg Oral Daily Maura Alexandra MD      pantoprazole  40 mg Oral Early Morning Maura Alexandra MD      potassium chloride  20 mEq Oral Daily PRN Maura Alexandra MD      pravastatin  40 mg Oral Daily With Ricky Boucher MD      [START ON 2/7/2021] sertraline  75 mg Oral Daily Jenny Trejo PA-C       Continuous Infusions:   PRN Meds:   acetaminophen    furosemide    hydrOXYzine HCL    ondansetron    potassium chloride     Assessment/Plan     Principal Problem:    Generalized weakness  Active Problems:    Longstanding persistent atrial fibrillation (HCC)    Chronic diastolic heart failure (HCC)    Essential hypertension    OAB (overactive bladder)    Osteoarthritis of knee    Visual hallucinations      Assessment:    Depression        Will increase zoloft to 75 mg  Intermittent Visual hallucinations        Check b-12 and folate  Additional management per medical provider        Treatment Plan:  Discussed above with Dr Alejo Brothers  Planned Medication Changes:   As above  All current active medications have been reviewed         Current Facility-Administered Medications   Medication Dose Route Frequency Provider Last Rate    acetaminophen  650 mg Oral Q6H PRN Lyndsey Beckwith MD      apixaban  2 5 mg Oral BID Lyndsey Beckwith MD      diltiazem  90 mg Oral [facility] Lyndsey Beckwith MD      furosemide  40 mg Oral Daily PRN Lyndsey Beckwith MD      hydrOXYzine HCL  10 mg Oral BID PRN Lyndsey Beckwith MD      metoprolol tartrate  25 mg Oral BID Lyndsey Beckwith MD      ondansetron  4 mg Intravenous Q6H PRN Lyndsey Beckwith MD      oxybutynin  5 mg Oral Daily Lyndsey Beckwith MD      pantoprazole  40 mg Oral Early Morning Lyndsey Beckwith MD      potassium chloride  20 mEq Oral Daily PRN Lyndsey Beckwith MD      pravastatin  40 mg Oral Daily With MD Shirley Saeed [facility] [employer] ON 2/7/2021] sertraline  75 mg Oral Daily Marilyn Donaldson PA-C         Risks / Benefits of Treatment:    Risks, benefits, and possible side effects of medications explained to patient and patient verbalizes understanding and agreement for treatment  Counseling / Coordination of Care: Total floor / unit time spent today 35 minutes  Greater than 50% of total time was spent with the patient and / or family counseling and / or coordination of care  A description of the counseling / coordination of care:   Diagnosis, medication changes and treatment plan reviewed with patient      Marilyn Donaldson PA-C 02/06/21

## 2021-02-06 NOTE — PROGRESS NOTES
Progress Note - Boris Self 1935, 81 y o  female MRN: [id number]    Unit/Bed#: -01 Encounter: [id number]    Primary Care Provider: April Valiente MD   Date and time admitted to hospital: 2/3/2021 12:53 PM        Visual hallucinations  Assessment & Plan  · Patient reporting visual hallucinations today, new onset, not present on admission  · Reports seeing "little critters" scattered throughout her room  · Does not have psych history, or history of hallucinations  · Unclear etiology- afebrile, no leukocytosis, UA never collected but not having physical exam findings suggestive of UTI  · Given age, could be hospital acquired delirium  · Will order psych consult     * Generalized weakness  Assessment & Plan  · Likely secondary to physical deconditioning with chronic osteoarthritis, recently in short-term rehab in September 2020  · Status post physical therapy evaluation recommending short-term rehab  · Patient is unable to care for self and has limited family support  · Coordinate with Care Management for short-term rehab  · Pending insurance authorization    Essential hypertension  Assessment & Plan  · Continue Lasix, Cardizem and Lopressor  · Monitor patient blood pressure    Chronic diastolic heart failure (HCC)  Assessment & Plan  Wt Readings from Last 3 Encounters:   02/06/21 56 3 kg (124 lb 1 9 oz)   08/21/20 60 7 kg (133 lb 13 1 oz)   08/17/20 59 kg (130 lb 1 1 oz)     · Currently euvolemic  · Continue Lasix with potassium supplement  · Previous echo 08/20 EF 65%, moderate MR  · On p r n   Lasix based on weight with holding parameters      Longstanding persistent atrial fibrillation (HCC)  Assessment & Plan  · Persistent AFib currently rate controlled  · Continue Eliquis  · Continue Toprol and Cardizem    Osteoarthritis of knee  Assessment & Plan  · Bilateral knee pain in setting of osteoarthritis  · Status post right knee replacement  · PT was consulted recommending short-term rehab  · Currently awaiting placement, pending insurance authorization    OAB (overactive bladder)  Assessment & Plan  · Continue oxybutynin  · Monitor urine output    VTE Pharmacologic Prophylaxis:   Pharmacologic: Apixaban (Eliquis)  Mechanical VTE Prophylaxis in Place: Yes    Patient Centered Rounds: I have performed bedside rounds with nursing staff today  Education and Discussions with Family / Patient: Spoke to morenita Craft     Time Spent for Care: 30 minutes  More than 50% of total time spent on counseling and coordination of care as described above  Current Length of Stay: 3 day(s)    Current Patient Status: Inpatient   Certification Statement: The patient will continue to require additional inpatient hospital stay due to Placement    Discharge Plan:  48 hours    Code Status: Level 3 - DNAR and DNI      Subjective:   Patient feels well, appetite is better, weakness has improved, no chest pain, no chest palpitations, no shortness of breath  Objective:     Vitals:   Temp (24hrs), Av 6 °F (36 4 °C), Min:96 9 °F (36 1 °C), Max:98 °F (36 7 °C)    Temp:  [96 9 °F (36 1 °C)-98 °F (36 7 °C)] 96 9 °F (36 1 °C)  HR:  [] 75  Resp:  [16-18] 18  BP: (102-123)/(67-79) 104/67  SpO2:  [93 %-97 %] 93 %  Body mass index is 25 07 kg/m²  Input and Output Summary (last 24 hours):     No intake or output data in the 24 hours ending 21 0919    Physical Exam:     Physical Exam  Vitals signs and nursing note reviewed  Constitutional:       Appearance: Normal appearance  HENT:      Head: Normocephalic and atraumatic  Nose: Nose normal  No congestion  Mouth/Throat:      Mouth: Mucous membranes are dry  Pharynx: Oropharynx is clear  Eyes:      General:         Right eye: No discharge  Left eye: No discharge  Neck:      Musculoskeletal: Normal range of motion and neck supple  Cardiovascular:      Rate and Rhythm: Normal rate and regular rhythm     Pulmonary:      Effort: Pulmonary effort is normal  No respiratory distress  Abdominal:      General: Abdomen is flat  Palpations: Abdomen is soft  Musculoskeletal:      Right lower leg: No edema  Left lower leg: No edema  Skin:     General: Skin is warm and dry  Neurological:      General: No focal deficit present  Mental Status: She is alert  Mental status is at baseline  Psychiatric:         Mood and Affect: Mood normal          Behavior: Behavior normal       Comments: Mood and behavior normal but thought content abnormal  Having visual hallucinations            Additional Data:     Labs:    Results from last 7 days   Lab Units 02/06/21  0430   WBC Thousand/uL 6 70   HEMOGLOBIN g/dL 13 6   HEMATOCRIT % 41 7*   PLATELETS Thousands/uL 243   NEUTROS PCT % 66   LYMPHS PCT % 19*   MONOS PCT % 10   EOS PCT % 4     Results from last 7 days   Lab Units 02/06/21  0430   SODIUM mmol/L 134   POTASSIUM mmol/L 3 7   CHLORIDE mmol/L 97*   CO2 mmol/L 29   BUN mg/dL 17   CREATININE mg/dL 0 76   ANION GAP mmol/L 8   CALCIUM mg/dL 10 2   ALBUMIN g/dL 3 9   TOTAL BILIRUBIN mg/dL 1 10*   ALK PHOS U/L 70   ALT U/L 9   AST U/L 14   GLUCOSE RANDOM mg/dL 88                           * I Have Reviewed All Lab Data Listed Above  * Additional Pertinent Lab Tests Reviewed:  Alex 66 Admission Reviewed    Imaging:    Imaging Reports Reviewed Today Include: na  Imaging Personally Reviewed by Myself Includes:  na    Recent Cultures (last 7 days):           Last 24 Hours Medication List:   Current Facility-Administered Medications   Medication Dose Route Frequency Provider Last Rate    acetaminophen  650 mg Oral Q6H PRN Jany Abebe MD      apixaban  2 5 mg Oral BID Jany Abebe MD      diltiazem  90 mg Oral [facility] Jany Abebe MD      furosemide  40 mg Oral Daily PRN Jany Abebe MD      hydrOXYzine HCL  10 mg Oral BID PRN Jany Abebe MD      metoprolol tartrate  25 mg Oral BID Jany Abebe MD      ondansetron 4 mg Intravenous Q6H PRN Mary Harvey MD      oxybutynin  5 mg Oral Daily Mary Harvey MD      pantoprazole  40 mg Oral Early Morning Mary Harvey MD      potassium chloride  20 mEq Oral Daily PRN Mary Harvey MD      pravastatin  40 mg Oral Daily With Jacinto Ham MD      sertraline  50 mg Oral Daily Mary Harvey MD          Today, Patient Was Seen By: MARCUS Isaacs      ** Please Note: Dictation voice to text software may have been used in the creation of this document   **

## 2021-02-06 NOTE — ASSESSMENT & PLAN NOTE
· Bilateral knee pain in setting of osteoarthritis  · Status post right knee replacement  · PT was consulted recommending short-term rehab  · Currently awaiting placement, pending insurance authorization

## 2021-02-07 PROBLEM — Z20.822 CLOSE EXPOSURE TO COVID-19 VIRUS: Status: ACTIVE | Noted: 2021-02-07

## 2021-02-07 LAB
ALBUMIN SERPL BCP-MCNC: 3.9 G/DL (ref 3.5–5.7)
ALP SERPL-CCNC: 72 U/L (ref 55–165)
ALT SERPL W P-5'-P-CCNC: 9 U/L (ref 7–52)
ANION GAP SERPL CALCULATED.3IONS-SCNC: 10 MMOL/L (ref 4–13)
AST SERPL W P-5'-P-CCNC: 16 U/L (ref 13–39)
BASOPHILS # BLD AUTO: 0.1 THOUSANDS/ΜL (ref 0–0.1)
BASOPHILS NFR BLD AUTO: 1 % (ref 0–2)
BILIRUB SERPL-MCNC: 1.3 MG/DL (ref 0.2–1)
BUN SERPL-MCNC: 16 MG/DL (ref 7–25)
CALCIUM SERPL-MCNC: 10.1 MG/DL (ref 8.6–10.5)
CHLORIDE SERPL-SCNC: 98 MMOL/L (ref 98–107)
CO2 SERPL-SCNC: 26 MMOL/L (ref 21–31)
CREAT SERPL-MCNC: 0.71 MG/DL (ref 0.6–1.2)
EOSINOPHIL # BLD AUTO: 0.1 THOUSAND/ΜL (ref 0–0.61)
EOSINOPHIL NFR BLD AUTO: 2 % (ref 0–5)
ERYTHROCYTE [DISTWIDTH] IN BLOOD BY AUTOMATED COUNT: 15.7 % (ref 11.5–14.5)
FOLATE SERPL-MCNC: 19.3 NG/ML (ref 3.1–17.5)
GFR SERPL CREATININE-BSD FRML MDRD: 78 ML/MIN/1.73SQ M
GLUCOSE SERPL-MCNC: 101 MG/DL (ref 65–99)
HCT VFR BLD AUTO: 43.9 % (ref 42–47)
HGB BLD-MCNC: 14.5 G/DL (ref 12–16)
LYMPHOCYTES # BLD AUTO: 1.1 THOUSANDS/ΜL (ref 0.6–4.47)
LYMPHOCYTES NFR BLD AUTO: 14 % (ref 21–51)
MAGNESIUM SERPL-MCNC: 2 MG/DL (ref 1.9–2.7)
MCH RBC QN AUTO: 28.7 PG (ref 26–34)
MCHC RBC AUTO-ENTMCNC: 33 G/DL (ref 31–37)
MCV RBC AUTO: 87 FL (ref 81–99)
MONOCYTES # BLD AUTO: 0.7 THOUSAND/ΜL (ref 0.17–1.22)
MONOCYTES NFR BLD AUTO: 10 % (ref 2–12)
NEUTROPHILS # BLD AUTO: 5.4 THOUSANDS/ΜL (ref 1.4–6.5)
NEUTS SEG NFR BLD AUTO: 73 % (ref 42–75)
PLATELET # BLD AUTO: 272 THOUSANDS/UL (ref 149–390)
PMV BLD AUTO: 8.1 FL (ref 8.6–11.7)
POTASSIUM SERPL-SCNC: 3.4 MMOL/L (ref 3.5–5.5)
PROT SERPL-MCNC: 7.6 G/DL (ref 6.4–8.9)
RBC # BLD AUTO: 5.05 MILLION/UL (ref 3.9–5.2)
SODIUM SERPL-SCNC: 134 MMOL/L (ref 134–143)
VIT B12 SERPL-MCNC: 511 PG/ML (ref 100–900)
WBC # BLD AUTO: 7.4 THOUSAND/UL (ref 4.8–10.8)

## 2021-02-07 PROCEDURE — 99232 SBSQ HOSP IP/OBS MODERATE 35: CPT | Performed by: INTERNAL MEDICINE

## 2021-02-07 PROCEDURE — 80053 COMPREHEN METABOLIC PANEL: CPT | Performed by: STUDENT IN AN ORGANIZED HEALTH CARE EDUCATION/TRAINING PROGRAM

## 2021-02-07 PROCEDURE — 82746 ASSAY OF FOLIC ACID SERUM: CPT | Performed by: PHYSICIAN ASSISTANT

## 2021-02-07 PROCEDURE — 82607 VITAMIN B-12: CPT | Performed by: PHYSICIAN ASSISTANT

## 2021-02-07 PROCEDURE — 83735 ASSAY OF MAGNESIUM: CPT | Performed by: STUDENT IN AN ORGANIZED HEALTH CARE EDUCATION/TRAINING PROGRAM

## 2021-02-07 PROCEDURE — 85025 COMPLETE CBC W/AUTO DIFF WBC: CPT | Performed by: STUDENT IN AN ORGANIZED HEALTH CARE EDUCATION/TRAINING PROGRAM

## 2021-02-07 RX ORDER — QUETIAPINE FUMARATE 25 MG/1
25 TABLET, FILM COATED ORAL
Status: DISCONTINUED | OUTPATIENT
Start: 2021-02-07 | End: 2021-02-15 | Stop reason: HOSPADM

## 2021-02-07 RX ORDER — POTASSIUM CHLORIDE 20 MEQ/1
20 TABLET, EXTENDED RELEASE ORAL ONCE
Status: COMPLETED | OUTPATIENT
Start: 2021-02-07 | End: 2021-02-07

## 2021-02-07 RX ADMIN — DILTIAZEM HYDROCHLORIDE 90 MG: 60 TABLET, FILM COATED ORAL at 17:17

## 2021-02-07 RX ADMIN — QUETIAPINE FUMARATE 25 MG: 25 TABLET ORAL at 21:15

## 2021-02-07 RX ADMIN — PRAVASTATIN SODIUM 40 MG: 40 TABLET ORAL at 17:17

## 2021-02-07 RX ADMIN — OXYBUTYNIN CHLORIDE 5 MG: 5 TABLET ORAL at 09:16

## 2021-02-07 RX ADMIN — APIXABAN 2.5 MG: 2.5 TABLET, FILM COATED ORAL at 09:16

## 2021-02-07 RX ADMIN — METOPROLOL TARTRATE 25 MG: 25 TABLET, FILM COATED ORAL at 09:16

## 2021-02-07 RX ADMIN — DILTIAZEM HYDROCHLORIDE 90 MG: 60 TABLET, FILM COATED ORAL at 21:15

## 2021-02-07 RX ADMIN — SERTRALINE HYDROCHLORIDE 75 MG: 50 TABLET ORAL at 09:16

## 2021-02-07 RX ADMIN — ONDANSETRON 4 MG: 2 INJECTION INTRAMUSCULAR; INTRAVENOUS at 05:05

## 2021-02-07 RX ADMIN — APIXABAN 2.5 MG: 2.5 TABLET, FILM COATED ORAL at 17:17

## 2021-02-07 RX ADMIN — DILTIAZEM HYDROCHLORIDE 90 MG: 60 TABLET, FILM COATED ORAL at 05:05

## 2021-02-07 RX ADMIN — HYDROXYZINE HYDROCHLORIDE 10 MG: 10 TABLET, FILM COATED ORAL at 03:52

## 2021-02-07 RX ADMIN — METOPROLOL TARTRATE 25 MG: 25 TABLET, FILM COATED ORAL at 17:17

## 2021-02-07 RX ADMIN — POTASSIUM CHLORIDE 20 MEQ: 1500 TABLET, EXTENDED RELEASE ORAL at 18:53

## 2021-02-07 RX ADMIN — PANTOPRAZOLE SODIUM 40 MG: 40 TABLET, DELAYED RELEASE ORAL at 05:05

## 2021-02-07 NOTE — ASSESSMENT & PLAN NOTE
Wt Readings from Last 3 Encounters:   02/07/21 59 4 kg (130 lb 15 3 oz)   08/21/20 60 7 kg (133 lb 13 1 oz)   08/17/20 59 kg (130 lb 1 1 oz)     · Chronic diastolic CHF stable on furosemide 40 mg as needed

## 2021-02-07 NOTE — PROGRESS NOTES
Progress Note - Shanelle Mg 1935, 81 y o  female MRN: [id number]    Unit/Bed#: -01 Encounter: [id number]    Primary Care Provider: Emerald Arnold MD   Date and time admitted to hospital: 2/3/2021 12:53 PM        * Generalized weakness  Assessment & Plan  · Generalized weakness recently in rehab secondary to osteoarthritis  · Awaiting SNF placement    Close exposure to COVID-19 virus  Assessment & Plan  · Close exposure to COVID-19 during hospitalization  · Currently on airborne precautions    Lab Results   Component Value Date    SARSCOV2 Negative 02/04/2021    SARSCOV2 Negative 02/03/2021    SARSCOV2 Negative 08/21/2020    SARSCOV2 Negative 08/17/2020    SARSCOV2 Negative 06/27/2020       Visual hallucinations  Assessment & Plan  · Visual hallucinations without psychiatric history  Likely secondary to delirium  · Appreciate psychiatry evaluation  Sertraline increased to 75 mg  · Will add quetiapine q h s  to help regulate sleep/wake cycle    OAB (overactive bladder)  Assessment & Plan  · Overactive bladder continue oxybutynin    Essential hypertension  Assessment & Plan  · Essential hypertension continue metoprolol and diltiazem    Chronic diastolic heart failure (HCC)  Assessment & Plan  Wt Readings from Last 3 Encounters:   02/07/21 59 4 kg (130 lb 15 3 oz)   08/21/20 60 7 kg (133 lb 13 1 oz)   08/17/20 59 kg (130 lb 1 1 oz)     · Chronic diastolic CHF stable on furosemide 40 mg as needed    Longstanding persistent atrial fibrillation (HCC)  Assessment & Plan  · Chronic atrial fibrillation on diltiazem metoprolol and anticoagulated with eliquis      VTE Pharmacologic Prophylaxis: VTE Score: 4 Moderate Risk (Score 3-4) - Pharmacological DVT Prophylaxis Ordered: Apixaban (Eliquis)  Patient Centered Rounds: I have performed bedside rounds with nursing staff today    Discussions with Specialists or Other Care Team Provider:     Education and Discussions with Family / Patient:     Time Spent for Care: 25 mins  More than 50% of total time spent on counseling and coordination of care as described above  Current Length of Stay: 4 day(s)  Current Patient Status: Inpatient   Certification Statement: The patient will continue to require additional inpatient hospital stay due to rehab placement  Discharge Plan / Estimated Discharge Date: 24-48 hours    Code Status: Level 3 - DNAR and DNI      Subjective:   Patient seen and examined  No new complaints  Was agitated overnight  Objective:   Vitals: Blood pressure 117/73, pulse (!) 113, temperature 97 6 °F (36 4 °C), temperature source Temporal, resp  rate 20, height 4' 11" (1 499 m), weight 59 4 kg (130 lb 15 3 oz), SpO2 92 %  Physical Exam  Vitals signs reviewed  Constitutional:       General: She is not in acute distress  Appearance: Normal appearance  Eyes:      General: No scleral icterus  Cardiovascular:      Rate and Rhythm: Tachycardia present  Rhythm irregular  Heart sounds: Normal heart sounds  Pulmonary:      Breath sounds: Decreased breath sounds present  No wheezing  Abdominal:      General: Bowel sounds are normal       Palpations: Abdomen is soft  Tenderness: There is no guarding or rebound  Musculoskeletal:         General: No swelling  Skin:     General: Skin is warm  Neurological:      General: No focal deficit present  Mental Status: She is alert     Psychiatric:         Mood and Affect: Mood normal        Additional Data:   Labs:  Results from last 7 days   Lab Units 02/07/21  0505 02/06/21  0430 02/05/21  0622 02/04/21  0524 02/03/21  1340   WBC Thousand/uL 7 40 6 70 6 20 5 70 7 10   HEMOGLOBIN g/dL 14 5 13 6 13 3 13 2 14 8   HEMATOCRIT % 43 9 41 7* 40 1* 40 3* 45 4   MCV fL 87 88 86 86 87   PLATELETS Thousands/uL 272 243 240 235 261     Results from last 7 days   Lab Units 02/07/21  0506 02/06/21  0430 02/05/21  0622 02/04/21  0525 02/03/21  1340   SODIUM mmol/L 134 134 136 136 136   POTASSIUM mmol/L 3 4* 3 7 4 3 3 4* 3 8   CHLORIDE mmol/L 98 97* 101 101 98   CO2 mmol/L 26 29 30 27 29   ANION GAP mmol/L 10 8 5 8 9   BUN mg/dL 16 17 19 18 19   CREATININE mg/dL 0 71 0 76 0 74 0 65 0 74   CALCIUM mg/dL 10 1 10 2 10 1 9 7 10 5   ALBUMIN g/dL 3 9 3 9 3 8  --   --    TOTAL BILIRUBIN mg/dL 1 30* 1 10* 1 10*  --   --    ALK PHOS U/L 72 70 71  --   --    ALT U/L 9 9 7  --   --    AST U/L 16 14 14  --   --    EGFR ml/min/1 73sq m 78 72 75 82 75   GLUCOSE RANDOM mg/dL 101* 88 92 94 103*     Results from last 7 days   Lab Units 02/07/21  0506 02/06/21  0430 02/05/21  0622 02/03/21  1340   MAGNESIUM mg/dL 2 0 2 0 1 9 1 9     Results from last 7 days   Lab Units 02/03/21  1340   TROPONIN I ng/mL <0 03                          * I Have Reviewed All Lab Data Listed Above  Cultures:   Results from last 7 days   Lab Units 02/04/21 2010 02/03/21  1340   INFLUENZA A PCR  Negative Negative       Results from last 7 days   Lab Units 02/04/21 2010 02/03/21  1340   SARS-COV-2  Negative Negative   INFLUENZA A PCR  Negative Negative   INFLUENZA B PCR  Negative Negative   RSV PCR  Negative Negative           Lines/Drains:  Invasive Devices     Peripheral Intravenous Line            Peripheral IV 02/06/21 Left Antecubital less than 1 day              Telemetry:      Imaging:  Imaging Reports Reviewed Today Include:   Xr Chest 1 View Portable    Result Date: 2/3/2021  Impression: No acute cardiopulmonary disease  Workstation performed: [id number]     Xr Femur 2 Views Left    Result Date: 2/3/2021  Impression: Bones are demineralized, limiting evaluation for nondisplaced fracture  No acute displaced fracture identified on the provided views  Workstation performed: [id number]     Ct Head Without Contrast    Result Date: 2/3/2021  Impression: No acute findings  Unchanged appearance of periventricular white matter disease days and a chronic right frontal infarction   Workstation performed: [id number]     Scheduled Meds:  Current Facility-Administered Medications   Medication Dose Route Frequency Provider Last Rate    acetaminophen  650 mg Oral Q6H PRN Rodolfo Starks MD      apixaban  2 5 mg Oral BID Rodolfo Starks MD      diltiazem  90 mg Oral [facility] Rodolfo Starks MD      furosemide  40 mg Oral Daily PRN Rodolfo Starks MD      hydrOXYzine HCL  10 mg Oral BID PRN Rodoflo Starks MD      metoprolol tartrate  25 mg Oral BID Rodolfo Starks MD      ondansetron  4 mg Intravenous Q6H PRN Rodolfo Starks MD      oxybutynin  5 mg Oral Daily Rodolfo Starks MD      pantoprazole  40 mg Oral Early Morning Rodolfo Starks MD      potassium chloride  20 mEq Oral Daily PRN Rodolfo Starks MD      pravastatin  40 mg Oral Daily With Sundeep Dorantes MD      sertraline  75 mg Oral Daily IVONNE Guerin DO [address] Internal Medicine  Hospitalist    ** Please Note: This note has been constructed using a voice recognition system   **

## 2021-02-07 NOTE — ASSESSMENT & PLAN NOTE
· Visual hallucinations without psychiatric history  Likely secondary to delirium  · Appreciate psychiatry evaluation  Sertraline increased to 75 mg    · Will add quetiapine q h s  to help regulate sleep/wake cycle

## 2021-02-07 NOTE — ASSESSMENT & PLAN NOTE
· Close exposure to COVID-19 during hospitalization  · Currently on airborne precautions    Lab Results   Component Value Date    SARSCOV2 Negative 02/04/2021    SARSCOV2 Negative 02/03/2021    SARSCOV2 Negative 08/21/2020    SARSCOV2 Negative 08/17/2020    SARSCOV2 Negative 06/27/2020

## 2021-02-07 NOTE — NURSING NOTE
Supervisor informs me that patient has contacted 911 and states she's bleeding  Patient removed IV and bleeding moderately on rom  Pressure applied, bleeding stopped  New IV inserted  Reoriented patient and encouraged patient to use call bell and to not call 911  Side rails up x2, call bell within reach, bed alarm on  Will continue to monitor

## 2021-02-08 ENCOUNTER — APPOINTMENT (INPATIENT)
Dept: RADIOLOGY | Facility: HOSPITAL | Age: 86
DRG: 553 | End: 2021-02-08
Payer: COMMERCIAL

## 2021-02-08 LAB
ANION GAP SERPL CALCULATED.3IONS-SCNC: 6 MMOL/L (ref 4–13)
BNP SERPL-MCNC: 1034 PG/ML (ref 1–100)
BUN SERPL-MCNC: 17 MG/DL (ref 7–25)
CALCIUM SERPL-MCNC: 9.8 MG/DL (ref 8.6–10.5)
CHLORIDE SERPL-SCNC: 98 MMOL/L (ref 98–107)
CO2 SERPL-SCNC: 30 MMOL/L (ref 21–31)
CREAT SERPL-MCNC: 0.71 MG/DL (ref 0.6–1.2)
CRP SERPL QL: <5 MG/L
FERRITIN SERPL-MCNC: 32 NG/ML (ref 8–388)
FLUAV RNA RESP QL NAA+PROBE: NEGATIVE
FLUBV RNA RESP QL NAA+PROBE: NEGATIVE
GFR SERPL CREATININE-BSD FRML MDRD: 78 ML/MIN/1.73SQ M
GLUCOSE SERPL-MCNC: 88 MG/DL (ref 65–99)
POTASSIUM SERPL-SCNC: 4.2 MMOL/L (ref 3.5–5.5)
RSV RNA RESP QL NAA+PROBE: NEGATIVE
SARS-COV-2 RNA RESP QL NAA+PROBE: NEGATIVE
SODIUM SERPL-SCNC: 134 MMOL/L (ref 134–143)

## 2021-02-08 PROCEDURE — 86140 C-REACTIVE PROTEIN: CPT | Performed by: PHYSICIAN ASSISTANT

## 2021-02-08 PROCEDURE — 80048 BASIC METABOLIC PNL TOTAL CA: CPT | Performed by: INTERNAL MEDICINE

## 2021-02-08 PROCEDURE — 97110 THERAPEUTIC EXERCISES: CPT

## 2021-02-08 PROCEDURE — 71045 X-RAY EXAM CHEST 1 VIEW: CPT

## 2021-02-08 PROCEDURE — 99232 SBSQ HOSP IP/OBS MODERATE 35: CPT | Performed by: NURSE PRACTITIONER

## 2021-02-08 PROCEDURE — 82728 ASSAY OF FERRITIN: CPT | Performed by: PHYSICIAN ASSISTANT

## 2021-02-08 PROCEDURE — 0241U HB NFCT DS VIR RESP RNA 4 TRGT: CPT | Performed by: NURSE PRACTITIONER

## 2021-02-08 PROCEDURE — 97530 THERAPEUTIC ACTIVITIES: CPT

## 2021-02-08 PROCEDURE — 83880 ASSAY OF NATRIURETIC PEPTIDE: CPT | Performed by: PHYSICIAN ASSISTANT

## 2021-02-08 RX ORDER — ZINC SULFATE 50(220)MG
220 CAPSULE ORAL DAILY
Status: COMPLETED | OUTPATIENT
Start: 2021-02-08 | End: 2021-02-14

## 2021-02-08 RX ORDER — MELATONIN
2000 DAILY
Status: DISCONTINUED | OUTPATIENT
Start: 2021-02-08 | End: 2021-02-15 | Stop reason: HOSPADM

## 2021-02-08 RX ORDER — ASCORBIC ACID 500 MG
1000 TABLET ORAL EVERY 12 HOURS SCHEDULED
Status: DISPENSED | OUTPATIENT
Start: 2021-02-08 | End: 2021-02-14

## 2021-02-08 RX ORDER — FAMOTIDINE 20 MG/1
20 TABLET, FILM COATED ORAL DAILY
Status: DISCONTINUED | OUTPATIENT
Start: 2021-02-08 | End: 2021-02-15 | Stop reason: HOSPADM

## 2021-02-08 RX ORDER — DEXAMETHASONE SODIUM PHOSPHATE 4 MG/ML
6 INJECTION, SOLUTION INTRA-ARTICULAR; INTRALESIONAL; INTRAMUSCULAR; INTRAVENOUS; SOFT TISSUE EVERY 24 HOURS
Status: DISCONTINUED | OUTPATIENT
Start: 2021-02-08 | End: 2021-02-08

## 2021-02-08 RX ADMIN — METOPROLOL TARTRATE 25 MG: 25 TABLET, FILM COATED ORAL at 09:08

## 2021-02-08 RX ADMIN — DILTIAZEM HYDROCHLORIDE 90 MG: 60 TABLET, FILM COATED ORAL at 22:06

## 2021-02-08 RX ADMIN — DILTIAZEM HYDROCHLORIDE 90 MG: 60 TABLET, FILM COATED ORAL at 05:08

## 2021-02-08 RX ADMIN — OXYCODONE HYDROCHLORIDE AND ACETAMINOPHEN 1000 MG: 500 TABLET ORAL at 09:09

## 2021-02-08 RX ADMIN — Medication 2000 UNITS: at 09:08

## 2021-02-08 RX ADMIN — FAMOTIDINE 20 MG: 20 TABLET ORAL at 09:08

## 2021-02-08 RX ADMIN — PANTOPRAZOLE SODIUM 40 MG: 40 TABLET, DELAYED RELEASE ORAL at 05:08

## 2021-02-08 RX ADMIN — DILTIAZEM HYDROCHLORIDE 90 MG: 60 TABLET, FILM COATED ORAL at 14:39

## 2021-02-08 RX ADMIN — QUETIAPINE FUMARATE 25 MG: 25 TABLET ORAL at 22:07

## 2021-02-08 RX ADMIN — APIXABAN 2.5 MG: 2.5 TABLET, FILM COATED ORAL at 17:04

## 2021-02-08 RX ADMIN — ZINC SULFATE 220 MG (50 MG) CAPSULE 220 MG: CAPSULE at 09:08

## 2021-02-08 RX ADMIN — PRAVASTATIN SODIUM 40 MG: 40 TABLET ORAL at 16:12

## 2021-02-08 RX ADMIN — APIXABAN 2.5 MG: 2.5 TABLET, FILM COATED ORAL at 10:34

## 2021-02-08 RX ADMIN — METOPROLOL TARTRATE 25 MG: 25 TABLET, FILM COATED ORAL at 17:04

## 2021-02-08 RX ADMIN — SERTRALINE HYDROCHLORIDE 75 MG: 50 TABLET ORAL at 09:07

## 2021-02-08 RX ADMIN — OXYBUTYNIN CHLORIDE 5 MG: 5 TABLET ORAL at 09:08

## 2021-02-08 RX ADMIN — DEXAMETHASONE SODIUM PHOSPHATE 6 MG: 4 INJECTION, SOLUTION INTRAMUSCULAR; INTRAVENOUS at 09:08

## 2021-02-08 RX ADMIN — OXYCODONE HYDROCHLORIDE AND ACETAMINOPHEN 1000 MG: 500 TABLET ORAL at 22:07

## 2021-02-08 NOTE — ASSESSMENT & PLAN NOTE
Wt Readings from Last 3 Encounters:   02/08/21 59 6 kg (131 lb 6 3 oz)   08/21/20 60 7 kg (133 lb 13 1 oz)   08/17/20 59 kg (130 lb 1 1 oz)     · Chronic diastolic CHF stable on furosemide 40 mg as needed  · Does not appear to be in acute exacerbation  · Unclear why the patient is requiring oxygen supplement at this time; will repeat chest x-ray  · BNP 1034

## 2021-02-08 NOTE — PROGRESS NOTES
Progress Note - Joyce Olivarez 1935, 81 y o  female MRN: [id number]    Unit/Bed#: -01 Encounter: [id number]    Primary Care Provider: Jake Parker MD   Date and time admitted to hospital: 2/3/2021 12:53 PM        * Generalized weakness  Assessment & Plan  · Generalized weakness recently in rehab secondary to osteoarthritis  · Awaiting SNF placement     Close exposure to COVID-19 virus  Assessment & Plan  · Close exposure to COVID-19 during hospitalization  · Currently on airborne precautions   · Will continue with vitamin supplement  Patient does not require any treatment at this time as her COVID test has been negative  Oxygen requirement can also be inactivity and combination of CHF  · The patient will require total 14 day from the exposure day    Lab Results   Component Value Date    SARSCOV2 Negative 02/08/2021    SARSCOV2 Negative 02/04/2021    SARSCOV2 Negative 02/03/2021    SARSCOV2 Negative 08/21/2020    SARSCOV2 Negative 08/17/2020       Visual hallucinations  Assessment & Plan  · Visual hallucinations without psychiatric history  Likely secondary to delirium  · Appreciate psychiatry evaluation  Sertraline increased to 75 mg    · Added quetiapine q h s  to help regulate sleep/wake cycle    OAB (overactive bladder)  Assessment & Plan  · continue oxybutynin    Essential hypertension  Assessment & Plan  · continue metoprolol and diltiazem  · Monitor blood pressure closely    Chronic diastolic heart failure (HCC)  Assessment & Plan  Wt Readings from Last 3 Encounters:   02/08/21 59 6 kg (131 lb 6 3 oz)   08/21/20 60 7 kg (133 lb 13 1 oz)   08/17/20 59 kg (130 lb 1 1 oz)     · Chronic diastolic CHF stable on furosemide 40 mg as needed  · Does not appear to be in acute exacerbation  · Unclear why the patient is requiring oxygen supplement at this time; will repeat chest x-ray  · BNP 1034    Longstanding persistent atrial fibrillation (HCC)  Assessment & Plan  · diltiazem metoprolol and anticoagulated with eliquis        VTE Prophylaxis:  Apixaban (Eliquis)    Patient Centered Rounds: I have performed bedside rounds with nursing staff today  Discussions with Specialists or Other Care Team Provider:  Nursing, PT/OT, case management  Education and Discussions with Family / Patient:  Patient and daughter    Current Length of Stay: 5 day(s)    Current Patient Status: Inpatient   Certification Statement: The patient will continue to require additional inpatient hospital stay due to Generalized weakness    Discharge Plan: pending discharge disposition     Code Status: Level 3 - DNAR and DNI    Subjective:   Feeling okay  Denies any pain  Daughter- states that patient was on 2L prior but took herself off  Objective:     Vitals:   Temp (24hrs), Av 4 °F (36 3 °C), Min:96 7 °F (35 9 °C), Max:97 8 °F (36 6 °C)    Temp:  [96 7 °F (35 9 °C)-97 8 °F (36 6 °C)] 97 7 °F (36 5 °C)  HR:  [] 93  Resp:  [18-20] 19  BP: (108-124)/(59-68) 110/68  SpO2:  [94 %-99 %] 94 %  Body mass index is 26 54 kg/m²  Input and Output Summary (last 24 hours):     No intake or output data in the 24 hours ending 21 1606    Physical Exam:   Physical Exam  Vitals signs and nursing note reviewed  Constitutional:       General: She is not in acute distress  Appearance: Normal appearance  HENT:      Head: Normocephalic and atraumatic  Right Ear: External ear normal       Left Ear: External ear normal       Nose: Nose normal  No rhinorrhea  Mouth/Throat:      Mouth: Mucous membranes are moist       Pharynx: Oropharynx is clear  Eyes:      General:         Right eye: No discharge  Left eye: No discharge  Pupils: Pupils are equal, round, and reactive to light  Neck:      Musculoskeletal: Normal range of motion and neck supple  No muscular tenderness  Cardiovascular:      Rate and Rhythm: Normal rate  Rhythm irregular  Pulses: Normal pulses        Heart sounds: Normal heart sounds  No murmur  Pulmonary:      Effort: Pulmonary effort is normal  No respiratory distress  Breath sounds: No rales  Comments: Very decreased in bases    Abdominal:      General: Bowel sounds are normal  There is no distension  Palpations: Abdomen is soft  There is no mass  Tenderness: There is no abdominal tenderness  Musculoskeletal: Normal range of motion  General: No swelling or tenderness  Skin:     General: Skin is warm and dry  Capillary Refill: Capillary refill takes less than 2 seconds  Findings: No erythema or rash  Neurological:      General: No focal deficit present  Mental Status: She is alert  Mental status is at baseline  Comments: Some period of forgetfulness and confusion   Psychiatric:         Mood and Affect: Mood normal          Behavior: Behavior normal          Thought Content: Thought content normal          Additional Data:     Labs:    Results from last 7 days   Lab Units 02/07/21  0505   WBC Thousand/uL 7 40   HEMOGLOBIN g/dL 14 5   HEMATOCRIT % 43 9   PLATELETS Thousands/uL 272   NEUTROS PCT % 73   LYMPHS PCT % 14*   MONOS PCT % 10   EOS PCT % 2     Results from last 7 days   Lab Units 02/08/21  0451 02/07/21  0506   SODIUM mmol/L 134 134   POTASSIUM mmol/L 4 2 3 4*   CHLORIDE mmol/L 98 98   CO2 mmol/L 30 26   BUN mg/dL 17 16   CREATININE mg/dL 0 71 0 71   CALCIUM mg/dL 9 8 10 1   ALK PHOS U/L  --  72   ALT U/L  --  9   AST U/L  --  16                   * I Have Reviewed All Lab Data Listed Above  * Additional Pertinent Lab Tests Reviewed:  Alex 66 Admission  Reviewed    Imaging:  Imaging Reports Reviewed Today Include: n/a    Recent Cultures (last 7 days):           Last 24 Hours Medication List:   Current Facility-Administered Medications   Medication Dose Route Frequency Provider Last Rate    acetaminophen  650 mg Oral Q6H PRN Emeka Boone MD      apixaban  2 5 mg Oral BID Emeka Boone MD      ascorbic acid  1,000 mg Oral Q12H [address] Doroteo Ochoa PA-C      cholecalciferol  2,000 Units Oral Daily Doroteo Ochoa PA-C      diltiazem  90 mg Oral [facility] Carlie Castro, MD      famotidine  20 mg Oral Daily Doroteo Ochoa PA-C      furosemide  40 mg Oral Daily PRN Elkeathramesh Castro, MD      hydrOXYzine HCL  10 mg Oral BID PRN Aleatha Wenonah, MD      metoprolol tartrate  25 mg Oral BID Aleatha Wenonah, MD      ondansetron  4 mg Intravenous Q6H PRN Aleatha Wenonah, MD      oxybutynin  5 mg Oral Daily Aleatha Wenonah, MD      pantoprazole  40 mg Oral Early Morning Aleatha Wenonah, MD      potassium chloride  20 mEq Oral Daily PRN Aleatha Wenonah, MD      pravastatin  40 mg Oral Daily With Winnie Boucher MD      QUEtiapine  25 mg Oral HS Tomasz Gross DO      sertraline  75 mg Oral Daily Jessenia Huggins PA-C      zinc sulfate  220 mg Oral Daily Doroteo Ochoa PA-C          Today, Patient Was Seen By: NEAL Hemphill    ** Please Note: Dictation voice to text software may have been used in the creation of this document   **

## 2021-02-08 NOTE — ASSESSMENT & PLAN NOTE
· Visual hallucinations without psychiatric history  Likely secondary to delirium  · Appreciate psychiatry evaluation  Sertraline increased to 75 mg    · Added quetiapine q h s  to help regulate sleep/wake cycle

## 2021-02-08 NOTE — PLAN OF CARE
Problem: PHYSICAL THERAPY ADULT  Goal: Performs mobility at highest level of function for planned discharge setting  See evaluation for individualized goals  Description: Treatment/Interventions: Functional transfer training, LE strengthening/ROM, Therapeutic exercise, Endurance training, Patient/family training, Equipment eval/education, Bed mobility, Gait training, Spoke to nursing, Spoke to case management  Equipment Recommended: (continue RW trial)       See flowsheet documentation for full assessment, interventions and recommendations  Outcome: Progressing  Note: Prognosis: Fair  Problem List: Decreased strength, Decreased endurance, Impaired balance, Decreased mobility, Decreased safety awareness  Assessment: Pt completes therapeutic exercises to increase Bilateral LE strength needed for an increase in functional task performance  Pt able to participate in bed mobility to improve level of function and work towards goal but L knee and LB pain limits her activity  Pain did not increase after session  Pt currently requires Min-Mod A x1 when treating and verbal cues for safety with hand placement and technique to reduce the risk of injury and maximize the benefit of treatment  Pt is in need of continued activity in PT to improve strength balance endurance mobility transfers and ambulation with return to maximize LOF  From PT/mobility standpoint, recommendation at time of d/c would be post acute rehab in order to promote return to PLOF and independence  Barriers to Discharge: Inaccessible home environment, Decreased caregiver support  Barriers to Discharge Comments: per chart, family unable to provide care for pt at this time  PT Discharge Recommendation: [id number]     PT - OK to Discharge: Yes(when medically cleared)    See flowsheet documentation for full assessment

## 2021-02-08 NOTE — CASE MANAGEMENT
CM spoke with Adrian Lara at [facility]  Still awaiting insurance auth for admit  Grygla Jane was made aware pt now on contact and airborne precautions for possible COVID exposure  Still able to accept  New COVID test peding  Pt now on 1 5 L oxygen and will need BLS transport to SNF  Pt daughter Anthony Miguel updated on plan of care  Reamains in agreement with pt going to [facility] for [address]  CM to follow

## 2021-02-08 NOTE — PHYSICAL THERAPY NOTE
PHYSICAL THERAPY NOTE          Patient Name: Blu Sparks  ZMQTZ'X Date: 2021     Pt identified by name, , and wristband       21 1000   PT Last Visit   PT Visit Date 21   Note Type   Note Type Treatment   Pain Assessment   Pain Assessment Tool 0-10   Pain Score 8   Pain Location/Orientation Orientation: Left; Location: Knee;Orientation: Lower; Location: Back   Restrictions/Precautions   Weight Bearing Precautions Per Order No   General   Family/Caregiver Present No   Cognition   Overall Cognitive Status WFL   Arousal/Participation Alert; Responsive   Attention Within functional limits   Orientation Level Oriented X4   Memory Decreased recall of precautions   Following Commands Follows one step commands with increased time or repetition   Subjective   Subjective pt agreeable to therapy   Bed Mobility   Supine to Sit 4  Minimal assistance   Additional items Assist x 1;Bedrails; Increased time required;Verbal cues;LE management   Sit to Supine 3  Moderate assistance   Additional items Assist x 1; Increased time required;Verbal cues;LE management   Additional Comments Pt sat EOB 8 mins, requested BTB due to back pain declining OOB at this time   Balance   Static Sitting Good   Dynamic Sitting Fair   Endurance Deficit   Endurance Deficit Yes   Activity Tolerance   Activity Tolerance Patient limited by fatigue   Exercises   Quad Sets Supine;20 reps;AROM; Left   Heelslides Supine;20 reps;AROM; Bilateral   Glute Sets Supine;20 reps;AROM; Bilateral   Hip Flexion Sitting;20 reps;AROM; Bilateral   Hip Abduction Sitting;20 reps;AROM; Bilateral   Hip Adduction Sitting;20 reps;AROM; Bilateral   Knee AROM Long Arc Quad Sitting;20 reps;AROM; Bilateral   Ankle Pumps Supine;20 reps;AROM; Bilateral   Assessment   Prognosis Fair   Problem List Decreased strength;Decreased endurance; Impaired balance;Decreased mobility; Decreased safety awareness   Assessment Pt completes therapeutic exercises to increase Bilateral LE strength needed for an increase in functional task performance  Pt able to participate in bed mobility to improve level of function and work towards goal but L knee and LB pain limits her activity  Pain did not increase after session  Pt currently requires Min-Mod A x1 when treating and verbal cues for safety with hand placement and technique to reduce the risk of injury and maximize the benefit of treatment  Pt is in need of continued activity in PT to improve strength balance endurance mobility transfers and ambulation with return to maximize LOF  From PT/mobility standpoint, recommendation at time of d/c would be post acute rehab in order to promote return to PLOF and independence  Barriers to Discharge Inaccessible home environment;Decreased caregiver support   Goals   Patient Goals to go home   STG Expiration Date 02/14/21   Short Term Goal #1 LTGs remain appropriate   PT Treatment Day 2   Plan   Treatment/Interventions Functional transfer training;LE strengthening/ROM; Therapeutic exercise; Endurance training;Patient/family training;Equipment eval/education;Gait training;Bed mobility   Progress Slow progress, decreased activity tolerance   PT Frequency   (3-5x/wk)   Recommendation   PT Discharge Recommendation Post-Acute Rehabilitation Services   Equipment Recommended   (continue RW trial)   PT - OK to Discharge Yes  (when medically cleared)   AM-PAC Basic Mobility Inpatient   Turning in Bed Without Bedrails 3   Lying on Back to Sitting on Edge of Flat Bed 3   Moving Bed to Chair 1   Standing Up From Chair 1   Walk in Room 1   Climb 3-5 Stairs 1   Basic Mobility Inpatient Raw Score 10   Turning Head Towards Sound 4   Follow Simple Instructions 4   Low Function Basic Mobility Raw Score 18   Low Function Basic Mobility Standardized Score 29 25     Pt resting in bed with all needs in reach post session

## 2021-02-08 NOTE — ASSESSMENT & PLAN NOTE
· Close exposure to COVID-19 during hospitalization  · Currently on airborne precautions   · Will continue with vitamin supplement  Patient does not require any treatment at this time as her COVID test has been negative  Oxygen requirement can also be inactivity and combination of CHF    · The patient will require total 14 day from the exposure day    Lab Results   Component Value Date    SARSCOV2 Negative 02/08/2021    SARSCOV2 Negative 02/04/2021    SARSCOV2 Negative 02/03/2021    SARSCOV2 Negative 08/21/2020    SARSCOV2 Negative 08/17/2020

## 2021-02-09 LAB
ANION GAP SERPL CALCULATED.3IONS-SCNC: 10 MMOL/L (ref 4–13)
BUN SERPL-MCNC: 18 MG/DL (ref 7–25)
CALCIUM SERPL-MCNC: 9.9 MG/DL (ref 8.6–10.5)
CHLORIDE SERPL-SCNC: 98 MMOL/L (ref 98–107)
CO2 SERPL-SCNC: 28 MMOL/L (ref 21–31)
CREAT SERPL-MCNC: 0.66 MG/DL (ref 0.6–1.2)
ERYTHROCYTE [DISTWIDTH] IN BLOOD BY AUTOMATED COUNT: 15.6 % (ref 11.5–14.5)
GFR SERPL CREATININE-BSD FRML MDRD: 81 ML/MIN/1.73SQ M
GLUCOSE SERPL-MCNC: 144 MG/DL (ref 65–99)
HCT VFR BLD AUTO: 39.6 % (ref 42–47)
HGB BLD-MCNC: 12.9 G/DL (ref 12–16)
MCH RBC QN AUTO: 28.6 PG (ref 26–34)
MCHC RBC AUTO-ENTMCNC: 32.7 G/DL (ref 31–37)
MCV RBC AUTO: 88 FL (ref 81–99)
PLATELET # BLD AUTO: 209 THOUSANDS/UL (ref 149–390)
PMV BLD AUTO: 8 FL (ref 8.6–11.7)
POTASSIUM SERPL-SCNC: 4.5 MMOL/L (ref 3.5–5.5)
RBC # BLD AUTO: 4.52 MILLION/UL (ref 3.9–5.2)
SODIUM SERPL-SCNC: 136 MMOL/L (ref 134–143)
WBC # BLD AUTO: 4.2 THOUSAND/UL (ref 4.8–10.8)

## 2021-02-09 PROCEDURE — 97530 THERAPEUTIC ACTIVITIES: CPT

## 2021-02-09 PROCEDURE — 80048 BASIC METABOLIC PNL TOTAL CA: CPT | Performed by: NURSE PRACTITIONER

## 2021-02-09 PROCEDURE — 97535 SELF CARE MNGMENT TRAINING: CPT

## 2021-02-09 PROCEDURE — 99232 SBSQ HOSP IP/OBS MODERATE 35: CPT | Performed by: NURSE PRACTITIONER

## 2021-02-09 PROCEDURE — 85027 COMPLETE CBC AUTOMATED: CPT | Performed by: NURSE PRACTITIONER

## 2021-02-09 RX ORDER — FUROSEMIDE 10 MG/ML
40 INJECTION INTRAMUSCULAR; INTRAVENOUS ONCE
Status: COMPLETED | OUTPATIENT
Start: 2021-02-09 | End: 2021-02-09

## 2021-02-09 RX ORDER — POTASSIUM CHLORIDE 750 MG/1
10 TABLET, EXTENDED RELEASE ORAL DAILY
Status: DISCONTINUED | OUTPATIENT
Start: 2021-02-10 | End: 2021-02-15 | Stop reason: HOSPADM

## 2021-02-09 RX ORDER — FUROSEMIDE 40 MG/1
40 TABLET ORAL DAILY
Status: DISCONTINUED | OUTPATIENT
Start: 2021-02-10 | End: 2021-02-10

## 2021-02-09 RX ADMIN — ZINC SULFATE 220 MG (50 MG) CAPSULE 220 MG: CAPSULE at 08:53

## 2021-02-09 RX ADMIN — OXYCODONE HYDROCHLORIDE AND ACETAMINOPHEN 1000 MG: 500 TABLET ORAL at 21:56

## 2021-02-09 RX ADMIN — DILTIAZEM HYDROCHLORIDE 90 MG: 60 TABLET, FILM COATED ORAL at 14:30

## 2021-02-09 RX ADMIN — QUETIAPINE FUMARATE 25 MG: 25 TABLET ORAL at 21:56

## 2021-02-09 RX ADMIN — OXYCODONE HYDROCHLORIDE AND ACETAMINOPHEN 1000 MG: 500 TABLET ORAL at 08:52

## 2021-02-09 RX ADMIN — Medication 2000 UNITS: at 08:53

## 2021-02-09 RX ADMIN — APIXABAN 2.5 MG: 2.5 TABLET, FILM COATED ORAL at 18:29

## 2021-02-09 RX ADMIN — OXYBUTYNIN CHLORIDE 5 MG: 5 TABLET ORAL at 08:53

## 2021-02-09 RX ADMIN — PANTOPRAZOLE SODIUM 40 MG: 40 TABLET, DELAYED RELEASE ORAL at 05:44

## 2021-02-09 RX ADMIN — FUROSEMIDE 40 MG: 10 INJECTION, SOLUTION INTRAMUSCULAR; INTRAVENOUS at 13:42

## 2021-02-09 RX ADMIN — FAMOTIDINE 20 MG: 20 TABLET ORAL at 08:53

## 2021-02-09 RX ADMIN — APIXABAN 2.5 MG: 2.5 TABLET, FILM COATED ORAL at 08:53

## 2021-02-09 RX ADMIN — PRAVASTATIN SODIUM 40 MG: 40 TABLET ORAL at 15:55

## 2021-02-09 RX ADMIN — SERTRALINE HYDROCHLORIDE 75 MG: 50 TABLET ORAL at 08:53

## 2021-02-09 RX ADMIN — DILTIAZEM HYDROCHLORIDE 90 MG: 60 TABLET, FILM COATED ORAL at 21:56

## 2021-02-09 RX ADMIN — METOPROLOL TARTRATE 25 MG: 25 TABLET, FILM COATED ORAL at 18:29

## 2021-02-09 RX ADMIN — METOPROLOL TARTRATE 25 MG: 25 TABLET, FILM COATED ORAL at 08:53

## 2021-02-09 RX ADMIN — DILTIAZEM HYDROCHLORIDE 90 MG: 60 TABLET, FILM COATED ORAL at 05:44

## 2021-02-09 NOTE — CASE MANAGEMENT
Cm called Adrian Lara at 10:40am to check on the authorization and asked clarification of the message that was left on the cm phone yesterday afternoon, she stated that [facility] and [place]  are not in network, pt does not have out of network benefits, I placed a call to the pt's room at 11:12 to # [phone] and there was not answer, I then called Mackenzie(daughter) at 11:13 am to #[phone] and I made her aware of this information, she stated she will go on line and look up the snf's in network and she will call me back with choices, cm will continue to work on a safe d/c plan

## 2021-02-09 NOTE — PLAN OF CARE
Problem: Prexisting or High Potential for Compromised Skin Integrity  Goal: Skin integrity is maintained or improved  Description: INTERVENTIONS:  - Identify patients at risk for skin breakdown  - Assess and monitor skin integrity  - Assess and monitor nutrition and hydration status  - Monitor labs   - Assess for incontinence   - Turn and reposition patient  - Assist with mobility/ambulation  - Relieve pressure over bony prominences  - Avoid friction and shearing  - Provide appropriate hygiene as needed including keeping skin clean and dry  - Evaluate need for skin moisturizer/barrier cream  - Collaborate with interdisciplinary team   - Patient/family teaching  - Consider wound care consult   Outcome: Progressing     Problem: Potential for Falls  Goal: Patient will remain free of falls  Description: INTERVENTIONS:  - Assess patient frequently for physical needs  -  Identify cognitive and physical deficits and behaviors that affect risk of falls    -  [place] fall precautions as indicated by assessment   - Educate patient/family on patient safety including physical limitations  - Instruct patient to call for assistance with activity based on assessment  - Modify environment to reduce risk of injury  - Consider OT/PT consult to assist with strengthening/mobility  Outcome: Progressing     Problem: METABOLIC, FLUID AND ELECTROLYTES - ADULT  Goal: Electrolytes maintained within normal limits  Description: INTERVENTIONS:  - Monitor labs and assess patient for signs and symptoms of electrolyte imbalances  - Administer electrolyte replacement as ordered  - Monitor response to electrolyte replacements, including repeat lab results as appropriate  - Instruct patient on fluid and nutrition as appropriate  Outcome: Progressing     Problem: SKIN/TISSUE INTEGRITY - ADULT  Goal: Skin integrity remains intact  Description: INTERVENTIONS  - Identify patients at risk for skin breakdown  - Assess and monitor skin integrity  - Assess and monitor nutrition and hydration status  - Monitor labs (i e  albumin)  - Assess for incontinence   - Turn and reposition patient  - Assist with mobility/ambulation  - Relieve pressure over bony prominences  - Avoid friction and shearing  - Provide appropriate hygiene as needed including keeping skin clean and dry  - Evaluate need for skin moisturizer/barrier cream  - Collaborate with interdisciplinary team (i e  Nutrition, Rehabilitation, etc )   - Patient/family teaching  Outcome: Progressing     Problem: MUSCULOSKELETAL - ADULT  Goal: Maintain or return mobility to safest level of function  Description: INTERVENTIONS:  - Assess patient's ability to carry out ADLs; assess patient's baseline for ADL function and identify physical deficits which impact ability to perform ADLs (bathing, care of mouth/teeth, toileting, grooming, dressing, etc )  - Assess/evaluate cause of self-care deficits   - Assess range of motion  - Assess patient's mobility  - Assess patient's need for assistive devices and provide as appropriate  - Encourage maximum independence but intervene and supervise when necessary  - Involve family in performance of ADLs  - Assess for home care needs following discharge   - Consider OT consult to assist with ADL evaluation and planning for discharge  - Provide patient education as appropriate  Outcome: Progressing     Problem: PAIN - ADULT  Goal: Verbalizes/displays adequate comfort level or baseline comfort level  Description: Interventions:  - Encourage patient to monitor pain and request assistance  - Assess pain using appropriate pain scale  - Administer analgesics based on type and severity of pain and evaluate response  - Implement non-pharmacological measures as appropriate and evaluate response  - Consider cultural and social influences on pain and pain management  - Notify physician/advanced practitioner if interventions unsuccessful or patient reports new pain  Outcome: Progressing Problem: INFECTION - ADULT  Goal: Absence or prevention of progression during hospitalization  Description: INTERVENTIONS:  - Assess and monitor for signs and symptoms of infection  - Monitor lab/diagnostic results  - Monitor all insertion sites, i e  indwelling lines, tubes, and drains  - Monitor endotracheal if appropriate and nasal secretions for changes in amount and color  - [place] appropriate cooling/warming therapies per order  - Administer medications as ordered  - Instruct and encourage patient and family to use good hand hygiene technique  - Identify and instruct in appropriate isolation precautions for identified infection/condition  Outcome: Progressing     Problem: SAFETY ADULT  Goal: Patient will remain free of falls  Description: INTERVENTIONS:  - Assess patient frequently for physical needs  -  Identify cognitive and physical deficits and behaviors that affect risk of falls    -  [place] fall precautions as indicated by assessment   - Educate patient/family on patient safety including physical limitations  - Instruct patient to call for assistance with activity based on assessment  - Modify environment to reduce risk of injury  - Consider OT/PT consult to assist with strengthening/mobility  Outcome: Progressing  Goal: Maintain or return to baseline ADL function  Description: INTERVENTIONS:  -  Assess patient's ability to carry out ADLs; assess patient's baseline for ADL function and identify physical deficits which impact ability to perform ADLs (bathing, care of mouth/teeth, toileting, grooming, dressing, etc )  - Assess/evaluate cause of self-care deficits   - Assess range of motion  - Assess patient's mobility; develop plan if impaired  - Assess patient's need for assistive devices and provide as appropriate  - Encourage maximum independence but intervene and supervise when necessary  - Involve family in performance of ADLs  - Assess for home care needs following discharge   - Consider OT consult to assist with ADL evaluation and planning for discharge  - Provide patient education as appropriate  Outcome: Progressing  Goal: Maintain or return mobility status to optimal level  Description: INTERVENTIONS:  - Assess patient's baseline mobility status (ambulation, transfers, stairs, etc )    - Identify cognitive and physical deficits and behaviors that affect mobility  - Identify mobility aids required to assist with transfers and/or ambulation (gait belt, sit-to-stand, lift, walker, cane, etc )  - [place] fall precautions as indicated by assessment  - Record patient progress and toleration of activity level on Mobility SBAR; progress patient to next Phase/Stage  - Instruct patient to call for assistance with activity based on assessment  - Consider rehabilitation consult to assist with strengthening/weightbearing, etc   Outcome: Progressing     Problem: DISCHARGE PLANNING  Goal: Discharge to home or other facility with appropriate resources  Description: INTERVENTIONS:  - Identify barriers to discharge w/patient and caregiver  - Arrange for needed discharge resources and transportation as appropriate  - Identify discharge learning needs (meds, wound care, etc )  - Arrange for interpretive services to assist at discharge as needed  - Refer to Case Management Department for coordinating discharge planning if the patient needs post-hospital services based on physician/advanced practitioner order or complex needs related to functional status, cognitive ability, or social support system  Outcome: Progressing     Problem: Knowledge Deficit  Goal: Patient/family/caregiver demonstrates understanding of disease process, treatment plan, medications, and discharge instructions  Description: Complete learning assessment and assess knowledge base    Interventions:  - Provide teaching at level of understanding  - Provide teaching via preferred learning methods  Outcome: Progressing     Problem: Nutrition/Hydration-ADULT  Goal: Nutrient/Hydration intake appropriate for improving, restoring or maintaining nutritional needs  Description: Monitor and assess patient's nutrition/hydration status for malnutrition  Collaborate with interdisciplinary team and initiate plan and interventions as ordered  Monitor patient's weight and dietary intake as ordered or per policy  Utilize nutrition screening tool and intervene as necessary  Determine patient's food preferences and provide high-protein, high-caloric foods as appropriate       INTERVENTIONS:  - Monitor oral intake, urinary output, labs, and treatment plans  - Assess nutrition and hydration status and recommend course of action  - Evaluate amount of meals eaten  - Assist patient with eating if necessary   - Allow adequate time for meals  - Recommend/ encourage appropriate diets, oral nutritional supplements, and vitamin/mineral supplements  - Order, calculate, and assess calorie counts as needed  - Recommend, monitor, and adjust tube feedings and TPN/PPN based on assessed needs  - Assess need for intravenous fluids  - Provide specific nutrition/hydration education as appropriate  - Include patient/family/caregiver in decisions related to nutrition  Outcome: Progressing

## 2021-02-09 NOTE — PROGRESS NOTES
Progress Note - Shruti Hurtado 1935, 81 y o  female MRN: [id number]    Unit/Bed#: -01 Encounter: [id number]    Primary Care Provider: Trev Charles MD   Date and time admitted to hospital: 2/3/2021 12:53 PM        * Generalized weakness  Assessment & Plan  · Generalized weakness recently in rehab secondary to osteoarthritis  · Awaiting SNF placement      Close exposure to COVID-19 virus  Assessment & Plan  · Close exposure to COVID-19 during hospitalization  · Currently on airborne precautions   · Will continue with vitamin supplement  Patient does not require any treatment at this time as her COVID test has been negative  Oxygen requirement can also be inactivity and combination of CHF  · The patient will require total 14 day from the exposure day     Lab Results   Component Value Date    SARSCOV2 Negative 02/08/2021    SARSCOV2 Negative 02/04/2021    SARSCOV2 Negative 02/03/2021    SARSCOV2 Negative 08/21/2020    SARSCOV2 Negative 08/17/2020       Visual hallucinations  Assessment & Plan  · Visual hallucinations without psychiatric history  Likely secondary to delirium  · Appreciate psychiatry evaluation  Sertraline increased to 75 mg  · Added quetiapine q h s  to help regulate sleep/wake cycle   · Improved     OAB (overactive bladder)  Assessment & Plan  · continue oxybutynin     Essential hypertension  Assessment & Plan  · continue metoprolol and diltiazem  · Monitor blood pressure closely     Chronic diastolic heart failure (HCC)  Assessment & Plan  Wt Readings from Last 3 Encounters:   02/09/21 58 2 kg (128 lb 4 9 oz)   08/21/20 60 7 kg (133 lb 13 1 oz)   08/17/20 59 kg (130 lb 1 1 oz)     · Chronic diastolic CHF stable on furosemide 40 mg as needed  · Does not appear to be in acute exacerbation  · Unclear why the patient is requiring oxygen supplement at this time; chest x-ray mild pulmonary vascular congestion  · Will give 1 dose of IV Lasix today  Will start the patient on p o  Lasix 40 mg daily in a   Deanna Charlette · BNP 1034    Longstanding persistent atrial fibrillation (HCC)  Assessment & Plan  · diltiazem metoprolol and anticoagulated with eliquis         VTE Prophylaxis:  Apixaban (Eliquis)    Patient Centered Rounds: I have performed bedside rounds with nursing staff today  Discussions with Specialists or Other Care Team Provider:  Nursing, case management, PT and OT  Education and Discussions with Family / Patient:  Patient and daughter    Current Length of Stay: 6 day(s)    Current Patient Status: Inpatient   Certification Statement: The patient will continue to require additional inpatient hospital stay due to Generalized weakness    Discharge Plan:  Pending discharge disposition    Code Status: Level 3 - DNAR and DNI    Subjective: The patient states that she is feeling fine  I was unable to obtain any other information  She was mumbling and was very difficult to understand  She does follow commands  No acute event overnight  Objective:     Vitals:   Temp (24hrs), Av 7 °F (36 5 °C), Min:97 3 °F (36 3 °C), Max:98 °F (36 7 °C)    Temp:  [97 3 °F (36 3 °C)-98 °F (36 7 °C)] 97 3 °F (36 3 °C)  HR:  [] 73  Resp:  [18-19] 18  BP: (110-131)/(57-68) 118/57  SpO2:  [94 %-95 %] 95 %  Body mass index is 25 91 kg/m²  Input and Output Summary (last 24 hours): Intake/Output Summary (Last 24 hours) at 2021 1330  Last data filed at 2021 0900  Gross per 24 hour   Intake 240 ml   Output --   Net 240 ml       Physical Exam:   Physical Exam  Vitals signs and nursing note reviewed  Constitutional:       General: She is not in acute distress  Appearance: Normal appearance  She is ill-appearing  HENT:      Head: Normocephalic and atraumatic  Right Ear: External ear normal       Left Ear: External ear normal       Nose: Nose normal  No rhinorrhea  Mouth/Throat:      Mouth: Mucous membranes are moist       Pharynx: Oropharynx is clear     Eyes:      General: Right eye: No discharge  Left eye: No discharge  Pupils: Pupils are equal, round, and reactive to light  Neck:      Musculoskeletal: Normal range of motion and neck supple  No muscular tenderness  Cardiovascular:      Rate and Rhythm: Normal rate  Rhythm irregular  Pulses: Normal pulses  Heart sounds: Normal heart sounds  No murmur  Pulmonary:      Effort: Pulmonary effort is normal  No respiratory distress  Breath sounds: Rales (left greater than right ) present  Abdominal:      General: Bowel sounds are normal  There is no distension  Palpations: Abdomen is soft  There is no mass  Tenderness: There is no abdominal tenderness  Musculoskeletal: Normal range of motion  General: No swelling or tenderness  Skin:     General: Skin is warm and dry  Capillary Refill: Capillary refill takes less than 2 seconds  Findings: No erythema or rash  Neurological:      General: No focal deficit present  Mental Status: She is alert  Mental status is at baseline  She is confused  Cranial Nerves: Cranial nerves are intact  Sensory: Sensation is intact  Psychiatric:         Mood and Affect: Mood normal          Behavior: Behavior normal          Additional Data:     Labs:    Results from last 7 days   Lab Units 02/09/21  0602 02/07/21  0505   WBC Thousand/uL 4 20* 7 40   HEMOGLOBIN g/dL 12 9 14 5   HEMATOCRIT % 39 6* 43 9   PLATELETS Thousands/uL 209 272   NEUTROS PCT %  --  73   LYMPHS PCT %  --  14*   MONOS PCT %  --  10   EOS PCT %  --  2     Results from last 7 days   Lab Units 02/09/21  0602  02/07/21  0506   SODIUM mmol/L 136   < > 134   POTASSIUM mmol/L 4 5   < > 3 4*   CHLORIDE mmol/L 98   < > 98   CO2 mmol/L 28   < > 26   BUN mg/dL 18   < > 16   CREATININE mg/dL 0 66   < > 0 71   CALCIUM mg/dL 9 9   < > 10 1   ALK PHOS U/L  --   --  72   ALT U/L  --   --  9   AST U/L  --   --  16    < > = values in this interval not displayed  * I Have Reviewed All Lab Data Listed Above  * Additional Pertinent Lab Tests Reviewed: Alex 66 Admission  Reviewed    Imaging:  Imaging Reports Reviewed Today Include:  Chest x-ray    Recent Cultures (last 7 days):           Last 24 Hours Medication List:   Current Facility-Administered Medications   Medication Dose Route Frequency Provider Last Rate    acetaminophen  650 mg Oral Q6H PRN Asher Baumgarten, MD      apixaban  2 5 mg Oral BID Asher Baumgarten, MD      ascorbic acid  1,000 mg Oral Q12H [facility] Lonzie IVONNE Fine      cholecalciferol  2,000 Units Oral Daily Lonzie StableIVONNE      diltiazem  90 mg Oral [facility] Asher Baumgarten, MD      famotidine  20 mg Oral Daily Lonzie IVONNE Fine      furosemide  40 mg Intravenous Once NEAL Knight      [START ON 2/10/2021] furosemide  40 mg Oral Daily NEAL Knight      hydrOXYzine HCL  10 mg Oral BID PRN Asher Baumgarten, MD      metoprolol tartrate  25 mg Oral BID Asher Baumgarten, MD      ondansetron  4 mg Intravenous Q6H PRN Asher Baumgarten, MD      oxybutynin  5 mg Oral Daily Asher Baumgarten, MD      pantoprazole  40 mg Oral Early Morning Asher Baumgarten, MD      potassium chloride  20 mEq Oral Daily PRN Asher Baumgarten, MD      pravastatin  40 mg Oral Daily With Kimberly Torres MD      QUEtiapine  25 mg Oral HS Olu Frank DO      sertraline  75 mg Oral Daily Ayde Guzmán PA-C      zinc sulfate  220 mg Oral Daily Armando Fine PA-C          Today, Patient Was Seen By: NEAL Knight    ** Please Note: Dictation voice to text software may have been used in the creation of this document   **

## 2021-02-09 NOTE — ASSESSMENT & PLAN NOTE
Wt Readings from Last 3 Encounters:   02/09/21 58 2 kg (128 lb 4 9 oz)   08/21/20 60 7 kg (133 lb 13 1 oz)   08/17/20 59 kg (130 lb 1 1 oz)     · Chronic diastolic CHF stable on furosemide 40 mg as needed  · Does not appear to be in acute exacerbation  · Unclear why the patient is requiring oxygen supplement at this time; chest x-ray mild pulmonary vascular congestion  · Will give 1 dose of IV Lasix today  Will start the patient on p o  Lasix 40 mg daily in a alia Simsetta Given   · BNP 1034

## 2021-02-09 NOTE — ASSESSMENT & PLAN NOTE
· Visual hallucinations without psychiatric history  Likely secondary to delirium  · Appreciate psychiatry evaluation  Sertraline increased to 75 mg    · Added quetiapine q h s  to help regulate sleep/wake cycle   · Improved

## 2021-02-09 NOTE — PLAN OF CARE
Problem: OCCUPATIONAL THERAPY ADULT  Goal: Performs self-care activities at highest level of function for planned discharge setting  See evaluation for individualized goals  Description: Treatment Interventions: ADL retraining, Functional transfer training, UE strengthening/ROM, Endurance training, Patient/family training, Compensatory technique education, Activityengagement          See flowsheet documentation for full assessment, interventions and recommendations  Outcome: Progressing, slowly  Note: Limitation: Decreased ADL status, Decreased UE strength, Decreased endurance, Decreased self-care trans, Decreased high-level ADLs  Prognosis: Good  Assessment: Patient participated in Skilled OT session this date with interventions consisting of ADL re training with the use of correct body mechnaics, safety awareness and fall prevention techniques and  therapeutic activities to: increase activity tolerance   Patient agreeable to OT treatment session, upon arrival patient was found semi-reclined in bed  (I Answered call bell but patient claimed she didn't want anything/was unaware call bell was on )  In comparison to previous session, patient with improvement in level of participation and amount of self-care completion  Patient requiring one step directives and ocassional safety reminders and occasional rest periods  Patient continues to be functioning below baseline level, occupational performance remains limited secondary to factors listed above and increased risk for falls and injury  From OT standpoint, recommendation at time of d/c would be post-acute rehabilitation services  Patient to benefit from continued Occupational Therapy treatment while in the hospital to address deficits as defined above and maximize level of functional independence with ADLs and functional mobility        OT Discharge Recommendation: Post-Acute Rehabilitation Services  OT - OK to Discharge: Yes(Once medically cleared ) DON Bernard/JANIYA

## 2021-02-09 NOTE — CASE MANAGEMENT
Jason called Gaelne Alka back at 12:05 to # [phone] and she gave me choices, she called the insurance and she was told that [address] is in network, she also stated the pt is able to go to [facility]  [facility], [place], [employer], & [facility], referrals were sent , I called Monica Ocampo at [phone] at 12:14 and left a message to make her aware that customer service told the daughter that [address] is in network,cm will continue to work on a safe d/c plan  ,

## 2021-02-09 NOTE — OCCUPATIONAL THERAPY NOTE
02/09/21 1020   OT Last Visit   OT Visit Date 02/09/21   Note Type   Note Type Treatment  (was "cold" through session; left with double blankets )   Restrictions/Precautions   Weight Bearing Precautions Per Order No   Other Precautions Cognitive; Bed Alarm; Fall Risk  (confusion noted)   General   Response to Previous Treatment Patient unable to report, no changes reported from family or staff   Lifestyle   Reciprocal Relationships mentioning daughters by name    Pain Assessment   Pain Assessment Tool Pain Assessment not indicated - pt denies pain   ADL   Where Assessed Other (Comment)  (edge of bed for approx  5 mins;then sitting(supported)in bed)   UB Bathing Assistance 4  Minimal Assistance  (for completeness)   UB Bathing Deficit Setup;Verbal cueing;Supervision/safety; Increased time to complete   LB Bathing Assistance 3  Moderate Assistance   LB Bathing Deficit Setup;Verbal cueing;Supervision/safety; Increased time to complete; Buttocks;Right lower leg including foot; Left lower leg including foot  (patient reaches to approx  mid-shin)   UB Dressing Comments needed some guidance to don new/clean hospital gown    LB Dressing Comments dep  for doff/don socks   Bed Mobility   Rolling R 4  Minimal assistance   Additional items Assist x 1;Bedrails; Increased time required;Verbal cues   Supine to Sit 4  Minimal assistance   Additional items Assist x 1;Bedrails; Increased time required;Verbal cues   Sit to Supine 4  Minimal assistance   Additional items Assist x 1;HOB elevated; Bedrails; Increased time required;LE management   Coordination   Gross Motor WFL   Dexterity WFL   Cognition   Overall Cognitive Status Impaired   Arousal/Participation Responsive; Cooperative   Attention Attends with cues to redirect   Following Commands Follows one step commands with increased time or repetition   Comments patient was concerned regarding things in the environment and at times it seemed like she thought she was at home   Activity Tolerance   Activity Tolerance Patient limited by fatigue  (states "I just want to sleep" )   Assessment   Assessment Patient participated in Skilled OT session this date with interventions consisting of ADL re training with the use of correct body mechnaics, safety awareness and fall prevention techniques and  therapeutic activities to: increase activity tolerance   Patient agreeable to OT treatment session, upon arrival patient was found semi-reclined in bed  (I Answered call bell but patient claimed she didn't want anything/was unaware call bell was on )  In comparison to previous session, patient with improvement in level of participation and amount of self-care completion  Patient requiring one step directives and ocassional safety reminders and occasional rest periods  Patient continues to be functioning below baseline level, occupational performance remains limited secondary to factors listed above and increased risk for falls and injury  From OT standpoint, recommendation at time of d/c would be post-acute rehabilitation services  Patient to benefit from continued Occupational Therapy treatment while in the hospital to address deficits as defined above and maximize level of functional independence with ADLs and functional mobility  Plan   Treatment Interventions ADL retraining;Functional transfer training;Energy conservation; Activityengagement   Goal Expiration Date 02/14/21   OT Treatment Day 2     VIRGIL Still

## 2021-02-10 LAB
ANION GAP SERPL CALCULATED.3IONS-SCNC: 10 MMOL/L (ref 4–13)
BUN SERPL-MCNC: 23 MG/DL (ref 7–25)
CALCIUM SERPL-MCNC: 10 MG/DL (ref 8.6–10.5)
CHLORIDE SERPL-SCNC: 95 MMOL/L (ref 98–107)
CO2 SERPL-SCNC: 31 MMOL/L (ref 21–31)
CREAT SERPL-MCNC: 0.7 MG/DL (ref 0.6–1.2)
ERYTHROCYTE [DISTWIDTH] IN BLOOD BY AUTOMATED COUNT: 15.7 % (ref 11.5–14.5)
GFR SERPL CREATININE-BSD FRML MDRD: 79 ML/MIN/1.73SQ M
GLUCOSE SERPL-MCNC: 104 MG/DL (ref 65–99)
HCT VFR BLD AUTO: 39.6 % (ref 42–47)
HGB BLD-MCNC: 13.2 G/DL (ref 12–16)
MCH RBC QN AUTO: 28.7 PG (ref 26–34)
MCHC RBC AUTO-ENTMCNC: 33.2 G/DL (ref 31–37)
MCV RBC AUTO: 86 FL (ref 81–99)
PLATELET # BLD AUTO: 233 THOUSANDS/UL (ref 149–390)
PMV BLD AUTO: 8.1 FL (ref 8.6–11.7)
POTASSIUM SERPL-SCNC: 3.4 MMOL/L (ref 3.5–5.5)
RBC # BLD AUTO: 4.59 MILLION/UL (ref 3.9–5.2)
SODIUM SERPL-SCNC: 136 MMOL/L (ref 134–143)
WBC # BLD AUTO: 6.8 THOUSAND/UL (ref 4.8–10.8)

## 2021-02-10 PROCEDURE — 85027 COMPLETE CBC AUTOMATED: CPT | Performed by: NURSE PRACTITIONER

## 2021-02-10 PROCEDURE — 80048 BASIC METABOLIC PNL TOTAL CA: CPT | Performed by: NURSE PRACTITIONER

## 2021-02-10 PROCEDURE — 99232 SBSQ HOSP IP/OBS MODERATE 35: CPT | Performed by: NURSE PRACTITIONER

## 2021-02-10 PROCEDURE — 97530 THERAPEUTIC ACTIVITIES: CPT

## 2021-02-10 RX ORDER — POTASSIUM CHLORIDE 20 MEQ/1
20 TABLET, EXTENDED RELEASE ORAL ONCE
Status: COMPLETED | OUTPATIENT
Start: 2021-02-10 | End: 2021-02-10

## 2021-02-10 RX ORDER — FUROSEMIDE 20 MG/1
20 TABLET ORAL
Status: DISCONTINUED | OUTPATIENT
Start: 2021-02-11 | End: 2021-02-15 | Stop reason: HOSPADM

## 2021-02-10 RX ADMIN — APIXABAN 2.5 MG: 2.5 TABLET, FILM COATED ORAL at 17:27

## 2021-02-10 RX ADMIN — PANTOPRAZOLE SODIUM 40 MG: 40 TABLET, DELAYED RELEASE ORAL at 05:11

## 2021-02-10 RX ADMIN — METOPROLOL TARTRATE 25 MG: 25 TABLET, FILM COATED ORAL at 17:27

## 2021-02-10 RX ADMIN — ZINC SULFATE 220 MG (50 MG) CAPSULE 220 MG: CAPSULE at 08:36

## 2021-02-10 RX ADMIN — SERTRALINE HYDROCHLORIDE 75 MG: 50 TABLET ORAL at 08:36

## 2021-02-10 RX ADMIN — DILTIAZEM HYDROCHLORIDE 90 MG: 60 TABLET, FILM COATED ORAL at 05:12

## 2021-02-10 RX ADMIN — DILTIAZEM HYDROCHLORIDE 90 MG: 60 TABLET, FILM COATED ORAL at 14:24

## 2021-02-10 RX ADMIN — OXYCODONE HYDROCHLORIDE AND ACETAMINOPHEN 1000 MG: 500 TABLET ORAL at 08:36

## 2021-02-10 RX ADMIN — HYDROXYZINE HYDROCHLORIDE 10 MG: 10 TABLET, FILM COATED ORAL at 02:06

## 2021-02-10 RX ADMIN — PRAVASTATIN SODIUM 40 MG: 40 TABLET ORAL at 16:09

## 2021-02-10 RX ADMIN — METOPROLOL TARTRATE 25 MG: 25 TABLET, FILM COATED ORAL at 08:37

## 2021-02-10 RX ADMIN — FUROSEMIDE 40 MG: 40 TABLET ORAL at 08:37

## 2021-02-10 RX ADMIN — OXYCODONE HYDROCHLORIDE AND ACETAMINOPHEN 1000 MG: 500 TABLET ORAL at 21:23

## 2021-02-10 RX ADMIN — POTASSIUM CHLORIDE 10 MEQ: 750 TABLET, EXTENDED RELEASE ORAL at 08:36

## 2021-02-10 RX ADMIN — OXYBUTYNIN CHLORIDE 5 MG: 5 TABLET ORAL at 08:36

## 2021-02-10 RX ADMIN — POTASSIUM CHLORIDE 20 MEQ: 1500 TABLET, EXTENDED RELEASE ORAL at 17:27

## 2021-02-10 RX ADMIN — QUETIAPINE FUMARATE 25 MG: 25 TABLET ORAL at 21:23

## 2021-02-10 RX ADMIN — Medication 2000 UNITS: at 08:36

## 2021-02-10 RX ADMIN — APIXABAN 2.5 MG: 2.5 TABLET, FILM COATED ORAL at 08:36

## 2021-02-10 RX ADMIN — FAMOTIDINE 20 MG: 20 TABLET ORAL at 08:36

## 2021-02-10 NOTE — PHYSICAL THERAPY NOTE
Physical Therapy Treatment Session Note    Patient's Name: Melissa Mathews    Admitting Diagnosis  Weakness generalized [R53 1]  Weakness [R53 1]    Problem List  Patient Active Problem List   Diagnosis    CAD (coronary artery disease)    Mitral regurgitation    Longstanding persistent atrial fibrillation (HCC)    Chronic diastolic heart failure ([facility] )    Essential hypertension    Acute hypokalemia    Depression with anxiety    Gastroesophageal reflux disease without esophagitis    OAB (overactive bladder)    Hypomagnesemia    Mixed hyperlipidemia    Osteoarthritis of knee    Pneumonia    Generalized weakness    Dizziness    Chest wall contusion, right, initial encounter    Ambulatory dysfunction    Hypokalemia    Liver lesion    Rib fractures    CKD (chronic kidney disease), stage II    Pulmonary hypertension ([facility] )    Visual hallucinations    Close exposure to COVID-19 virus       Past Medical History  Past Medical History:   Diagnosis Date    Acute on chronic diastolic congestive heart failure ([facility] [facility] ) 6/27/2019    Ambulatory dysfunction 8/17/2020    Arthritis     Chest wall contusion, right, initial encounter 8/17/2020    Chest wall contusion, right, subsequent encounter 8/17/2020    Chronic diastolic heart failure (HCC)     Chronic kidney disease, stage 2 (mild)     Coronary artery disease     history of stenting    Eczema     years    Edema     History of echocardiogram 07/20/2017    EF 55%, mild concentric LVH, bileaflet MVP with moderate MR, left atrial enlargement      History of transfusion     Hyperlipidemia     Hypertension     Hypo-osmolality and hyponatremia     Hypokalemia 7/11/2019    Mitral regurgitation        Past Surgical History  Past Surgical History:   Procedure Laterality Date    ABDOMINAL SURGERY      hysterectomy    CARDIAC CATHETERIZATION  04/10/2012    EF 65%, no significant CAD, patent stents, severe MR     CORONARY ANGIOPLASTY WITH STENT PLACEMENT  2007    EF 55%, GARRET to LAD   EYE SURGERY      b/l cataracts    HYSTERECTOMY      JOINT REPLACEMENT      Rt knee        02/10/21 1006   PT Last Visit   PT Visit Date 02/10/21   Note Type   Note Type Treatment   Pain Assessment   Pain Assessment Tool FLACC   Pain Rating: FLACC (Rest) - Face 0   Pain Rating: FLACC (Rest) - Legs 0   Pain Rating: FLACC (Rest) - Activity 1   Pain Rating: FLACC (Rest) - Cry 0   Pain Rating: FLACC (Rest) - Consolability 2   Score: FLACC (Rest) 3   Pain Rating: FLACC (Activity) - Face 1   Pain Rating: FLACC (Activity) - Legs 1   Pain Rating: FLACC (Activity) - Activity 1   Pain Rating: FLACC (Activity) - Cry 0   Pain Rating: FLACC (Activity) - Consolability 2   Score: FLACC (Activity) 5   Restrictions/Precautions   Weight Bearing Precautions Per Order No   Other Precautions Cognitive; Bed Alarm; Fall Risk   General   Chart Reviewed Yes   Response to Previous Treatment Patient unable to report, no changes reported from family or staff   Family/Caregiver Present No   Cognition   Overall Cognitive Status Impaired   Arousal/Participation Responsive   Attention Difficulty attending to directions   Orientation Level Oriented to person;Disoriented to place; Disoriented to time;Disoriented to situation  (however could not provide )   Memory Decreased recall of precautions   Following Commands Unable to follow one step commands   Subjective   Subjective Pt  did not provide any discernible verbal information  Bed Mobility   Rolling R 4  Minimal assistance   Additional items Assist x 1;Bedrails;Verbal cues; Impulsive; 3 trials    Rolling L 4  Minimal assistance   Additional items Assist x 1;Bedrails; Impulsive;Verbal cues;3 trials   Supine to Sit Unable to assess  (could not safely advance functional mobility re:cognition)   Transfers   Sit to Stand Unable to assess   Ambulation/Elevation   Gait pattern Not appropriate; Not tested   Balance   Static Sitting   (unable to safely assess at this time)   Endurance Deficit   Endurance Deficit Yes   Activity Tolerance   Activity Tolerance Other (Comment)  (current cognitive status w/decreased engagement)   Nurse Made Aware yes, Noma Bumpers RN   Assessment   Prognosis Guarded   Problem List Decreased strength;Decreased endurance; Impaired balance;Decreased mobility; Decreased safety awareness; Impaired judgement;Decreased cognition;Decreased coordination   Assessment Pt seen for PT treatment session this date with interventions consisting of therapeutic activity consisting of training: bed mobility  Pt agreeable to PT treatment session upon arrival, pt found supine in bed w/ HOB elevated, A&O x 1,FLACC score of 5  In comparison to previous session, pt with no improvements as evidenced by inability to safely advance functional tasks  Post session: pt returned BTB, bed alarm engaged, all needs in reach and RN notified of session findings/recommendations Continue to recommend STR at time of d/c in order to maximize pt's functional independence and safety w/ mobility  Pt continues to be functioning below baseline level, and remains limited 2* factors listed above and including impaired cognition, decreased engagement, impulsivity  PT will continue to see pt while here in order to address the deficits listed above and provide interventions consistent w/ POC in effort to achieve STGs  Barriers to Discharge Inaccessible home environment;Decreased caregiver support; Other (Comment)   Barriers to Discharge Comments Inability to safely advance functional tasks re:cognitive status  Goals   Patient Goals unknown as pt  could not provide re:current cognitive status   STG Expiration Date 02/14/21   Short Term Goal #1 STGs remain appropriate   PT Treatment Day 3   Plan   Treatment/Interventions ADL retraining;Functional transfer training;LE strengthening/ROM; Therapeutic exercise; Endurance training;Cognitive reorientation;Patient/family training;Bed mobility;Gait training;Spoke to nursing   Progress No functional improvements   PT Frequency Other (Comment); Monitor status  (3-5x/wk)   Recommendation   PT Discharge Recommendation Post-Acute Rehabilitation Services  (continued recommendation)   Equipment Recommended   (TBD)   PT - OK to Discharge No   Additional Comments Upon conclusion, pt was resting in bed w/all needs within reach     AM-PAC Basic Mobility Inpatient   Turning in Bed Without Bedrails 2   Lying on Back to Sitting on Edge of Flat Bed 2   Moving Bed to Chair 2   Standing Up From Chair 2   Walk in Room 2   Climb 3-5 Stairs 1   Basic Mobility Inpatient Raw Score 11   Basic Mobility Standardized Score 30 25     Treatment Time: 6499-1222      Donnell Oquendo, PT

## 2021-02-10 NOTE — ASSESSMENT & PLAN NOTE
· Close exposure to COVID-19 during hospitalization  · Currently on airborne precautions   · Will continue with vitamin supplement  Patient does not require any treatment at this time as her COVID test has been negative  Oxygen requirement can also be inactivity and combination of CHF    · The patient will require total 14 day from the exposure day from 2/3/2021    Lab Results   Component Value Date    SARSCOV2 Negative 02/08/2021    SARSCOV2 Negative 02/04/2021    SARSCOV2 Negative 02/03/2021    SARSCOV2 Negative 08/21/2020    SARSCOV2 Negative 08/17/2020

## 2021-02-10 NOTE — NURSING NOTE
Pt more calm and cooperative now, presently in bed in no acute distress eating dinner, callbell in reach of the pt, bed alarm is on and attached to the callbell system

## 2021-02-10 NOTE — PLAN OF CARE
Problem: Prexisting or High Potential for Compromised Skin Integrity  Goal: Skin integrity is maintained or improved  Description: INTERVENTIONS:  - Identify patients at risk for skin breakdown  - Assess and monitor skin integrity  - Assess and monitor nutrition and hydration status  - Monitor labs   - Assess for incontinence   - Turn and reposition patient  - Assist with mobility/ambulation  - Relieve pressure over bony prominences  - Avoid friction and shearing  - Provide appropriate hygiene as needed including keeping skin clean and dry  - Evaluate need for skin moisturizer/barrier cream  - Collaborate with interdisciplinary team   - Patient/family teaching  - Consider wound care consult   Outcome: Progressing     Problem: Potential for Falls  Goal: Patient will remain free of falls  Description: INTERVENTIONS:  - Assess patient frequently for physical needs  -  Identify cognitive and physical deficits and behaviors that affect risk of falls    -  [place] fall precautions as indicated by assessment   - Educate patient/family on patient safety including physical limitations  - Instruct patient to call for assistance with activity based on assessment  - Modify environment to reduce risk of injury  - Consider OT/PT consult to assist with strengthening/mobility  Outcome: Progressing     Problem: METABOLIC, FLUID AND ELECTROLYTES - ADULT  Goal: Electrolytes maintained within normal limits  Description: INTERVENTIONS:  - Monitor labs and assess patient for signs and symptoms of electrolyte imbalances  - Administer electrolyte replacement as ordered  - Monitor response to electrolyte replacements, including repeat lab results as appropriate  - Instruct patient on fluid and nutrition as appropriate  Outcome: Progressing     Problem: SKIN/TISSUE INTEGRITY - ADULT  Goal: Skin integrity remains intact  Description: INTERVENTIONS  - Identify patients at risk for skin breakdown  - Assess and monitor skin integrity  - Assess and monitor nutrition and hydration status  - Monitor labs (i e  albumin)  - Assess for incontinence   - Turn and reposition patient  - Assist with mobility/ambulation  - Relieve pressure over bony prominences  - Avoid friction and shearing  - Provide appropriate hygiene as needed including keeping skin clean and dry  - Evaluate need for skin moisturizer/barrier cream  - Collaborate with interdisciplinary team (i e  Nutrition, Rehabilitation, etc )   - Patient/family teaching  Outcome: Progressing     Problem: MUSCULOSKELETAL - ADULT  Goal: Maintain or return mobility to safest level of function  Description: INTERVENTIONS:  - Assess patient's ability to carry out ADLs; assess patient's baseline for ADL function and identify physical deficits which impact ability to perform ADLs (bathing, care of mouth/teeth, toileting, grooming, dressing, etc )  - Assess/evaluate cause of self-care deficits   - Assess range of motion  - Assess patient's mobility  - Assess patient's need for assistive devices and provide as appropriate  - Encourage maximum independence but intervene and supervise when necessary  - Involve family in performance of ADLs  - Assess for home care needs following discharge   - Consider OT consult to assist with ADL evaluation and planning for discharge  - Provide patient education as appropriate  Outcome: Progressing     Problem: PAIN - ADULT  Goal: Verbalizes/displays adequate comfort level or baseline comfort level  Description: Interventions:  - Encourage patient to monitor pain and request assistance  - Assess pain using appropriate pain scale  - Administer analgesics based on type and severity of pain and evaluate response  - Implement non-pharmacological measures as appropriate and evaluate response  - Consider cultural and social influences on pain and pain management  - Notify physician/advanced practitioner if interventions unsuccessful or patient reports new pain  Outcome: Progressing Problem: INFECTION - ADULT  Goal: Absence or prevention of progression during hospitalization  Description: INTERVENTIONS:  - Assess and monitor for signs and symptoms of infection  - Monitor lab/diagnostic results  - Monitor all insertion sites, i e  indwelling lines, tubes, and drains  - Monitor endotracheal if appropriate and nasal secretions for changes in amount and color  - [place] appropriate cooling/warming therapies per order  - Administer medications as ordered  - Instruct and encourage patient and family to use good hand hygiene technique  - Identify and instruct in appropriate isolation precautions for identified infection/condition  Outcome: Progressing     Problem: SAFETY ADULT  Goal: Patient will remain free of falls  Description: INTERVENTIONS:  - Assess patient frequently for physical needs  -  Identify cognitive and physical deficits and behaviors that affect risk of falls    -  [place] fall precautions as indicated by assessment   - Educate patient/family on patient safety including physical limitations  - Instruct patient to call for assistance with activity based on assessment  - Modify environment to reduce risk of injury  - Consider OT/PT consult to assist with strengthening/mobility  Outcome: Progressing  Goal: Maintain or return to baseline ADL function  Description: INTERVENTIONS:  -  Assess patient's ability to carry out ADLs; assess patient's baseline for ADL function and identify physical deficits which impact ability to perform ADLs (bathing, care of mouth/teeth, toileting, grooming, dressing, etc )  - Assess/evaluate cause of self-care deficits   - Assess range of motion  - Assess patient's mobility; develop plan if impaired  - Assess patient's need for assistive devices and provide as appropriate  - Encourage maximum independence but intervene and supervise when necessary  - Involve family in performance of ADLs  - Assess for home care needs following discharge   - Consider OT consult to assist with ADL evaluation and planning for discharge  - Provide patient education as appropriate  Outcome: Progressing  Goal: Maintain or return mobility status to optimal level  Description: INTERVENTIONS:  - Assess patient's baseline mobility status (ambulation, transfers, stairs, etc )    - Identify cognitive and physical deficits and behaviors that affect mobility  - Identify mobility aids required to assist with transfers and/or ambulation (gait belt, sit-to-stand, lift, walker, cane, etc )  - [place] fall precautions as indicated by assessment  - Record patient progress and toleration of activity level on Mobility SBAR; progress patient to next Phase/Stage  - Instruct patient to call for assistance with activity based on assessment  - Consider rehabilitation consult to assist with strengthening/weightbearing, etc   Outcome: Progressing     Problem: DISCHARGE PLANNING  Goal: Discharge to home or other facility with appropriate resources  Description: INTERVENTIONS:  - Identify barriers to discharge w/patient and caregiver  - Arrange for needed discharge resources and transportation as appropriate  - Identify discharge learning needs (meds, wound care, etc )  - Arrange for interpretive services to assist at discharge as needed  - Refer to Case Management Department for coordinating discharge planning if the patient needs post-hospital services based on physician/advanced practitioner order or complex needs related to functional status, cognitive ability, or social support system  Outcome: Progressing     Problem: Knowledge Deficit  Goal: Patient/family/caregiver demonstrates understanding of disease process, treatment plan, medications, and discharge instructions  Description: Complete learning assessment and assess knowledge base    Interventions:  - Provide teaching at level of understanding  - Provide teaching via preferred learning methods  Outcome: Progressing     Problem: Nutrition/Hydration-ADULT  Goal: Nutrient/Hydration intake appropriate for improving, restoring or maintaining nutritional needs  Description: Monitor and assess patient's nutrition/hydration status for malnutrition  Collaborate with interdisciplinary team and initiate plan and interventions as ordered  Monitor patient's weight and dietary intake as ordered or per policy  Utilize nutrition screening tool and intervene as necessary  Determine patient's food preferences and provide high-protein, high-caloric foods as appropriate       INTERVENTIONS:  - Monitor oral intake, urinary output, labs, and treatment plans  - Assess nutrition and hydration status and recommend course of action  - Evaluate amount of meals eaten  - Assist patient with eating if necessary   - Allow adequate time for meals  - Recommend/ encourage appropriate diets, oral nutritional supplements, and vitamin/mineral supplements  - Order, calculate, and assess calorie counts as needed  - Recommend, monitor, and adjust tube feedings and TPN/PPN based on assessed needs  - Assess need for intravenous fluids  - Provide specific nutrition/hydration education as appropriate  - Include patient/family/caregiver in decisions related to nutrition  Outcome: Progressing

## 2021-02-10 NOTE — ASSESSMENT & PLAN NOTE
· Visual hallucinations without psychiatric history  Likely secondary to delirium  · Appreciate psychiatry evaluation  Sertraline increased to 75 mg    · Added quetiapine q h s  to help regulate sleep/wake cycle   · Resolved- but patient remains confused, combative at times but able to redirect

## 2021-02-10 NOTE — PLAN OF CARE
Problem: PHYSICAL THERAPY ADULT  Goal: Performs mobility at highest level of function for planned discharge setting  See evaluation for individualized goals  Description: Treatment/Interventions: Functional transfer training, LE strengthening/ROM, Therapeutic exercise, Endurance training, Patient/family training, Equipment eval/education, Bed mobility, Gait training, Spoke to nursing, Spoke to case management  Equipment Recommended: (continue RW trial)       See flowsheet documentation for full assessment, interventions and recommendations  Outcome: Not Progressing  Note: Prognosis: Guarded  Problem List: Decreased strength, Decreased endurance, Impaired balance, Decreased mobility, Decreased safety awareness, Impaired judgement, Decreased cognition, Decreased coordination  Assessment: Pt seen for PT treatment session this date with interventions consisting of therapeutic activity consisting of training: bed mobility  Pt agreeable to PT treatment session upon arrival, pt found supine in bed w/ HOB elevated, A&O x 1,FLACC score of 5  In comparison to previous session, pt with no improvements as evidenced by inability to safely advance functional tasks  Post session: pt returned BTB, bed alarm engaged, all needs in reach and RN notified of session findings/recommendations Continue to recommend STR at time of d/c in order to maximize pt's functional independence and safety w/ mobility  Pt continues to be functioning below baseline level, and remains limited 2* factors listed above and including impaired cognition, decreased engagement, impulsivity  PT will continue to see pt while here in order to address the deficits listed above and provide interventions consistent w/ POC in effort to achieve STGs  Barriers to Discharge: Inaccessible home environment, Decreased caregiver support, Other (Comment)  Barriers to Discharge Comments: Inability to safely advance functional tasks re:cognitive status    PT Discharge Recommendation: (S) Post-Acute Rehabilitation Services(continued recommendation)     PT - OK to Discharge: No    See flowsheet documentation for full assessment

## 2021-02-10 NOTE — ASSESSMENT & PLAN NOTE
Wt Readings from Last 3 Encounters:   02/10/21 52 4 kg (115 lb 8 3 oz)   08/21/20 60 7 kg (133 lb 13 1 oz)   08/17/20 59 kg (130 lb 1 1 oz)     · Chronic diastolic CHF stable on furosemide 40 mg as needed  · Does not appear to be in acute exacerbation  · Unclear why the patient is requiring oxygen supplement at this time; chest x-ray mild pulmonary vascular congestion  · Received 1 dose of IV Lasix 2/9   started the patient on p o  Lasix 40 mg daily     · BNP 1034

## 2021-02-10 NOTE — CASE MANAGEMENT
I had received a call from [phone] yesterday stating she has paperwork stating they are not in network, she stated they had a problem with this in the past, [facility], they do not have an open bed yet, possible bed availability for Thursday or Friday, I did request npi numbers so an [facility] [place] can be started, information was not received, pt will need an auth and authorization for transportation, cm will continue to work on a safe d/c plan

## 2021-02-10 NOTE — PLAN OF CARE
Problem: Prexisting or High Potential for Compromised Skin Integrity  Goal: Skin integrity is maintained or improved  Description: INTERVENTIONS:  - Identify patients at risk for skin breakdown  - Assess and monitor skin integrity  - Assess and monitor nutrition and hydration status  - Monitor labs   - Assess for incontinence   - Turn and reposition patient  - Assist with mobility/ambulation  - Relieve pressure over bony prominences  - Avoid friction and shearing  - Provide appropriate hygiene as needed including keeping skin clean and dry  - Evaluate need for skin moisturizer/barrier cream  - Collaborate with interdisciplinary team   - Patient/family teaching  - Consider wound care consult   Outcome: Not Progressing     Problem: Potential for Falls  Goal: Patient will remain free of falls  Description: INTERVENTIONS:  - Assess patient frequently for physical needs  -  Identify cognitive and physical deficits and behaviors that affect risk of falls    -  [place] fall precautions as indicated by assessment   - Educate patient/family on patient safety including physical limitations  - Instruct patient to call for assistance with activity based on assessment  - Modify environment to reduce risk of injury  - Consider OT/PT consult to assist with strengthening/mobility  Outcome: Not Progressing     Problem: METABOLIC, FLUID AND ELECTROLYTES - ADULT  Goal: Electrolytes maintained within normal limits  Description: INTERVENTIONS:  - Monitor labs and assess patient for signs and symptoms of electrolyte imbalances  - Administer electrolyte replacement as ordered  - Monitor response to electrolyte replacements, including repeat lab results as appropriate  - Instruct patient on fluid and nutrition as appropriate  Outcome: Not Progressing     Problem: SKIN/TISSUE INTEGRITY - ADULT  Goal: Skin integrity remains intact  Description: INTERVENTIONS  - Identify patients at risk for skin breakdown  - Assess and monitor skin integrity  - Assess and monitor nutrition and hydration status  - Monitor labs (i e  albumin)  - Assess for incontinence   - Turn and reposition patient  - Assist with mobility/ambulation  - Relieve pressure over bony prominences  - Avoid friction and shearing  - Provide appropriate hygiene as needed including keeping skin clean and dry  - Evaluate need for skin moisturizer/barrier cream  - Collaborate with interdisciplinary team (i e  Nutrition, Rehabilitation, etc )   - Patient/family teaching  Outcome: Not Progressing     Problem: MUSCULOSKELETAL - ADULT  Goal: Maintain or return mobility to safest level of function  Description: INTERVENTIONS:  - Assess patient's ability to carry out ADLs; assess patient's baseline for ADL function and identify physical deficits which impact ability to perform ADLs (bathing, care of mouth/teeth, toileting, grooming, dressing, etc )  - Assess/evaluate cause of self-care deficits   - Assess range of motion  - Assess patient's mobility  - Assess patient's need for assistive devices and provide as appropriate  - Encourage maximum independence but intervene and supervise when necessary  - Involve family in performance of ADLs  - Assess for home care needs following discharge   - Consider OT consult to assist with ADL evaluation and planning for discharge  - Provide patient education as appropriate  Outcome: Not Progressing     Problem: PAIN - ADULT  Goal: Verbalizes/displays adequate comfort level or baseline comfort level  Description: Interventions:  - Encourage patient to monitor pain and request assistance  - Assess pain using appropriate pain scale  - Administer analgesics based on type and severity of pain and evaluate response  - Implement non-pharmacological measures as appropriate and evaluate response  - Consider cultural and social influences on pain and pain management  - Notify physician/advanced practitioner if interventions unsuccessful or patient reports new pain  Outcome: Not Progressing     Problem: INFECTION - ADULT  Goal: Absence or prevention of progression during hospitalization  Description: INTERVENTIONS:  - Assess and monitor for signs and symptoms of infection  - Monitor lab/diagnostic results  - Monitor all insertion sites, i e  indwelling lines, tubes, and drains  - Monitor endotracheal if appropriate and nasal secretions for changes in amount and color  - [place] appropriate cooling/warming therapies per order  - Administer medications as ordered  - Instruct and encourage patient and family to use good hand hygiene technique  - Identify and instruct in appropriate isolation precautions for identified infection/condition  Outcome: Not Progressing     Problem: SAFETY ADULT  Goal: Patient will remain free of falls  Description: INTERVENTIONS:  - Assess patient frequently for physical needs  -  Identify cognitive and physical deficits and behaviors that affect risk of falls    -  [place] fall precautions as indicated by assessment   - Educate patient/family on patient safety including physical limitations  - Instruct patient to call for assistance with activity based on assessment  - Modify environment to reduce risk of injury  - Consider OT/PT consult to assist with strengthening/mobility  Outcome: Not Progressing  Goal: Maintain or return to baseline ADL function  Description: INTERVENTIONS:  -  Assess patient's ability to carry out ADLs; assess patient's baseline for ADL function and identify physical deficits which impact ability to perform ADLs (bathing, care of mouth/teeth, toileting, grooming, dressing, etc )  - Assess/evaluate cause of self-care deficits   - Assess range of motion  - Assess patient's mobility; develop plan if impaired  - Assess patient's need for assistive devices and provide as appropriate  - Encourage maximum independence but intervene and supervise when necessary  - Involve family in performance of ADLs  - Assess for home care needs following discharge   - Consider OT consult to assist with ADL evaluation and planning for discharge  - Provide patient education as appropriate  Outcome: Not Progressing  Goal: Maintain or return mobility status to optimal level  Description: INTERVENTIONS:  - Assess patient's baseline mobility status (ambulation, transfers, stairs, etc )    - Identify cognitive and physical deficits and behaviors that affect mobility  - Identify mobility aids required to assist with transfers and/or ambulation (gait belt, sit-to-stand, lift, walker, cane, etc )  - [place] fall precautions as indicated by assessment  - Record patient progress and toleration of activity level on Mobility SBAR; progress patient to next Phase/Stage  - Instruct patient to call for assistance with activity based on assessment  - Consider rehabilitation consult to assist with strengthening/weightbearing, etc   Outcome: Not Progressing     Problem: DISCHARGE PLANNING  Goal: Discharge to home or other facility with appropriate resources  Description: INTERVENTIONS:  - Identify barriers to discharge w/patient and caregiver  - Arrange for needed discharge resources and transportation as appropriate  - Identify discharge learning needs (meds, wound care, etc )  - Arrange for interpretive services to assist at discharge as needed  - Refer to Case Management Department for coordinating discharge planning if the patient needs post-hospital services based on physician/advanced practitioner order or complex needs related to functional status, cognitive ability, or social support system  Outcome: Not Progressing     Problem: Knowledge Deficit  Goal: Patient/family/caregiver demonstrates understanding of disease process, treatment plan, medications, and discharge instructions  Description: Complete learning assessment and assess knowledge base    Interventions:  - Provide teaching at level of understanding  - Provide teaching via preferred learning methods  Outcome: Not Progressing     Problem: Nutrition/Hydration-ADULT  Goal: Nutrient/Hydration intake appropriate for improving, restoring or maintaining nutritional needs  Description: Monitor and assess patient's nutrition/hydration status for malnutrition  Collaborate with interdisciplinary team and initiate plan and interventions as ordered  Monitor patient's weight and dietary intake as ordered or per policy  Utilize nutrition screening tool and intervene as necessary  Determine patient's food preferences and provide high-protein, high-caloric foods as appropriate       INTERVENTIONS:  - Monitor oral intake, urinary output, labs, and treatment plans  - Assess nutrition and hydration status and recommend course of action  - Evaluate amount of meals eaten  - Assist patient with eating if necessary   - Allow adequate time for meals  - Recommend/ encourage appropriate diets, oral nutritional supplements, and vitamin/mineral supplements  - Order, calculate, and assess calorie counts as needed  - Recommend, monitor, and adjust tube feedings and TPN/PPN based on assessed needs  - Assess need for intravenous fluids  - Provide specific nutrition/hydration education as appropriate  - Include patient/family/caregiver in decisions related to nutrition  Outcome: Not Progressing

## 2021-02-10 NOTE — PROGRESS NOTES
Progress Note - Devin Jeffries 1935, 81 y o  female MRN: [id number]    Unit/Bed#: -01 Encounter: [id number]    Primary Care Provider: Anni Cerrato MD   Date and time admitted to hospital: 2/3/2021 12:53 PM        * Generalized weakness  Assessment & Plan  · Generalized weakness recently in rehab secondary to osteoarthritis  · Awaiting SNF placement       Close exposure to COVID-19 virus  Assessment & Plan  · Close exposure to COVID-19 during hospitalization  · Currently on airborne precautions   · Will continue with vitamin supplement  Patient does not require any treatment at this time as her COVID test has been negative  Oxygen requirement can also be inactivity and combination of CHF  · The patient will require total 14 day from the exposure day from 2/3/2021    Lab Results   Component Value Date    SARSCOV2 Negative 02/08/2021    SARSCOV2 Negative 02/04/2021    SARSCOV2 Negative 02/03/2021    SARSCOV2 Negative 08/21/2020    SARSCOV2 Negative 08/17/2020       Visual hallucinations  Assessment & Plan  · Visual hallucinations without psychiatric history  Likely secondary to delirium  · Appreciate psychiatry evaluation  Sertraline increased to 75 mg    · Added quetiapine q h s  to help regulate sleep/wake cycle   · Resolved- but patient remains confused, combative at times but able to redirect    OAB (overactive bladder)  Assessment & Plan  · continue oxybutynin      Essential hypertension  Assessment & Plan  · continue metoprolol and diltiazem  · Monitor blood pressure closely      Chronic diastolic heart failure (HCC)  Assessment & Plan  Wt Readings from Last 3 Encounters:   02/10/21 52 4 kg (115 lb 8 3 oz)   08/21/20 60 7 kg (133 lb 13 1 oz)   08/17/20 59 kg (130 lb 1 1 oz)     · Chronic diastolic CHF stable on furosemide 40 mg as needed  · Does not appear to be in acute exacerbation  · Unclear why the patient is requiring oxygen supplement at this time; chest x-ray mild pulmonary vascular congestion  · Received 1 dose of IV Lasix    started the patient on p o  Lasix 40 mg daily  · BNP 1034    Longstanding persistent atrial fibrillation (HCC)  Assessment & Plan  · diltiazem metoprolol and anticoagulated with eliquis          VTE Prophylaxis:  Apixaban (Eliquis)    Patient Centered Rounds: I have performed bedside rounds with nursing staff today  Discussions with Specialists or Other Care Team Provider:  Nursing, case management, PT/OT  Education and Discussions with Family / Patient:  Patient and daughter    Current Length of Stay: 7 day(s)    Current Patient Status: Inpatient   Certification Statement: The patient will continue to require additional inpatient hospital stay due to Encephalopathy    Discharge Plan:  Pending discharge disposition    Code Status: Level 3 - DNAR and DNI    Subjective: The patient stated she is feeling okay  Denies any pain  Per staff, the patient was combative and was hitting staff earlier this morning  Objective:     Vitals:   Temp (24hrs), Av 7 °F (36 5 °C), Min:97 1 °F (36 2 °C), Max:98 4 °F (36 9 °C)    Temp:  [97 1 °F (36 2 °C)-98 4 °F (36 9 °C)] 97 7 °F (36 5 °C)  HR:  [] 78  Resp:  [18-20] 20  BP: ()/(63-95) 118/70  SpO2:  [94 %-98 %] 96 %  Body mass index is 23 33 kg/m²  Input and Output Summary (last 24 hours): Intake/Output Summary (Last 24 hours) at 2/10/2021 1704  Last data filed at 2/10/2021 1225  Gross per 24 hour   Intake 240 ml   Output --   Net 240 ml       Physical Exam:   Physical Exam  Vitals signs and nursing note reviewed  Constitutional:       General: She is not in acute distress  Appearance: Normal appearance  She is ill-appearing (chronically ill and frail )  HENT:      Head: Normocephalic and atraumatic  Right Ear: External ear normal       Left Ear: External ear normal       Nose: Nose normal  No rhinorrhea        Mouth/Throat:      Mouth: Mucous membranes are moist       Pharynx: Oropharynx is clear  Eyes:      General:         Right eye: No discharge  Left eye: No discharge  Pupils: Pupils are equal, round, and reactive to light  Neck:      Musculoskeletal: Normal range of motion and neck supple  No muscular tenderness  Cardiovascular:      Rate and Rhythm: Normal rate and regular rhythm  Pulses: Normal pulses  Heart sounds: Normal heart sounds  No murmur  Pulmonary:      Effort: Pulmonary effort is normal  No respiratory distress  Breath sounds: Normal breath sounds  Abdominal:      General: Bowel sounds are normal  There is no distension  Palpations: Abdomen is soft  There is no mass  Tenderness: There is no abdominal tenderness  Musculoskeletal: Normal range of motion  General: No swelling or tenderness  Skin:     General: Skin is warm and dry  Capillary Refill: Capillary refill takes less than 2 seconds  Findings: No erythema or rash  Neurological:      General: No focal deficit present  Mental Status: She is alert  She is confused  Cranial Nerves: Cranial nerves are intact  Sensory: Sensation is intact  Psychiatric:         Mood and Affect: Mood normal          Behavior: Behavior normal          Additional Data:     Labs:    Results from last 7 days   Lab Units 02/10/21  0530  02/07/21  0505   WBC Thousand/uL 6 80   < > 7 40   HEMOGLOBIN g/dL 13 2   < > 14 5   HEMATOCRIT % 39 6*   < > 43 9   PLATELETS Thousands/uL 233   < > 272   NEUTROS PCT %  --   --  73   LYMPHS PCT %  --   --  14*   MONOS PCT %  --   --  10   EOS PCT %  --   --  2    < > = values in this interval not displayed       Results from last 7 days   Lab Units 02/10/21  0530  02/07/21  0506   SODIUM mmol/L 136   < > 134   POTASSIUM mmol/L 3 4*   < > 3 4*   CHLORIDE mmol/L 95*   < > 98   CO2 mmol/L 31   < > 26   BUN mg/dL 23   < > 16   CREATININE mg/dL 0 70   < > 0 71   CALCIUM mg/dL 10 0   < > 10 1   ALK PHOS U/L  --   --  72   ALT U/L --   --  9   AST U/L  --   --  16    < > = values in this interval not displayed  * I Have Reviewed All Lab Data Listed Above  * Additional Pertinent Lab Tests Reviewed: Alex [facility] Admission  Reviewed    Imaging:  Imaging Reports Reviewed Today Include: n/a     Recent Cultures (last 7 days):           Last 24 Hours Medication List:   Current Facility-Administered Medications   Medication Dose Route Frequency Provider Last Rate    acetaminophen  650 mg Oral Q6H PRN Mary Harvey MD      apixaban  2 5 mg Oral BID Mary Harvey MD      ascorbic acid  1,000 mg Oral Q12H [address] Marie Lan PA-C      cholecalciferol  2,000 Units Oral Daily Marie Lan PA-C      diltiazem  90 mg Oral [facility] Mary Harvey MD      famotidine  20 mg Oral Daily Marie Lan PA-C      furosemide  40 mg Oral Daily NEAL Blackwood      hydrOXYzine HCL  10 mg Oral BID PRN Mary Harvey MD      metoprolol tartrate  25 mg Oral BID Mary Harvey MD      ondansetron  4 mg Intravenous Q6H PRN Mary Harvey MD      oxybutynin  5 mg Oral Daily Mary Harvey MD      pantoprazole  40 mg Oral Early Morning Mary Harvey MD      potassium chloride  10 mEq Oral Daily NEAL Blackwood      potassium chloride  20 mEq Oral Once NEAL Blackwood      pravastatin  40 mg Oral Daily With Jacinto Ham MD      QUEtiapine  25 mg Oral HS Olu Frank DO      sertraline  75 mg Oral Daily Cam Lawton PA-C      zinc sulfate  220 mg Oral Daily Marie Lan PA-C          Today, Patient Was Seen By: NEAL Blackwood    ** Please Note: Dictation voice to text software may have been used in the creation of this document   **

## 2021-02-11 PROBLEM — R41.89 COGNITIVE IMPAIRMENT: Status: ACTIVE | Noted: 2021-02-11

## 2021-02-11 PROCEDURE — 99232 SBSQ HOSP IP/OBS MODERATE 35: CPT | Performed by: NURSE PRACTITIONER

## 2021-02-11 RX ORDER — POTASSIUM CHLORIDE 20 MEQ/1
20 TABLET, EXTENDED RELEASE ORAL ONCE
Status: COMPLETED | OUTPATIENT
Start: 2021-02-11 | End: 2021-02-11

## 2021-02-11 RX ADMIN — OXYBUTYNIN CHLORIDE 5 MG: 5 TABLET ORAL at 09:44

## 2021-02-11 RX ADMIN — POTASSIUM CHLORIDE 20 MEQ: 1500 TABLET, EXTENDED RELEASE ORAL at 17:18

## 2021-02-11 RX ADMIN — OXYCODONE HYDROCHLORIDE AND ACETAMINOPHEN 1000 MG: 500 TABLET ORAL at 09:43

## 2021-02-11 RX ADMIN — Medication 2000 UNITS: at 09:44

## 2021-02-11 RX ADMIN — ZINC SULFATE 220 MG (50 MG) CAPSULE 220 MG: CAPSULE at 09:43

## 2021-02-11 RX ADMIN — APIXABAN 2.5 MG: 2.5 TABLET, FILM COATED ORAL at 09:44

## 2021-02-11 RX ADMIN — OXYCODONE HYDROCHLORIDE AND ACETAMINOPHEN 1000 MG: 500 TABLET ORAL at 22:32

## 2021-02-11 RX ADMIN — ACETAMINOPHEN 650 MG: 325 TABLET ORAL at 02:12

## 2021-02-11 RX ADMIN — APIXABAN 2.5 MG: 2.5 TABLET, FILM COATED ORAL at 17:18

## 2021-02-11 RX ADMIN — DILTIAZEM HYDROCHLORIDE 90 MG: 60 TABLET, FILM COATED ORAL at 22:32

## 2021-02-11 RX ADMIN — POTASSIUM CHLORIDE 10 MEQ: 750 TABLET, EXTENDED RELEASE ORAL at 09:44

## 2021-02-11 RX ADMIN — QUETIAPINE FUMARATE 25 MG: 25 TABLET ORAL at 22:32

## 2021-02-11 RX ADMIN — SERTRALINE HYDROCHLORIDE 75 MG: 50 TABLET ORAL at 09:44

## 2021-02-11 RX ADMIN — PRAVASTATIN SODIUM 40 MG: 40 TABLET ORAL at 17:18

## 2021-02-11 RX ADMIN — METOPROLOL TARTRATE 25 MG: 25 TABLET, FILM COATED ORAL at 09:43

## 2021-02-11 RX ADMIN — METOPROLOL TARTRATE 25 MG: 25 TABLET, FILM COATED ORAL at 17:18

## 2021-02-11 RX ADMIN — FAMOTIDINE 20 MG: 20 TABLET ORAL at 09:43

## 2021-02-11 RX ADMIN — DILTIAZEM HYDROCHLORIDE 90 MG: 60 TABLET, FILM COATED ORAL at 14:01

## 2021-02-11 NOTE — PLAN OF CARE
Problem: Prexisting or High Potential for Compromised Skin Integrity  Goal: Skin integrity is maintained or improved  Description: INTERVENTIONS:  - Identify patients at risk for skin breakdown  - Assess and monitor skin integrity  - Assess and monitor nutrition and hydration status  - Monitor labs   - Assess for incontinence   - Turn and reposition patient  - Assist with mobility/ambulation  - Relieve pressure over bony prominences  - Avoid friction and shearing  - Provide appropriate hygiene as needed including keeping skin clean and dry  - Evaluate need for skin moisturizer/barrier cream  - Collaborate with interdisciplinary team   - Patient/family teaching  - Consider wound care consult   Outcome: Progressing     Problem: Potential for Falls  Goal: Patient will remain free of falls  Description: INTERVENTIONS:  - Assess patient frequently for physical needs  -  Identify cognitive and physical deficits and behaviors that affect risk of falls    -  [place] fall precautions as indicated by assessment   - Educate patient/family on patient safety including physical limitations  - Instruct patient to call for assistance with activity based on assessment  - Modify environment to reduce risk of injury  - Consider OT/PT consult to assist with strengthening/mobility  Outcome: Progressing     Problem: METABOLIC, FLUID AND ELECTROLYTES - ADULT  Goal: Electrolytes maintained within normal limits  Description: INTERVENTIONS:  - Monitor labs and assess patient for signs and symptoms of electrolyte imbalances  - Administer electrolyte replacement as ordered  - Monitor response to electrolyte replacements, including repeat lab results as appropriate  - Instruct patient on fluid and nutrition as appropriate  Outcome: Progressing     Problem: SKIN/TISSUE INTEGRITY - ADULT  Goal: Skin integrity remains intact  Description: INTERVENTIONS  - Identify patients at risk for skin breakdown  - Assess and monitor skin integrity  - Assess and monitor nutrition and hydration status  - Monitor labs (i e  albumin)  - Assess for incontinence   - Turn and reposition patient  - Assist with mobility/ambulation  - Relieve pressure over bony prominences  - Avoid friction and shearing  - Provide appropriate hygiene as needed including keeping skin clean and dry  - Evaluate need for skin moisturizer/barrier cream  - Collaborate with interdisciplinary team (i e  Nutrition, Rehabilitation, etc )   - Patient/family teaching  Outcome: Progressing     Problem: MUSCULOSKELETAL - ADULT  Goal: Maintain or return mobility to safest level of function  Description: INTERVENTIONS:  - Assess patient's ability to carry out ADLs; assess patient's baseline for ADL function and identify physical deficits which impact ability to perform ADLs (bathing, care of mouth/teeth, toileting, grooming, dressing, etc )  - Assess/evaluate cause of self-care deficits   - Assess range of motion  - Assess patient's mobility  - Assess patient's need for assistive devices and provide as appropriate  - Encourage maximum independence but intervene and supervise when necessary  - Involve family in performance of ADLs  - Assess for home care needs following discharge   - Consider OT consult to assist with ADL evaluation and planning for discharge  - Provide patient education as appropriate  Outcome: Progressing     Problem: PAIN - ADULT  Goal: Verbalizes/displays adequate comfort level or baseline comfort level  Description: Interventions:  - Encourage patient to monitor pain and request assistance  - Assess pain using appropriate pain scale  - Administer analgesics based on type and severity of pain and evaluate response  - Implement non-pharmacological measures as appropriate and evaluate response  - Consider cultural and social influences on pain and pain management  - Notify physician/advanced practitioner if interventions unsuccessful or patient reports new pain  Outcome: Progressing Problem: INFECTION - ADULT  Goal: Absence or prevention of progression during hospitalization  Description: INTERVENTIONS:  - Assess and monitor for signs and symptoms of infection  - Monitor lab/diagnostic results  - Monitor all insertion sites, i e  indwelling lines, tubes, and drains  - Monitor endotracheal if appropriate and nasal secretions for changes in amount and color  - [place] appropriate cooling/warming therapies per order  - Administer medications as ordered  - Instruct and encourage patient and family to use good hand hygiene technique  - Identify and instruct in appropriate isolation precautions for identified infection/condition  Outcome: Progressing     Problem: SAFETY ADULT  Goal: Patient will remain free of falls  Description: INTERVENTIONS:  - Assess patient frequently for physical needs  -  Identify cognitive and physical deficits and behaviors that affect risk of falls    -  [place] fall precautions as indicated by assessment   - Educate patient/family on patient safety including physical limitations  - Instruct patient to call for assistance with activity based on assessment  - Modify environment to reduce risk of injury  - Consider OT/PT consult to assist with strengthening/mobility  Outcome: Progressing  Goal: Maintain or return to baseline ADL function  Description: INTERVENTIONS:  -  Assess patient's ability to carry out ADLs; assess patient's baseline for ADL function and identify physical deficits which impact ability to perform ADLs (bathing, care of mouth/teeth, toileting, grooming, dressing, etc )  - Assess/evaluate cause of self-care deficits   - Assess range of motion  - Assess patient's mobility; develop plan if impaired  - Assess patient's need for assistive devices and provide as appropriate  - Encourage maximum independence but intervene and supervise when necessary  - Involve family in performance of ADLs  - Assess for home care needs following discharge   - Consider OT consult to assist with ADL evaluation and planning for discharge  - Provide patient education as appropriate  Outcome: Progressing  Goal: Maintain or return mobility status to optimal level  Description: INTERVENTIONS:  - Assess patient's baseline mobility status (ambulation, transfers, stairs, etc )    - Identify cognitive and physical deficits and behaviors that affect mobility  - Identify mobility aids required to assist with transfers and/or ambulation (gait belt, sit-to-stand, lift, walker, cane, etc )  - [place] fall precautions as indicated by assessment  - Record patient progress and toleration of activity level on Mobility SBAR; progress patient to next Phase/Stage  - Instruct patient to call for assistance with activity based on assessment  - Consider rehabilitation consult to assist with strengthening/weightbearing, etc   Outcome: Progressing     Problem: DISCHARGE PLANNING  Goal: Discharge to home or other facility with appropriate resources  Description: INTERVENTIONS:  - Identify barriers to discharge w/patient and caregiver  - Arrange for needed discharge resources and transportation as appropriate  - Identify discharge learning needs (meds, wound care, etc )  - Arrange for interpretive services to assist at discharge as needed  - Refer to Case Management Department for coordinating discharge planning if the patient needs post-hospital services based on physician/advanced practitioner order or complex needs related to functional status, cognitive ability, or social support system  Outcome: Progressing     Problem: Knowledge Deficit  Goal: Patient/family/caregiver demonstrates understanding of disease process, treatment plan, medications, and discharge instructions  Description: Complete learning assessment and assess knowledge base    Interventions:  - Provide teaching at level of understanding  - Provide teaching via preferred learning methods  Outcome: Progressing     Problem: Nutrition/Hydration-ADULT  Goal: Nutrient/Hydration intake appropriate for improving, restoring or maintaining nutritional needs  Description: Monitor and assess patient's nutrition/hydration status for malnutrition  Collaborate with interdisciplinary team and initiate plan and interventions as ordered  Monitor patient's weight and dietary intake as ordered or per policy  Utilize nutrition screening tool and intervene as necessary  Determine patient's food preferences and provide high-protein, high-caloric foods as appropriate       INTERVENTIONS:  - Monitor oral intake, urinary output, labs, and treatment plans  - Assess nutrition and hydration status and recommend course of action  - Evaluate amount of meals eaten  - Assist patient with eating if necessary   - Allow adequate time for meals  - Recommend/ encourage appropriate diets, oral nutritional supplements, and vitamin/mineral supplements  - Order, calculate, and assess calorie counts as needed  - Recommend, monitor, and adjust tube feedings and TPN/PPN based on assessed needs  - Assess need for intravenous fluids  - Provide specific nutrition/hydration education as appropriate  - Include patient/family/caregiver in decisions related to nutrition  Outcome: Progressing

## 2021-02-11 NOTE — CASE MANAGEMENT
CM continues to work on SNF placement for pt  CM sent message to [facility] via [address] to check bed availability They had previously reported they may have a bed for pt Thursday or Friday  Pt will need auth and transport  CM will continue to follow

## 2021-02-11 NOTE — NURSING NOTE
pts requesting to talk to her daughter, called placed and daughter transfer in to talk to her mother, no other changes in the pts assess at this time, presently in bed in no acute distress eating dinner, callbell in reach of the pt, bed alarm on and attached to callbell system

## 2021-02-11 NOTE — PROGRESS NOTES
Progress Note - Jesi Blackmon 1935, 81 y o  female MRN: [id number]    Unit/Bed#: -01 Encounter: [id number]    Primary Care Provider: Jerry Grissom MD   Date and time admitted to hospital: 2/3/2021 12:53 PM        * Generalized weakness  Assessment & Plan  · Generalized weakness recently in rehab secondary to osteoarthritis  · Awaiting SNF placement        Cognitive impairment  Assessment & Plan  · Suspected underlying dementia without formal diagnosis  · Supportive care     Close exposure to COVID-19 virus  Assessment & Plan  · Close exposure to COVID-19 during hospitalization  · Currently on airborne precautions   · Will continue with vitamin supplement  Patient does not require any treatment at this time as her COVID test has been negative  Oxygen requirement can also be inactivity and combination of CHF  · The patient will require total 14 day from the exposure day from 2/3/2021     Lab Results   Component Value Date    SARSCOV2 Negative 02/08/2021    SARSCOV2 Negative 02/04/2021    SARSCOV2 Negative 02/03/2021    SARSCOV2 Negative 08/21/2020    SARSCOV2 Negative 08/17/2020       Visual hallucinations  Assessment & Plan  · Visual hallucinations without psychiatric history  Likely secondary to delirium  · Appreciate psychiatry evaluation  Sertraline increased to 75 mg    · Added quetiapine q h s  to help regulate sleep/wake cycle   · Resolved- but patient remains confused, combative at times but able to redirect     OAB (overactive bladder)  Assessment & Plan  · continue oxybutynin       Essential hypertension  Assessment & Plan  · continue metoprolol and diltiazem  · Monitor blood pressure closely       Chronic diastolic heart failure (HCC)  Assessment & Plan  Wt Readings from Last 3 Encounters:   02/11/21 51 6 kg (113 lb 12 1 oz)   08/21/20 60 7 kg (133 lb 13 1 oz)   08/17/20 59 kg (130 lb 1 1 oz)     · Chronic diastolic CHF stable on furosemide 40 mg as needed  · Does not appear to be in acute exacerbation  · Unclear why the patient is requiring oxygen supplement at this time; chest x-ray mild pulmonary vascular congestion  · Received 1 dose of IV Lasix    started the patient on p o  Lasix 20 mg daily  · BNP 1034    Longstanding persistent atrial fibrillation (HCC)  Assessment & Plan  · diltiazem metoprolol and anticoagulated with eliquis         VTE Prophylaxis:  Apixaban (Eliquis)    Patient Centered Rounds: I have performed bedside rounds with nursing staff today  Discussions with Specialists or Other Care Team Provider: nursing, CM, PT/OT  Education and Discussions with Family / Patient: patient and daughter     Current Length of Stay: 8 day(s)    Current Patient Status: Inpatient   Certification Statement: The patient will continue to require additional inpatient hospital stay due to generalized weakness    Discharge Plan: pending discharge disposition    Code Status: Level 3 - DNAR and DNI    Subjective:   Unable to assess  Objective:     Vitals:   Temp (24hrs), Av 4 °F (36 3 °C), Min:97 °F (36 1 °C), Max:97 8 °F (36 6 °C)    Temp:  [97 °F (36 1 °C)-97 8 °F (36 6 °C)] 97 8 °F (36 6 °C)  HR:  [61-99] 99  Resp:  [18-20] 20  BP: (100-130)/(60-76) 130/60  SpO2:  [92 %-99 %] 99 %  Body mass index is 22 98 kg/m²  Input and Output Summary (last 24 hours): Intake/Output Summary (Last 24 hours) at 2021 1533  Last data filed at 2021 1239  Gross per 24 hour   Intake 680 ml   Output 200 ml   Net 480 ml       Physical Exam:   Physical Exam  Vitals signs and nursing note reviewed  Constitutional:       General: She is not in acute distress  Appearance: Normal appearance  Comments: Chronically ill appearing     HENT:      Head: Normocephalic and atraumatic  Right Ear: External ear normal       Left Ear: External ear normal       Nose: Nose normal  No rhinorrhea  Mouth/Throat:      Mouth: Mucous membranes are moist       Pharynx: Oropharynx is clear  Eyes:      General:         Right eye: No discharge  Left eye: No discharge  Pupils: Pupils are equal, round, and reactive to light  Neck:      Musculoskeletal: Normal range of motion and neck supple  No muscular tenderness  Cardiovascular:      Rate and Rhythm: Normal rate and regular rhythm  Pulses: Normal pulses  Heart sounds: Normal heart sounds  No murmur  Pulmonary:      Effort: Pulmonary effort is normal  No respiratory distress  Breath sounds: Normal breath sounds  Abdominal:      General: Bowel sounds are normal  There is no distension  Palpations: Abdomen is soft  There is no mass  Tenderness: There is no abdominal tenderness  Musculoskeletal: Normal range of motion  General: No swelling or tenderness  Skin:     General: Skin is warm and dry  Capillary Refill: Capillary refill takes less than 2 seconds  Findings: No erythema or rash  Neurological:      General: No focal deficit present  Mental Status: She is alert and oriented to person, place, and time  Mental status is at baseline  Psychiatric:         Mood and Affect: Mood normal          Behavior: Behavior normal          Thought Content: Thought content normal          Judgment: Judgment normal          Additional Data:     Labs:    Results from last 7 days   Lab Units 02/10/21  0530  02/07/21  0505   WBC Thousand/uL 6 80   < > 7 40   HEMOGLOBIN g/dL 13 2   < > 14 5   HEMATOCRIT % 39 6*   < > 43 9   PLATELETS Thousands/uL 233   < > 272   NEUTROS PCT %  --   --  73   LYMPHS PCT %  --   --  14*   MONOS PCT %  --   --  10   EOS PCT %  --   --  2    < > = values in this interval not displayed       Results from last 7 days   Lab Units 02/10/21  0530  02/07/21  0506   SODIUM mmol/L 136   < > 134   POTASSIUM mmol/L 3 4*   < > 3 4*   CHLORIDE mmol/L 95*   < > 98   CO2 mmol/L 31   < > 26   BUN mg/dL 23   < > 16   CREATININE mg/dL 0 70   < > 0 71   CALCIUM mg/dL 10 0   < > 10 1   ALK PHOS U/L  --   --  72   ALT U/L  --   --  9   AST U/L  --   --  16    < > = values in this interval not displayed  * I Have Reviewed All Lab Data Listed Above  * Additional Pertinent Lab Tests Reviewed: Alex 66 Admission  Reviewed    Imaging:  Imaging Reports Reviewed Today Include: n/a     Recent Cultures (last 7 days):           Last 24 Hours Medication List:   Current Facility-Administered Medications   Medication Dose Route Frequency Provider Last Rate    acetaminophen  650 mg Oral Q6H PRN Monserrat Scott MD      apixaban  2 5 mg Oral BID Monserrat Scott MD      ascorbic acid  1,000 mg Oral Q12H [facility] Veronica Nation PA-C      cholecalciferol  2,000 Units Oral Daily Veronica Nation PA-C      diltiazem  90 mg Oral [facility] Monserrat Scott MD      famotidine  20 mg Oral Daily Veronica Nation PA-C      furosemide  20 mg Oral Early Morning NEAL Yuen      hydrOXYzine HCL  10 mg Oral BID PRN Monserrat Scott MD      metoprolol tartrate  25 mg Oral BID Monserrat Scott MD      ondansetron  4 mg Intravenous Q6H PRN Monserrat Scott MD      oxybutynin  5 mg Oral Daily Monserrat Scott MD      pantoprazole  40 mg Oral Early Morning Monserrat Scott MD      potassium chloride  10 mEq Oral Daily NEAL Yuen      potassium chloride  20 mEq Oral Once NEAL Yuen      pravastatin  40 mg Oral Daily With Danyell Wood MD      QUEtiapine  25 mg Oral HS Olu Frank DO      sertraline  75 mg Oral Daily Corrie Mcmillan PA-C      zinc sulfate  220 mg Oral Daily Veronica Nation PA-C          Today, Patient Was Seen By: NEAL Yuen    ** Please Note: Dictation voice to text software may have been used in the creation of this document   **

## 2021-02-12 LAB
ANION GAP SERPL CALCULATED.3IONS-SCNC: 8 MMOL/L (ref 4–13)
BUN SERPL-MCNC: 24 MG/DL (ref 7–25)
CALCIUM SERPL-MCNC: 9.6 MG/DL (ref 8.6–10.5)
CHLORIDE SERPL-SCNC: 100 MMOL/L (ref 98–107)
CO2 SERPL-SCNC: 32 MMOL/L (ref 21–31)
CREAT SERPL-MCNC: 0.65 MG/DL (ref 0.6–1.2)
GFR SERPL CREATININE-BSD FRML MDRD: 81 ML/MIN/1.73SQ M
GLUCOSE SERPL-MCNC: 92 MG/DL (ref 65–99)
GLUCOSE SERPL-MCNC: 96 MG/DL (ref 65–140)
MAGNESIUM SERPL-MCNC: 2.1 MG/DL (ref 1.9–2.7)
POTASSIUM SERPL-SCNC: 3.9 MMOL/L (ref 3.5–5.5)
SODIUM SERPL-SCNC: 140 MMOL/L (ref 134–143)

## 2021-02-12 PROCEDURE — 99232 SBSQ HOSP IP/OBS MODERATE 35: CPT | Performed by: NURSE PRACTITIONER

## 2021-02-12 PROCEDURE — 80048 BASIC METABOLIC PNL TOTAL CA: CPT | Performed by: NURSE PRACTITIONER

## 2021-02-12 PROCEDURE — 83735 ASSAY OF MAGNESIUM: CPT | Performed by: NURSE PRACTITIONER

## 2021-02-12 PROCEDURE — 82948 REAGENT STRIP/BLOOD GLUCOSE: CPT

## 2021-02-12 PROCEDURE — 97530 THERAPEUTIC ACTIVITIES: CPT

## 2021-02-12 RX ADMIN — METOPROLOL TARTRATE 25 MG: 25 TABLET, FILM COATED ORAL at 17:16

## 2021-02-12 RX ADMIN — PRAVASTATIN SODIUM 40 MG: 40 TABLET ORAL at 16:08

## 2021-02-12 RX ADMIN — POTASSIUM CHLORIDE 10 MEQ: 750 TABLET, EXTENDED RELEASE ORAL at 10:22

## 2021-02-12 RX ADMIN — Medication 2000 UNITS: at 10:22

## 2021-02-12 RX ADMIN — ZINC SULFATE 220 MG (50 MG) CAPSULE 220 MG: CAPSULE at 10:22

## 2021-02-12 RX ADMIN — ACETAMINOPHEN 650 MG: 325 TABLET ORAL at 23:00

## 2021-02-12 RX ADMIN — FUROSEMIDE 20 MG: 20 TABLET ORAL at 06:20

## 2021-02-12 RX ADMIN — APIXABAN 2.5 MG: 2.5 TABLET, FILM COATED ORAL at 10:22

## 2021-02-12 RX ADMIN — PANTOPRAZOLE SODIUM 40 MG: 40 TABLET, DELAYED RELEASE ORAL at 06:19

## 2021-02-12 RX ADMIN — FAMOTIDINE 20 MG: 20 TABLET ORAL at 10:23

## 2021-02-12 RX ADMIN — DILTIAZEM HYDROCHLORIDE 90 MG: 60 TABLET, FILM COATED ORAL at 06:19

## 2021-02-12 RX ADMIN — OXYCODONE HYDROCHLORIDE AND ACETAMINOPHEN 1000 MG: 500 TABLET ORAL at 10:22

## 2021-02-12 RX ADMIN — OXYBUTYNIN CHLORIDE 5 MG: 5 TABLET ORAL at 10:22

## 2021-02-12 RX ADMIN — SERTRALINE HYDROCHLORIDE 75 MG: 50 TABLET ORAL at 10:22

## 2021-02-12 RX ADMIN — METOPROLOL TARTRATE 25 MG: 25 TABLET, FILM COATED ORAL at 10:22

## 2021-02-12 RX ADMIN — OXYCODONE HYDROCHLORIDE AND ACETAMINOPHEN 1000 MG: 500 TABLET ORAL at 21:01

## 2021-02-12 RX ADMIN — QUETIAPINE FUMARATE 25 MG: 25 TABLET ORAL at 21:01

## 2021-02-12 RX ADMIN — DILTIAZEM HYDROCHLORIDE 90 MG: 60 TABLET, FILM COATED ORAL at 16:08

## 2021-02-12 RX ADMIN — DILTIAZEM HYDROCHLORIDE 90 MG: 60 TABLET, FILM COATED ORAL at 21:01

## 2021-02-12 RX ADMIN — APIXABAN 2.5 MG: 2.5 TABLET, FILM COATED ORAL at 17:16

## 2021-02-12 NOTE — PLAN OF CARE
Problem: PHYSICAL THERAPY ADULT  Goal: Performs mobility at highest level of function for planned discharge setting  See evaluation for individualized goals  Description: Treatment/Interventions: Functional transfer training, LE strengthening/ROM, Therapeutic exercise, Endurance training, Patient/family training, Equipment eval/education, Bed mobility, Gait training, Spoke to nursing, Spoke to case management  Equipment Recommended: (continue RW trial)       See flowsheet documentation for full assessment, interventions and recommendations  Outcome: Progressing  Note: Prognosis: Fair  Problem List: Decreased strength, Decreased endurance, Impaired balance, Decreased mobility, Decreased safety awareness, Impaired judgement, Decreased cognition, Decreased coordination  Assessment: Pt seen for PT treatment session this date with interventions consisting of therapeutic activity consisting of training: bed mobility, supine<>sit transfers, sit<>stand transfers, static sitting tolerance at EOB for 2 minutes w/ B UE support, static standing tolerance for <1 minutes w/ B UE support, vc and tactile cues for static sitting posture faciliation, vc and tactile cues for static standing posture faciliation, stand pivot transfers towards B direction and BSC usage  Pt agreeable to PT treatment session upon arrival, pt found supine in bed w/ HOB elevated, A&O x 1  In comparison to previous session, pt with improvements in OOB tasks, cooperative  Post session: pt returned BTB, bed alarm engaged, all needs in reach and RN notified of session findings/recommendations Continue to recommend STR at time of d/c in order to maximize pt's functional independence and safety w/ mobility  Pt continues to be functioning below baseline level, and remains limited 2* factors listed above and including weakness, impaired balance, impaired cognition, gait deviations   PT will continue to see pt while here in order to address the deficits listed above and provide interventions consistent w/ POC in effort to achieve STGs  Barriers to Discharge: Inaccessible home environment, Decreased caregiver support, Other (Comment)  Barriers to Discharge Comments: Inability to safely advance functional tasks re:cognitive status  PT Discharge Recommendation: Post-Acute Rehabilitation Services(continued recommendation)     PT - OK to Discharge: Yes(if medically stable, to PAR)    See flowsheet documentation for full assessment

## 2021-02-12 NOTE — CASE MANAGEMENT
SNF bedsearch in [facility] has been exhausted, no beds available  CM spoke with pt daughter Mar Copping via phone [phone] to discuss same  Daughter states she does not want pt going out of the area now  She is now not agreeable to [id number] [facility]  She is going to speak with sister and brother and they will likely now take pt home with [facility]  She will call CM back after speaking with them and give their decision  IMM reviewed with daughter and copy mailed to home  CM to follow

## 2021-02-12 NOTE — NURSING NOTE
Patient set off bed alarm  Patient found sitting up at side of bed  Confused at this time  Patient reoriented  Patient incontinent of urine and assisted to bedside commode to void additional clear armand urine  Bed alarm reactivated  O2 sat on RA remains oin the upper 90's  Patient offering no complaints

## 2021-02-12 NOTE — PROGRESS NOTES
Progress Note - Shanelle Mg 1935, 81 y o  female MRN: [id number]    Unit/Bed#: -01 Encounter: [id number]    Primary Care Provider: Emerald Arnold MD   Date and time admitted to hospital: 2/3/2021 12:53 PM        * Generalized weakness  Assessment & Plan  · Generalized weakness recently in rehab secondary to osteoarthritis  · Awaiting SNF placement         Cognitive impairment  Assessment & Plan  · Suspected underlying dementia without formal diagnosis  · Supportive care      Close exposure to COVID-19 virus  Assessment & Plan  · Close exposure to COVID-19 during hospitalization  · Currently on airborne precautions   · Will continue with vitamin supplement  Patient does not require any treatment at this time as her COVID test has been negative  Oxygen requirement can also be inactivity and combination of CHF  · The patient will require total 14 day from the exposure day from 2/3/2021      Lab Results   Component Value Date    SARSCOV2 Negative 02/08/2021    SARSCOV2 Negative 02/04/2021    SARSCOV2 Negative 02/03/2021    SARSCOV2 Negative 08/21/2020    SARSCOV2 Negative 08/17/2020       Visual hallucinations  Assessment & Plan  · Visual hallucinations without psychiatric history  Likely secondary to delirium  · Appreciate psychiatry evaluation  Sertraline increased to 75 mg    · Added quetiapine q h s  to help regulate sleep/wake cycle   · Resolved- but patient remains confused, combative at times but able to redirect      OAB (overactive bladder)  Assessment & Plan  · continue oxybutynin        Essential hypertension  Assessment & Plan  · continue metoprolol and diltiazem  · Monitor blood pressure closely        Acute on chronic diastolic heart failure [facility])  Assessment & Plan  Wt Readings from Last 3 Encounters:   02/12/21 51 6 kg (113 lb 12 1 oz)   08/21/20 60 7 kg (133 lb 13 1 oz)   08/17/20 59 kg (130 lb 1 1 oz)     · Mild exacerbation of diastolic CHF- acute exacerbation is resolved · chest x-ray mild pulmonary vascular congestion  · The patient require 1 L of oxygen supplement but able to titrated off to room air  · Received 1 dose of IV Lasix    started the patient on p o  Lasix 20 mg daily  · BNP 1034  · This is resolved    Longstanding persistent atrial fibrillation (HCC)  Assessment & Plan  · diltiazem metoprolol and anticoagulated with eliquis            VTE Prophylaxis:  Apixaban (Eliquis)    Patient Centered Rounds: I have performed bedside rounds with nursing staff today  Discussions with Specialists or Other Care Team Provider:  Nursing, case management, PT/OT  Education and Discussions with Family / Patient:  Patient and daughter    Current Length of Stay: 5 day(s)    Current Patient Status: Inpatient   Certification Statement: The patient will continue to require additional inpatient hospital stay due to Generalized weakness    Discharge Plan:  Pending discharge disposition    Code Status: Level 3 - DNAR and DNI    Subjective:   States feeling okay  Unable to obtain further information from the patient  Per staff the patient has not been combative  Remain confused  Objective:     Vitals:   Temp (24hrs), Av 3 °F (36 8 °C), Min:97 8 °F (36 6 °C), Max:99 °F (37 2 °C)    Temp:  [97 8 °F (36 6 °C)-99 °F (37 2 °C)] 98 °F (36 7 °C)  HR:  [74-77] 77  Resp:  [16] 16  BP: (120-126)/(61-74) 120/70  SpO2:  [95 %-98 %] 98 %  Body mass index is 22 98 kg/m²  Input and Output Summary (last 24 hours):     No intake or output data in the 24 hours ending 21    Physical Exam:   Physical Exam  Vitals signs and nursing note reviewed  Constitutional:       General: She is not in acute distress  Appearance: Normal appearance  She is ill-appearing (chronically)  HENT:      Head: Normocephalic and atraumatic  Right Ear: External ear normal       Left Ear: External ear normal       Nose: Nose normal  No rhinorrhea        Mouth/Throat:      Mouth: Mucous membranes are moist       Pharynx: Oropharynx is clear  Eyes:      General:         Right eye: No discharge  Left eye: No discharge  Pupils: Pupils are equal, round, and reactive to light  Neck:      Musculoskeletal: Normal range of motion and neck supple  No muscular tenderness  Cardiovascular:      Rate and Rhythm: Normal rate and regular rhythm  Pulses: Normal pulses  Heart sounds: Normal heart sounds  No murmur  Pulmonary:      Effort: Pulmonary effort is normal  No respiratory distress  Breath sounds: Normal breath sounds  Abdominal:      General: Bowel sounds are normal  There is no distension  Palpations: Abdomen is soft  There is no mass  Tenderness: There is no abdominal tenderness  Musculoskeletal: Normal range of motion  General: No swelling or tenderness  Skin:     General: Skin is warm and dry  Capillary Refill: Capillary refill takes less than 2 seconds  Findings: No erythema or rash  Neurological:      General: No focal deficit present  Mental Status: She is alert  Mental status is at baseline  Cranial Nerves: No cranial nerve deficit  Psychiatric:         Mood and Affect: Mood normal          Behavior: Behavior normal          Additional Data:     Labs:    Results from last 7 days   Lab Units 02/10/21  0530  02/07/21  0505   WBC Thousand/uL 6 80   < > 7 40   HEMOGLOBIN g/dL 13 2   < > 14 5   HEMATOCRIT % 39 6*   < > 43 9   PLATELETS Thousands/uL 233   < > 272   NEUTROS PCT %  --   --  73   LYMPHS PCT %  --   --  14*   MONOS PCT %  --   --  10   EOS PCT %  --   --  2    < > = values in this interval not displayed       Results from last 7 days   Lab Units 02/12/21  0640  02/07/21  0506   SODIUM mmol/L 140   < > 134   POTASSIUM mmol/L 3 9   < > 3 4*   CHLORIDE mmol/L 100   < > 98   CO2 mmol/L 32*   < > 26   BUN mg/dL 24   < > 16   CREATININE mg/dL 0 65   < > 0 71   CALCIUM mg/dL 9 6   < > 10 1   ALK PHOS U/L  --   --  72   ALT U/L --   --  9   AST U/L  --   --  16    < > = values in this interval not displayed  Results from last 7 days   Lab Units 02/12/21  0636   POC GLUCOSE mg/dl 96           * I Have Reviewed All Lab Data Listed Above  * Additional Pertinent Lab Tests Reviewed: Alex 66 Admission  Reviewed    Imaging:  Imaging Reports Reviewed Today Include: n/a    Recent Cultures (last 7 days):           Last 24 Hours Medication List:   Current Facility-Administered Medications   Medication Dose Route Frequency Provider Last Rate    acetaminophen  650 mg Oral Q6H PRN Raya Meeks MD      apixaban  2 5 mg Oral BID Raya Meeks, MD      ascorbic acid  1,000 mg Oral Q12H [address] Oneta Camera, PA-C      cholecalciferol  2,000 Units Oral Daily Oneta Camera, PA-C      diltiazem  90 mg Oral [facility] Raya Meeks MD      famotidine  20 mg Oral Daily Oneta Camera, PA-C      furosemide  20 mg Oral Early Morning NEAL Elder      hydrOXYzine HCL  10 mg Oral BID PRN Raya Meeks MD      metoprolol tartrate  25 mg Oral BID Raya Meeks, MD      ondansetron  4 mg Intravenous Q6H PRN Raya Meeks, MD      oxybutynin  5 mg Oral Daily Raya Meeks MD      pantoprazole  40 mg Oral Early Morning Raya Meeks, MD      potassium chloride  10 mEq Oral Daily NEAL Elder      pravastatin  40 mg Oral Daily With Roger Quinones MD      QUEtiapine  25 mg Oral HS Olu Frank,       sertraline  75 mg Oral Daily Juris Eddi, PA-C      zinc sulfate  220 mg Oral Daily Oneta Camera, PA-C          Today, Patient Was Seen By: NEAL Elder    ** Please Note: Dictation voice to text software may have been used in the creation of this document   **

## 2021-02-12 NOTE — PHYSICAL THERAPY NOTE
Physical Therapy Treatment Session Note    Patient's Name: Melissa Mathews    Admitting Diagnosis  Weakness generalized [R53 1]  Weakness [R53 1]    Problem List  Patient Active Problem List   Diagnosis    CAD (coronary artery disease)    Mitral regurgitation    Longstanding persistent atrial fibrillation (HCC)    Chronic diastolic heart failure ([facility] [address] )    Essential hypertension    Acute hypokalemia    Depression with anxiety    Gastroesophageal reflux disease without esophagitis    OAB (overactive bladder)    Hypomagnesemia    Mixed hyperlipidemia    Osteoarthritis of knee    Pneumonia    Generalized weakness    Dizziness    Chest wall contusion, right, initial encounter    Ambulatory dysfunction    Hypokalemia    Liver lesion    Rib fractures    CKD (chronic kidney disease), stage II    Pulmonary hypertension (HCC)    Visual hallucinations    Close exposure to COVID-19 virus    Cognitive impairment       Past Medical History  Past Medical History:   Diagnosis Date    Acute on chronic diastolic congestive heart failure ([facility] [address] ) 6/27/2019    Ambulatory dysfunction 8/17/2020    Arthritis     Chest wall contusion, right, initial encounter 8/17/2020    Chest wall contusion, right, subsequent encounter 8/17/2020    Chronic diastolic heart failure (HCC)     Chronic kidney disease, stage 2 (mild)     Coronary artery disease     history of stenting    Eczema     years    Edema     History of echocardiogram 07/20/2017    EF 55%, mild concentric LVH, bileaflet MVP with moderate MR, left atrial enlargement      History of transfusion     Hyperlipidemia     Hypertension     Hypo-osmolality and hyponatremia     Hypokalemia 7/11/2019    Mitral regurgitation        Past Surgical History  Past Surgical History:   Procedure Laterality Date    ABDOMINAL SURGERY      hysterectomy    CARDIAC CATHETERIZATION  04/10/2012    EF 65%, no significant CAD, patent stents, severe MR     CORONARY ANGIOPLASTY WITH STENT PLACEMENT  03/21/2007    EF 55%, GARRET to LAD   EYE SURGERY      b/l cataracts    HYSTERECTOMY      JOINT REPLACEMENT      Rt knee        02/12/21 1043   PT Last Visit   PT Visit Date 02/12/21   Note Type   Note Type Treatment   Pain Assessment   Pain Assessment Tool FLACC   Pain Rating: FLACC (Rest) - Face 0   Pain Rating: FLACC (Rest) - Legs 0   Pain Rating: FLACC (Rest) - Activity 0   Pain Rating: FLACC (Rest) - Cry 0   Pain Rating: FLACC (Rest) - Consolability 0   Score: FLACC (Rest) 0   Pain Rating: FLACC (Activity) - Face 0   Pain Rating: FLACC (Activity) - Legs 0   Pain Rating: FLACC (Activity) - Activity 0   Pain Rating: FLACC (Activity) - Cry 0   Pain Rating: FLACC (Activity) - Consolability 0   Score: FLACC (Activity) 0   Restrictions/Precautions   Weight Bearing Precautions Per Order No   Other Precautions Contact/isolation; Airborne/isolation;Cognitive; Bed Alarm; Fall Risk   General   Chart Reviewed Yes   Response to Previous Treatment Patient unable to report, no changes reported from family or staff   Family/Caregiver Present No   Cognition   Overall Cognitive Status Impaired   Arousal/Participation Alert; Responsive; Cooperative   Attention Attends with cues to redirect   Orientation Level Oriented to person;Disoriented to place; Disoriented to time;Disoriented to situation   Memory Decreased recall of precautions   Following Commands Follows one step commands with increased time or repetition   Comments Pt  agreeable to PT tx session,pleasant  Subjective   Subjective "I have to pee"   Bed Mobility   Rolling R 4  Minimal assistance   Additional items Assist x 1;Bedrails; Increased time required;Verbal cues;LE management   Rolling L 4  Minimal assistance   Additional items Assist x 1;Bedrails; Increased time required;Verbal cues;LE management   Supine to Sit 3  Moderate assistance   Additional items Assist x 1;HOB elevated; Bedrails; Increased time required;Verbal cues   Sit to Supine 3  Moderate assistance   Additional items Assist x 1;Bedrails; Increased time required;Verbal cues;LE management   Additional Comments Pt  denied lightheadedness/dizziness w/positional change  Transfers   Sit to Stand 3  Moderate assistance   Additional items Assist x 1;Bedrails; Increased time required;Verbal cues   Stand to Sit 3  Moderate assistance   Additional items Assist x 1;Bedrails; Increased time required;Verbal cues   Stand pivot 3  Moderate assistance   Additional items Assist x 1; Increased time required;Verbal cues;Armrests   Toilet transfer 3  Moderate assistance   Additional items Assist x 1; Armrests; Increased time required;Verbal cues; Commode;Raised toilet seat   Ambulation/Elevation   Gait pattern Improper Weight shift;Narrow FILOMENA; Forward Flexion;Decreased foot clearance; Short stride; Inconsistent judy   Gait Assistance 3  Moderate assist   Additional items Assist x 1;Verbal cues; Tactile cues   Assistive Device Other (Comment)  (HHA/postural support)   Distance 4 feet  (2 feet x 2 bedside<->BSC)   Stair Management Assistance Not tested   Balance   Static Sitting Fair -   Dynamic Sitting Poor +   Static Standing Poor +   Dynamic Standing Poor   Ambulatory Poor   Endurance Deficit   Endurance Deficit Yes   Activity Tolerance   Activity Tolerance Patient limited by fatigue   Nurse Made Aware Yes,nursing staff was informed of tx session outcome  Verdiimmanuel Dillard was also informed of tx outcome  Assessment   Prognosis Fair   Problem List Decreased strength;Decreased endurance; Impaired balance;Decreased mobility; Decreased safety awareness; Impaired judgement;Decreased cognition;Decreased coordination   Assessment Pt seen for PT treatment session this date with interventions consisting of therapeutic activity consisting of training: bed mobility, supine<>sit transfers, sit<>stand transfers, static sitting tolerance at EOB for 2 minutes w/ B UE support, static standing tolerance for <1 minutes w/ B UE support, vc and tactile cues for static sitting posture faciliation, vc and tactile cues for static standing posture faciliation, stand pivot transfers towards B direction and BSC usage  Pt agreeable to PT treatment session upon arrival, pt found supine in bed w/ HOB elevated, A&O x 1  In comparison to previous session, pt with improvements in OOB tasks, cooperative  Post session: pt returned BTB, bed alarm engaged, all needs in reach and RN notified of session findings/recommendations Continue to recommend STR at time of d/c in order to maximize pt's functional independence and safety w/ mobility  Pt continues to be functioning below baseline level, and remains limited 2* factors listed above and including weakness, impaired balance, impaired cognition, gait deviations  PT will continue to see pt while here in order to address the deficits listed above and provide interventions consistent w/ POC in effort to achieve STGs  Barriers to Discharge Inaccessible home environment;Decreased caregiver support; Other (Comment)   Goals   Patient Goals to get warmer   STG Expiration Date 02/14/21   Short Term Goal #1 STGs remain appropriate   PT Treatment Day 4   Plan   Treatment/Interventions ADL retraining;LE strengthening/ROM; Functional transfer training; Therapeutic exercise; Endurance training;Cognitive reorientation;Patient/family training;Equipment eval/education; Bed mobility;Gait training;Spoke to nursing;Spoke to case management;OT   Progress Slow progress, decreased activity tolerance   PT Frequency Other (Comment)  (3-5x/wk)   Recommendation   PT Discharge Recommendation Post-Acute Rehabilitation Services  (continued recommendation)   PT - OK to Discharge Yes  (if medically stable, to PAR)   Additional Comments Upon conclusion, pt was resting in bed w/bed alarm engaged;all needs within reach     AM-PAC Basic Mobility Inpatient   Turning in Bed Without Bedrails 2   Lying on Back to Sitting on Edge of Flat Bed 2 Moving Bed to Chair 2   Standing Up From Chair 2   Walk in Room 2   Climb 3-5 Stairs 1   Basic Mobility Inpatient Raw Score 11   Basic Mobility Standardized Score 30 25   Turning Head Towards Sound 2   Follow Simple Instructions 2   Low Function Basic Mobility Raw Score 15   Low Function Basic Mobility Standardized Score 23 9     Treatment Time: 6485-0379    James Owens, PT

## 2021-02-12 NOTE — ASSESSMENT & PLAN NOTE
Wt Readings from Last 3 Encounters:   02/12/21 51 6 kg (113 lb 12 1 oz)   08/21/20 60 7 kg (133 lb 13 1 oz)   08/17/20 59 kg (130 lb 1 1 oz)     · Mild exacerbation of diastolic CHF- acute exacerbation is resolved   · chest x-ray mild pulmonary vascular congestion  · The patient require 1 L of oxygen supplement but able to titrated off to room air  · Received 1 dose of IV Lasix 2/9   started the patient on p o  Lasix 20 mg daily     · BNP 1034  · This is resolved

## 2021-02-12 NOTE — CASE MANAGEMENT
CM received call back from pt daughter Omar Rucker  After speaking with her brother and sister they continue to fele they are unable to properly care for pt at home and wish to continue to SNF bedsearch  They are agreeable to expanding bedsearch to 50 mile radius  CM sent referrals to SNFs within 50 mile radius for placement  Pt will need auth and transport once facility located  Chelsea Santacruz from [facility] updated  CM to follow

## 2021-02-13 LAB
ANION GAP SERPL CALCULATED.3IONS-SCNC: 10 MMOL/L (ref 4–13)
BUN SERPL-MCNC: 24 MG/DL (ref 7–25)
CALCIUM SERPL-MCNC: 9.5 MG/DL (ref 8.6–10.5)
CHLORIDE SERPL-SCNC: 96 MMOL/L (ref 98–107)
CO2 SERPL-SCNC: 30 MMOL/L (ref 21–31)
CREAT SERPL-MCNC: 0.72 MG/DL (ref 0.6–1.2)
ERYTHROCYTE [DISTWIDTH] IN BLOOD BY AUTOMATED COUNT: 15.6 % (ref 11.5–14.5)
GFR SERPL CREATININE-BSD FRML MDRD: 77 ML/MIN/1.73SQ M
GLUCOSE SERPL-MCNC: 91 MG/DL (ref 65–99)
HCT VFR BLD AUTO: 41.7 % (ref 42–47)
HGB BLD-MCNC: 13.8 G/DL (ref 12–16)
MCH RBC QN AUTO: 28.6 PG (ref 26–34)
MCHC RBC AUTO-ENTMCNC: 33.1 G/DL (ref 31–37)
MCV RBC AUTO: 86 FL (ref 81–99)
PLATELET # BLD AUTO: 256 THOUSANDS/UL (ref 149–390)
PMV BLD AUTO: 8.5 FL (ref 8.6–11.7)
POTASSIUM SERPL-SCNC: 3.5 MMOL/L (ref 3.5–5.5)
RBC # BLD AUTO: 4.82 MILLION/UL (ref 3.9–5.2)
SODIUM SERPL-SCNC: 136 MMOL/L (ref 134–143)
WBC # BLD AUTO: 7.2 THOUSAND/UL (ref 4.8–10.8)

## 2021-02-13 PROCEDURE — 85027 COMPLETE CBC AUTOMATED: CPT | Performed by: NURSE PRACTITIONER

## 2021-02-13 PROCEDURE — 80048 BASIC METABOLIC PNL TOTAL CA: CPT | Performed by: NURSE PRACTITIONER

## 2021-02-13 PROCEDURE — 99232 SBSQ HOSP IP/OBS MODERATE 35: CPT | Performed by: NURSE PRACTITIONER

## 2021-02-13 RX ORDER — POTASSIUM CHLORIDE 20 MEQ/1
40 TABLET, EXTENDED RELEASE ORAL ONCE
Status: COMPLETED | OUTPATIENT
Start: 2021-02-13 | End: 2021-02-13

## 2021-02-13 RX ADMIN — PRAVASTATIN SODIUM 40 MG: 40 TABLET ORAL at 17:18

## 2021-02-13 RX ADMIN — POTASSIUM CHLORIDE 40 MEQ: 1500 TABLET, EXTENDED RELEASE ORAL at 09:35

## 2021-02-13 RX ADMIN — OXYBUTYNIN CHLORIDE 5 MG: 5 TABLET ORAL at 09:34

## 2021-02-13 RX ADMIN — Medication 2000 UNITS: at 09:34

## 2021-02-13 RX ADMIN — APIXABAN 2.5 MG: 2.5 TABLET, FILM COATED ORAL at 09:35

## 2021-02-13 RX ADMIN — QUETIAPINE FUMARATE 25 MG: 25 TABLET ORAL at 21:40

## 2021-02-13 RX ADMIN — OXYCODONE HYDROCHLORIDE AND ACETAMINOPHEN 1000 MG: 500 TABLET ORAL at 09:34

## 2021-02-13 RX ADMIN — SERTRALINE HYDROCHLORIDE 75 MG: 50 TABLET ORAL at 09:34

## 2021-02-13 RX ADMIN — FUROSEMIDE 20 MG: 20 TABLET ORAL at 06:49

## 2021-02-13 RX ADMIN — POTASSIUM CHLORIDE 10 MEQ: 750 TABLET, EXTENDED RELEASE ORAL at 09:34

## 2021-02-13 RX ADMIN — APIXABAN 2.5 MG: 2.5 TABLET, FILM COATED ORAL at 17:18

## 2021-02-13 RX ADMIN — METOPROLOL TARTRATE 25 MG: 25 TABLET, FILM COATED ORAL at 17:18

## 2021-02-13 RX ADMIN — OXYCODONE HYDROCHLORIDE AND ACETAMINOPHEN 1000 MG: 500 TABLET ORAL at 21:40

## 2021-02-13 RX ADMIN — PANTOPRAZOLE SODIUM 40 MG: 40 TABLET, DELAYED RELEASE ORAL at 06:49

## 2021-02-13 RX ADMIN — DILTIAZEM HYDROCHLORIDE 90 MG: 60 TABLET, FILM COATED ORAL at 14:17

## 2021-02-13 RX ADMIN — FAMOTIDINE 20 MG: 20 TABLET ORAL at 09:34

## 2021-02-13 RX ADMIN — METOPROLOL TARTRATE 25 MG: 25 TABLET, FILM COATED ORAL at 09:34

## 2021-02-13 RX ADMIN — ZINC SULFATE 220 MG (50 MG) CAPSULE 220 MG: CAPSULE at 09:34

## 2021-02-13 RX ADMIN — DILTIAZEM HYDROCHLORIDE 90 MG: 60 TABLET, FILM COATED ORAL at 06:49

## 2021-02-13 RX ADMIN — DILTIAZEM HYDROCHLORIDE 90 MG: 60 TABLET, FILM COATED ORAL at 21:40

## 2021-02-13 NOTE — NURSING NOTE
Patient tearful and crying this am  Patient is ready to go home and is feeling very isolated  Time spent with patient in attempts to console her

## 2021-02-13 NOTE — NURSING NOTE
Patient assessment as noted  Although patient answers orientation questions appropriately; patient is intermittently confused  Patient did complain of lower back pain which she states she takes tylenol at home  Tylenol was pulled  Attempted to administer however patient refused but then took later

## 2021-02-13 NOTE — PROGRESS NOTES
Progress Note - Jerry Hudson 1935, 81 y o  female MRN: [id number]    Unit/Bed#: -01 Encounter: [id number]    Primary Care Provider: Mary Jo Chiu MD   Date and time admitted to hospital: 2/3/2021 12:53 PM        * Generalized weakness  Assessment & Plan  · Generalized weakness recently in rehab secondary to osteoarthritis  · Awaiting SNF placement          Cognitive impairment  Assessment & Plan  · Suspected underlying dementia without formal diagnosis  · Supportive care       Close exposure to COVID-19 virus  Assessment & Plan  · Close exposure to COVID-19 during hospitalization  · Currently on airborne precautions   · Will continue with vitamin supplement  Patient does not require any treatment at this time as her COVID test has been negative  Oxygen requirement can also be inactivity and combination of CHF  · The patient will require total 14 day from the exposure day from 2/3/2021       Lab Results   Component Value Date    SARSCOV2 Negative 02/08/2021    SARSCOV2 Negative 02/04/2021    SARSCOV2 Negative 02/03/2021    SARSCOV2 Negative 08/21/2020    SARSCOV2 Negative 08/17/2020       Visual hallucinations  Assessment & Plan  · Visual hallucinations without psychiatric history  Likely secondary to delirium  · Appreciate psychiatry evaluation  Sertraline increased to 75 mg    · Added quetiapine q h s  to help regulate sleep/wake cycle   · Resolved- but patient remains confused, combative at times but able to redirect       OAB (overactive bladder)  Assessment & Plan  · continue oxybutynin         Essential hypertension  Assessment & Plan  · continue metoprolol and diltiazem  · Monitor blood pressure closely         Acute on chronic diastolic heart failure [facility])  Assessment & Plan  Wt Readings from Last 3 Encounters:   02/12/21 51 6 kg (113 lb 12 1 oz)   08/21/20 60 7 kg (133 lb 13 1 oz)   08/17/20 59 kg (130 lb 1 1 oz)     · Mild exacerbation of diastolic CHF- acute exacerbation is resolved   · chest x-ray mild pulmonary vascular congestion  · The patient require 1 L of oxygen supplement but able to titrated off to room air  · Received 1 dose of IV Lasix    started the patient on p o  Lasix 20 mg daily  · BNP 1034   · This is resolved    Longstanding persistent atrial fibrillation (HCC)  Assessment & Plan  · diltiazem metoprolol and anticoagulated with eliquis             VTE Prophylaxis:  Apixaban (Eliquis)    Patient Centered Rounds: I have performed bedside rounds with nursing staff today  Discussions with Specialists or Other Care Team Provider: nursing, CM, PT/OT  Education and Discussions with Family / Patient: patient and daughter     Current Length of Stay: 10 day(s)    Current Patient Status: Inpatient   Certification Statement: The patient will continue to require additional inpatient hospital stay due to Generalized weakness    Discharge Plan:  Pending discharge disposition    Code Status: Level 3 - DNAR and DNI    Subjective:   Feeling okay  Per staff  The patient has not been aggressive however remained confused  Objective:     Vitals:   Temp (24hrs), Av 8 °F (36 6 °C), Min:97 7 °F (36 5 °C), Max:98 °F (36 7 °C)    Temp:  [97 7 °F (36 5 °C)-98 °F (36 7 °C)] 97 8 °F (36 6 °C)  HR:  [75-78] 75  Resp:  [16] 16  BP: (110-120)/(60-70) 112/66  SpO2:  [98 %-99 %] 99 %  Body mass index is 22 98 kg/m²  Input and Output Summary (last 24 hours): Intake/Output Summary (Last 24 hours) at 2021 1522  Last data filed at 2021 1158  Gross per 24 hour   Intake 680 ml   Output 800 ml   Net -120 ml       Physical Exam:   Physical Exam  Vitals signs and nursing note reviewed  Constitutional:       General: She is not in acute distress  Appearance: Normal appearance  HENT:      Head: Normocephalic and atraumatic  Right Ear: External ear normal       Left Ear: External ear normal       Nose: Nose normal  No rhinorrhea        Mouth/Throat:      Mouth: Mucous membranes are moist       Pharynx: Oropharynx is clear  Eyes:      General:         Right eye: No discharge  Left eye: No discharge  Pupils: Pupils are equal, round, and reactive to light  Neck:      Musculoskeletal: Normal range of motion and neck supple  No muscular tenderness  Cardiovascular:      Rate and Rhythm: Normal rate and regular rhythm  Pulses: Normal pulses  Heart sounds: Normal heart sounds  No murmur  Pulmonary:      Effort: Pulmonary effort is normal  No respiratory distress  Breath sounds: Normal breath sounds  Abdominal:      General: Bowel sounds are normal  There is no distension  Palpations: Abdomen is soft  There is no mass  Tenderness: There is no abdominal tenderness  Musculoskeletal: Normal range of motion  General: No swelling or tenderness  Skin:     General: Skin is warm and dry  Capillary Refill: Capillary refill takes less than 2 seconds  Findings: No erythema or rash  Neurological:      General: No focal deficit present  Mental Status: She is alert  Mental status is at baseline  She is confused  Psychiatric:         Mood and Affect: Mood normal          Behavior: Behavior normal          Additional Data:     Labs:    Results from last 7 days   Lab Units 02/13/21  0557  02/07/21  0505   WBC Thousand/uL 7 20   < > 7 40   HEMOGLOBIN g/dL 13 8   < > 14 5   HEMATOCRIT % 41 7*   < > 43 9   PLATELETS Thousands/uL 256   < > 272   NEUTROS PCT %  --   --  73   LYMPHS PCT %  --   --  14*   MONOS PCT %  --   --  10   EOS PCT %  --   --  2    < > = values in this interval not displayed       Results from last 7 days   Lab Units 02/13/21  0557  02/07/21  0506   SODIUM mmol/L 136   < > 134   POTASSIUM mmol/L 3 5   < > 3 4*   CHLORIDE mmol/L 96*   < > 98   CO2 mmol/L 30   < > 26   BUN mg/dL 24   < > 16   CREATININE mg/dL 0 72   < > 0 71   CALCIUM mg/dL 9 5   < > 10 1   ALK PHOS U/L  --   --  72   ALT U/L  --   --  9 AST U/L  --   --  16    < > = values in this interval not displayed  Results from last 7 days   Lab Units 02/12/21  0636   POC GLUCOSE mg/dl 96           * I Have Reviewed All Lab Data Listed Above  * Additional Pertinent Lab Tests Reviewed: Alex Stone Admission  Reviewed    Imaging:  Imaging Reports Reviewed Today Include: n/a     Recent Cultures (last 7 days):           Last 24 Hours Medication List:   Current Facility-Administered Medications   Medication Dose Route Frequency Provider Last Rate    acetaminophen  650 mg Oral Q6H PRN Mariah Arana MD      apixaban  2 5 mg Oral BID Mariah Arana, MD      ascorbic acid  1,000 mg Oral Q12H [address] Sebas IVONNE Andrade      cholecalciferol  2,000 Units Oral Daily Sebas ProIVONNE roman      diltiazem  90 mg Oral [facility] Mariah Arana MD      famotidine  20 mg Oral Daily Sebas IVONNE Andrade      furosemide  20 mg Oral Early Morning NEAL Brooks      hydrOXYzine HCL  10 mg Oral BID PRN Mariah Arana MD      metoprolol tartrate  25 mg Oral BID Mariah Arana, MD      ondansetron  4 mg Intravenous Q6H PRN Mariah Arana, MD      oxybutynin  5 mg Oral Daily Mariah Arana MD      pantoprazole  40 mg Oral Early Morning Mariah Arana, MD      potassium chloride  10 mEq Oral Daily NEAL Brooks      pravastatin  40 mg Oral Daily With Tiffany Corado MD      QUEtiapine  25 mg Oral HS Olu Frank DO      sertraline  75 mg Oral Daily Any Nelson PA-C      zinc sulfate  220 mg Oral Daily Sebas Andrade PA-C          Today, Patient Was Seen By: NEAL Brooks    ** Please Note: Dictation voice to text software may have been used in the creation of this document   **

## 2021-02-14 LAB
ANION GAP SERPL CALCULATED.3IONS-SCNC: 9 MMOL/L (ref 4–13)
BUN SERPL-MCNC: 19 MG/DL (ref 7–25)
CALCIUM SERPL-MCNC: 9.7 MG/DL (ref 8.6–10.5)
CHLORIDE SERPL-SCNC: 97 MMOL/L (ref 98–107)
CO2 SERPL-SCNC: 32 MMOL/L (ref 21–31)
CREAT SERPL-MCNC: 0.79 MG/DL (ref 0.6–1.2)
GFR SERPL CREATININE-BSD FRML MDRD: 68 ML/MIN/1.73SQ M
GLUCOSE SERPL-MCNC: 92 MG/DL (ref 65–99)
POTASSIUM SERPL-SCNC: 3.9 MMOL/L (ref 3.5–5.5)
SODIUM SERPL-SCNC: 138 MMOL/L (ref 134–143)

## 2021-02-14 PROCEDURE — 80048 BASIC METABOLIC PNL TOTAL CA: CPT | Performed by: NURSE PRACTITIONER

## 2021-02-14 PROCEDURE — 99232 SBSQ HOSP IP/OBS MODERATE 35: CPT | Performed by: NURSE PRACTITIONER

## 2021-02-14 RX ORDER — LORAZEPAM 1 MG/1
1 TABLET ORAL ONCE
Status: COMPLETED | OUTPATIENT
Start: 2021-02-14 | End: 2021-02-14

## 2021-02-14 RX ADMIN — POTASSIUM CHLORIDE 10 MEQ: 750 TABLET, EXTENDED RELEASE ORAL at 08:10

## 2021-02-14 RX ADMIN — OXYBUTYNIN CHLORIDE 5 MG: 5 TABLET ORAL at 08:10

## 2021-02-14 RX ADMIN — APIXABAN 2.5 MG: 2.5 TABLET, FILM COATED ORAL at 17:00

## 2021-02-14 RX ADMIN — DILTIAZEM HYDROCHLORIDE 90 MG: 60 TABLET, FILM COATED ORAL at 13:52

## 2021-02-14 RX ADMIN — SERTRALINE HYDROCHLORIDE 75 MG: 50 TABLET ORAL at 08:10

## 2021-02-14 RX ADMIN — QUETIAPINE FUMARATE 25 MG: 25 TABLET ORAL at 21:38

## 2021-02-14 RX ADMIN — FAMOTIDINE 20 MG: 20 TABLET ORAL at 08:10

## 2021-02-14 RX ADMIN — LORAZEPAM 1 MG: 1 TABLET ORAL at 00:27

## 2021-02-14 RX ADMIN — PRAVASTATIN SODIUM 40 MG: 40 TABLET ORAL at 15:34

## 2021-02-14 RX ADMIN — FUROSEMIDE 20 MG: 20 TABLET ORAL at 06:18

## 2021-02-14 RX ADMIN — METOPROLOL TARTRATE 25 MG: 25 TABLET, FILM COATED ORAL at 17:00

## 2021-02-14 RX ADMIN — OXYCODONE HYDROCHLORIDE AND ACETAMINOPHEN 1000 MG: 500 TABLET ORAL at 08:10

## 2021-02-14 RX ADMIN — ZINC SULFATE 220 MG (50 MG) CAPSULE 220 MG: CAPSULE at 08:10

## 2021-02-14 RX ADMIN — PANTOPRAZOLE SODIUM 40 MG: 40 TABLET, DELAYED RELEASE ORAL at 06:18

## 2021-02-14 RX ADMIN — Medication 2000 UNITS: at 08:10

## 2021-02-14 RX ADMIN — APIXABAN 2.5 MG: 2.5 TABLET, FILM COATED ORAL at 08:10

## 2021-02-14 RX ADMIN — METOPROLOL TARTRATE 25 MG: 25 TABLET, FILM COATED ORAL at 08:10

## 2021-02-14 NOTE — PLAN OF CARE
Problem: Prexisting or High Potential for Compromised Skin Integrity  Goal: Skin integrity is maintained or improved  Description: INTERVENTIONS:  - Identify patients at risk for skin breakdown  - Assess and monitor skin integrity  - Assess and monitor nutrition and hydration status  - Monitor labs   - Assess for incontinence   - Turn and reposition patient  - Assist with mobility/ambulation  - Relieve pressure over bony prominences  - Avoid friction and shearing  - Provide appropriate hygiene as needed including keeping skin clean and dry  - Evaluate need for skin moisturizer/barrier cream  - Collaborate with interdisciplinary team   - Patient/family teaching  - Consider wound care consult   Outcome: Progressing     Problem: Potential for Falls  Goal: Patient will remain free of falls  Description: INTERVENTIONS:  - Assess patient frequently for physical needs  -  Identify cognitive and physical deficits and behaviors that affect risk of falls    -  [place] fall precautions as indicated by assessment   - Educate patient/family on patient safety including physical limitations  - Instruct patient to call for assistance with activity based on assessment  - Modify environment to reduce risk of injury  - Consider OT/PT consult to assist with strengthening/mobility  Outcome: Progressing     Problem: METABOLIC, FLUID AND ELECTROLYTES - ADULT  Goal: Electrolytes maintained within normal limits  Description: INTERVENTIONS:  - Monitor labs and assess patient for signs and symptoms of electrolyte imbalances  - Administer electrolyte replacement as ordered  - Monitor response to electrolyte replacements, including repeat lab results as appropriate  - Instruct patient on fluid and nutrition as appropriate  Outcome: Progressing     Problem: SKIN/TISSUE INTEGRITY - ADULT  Goal: Skin integrity remains intact  Description: INTERVENTIONS  - Identify patients at risk for skin breakdown  - Assess and monitor skin integrity  - Assess and monitor nutrition and hydration status  - Monitor labs (i e  albumin)  - Assess for incontinence   - Turn and reposition patient  - Assist with mobility/ambulation  - Relieve pressure over bony prominences  - Avoid friction and shearing  - Provide appropriate hygiene as needed including keeping skin clean and dry  - Evaluate need for skin moisturizer/barrier cream  - Collaborate with interdisciplinary team (i e  Nutrition, Rehabilitation, etc )   - Patient/family teaching  Outcome: Progressing     Problem: MUSCULOSKELETAL - ADULT  Goal: Maintain or return mobility to safest level of function  Description: INTERVENTIONS:  - Assess patient's ability to carry out ADLs; assess patient's baseline for ADL function and identify physical deficits which impact ability to perform ADLs (bathing, care of mouth/teeth, toileting, grooming, dressing, etc )  - Assess/evaluate cause of self-care deficits   - Assess range of motion  - Assess patient's mobility  - Assess patient's need for assistive devices and provide as appropriate  - Encourage maximum independence but intervene and supervise when necessary  - Involve family in performance of ADLs  - Assess for home care needs following discharge   - Consider OT consult to assist with ADL evaluation and planning for discharge  - Provide patient education as appropriate  Outcome: Progressing     Problem: PAIN - ADULT  Goal: Verbalizes/displays adequate comfort level or baseline comfort level  Description: Interventions:  - Encourage patient to monitor pain and request assistance  - Assess pain using appropriate pain scale  - Administer analgesics based on type and severity of pain and evaluate response  - Implement non-pharmacological measures as appropriate and evaluate response  - Consider cultural and social influences on pain and pain management  - Notify physician/advanced practitioner if interventions unsuccessful or patient reports new pain  Outcome: Progressing Problem: INFECTION - ADULT  Goal: Absence or prevention of progression during hospitalization  Description: INTERVENTIONS:  - Assess and monitor for signs and symptoms of infection  - Monitor lab/diagnostic results  - Monitor all insertion sites, i e  indwelling lines, tubes, and drains  - Monitor endotracheal if appropriate and nasal secretions for changes in amount and color  - [place] appropriate cooling/warming therapies per order  - Administer medications as ordered  - Instruct and encourage patient and family to use good hand hygiene technique  - Identify and instruct in appropriate isolation precautions for identified infection/condition  Outcome: Progressing     Problem: SAFETY ADULT  Goal: Patient will remain free of falls  Description: INTERVENTIONS:  - Assess patient frequently for physical needs  -  Identify cognitive and physical deficits and behaviors that affect risk of falls    -  [place] fall precautions as indicated by assessment   - Educate patient/family on patient safety including physical limitations  - Instruct patient to call for assistance with activity based on assessment  - Modify environment to reduce risk of injury  - Consider OT/PT consult to assist with strengthening/mobility  Outcome: Progressing  Goal: Maintain or return to baseline ADL function  Description: INTERVENTIONS:  -  Assess patient's ability to carry out ADLs; assess patient's baseline for ADL function and identify physical deficits which impact ability to perform ADLs (bathing, care of mouth/teeth, toileting, grooming, dressing, etc )  - Assess/evaluate cause of self-care deficits   - Assess range of motion  - Assess patient's mobility; develop plan if impaired  - Assess patient's need for assistive devices and provide as appropriate  - Encourage maximum independence but intervene and supervise when necessary  - Involve family in performance of ADLs  - Assess for home care needs following discharge   - Consider OT consult to assist with ADL evaluation and planning for discharge  - Provide patient education as appropriate  Outcome: Progressing  Goal: Maintain or return mobility status to optimal level  Description: INTERVENTIONS:  - Assess patient's baseline mobility status (ambulation, transfers, stairs, etc )    - Identify cognitive and physical deficits and behaviors that affect mobility  - Identify mobility aids required to assist with transfers and/or ambulation (gait belt, sit-to-stand, lift, walker, cane, etc )  - [place] fall precautions as indicated by assessment  - Record patient progress and toleration of activity level on Mobility SBAR; progress patient to next Phase/Stage  - Instruct patient to call for assistance with activity based on assessment  - Consider rehabilitation consult to assist with strengthening/weightbearing, etc   Outcome: Progressing     Problem: DISCHARGE PLANNING  Goal: Discharge to home or other facility with appropriate resources  Description: INTERVENTIONS:  - Identify barriers to discharge w/patient and caregiver  - Arrange for needed discharge resources and transportation as appropriate  - Identify discharge learning needs (meds, wound care, etc )  - Arrange for interpretive services to assist at discharge as needed  - Refer to Case Management Department for coordinating discharge planning if the patient needs post-hospital services based on physician/advanced practitioner order or complex needs related to functional status, cognitive ability, or social support system  Outcome: Progressing     Problem: Knowledge Deficit  Goal: Patient/family/caregiver demonstrates understanding of disease process, treatment plan, medications, and discharge instructions  Description: Complete learning assessment and assess knowledge base    Interventions:  - Provide teaching at level of understanding  - Provide teaching via preferred learning methods  Outcome: Progressing     Problem: Nutrition/Hydration-ADULT  Goal: Nutrient/Hydration intake appropriate for improving, restoring or maintaining nutritional needs  Description: Monitor and assess patient's nutrition/hydration status for malnutrition  Collaborate with interdisciplinary team and initiate plan and interventions as ordered  Monitor patient's weight and dietary intake as ordered or per policy  Utilize nutrition screening tool and intervene as necessary  Determine patient's food preferences and provide high-protein, high-caloric foods as appropriate       INTERVENTIONS:  - Monitor oral intake, urinary output, labs, and treatment plans  - Assess nutrition and hydration status and recommend course of action  - Evaluate amount of meals eaten  - Assist patient with eating if necessary   - Allow adequate time for meals  - Recommend/ encourage appropriate diets, oral nutritional supplements, and vitamin/mineral supplements  - Order, calculate, and assess calorie counts as needed  - Recommend, monitor, and adjust tube feedings and TPN/PPN based on assessed needs  - Assess need for intravenous fluids  - Provide specific nutrition/hydration education as appropriate  - Include patient/family/caregiver in decisions related to nutrition  Outcome: Progressing

## 2021-02-14 NOTE — ASSESSMENT & PLAN NOTE
· Visual hallucinations without psychiatric history  Likely secondary to delirium  · Appreciate psychiatry evaluation  Sertraline increased to 75 mg    · Added quetiapine q h s  to help regulate sleep/wake cycle   · Resolved- but patient remains confused, was combative at times in the beginning of the week but has been pleasant over the past few days

## 2021-02-14 NOTE — PROGRESS NOTES
Progress Note - Adithya Hansa 1935, 81 y o  female MRN: [id number]    Unit/Bed#: -01 Encounter: [id number]    Primary Care Provider: Killian Castillo MD   Date and time admitted to hospital: 2/3/2021 12:53 PM        * Generalized weakness  Assessment & Plan  · Generalized weakness recently in rehab secondary to osteoarthritis  · Awaiting SNF placement           Cognitive impairment  Assessment & Plan  · Suspected underlying dementia without formal diagnosis  · Supportive care        Close exposure to COVID-19 virus  Assessment & Plan  · Close exposure to COVID-19 during hospitalization  · Currently on airborne precautions   · Will continue with vitamin supplement  Patient does not require any treatment at this time as her COVID test has been negative  Oxygen requirement can also be inactivity and combination of CHF  · The patient will require total 14 day from the exposure day from 2/3/2021        Lab Results   Component Value Date    SARSCOV2 Negative 02/08/2021    SARSCOV2 Negative 02/04/2021    SARSCOV2 Negative 02/03/2021    SARSCOV2 Negative 08/21/2020    SARSCOV2 Negative 08/17/2020       Visual hallucinations  Assessment & Plan  · Visual hallucinations without psychiatric history  Likely secondary to delirium  · Appreciate psychiatry evaluation  Sertraline increased to 75 mg    · Added quetiapine q h s  to help regulate sleep/wake cycle   · Resolved- but patient remains confused, was combative at times in the beginning of the week but has been pleasant over the past few days     OAB (overactive bladder)  Assessment & Plan  · continue oxybutynin          Essential hypertension  Assessment & Plan  · continue metoprolol and diltiazem  · Monitor blood pressure closely          Acute on chronic diastolic heart failure [facility])  Assessment & Plan  Wt Readings from Last 3 Encounters:   02/14/21 48 6 kg (107 lb 2 3 oz)   08/21/20 60 7 kg (133 lb 13 1 oz)   08/17/20 59 kg (130 lb 1 1 oz)     · Mild exacerbation of diastolic CHF- acute exacerbation is resolved   · chest x-ray mild pulmonary vascular congestion  · The patient require 1 L of oxygen supplement but able to titrated off to room air  · Received 1 dose of IV Lasix    started the patient on p o  Lasix 20 mg daily  · BNP 1034   · This is resolved     Longstanding persistent atrial fibrillation (HCC)  Assessment & Plan  · diltiazem metoprolol and anticoagulated with eliquis               VTE Prophylaxis:  Apixaban (Eliquis)    Patient Centered Rounds: I have performed bedside rounds with nursing staff today  Discussions with Specialists or Other Care Team Provider: nursing, PT/OT   Education and Discussions with Family / Patient: patient and daughter     Current Length of Stay: 6 day(s)    Current Patient Status: Inpatient   Certification Statement: The patient will continue to require additional inpatient hospital stay due to generalized weakness    Discharge Plan: pending discharge disposition  Code Status: Level 3 - DNAR and DNI    Subjective:   Feeling tired  Would like to go home  Objective:     Vitals:   Temp (24hrs), Av 8 °F (36 6 °C), Min:97 6 °F (36 4 °C), Max:97 9 °F (36 6 °C)    Temp:  [97 6 °F (36 4 °C)-97 9 °F (36 6 °C)] 97 6 °F (36 4 °C)  HR:  [] 64  Resp:  [17-24] 24  BP: (108-142)/(61-82) 108/82  SpO2:  [93 %-98 %] 97 %  Body mass index is 21 64 kg/m²  Input and Output Summary (last 24 hours): Intake/Output Summary (Last 24 hours) at 2021 1432  Last data filed at 2021 1703  Gross per 24 hour   Intake 240 ml   Output --   Net 240 ml       Physical Exam:   Physical Exam  Vitals signs and nursing note reviewed  Constitutional:       General: She is not in acute distress  Appearance: Normal appearance  She is cachectic  HENT:      Head: Normocephalic and atraumatic  Right Ear: External ear normal       Left Ear: External ear normal       Nose: Nose normal  No rhinorrhea  Mouth/Throat:      Mouth: Mucous membranes are moist       Pharynx: Oropharynx is clear  Eyes:      General:         Right eye: No discharge  Left eye: No discharge  Pupils: Pupils are equal, round, and reactive to light  Neck:      Musculoskeletal: Normal range of motion and neck supple  No muscular tenderness  Cardiovascular:      Rate and Rhythm: Normal rate and regular rhythm  Pulses: Normal pulses  Heart sounds: Normal heart sounds  No murmur  Pulmonary:      Effort: Pulmonary effort is normal  No respiratory distress  Breath sounds: Normal breath sounds  Abdominal:      General: Bowel sounds are normal  There is no distension  Palpations: Abdomen is soft  There is no mass  Tenderness: There is no abdominal tenderness  Musculoskeletal: Normal range of motion  General: No swelling or tenderness  Skin:     General: Skin is warm and dry  Capillary Refill: Capillary refill takes less than 2 seconds  Findings: No erythema or rash  Neurological:      General: No focal deficit present  Mental Status: She is alert  Mental status is at baseline  Comments: Periods of confusion    Psychiatric:         Mood and Affect: Mood normal          Behavior: Behavior normal          Thought Content: Thought content normal          Additional Data:     Labs:    Results from last 7 days   Lab Units 02/13/21  0557   WBC Thousand/uL 7 20   HEMOGLOBIN g/dL 13 8   HEMATOCRIT % 41 7*   PLATELETS Thousands/uL 256     Results from last 7 days   Lab Units 02/14/21  0630   SODIUM mmol/L 138   POTASSIUM mmol/L 3 9   CHLORIDE mmol/L 97*   CO2 mmol/L 32*   BUN mg/dL 19   CREATININE mg/dL 0 79   CALCIUM mg/dL 9 7         Results from last 7 days   Lab Units 02/12/21  0636   POC GLUCOSE mg/dl 96           * I Have Reviewed All Lab Data Listed Above  * Additional Pertinent Lab Tests Reviewed:  Alex 66 Admission  Reviewed    Imaging:  Imaging Reports Reviewed Today Include: n/a     Recent Cultures (last 7 days):           Last 24 Hours Medication List:   Current Facility-Administered Medications   Medication Dose Route Frequency Provider Last Rate    acetaminophen  650 mg Oral Q6H PRN Carlie Castro MD      apixaban  2 5 mg Oral BID Carlie Castro MD      ascorbic acid  1,000 mg Oral Q12H [address] Doroteo Ochoa PA-C      cholecalciferol  2,000 Units Oral Daily Doroteo Ochoa PA-C      diltiazem  90 mg Oral [facility] Carlie Castro MD      famotidine  20 mg Oral Daily Doroteo Ochoa PA-C      furosemide  20 mg Oral Early Morning NEAL Hemphill      hydrOXYzine HCL  10 mg Oral BID PRN Carlie Castro MD      metoprolol tartrate  25 mg Oral BID Carlie Castro MD      ondansetron  4 mg Intravenous Q6H PRN Carlie Castro MD      oxybutynin  5 mg Oral Daily Carlie Castro MD      pantoprazole  40 mg Oral Early Morning Carlie Castro MD      potassium chloride  10 mEq Oral Daily NEAL Hemphill      pravastatin  40 mg Oral Daily With Winnie Boucher MD      QUEtiapine  25 mg Oral HS Tomasz Gross DO      sertraline  75 mg Oral Daily Jessenia Huggins PA-C          Today, Patient Was Seen By: NEAL Hemphill    ** Please Note: Dictation voice to text software may have been used in the creation of this document   **

## 2021-02-14 NOTE — ASSESSMENT & PLAN NOTE
Wt Readings from Last 3 Encounters:   02/14/21 48 6 kg (107 lb 2 3 oz)   08/21/20 60 7 kg (133 lb 13 1 oz)   08/17/20 59 kg (130 lb 1 1 oz)     · Mild exacerbation of diastolic CHF- acute exacerbation is resolved   · chest x-ray mild pulmonary vascular congestion  · The patient require 1 L of oxygen supplement but able to titrated off to room air  · Received 1 dose of IV Lasix 2/9   started the patient on p o  Lasix 20 mg daily     · BNP 1034   · This is resolved

## 2021-02-15 VITALS
HEART RATE: 96 BPM | SYSTOLIC BLOOD PRESSURE: 132 MMHG | HEIGHT: 59 IN | TEMPERATURE: 97 F | OXYGEN SATURATION: 100 % | WEIGHT: 106.26 LBS | RESPIRATION RATE: 18 BRPM | DIASTOLIC BLOOD PRESSURE: 82 MMHG | BODY MASS INDEX: 21.42 KG/M2

## 2021-02-15 LAB
FLUAV RNA RESP QL NAA+PROBE: NEGATIVE
FLUBV RNA RESP QL NAA+PROBE: NEGATIVE
RSV RNA RESP QL NAA+PROBE: NEGATIVE
SARS-COV-2 RNA RESP QL NAA+PROBE: NEGATIVE

## 2021-02-15 PROCEDURE — 99239 HOSP IP/OBS DSCHRG MGMT >30: CPT | Performed by: HOSPITALIST

## 2021-02-15 PROCEDURE — 0241U HB NFCT DS VIR RESP RNA 4 TRGT: CPT | Performed by: HOSPITALIST

## 2021-02-15 PROCEDURE — 97110 THERAPEUTIC EXERCISES: CPT

## 2021-02-15 PROCEDURE — NC001 PR NO CHARGE: Performed by: HOSPITALIST

## 2021-02-15 PROCEDURE — 97535 SELF CARE MNGMENT TRAINING: CPT

## 2021-02-15 PROCEDURE — 97116 GAIT TRAINING THERAPY: CPT

## 2021-02-15 RX ORDER — QUETIAPINE FUMARATE 25 MG/1
25 TABLET, FILM COATED ORAL
Qty: 30 TABLET | Refills: 0
Start: 2021-02-15 | End: 2021-07-01 | Stop reason: ALTCHOICE

## 2021-02-15 RX ORDER — ONDANSETRON 4 MG/1
4 TABLET, ORALLY DISINTEGRATING ORAL EVERY 6 HOURS PRN
Status: DISCONTINUED | OUTPATIENT
Start: 2021-02-15 | End: 2021-02-15 | Stop reason: HOSPADM

## 2021-02-15 RX ADMIN — DILTIAZEM HYDROCHLORIDE 90 MG: 60 TABLET, FILM COATED ORAL at 05:10

## 2021-02-15 RX ADMIN — FUROSEMIDE 20 MG: 20 TABLET ORAL at 05:11

## 2021-02-15 RX ADMIN — FAMOTIDINE 20 MG: 20 TABLET ORAL at 08:48

## 2021-02-15 RX ADMIN — SERTRALINE HYDROCHLORIDE 75 MG: 50 TABLET ORAL at 08:48

## 2021-02-15 RX ADMIN — POTASSIUM CHLORIDE 10 MEQ: 750 TABLET, EXTENDED RELEASE ORAL at 08:48

## 2021-02-15 RX ADMIN — METOPROLOL TARTRATE 25 MG: 25 TABLET, FILM COATED ORAL at 08:48

## 2021-02-15 RX ADMIN — OXYBUTYNIN CHLORIDE 5 MG: 5 TABLET ORAL at 08:48

## 2021-02-15 RX ADMIN — Medication 2000 UNITS: at 08:48

## 2021-02-15 RX ADMIN — APIXABAN 2.5 MG: 2.5 TABLET, FILM COATED ORAL at 08:48

## 2021-02-15 RX ADMIN — DILTIAZEM HYDROCHLORIDE 90 MG: 60 TABLET, FILM COATED ORAL at 14:32

## 2021-02-15 RX ADMIN — PANTOPRAZOLE SODIUM 40 MG: 40 TABLET, DELAYED RELEASE ORAL at 05:11

## 2021-02-15 NOTE — ASSESSMENT & PLAN NOTE
Danie is a 22 month old child presenting for assessment of   Chief Complaint   Patient presents with   • Fever   .    Pharmacy of choice noted (under Meds & Orders): Yes  Medications and allergies verified: Yes  Denies known Latex allergy or symptoms of Latex sensitivity.  IMMUNIZATION REACTION: no  Patient would like communication of their results via:        Cell Phone:   Telephone Information:   Mobile [phone]     Okay to leave a message containing results? Yes  CONCERNS: highest temp 102  runny nose and cough     · Discharge to the skilled nursing facility on metoprolol and diltiazem

## 2021-02-15 NOTE — ASSESSMENT & PLAN NOTE
· Patient remains medically stable for discharge  · Generalized weakness is secondary to osteoarthritis  · Status post a recent rehab stay  · Case management is working on skilled nursing facility placement, will discharge as soon as we have a safe disposition    Addendum at 3:30 p m :  Notified by case management that patient has been accepted to skilled nursing facility, in [place], [place]    Patient remains medically stable for discharge  Will discharge the patient now, she has a 4:00 p m

## 2021-02-15 NOTE — PHYSICAL THERAPY NOTE
Physical Therapy Treatment Session Note    Patient's Name: Colt Saini    Admitting Diagnosis  Weakness generalized [R53 1]  Weakness [R53 1]    Problem List  Patient Active Problem List   Diagnosis    CAD (coronary artery disease)    Mitral regurgitation    Longstanding persistent atrial fibrillation (HCC)    Acute on chronic diastolic heart failure ([facility] [address] )    Essential hypertension    Acute hypokalemia    Depression with anxiety    Gastroesophageal reflux disease without esophagitis    OAB (overactive bladder)    Hypomagnesemia    Mixed hyperlipidemia    Osteoarthritis of knee    Pneumonia    Generalized weakness    Dizziness    Chest wall contusion, right, initial encounter    Ambulatory dysfunction    Hypokalemia    Liver lesion    Rib fractures    CKD (chronic kidney disease), stage II    Pulmonary hypertension (HCC)    Visual hallucinations    Close exposure to COVID-19 virus    Cognitive impairment       Past Medical History  Past Medical History:   Diagnosis Date    Acute on chronic diastolic congestive heart failure ([facility] [address] ) 6/27/2019    Ambulatory dysfunction 8/17/2020    Arthritis     Chest wall contusion, right, initial encounter 8/17/2020    Chest wall contusion, right, subsequent encounter 8/17/2020    Chronic diastolic heart failure (HCC)     Chronic kidney disease, stage 2 (mild)     Coronary artery disease     history of stenting    Eczema     years    Edema     History of echocardiogram 07/20/2017    EF 55%, mild concentric LVH, bileaflet MVP with moderate MR, left atrial enlargement      History of transfusion     Hyperlipidemia     Hypertension     Hypo-osmolality and hyponatremia     Hypokalemia 7/11/2019    Mitral regurgitation        Past Surgical History  Past Surgical History:   Procedure Laterality Date    ABDOMINAL SURGERY      hysterectomy    CARDIAC CATHETERIZATION  04/10/2012    EF 65%, no significant CAD, patent stents, severe MR    [facility] CORONARY ANGIOPLASTY WITH STENT PLACEMENT  03/21/2007    EF 55%, GARRET to LAD   EYE SURGERY      b/l cataracts    HYSTERECTOMY      JOINT REPLACEMENT      Rt knee        02/15/21 1015   PT Last Visit   PT Visit Date 02/15/21   Note Type   Note Type Treatment   Pain Assessment   Pain Assessment Tool Pain Assessment not indicated - pt denies pain   Pain Score No Pain   Restrictions/Precautions   Weight Bearing Precautions Per Order No   Other Precautions Contact/isolation; Airborne/isolation; Bed Alarm;Cognitive; Fall Risk   General   Chart Reviewed Yes   Response to Previous Treatment Patient unable to report, no changes reported from family or staff   Family/Caregiver Present No   Cognition   Overall Cognitive Status Impaired   Arousal/Participation Alert; Responsive; Cooperative   Attention Attends with cues to redirect   Orientation Level Oriented to person   Memory Decreased recall of recent events;Decreased recall of precautions;Decreased short term memory   Following Commands Follows one step commands with increased time or repetition   Comments Pt  agreeable to PT tx session  Subjective   Subjective "get my sweater out, I'm cold"   Bed Mobility   Supine to Sit 5  Supervision   Additional items Assist x 1;HOB elevated; Bedrails; Increased time required;Verbal cues   Sit to Supine 5  Supervision   Additional items Assist x 1;Bedrails; Increased time required;Verbal cues   Additional Comments Lizet Gandara denied dizziness/lightheadedness w/positional change  No overt LOB completing BLE ther ex dynamically at EOB  Transfers   Sit to Stand   (CGA)   Additional items Assist x 1;Bedrails; Increased time required;Verbal cues   Stand to Sit   (CGA)   Additional items Assist x 1;Bedrails; Increased time required;Verbal cues   Stand pivot   (CGA)   Additional items Assist x 1; Armrests; Increased time required;Verbal cues   Toilet transfer   (CGA)   Additional items Assist x 1; Armrests; Increased time required;Verbal cues;Commode;Raised toilet seat   Ambulation/Elevation   Gait pattern Improper Weight shift;Narrow FILOMENA; Forward Flexion;Decreased foot clearance; Short stride; Inconsistent judy   Gait Assistance 4  Minimal assist   Additional items Assist x 1;Verbal cues; Tactile cues   Assistive Device Rolling walker   Distance 6 feet x 2  (to/from [facility])   Stair Management Assistance Not tested   Balance   Static Sitting Fair +   Dynamic Sitting Fair   Static Standing Fair -   Dynamic Standing Poor +   Ambulatory Poor +   Endurance Deficit   Endurance Deficit Yes   Activity Tolerance   Activity Tolerance Patient limited by fatigue   Nurse Made Aware Yes, Dariela Michael staff was informed of tx session outcome  Exercises   Hip Flexion Sitting;25 reps;AROM; Bilateral   Hip Abduction Sitting;25 reps;AROM; Bilateral   Hip Adduction Sitting;25 reps;AROM; Bilateral   Knee AROM Long Arc Quad Sitting;25 reps;AROM; Bilateral   Ankle Pumps Sitting;25 reps;AROM; Bilateral   Assessment   Prognosis Fair   Problem List Decreased strength;Decreased endurance; Impaired balance;Decreased mobility; Decreased safety awareness; Impaired judgement;Decreased cognition;Decreased coordination   Assessment Pt seen for PT treatment session this date with interventions consisting of gait training w/ emphasis on improving pt's ability to ambulate level surfaces x 12 feet total with min A provided by therapist with RW and Therapeutic exercise consisting of: AROM 25 reps B LE in sitting position  Pt agreeable to PT treatment session upon arrival, pt found supine in bed w/ HOB elevated, in no apparent distress and A&O x 1  In comparison to previous session, pt with improvements in ambulatory distance  Post session: pt returned BTB, bed alarm engaged, all needs in reach and RN notified of session findings/recommendations Continue to recommend STR at time of d/c in order to maximize pt's functional independence and safety w/ mobility   Pt continues to be functioning below baseline level, and remains limited 2* factors listed above and including weakness, decreased endurance, decreased cognition, gait deviations  PT will continue to see pt while here in order to address the deficits listed above and provide interventions consistent w/ POC in effort to achieve LTGs  Barriers to Discharge Inaccessible home environment;Decreased caregiver support; Other (Comment)   Goals   Patient Goals to warm up   LTG Expiration Date 21  (POC updated, STGs  2021)   Long Term Goal #1 1 )Patient will complete bed mobility with supervision of 1 for decrease need for caregiver assistance, decrease burden of care  2 ) Patient will complete transfers with supervision of 1 to decrease risk of falls, facilitate upright standing posture  3 ) BLE strength to greater than/equal to 4/5 gross musculature to increase ability to safely transfer, control descent to chair  4 ) Patient will exhibit increase static standing balance to Fair+, >2 minutes without LOB and supervision of 1 to improve activity tolerance  5 ) Patient will exhibit increase dynamic ambulatory balance to Fair+ >35 feet w/AD CGA of 1 to improve ability to mobilize to toilet, chair and decrease risk for additional medical complications  6 ) Patient will exhibit good self monitoring and ability to follow 2 step commands to increase complexity of tasks and resume ADL's without LOB  PT Treatment Day 5   Plan   Treatment/Interventions ADL retraining;Functional transfer training;LE strengthening/ROM; Therapeutic exercise; Endurance training;Patient/family training;Cognitive reorientation;Equipment eval/education; Bed mobility;Gait training;Spoke to nursing;OT   Progress Slow progress, decreased activity tolerance   PT Frequency Other (Comment)  (3-5x/wk)   Recommendation   PT Discharge Recommendation Post-Acute Rehabilitation Services  (continued recommendation)   Equipment Recommended Walker  (continued usage)   PT - OK to Discharge Yes  (if medically stable,to PAR)   Additional Comments Upon conclusion, pt was resting in bed w/bed alarm engaged;all needs within reach  Additional Comments 2 [facility] raw score of 16, standardized score of 38 32 As per the data, this score is correlated most closely with a group home recommendation  However,the therapist's discharge disposition recommendation may vary d/t numerous objective findings contribute to the suggested destination     AM-PAC Basic Mobility Inpatient   Turning in Bed Without Bedrails 3   Lying on Back to Sitting on Edge of Flat Bed 3   Moving Bed to Chair 3   Standing Up From Chair 3   Walk in Room 2   Climb 3-5 Stairs 2   Basic Mobility Inpatient Raw Score 16   Basic Mobility Standardized Score 38 32     Treatment Time:[id number]      Ruth Irene, PT

## 2021-02-15 NOTE — CASE MANAGEMENT
CM received auth from [place] at [facility]  RUG RVB  579 minutes per week  They authorized 3 days today through 2/17 review same day with Sage Blanca OHT#[phone]  OXAL#[id number]  Ambulance auth also obtained [id number]  CM provided information to [facility] at [facility]  They are able to accept pt today  Pt requires BLS transport due to confusion, inabilty to ambulate and inability to tolerate seated position for time of transport  [facility] able to transport pt at 1600  Ambulance auth given to [facility]ILI[facility] WITH [facility] completed and placed on chart  [facility] aware and in agreement  COVID test from today is negative  [facility] also aware  They are aware of pt previous COVID exposure on 2/3/21 and are still able to accept  Pt daughter Sebastian Sutherland aware of the above and in agreement  Pt nurse Isael Cannon provided with numbers for report and fax to [facility]

## 2021-02-15 NOTE — CASE MANAGEMENT
Pt has been accepted to [facility] in [facility] for [address] per Colby Dominique in admissions  [facility] NPI [id number]  By Dr Arlyne Eisenmenger NPI [id number]  Per Colby Dominique they contract with [facility] and will need auth  CM to submit for same  CM spoke with pt daughter Mani Ch and she is in agreement with pt going to [facility] for [address]  CM spoke with Sana at [facility] provider line [phone]  [facility] and [facility] requested [facility] be faxed to [phone]  CM faxed same, awaiting determination  Pt will need BLS transport to [facility] once [address] [facility] [address] obtained  CM to follow

## 2021-02-15 NOTE — DISCHARGE INSTR - AVS FIRST PAGE
Dear Yandel Florian,     It was our pleasure to care for you here at [facility]  It is our hope that we were always able to exceed the expected standards for your care during your stay  You were hospitalized due to generalized weakness  You were cared for on the medical/surgical floor by Camron Deras [facility] who covers for your primary care physician (PCP), Lizz Austin MD, while you were hospitalized  If you have any questions or concerns related to this hospitalization, you may contact us at [phone]  For follow up as well as any medication refills, we recommend that you follow up with your primary care physician  A registered nurse will reach out to you by phone within a few days after your discharge to answer any additional questions that you may have after going home  However, at this time we provide for you here, the most important instructions / recommendations at discharge:     · Notable Medication Adjustments -   · Okay to resume all pre-admission medications at the preadmission dosages, Seroquel has been added  · Testing Required after Discharge -   · To be further determined in the outpatient setting by your primary care provider  · Important follow up information -   · Please follow-up with the providers as outlined in this discharge package  · Other Instructions -   · Please maintain a healthy diet  · Please review this entire after visit summary as additional general instructions including medication list, appointments, activity, diet, any pertinent wound care, and other additional recommendations from your care team that may be provided for you        Sincerely,     Camron Deras MD

## 2021-02-15 NOTE — ASSESSMENT & PLAN NOTE
Wt Readings from Last 3 Encounters:   02/15/21 48 2 kg (106 lb 4 2 oz)   08/21/20 60 7 kg (133 lb 13 1 oz)   08/17/20 59 kg (130 lb 1 1 oz)     · Initially with an acute exacerbation of diastolic dysfunction CHF which has since resolved  · Discharge on the current cardiac based medications which include apixaban, metoprolol, diltiazem, Lasix, and pravastatin

## 2021-02-15 NOTE — ASSESSMENT & PLAN NOTE
· Close exposure to COVID-19 during hospitalization  · Continue treatment with vitamin supplementation  · Patient to remain on airborne precautions until 02/17/2021  · Patient has tested negative for COVID-19 3 times since 02/03/2021  · As per case management, the patient required yet 1 more COVID-19 test today on 02/15/2021, that too was negative, there will be no further testing, treatment, and/or precautions required for this patient moving forward now and or in the outpatient setting other than continuing to wear mask and socially distancing  · Patient is currently 100% on room air

## 2021-02-15 NOTE — PLAN OF CARE
Problem: OCCUPATIONAL THERAPY ADULT  Goal: Performs self-care activities at highest level of function for planned discharge setting  See evaluation for individualized goals  Description: Treatment Interventions: ADL retraining, Functional transfer training, UE strengthening/ROM, Endurance training, Patient/family training, Compensatory technique education, Activityengagement          See flowsheet documentation for full assessment, interventions and recommendations  Outcome: Progressing  Note: Limitation: Decreased ADL status, Decreased UE strength, Decreased endurance, Decreased self-care trans, Decreased high-level ADLs  Prognosis: Good  Assessment: Patient participated in Skilled OT session this date with interventions consisting of functional transfer training, ADL re training with the use of correct body mechnaics, Energy Conservation techniques and therapeutic exercise to: increase functional use of BUEs, increase BUE muscle strength    Patient agreeable to OT treatment session, upon arrival patient was found supine in bed  Patient performs bed mobility, supine to sit EOB, with Supervision x 1  Patient then transfers sit to stand and pivots to/from commode with supervision x 1  Requires min A with pericare to ensure cleanliness post toileting  Patient then sits EOB x 23 min with no LOB boted in order to complete UB TE as detailed in flow sheet  Session ended with patient supine in bed, SCDs reapplied and all needs met  In comparison to previous session, patient with improvements in functional transfers, UB strength and endurance   Patient requiring verbal cues for correct technique, verbal cues for pacing thru activity steps, frequent rest periods and ocassional safety reminders  Patient performance  demonstrated varied carryover of learned techniques and strategies to facilitate safety during functional tasks   Patient continues to be functioning below baseline level, occupational performance remains limited secondary to factors listed above and increased risk for falls and injury  From OT standpoint, recommendation at time of d/c would be Short Term Rehab  Patient to benefit from continued Occupational Therapy treatment while in the hospital to address deficits as defined above and maximize level of functional independence with ADLs and functional mobility in order to return to PLOF        OT Discharge Recommendation: Post-Acute Rehabilitation Services  OT - OK to Discharge: Yes(when medically cleared)

## 2021-02-15 NOTE — ASSESSMENT & PLAN NOTE
· Patient remains medically stable for discharge  · Generalized weakness is secondary to osteoarthritis  · Status post a recent rehab stay  · Case management is working on skilled nursing facility placement, will discharge as soon as we have a safe disposition

## 2021-02-15 NOTE — ASSESSMENT & PLAN NOTE
· Rate controlled with metoprolol, and diltiazem  · Anticoagulation with Eliquis    · DC on the same meds

## 2021-02-15 NOTE — DISCHARGE SUMMARY
Discharge- Alexander Granda 1935, 81 y o  female MRN: [id number]    Unit/Bed#: -01 Encounter: [id number]    Primary Care Provider: Bob Martin MD   Date and time admitted to hospital: 2/3/2021 12:53 PM        * Generalized weakness  Assessment & Plan  · Patient remains medically stable for discharge  · Generalized weakness is secondary to osteoarthritis  · Status post a recent rehab stay  · Case management is working on skilled nursing facility placement, will discharge as soon as we have a safe disposition    Addendum at 3:30 p m :  Notified by case management that patient has been accepted to skilled nursing facility, in [place], [place]    Patient remains medically stable for discharge  Will discharge the patient now, she has a 4:00 p m      Close exposure to COVID-19 virus  Assessment & Plan  · Close exposure to COVID-19 during hospitalization  · Continue treatment with vitamin supplementation  · Patient to remain on airborne precautions until 02/17/2021  · Patient has tested negative for COVID-19 3 times since 02/03/2021  · As per case management, the patient required yet 1 more COVID-19 test today on 02/15/2021, that too was negative, there will be no further testing, treatment, and/or precautions required for this patient moving forward now and or in the outpatient setting other than continuing to wear mask and socially distancing  · Patient is currently 100% on room air      Acute on chronic diastolic heart failure ([facility] [address] )  Assessment & Plan  Wt Readings from Last 3 Encounters:   02/15/21 48 2 kg (106 lb 4 2 oz)   08/21/20 60 7 kg (133 lb 13 1 oz)   08/17/20 59 kg (130 lb 1 1 oz)     · Initially with an acute exacerbation of diastolic dysfunction CHF which has since resolved  · Discharge on the current cardiac based medications which include apixaban, metoprolol, diltiazem, Lasix, and pravastatin    Cognitive impairment  Assessment & Plan  · Suspected underlying dementia without formal diagnosis  · Supportive care        Essential hypertension  Assessment & Plan  · Discharge to the skilled nursing facility on metoprolol and diltiazem    Longstanding persistent atrial fibrillation (HCC)  Assessment & Plan  · Rate controlled with metoprolol, and diltiazem  · Anticoagulation with Eliquis    · DC on the same meds    OAB (overactive bladder)  Assessment & Plan  · Discharge on oxybutynin          Visual hallucinations  Assessment & Plan  · Visual hallucinations were secondary to delirium  · Appreciate Psychiatry input  · Discharge on sertraline and Seroquel at the current dosages          Discharging Physician / Practitioner: Rianna Jacques MD  PCP: Hussein Reza MD  Admission Date:   Admission Orders (From admission, onward)     Ordered        02/03/21 1544  Inpatient Admission  Once                   Discharge Date: 02/15/21    Resolved Problems  Date Reviewed: 2/14/2021    None          Consultations During Hospital Stay:  · Psychiatry    Procedures Performed:   · None    Significant Findings / Test Results:   · CT brain:-No acute findings  Unchanged appearance of periventricular white matter disease days and a chronic right frontal infarction  · X-ray left femur-Bones are demineralized, limiting evaluation for nondisplaced fracture   No acute displaced fracture identified on the provided views  · Chest x-ray-no acute cardiopulmonary disease  · Chest x-ray 2 :  Mild pulmonary venous congestion     Incidental Findings:   · None     Test Results Pending at Discharge (will require follow up): · None     Outpatient Tests Requested:  · None    Complications:  None    Reason for Admission:  Generalized weakness/mild exacerbation of an acute on chronic diastolic dysfunction congestive heart failure    Hospital Course:     Rupali Elaine is a 81 y o  female patient who originally presented to the hospital on 2/3/2021 due to generalized weakness    Please refer to the initial history and physical examination completed by Dr Jeannine Sampson for the initial presenting features and complaints  In brief, the patient is an 81-year-old female who was admitted to the hospital after she came in with generalized weakness  She was admitted to [facility] surge  She was treated also for an acute on chronic diastolic dysfunction CHF exacerbation  She was treated with IV Lasix  She also unfortunately had a COVID exposure here in the hospital   She was briefly treated with some vitamins  She remained on room air throughout  She was seen by Psychiatry for visual and auditory hallucinations  Medications were adjusted  Seroquel was added to her regimen  Of note, the patient has a baseline cognitive decline noted all  Case management had a prolonged time in finding her place to go  She was ultimately accepted to a skilled nursing facility on 02/15/2021, she was discharged to that facility, in [place], [place]  She was otherwise discharged on all of her other pre-admission medications at the preadmission dosages  She will follow up in the outpatient setting with her PCP  Please see above list of diagnoses and related plan for additional information  Condition at Discharge: good     Discharge Day Visit / Exam:     Please refer to my progress note from earlier today for the final days visit and exam    Discussion with Family:  Patient's daughter was brought up to par earlier today    Discharge instructions/Information to patient and family:   See after visit summary for information provided to patient and family  Provisions for Follow-Up Care:  See after visit summary for information related to follow-up care and any pertinent home health orders        Disposition:     Jenni Machado Miguel at [address] to [facility]:   · Not Applicable to this Patient - Not Applicable to this Patient    Planned Readmission:  None     Discharge Statement:  I spent 45 minutes discharging the patient  This time was spent on the day of discharge  I had direct contact with the patient on the day of discharge  Greater than 50% of the total time was spent examining patient, answering all patient questions, arranging and discussing plan of care with patient as well as directly providing post-discharge instructions  Additional time then spent on discharge activities  Discharge Medications:  See after visit summary for reconciled discharge medications provided to patient and family        ** Please Note: This note has been constructed using a voice recognition system **

## 2021-02-15 NOTE — ASSESSMENT & PLAN NOTE
· Visual hallucinations were secondary to delirium  · Appreciate Psychiatry input  · Continue sertraline, and Seroquel at the current dosages

## 2021-02-15 NOTE — PLAN OF CARE
Problem: Prexisting or High Potential for Compromised Skin Integrity  Goal: Skin integrity is maintained or improved  Description: INTERVENTIONS:  - Identify patients at risk for skin breakdown  - Assess and monitor skin integrity  - Assess and monitor nutrition and hydration status  - Monitor labs   - Assess for incontinence   - Turn and reposition patient  - Assist with mobility/ambulation  - Relieve pressure over bony prominences  - Avoid friction and shearing  - Provide appropriate hygiene as needed including keeping skin clean and dry  - Evaluate need for skin moisturizer/barrier cream  - Collaborate with interdisciplinary team   - Patient/family teaching  - Consider wound care consult   Outcome: Progressing     Problem: Potential for Falls  Goal: Patient will remain free of falls  Description: INTERVENTIONS:  - Assess patient frequently for physical needs  -  Identify cognitive and physical deficits and behaviors that affect risk of falls    -  [place] fall precautions as indicated by assessment   - Educate patient/family on patient safety including physical limitations  - Instruct patient to call for assistance with activity based on assessment  - Modify environment to reduce risk of injury  - Consider OT/PT consult to assist with strengthening/mobility  Outcome: Progressing     Problem: METABOLIC, FLUID AND ELECTROLYTES - ADULT  Goal: Electrolytes maintained within normal limits  Description: INTERVENTIONS:  - Monitor labs and assess patient for signs and symptoms of electrolyte imbalances  - Administer electrolyte replacement as ordered  - Monitor response to electrolyte replacements, including repeat lab results as appropriate  - Instruct patient on fluid and nutrition as appropriate  Outcome: Progressing     Problem: SKIN/TISSUE INTEGRITY - ADULT  Goal: Skin integrity remains intact  Description: INTERVENTIONS  - Identify patients at risk for skin breakdown  - Assess and monitor skin integrity  - Assess and monitor nutrition and hydration status  - Monitor labs (i e  albumin)  - Assess for incontinence   - Turn and reposition patient  - Assist with mobility/ambulation  - Relieve pressure over bony prominences  - Avoid friction and shearing  - Provide appropriate hygiene as needed including keeping skin clean and dry  - Evaluate need for skin moisturizer/barrier cream  - Collaborate with interdisciplinary team (i e  Nutrition, Rehabilitation, etc )   - Patient/family teaching  Outcome: Progressing     Problem: MUSCULOSKELETAL - ADULT  Goal: Maintain or return mobility to safest level of function  Description: INTERVENTIONS:  - Assess patient's ability to carry out ADLs; assess patient's baseline for ADL function and identify physical deficits which impact ability to perform ADLs (bathing, care of mouth/teeth, toileting, grooming, dressing, etc )  - Assess/evaluate cause of self-care deficits   - Assess range of motion  - Assess patient's mobility  - Assess patient's need for assistive devices and provide as appropriate  - Encourage maximum independence but intervene and supervise when necessary  - Involve family in performance of ADLs  - Assess for home care needs following discharge   - Consider OT consult to assist with ADL evaluation and planning for discharge  - Provide patient education as appropriate  Outcome: Progressing     Problem: PAIN - ADULT  Goal: Verbalizes/displays adequate comfort level or baseline comfort level  Description: Interventions:  - Encourage patient to monitor pain and request assistance  - Assess pain using appropriate pain scale  - Administer analgesics based on type and severity of pain and evaluate response  - Implement non-pharmacological measures as appropriate and evaluate response  - Consider cultural and social influences on pain and pain management  - Notify physician/advanced practitioner if interventions unsuccessful or patient reports new pain  Outcome: Progressing Problem: INFECTION - ADULT  Goal: Absence or prevention of progression during hospitalization  Description: INTERVENTIONS:  - Assess and monitor for signs and symptoms of infection  - Monitor lab/diagnostic results  - Monitor all insertion sites, i e  indwelling lines, tubes, and drains  - Monitor endotracheal if appropriate and nasal secretions for changes in amount and color  - [place] appropriate cooling/warming therapies per order  - Administer medications as ordered  - Instruct and encourage patient and family to use good hand hygiene technique  - Identify and instruct in appropriate isolation precautions for identified infection/condition  Outcome: Progressing     Problem: SAFETY ADULT  Goal: Patient will remain free of falls  Description: INTERVENTIONS:  - Assess patient frequently for physical needs  -  Identify cognitive and physical deficits and behaviors that affect risk of falls    -  [place] fall precautions as indicated by assessment   - Educate patient/family on patient safety including physical limitations  - Instruct patient to call for assistance with activity based on assessment  - Modify environment to reduce risk of injury  - Consider OT/PT consult to assist with strengthening/mobility  Outcome: Progressing  Goal: Maintain or return to baseline ADL function  Description: INTERVENTIONS:  -  Assess patient's ability to carry out ADLs; assess patient's baseline for ADL function and identify physical deficits which impact ability to perform ADLs (bathing, care of mouth/teeth, toileting, grooming, dressing, etc )  - Assess/evaluate cause of self-care deficits   - Assess range of motion  - Assess patient's mobility; develop plan if impaired  - Assess patient's need for assistive devices and provide as appropriate  - Encourage maximum independence but intervene and supervise when necessary  - Involve family in performance of ADLs  - Assess for home care needs following discharge   - Consider OT consult to assist with ADL evaluation and planning for discharge  - Provide patient education as appropriate  Outcome: Progressing  Goal: Maintain or return mobility status to optimal level  Description: INTERVENTIONS:  - Assess patient's baseline mobility status (ambulation, transfers, stairs, etc )    - Identify cognitive and physical deficits and behaviors that affect mobility  - Identify mobility aids required to assist with transfers and/or ambulation (gait belt, sit-to-stand, lift, walker, cane, etc )  - [place] fall precautions as indicated by assessment  - Record patient progress and toleration of activity level on Mobility SBAR; progress patient to next Phase/Stage  - Instruct patient to call for assistance with activity based on assessment  - Consider rehabilitation consult to assist with strengthening/weightbearing, etc   Outcome: Progressing     Problem: DISCHARGE PLANNING  Goal: Discharge to home or other facility with appropriate resources  Description: INTERVENTIONS:  - Identify barriers to discharge w/patient and caregiver  - Arrange for needed discharge resources and transportation as appropriate  - Identify discharge learning needs (meds, wound care, etc )  - Arrange for interpretive services to assist at discharge as needed  - Refer to Case Management Department for coordinating discharge planning if the patient needs post-hospital services based on physician/advanced practitioner order or complex needs related to functional status, cognitive ability, or social support system  Outcome: Progressing     Problem: Knowledge Deficit  Goal: Patient/family/caregiver demonstrates understanding of disease process, treatment plan, medications, and discharge instructions  Description: Complete learning assessment and assess knowledge base    Interventions:  - Provide teaching at level of understanding  - Provide teaching via preferred learning methods  Outcome: Progressing     Problem: Nutrition/Hydration-ADULT  Goal: Nutrient/Hydration intake appropriate for improving, restoring or maintaining nutritional needs  Description: Monitor and assess patient's nutrition/hydration status for malnutrition  Collaborate with interdisciplinary team and initiate plan and interventions as ordered  Monitor patient's weight and dietary intake as ordered or per policy  Utilize nutrition screening tool and intervene as necessary  Determine patient's food preferences and provide high-protein, high-caloric foods as appropriate       INTERVENTIONS:  - Monitor oral intake, urinary output, labs, and treatment plans  - Assess nutrition and hydration status and recommend course of action  - Evaluate amount of meals eaten  - Assist patient with eating if necessary   - Allow adequate time for meals  - Recommend/ encourage appropriate diets, oral nutritional supplements, and vitamin/mineral supplements  - Order, calculate, and assess calorie counts as needed  - Recommend, monitor, and adjust tube feedings and TPN/PPN based on assessed needs  - Assess need for intravenous fluids  - Provide specific nutrition/hydration education as appropriate  - Include patient/family/caregiver in decisions related to nutrition  Outcome: Progressing

## 2021-02-15 NOTE — PLAN OF CARE
Problem: Prexisting or High Potential for Compromised Skin Integrity  Goal: Skin integrity is maintained or improved  Description: INTERVENTIONS:  - Identify patients at risk for skin breakdown  - Assess and monitor skin integrity  - Assess and monitor nutrition and hydration status  - Monitor labs   - Assess for incontinence   - Turn and reposition patient  - Assist with mobility/ambulation  - Relieve pressure over bony prominences  - Avoid friction and shearing  - Provide appropriate hygiene as needed including keeping skin clean and dry  - Evaluate need for skin moisturizer/barrier cream  - Collaborate with interdisciplinary team   - Patient/family teaching  - Consider wound care consult   2/15/2021 1510 by Brenda Garber RN  Outcome: Adequate for Discharge  2/15/2021 1402 by Brenda Garber RN  Outcome: Progressing     Problem: Potential for Falls  Goal: Patient will remain free of falls  Description: INTERVENTIONS:  - Assess patient frequently for physical needs  -  Identify cognitive and physical deficits and behaviors that affect risk of falls    -  [place] fall precautions as indicated by assessment   - Educate patient/family on patient safety including physical limitations  - Instruct patient to call for assistance with activity based on assessment  - Modify environment to reduce risk of injury  - Consider OT/PT consult to assist with strengthening/mobility  2/15/2021 1510 by Brenda Garber RN  Outcome: Adequate for Discharge  2/15/2021 1402 by Brenda Garber RN  Outcome: Progressing     Problem: METABOLIC, FLUID AND ELECTROLYTES - ADULT  Goal: Electrolytes maintained within normal limits  Description: INTERVENTIONS:  - Monitor labs and assess patient for signs and symptoms of electrolyte imbalances  - Administer electrolyte replacement as ordered  - Monitor response to electrolyte replacements, including repeat lab results as appropriate  - Instruct patient on fluid and nutrition as appropriate  2/15/2021 1510 by Stephania Smith RN  Outcome: Adequate for Discharge  2/15/2021 1402 by Stephania Smith RN  Outcome: Progressing     Problem: SKIN/TISSUE INTEGRITY - ADULT  Goal: Skin integrity remains intact  Description: INTERVENTIONS  - Identify patients at risk for skin breakdown  - Assess and monitor skin integrity  - Assess and monitor nutrition and hydration status  - Monitor labs (i e  albumin)  - Assess for incontinence   - Turn and reposition patient  - Assist with mobility/ambulation  - Relieve pressure over bony prominences  - Avoid friction and shearing  - Provide appropriate hygiene as needed including keeping skin clean and dry  - Evaluate need for skin moisturizer/barrier cream  - Collaborate with interdisciplinary team (i e  Nutrition, Rehabilitation, etc )   - Patient/family teaching  2/15/2021 1510 by Stephania Smith RN  Outcome: Adequate for Discharge  2/15/2021 1402 by Stephania Smith RN  Outcome: Progressing     Problem: MUSCULOSKELETAL - ADULT  Goal: Maintain or return mobility to safest level of function  Description: INTERVENTIONS:  - Assess patient's ability to carry out ADLs; assess patient's baseline for ADL function and identify physical deficits which impact ability to perform ADLs (bathing, care of mouth/teeth, toileting, grooming, dressing, etc )  - Assess/evaluate cause of self-care deficits   - Assess range of motion  - Assess patient's mobility  - Assess patient's need for assistive devices and provide as appropriate  - Encourage maximum independence but intervene and supervise when necessary  - Involve family in performance of ADLs  - Assess for home care needs following discharge   - Consider OT consult to assist with ADL evaluation and planning for discharge  - Provide patient education as appropriate  2/15/2021 1510 by Stephania Smith RN  Outcome: Adequate for Discharge  2/15/2021 1402 by Stephania Smith RN  Outcome: Progressing     Problem: PAIN - ADULT  Goal: Verbalizes/displays adequate comfort level or baseline comfort level  Description: Interventions:  - Encourage patient to monitor pain and request assistance  - Assess pain using appropriate pain scale  - Administer analgesics based on type and severity of pain and evaluate response  - Implement non-pharmacological measures as appropriate and evaluate response  - Consider cultural and social influences on pain and pain management  - Notify physician/advanced practitioner if interventions unsuccessful or patient reports new pain  2/15/2021 1510 by Stephania Smith RN  Outcome: Adequate for Discharge  2/15/2021 1402 by Stephania Smith RN  Outcome: Progressing     Problem: INFECTION - ADULT  Goal: Absence or prevention of progression during hospitalization  Description: INTERVENTIONS:  - Assess and monitor for signs and symptoms of infection  - Monitor lab/diagnostic results  - Monitor all insertion sites, i e  indwelling lines, tubes, and drains  - Monitor endotracheal if appropriate and nasal secretions for changes in amount and color  - [place] appropriate cooling/warming therapies per order  - Administer medications as ordered  - Instruct and encourage patient and family to use good hand hygiene technique  - Identify and instruct in appropriate isolation precautions for identified infection/condition  2/15/2021 1510 by Stephania Smith RN  Outcome: Adequate for Discharge  2/15/2021 1402 by Stephania Smith RN  Outcome: Progressing     Problem: SAFETY ADULT  Goal: Patient will remain free of falls  Description: INTERVENTIONS:  - Assess patient frequently for physical needs  -  Identify cognitive and physical deficits and behaviors that affect risk of falls    -  [place] fall precautions as indicated by assessment   - Educate patient/family on patient safety including physical limitations  - Instruct patient to call for assistance with activity based on assessment  - Modify environment to reduce risk of injury  - Consider OT/PT consult to assist with strengthening/mobility  2/15/2021 1510 by Iggy Ragland RN  Outcome: Adequate for Discharge  2/15/2021 1402 by Iggy Ragland RN  Outcome: Progressing  Goal: Maintain or return to baseline ADL function  Description: INTERVENTIONS:  -  Assess patient's ability to carry out ADLs; assess patient's baseline for ADL function and identify physical deficits which impact ability to perform ADLs (bathing, care of mouth/teeth, toileting, grooming, dressing, etc )  - Assess/evaluate cause of self-care deficits   - Assess range of motion  - Assess patient's mobility; develop plan if impaired  - Assess patient's need for assistive devices and provide as appropriate  - Encourage maximum independence but intervene and supervise when necessary  - Involve family in performance of ADLs  - Assess for home care needs following discharge   - Consider OT consult to assist with ADL evaluation and planning for discharge  - Provide patient education as appropriate  2/15/2021 1510 by Iggy Ragland RN  Outcome: Adequate for Discharge  2/15/2021 1402 by Iggy Ragland RN  Outcome: Progressing  Goal: Maintain or return mobility status to optimal level  Description: INTERVENTIONS:  - Assess patient's baseline mobility status (ambulation, transfers, stairs, etc )    - Identify cognitive and physical deficits and behaviors that affect mobility  - Identify mobility aids required to assist with transfers and/or ambulation (gait belt, sit-to-stand, lift, walker, cane, etc )  - [place] fall precautions as indicated by assessment  - Record patient progress and toleration of activity level on Mobility SBAR; progress patient to next Phase/Stage  - Instruct patient to call for assistance with activity based on assessment  - Consider rehabilitation consult to assist with strengthening/weightbearing, etc   2/15/2021 1510 by Iggy Ragland RN  Outcome: Adequate for Discharge  2/15/2021 1402 by Iggy Ragland RN  Outcome: Progressing     Problem: DISCHARGE PLANNING  Goal: Discharge to home or other facility with appropriate resources  Description: INTERVENTIONS:  - Identify barriers to discharge w/patient and caregiver  - Arrange for needed discharge resources and transportation as appropriate  - Identify discharge learning needs (meds, wound care, etc )  - Arrange for interpretive services to assist at discharge as needed  - Refer to Case Management Department for coordinating discharge planning if the patient needs post-hospital services based on physician/advanced practitioner order or complex needs related to functional status, cognitive ability, or social support system  2/15/2021 1510 by Harry Patino RN  Outcome: Adequate for Discharge  2/15/2021 1402 by Harry Patino RN  Outcome: Progressing     Problem: Knowledge Deficit  Goal: Patient/family/caregiver demonstrates understanding of disease process, treatment plan, medications, and discharge instructions  Description: Complete learning assessment and assess knowledge base  Interventions:  - Provide teaching at level of understanding  - Provide teaching via preferred learning methods  2/15/2021 1510 by Harry Patino RN  Outcome: Adequate for Discharge  2/15/2021 1402 by Harry Patino RN  Outcome: Progressing     Problem: Nutrition/Hydration-ADULT  Goal: Nutrient/Hydration intake appropriate for improving, restoring or maintaining nutritional needs  Description: Monitor and assess patient's nutrition/hydration status for malnutrition  Collaborate with interdisciplinary team and initiate plan and interventions as ordered  Monitor patient's weight and dietary intake as ordered or per policy  Utilize nutrition screening tool and intervene as necessary  Determine patient's food preferences and provide high-protein, high-caloric foods as appropriate       INTERVENTIONS:  - Monitor oral intake, urinary output, labs, and treatment plans  - Assess nutrition and hydration status and recommend course of action  - Evaluate amount of meals eaten  - Assist patient with eating if necessary   - Allow adequate time for meals  - Recommend/ encourage appropriate diets, oral nutritional supplements, and vitamin/mineral supplements  - Order, calculate, and assess calorie counts as needed  - Recommend, monitor, and adjust tube feedings and TPN/PPN based on assessed needs  - Assess need for intravenous fluids  - Provide specific nutrition/hydration education as appropriate  - Include patient/family/caregiver in decisions related to nutrition  2/15/2021 1510 by Niko Brown, RN  Outcome: Adequate for Discharge  2/15/2021 1402 by Niko Brown, RN  Outcome: Progressing

## 2021-02-15 NOTE — PLAN OF CARE
Problem: PHYSICAL THERAPY ADULT  Goal: Performs mobility at highest level of function for planned discharge setting  See evaluation for individualized goals  Description: Treatment/Interventions: Functional transfer training, LE strengthening/ROM, Therapeutic exercise, Endurance training, Patient/family training, Equipment eval/education, Bed mobility, Gait training, Spoke to nursing, Spoke to case management  Equipment Recommended: (continue RW trial)       See flowsheet documentation for full assessment, interventions and recommendations  Outcome: Progressing  Note: Prognosis: Fair  Problem List: Decreased strength, Decreased endurance, Impaired balance, Decreased mobility, Decreased safety awareness, Impaired judgement, Decreased cognition, Decreased coordination  Assessment: Pt seen for PT treatment session this date with interventions consisting of gait training w/ emphasis on improving pt's ability to ambulate level surfaces x 12 feet total with min A provided by therapist with RW and Therapeutic exercise consisting of: AROM 25 reps B LE in sitting position  Pt agreeable to PT treatment session upon arrival, pt found supine in bed w/ HOB elevated, in no apparent distress and A&O x 1  In comparison to previous session, pt with improvements in ambulatory distance  Post session: pt returned BTB, bed alarm engaged, all needs in reach and RN notified of session findings/recommendations Continue to recommend STR at time of d/c in order to maximize pt's functional independence and safety w/ mobility  Pt continues to be functioning below baseline level, and remains limited 2* factors listed above and including weakness, decreased endurance, decreased cognition, gait deviations  PT will continue to see pt while here in order to address the deficits listed above and provide interventions consistent w/ POC in effort to achieve LTGs    Barriers to Discharge: Inaccessible home environment, Decreased caregiver support, Other (Comment)  Barriers to Discharge Comments: Inability to safely advance functional tasks re:cognitive status  PT Discharge Recommendation: Post-Acute Rehabilitation Services(continued recommendation)     PT - OK to Discharge: Yes(if medically stable,to PAR)    See flowsheet documentation for full assessment

## 2021-02-15 NOTE — ASSESSMENT & PLAN NOTE
· Visual hallucinations were secondary to delirium  · Appreciate Psychiatry input  · Discharge on sertraline and Seroquel at the current dosages

## 2021-02-15 NOTE — PLAN OF CARE
Problem: Prexisting or High Potential for Compromised Skin Integrity  Goal: Skin integrity is maintained or improved  Description: INTERVENTIONS:  - Identify patients at risk for skin breakdown  - Assess and monitor skin integrity  - Assess and monitor nutrition and hydration status  - Monitor labs   - Assess for incontinence   - Turn and reposition patient  - Assist with mobility/ambulation  - Relieve pressure over bony prominences  - Avoid friction and shearing  - Provide appropriate hygiene as needed including keeping skin clean and dry  - Evaluate need for skin moisturizer/barrier cream  - Collaborate with interdisciplinary team   - Patient/family teaching  - Consider wound care consult   Outcome: Progressing     Problem: Potential for Falls  Goal: Patient will remain free of falls  Description: INTERVENTIONS:  - Assess patient frequently for physical needs  -  Identify cognitive and physical deficits and behaviors that affect risk of falls    -  [place] fall precautions as indicated by assessment   - Educate patient/family on patient safety including physical limitations  - Instruct patient to call for assistance with activity based on assessment  - Modify environment to reduce risk of injury  - Consider OT/PT consult to assist with strengthening/mobility  Outcome: Progressing     Problem: METABOLIC, FLUID AND ELECTROLYTES - ADULT  Goal: Electrolytes maintained within normal limits  Description: INTERVENTIONS:  - Monitor labs and assess patient for signs and symptoms of electrolyte imbalances  - Administer electrolyte replacement as ordered  - Monitor response to electrolyte replacements, including repeat lab results as appropriate  - Instruct patient on fluid and nutrition as appropriate  Outcome: Progressing     Problem: SKIN/TISSUE INTEGRITY - ADULT  Goal: Skin integrity remains intact  Description: INTERVENTIONS  - Identify patients at risk for skin breakdown  - Assess and monitor skin integrity  - Assess and monitor nutrition and hydration status  - Monitor labs (i e  albumin)  - Assess for incontinence   - Turn and reposition patient  - Assist with mobility/ambulation  - Relieve pressure over bony prominences  - Avoid friction and shearing  - Provide appropriate hygiene as needed including keeping skin clean and dry  - Evaluate need for skin moisturizer/barrier cream  - Collaborate with interdisciplinary team (i e  Nutrition, Rehabilitation, etc )   - Patient/family teaching  Outcome: Progressing     Problem: MUSCULOSKELETAL - ADULT  Goal: Maintain or return mobility to safest level of function  Description: INTERVENTIONS:  - Assess patient's ability to carry out ADLs; assess patient's baseline for ADL function and identify physical deficits which impact ability to perform ADLs (bathing, care of mouth/teeth, toileting, grooming, dressing, etc )  - Assess/evaluate cause of self-care deficits   - Assess range of motion  - Assess patient's mobility  - Assess patient's need for assistive devices and provide as appropriate  - Encourage maximum independence but intervene and supervise when necessary  - Involve family in performance of ADLs  - Assess for home care needs following discharge   - Consider OT consult to assist with ADL evaluation and planning for discharge  - Provide patient education as appropriate  Outcome: Progressing     Problem: PAIN - ADULT  Goal: Verbalizes/displays adequate comfort level or baseline comfort level  Description: Interventions:  - Encourage patient to monitor pain and request assistance  - Assess pain using appropriate pain scale  - Administer analgesics based on type and severity of pain and evaluate response  - Implement non-pharmacological measures as appropriate and evaluate response  - Consider cultural and social influences on pain and pain management  - Notify physician/advanced practitioner if interventions unsuccessful or patient reports new pain  Outcome: Progressing Problem: INFECTION - ADULT  Goal: Absence or prevention of progression during hospitalization  Description: INTERVENTIONS:  - Assess and monitor for signs and symptoms of infection  - Monitor lab/diagnostic results  - Monitor all insertion sites, i e  indwelling lines, tubes, and drains  - Monitor endotracheal if appropriate and nasal secretions for changes in amount and color  - [place] appropriate cooling/warming therapies per order  - Administer medications as ordered  - Instruct and encourage patient and family to use good hand hygiene technique  - Identify and instruct in appropriate isolation precautions for identified infection/condition  Outcome: Progressing     Problem: SAFETY ADULT  Goal: Patient will remain free of falls  Description: INTERVENTIONS:  - Assess patient frequently for physical needs  -  Identify cognitive and physical deficits and behaviors that affect risk of falls    -  [place] fall precautions as indicated by assessment   - Educate patient/family on patient safety including physical limitations  - Instruct patient to call for assistance with activity based on assessment  - Modify environment to reduce risk of injury  - Consider OT/PT consult to assist with strengthening/mobility  Outcome: Progressing  Goal: Maintain or return to baseline ADL function  Description: INTERVENTIONS:  -  Assess patient's ability to carry out ADLs; assess patient's baseline for ADL function and identify physical deficits which impact ability to perform ADLs (bathing, care of mouth/teeth, toileting, grooming, dressing, etc )  - Assess/evaluate cause of self-care deficits   - Assess range of motion  - Assess patient's mobility; develop plan if impaired  - Assess patient's need for assistive devices and provide as appropriate  - Encourage maximum independence but intervene and supervise when necessary  - Involve family in performance of ADLs  - Assess for home care needs following discharge   - Consider OT consult to assist with ADL evaluation and planning for discharge  - Provide patient education as appropriate  Outcome: Progressing  Goal: Maintain or return mobility status to optimal level  Description: INTERVENTIONS:  - Assess patient's baseline mobility status (ambulation, transfers, stairs, etc )    - Identify cognitive and physical deficits and behaviors that affect mobility  - Identify mobility aids required to assist with transfers and/or ambulation (gait belt, sit-to-stand, lift, walker, cane, etc )  - [place] fall precautions as indicated by assessment  - Record patient progress and toleration of activity level on Mobility SBAR; progress patient to next Phase/Stage  - Instruct patient to call for assistance with activity based on assessment  - Consider rehabilitation consult to assist with strengthening/weightbearing, etc   Outcome: Progressing     Problem: DISCHARGE PLANNING  Goal: Discharge to home or other facility with appropriate resources  Description: INTERVENTIONS:  - Identify barriers to discharge w/patient and caregiver  - Arrange for needed discharge resources and transportation as appropriate  - Identify discharge learning needs (meds, wound care, etc )  - Arrange for interpretive services to assist at discharge as needed  - Refer to Case Management Department for coordinating discharge planning if the patient needs post-hospital services based on physician/advanced practitioner order or complex needs related to functional status, cognitive ability, or social support system  Outcome: Progressing     Problem: Knowledge Deficit  Goal: Patient/family/caregiver demonstrates understanding of disease process, treatment plan, medications, and discharge instructions  Description: Complete learning assessment and assess knowledge base    Interventions:  - Provide teaching at level of understanding  - Provide teaching via preferred learning methods  Outcome: Progressing     Problem: Nutrition/Hydration-ADULT  Goal: Nutrient/Hydration intake appropriate for improving, restoring or maintaining nutritional needs  Description: Monitor and assess patient's nutrition/hydration status for malnutrition  Collaborate with interdisciplinary team and initiate plan and interventions as ordered  Monitor patient's weight and dietary intake as ordered or per policy  Utilize nutrition screening tool and intervene as necessary  Determine patient's food preferences and provide high-protein, high-caloric foods as appropriate       INTERVENTIONS:  - Monitor oral intake, urinary output, labs, and treatment plans  - Assess nutrition and hydration status and recommend course of action  - Evaluate amount of meals eaten  - Assist patient with eating if necessary   - Allow adequate time for meals  - Recommend/ encourage appropriate diets, oral nutritional supplements, and vitamin/mineral supplements  - Order, calculate, and assess calorie counts as needed  - Recommend, monitor, and adjust tube feedings and TPN/PPN based on assessed needs  - Assess need for intravenous fluids  - Provide specific nutrition/hydration education as appropriate  - Include patient/family/caregiver in decisions related to nutrition  Outcome: Progressing

## 2021-02-15 NOTE — ASSESSMENT & PLAN NOTE
· Close exposure to COVID-19 during hospitalization  · Continue treatment with vitamin supplementation  · Patient to remain on airborne precautions until 02/17/2021  · Patient has tested negative for COVID-19 3 times since 02/03/2021, there will be no further COVID-19 testing  · Patient is currently 100% on room air

## 2021-02-15 NOTE — ASSESSMENT & PLAN NOTE
Wt Readings from Last 3 Encounters:   02/15/21 48 2 kg (106 lb 4 2 oz)   08/21/20 60 7 kg (133 lb 13 1 oz)   08/17/20 59 kg (130 lb 1 1 oz)     · Initially with an acute exacerbation of diastolic dysfunction CHF which has since resolved  · Continue current cardiac based medications which include apixaban, metoprolol, diltiazem, Lasix, and pravastatin

## 2021-02-15 NOTE — OCCUPATIONAL THERAPY NOTE
02/15/21 1041   OT Last Visit   OT Visit Date 02/15/21   Note Type   Note Type Treatment   Restrictions/Precautions   Weight Bearing Precautions Per Order No   Other Precautions Contact/isolation; Airborne/isolation;Cognitive; Bed Alarm; Fall Risk   Pain Assessment   Pain Assessment Tool Pain Assessment not indicated - pt denies pain   Pain Score No Pain   ADL   Toileting Assistance  4  Minimal Assistance   Toileting Deficit Setup;Verbal cueing;Supervison/safety; Increased time to complete;Perineal hygiene   Toileting Comments Min A to ensure cleanliness with pericare   Bed Mobility   Supine to Sit 5  Supervision   Additional items Assist x 1;HOB elevated; Bedrails; Increased time required;Verbal cues   Sit to Supine 5  Supervision   Additional items Assist x 1;HOB elevated; Bedrails; Increased time required;Verbal cues   Additional Comments Sat EOB x 23 min to  complete TE with good sitting balance noted  Transfers   Sit to Stand 5  Supervision   Additional items Assist x 1;Bedrails; Increased time required;Verbal cues   Stand to Sit 5  Supervision   Additional items Assist x 1; Armrests; Increased time required;Verbal cues   Stand pivot 5  Supervision   Additional items Assist x 1;Verbal cues   Toilet transfer 5  Supervision   Additional items Assist x 1;Verbal cues; Commode   Toilet Transfers   Toilet Transfer From [employer] Transfer Type To and from   Toilet Transfer to Standard bedside commode   Toilet Transfer Technique Stand pivot; To right; To left   Toilet Transfers Supervision   Therapeutic Excerise-Strength   UE Strength Yes   Right Upper Extremity- Strength   R Shoulder Flexion; Extension;Horizontal ABduction  (horiz  add, scapular pro/ret)   R Elbow Elbow flexion;Elbow extension   R Wrist   (wrist rotation)   R Weight/Reps/Sets 1 pound weight x 15 reps   Left Upper Extremity-Strength   L Weights/Reps/Sets TE same as R UE above   Cognition   Overall Cognitive Status Impaired   Arousal/Participation Alert; Cooperative   Attention Attends with cues to redirect   Orientation Level Disoriented to place; Disoriented to time;Disoriented to situation   Memory Decreased recall of precautions   Following Commands Follows one step commands with increased time or repetition   Comments Pt agreeable to OT treatment  Activity Tolerance   Activity Tolerance Patient limited by fatigue   Assessment   Assessment Patient participated in Skilled OT session this date with interventions consisting of functional transfer training, ADL re training with the use of correct body mechnaics, Energy Conservation techniques and therapeutic exercise to: increase functional use of BUEs, increase BUE muscle strength    Patient agreeable to OT treatment session, upon arrival patient was found supine in bed  Patient performs bed mobility, supine to sit EOB, with Supervision x 1  Patient then transfers sit to stand and pivots to/from commode with supervision x 1  Requires min A with pericare to ensure cleanliness post toileting  Patient then sits EOB x 23 min with no LOB boted in order to complete UB TE as detailed in flow sheet  Session ended with patient supine in bed, SCDs reapplied and all needs met  In comparison to previous session, patient with improvements in functional transfers, UB strength and endurance   Patient requiring verbal cues for correct technique, verbal cues for pacing thru activity steps, frequent rest periods and ocassional safety reminders  Patient performance  demonstrated varied carryover of learned techniques and strategies to facilitate safety during functional tasks  Patient continues to be functioning below baseline level, occupational performance remains limited secondary to factors listed above and increased risk for falls and injury  From OT standpoint, recommendation at time of d/c would be Short Term Rehab    Patient to benefit from continued Occupational Therapy treatment while in the hospital to address deficits as defined above and maximize level of functional independence with ADLs and functional mobility in order to return to PLOF  Plan   Treatment Interventions ADL retraining;Functional transfer training;UE strengthening/ROM; Energy conservation   Goal Expiration Date 02/14/21   OT Treatment Day 3   OT Frequency 3-5x/wk   Recommendation   OT Discharge Recommendation Post-Acute Rehabilitation Services   OT - OK to Discharge Yes  (when medically cleared)   [address] [facility]   Following a Speech/Presentation 3   Understanding Ordinary Conversation 3   Taking Medications 2   Remembering Where Things Are Placed or Put Away 2   Remembering List of 4-5 Errands 2   Taking Care of Complicated Tasks 2   Applied Cognition Raw Score 14   Applied Cognition Standardized Score 32 02   Emaline DON Thapa

## 2021-02-15 NOTE — PROGRESS NOTES
Progress Note - Shanelle Mg 1935, 81 y o  female MRN: [id number]    Unit/Bed#: -01 Encounter: [id number]    Primary Care Provider: Emerald Arnold MD   Date and time admitted to hospital: 2/3/2021 12:53 PM        * Generalized weakness  Assessment & Plan  · Patient remains medically stable for discharge  · Generalized weakness is secondary to osteoarthritis  · Status post a recent rehab stay  · Case management is working on skilled nursing facility placement, will discharge as soon as we have a safe disposition    Close exposure to COVID-19 virus  Assessment & Plan  · Close exposure to COVID-19 during hospitalization  · Continue treatment with vitamin supplementation  · Patient to remain on airborne precautions until 02/17/2021  · Patient has tested negative for COVID-19 3 times since 02/03/2021, there will be no further COVID-19 testing  · Patient is currently 100% on room air      Acute on chronic diastolic heart failure (HCC)  Assessment & Plan  Wt Readings from Last 3 Encounters:   02/15/21 48 2 kg (106 lb 4 2 oz)   08/21/20 60 7 kg (133 lb 13 1 oz)   08/17/20 59 kg (130 lb 1 1 oz)     · Initially with an acute exacerbation of diastolic dysfunction CHF which has since resolved  · Continue current cardiac based medications which include apixaban, metoprolol, diltiazem, Lasix, and pravastatin    Cognitive impairment  Assessment & Plan  · Suspected underlying dementia without formal diagnosis  · Supportive care        Essential hypertension  Assessment & Plan  · continue metoprolol and diltiazem  · Monitor blood pressure closely          Longstanding persistent atrial fibrillation (HCC)  Assessment & Plan  · Rate controlled with metoprolol, and diltiazem  · Anticoagulation with Eliquis      OAB (overactive bladder)  Assessment & Plan  · continue oxybutynin          Visual hallucinations  Assessment & Plan  · Visual hallucinations were secondary to delirium  · Appreciate Psychiatry input  · Continue sertraline, and Seroquel at the current dosages        VTE Prophylaxis:  Apixaban (Eliquis)    Patient Centered Rounds: I have performed bedside rounds with nursing staff today  Discussions with Specialists or Other Care Team Provider:  Case management, nursing, pharmacy  Education and Discussions with Family / Patient:  The patient's daughter Anthony Miguel was brought up to [facility] at phone number [phone] at 10:47 a m , all questions answered to her satisfaction    Current Length of Stay: 12 day(s)    Current Patient Status: Inpatient   Certification Statement: The patient will continue to require additional inpatient hospital stay due to The need for the insurance authorization so that the patient can be transferred to the skilled nursing facility    Discharge Plan:  Patient remains medically stable for discharge, case management is working on the final disposition    Code Status: Level 3 - DNAR and DNI    Subjective:   Patient seen and examined, sitting up in bed, appears comfortable, denies pain or discomfort  Feels a little tired  Objective:     Vitals:   Temp (24hrs), Av 6 °F (36 4 °C), Min:97 °F (36 1 °C), Max:99 °F (37 2 °C)    Temp:  [97 °F (36 1 °C)-99 °F (37 2 °C)] 97 °F (36 1 °C)  HR:  [] 96  Resp:  [18-20] 18  BP: (104-130)/(60-82) 130/82  SpO2:  [94 %-100 %] 100 %  Body mass index is 21 46 kg/m²  Input and Output Summary (last 24 hours): Intake/Output Summary (Last 24 hours) at 2/15/2021 1045  Last data filed at 2/15/2021 0900  Gross per 24 hour   Intake 600 ml   Output 400 ml   Net 200 ml       Physical Exam:   Physical Exam  Constitutional:       General: She is not in acute distress  Appearance: Normal appearance  She is normal weight  She is not ill-appearing  HENT:      Head: Normocephalic and atraumatic  Nose: Nose normal       Mouth/Throat:      Mouth: Mucous membranes are moist    Eyes:      Extraocular Movements: Extraocular movements intact  Pupils: Pupils are equal, round, and reactive to light  Neck:      Musculoskeletal: Normal range of motion and neck supple  No neck rigidity  Cardiovascular:      Rate and Rhythm: Normal rate and regular rhythm  Pulses: Normal pulses  Heart sounds: Normal heart sounds  No murmur  No friction rub  No gallop  Pulmonary:      Effort: Pulmonary effort is normal  No respiratory distress  Breath sounds: Normal breath sounds  No wheezing, rhonchi or rales  Abdominal:      General: There is no distension  Palpations: Abdomen is soft  There is no mass  Tenderness: There is no abdominal tenderness  Hernia: No hernia is present  Musculoskeletal: Normal range of motion  General: No swelling or tenderness  Right lower leg: No edema  Left lower leg: No edema  Skin:     General: Skin is warm  Capillary Refill: Capillary refill takes less than 2 seconds  Findings: No erythema or rash  Neurological:      General: No focal deficit present  Mental Status: She is alert and oriented to person, place, and time  Mental status is at baseline  Cranial Nerves: No cranial nerve deficit  Motor: No weakness  Psychiatric:         Mood and Affect: Mood normal          Behavior: Behavior normal          Additional Data:     Labs:    Results from last 7 days   Lab Units 02/13/21  0557   WBC Thousand/uL 7 20   HEMOGLOBIN g/dL 13 8   HEMATOCRIT % 41 7*   PLATELETS Thousands/uL 256     Results from last 7 days   Lab Units 02/14/21  0630   SODIUM mmol/L 138   POTASSIUM mmol/L 3 9   CHLORIDE mmol/L 97*   CO2 mmol/L 32*   BUN mg/dL 19   CREATININE mg/dL 0 79   CALCIUM mg/dL 9 7         Results from last 7 days   Lab Units 02/12/21  0636   POC GLUCOSE mg/dl 96           * I Have Reviewed All Lab Data Listed Above  * Additional Pertinent Lab Tests Reviewed:  All Labs For Current Hospital Admission  Reviewed    Imaging:  Imaging Reports Reviewed Today Include: None    Recent Cultures (last 7 days):           Last 24 Hours Medication List:   Current Facility-Administered Medications   Medication Dose Route Frequency Provider Last Rate    acetaminophen  650 mg Oral Q6H PRN Bella Mcrae MD      apixaban  2 5 mg Oral BID Bella Mcrae MD      cholecalciferol  2,000 Units Oral Daily Maxx Juárez PA-C      diltiazem  90 mg Oral [facility] Bella Mcrae MD      famotidine  20 mg Oral Daily Maxx Juárez PA-C      furosemide  20 mg Oral Early Morning NEAL Fuchs      hydrOXYzine HCL  10 mg Oral BID PRN Bella Mcrae MD      metoprolol tartrate  25 mg Oral BID Bella Mcrae MD      ondansetron  4 mg Oral Q6H PRN Maria Eugenia Jesus PA-C      oxybutynin  5 mg Oral Daily Bella Mcrae MD      pantoprazole  40 mg Oral Early Morning Bella Mcrae MD      potassium chloride  10 mEq Oral Daily NEAL Fuchs      pravastatin  40 mg Oral Daily With Marion Hall MD      QUEtiapine  25 mg Oral HS Lum Bread, DO      sertraline  75 mg Oral Daily Alek Pryor PA-C          Today, Patient Was Seen By: Ebony Reilly MD    ** Please Note: Dictation voice to text software may have been used in the creation of this document   **

## 2021-05-26 NOTE — PROGRESS NOTES
[facility] [place], [place]    Name: Javier Gonzalez  YOB: 1935      Assessment/Plan:    Hyponatremia    Continue with low sodium diet and fluid restriction  Hypomagnesemia    Reduce Mag-oxide to three times a week  Diastolic heart failure (HCC)  Wt Readings from Last 3 Encounters:   09/04/19 65 2 kg (143 lb 12 8 oz)   08/08/19 67 1 kg (148 lb)   07/12/19 74 6 kg (164 lb 6 4 oz)     Patient is compensated at this time  Mixed hyperlipidemia  Continue with pravastatin  Problem List Items Addressed This Visit        Cardiovascular and Mediastinum    A-fib ([address] )    Diastolic heart failure (HCC)     Wt Readings from Last 3 Encounters:   09/04/19 65 2 kg (143 lb 12 8 oz)   08/08/19 67 1 kg (148 lb)   07/12/19 74 6 kg (164 lb 6 4 oz)     Patient is compensated at this time  Genitourinary    Chronic kidney disease, stage 2 (mild) - Primary       Other    Hyponatremia       Continue with low sodium diet and fluid restriction  Hypomagnesemia       Reduce Mag-oxide to three times a week  RESOLVED: Hypokalemia            Subjective:      Patient ID: Javier Gonzalez is a 81 y o  female  Hypertension   This is a chronic problem  The current episode started more than 1 year ago  The problem is unchanged  The problem is controlled  Pertinent negatives include no chest pain, headaches or palpitations  There are no associated agents to hypertension  Risk factors for coronary artery disease include dyslipidemia and obesity  There are no compliance problems  Hypertensive end-organ damage includes heart failure         The following portions of the patient's history were reviewed and updated as appropriate: allergies, current medications, past family history, past medical history, past social history, past surgical history and problem list     Review of Systems   Cardiovascular: Negative for chest pain and palpitations  Neurological: Negative for headaches  All other systems reviewed and are negative  Social History     Socioeconomic History    Marital status:      Spouse name: Not on file    Number of children: Not on file    Years of education: Not on file    Highest education level: Not on file   Occupational History    Not on file   Social Needs    Financial resource strain: Not on file    Food insecurity:     Worry: Not on file     Inability: Not on file    Transportation needs:     Medical: Not on file     Non-medical: Not on file   Tobacco Use    Smoking status: Never Smoker    Smokeless tobacco: Never Used   Substance and Sexual Activity    Alcohol use: Not Currently    Drug use: Never    Sexual activity: Not Currently   Lifestyle    Physical activity:     Days per week: Not on file     Minutes per session: Not on file    Stress: Not on file   Relationships    Social connections:     Talks on phone: Not on file     Gets together: Not on file     Attends Restorationist service: Not on file     Active member of club or organization: Not on file     Attends meetings of clubs or organizations: Not on file     Relationship status: Not on file    Intimate partner violence:     Fear of current or ex partner: Not on file     Emotionally abused: Not on file     Physically abused: Not on file     Forced sexual activity: Not on file   Other Topics Concern    Not on file   Social History Narrative    Not on file     Past Medical History:   Diagnosis Date    Acute on chronic diastolic congestive heart failure ([facility] [address] ) 6/27/2019    Arthritis     Coronary artery disease     history of stenting    Eczema     years    History of echocardiogram 07/20/2017    EF 55%, mild concentric LVH, bileaflet MVP with moderate MR, left atrial enlargement      History of transfusion     Hyperlipidemia     Hypertension     Hypokalemia 7/11/2019    Mitral regurgitation      Past Surgical History: Procedure Laterality Date    ABDOMINAL SURGERY      hysterectomy    CARDIAC CATHETERIZATION  04/10/2012    EF 65%, no significant CAD, patent stents, severe MR     CORONARY ANGIOPLASTY WITH STENT PLACEMENT  03/21/2007    EF 55%, GARRET to LAD      EYE SURGERY      b/l cataracts    HYSTERECTOMY      JOINT REPLACEMENT      Rt knee       Current Outpatient Medications:     ALPRAZolam (XANAX) 0 25 mg tablet, Take 0 25 mg by mouth daily at bedtime as needed for anxiety, Disp: , Rfl:     apixaban (ELIQUIS) 2 5 mg, Take 1 tablet (2 5 mg total) by mouth 2 (two) times a day, Disp: 180 tablet, Rfl: 3    diltiazem (CARDIZEM) 90 mg tablet, Take 1 tablet (90 mg total) by mouth 3 (three) times a day, Disp: 90 tablet, Rfl: 0    furosemide (LASIX) 40 mg tablet, Take 1 tablet (40 mg total) by mouth daily, Disp: 90 tablet, Rfl: 3    magnesium oxide (MAG-OX) 400 mg, Take 1 tablet (400 mg total) by mouth daily, Disp: 30 tablet, Rfl: 0    metoprolol tartrate (LOPRESSOR) 25 mg tablet, Take 1 tablet (25 mg total) by mouth 3 (three) times a day, Disp: 90 tablet, Rfl: 0    oxybutynin (DITROPAN) 5 mg tablet, Take 5 mg by mouth daily, Disp: , Rfl:     PANTOPRAZOLE SODIUM PO, Take by mouth daily, Disp: , Rfl:     pravastatin (PRAVACHOL) 40 mg tablet, Take 1 tablet (40 mg total) by mouth daily with dinner, Disp: 30 tablet, Rfl: 0    sertraline (ZOLOFT) 50 mg tablet, Take 50 mg by mouth daily, Disp: , Rfl:     Lab Results   Component Value Date     04/05/2018    SODIUM 134 (L) 08/08/2019    K 3 7 08/08/2019     08/08/2019    CO2 29 08/08/2019    ANIONGAP 12 5 04/05/2018    AGAP 5 08/08/2019    BUN 15 08/08/2019    CREATININE 0 72 08/08/2019    GLUC 98 07/18/2019    GLUF 115 (H) 08/08/2019    CALCIUM 9 5 08/08/2019    AST 16 07/08/2019    ALT 17 07/08/2019    ALKPHOS 49 (L) 07/08/2019    PROT 7 3 04/05/2018    TP 6 4 07/08/2019    BILITOT 0 4 04/05/2018    TBILI 1 00 07/08/2019    EGFR 78 08/08/2019     Lab Results Component Value Date    WBC 7 80 07/08/2019    HGB 12 1 07/18/2019    HCT 38 9 07/18/2019    MCV 90 07/08/2019     07/08/2019     Lab Results   Component Value Date    CHOLESTEROL 149 03/15/2019     Lab Results   Component Value Date    HDL 61 (H) 03/15/2019    HDL 56 04/05/2018     Lab Results   Component Value Date    LDLCALC 69 03/15/2019    LDLCALC 72 1 (L) 04/05/2018     Lab Results   Component Value Date    TRIG 94 03/15/2019    TRIG 119 04/05/2018     No results found for: [place], [place]  Lab Results   Component Value Date    GCU1YQJTEROE 2 210 08/08/2019     Lab Results   Component Value Date    CALCIUM 9 5 08/08/2019    PHOS 5 2 06/26/2019     No results found for: SPEP, UPEP  No results found for: IQRA LOCO4HUR        Objective:      /72 (BP Location: Left arm, Patient Position: Sitting, Cuff Size: Standard)   Ht 4' 11" (1 499 m)   Wt 65 2 kg (143 lb 12 8 oz)   BMI 29 04 kg/m²          Physical Exam   Constitutional: She is oriented to person, place, and time  She appears well-developed and well-nourished  No distress  HENT:   Head: Normocephalic and atraumatic  Eyes: Conjunctivae are normal    Neck: Neck supple  Cardiovascular: Normal rate and regular rhythm  Murmur heard  Pulmonary/Chest: Effort normal and breath sounds normal    Abdominal: Soft  Musculoskeletal: She exhibits no edema  Neurological: She is alert and oriented to person, place, and time  No cranial nerve deficit  Skin: Skin is warm  No rash noted  Psychiatric: She has a normal mood and affect   Her behavior is normal        Nelson Andrade DO complains of pain/discomfort

## 2021-07-01 ENCOUNTER — APPOINTMENT (EMERGENCY)
Dept: RADIOLOGY | Facility: HOSPITAL | Age: 86
DRG: 291 | End: 2021-07-01
Payer: COMMERCIAL

## 2021-07-01 ENCOUNTER — HOSPITAL ENCOUNTER (INPATIENT)
Facility: HOSPITAL | Age: 86
LOS: 1 days | Discharge: HOME | DRG: 291 | End: 2021-07-03
Attending: EMERGENCY MEDICINE | Admitting: INTERNAL MEDICINE
Payer: COMMERCIAL

## 2021-07-01 ENCOUNTER — APPOINTMENT (EMERGENCY)
Dept: NON INVASIVE DIAGNOSTICS | Facility: HOSPITAL | Age: 86
DRG: 291 | End: 2021-07-01
Payer: COMMERCIAL

## 2021-07-01 DIAGNOSIS — R06.00 DYSPNEA: ICD-10-CM

## 2021-07-01 DIAGNOSIS — I50.32 CHRONIC DIASTOLIC HEART FAILURE (HCC): ICD-10-CM

## 2021-07-01 DIAGNOSIS — I50.33 ACUTE ON CHRONIC DIASTOLIC HEART FAILURE (HCC): ICD-10-CM

## 2021-07-01 DIAGNOSIS — I34.0 MITRAL VALVE INSUFFICIENCY, UNSPECIFIED ETIOLOGY: ICD-10-CM

## 2021-07-01 DIAGNOSIS — J18.9 PNEUMONIA: Primary | ICD-10-CM

## 2021-07-01 DIAGNOSIS — I48.91 ATRIAL FIBRILLATION (HCC): ICD-10-CM

## 2021-07-01 PROBLEM — J96.20 ACUTE ON CHRONIC RESPIRATORY FAILURE (HCC): Status: ACTIVE | Noted: 2020-02-29

## 2021-07-01 LAB
ALBUMIN SERPL BCP-MCNC: 3.6 G/DL (ref 3.5–5.7)
ALP SERPL-CCNC: 73 U/L (ref 55–165)
ALT SERPL W P-5'-P-CCNC: 8 U/L (ref 7–52)
ANION GAP SERPL CALCULATED.3IONS-SCNC: 8 MMOL/L (ref 4–13)
APTT PPP: 35 SECONDS (ref 23–37)
AST SERPL W P-5'-P-CCNC: 12 U/L (ref 13–39)
ATRIAL RATE: 52 BPM
BASOPHILS # BLD AUTO: 0.1 THOUSANDS/ΜL (ref 0–0.1)
BASOPHILS NFR BLD AUTO: 1 % (ref 0–2)
BILIRUB SERPL-MCNC: 1 MG/DL (ref 0.2–1)
BNP SERPL-MCNC: 2360 PG/ML (ref 1–100)
BUN SERPL-MCNC: 14 MG/DL (ref 7–25)
CALCIUM SERPL-MCNC: 9.1 MG/DL (ref 8.6–10.5)
CHLORIDE SERPL-SCNC: 104 MMOL/L (ref 98–107)
CO2 SERPL-SCNC: 26 MMOL/L (ref 21–31)
CREAT SERPL-MCNC: 0.81 MG/DL (ref 0.6–1.2)
EOSINOPHIL # BLD AUTO: 0.2 THOUSAND/ΜL (ref 0–0.61)
EOSINOPHIL NFR BLD AUTO: 3 % (ref 0–5)
ERYTHROCYTE [DISTWIDTH] IN BLOOD BY AUTOMATED COUNT: 15.4 % (ref 11.5–14.5)
GFR SERPL CREATININE-BSD FRML MDRD: 66 ML/MIN/1.73SQ M
GLUCOSE SERPL-MCNC: 125 MG/DL (ref 65–99)
HCT VFR BLD AUTO: 36.2 % (ref 42–47)
HGB BLD-MCNC: 11.9 G/DL (ref 12–16)
INR PPP: 1.71 (ref 0.84–1.19)
LACTATE SERPL-SCNC: 1.3 MMOL/L (ref 0.5–2)
LYMPHOCYTES # BLD AUTO: 0.9 THOUSANDS/ΜL (ref 0.6–4.47)
LYMPHOCYTES NFR BLD AUTO: 13 % (ref 21–51)
MCH RBC QN AUTO: 28.8 PG (ref 26–34)
MCHC RBC AUTO-ENTMCNC: 32.9 G/DL (ref 31–37)
MCV RBC AUTO: 88 FL (ref 81–99)
MONOCYTES # BLD AUTO: 0.8 THOUSAND/ΜL (ref 0.17–1.22)
MONOCYTES NFR BLD AUTO: 12 % (ref 2–12)
NEUTROPHILS # BLD AUTO: 5.2 THOUSANDS/ΜL (ref 1.4–6.5)
NEUTS SEG NFR BLD AUTO: 72 % (ref 42–75)
PLATELET # BLD AUTO: 260 THOUSANDS/UL (ref 149–390)
PMV BLD AUTO: 7.6 FL (ref 8.6–11.7)
POTASSIUM SERPL-SCNC: 3.7 MMOL/L (ref 3.5–5.5)
PROT SERPL-MCNC: 6.9 G/DL (ref 6.4–8.9)
PROTHROMBIN TIME: 19.9 SECONDS (ref 11.6–14.5)
QRS AXIS: -17 DEGREES
QRSD INTERVAL: 98 MS
QT INTERVAL: 464 MS
QTC INTERVAL: 486 MS
RBC # BLD AUTO: 4.14 MILLION/UL (ref 3.9–5.2)
SARS-COV-2 RNA RESP QL NAA+PROBE: NEGATIVE
SODIUM SERPL-SCNC: 138 MMOL/L (ref 134–143)
T WAVE AXIS: -23 DEGREES
TROPONIN I SERPL-MCNC: <0.03 NG/ML
VENTRICULAR RATE: 66 BPM
WBC # BLD AUTO: 7.2 THOUSAND/UL (ref 4.8–10.8)

## 2021-07-01 PROCEDURE — 84484 ASSAY OF TROPONIN QUANT: CPT | Performed by: EMERGENCY MEDICINE

## 2021-07-01 PROCEDURE — 83880 ASSAY OF NATRIURETIC PEPTIDE: CPT | Performed by: EMERGENCY MEDICINE

## 2021-07-01 PROCEDURE — 87040 BLOOD CULTURE FOR BACTERIA: CPT | Performed by: EMERGENCY MEDICINE

## 2021-07-01 PROCEDURE — 99285 EMERGENCY DEPT VISIT HI MDM: CPT

## 2021-07-01 PROCEDURE — 99285 EMERGENCY DEPT VISIT HI MDM: CPT | Performed by: EMERGENCY MEDICINE

## 2021-07-01 PROCEDURE — 80053 COMPREHEN METABOLIC PANEL: CPT | Performed by: EMERGENCY MEDICINE

## 2021-07-01 PROCEDURE — 84145 PROCALCITONIN (PCT): CPT | Performed by: FAMILY MEDICINE

## 2021-07-01 PROCEDURE — 93010 ELECTROCARDIOGRAM REPORT: CPT | Performed by: INTERNAL MEDICINE

## 2021-07-01 PROCEDURE — 36415 COLL VENOUS BLD VENIPUNCTURE: CPT | Performed by: EMERGENCY MEDICINE

## 2021-07-01 PROCEDURE — 93005 ELECTROCARDIOGRAM TRACING: CPT

## 2021-07-01 PROCEDURE — U0005 INFEC AGEN DETEC AMPLI PROBE: HCPCS | Performed by: EMERGENCY MEDICINE

## 2021-07-01 PROCEDURE — 71045 X-RAY EXAM CHEST 1 VIEW: CPT

## 2021-07-01 PROCEDURE — 99220 PR INITIAL OBSERVATION CARE/DAY 70 MINUTES: CPT | Performed by: FAMILY MEDICINE

## 2021-07-01 PROCEDURE — 93971 EXTREMITY STUDY: CPT

## 2021-07-01 PROCEDURE — 85025 COMPLETE CBC W/AUTO DIFF WBC: CPT | Performed by: EMERGENCY MEDICINE

## 2021-07-01 PROCEDURE — 85610 PROTHROMBIN TIME: CPT | Performed by: EMERGENCY MEDICINE

## 2021-07-01 PROCEDURE — 83605 ASSAY OF LACTIC ACID: CPT | Performed by: EMERGENCY MEDICINE

## 2021-07-01 PROCEDURE — 85730 THROMBOPLASTIN TIME PARTIAL: CPT | Performed by: EMERGENCY MEDICINE

## 2021-07-01 PROCEDURE — U0003 INFECTIOUS AGENT DETECTION BY NUCLEIC ACID (DNA OR RNA); SEVERE ACUTE RESPIRATORY SYNDROME CORONAVIRUS 2 (SARS-COV-2) (CORONAVIRUS DISEASE [COVID-19]), AMPLIFIED PROBE TECHNIQUE, MAKING USE OF HIGH THROUGHPUT TECHNOLOGIES AS DESCRIBED BY CMS-2020-01-R: HCPCS | Performed by: EMERGENCY MEDICINE

## 2021-07-01 RX ORDER — VANCOMYCIN HYDROCHLORIDE 1 G/200ML
20 INJECTION, SOLUTION INTRAVENOUS EVERY 24 HOURS
Status: DISCONTINUED | OUTPATIENT
Start: 2021-07-02 | End: 2021-07-02

## 2021-07-01 RX ORDER — VANCOMYCIN HYDROCHLORIDE 1 G/200ML
20 INJECTION, SOLUTION INTRAVENOUS ONCE
Status: COMPLETED | OUTPATIENT
Start: 2021-07-01 | End: 2021-07-01

## 2021-07-01 RX ORDER — ACETAMINOPHEN 325 MG/1
975 TABLET ORAL EVERY 8 HOURS SCHEDULED
Status: DISCONTINUED | OUTPATIENT
Start: 2021-07-01 | End: 2021-07-02

## 2021-07-01 RX ORDER — PANTOPRAZOLE SODIUM 40 MG/1
40 TABLET, DELAYED RELEASE ORAL
Status: DISCONTINUED | OUTPATIENT
Start: 2021-07-02 | End: 2021-07-03 | Stop reason: HOSPADM

## 2021-07-01 RX ORDER — VANCOMYCIN HYDROCHLORIDE 1 G/200ML
20 INJECTION, SOLUTION INTRAVENOUS EVERY 12 HOURS
Status: DISCONTINUED | OUTPATIENT
Start: 2021-07-02 | End: 2021-07-01

## 2021-07-01 RX ORDER — CEFEPIME HYDROCHLORIDE 1 G/50ML
1000 INJECTION, SOLUTION INTRAVENOUS ONCE
Status: COMPLETED | OUTPATIENT
Start: 2021-07-01 | End: 2021-07-01

## 2021-07-01 RX ORDER — CEFEPIME HYDROCHLORIDE 2 G/50ML
2000 INJECTION, SOLUTION INTRAVENOUS EVERY 12 HOURS
Status: DISCONTINUED | OUTPATIENT
Start: 2021-07-02 | End: 2021-07-02

## 2021-07-01 RX ORDER — POLYETHYLENE GLYCOL 3350 17 G/17G
17 POWDER, FOR SOLUTION ORAL DAILY
Status: DISCONTINUED | OUTPATIENT
Start: 2021-07-02 | End: 2021-07-03 | Stop reason: HOSPADM

## 2021-07-01 RX ORDER — FUROSEMIDE 10 MG/ML
20 INJECTION INTRAMUSCULAR; INTRAVENOUS
Status: DISCONTINUED | OUTPATIENT
Start: 2021-07-01 | End: 2021-07-02

## 2021-07-01 RX ORDER — OXYBUTYNIN CHLORIDE 5 MG/1
5 TABLET ORAL 3 TIMES DAILY
Status: DISCONTINUED | OUTPATIENT
Start: 2021-07-01 | End: 2021-07-03 | Stop reason: HOSPADM

## 2021-07-01 RX ORDER — ALPRAZOLAM 0.25 MG/1
0.25 TABLET ORAL
Status: DISCONTINUED | OUTPATIENT
Start: 2021-07-01 | End: 2021-07-03 | Stop reason: HOSPADM

## 2021-07-01 RX ADMIN — METOPROLOL TARTRATE 25 MG: 25 TABLET, FILM COATED ORAL at 20:38

## 2021-07-01 RX ADMIN — ACETAMINOPHEN 975 MG: 325 TABLET ORAL at 20:39

## 2021-07-01 RX ADMIN — VANCOMYCIN HYDROCHLORIDE 1000 MG: 1 INJECTION, SOLUTION INTRAVENOUS at 15:18

## 2021-07-01 RX ADMIN — OXYBUTYNIN CHLORIDE 5 MG: 5 TABLET ORAL at 20:37

## 2021-07-01 RX ADMIN — APIXABAN 2.5 MG: 2.5 TABLET, FILM COATED ORAL at 20:37

## 2021-07-01 RX ADMIN — FUROSEMIDE 20 MG: 10 INJECTION, SOLUTION INTRAMUSCULAR; INTRAVENOUS at 20:40

## 2021-07-01 RX ADMIN — CEFEPIME HYDROCHLORIDE 1000 MG: 1 INJECTION, SOLUTION INTRAVENOUS at 14:41

## 2021-07-01 RX ADMIN — DILTIAZEM HYDROCHLORIDE 90 MG: 60 TABLET, FILM COATED ORAL at 20:35

## 2021-07-01 NOTE — H&P
[facility]  H&P- Sarthak Narvaez 1935, 81 y o  female MRN: [id number]  Unit/Bed#: ED 02 Encounter: [id number]  Primary Care Provider: Lily Apple MD   Date and time admitted to hospital: 7/1/2021  1:37 PM    * Acute on chronic respiratory failure [facility])  Assessment & Plan  Patient normally on 2 L nasal cannula around the clock  Currently she is on 3 days nasal cannula  Secondary to acute on chronic CHF and possible pneumonia  Sats above 92%    Acute on chronic diastolic heart failure (HCC)  Assessment & Plan  Wt Readings from Last 3 Encounters:   07/01/21 49 9 kg (110 lb)   02/15/21 48 2 kg (106 lb 4 2 oz)   08/21/20 60 7 kg (133 lb 13 1 oz)     Monitor ins and outs  Low-sodium diet  Daily weights  Will give Lasix 20 b i d  IV  BNP 2360  Cardiology consult        Pneumonia  Assessment & Plan  Presented with generalized weakness, cough and shortness of breath for a week  Chest x-ray suggestive of possible pneumonia  Will start on cefepime and vancomycin IV  Pending procalcitonin  Pending urine culture    Cognitive impairment  Assessment & Plan  · Suspected underlying dementia without formal diagnosis  · Supportive care        Essential hypertension  Assessment & Plan  Well controlled  Continue home medications    Longstanding persistent atrial fibrillation (HCC)  Assessment & Plan  Anticoagulated with Eliquis  Rate controlled with diltiazem and metoprolol    VTE Prophylaxis: Apixaban (Eliquis)  / sequential compression device   Code Status:  DNR DNI  POLST: POLST form is not discussed and not completed at this time  Discussion with family:  With patient patient's daughter    Anticipated Length of Stay:  Patient will be admitted on an Observation basis with an anticipated length of stay of  as above 2 midnights  Justification for Hospital Stay:  As above    Total Time for Visit, including Counseling / Coordination of Care: 45 minutes    Greater than 50% of this total time spent on direct patient counseling and coordination of care  Chief Complaint:   Weakness, cough, shortness of breath    History of Present Illness:    Sarthak Narvaez is a 81 y o  female with past medical history significant for CHF on 2 L nasal oxygen at home, AFib, hypertension who presents with 1 week history of generalized weakness, cough and shortness of breath     She denies any chest pain  She has been taking her medication as directed  She denies any fevers, chills, she reports some increase in her weight  Review of Systems:    Review of Systems   Constitutional: Positive for fatigue  Negative for chills, fever and unexpected weight change  HENT: Negative for hearing loss, nosebleeds and sore throat  Eyes: Negative for pain, redness and visual disturbance  Respiratory: Positive for shortness of breath  Negative for cough and wheezing  Cardiovascular: Negative for chest pain, palpitations and leg swelling  Gastrointestinal: Negative for abdominal pain, nausea and vomiting  Endocrine: Negative for polydipsia and polyuria  Genitourinary: Negative for dysuria and hematuria  Musculoskeletal: Negative for arthralgias, joint swelling and myalgias  Skin: Negative for rash and wound  Neurological: Negative for dizziness, numbness and headaches  Psychiatric/Behavioral: Negative for decreased concentration and suicidal ideas  The patient is not nervous/anxious          Past Medical and Surgical History:     Past Medical History:   Diagnosis Date    Acute on chronic diastolic congestive heart failure ([facility] [address] ) 6/27/2019    Ambulatory dysfunction 8/17/2020    Arthritis     Chest wall contusion, right, initial encounter 8/17/2020    Chest wall contusion, right, subsequent encounter 8/17/2020    Chronic diastolic heart failure (HCC)     Chronic kidney disease, stage 2 (mild)     Coronary artery disease     history of stenting    Eczema     years    Edema     History of echocardiogram 07/20/2017    EF 55%, mild concentric LVH, bileaflet MVP with moderate MR, left atrial enlargement   History of transfusion     Hyperlipidemia     Hypertension     Hypo-osmolality and hyponatremia     Hypokalemia 7/11/2019    Mitral regurgitation        Past Surgical History:   Procedure Laterality Date    ABDOMINAL SURGERY      hysterectomy    CARDIAC CATHETERIZATION  04/10/2012    EF 65%, no significant CAD, patent stents, severe MR     CORONARY ANGIOPLASTY WITH STENT PLACEMENT  03/21/2007    EF 55%, GARRET to LAD   EYE SURGERY      b/l cataracts    HYSTERECTOMY      JOINT REPLACEMENT      Rt knee       Meds/Allergies:    Prior to Admission medications    Medication Sig Start Date End Date Taking?  Authorizing Provider   acetaminophen (TYLENOL) 325 mg tablet Take 3 tablets (975 mg total) by mouth every 8 (eight) hours 6/27/20   Aldia Norris MD   ALPRAZolam Priscille Peach) 0 25 mg tablet Take 1 tablet (0 25 mg total) by mouth daily at bedtime as needed for anxiety for up to 10 days 8/21/20 8/31/20  Alida Norris MD   apixaban (ELIQUIS) 2 5 mg Take 1 tablet (2 5 mg total) by mouth 2 (two) times a day 8/8/19   Myah Baum PA-C   bisacodyl (DULCOLAX) 5 mg EC tablet Take 1 tablet (5 mg total) by mouth daily as needed for constipation 6/27/20   Alida Norris MD   diltiazem (CARDIZEM) 90 mg tablet Take 1 tablet (90 mg total) by mouth every 8 (eight) hours 7/20/20   Vijaya Castañeda MD   furosemide (LASIX) 40 mg tablet Take 1 tablet (40 mg total) by mouth daily as needed (Only take for morning weight more than 125 pounds )  Patient taking differently: Take 20 mg by mouth daily  6/17/20   Vijaya Castañeda MD   hydrOXYzine HCL (ATARAX) 25 mg tablet Take 25 mg by mouth 2 (two) times a day    Historical Provider, MD   metoprolol tartrate (LOPRESSOR) 25 mg tablet Take 1 tablet (25 mg total) by mouth 2 (two) times a day 3/5/20 6/17/20  NEAL Gonsales   omeprazole (PriLOSEC) 20 mg delayed release capsule Take 20 mg by mouth daily    Historical Provider, MD   oxybutynin (DITROPAN) 5 mg tablet Take 5 mg by mouth 3 (three) times a day     Historical Provider, MD   polyethylene glycol (MIRALAX) 17 g packet Take 17 g by mouth daily 6/27/20   Fritz Beckett MD   sertraline (ZOLOFT) 50 mg tablet Take 75 mg by mouth daily     Historical Provider, MD   PANTOPRAZOLE SODIUM PO Take by mouth daily  7/1/21  Historical Provider, MD   potassium chloride (K-DUR,KLOR-CON) 20 mEq tablet Take 1 tablet (20 mEq total) by mouth daily as needed (Only take for morning weight more than 125 pounds ) 6/17/20 7/1/21  Kourtney Padilla MD   pravastatin (PRAVACHOL) 40 mg tablet Take 1 tablet (40 mg total) by mouth daily with dinner 7/12/19 7/1/21  Rakan Roberts MD   QUEtiapine (SEROquel) 25 mg tablet Take 1 tablet (25 mg total) by mouth daily at bedtime 2/15/21 7/1/21  Kai Anderson MD     I have reviewed home medications with patient personally  Allergies: Allergies   Allergen Reactions    Chocolate Flavor - Food Allergy Itching    Shellfish-Derived Products - Food Allergy Shortness Of Breath    Iodine - Food Allergy Other (See Comments)     shellfish- difficulty breathing    Codeine Rash       Social History:     Marital Status:     Occupation:   Patient Pre-hospital Living Situation:  Living with daughter  Patient Pre-hospital Level of Mobility:  With walker  Patient Pre-hospital Diet Restrictions:  Non  Substance Use History:   Social History     Substance and Sexual Activity   Alcohol Use Never    Alcohol/week: 0 0 standard drinks     Social History     Tobacco Use   Smoking Status Never Smoker   Smokeless Tobacco Never Used     Social History     Substance and Sexual Activity   Drug Use Never       Family History:    non-contributory    Physical Exam:     Vitals:   Blood Pressure: 118/80 (07/01/21 1400)  Pulse: 81 (07/01/21 1400)  Temperature: 98 5 °F (36 9 °C) (07/01/21 1334)  Temp Source: Temporal (07/01/21 1334)  Respirations: 22 (07/01/21 1400)  Weight - Scale: 49 9 kg (110 lb) (07/01/21 1334)  SpO2: 95 % (07/01/21 1400)    Physical Exam  Vitals and nursing note reviewed  Constitutional:       General: She is not in acute distress  HENT:      Head: Normocephalic  Nose: Nose normal       Mouth/Throat:      Pharynx: Oropharynx is clear  Eyes:      Conjunctiva/sclera: Conjunctivae normal    Cardiovascular:      Rate and Rhythm: Normal rate  Rhythm irregular  Heart sounds: No murmur heard  Pulmonary:      Effort: Pulmonary effort is normal  No respiratory distress  Breath sounds: Normal breath sounds  No wheezing  Comments: Few bibasilar crackles  Abdominal:      General: There is no distension  Tenderness: There is no abdominal tenderness  There is no guarding  Musculoskeletal:         General: No swelling  Normal range of motion  Right lower leg: Edema present  Skin:     General: Skin is warm  Capillary Refill: Capillary refill takes less than 2 seconds  Neurological:      General: No focal deficit present  Mental Status: She is alert and oriented to person, place, and time  Cranial Nerves: No cranial nerve deficit  Psychiatric:         Mood and Affect: Mood normal              Additional Data:     Lab Results: I have personally reviewed pertinent reports        Results from last 7 days   Lab Units 07/01/21  1403   WBC Thousand/uL 7 20   HEMOGLOBIN g/dL 11 9*   HEMATOCRIT % 36 2*   PLATELETS Thousands/uL 260   NEUTROS PCT % 72   LYMPHS PCT % 13*   MONOS PCT % 12   EOS PCT % 3     Results from last 7 days   Lab Units 07/01/21  1403   SODIUM mmol/L 138   POTASSIUM mmol/L 3 7   CHLORIDE mmol/L 104   CO2 mmol/L 26   BUN mg/dL 14   CREATININE mg/dL 0 81   ANION GAP mmol/L 8   CALCIUM mg/dL 9 1   ALBUMIN g/dL 3 6   TOTAL BILIRUBIN mg/dL 1 00   ALK PHOS U/L 73   ALT U/L 8   AST U/L 12*   GLUCOSE RANDOM mg/dL 125*     Results from last 7 days   Lab Units 07/01/21  1403   INR  1 71*             Results from last 7 days   Lab Units 07/01/21  1403   LACTIC ACID mmol/L 1 3       Imaging: I have personally reviewed pertinent reports  XR chest 1 view portable   ED Interpretation by Nadya Jones III, DO (07/01 1412)   Right-sided infiltrate noted  Final Result by Yury Campos MD (07/01 1444)      Right base opacity which could be due to a small effusion and atelectasis but pneumonia cannot be excluded  Workstation performed: [id number]         VAS lower limb venous duplex study, unilateral/limited    (Results Pending)       EKG, Pathology, and Other Studies Reviewed on Admission:   · EKG:  AFib, prolonged QT    Allscripts / Epic Records Reviewed: Yes     ** Please Note: This note has been constructed using a voice recognition system   **

## 2021-07-01 NOTE — PROGRESS NOTES
Vancomycin Assessment    Keesha James is a 81 y o  female who is currently receiving vancomycin 1000mg IV Q24H for Pneumonia     Relevant clinical data and objective history reviewed:  Creatinine   Date Value Ref Range Status   07/01/2021 0 81 0 60 - 1 20 mg/dL Final     Comment:     Standardized to IDMS reference method   02/14/2021 0 79 0 60 - 1 20 mg/dL Final     Comment:     Standardized to IDMS reference method   02/13/2021 0 72 0 60 - 1 20 mg/dL Final     Comment:     Standardized to IDMS reference method   04/05/2018 0 68 0 6 - 1 2 MG/DL Final     Comment:     IDMS method performed  The drugs Metamizole, Sulfasalazine, and Sulfapyridine may interfere and  result in false low results  /80   Pulse 81   Temp 98 5 °F (36 9 °C) (Temporal)   Resp 22   Wt 49 9 kg (110 lb)   SpO2 95%   BMI 22 22 kg/m²   No intake/output data recorded  Lab Results   Component Value Date/Time    BUN 14 07/01/2021 02:03 PM    BUN 15 04/05/2018 12:23 PM    WBC 7 20 07/01/2021 02:03 PM    WBC 7 2 04/05/2018 12:23 PM    HGB 11 9 (L) 07/01/2021 02:03 PM    HGB 11 5 (L) 04/05/2018 12:23 PM    HCT 36 2 (L) 07/01/2021 02:03 PM    HCT 34 7 (L) 04/05/2018 12:23 PM    MCV 88 07/01/2021 02:03 PM    MCV 86 0 04/05/2018 12:23 PM     07/01/2021 02:03 PM     04/05/2018 12:23 PM     Temp Readings from Last 3 Encounters:   07/01/21 98 5 °F (36 9 °C) (Temporal)   02/15/21 (!) 97 °F (36 1 °C) (Temporal)   08/21/20 98 3 °F (36 8 °C) (Oral)     Vancomycin Days of Therapy: 1    Assessment/Plan  The patient is currently on vancomycin utilizing scheduled dosing based on actual body weight  Baseline risks associated with therapy include: pre-existing renal impairment, advanced age, and dehydration  The patient is currently receiving 1000mg IV Q24H and is clinically appropriate and dose will be continued    Pharmacy will also follow closely for s/sx of nephrotoxicity, infusion reactions, and appropriateness of therapy  BMP and CBC will be ordered per protocol  Plan for trough as patient approaches steady state, prior to the 4th  dose at approximately 1430 on 07/04/2021  Due to infection severity, will target a trough of 15-20 (appropriate for most indications)   Pharmacy will continue to follow the patients culture results and clinical progress daily      Adi Fink, Pharmacist

## 2021-07-01 NOTE — ASSESSMENT & PLAN NOTE
Presented with generalized weakness, cough and shortness of breath for a week  Chest x-ray suggestive of possible pneumonia  Will start on cefepime and vancomycin IV  Pending procalcitonin  Pending urine culture

## 2021-07-01 NOTE — ED PROVIDER NOTES
History  Chief Complaint   Patient presents with    Shortness of Breath     HPI      This is a very pleasant 81-year-old female presents the emergency department with a 2 day history of having cough, congestion, shortness of breath  Patient arrived via EMS  Patient voices a concern that she has pneumonia  Most recently the patient was seen and evaluated and admitted to the hospital for 12 days in February 2021 secondary to generalized weakness and close exposure to COVID-19 (tested negative at this admission)  Other relevant past medical history is history of acute on chronic diastolic heart failure, cognitive impairment, essential hypertension, longstanding atrial fibrillation anticoagulant Eliquis and rate controlled with metoprolol and Cardizem  Prior to Admission Medications   Prescriptions Last Dose Informant Patient Reported? Taking?    ALPRAZolam (XANAX) 0 25 mg tablet   No No   Sig: Take 1 tablet (0 25 mg total) by mouth daily at bedtime as needed for anxiety for up to 10 days   acetaminophen (TYLENOL) 325 mg tablet   No No   Sig: Take 3 tablets (975 mg total) by mouth every 8 (eight) hours   apixaban (ELIQUIS) 2 5 mg   No No   Sig: Take 1 tablet (2 5 mg total) by mouth 2 (two) times a day   bisacodyl (DULCOLAX) 5 mg EC tablet   No No   Sig: Take 1 tablet (5 mg total) by mouth daily as needed for constipation   diltiazem (CARDIZEM) 90 mg tablet   No No   Sig: Take 1 tablet (90 mg total) by mouth every 8 (eight) hours   furosemide (LASIX) 40 mg tablet   No No   Sig: Take 1 tablet (40 mg total) by mouth daily as needed (Only take for morning weight more than 125 pounds )   Patient taking differently: Take 20 mg by mouth daily    hydrOXYzine HCL (ATARAX) 25 mg tablet   Yes No   Sig: Take 25 mg by mouth 2 (two) times a day   metoprolol tartrate (LOPRESSOR) 25 mg tablet   No No   Sig: Take 1 tablet (25 mg total) by mouth 2 (two) times a day   omeprazole (PriLOSEC) 20 mg delayed release capsule   Yes No Sig: Take 20 mg by mouth daily   oxybutynin (DITROPAN) 5 mg tablet   Yes No   Sig: Take 5 mg by mouth 3 (three) times a day    polyethylene glycol (MIRALAX) 17 g packet   No No   Sig: Take 17 g by mouth daily   sertraline (ZOLOFT) 50 mg tablet   Yes No   Sig: Take 75 mg by mouth daily       Facility-Administered Medications: None       Past Medical History:   Diagnosis Date    Acute on chronic diastolic congestive heart failure ([facility] [address] ) 6/27/2019    Ambulatory dysfunction 8/17/2020    Arthritis     Chest wall contusion, right, initial encounter 8/17/2020    Chest wall contusion, right, subsequent encounter 8/17/2020    Chronic diastolic heart failure (HCC)     Chronic kidney disease, stage 2 (mild)     Coronary artery disease     history of stenting    Eczema     years    Edema     History of echocardiogram 07/20/2017    EF 55%, mild concentric LVH, bileaflet MVP with moderate MR, left atrial enlargement   History of transfusion     Hyperlipidemia     Hypertension     Hypo-osmolality and hyponatremia     Hypokalemia 7/11/2019    Mitral regurgitation        Past Surgical History:   Procedure Laterality Date    ABDOMINAL SURGERY      hysterectomy    CARDIAC CATHETERIZATION  04/10/2012    EF 65%, no significant CAD, patent stents, severe MR     CORONARY ANGIOPLASTY WITH STENT PLACEMENT  03/21/2007    EF 55%, GARRET to LAD   EYE SURGERY      b/l cataracts    HYSTERECTOMY      JOINT REPLACEMENT      Rt knee       Family History   Problem Relation Age of Onset    Arthritis Mother     Cancer Mother     Heart disease Mother     Hypertension Mother     Alcohol abuse Father     Arthritis Father     Birth defects Son     Hearing loss Son     Diabetes Daughter     Stroke Maternal Grandmother     Asthma Maternal Grandfather      I have reviewed and agree with the history as documented      E-Cigarette/Vaping    E-Cigarette Use Never User      E-Cigarette/Vaping Substances     Social History Tobacco Use    Smoking status: Never Smoker    Smokeless tobacco: Never Used   Vaping Use    Vaping Use: Never used   Substance Use Topics    Alcohol use: Never     Alcohol/week: 0 0 standard drinks    Drug use: Never       Review of Systems   Constitutional: Negative  HENT: Negative  Eyes: Negative  Respiratory: Positive for cough, chest tightness and shortness of breath  Cardiovascular: Negative  Gastrointestinal: Negative  Negative for abdominal distention and abdominal pain  Endocrine: Negative  Genitourinary: Negative  Musculoskeletal: Negative  Allergic/Immunologic: Negative  Neurological: Negative  Hematological: Negative  Psychiatric/Behavioral: Negative  Physical Exam  Physical Exam  Vitals and nursing note reviewed  Constitutional:       General: She is in acute distress  Appearance: She is well-developed and normal weight  She is ill-appearing  She is not toxic-appearing or diaphoretic  Interventions: She is not intubated  HENT:      Head: Normocephalic and atraumatic  Mouth/Throat:      Mouth: Mucous membranes are moist       Pharynx: Oropharynx is clear  Eyes:      Extraocular Movements: Extraocular movements intact  Pupils: Pupils are equal, round, and reactive to light  Cardiovascular:      Rate and Rhythm: Normal rate and regular rhythm  Pulmonary:      Effort: Tachypnea and accessory muscle usage present  No bradypnea or respiratory distress  She is not intubated  Breath sounds: Normal breath sounds  No stridor  Chest:      Chest wall: No mass or deformity  Musculoskeletal:         General: Normal range of motion  Cervical back: Normal range of motion and neck supple  Right lower leg: No edema  Left lower leg: Tenderness present  Edema present  Skin:     General: Skin is warm  Capillary Refill: Capillary refill takes less than 2 seconds     Neurological:      General: No focal deficit present  Mental Status: She is alert and oriented to person, place, and time     Psychiatric:         Mood and Affect: Mood normal          Behavior: Behavior normal          Vital Signs  ED Triage Vitals [07/01/21 1334]   Temperature Pulse Respirations Blood Pressure SpO2   98 5 °F (36 9 °C) 86 18 121/68 96 %      Temp Source Heart Rate Source Patient Position - Orthostatic VS BP Location FiO2 (%)   Temporal Monitor Sitting Left arm --      Pain Score       --           Vitals:    07/01/21 1334 07/01/21 1400 07/01/21 1558 07/01/21 1624   BP: 121/68 118/80 122/81 122/61   Pulse: 86 81 72 74   Patient Position - Orthostatic VS: Sitting   Lying         Visual Acuity      ED Medications  Medications   acetaminophen (TYLENOL) tablet 975 mg (has no administration in time range)   ALPRAZolam (XANAX) tablet 0 25 mg (has no administration in time range)   apixaban (ELIQUIS) tablet 2 5 mg (has no administration in time range)   bisacodyl (DULCOLAX) EC tablet 5 mg (has no administration in time range)   diltiazem (CARDIZEM) tablet 90 mg (has no administration in time range)   metoprolol tartrate (LOPRESSOR) tablet 25 mg (has no administration in time range)   pantoprazole (PROTONIX) EC tablet 40 mg (has no administration in time range)   oxybutynin (DITROPAN) tablet 5 mg (has no administration in time range)   polyethylene glycol (MIRALAX) packet 17 g (has no administration in time range)   sertraline (ZOLOFT) tablet 75 mg (has no administration in time range)   furosemide (LASIX) injection 20 mg (has no administration in time range)   cefepime (MAXIPIME) IVPB (premix in dextrose) 2,000 mg 50 mL (has no administration in time range)   vancomycin (VANCOCIN) IVPB (premix in dextrose) 1,000 mg 200 mL (has no administration in time range)   cefepime (MAXIPIME) IVPB (premix in dextrose) 1,000 mg 50 mL (0 mg Intravenous Stopped 7/1/21 1518)   vancomycin (VANCOCIN) IVPB (premix in dextrose) 1,000 mg 200 mL (1,000 mg Intravenous New Bag 7/1/21 1518)       Diagnostic Studies  Results Reviewed     Procedure Component Value Units Date/Time    Procalcitonin with AM Reflex [[id number]]     Lab Status: No result Specimen: Blood     [facility] [[id number]]  (Normal) Collected: 07/01/21 1403    Lab Status: Final result Specimen: Nares from Nasopharyngeal Swab Updated: 07/01/21 1510     SARS-CoV-2 Negative    Narrative: The specimen collection materials, transport medium, and/or testing methodology utilized in the production of these test results have been proven to be reliable in a limited validation with an abbreviated program under the Emergency Utilization Authorization provided by the FDA  Testing reported as "Presumptive positive" will be confirmed with secondary testing to ensure result accuracy  Clinical caution and judgement should be used with the interpretation of these results with consideration of the clinical impression and other laboratory testing  Testing reported as "Positive" or "Negative" has been proven to be accurate according to standard laboratory validation requirements  All testing is performed with control materials showing appropriate reactivity at standard intervals  Blood culture #1 [[id number]] Collected: 07/01/21 1506    Lab Status: In process Specimen: Blood from Hand, Right Updated: 07/01/21 1509    Blood culture #2 [[id number]] Collected: 07/01/21 1506    Lab Status:  In process Specimen: Blood from Arm, Left Updated: 07/01/21 1509    B-Type Natriuretic Peptide ([address]) [[id number]]  (Abnormal) Collected: 07/01/21 1403    Lab Status: Final result Specimen: Blood from Arm, Right Updated: 07/01/21 1502     BNP 2,360 pg/mL     Troponin I [[id number]]  (Normal) Collected: 07/01/21 1403    Lab Status: Final result Specimen: Blood from Arm, Right Updated: 07/01/21 1431     Troponin I <0 03 ng/mL     Comprehensive metabolic panel [[id number]]  (Abnormal) Collected: 07/01/21 1403    Lab Status: Final result Specimen: Blood from Arm, Right Updated: 07/01/21 1429     Sodium 138 mmol/L      Potassium 3 7 mmol/L      Chloride 104 mmol/L      CO2 26 mmol/L      ANION GAP 8 mmol/L      BUN 14 mg/dL      Creatinine 0 81 mg/dL      Glucose 125 mg/dL      Calcium 9 1 mg/dL      AST 12 U/L      ALT 8 U/L      Alkaline Phosphatase 73 U/L      Total Protein 6 9 g/dL      Albumin 3 6 g/dL      Total Bilirubin 1 00 mg/dL      eGFR 66 ml/min/1 73sq m     Narrative:      [place] guidelines for Chronic Kidney Disease (CKD):     Stage 1 with normal or high GFR (GFR > 90 mL/min/1 73 square meters)    Stage 2 Mild CKD (GFR = 60-89 mL/min/1 73 square meters)    Stage 3A Moderate CKD (GFR = 45-59 mL/min/1 73 square meters)    Stage 3B Moderate CKD (GFR = 30-44 mL/min/1 73 square meters)    Stage 4 Severe CKD (GFR = 15-29 mL/min/1 73 square meters)    Stage 5 End Stage CKD (GFR <15 mL/min/1 73 square meters)  Note: GFR calculation is accurate only with a steady state creatinine    Lactic acid [[id number]]  (Normal) Collected: 07/01/21 1403    Lab Status: Final result Specimen: Blood from Arm, Right Updated: 07/01/21 1428     LACTIC ACID 1 3 mmol/L     Narrative:      Result may be elevated if tourniquet was used during collection      Protime-INR [[id number]]  (Abnormal) Collected: 07/01/21 1403    Lab Status: Final result Specimen: Blood from Arm, Right Updated: 07/01/21 1427     Protime 19 9 seconds      INR 1 71    APTT [[id number]]  (Normal) Collected: 07/01/21 1403    Lab Status: Final result Specimen: Blood from Arm, Right Updated: 07/01/21 1427     PTT 35 seconds     CBC and differential [[id number]]  (Abnormal) Collected: 07/01/21 1403    Lab Status: Final result Specimen: Blood from Arm, Right Updated: 07/01/21 1420     WBC 7 20 Thousand/uL      RBC 4 14 Million/uL      Hemoglobin 11 9 g/dL      Hematocrit 36 2 %      MCV 88 fL      MCH 28 8 pg      MCHC 32 9 g/dL      RDW 15 4 %      MPV 7 6 fL      Platelets 088 Thousands/uL      Neutrophils Relative 72 %      Lymphocytes Relative 13 %      Monocytes Relative 12 %      Eosinophils Relative 3 %      Basophils Relative 1 %      Neutrophils Absolute 5 20 Thousands/µL      Lymphocytes Absolute 0 90 Thousands/µL      Monocytes Absolute 0 80 Thousand/µL      Eosinophils Absolute 0 20 Thousand/µL      Basophils Absolute 0 10 Thousands/µL                  XR chest 1 view portable   ED Interpretation by Miriam Rogel III, DO (07/01 1412)   Right-sided infiltrate noted  Final Result by Jimbo Carrasco MD (07/01 1444)      Right base opacity which could be due to a small effusion and atelectasis but pneumonia cannot be excluded  Workstation performed: [id number]         VAS lower limb venous duplex study, unilateral/limited    (Results Pending)              Procedures  ECG 12 Lead Documentation Only    Date/Time: 7/1/2021 1:36 PM  Performed by: Miriam Rogel III, DO  Authorized by: Miriam Rogel III, DO     Indications / Diagnosis:  Cough, congestion, SOB  ECG reviewed by me, the ED Provider: yes    Patient location:  ED  Comments:      I personally reviewed this EKG is performed the patient July 1, 2021  EKG was completed at 1:34 a m  P m  And interpreted by me at 1:36 p m   Irregular irregular heart rate consistent with atrial fibrillation with a QT interval 486 milliseconds with no discernible P-wave  ED Course  ED Course as of Jul 01 1713   Thu Jul 01, 2021   1430  Venous Doppler negative      1438 Case discussed with the hospitalist will admit for observation                                                MDM  Number of Diagnoses or Management Options  Atrial fibrillation ([address] )  Dyspnea  Pneumonia  Diagnosis management comments: Atrial fibrillation on Eliquis, lower extremity venous Doppler negative, patient has a right-sided infiltrate with a cough congestion shortness of breath, lactic acid normal, patient will need to be admitted for IV antibiotics  Patient feels generally weak noted patient's heart rate appears to be a slow atrial fibrillation with ventricular rate of approximately 66 beats per minute  Broad-spectrum antibiotics administered, case discussed with SLIM attending  Portions of the record may have been created with voice recognition software  Occasional wrong word or "sound a like" substitutions may have occurred due to the inherent limitations of voice recognition software  Read the chart carefully and recognize, using context, where substitutions have occurred  Amount and/or Complexity of Data Reviewed  Clinical lab tests: ordered and reviewed  Tests in the radiology section of CPT®: ordered and reviewed  Tests in the medicine section of CPT®: ordered and reviewed        Disposition  Final diagnoses:   Pneumonia   Atrial fibrillation ([facility] [address] )   Dyspnea     Time reflects when diagnosis was documented in both MDM as applicable and the Disposition within this note     Time User Action Codes Description Comment    7/1/2021  2:39 PM Orvis Chelle Add [J18 9] Pneumonia     7/1/2021  2:39 PM Gaston Coins [I48 91] Atrial fibrillation ([facility] [address] )     7/1/2021  2:39 PM Orvis Chelle Add [R06 00] Dyspnea     7/1/2021  3:46 PM Amandeep Sabillon Add [I50 33] Acute on chronic diastolic heart failure [facility])       ED Disposition     ED Disposition Condition Date/Time Comment    Admit Stable u Jul 1, 2021  2:40 PM Case was discussed with Dr Claudia Andujar and the patient's admission status was agreed to be Admission Status: observation status to the service of Dr Claudia Andujar           Follow-up Information    None         Current Discharge Medication List      CONTINUE these medications which have NOT CHANGED    Details   acetaminophen (TYLENOL) 325 mg tablet Take 3 tablets (975 mg total) by mouth every 8 (eight) hours  Qty: 30 tablet, Refills: 0    Associated Diagnoses: Rib fracture      ALPRAZolam Areatha Ramirez) 0 25 mg tablet Take 1 tablet (0 25 mg total) by mouth daily at bedtime as needed for anxiety for up to 10 days  Qty: 5 tablet, Refills: 0    Associated Diagnoses: Anxiety      apixaban (ELIQUIS) 2 5 mg Take 1 tablet (2 5 mg total) by mouth 2 (two) times a day  Qty: 180 tablet, Refills: 3    Associated Diagnoses: A-fib ([facility])      bisacodyl (DULCOLAX) 5 mg EC tablet Take 1 tablet (5 mg total) by mouth daily as needed for constipation  Qty: 30 tablet, Refills: 0    Associated Diagnoses: Constipation      diltiazem (CARDIZEM) 90 mg tablet Take 1 tablet (90 mg total) by mouth every 8 (eight) hours  Qty: 42 tablet, Refills: 0    Associated Diagnoses: Atrial fibrillation with RVR ([facility])      furosemide (LASIX) 40 mg tablet Take 1 tablet (40 mg total) by mouth daily as needed (Only take for morning weight more than 125 pounds )  Qty: 90 tablet, Refills: 5    Associated Diagnoses: Mitral valve insufficiency, unspecified etiology; Chronic diastolic heart failure (HCC)      hydrOXYzine HCL (ATARAX) 25 mg tablet Take 25 mg by mouth 2 (two) times a day      metoprolol tartrate (LOPRESSOR) 25 mg tablet Take 1 tablet (25 mg total) by mouth 2 (two) times a day  Qty: 60 tablet, Refills: 0    Associated Diagnoses: Essential hypertension      omeprazole (PriLOSEC) 20 mg delayed release capsule Take 20 mg by mouth daily      oxybutynin (DITROPAN) 5 mg tablet Take 5 mg by mouth 3 (three) times a day       polyethylene glycol (MIRALAX) 17 g packet Take 17 g by mouth daily  Qty: 14 each, Refills: 0    Associated Diagnoses: Constipation      sertraline (ZOLOFT) 50 mg tablet Take 75 mg by mouth daily            No discharge procedures on file      PDMP Review       Value Time User    PDMP Reviewed  Yes 3/5/2020  1:47 PM Mirian Cortez, [address]          ED Provider  Electronically Signed by           Giovanny Rmoe III,   07/01/21 3433

## 2021-07-01 NOTE — ASSESSMENT & PLAN NOTE
Patient normally on 2 L nasal cannula around the clock  Currently she is on 3 days nasal cannula    Secondary to acute on chronic CHF and possible pneumonia  Sats above 92%

## 2021-07-01 NOTE — ASSESSMENT & PLAN NOTE
Wt Readings from Last 3 Encounters:   07/01/21 49 9 kg (110 lb)   02/15/21 48 2 kg (106 lb 4 2 oz)   08/21/20 60 7 kg (133 lb 13 1 oz)     Monitor ins and outs  Low-sodium diet  Daily weights  Will give Lasix 20 b i d  IV  BNP 2360  Cardiology consult

## 2021-07-02 LAB
ANION GAP SERPL CALCULATED.3IONS-SCNC: 9 MMOL/L (ref 4–13)
BACTERIA UR QL AUTO: ABNORMAL /HPF
BASOPHILS # BLD AUTO: 0.1 THOUSANDS/ΜL (ref 0–0.1)
BASOPHILS NFR BLD AUTO: 1 % (ref 0–2)
BILIRUB UR QL STRIP: NEGATIVE
BUN SERPL-MCNC: 12 MG/DL (ref 7–25)
CALCIUM SERPL-MCNC: 8.7 MG/DL (ref 8.6–10.5)
CHLORIDE SERPL-SCNC: 106 MMOL/L (ref 98–107)
CLARITY UR: CLEAR
CO2 SERPL-SCNC: 25 MMOL/L (ref 21–31)
COLOR UR: YELLOW
CREAT SERPL-MCNC: 0.88 MG/DL (ref 0.6–1.2)
EOSINOPHIL # BLD AUTO: 0.4 THOUSAND/ΜL (ref 0–0.61)
EOSINOPHIL NFR BLD AUTO: 6 % (ref 0–5)
ERYTHROCYTE [DISTWIDTH] IN BLOOD BY AUTOMATED COUNT: 15.4 % (ref 11.5–14.5)
GFR SERPL CREATININE-BSD FRML MDRD: 60 ML/MIN/1.73SQ M
GLUCOSE SERPL-MCNC: 100 MG/DL (ref 65–99)
GLUCOSE UR STRIP-MCNC: NEGATIVE MG/DL
HCT VFR BLD AUTO: 34.1 % (ref 42–47)
HGB BLD-MCNC: 11.4 G/DL (ref 12–16)
HGB UR QL STRIP.AUTO: ABNORMAL
KETONES UR STRIP-MCNC: NEGATIVE MG/DL
LEUKOCYTE ESTERASE UR QL STRIP: ABNORMAL
LYMPHOCYTES # BLD AUTO: 1.1 THOUSANDS/ΜL (ref 0.6–4.47)
LYMPHOCYTES NFR BLD AUTO: 17 % (ref 21–51)
MCH RBC QN AUTO: 29 PG (ref 26–34)
MCHC RBC AUTO-ENTMCNC: 33.5 G/DL (ref 31–37)
MCV RBC AUTO: 87 FL (ref 81–99)
MONOCYTES # BLD AUTO: 0.7 THOUSAND/ΜL (ref 0.17–1.22)
MONOCYTES NFR BLD AUTO: 10 % (ref 2–12)
NEUTROPHILS # BLD AUTO: 4.5 THOUSANDS/ΜL (ref 1.4–6.5)
NEUTS SEG NFR BLD AUTO: 66 % (ref 42–75)
NITRITE UR QL STRIP: NEGATIVE
NON-SQ EPI CELLS URNS QL MICRO: ABNORMAL /HPF
PH UR STRIP.AUTO: 6 [PH]
PLATELET # BLD AUTO: 225 THOUSANDS/UL (ref 149–390)
PMV BLD AUTO: 7.8 FL (ref 8.6–11.7)
POTASSIUM SERPL-SCNC: 3.4 MMOL/L (ref 3.5–5.5)
PROCALCITONIN SERPL-MCNC: <0.05 NG/ML
PROCALCITONIN SERPL-MCNC: <0.05 NG/ML
PROT UR STRIP-MCNC: NEGATIVE MG/DL
RBC # BLD AUTO: 3.94 MILLION/UL (ref 3.9–5.2)
RBC #/AREA URNS AUTO: ABNORMAL /HPF
SODIUM SERPL-SCNC: 140 MMOL/L (ref 134–143)
SP GR UR STRIP.AUTO: 1.01 (ref 1–1.03)
UROBILINOGEN UR QL STRIP.AUTO: 0.2 E.U./DL
WBC # BLD AUTO: 6.8 THOUSAND/UL (ref 4.8–10.8)
WBC #/AREA URNS AUTO: ABNORMAL /HPF

## 2021-07-02 PROCEDURE — 99222 1ST HOSP IP/OBS MODERATE 55: CPT | Performed by: INTERNAL MEDICINE

## 2021-07-02 PROCEDURE — 93971 EXTREMITY STUDY: CPT | Performed by: SURGERY

## 2021-07-02 PROCEDURE — 97167 OT EVAL HIGH COMPLEX 60 MIN: CPT

## 2021-07-02 PROCEDURE — 84145 PROCALCITONIN (PCT): CPT | Performed by: FAMILY MEDICINE

## 2021-07-02 PROCEDURE — 85025 COMPLETE CBC W/AUTO DIFF WBC: CPT | Performed by: FAMILY MEDICINE

## 2021-07-02 PROCEDURE — 81001 URINALYSIS AUTO W/SCOPE: CPT | Performed by: NURSE PRACTITIONER

## 2021-07-02 PROCEDURE — 97163 PT EVAL HIGH COMPLEX 45 MIN: CPT

## 2021-07-02 PROCEDURE — 80048 BASIC METABOLIC PNL TOTAL CA: CPT | Performed by: FAMILY MEDICINE

## 2021-07-02 PROCEDURE — 99232 SBSQ HOSP IP/OBS MODERATE 35: CPT | Performed by: NURSE PRACTITIONER

## 2021-07-02 PROCEDURE — 81003 URINALYSIS AUTO W/O SCOPE: CPT | Performed by: NURSE PRACTITIONER

## 2021-07-02 RX ORDER — ACETAMINOPHEN 325 MG/1
975 TABLET ORAL EVERY 8 HOURS SCHEDULED
Status: DISCONTINUED | OUTPATIENT
Start: 2021-07-02 | End: 2021-07-03 | Stop reason: HOSPADM

## 2021-07-02 RX ORDER — POTASSIUM CHLORIDE 20 MEQ/1
20 TABLET, EXTENDED RELEASE ORAL DAILY
Status: DISCONTINUED | OUTPATIENT
Start: 2021-07-02 | End: 2021-07-03 | Stop reason: HOSPADM

## 2021-07-02 RX ORDER — FUROSEMIDE 10 MG/ML
20 INJECTION INTRAMUSCULAR; INTRAVENOUS
Status: DISCONTINUED | OUTPATIENT
Start: 2021-07-02 | End: 2021-07-03

## 2021-07-02 RX ORDER — FUROSEMIDE 10 MG/ML
20 INJECTION INTRAMUSCULAR; INTRAVENOUS
Status: DISCONTINUED | OUTPATIENT
Start: 2021-07-02 | End: 2021-07-02

## 2021-07-02 RX ADMIN — OXYBUTYNIN CHLORIDE 5 MG: 5 TABLET ORAL at 15:57

## 2021-07-02 RX ADMIN — OXYBUTYNIN CHLORIDE 5 MG: 5 TABLET ORAL at 22:01

## 2021-07-02 RX ADMIN — POLYETHYLENE GLYCOL 3350 17 G: 17 POWDER, FOR SOLUTION ORAL at 08:40

## 2021-07-02 RX ADMIN — PANTOPRAZOLE SODIUM 40 MG: 40 TABLET, DELAYED RELEASE ORAL at 06:09

## 2021-07-02 RX ADMIN — METOPROLOL TARTRATE 25 MG: 25 TABLET, FILM COATED ORAL at 08:39

## 2021-07-02 RX ADMIN — CEFEPIME HYDROCHLORIDE 2000 MG: 2 INJECTION, SOLUTION INTRAVENOUS at 01:52

## 2021-07-02 RX ADMIN — APIXABAN 2.5 MG: 2.5 TABLET, FILM COATED ORAL at 08:40

## 2021-07-02 RX ADMIN — DILTIAZEM HYDROCHLORIDE 90 MG: 60 TABLET, FILM COATED ORAL at 03:59

## 2021-07-02 RX ADMIN — ACETAMINOPHEN 975 MG: 325 TABLET ORAL at 22:02

## 2021-07-02 RX ADMIN — ACETAMINOPHEN 975 MG: 325 TABLET ORAL at 12:48

## 2021-07-02 RX ADMIN — OXYBUTYNIN CHLORIDE 5 MG: 5 TABLET ORAL at 08:40

## 2021-07-02 RX ADMIN — ACETAMINOPHEN 975 MG: 325 TABLET ORAL at 03:58

## 2021-07-02 RX ADMIN — FUROSEMIDE 20 MG: 10 INJECTION, SOLUTION INTRAMUSCULAR; INTRAVENOUS at 15:57

## 2021-07-02 RX ADMIN — METOPROLOL TARTRATE 25 MG: 25 TABLET, FILM COATED ORAL at 17:26

## 2021-07-02 RX ADMIN — POTASSIUM CHLORIDE 20 MEQ: 1500 TABLET, EXTENDED RELEASE ORAL at 08:55

## 2021-07-02 RX ADMIN — FUROSEMIDE 20 MG: 10 INJECTION, SOLUTION INTRAMUSCULAR; INTRAVENOUS at 08:55

## 2021-07-02 RX ADMIN — SERTRALINE HYDROCHLORIDE 75 MG: 50 TABLET ORAL at 08:39

## 2021-07-02 RX ADMIN — APIXABAN 2.5 MG: 2.5 TABLET, FILM COATED ORAL at 17:26

## 2021-07-02 NOTE — NURSING NOTE
Awake and pleasant  02 is maintained chronic at 2 liters n/c  Heart is irregular  Tele is maintained afib  Trace edema is noted b/l  Pt denies pain  Plus 2 pedal/radial pulses occ loose cough is noted lbm was6/1    Call bell and nurse # given

## 2021-07-02 NOTE — CONSULTS
[address] [facility] 1935[facility] 81 [facility]  [facility] MRN: [id number]  Unit/Bed#: -01 Encounter: [id number]  Primary Care Provider: Agnieszka Smiley MD   Date and time admitted to hospital: 7/1/2021  1:37 PM    Inpatient consult to Cardiology  Consult performed by: Jodi Bianchi PA-C  Consult ordered by: Christina Potter MD          Pneumonia  Assessment & Plan  RLL pneumonia could not be excluded on x-ray  Essential hypertension  Assessment & Plan  Controlled on current medical regimen   Continue to monitor   Will be the limiting factor in diuresis   See holds     Acute on chronic diastolic heart failure (HCC)  Assessment & Plan  A/C exacerbation of dHFpEF of 65% on echo 8/17/20 65% from mod MR    BNP is 2360   Mild edema   Agree with cautious diuresis    Continue to monitor renal function and potassium with supplementation  Low sodium diet, daily weights (standing scale only), and fluid restriction              Longstanding persistent atrial fibrillation (HCC)  Assessment & Plan  Rate controlled   On Eliquis for stoke risk reduction     Thank you for allowing us to see this pleasant patient in consult  We will follow up with Outpatient plans outlined above and make all arrangements  Chief Complaint:  Chief Complaint   Patient presents with    Shortness of Breath        History of Present Illness: The patient is an 81year old with known dHF, MR, AFIB, and HTN  She is on home O2 only at night  She comes in for complaints of progressive SOB for the past several days with edema and weakness in the LE  She has also has a mild non-productive cough  From a cardiac perspective, she is without complaints of chest pain  He has no palpitations syncope or near syncope, and denies orthopnea or PND  He does not complain of TIA or CVA symptoms  He denies any exertional neck, jaw, arm or back pain  BNP is 2360  Troponin level is negative   And EKG is negative for ischemia  CXR is potentially positive for RLL pneumonia  Review of Systems: a 10 point review of systems was conducted and is negative except for as mentioned in the HPI or as below  Review of Systems   Constitutional: Positive for malaise/fatigue  HENT: Negative  Eyes: Negative  Cardiovascular: Positive for dyspnea on exertion and leg swelling  Negative for chest pain, claudication, cyanosis, irregular heartbeat, near-syncope, orthopnea, palpitations, paroxysmal nocturnal dyspnea and syncope  Respiratory: Positive for cough  Negative for hemoptysis, shortness of breath, sleep disturbances due to breathing, snoring, sputum production and wheezing  Endocrine: Negative  Hematologic/Lymphatic: Negative  Skin: Negative  Musculoskeletal: Negative  Gastrointestinal: Negative  Genitourinary: Negative  Neurological: Negative  Psychiatric/Behavioral: Negative  Allergic/Immunologic: Negative  Past Medical and Surgical History:  Past Medical History:   Diagnosis Date    Acute on chronic diastolic congestive heart failure ([facility] ) 6/27/2019    Ambulatory dysfunction 8/17/2020    Arthritis     Chest wall contusion, right, initial encounter 8/17/2020    Chest wall contusion, right, subsequent encounter 8/17/2020    Chronic diastolic heart failure (HCC)     Chronic kidney disease, stage 2 (mild)     Coronary artery disease     history of stenting    Eczema     years    Edema     History of echocardiogram 07/20/2017    EF 55%, mild concentric LVH, bileaflet MVP with moderate MR, left atrial enlargement      History of transfusion     Hyperlipidemia     Hypertension     Hypo-osmolality and hyponatremia     Hypokalemia 7/11/2019    Mitral regurgitation      Past Surgical History:   Procedure Laterality Date    ABDOMINAL SURGERY      hysterectomy    CARDIAC CATHETERIZATION  04/10/2012    EF 65%, no significant CAD, patent stents, severe MR     CORONARY ANGIOPLASTY WITH STENT PLACEMENT  03/21/2007    EF 55%, GARRET to LAD   EYE SURGERY      b/l cataracts    HYSTERECTOMY      JOINT REPLACEMENT      Rt knee     Social History     Substance and Sexual Activity   Alcohol Use Never    Alcohol/week: 0 0 standard drinks     Social History     Substance and Sexual Activity   Drug Use Never     Social History     Tobacco Use   Smoking Status Never Smoker   Smokeless Tobacco Never Used       Family History:  Family History   Problem Relation Age of Onset    Arthritis Mother     Cancer Mother     Heart disease Mother    Niya Sagast[place] Hypertension Mother     Alcohol abuse Father     Arthritis Father     Birth defects Son     Hearing loss Son     Diabetes Daughter     Stroke Maternal Grandmother     Asthma Maternal Grandfather        Medication:  Medications Prior to Admission   Medication    acetaminophen (TYLENOL) 325 mg tablet    ALPRAZolam (XANAX) 0 25 mg tablet    apixaban (ELIQUIS) 2 5 mg    bisacodyl (DULCOLAX) 5 mg EC tablet    diltiazem (CARDIZEM) 90 mg tablet    furosemide (LASIX) 40 mg tablet    hydrOXYzine HCL (ATARAX) 25 mg tablet    metoprolol tartrate (LOPRESSOR) 25 mg tablet    omeprazole (PriLOSEC) 20 mg delayed release capsule    oxybutynin (DITROPAN) 5 mg tablet    polyethylene glycol (MIRALAX) 17 g packet    sertraline (ZOLOFT) 50 mg tablet        Allergies: Allergies   Allergen Reactions    Chocolate Flavor - Food Allergy Itching    Shellfish-Derived Products - Food Allergy Shortness Of Breath    Iodine - Food Allergy Other (See Comments)     shellfish- difficulty breathing    Codeine Rash       Physical Exam:  Vitals: Blood pressure 113/73, pulse 86, temperature 97 8 °F (36 6 °C), temperature source Temporal, resp  rate 18, height 4' 11" (1 499 m), weight 51 kg (112 lb 7 oz), SpO2 94 %  , Body mass index is 22 71 kg/m² ,   Orthostatic Blood Pressures      Most Recent Value   Blood Pressure  113/73 filed at 07/02/2021 0731   Patient Position - Orthostatic VS  Lying filed at 07/02/2021 2666      , Systolic (07NYA), PVY:489 , Min:113 , RAR:622   , Diastolic (29VXQ), NHF:68, Min:61, Max:81    Physical Exam  Vitals and nursing note reviewed  Constitutional:       Appearance: She is well-developed  HENT:      Head: Normocephalic and atraumatic  Eyes:      General: No scleral icterus  Conjunctiva/sclera: Conjunctivae normal    Neck:      Vascular: No JVD  Trachea: No tracheal deviation  Cardiovascular:      Rate and Rhythm: Normal rate  Rhythm irregularly irregular  Pulses: Intact distal pulses  Heart sounds: S1 normal and S2 normal  Murmur heard  Holosystolic murmur is present with a grade of 3/6 at the apex  No friction rub  No gallop  Pulmonary:      Effort: Pulmonary effort is normal  No respiratory distress  Breath sounds: Decreased breath sounds and rales present  No wheezing  Chest:      Chest wall: No tenderness  Abdominal:      General: Bowel sounds are normal  There is no distension  Palpations: Abdomen is soft  Tenderness: There is no abdominal tenderness  Comments: Aorta not palpable    Musculoskeletal:         General: No tenderness  Normal range of motion  Cervical back: Normal range of motion and neck supple  Skin:     General: Skin is warm and dry  Coloration: Skin is not pale  Findings: No erythema or rash  Neurological:      Mental Status: She is alert and oriented to person, place, and time  Psychiatric:         Behavior: Behavior normal            Most Recent Cardiac Imaging:   Echo 8/17/20: Normal LVEF 65% no WMA, mod MR mod MAC, Mild AS       EKG: Atrial fibrillation  Prolonged QT  Nonspecific ST-t wave changes    Lab Results:   Troponins:   Results from last 7 days   Lab Units 07/01/21  1403   TROPONIN I ng/mL <0 03     BNP:  Results from last 6 Months   Lab Units 07/01/21  1403 02/08/21  0451   BNP pg/mL 2,360* 1,034*     CBC with diff:   Results from last 7 days   Lab Units 07/02/21  0606 07/01/21  1403   WBC Thousand/uL 6 80 7 20   HEMOGLOBIN g/dL 11 4* 11 9*   HEMATOCRIT % 34 1* 36 2*   PLATELETS Thousands/uL 225 260   RBC Million/uL 3 94 4 14     CMP:  Results from last 7 days   Lab Units 07/02/21  0606 07/01/21  1403   POTASSIUM mmol/L 3 4* 3 7   CHLORIDE mmol/L 106 104   BUN mg/dL 12 14   CREATININE mg/dL 0 88 0 81   CALCIUM mg/dL 8 7 9 1   AST U/L  --  12*   ALT U/L  --  8   ALK PHOS U/L  --  73   EGFR ml/min/1 73sq m 60 66     Lipid Profile:

## 2021-07-02 NOTE — ASSESSMENT & PLAN NOTE
Wt Readings from Last 3 Encounters:   07/02/21 51 kg (112 lb 7 oz)   02/15/21 48 2 kg (106 lb 4 2 oz)   08/21/20 60 7 kg (133 lb 13 1 oz)     · Monitor I&Os  · Low-sodium diet  · Daily weights  · Continue with Lasix 20 b i d  IV  · BNP 2360  · Standing weight; weight does not appears to be improved with IV diuresis- suspect inaccurate  · Clinically the patient is improving  · Cardiology input appreciated

## 2021-07-02 NOTE — PHYSICAL THERAPY NOTE
Physical Therapy Evaluation     Patient's Name: Marty Be    Admitting Diagnosis  At[facility] fibrillation ([facility] ) [I48 91]  Acute on chronic diastolic heart failure ([facility] ) [I50 33]  Pneumonia [J18 9]  Dyspnea [R06 00]  SOB (shortness of breath) [R06 02]    Problem List  Patient Active Problem List   Diagnosis    CAD (coronary artery disease)    Mitral regurgitation    Longstanding persistent atrial fibrillation (HCC)    Acute on chronic diastolic heart failure ([facility] )    Essential hypertension    Acute hypokalemia    Depression with anxiety    Gastroesophageal reflux disease without esophagitis    OAB (overactive bladder)    Hypomagnesemia    Mixed hyperlipidemia    Osteoarthritis of knee    Pneumonia    Acute on chronic respiratory failure (HCC)    Generalized weakness    Dizziness    Chest wall contusion, right, initial encounter    Ambulatory dysfunction    Hypokalemia    Liver lesion    Rib fractures    CKD (chronic kidney disease), stage II    Pulmonary hypertension ([facility] )    Visual hallucinations    Close exposure to COVID-19 virus    Cognitive impairment       Past Medical History  Past Medical History:   Diagnosis Date    Acute on chronic diastolic congestive heart failure ([facility] ) 6/27/2019    Ambulatory dysfunction 8/17/2020    Arthritis     Chest wall contusion, right, initial encounter 8/17/2020    Chest wall contusion, right, subsequent encounter 8/17/2020    Chronic diastolic heart failure (HCC)     Chronic kidney disease, stage 2 (mild)     Coronary artery disease     history of stenting    Eczema     years    Edema     History of echocardiogram 07/20/2017    EF 55%, mild concentric LVH, bileaflet MVP with moderate MR, left atrial enlargement      History of transfusion     Hyperlipidemia     Hypertension     Hypo-osmolality and hyponatremia     Hypokalemia 7/11/2019    Mitral regurgitation        Past Surgical History  Past Surgical History:   Procedure Laterality Date    ABDOMINAL SURGERY      hysterectomy    CARDIAC CATHETERIZATION  04/10/2012    EF 65%, no significant CAD, patent stents, severe MR     CORONARY ANGIOPLASTY WITH STENT PLACEMENT  03/21/2007    EF 55%, GARRET to LAD   EYE SURGERY      b/l cataracts    HYSTERECTOMY      JOINT REPLACEMENT      Rt knee        07/02/21 0805   PT Last Visit   PT Visit Date 07/02/21   Note Type   Note type Evaluation   Pain Assessment   Pain Assessment Tool Pain Assessment not indicated - pt denies pain   Home Living   Type of [address] Two level;Performs ADLs on one level; Able to live on main level with bedroom/bathroom;Stairs to enter with rails  (1st floor setup, 2 LOLA)   Bathroom Shower/Tub Tub/shower unit   Bathroom Toilet Standard   Bathroom Equipment Shower chair   [address] [facility]  (pt uses RW at baseline)   Prior Function   Level of [place] Needs assistance with ADLs and functional mobility; Needs assistance with IADLs   Lives With Son   Receives Help From Family   ADL Assistance Needs assistance   IADLs Needs assistance   Falls in the last 6 months 1 to 4   Vocational Retired   Restrictions/Precautions   [place] Bearing Precautions Per Order No   Other Precautions Cognitive; Chair Alarm; Bed Alarm;Multiple lines; Fall Risk;O2   General   Family/Caregiver Present No   Cognition   Arousal/Participation Alert   Orientation Level Oriented to person;Oriented to place;Oriented to time;Disoriented to situation   Memory Decreased recall of precautions   Following Commands Follows one step commands without difficulty   Comments pt agreeable to PT session   RLE Assessment   RLE Assessment X   Strength RLE   RLE Overall Strength 4-/5   LLE Assessment   LLE Assessment X   Strength LLE   LLE Overall Strength 4-/5   Coordination   Movements are Fluid and Coordinated 0   Sensation WFL   Bed Mobility   Supine to Sit 4  Minimal assistance   Additional items Assist x 1;HOB elevated; Bedrails; Increased time required;Verbal cues;LE management   Transfers   Sit to Stand 4  Minimal assistance   Additional items Assist x 1; Increased time required;Verbal cues   Stand to Sit 4  Minimal assistance   Additional items Assist x 1;Verbal cues;Armrests   Additional Comments pt denies any lightheadedness/dizziness   Ambulation/Elevation   Gait pattern Improper Weight shift;Decreased foot clearance; Short stride   Gait Assistance 4  Minimal assist  (primarily CGA for safety)   Additional items Assist x 1;Verbal cues; Tactile cues   Assistive Device Rolling walker   Distance 5 ft   Stair Management Assistance Not tested   Balance   Static Sitting Good   Dynamic Sitting Fair +   Static Standing Fair   Dynamic Standing Fair -   Ambulatory Fair -   Endurance Deficit   Endurance Deficit Yes   Activity Tolerance   Activity Tolerance Patient limited by fatigue   Nurse Made Aware Yes, RN Linda Miramontes made aware of outcomes/recs   Assessment   Prognosis Good   Problem List Decreased endurance; Impaired balance;Decreased mobility; Decreased strength   Assessment Pt is 81 y o  female seen for PT evaluation on 7/2/2021 s/p admit [address] on 7/1/2021 w/ Acute on chronic respiratory failure ([facility] )  PT consulted to assess pt's functional mobility and d/c needs  Order placed for PT eval and tx, w/ up w/ A order  PT performed at least 2 patient identifiers during session: Name and wristband  Comorbidities affecting pt's physical performance at time of assessment include: acute on chronic resp failure, CHF, pneumonia, cognitive impairment, HTN, AFib  PTA, pt was independent w/ all functional mobility w/ RW, ambulates household distances, has + LOLA, lives w/ son in 2 level home with 1st floor setup and retired   Personal factors affecting pt at time of IE include: ambulating w/ assistive device, stairs to enter home, unable to perform dynamic tasks in community, decreased cognition, positive fall history, decreased initiation and engagement, unable to perform physical activity, limited insight into impairments, inability to perform IADLs and inability to perform ADLs  Please find objective findings from PT assessment regarding body systems outlined above with impairments and limitations including weakness, impaired balance, decreased endurance, decreased activity tolerance, decreased functional mobility tolerance, decreased safety awareness and fall risk, as well as mobility assessment (need for cueing for mobility technique)  The following objective measures performed on IE also reveal limitations: AM-PAC 6-Clicks: 24/36  Pt's clinical presentation is currently unstable/unpredictable seen in pt's presentation of abnormal lab value(s), need for input for task focus and mobility technique and ongoing medical assessment  Pt to benefit from continued PT tx to address deficits as defined above and maximize level of functional independent mobility and consistency  From PT/mobility standpoint, recommendation at time of d/c would be home with home health rehabilitation pending progress in order to facilitate return to PLOF     Goals   Patient Goals "to eat breakfast"   STG Expiration Date 07/12/21   Short Term Goal #1 In 7-10 days: Increase bilateral LE strength 1/2 grade to facilitate independent mobility, Perform all bed mobility tasks modified independent to decrease caregiver burden, Perform all transfers with close S to improve independence, Ambulate > 50 ft  with RW with close S w/o LOB and w/ normalized gait pattern 100% of the time, Increase all balance 1/2 grade to decrease risk for falls, Tolerate 4 hr OOB to faciliate upright tolerance, Complete % of the time, Tolerate seated at EOB 15 minutes to facilitate functional task performance and Tolerate standing 2 minutes to facilitate functional task performance   PT Treatment Day 0   Plan   Treatment/Interventions Functional transfer training;LE strengthening/ROM; Elevations; Therapeutic exercise; Endurance training;Patient/family training;Gait training;Spoke to nursing;Spoke to case management   PT Frequency   (3-5x/wk)   Recommendation   PT Discharge Recommendation Home with home health rehabilitation   Equipment Recommended   (continue RW use)   PT - OK to Discharge Yes  (when medically cleared)   Additional Comments Pt's raw score on the AM-PAC Basic Mobility inpatient short form is 18, standardized score is 41 05  Patients at this level are likely to benefit from DC to home with home care, however, please refer to therapist recommendation for safe DC planning     AM-PAC Basic Mobility Inpatient   Turning in Bed Without Bedrails 4   Lying on Back to Sitting on Edge of Flat Bed 3   Moving Bed to Chair 3   Standing Up From Chair 3   Walk in Room 3   Climb 3-5 Stairs 2   Basic Mobility Inpatient Raw Score 18   Basic Mobility Standardized Score 41 05           Priscilla Galarza, PT

## 2021-07-02 NOTE — OCCUPATIONAL THERAPY NOTE
Occupational Therapy Evaluation      Daryle Sparrow    7/2/2021    Patient Active Problem List   Diagnosis    CAD (coronary artery disease)    Mitral regurgitation    Longstanding persistent atrial fibrillation (HCC)    Acute on chronic diastolic heart failure ([facility] )    Essential hypertension    Acute hypokalemia    Depression with anxiety    Gastroesophageal reflux disease without esophagitis    OAB (overactive bladder)    Hypomagnesemia    Mixed hyperlipidemia    Osteoarthritis of knee    Pneumonia    Acute on chronic respiratory failure (HCC)    Generalized weakness    Dizziness    Chest wall contusion, right, initial encounter    Ambulatory dysfunction    Hypokalemia    Liver lesion    Rib fractures    CKD (chronic kidney disease), stage II    Pulmonary hypertension ([address] )    Visual hallucinations    Close exposure to COVID-19 virus    Cognitive impairment       Past Medical History:   Diagnosis Date    Acute on chronic diastolic congestive heart failure ([facility] ) 6/27/2019    Ambulatory dysfunction 8/17/2020    Arthritis     Chest wall contusion, right, initial encounter 8/17/2020    Chest wall contusion, right, subsequent encounter 8/17/2020    Chronic diastolic heart failure (HCC)     Chronic kidney disease, stage 2 (mild)     Coronary artery disease     history of stenting    Eczema     years    Edema     History of echocardiogram 07/20/2017    EF 55%, mild concentric LVH, bileaflet MVP with moderate MR, left atrial enlargement   History of transfusion     Hyperlipidemia     Hypertension     Hypo-osmolality and hyponatremia     Hypokalemia 7/11/2019    Mitral regurgitation        Past Surgical History:   Procedure Laterality Date    ABDOMINAL SURGERY      hysterectomy    CARDIAC CATHETERIZATION  04/10/2012    EF 65%, no significant CAD, patent stents, severe MR     CORONARY ANGIOPLASTY WITH STENT PLACEMENT  03/21/2007    EF 55%, GARRET to LAD      EYE SURGERY b/l cataracts    HYSTERECTOMY      JOINT REPLACEMENT      Rt knee        07/02/21 0954   OT Last Visit   OT Visit Date 07/02/21   Note Type   Note type Evaluation   Restrictions/Precautions   Weight Bearing Precautions Per Order No   Other Precautions Cognitive; Chair Alarm; Bed Alarm;Multiple lines; Fall Risk;O2   Pain Assessment   Pain Assessment Tool Pain Assessment not indicated - pt denies pain   Pain Score No Pain   Home Living   Type of [address] One level;Performs ADLs on one level; Able to live on main level with bedroom/bathroom; Ramped entrance   Bathroom Shower/Tub Tub/shower unit   Bathroom Toilet Standard   Bathroom Equipment Grab bars in shower;Grab bars around toilet   [address]  (utilizes RW at baseline )   Prior Function   Level of [place] Independent with ADLs and functional mobility; Needs assistance with IADLs   Lives With Son   Receives Help From Family   ADL Assistance Independent   IADLs Needs assistance  (son does cooking and laundry )   Falls in the last 6 months 1 to 4   Vocational Retired   Comments (-) driving, son assists with transportation    Lifestyle   Autonomy Patient reporting being independent with ADLs, ambulatory with RW and lives with son in a one level house    Reciprocal Relationships son is home 24/7 with patient    Service to Others Retired    [address] Enjoys visiting family    ADL   Eating Assistance 6  Modified independent   [address] 5  [address] [facility] 4  [address] 5  [address] [facility] 4  [address] [facility]  4  Minimal Assistance   Bed Mobility   Supine to Sit   (DNT: pt seated OOB in chair upon arrival )   Sit to Supine 5  Supervision   Additional items Assist x 1; Increased time required;Verbal cues; Bedrails   Transfers   Sit to Stand 4  Minimal assistance   Additional items Assist x 1; Armrests; Increased time required;Verbal cues   Stand to Sit 4  Minimal assistance   Additional items Assist x 1; Increased time required;Verbal cues   Additional Comments Pt denied lightheaded/dizziness with transitional movements  Functional Mobility   Functional Mobility 4  Minimal assistance   Additional Comments Pt ambulated in room with no overt LOB  After ambulating ~30 feet, patient reported feeling dizzy and SOB  Pt returned to chair but symptoms persisted  BP seated: 104/68  Pt requested to return BTB  Once supine, pt reported that dizziness subsided  RN Severa Mari made aware  Additional items Rolling walker   Balance   Static Sitting Good   Dynamic Sitting Fair +   Static Standing Fair   Dynamic Standing Fair -   Activity Tolerance   Activity Tolerance Patient limited by fatigue   Nurse Made Aware MARY Madison made aware of outcomes    RUE Assessment   RUE Assessment WFL  (grossly 3+/5 MMT )   LUE Assessment   LUE Assessment WFL  (grossly 3+/5 MMT )   Hand Function   Gross Motor Coordination Functional   Fine Motor Coordination Functional   Vision-Basic Assessment   Current Vision Wears glasses only for reading   Cognition   Overall Cognitive Status Impaired   Arousal/Participation Alert; Responsive; Cooperative   Attention Attends with cues to redirect   Orientation Level Oriented to person;Oriented to place;Oriented to time;Disoriented to situation   Memory Decreased recall of precautions   Following Commands Follows one step commands without difficulty   Comments Pt agreeable to OT eval    Assessment   Limitation Decreased ADL status; Decreased UE strength;Decreased cognition;Decreased Safe judgement during ADL;Decreased endurance;Decreased self-care trans;Decreased high-level ADLs   Prognosis Good   Assessment Patient is a 81 y o  female seen for OT evaluation s/p admit to [facility]  on 7/1/2021 w/Acute on chronic [facility] failure ([facility] )  Commorbidities affecting patient's functional performance at time of assessment include: acute on chronic heart failure, cognitive impairment, HTN, A-fib and pneumonia  Orders placed for OT evaluation and treatment and up with assistance  Performed at least two patient identifiers during session including name and wristband  Prior to admission, Patient reporting being independent with ADLs, ambulatory with RW and lives with son in a one level house  Personal factors affecting patient at time of initial evaluation include: steps to enter, difficulty performing ADLs and difficulty performing IADLs  Upon evaluation, patient requires supervision assist for UB ADLs, minimal  assist for LB ADLs, transfers and functional ambulation in room and bathroom with supervision assist, with the use of Rolling Walker  Patient is oriented x 4 and presents with limited ability to recall information after a brief period of time (short term memory) / working memory   Occupational performance is affected by the following deficits: decreased muscle strength, dynamic sit/ stand balance deficit with poor standing tolerance time for self care and functional mobility, decreased activity tolerance, impaired memory, impaired problem solving, decreased safety awareness and delayed righting and equilibrium reactions  Patient to benefit from continued Occupational Therapy treatment while in the hospital to address deficits as defined above and maximize level of functional independence with ADLs and functional mobility  Occupational Performance areas to address include: grooming , bathing/ shower, dressing, toilet hygiene, transfer to all surfaces, functional ambulation, medication routine/ management and IADLS: Household maintenance  From OT standpoint, recommendation at time of d/c would be Home with home health rehabilitation       Goals   Patient Goals to sleep    Plan   Treatment Interventions ADL retraining;Functional transfer training;UE strengthening/ROM; Endurance training;Cognitive reorientation;Patient/family training; Compensatory technique education; Activityengagement   Goal Expiration Date 07/12/21   OT Treatment Day 0   OT Frequency 3-5x/wk   Recommendation   OT Discharge Recommendation Home with home health rehabilitation   OT - OK to Discharge Yes  (Once medically cleared )   Additional Comments  At the end of the session, all needs met and pt supine in bed, bed alarm activated, SCDs on BLE and turned on, HOB elevated, and call bell within reach  Additional Comments 2 The patient's raw score on the AM-PAC Daily Activity inpatient short form is 20, standardized score is 42 03, greater than 39 4  Patients at this level are likely to benefit from discharge to home  Please refer to the recommendation of the Occupational Therapist for safe discharge planning     AM-PAC Daily Activity Inpatient   Lower Body Dressing 3   Bathing 3   Toileting 3   Upper Body Dressing 3   Grooming 4   Eating 4   Daily Activity Raw Score 20   Daily Activity Standardized Score (Calc for Raw Score >=11) 42 03   AM-[facility] Applied Cognition Inpatient   Following a Speech/Presentation 3   Understanding Ordinary Conversation 4   Taking Medications 3   Remembering Where Things Are Placed or Put Away 2   Remembering List of 4-5 Errands 2   Taking Care of Complicated Tasks 3   Applied Cognition Raw Score 17   Applied Cognition Standardized Score 36 52     GOALS:    *ADL transfers with (I) for inc'd independence with ADLs/purposeful tasks    *UB ADL with (I) for inc'd independence with self cares    *LB ADL with (S) using AE prn for inc'd independence with self cares    *Toileting with (S) for clothing management and hygiene for return to PLOF with personal care    *Increase stand tolerance x5 m for inc'd tolerance with standing purposeful tasks    *Participate in 10m UE therex to increase overall stamina/activity tolerance for purposeful tasks    *Bed mobility- (S) for inc'd independence to manage own comfort and initiate EOB & OOB purposeful tasks    *Patient will verbalize 3 safety awareness/ principles to prevent falls in the home setting  *Patient will verbalize and demonstrate use of energy conservation/deep breathing techniques and work simplification skills during functional activities with no verbal cues  *Patient will increase OOB/sitting tolerance to 2-4 hours per day to increase participation in self-care and leisure tasks with no s/s of exertion         Sophie Rendon MS, OTR/L

## 2021-07-02 NOTE — CASE MANAGEMENT
LOS: 19 hrs, pt is not a 30 day readmission or bundle pt, pt was na obs status and changed to inpt for chf/pneumonia, cm met with the ot and her daughter Augusta García, they were made aware of cm role and change in status, pt lives with her son Christel Perales in a 4 story home , ramp outside and 1 step to the inside, 4 story home, pt's bedroom and bath are on the 1st floor, DME: commode,walker, cane, oxygen 2 5 liters Young;s ,bp cuff, A post acute care recommendation was made by your care team for [facility]  Discussed Freedom of Choice with both patient and caregiver  Choice is to make a new referral  Pt has [address] in the past, referrals was sen to [facility] and they are not able to accept, pt and family were made aware, permission was given to find any [facility] that services her area and accepts her insurance, referrals were sent to: [facility], [facility], [facility] [place], [facility] and they are not able to accept [facility] has not yet responded, new referrals were sent to Personal home, associated family, [facility], [facility], [facility] (no) , [facility], pt's family does the driving cooking shopping cleaning,meds  Family has to help assist with bathing and dressing,p's family will bring her portable oxygen tank from home upon d/c and will transport the pt home, family denies any d/c needs at this time, cm will continue to follow and work on a safe d/c plan  CM reviewed d/c planning process including the following: identifying help at home, patient preference for d/c planning needs, availability of treatment team to discuss questions or concerns patient and/or family may have regarding understanding medications and recognizing signs and symptoms once discharged  CM also encouraged patient to follow up with all recommended appointments after discharge  Patient advised of importance for patient and family to participate in managing patients medical well being

## 2021-07-02 NOTE — PROGRESS NOTES
Vancomycin IV Pharmacy-to-Dose Consultation    Marty Be is a 81 y o  female who is currently receiving Vancomycin IV with management by the Pharmacy Consult service  Assessment/Plan:  The patient was reviewed  Renal function is stable and no signs or symptoms of nephrotoxicity and/or infusion reactions were documented in the chart  Based on todays assessment, continue current vancomycin (day # 2) dosing of 1000mg IV Q24H, with a plan for trough to be drawn at 1430 on 07/04/2021  We will continue to follow the patients culture results and clinical progress daily      Adi Fink, Pharmacist

## 2021-07-02 NOTE — PROGRESS NOTES
[facility]  Progress Note - Ashish Walker 1935, 81 y o  female MRN: [id number]  Unit/Bed#: -01 Encounter: [id number]  Primary Care Provider: Laray Eisenmenger, MD   Date and time admitted to hospital: 7/1/2021  1:37 PM    * Acute on chronic respiratory failure ([address] )  Assessment & Plan  · Patient normally on 2 L nasal cannula around the clock  · She required 3 L nasal cannula yesterday but today is back down to 2 L  · Secondary to acute on chronic CHF   · Sats above 92%    Cognitive impairment  Assessment & Plan  · Suspected underlying dementia without formal diagnosis  · Supportive care         Pneumonia  Assessment & Plan  · Presented with generalized weakness, cough and shortness of breath for a week  · Chest x-ray suggestive of possible pneumonia  · Started on cefepime and vancomycin IV  · procalcitonin- negative x 2; discontinued abx   · Symptoms are likely related to CHF exacerbation    Essential hypertension  Assessment & Plan  · Well controlled  · Continue home medications    Acute on chronic diastolic heart failure (HCC)  Assessment & Plan  Wt Readings from Last 3 Encounters:   07/02/21 51 kg (112 lb 7 oz)   02/15/21 48 2 kg (106 lb 4 2 oz)   08/21/20 60 7 kg (133 lb 13 1 oz)     · Monitor I&Os  · Low-sodium diet  · Daily weights  · Continue with Lasix 20 b i d  IV  · BNP 2360  · Standing weight; weight does not appears to be improved with IV diuresis- suspect inaccurate  · Clinically the patient is improving  · Cardiology input appreciated        Longstanding persistent atrial fibrillation (HCC)  Assessment & Plan  · Anticoagulated with Eliquis  · Rate controlled with diltiazem and metoprolol        VTE Prophylaxis:  Apixaban (Eliquis)    Patient Centered Rounds: I have performed bedside rounds with nursing staff today      Discussions with Specialists or Other Care Team Provider:  Cardiology, nursing, case management, PT/OT  Education and Discussions with Family / Patient:  Patient and daughter via phone    Current Length of Stay: 0 day(s)    Current Patient Status: Inpatient   Certification Statement: The patient will continue to require additional inpatient hospital stay due to Acute on chronic respiratory failure    Discharge Plan:  Pending hospital course  The patient will continue to require IV diuresis for another 24-48 hours  Will change to inpatient status  Code Status: Level 1 - Full Code    Subjective:   Feeling slightly better but continues to feel some dyspnea on exertion  Objective:     Vitals:   Temp (24hrs), Av 9 °F (36 6 °C), Min:97 1 °F (36 2 °C), Max:98 6 °F (37 °C)    Temp:  [97 1 °F (36 2 °C)-98 6 °F (37 °C)] 98 °F (36 7 °C)  HR:  [63-86] 63  Resp:  [18-20] 18  BP: (109-133)/(60-81) 109/60  SpO2:  [94 %-96 %] 96 %  Body mass index is 22 71 kg/m²  Input and Output Summary (last 24 hours): Intake/Output Summary (Last 24 hours) at 2021 1426  Last data filed at 2021 1301  Gross per 24 hour   Intake 658 ml   Output 2100 ml   Net -1442 ml       Physical Exam:   Physical Exam  Vitals and nursing note reviewed  Constitutional:       General: She is not in acute distress  Appearance: Normal appearance  HENT:      Head: Normocephalic and atraumatic  Right Ear: External ear normal       Left Ear: External ear normal       Nose: Nose normal  No rhinorrhea  Mouth/Throat:      Mouth: Mucous membranes are moist       Pharynx: Oropharynx is clear  Eyes:      General:         Right eye: No discharge  Left eye: No discharge  Pupils: Pupils are equal, round, and reactive to light  Cardiovascular:      Rate and Rhythm: Normal rate and regular rhythm  Pulses: Normal pulses  Heart sounds: Normal heart sounds  No murmur heard  Pulmonary:      Effort: Pulmonary effort is normal  No respiratory distress  Breath sounds: Rales present     Abdominal:      General: Bowel sounds are normal  There is no distension  Palpations: Abdomen is soft  There is no mass  Tenderness: There is no abdominal tenderness  Musculoskeletal:         General: No swelling or tenderness  Normal range of motion  Cervical back: Normal range of motion and neck supple  No muscular tenderness  Right lower leg: Edema present  Left lower leg: Edema present  Skin:     General: Skin is warm and dry  Capillary Refill: Capillary refill takes less than 2 seconds  Findings: No erythema or rash  Neurological:      General: No focal deficit present  Mental Status: She is alert  Mental status is at baseline  Comments: With periods of forgetfulness   Psychiatric:         Mood and Affect: Mood normal          Behavior: Behavior normal          Thought Content: Thought content normal          Judgment: Judgment normal          Additional Data:     Labs:    Results from last 7 days   Lab Units 07/02/21  0606   WBC Thousand/uL 6 80   HEMOGLOBIN g/dL 11 4*   HEMATOCRIT % 34 1*   PLATELETS Thousands/uL 225   NEUTROS PCT % 66   LYMPHS PCT % 17*   MONOS PCT % 10   EOS PCT % 6*     Results from last 7 days   Lab Units 07/02/21  0606 07/01/21  1403   SODIUM mmol/L 140 138   POTASSIUM mmol/L 3 4* 3 7   CHLORIDE mmol/L 106 104   CO2 mmol/L 25 26   BUN mg/dL 12 14   CREATININE mg/dL 0 88 0 81   CALCIUM mg/dL 8 7 9 1   ALK PHOS U/L  --  73   ALT U/L  --  8   AST U/L  --  12*     Results from last 7 days   Lab Units 07/01/21  1403   INR  1 71*               * I Have Reviewed All Lab Data Listed Above  * Additional Pertinent Lab Tests Reviewed: Alex 66 Admission  Reviewed    Imaging:  Imaging Reports Reviewed Today Include: cxr      Recent Cultures (last 7 days):     Results from last 7 days   Lab Units 07/01/21  1506   BLOOD CULTURE  Received in Microbiology Lab  Culture in Progress  Received in Microbiology Lab  Culture in Progress         Last 24 Hours Medication List:   Current Facility-Administered Medications   Medication Dose Route Frequency Provider Last Rate    acetaminophen  975 mg Oral Q8H [address] NEAL Garrett      ALPRAZolam  0 25 mg Oral HS PRN Cecilia Hubbard MD      apixaban  2 5 mg Oral BID Cecilia Hubbard, MD      bisacodyl  5 mg Oral Daily PRN Cecilia Hubbard, MD      diltiazem  90 mg Oral [facility] NEAL Blanco      furosemide  20 mg Intravenous BID (diuretic) Madhu Burt PA-C      metoprolol tartrate  25 mg Oral BID Cecilia Hubbard MD      oxybutynin  5 mg Oral TID Cecilia Hubbard MD      pantoprazole  40 mg Oral Early Morning Cecilia Hubbard MD      polyethylene glycol  17 g Oral Daily Cecilia Hubbard MD      potassium chloride  20 mEq Oral Daily Madhu Burt PA-C      sertraline  75 mg Oral Daily Cecilia Hubbard MD          Today, Patient Was Seen By: NEAL Blanco    ** Please Note: Dictation voice to text software may have been used in the creation of this document   **

## 2021-07-02 NOTE — UTILIZATION REVIEW
UNABLE TO SUBMIT [employer]IA [employer]     Initial Clinical Review    Admission: Date/Time/Statement:   Admission Orders (From admission, onward)     Ordered        07/02/21 1115  Inpatient Admission  Once         07/01/21 1439  Place in Observation  Once                   7/02   CHANGED TO INPT STATUS  Due to  Ongoing need for  IV Lasix diuresis and  IVAB therapies       Orders Placed This Encounter   Procedures    Inpatient Admission     Standing Status:   Standing     Number of Occurrences:   1     Order Specific Question:   Level of Care     Answer:   Med Surg [16]     Order Specific Question:   Estimated length of stay     Answer:   More than 2 Midnights     Order Specific Question:   Certification     Answer:   I certify that inpatient services are medically necessary for this patient for a duration of greater than two midnights  See H&P and MD Progress Notes for additional information about the patient's course of treatment  ED Arrival Information     Expected Arrival Acuity    - 7/1/2021 13:26 Emergent         Means of arrival Escorted by Service Admission type    Amb[facility]         Arrival complaint    sob        Chief Complaint   Patient presents with    Shortness of Breath       Initial Presentation:   82 yo female,  To ER from home,  Admitted OBS status for  Acute on chronic diastolic heart failure (EF of 65% on echo 8/17/20 65% from mod MR )   Chronic resp failure requiring continuous 2L NC oxygen only at [facility]  (initially required 3L NC in ER)   Presents w/1 week h/o progressive SOB w/increasing LE edema, weakness, fatigue, SOB  IN ER,  RLL pneumonia could not be excluded on x-ray  bnp 2360  Mild LE edema, BRIZUELA    Will initiate IV diuresis and IVAB,  Implement fluid restriction w/close monitoring of volume status (daily wgt, I/O)   consult cardiology , cont telemetry,  Monitor/replete electrolyles prn      Date:   7/2     Day 2:  Tele - remains Afib controlled HR    PER CARDIOLOGY:  Cont diuresis w/HOLD parameters (Lo bp will be the limiting factor in diuresis)       ED Triage Vitals   Temperature Pulse Respirations Blood Pressure SpO2   07/01/21 1334 07/01/21 1334 07/01/21 1334 07/01/21 1334 07/01/21 1334   98 5 °F (36 9 °C) 86 18 121/68 96 %      Temp Source Heart Rate Source Patient Position - Orthostatic VS BP Location FiO2 (%) 3L NC    07/01/21 1334 07/01/21 1334 07/01/21 1334 07/01/21 1334 --   Temporal Monitor Sitting Left arm       Pain Score       07/01/21 1821       No Pain          Wt Readings from Last 1 Encounters:   07/02/21 51 kg (112 lb 7 oz)     Additional Vital Signs:   07/02/21 1126 98 °F (36 7 °C) 63 18 111/70 96 % 2 L/min Nasal cannula   07/02/21 0731 97 8 °F (36 6 °C) 86 18 113/73 94 % -- None (Room air)   07/02/21 0258 98 °F (36 7 °C) 64 18 114/72 94 % -- Nasal cannula   07/01/21 2240 97 1 °F (36 2 °C) 70 18 133/70 95 % -- Nasal cannula   07/01/21 2011 -- -- -- -- -- 2 L/min Nasal cannula   07/01/21 1900 98 °F (36 7 °C) 71 20 132/67 94 % 3L NC  --   07/01/21 1624 98 6 °F (37 °C) 74 20 122/61 95 % 3L NC         Pertinent Labs/Diagnostic Test Results:   7/01  cxr -  Right base opacity which could be due to a small effusion and atelectasis but pneumonia cannot be excluded  7/01  ekg -  Atrial fibrillation; Prolonged QT; Nonspecific ST-t wave changes      Results from last 7 days   Lab Units 07/01/21  1403   SARS-COV-2  Negative     Results from last 7 days   Lab Units 07/02/21  0606 07/01/21  1403   WBC Thousand/uL 6 80 7 20   HEMOGLOBIN g/dL 11 4* 11 9*   HEMATOCRIT % 34 1* 36 2*   PLATELETS Thousands/uL 225 260   NEUTROS ABS Thousands/µL 4 50 5 20         Results from last 7 days   Lab Units 07/02/21  0606 07/01/21  1403   SODIUM mmol/L 140 138   POTASSIUM mmol/L 3 4* 3 7   CHLORIDE mmol/L 106 104   CO2 mmol/L 25 26   ANION GAP mmol/L 9 8   BUN mg/dL 12 14   CREATININE mg/dL 0 88 0 81   EGFR ml/min/1 73sq m 60 66   CALCIUM mg/dL 8 7 9 1     Results from last 7 days   Lab Units 07/01/21  1403   AST U/L 12*   ALT U/L 8   ALK PHOS U/L 73   TOTAL PROTEIN g/dL 6 9   ALBUMIN g/dL 3 6   TOTAL BILIRUBIN mg/dL 1 00         Results from last 7 days   Lab Units 07/02/21  0606 07/01/21  1403   GLUCOSE RANDOM mg/dL 100* 125*     Results from last 7 days   Lab Units 07/01/21  1403   TROPONIN I ng/mL <0 03         Results from last 7 days   Lab Units 07/01/21  1403   PROTIME seconds 19 9*   INR  1 71*   PTT seconds 35         Results from last 7 days   Lab Units 07/02/21  0606 07/01/21  1718   PROCALCITONIN ng/ml <0 05 <0 05     Results from last 7 days   Lab Units 07/01/21  1403   LACTIC ACID mmol/L 1 3     Results from last 7 days   Lab Units 07/01/21  1403   BNP pg/mL 2,360*     Results from last 7 days   Lab Units 07/01/21  1506   BLOOD CULTURE  Received in Microbiology Lab  Culture in Progress  Received in Microbiology Lab  Culture in Progress  ED Treatment:   Medication Administration from 07/01/2021 1326 to 07/01/2021 1601       Date/Time Order Dose Route Action     07/01/2021 1441 cefepime (MAXIPIME) IVPB (premix in dextrose) 1,000 mg 50 mL 1,000 mg Intravenous New Bag     07/01/2021 1518 vancomycin (VANCOCIN) IVPB (premix in dextrose) 1,000 mg 200 mL 1,000 mg Intravenous New Bag        Past Medical History:   Diagnosis Date    Acute on chronic diastolic congestive heart failure ([facility] [address] ) 6/27/2019    Ambulatory dysfunction 8/17/2020    Arthritis     Chest wall contusion, right, initial encounter 8/17/2020    Chest wall contusion, right, subsequent encounter 8/17/2020    Chronic diastolic heart failure (HCC)     Chronic kidney disease, stage 2 (mild)     Coronary artery disease     history of stenting    Eczema     years    Edema     History of echocardiogram 07/20/2017    EF 55%, mild concentric LVH, bileaflet MVP with moderate MR, left atrial enlargement      History of transfusion     Hyperlipidemia     Hypertension     Hypo-osmolality and hyponatremia     Hypokalemia 7/11/2019    Mitral regurgitation      Present on Admission:   Longstanding persistent atrial fibrillation ([facility] [address] )   Essential hypertension   Cognitive impairment   Acute on chronic diastolic heart failure (HCC)   Pneumonia      Admitting Diagnosis: Atrial fibrillation (HCC) [I48 91]  Acute on chronic diastolic heart failure (HCC) [I50 33]  Pneumonia [J18 9]  Dyspnea [R06 00]  SOB (shortness of breath) [R06 02]  Age/Sex: 81 y o  female  Admission Orders:   Telemetry ; Consult cardiology ;  PT/OT;  dialy wgt;  I/O q shift;  Pulse oximetry "spot cks"; Diet  2g na, fluid restriction 1800 ml    Scheduled Medications:  acetaminophen, 975 mg, Oral, Q8H AGUILAR  apixaban, 2 5 mg, Oral, BID  cefepime, 2,000 mg, Intravenous, Q12H  diltiazem, 90 mg, Oral, Q8H AGUILAR  furosemide, 20 mg, Intravenous, BID (diuretic)  metoprolol tartrate, 25 mg, Oral, BID  oxybutynin, 5 mg, Oral, TID  pantoprazole, 40 mg, Oral, Early Morning  polyethylene glycol, 17 g, Oral, Daily  potassium chloride, 20 mEq, Oral, Daily  sertraline, 75 mg, Oral, Daily  vancomycin, 20 mg/kg, Intravenous, Q24H      Continuous IV Infusions:     PRN Meds:  ALPRAZolam, 0 25 mg, Oral, HS PRN  bisacodyl, 5 mg, Oral, Daily PRN        IP CONSULT TO PHARMACY  IP CONSULT TO CARDIOLOGY    Network Utilization Review Department  ATTENTION: Please call with any questions or concerns to [phone] and carefully listen to the prompts so that you are directed to the right person  All voicemails are confidential   Carey Query all requests for admission clinical reviews, approved or denied determinations and any other requests to dedicated fax number below belonging to the campus where the patient is receiving treatment   List of dedicated fax numbers for the Facilities:  [address] DENIALS (Administrative/Medical Necessity) [phone]   [address] (Maternity/NICU/Pediatrics) [phone] 5000 [facility] [phone]   [address] [place] [address] [address] [place] [address] Mechelle Zhang [address] [phone] 4601 [facility] [phone]

## 2021-07-02 NOTE — PLAN OF CARE
Problem: Potential for Falls  Goal: Patient will remain free of falls  Description: INTERVENTIONS:  - Educate patient/family on patient safety including physical limitations  - Instruct patient to call for assistance with activity   - Consult OT/PT to assist with strengthening/mobility   - Keep Call bell within reach  - Keep bed low and locked with side rails adjusted as appropriate  - Keep care items and personal belongings within reach  - Initiate and maintain comfort rounds  - Make Fall Risk Sign visible to staff  - Offer Toileting every 4 in advance of need  - Initiate/Maintain BED alarm  - Obtain necessary fall risk management equipment  - Apply yellow socks and bracelet for high fall risk patients  - Consider moving patient to room near nurses station  7/2/2021 0048 by Gely Reynolds RN  Outcome: Progressing  7/2/2021 0048 by Gely Reynolds RN  Outcome: Progressing     Problem: PAIN - ADULT  Goal: Verbalizes/displays adequate comfort level or baseline comfort level  Description: Interventions:  - Encourage patient to monitor pain and request assistance  - Assess pain using appropriate pain scale  - Administer analgesics based on type and severity of pain and evaluate response  - Implement non-pharmacological measures as appropriate and evaluate response  - Consider cultural and social influences on pain and pain management  - Notify physician/advanced practitioner if interventions unsuccessful or patient reports new pain  7/2/2021 0048 by Gely Reynolds RN  Outcome: Progressing  7/2/2021 0048 by Gely Reynolds RN  Outcome: Progressing     Problem: INFECTION - ADULT  Goal: Absence or prevention of progression during hospitalization  Description: INTERVENTIONS:  - Assess and monitor for signs and symptoms of infection  - Monitor lab/diagnostic results  - Monitor all insertion sites, i e  indwelling lines, tubes, and drains  - Monitor endotracheal if appropriate and nasal secretions for changes in amount and color  - [place] appropriate cooling/warming therapies per order  - Administer medications as ordered  - Instruct and encourage patient and family to use good hand hygiene technique  - Identify and instruct in appropriate isolation precautions for identified infection/condition  7/2/2021 0048 by Mayela Mccoy RN  Outcome: Progressing  7/2/2021 0048 by Mayela Mccoy RN  Outcome: Progressing     Problem: SAFETY ADULT  Goal: Patient will remain free of falls  Description: INTERVENTIONS:  - Educate patient/family on patient safety including physical limitations  - Instruct patient to call for assistance with activity   - Consult OT/PT to assist with strengthening/mobility   - Keep Call bell within reach  - Keep bed low and locked with side rails adjusted as appropriate  - Keep care items and personal belongings within reach  - Initiate and maintain comfort rounds  - Make Fall Risk Sign visible to staff  - Offer Toileting everyHours, in advance of ne- Apply yellow socks and bracelet for high fall risk patients  - Consider moving patient to room near nurses station  7/2/2021 0048 by Mayela Mccoy RN  Outcome: Progressing  7/2/2021 0048 by Mayela Mccoy RN  Outcome: Progressing     Problem: DISCHARGE PLANNING  Goal: Discharge to home or other facility with appropriate resources  Description: INTERVENTIONS:  - Identify barriers to discharge w/patient and caregiver  - Arrange for needed discharge resources and transportation as appropriate  - Identify discharge learning needs (meds, wound care, etc )  - Arrange for interpretive services to assist at discharge as needed  - Refer to Case Management Department for coordinating discharge planning if the patient needs post-hospital services based on physician/advanced practitioner order or complex needs related to functional status, cognitive ability, or social support system  7/2/2021 0048 by Mayela Mccoy RN  Outcome: Progressing  7/2/2021 0048 by Samira Baum RN  Outcome: Progressing     Problem: Knowledge Deficit  Goal: Patient/family/caregiver demonstrates understanding of disease process, treatment plan, medications, and discharge instructions  Description: Complete learning assessment and assess knowledge base    Interventions:  - Provide teaching at level of understanding  - Provide teaching via preferred learning methods  7/2/2021 0048 by Samira Baum RN  Outcome: Progressing  7/2/2021 0048 by Samira Baum RN  Outcome: Progressing     Problem: RESPIRATORY - ADULT  Goal: Achieves optimal ventilation and oxygenation  Description: INTERVENTIONS:  - Assess for changes in respiratory status  - Assess for changes in mentation and behavior  - Position to facilitate oxygenation and minimize respiratory effort  - Oxygen administered by appropriate delivery if ordered  - Initiate smoking cessation education as indicated  - Encourage broncho-pulmonary hygiene including cough, deep breathe, Incentive Spirometry  - Assess the need for suctioning and aspirate as needed  - Assess and instruct to report SOB or any respiratory difficulty  - Respiratory Therapy support as indicated  7/2/2021 0048 by Samira Baum RN  Outcome: Progressing  7/2/2021 0048 by Samira Baum RN  Outcome: Progressing     Problem: MOBILITY - ADULT  Goal: Maintain or return to baseline ADL function  Description: INTERVENTIONS:  -  Assess patient's ability to carry out ADLs; assess patient's baseline for ADL function and identify physical deficits which impact ability to perform ADLs (bathing, care of mouth/teeth, toileting, grooming, dressing, etc )  - Assess/evaluate cause of self-care deficits   - Assess range of motion  - Assess patient's mobility; develop plan if impaired  - Assess patient's need for assistive devices and provide as appropriate  - Encourage maximum independence but intervene and supervise when necessary  - Involve family in performance of ADLs  - Assess for home care needs following discharge   - Consider OT consult to assist with ADL evaluation and planning for discharge  - Provide patient education as appropriate  7/2/2021 0048 by Glenys Burroughs RN  Outcome: Progressing  7/2/2021 0048 by Glenys Burroughs RN  Outcome: Progressing  Goal: Maintains/Returns to pre admission functional level  Description: INTERVENTIONS:  - Perform BMAT or MOVE assessment daily    - Set and communicate daily mobility goal to care team and patient/family/caregiver  - Collaborate with rehabilitation services on mobility   - Ambulate patient 3  times a day  - Out of bed to chair  2  times a day   - Out of bed for meals  2 times a day  - Out of bed for toileting  - Record patient progress and toleration of activity level   7/2/2021 0048 by Glenys Burroughs RN  Outcome: Progressing  7/2/2021 0048 by Glenys Burroughs RN  Outcome: Progressing     Problem: Nutrition/Hydration-ADULT  Goal: Nutrient/Hydration intake appropriate for improving, restoring or maintaining nutritional needs  Description: Monitor and assess patient's nutrition/hydration status for malnutrition  Collaborate with interdisciplinary team and initiate plan and interventions as ordered  Monitor patient's weight and dietary intake as ordered or per policy  Utilize nutrition screening tool and intervene as necessary  Determine patient's food preferences and provide high-protein, high-caloric foods as appropriate       INTERVENTIONS:  - Monitor oral intake, urinary output, labs, and treatment plans  - Assess nutrition and hydration status and recommend course of action  - Evaluate amount of meals eaten  - Assist patient with eating if necessary   - Allow adequate time for meals  - Recommend/ encourage appropriate diets, oral nutritional supplements, and vitamin/mineral supplements  - Order, calculate, and assess calorie counts as needed  - Recommend, monitor, and adjust tube feedings and TPN/PPN based on assessed needs  - Assess need for intravenous fluids  - Provide specific nutrition/hydration education as appropriate  - Include patient/family/caregiver in decisions related to nutrition  7/2/2021 0048 by Glenys Burroughs RN  Outcome: Progressing  7/2/2021 0048 by Glenys Burroughs RN  Outcome: Progressing

## 2021-07-02 NOTE — PLAN OF CARE
Problem: PHYSICAL THERAPY ADULT  Goal: Performs mobility at highest level of function for planned discharge setting  See evaluation for individualized goals  Description: Treatment/Interventions: Functional transfer training, LE strengthening/ROM, Elevations, Therapeutic exercise, Endurance training, Patient/family training, Gait training, Spoke to nursing, Spoke to case management  Equipment Recommended:  (continue RW use)       See flowsheet documentation for full assessment, interventions and recommendations  Note: Prognosis: Good  Problem List: Decreased endurance, Impaired balance, Decreased mobility, Decreased strength  Assessment: Pt is 81 y o  female seen for PT evaluation on 7/2/2021 s/p admit [address] on 7/1/2021 w/ Acute on chronic respiratory failure ([facility] [address] )  PT consulted to assess pt's functional mobility and d/c needs  Order placed for PT eval and tx, w/ up w/ A order  PT performed at least 2 patient identifiers during session: Name and wristband  Comorbidities affecting pt's physical performance at time of assessment include: acute on chronic resp failure, CHF, pneumonia, cognitive impairment, HTN, AFib  PTA, pt was independent w/ all functional mobility w/ RW, ambulates household distances, has + LOLA, lives w/ son in 2 level home with 1st floor setup and retired  Personal factors affecting pt at time of IE include: ambulating w/ assistive device, stairs to enter home, unable to perform dynamic tasks in community, decreased cognition, positive fall history, decreased initiation and engagement, unable to perform physical activity, limited insight into impairments, inability to perform IADLs and inability to perform ADLs   Please find objective findings from PT assessment regarding body systems outlined above with impairments and limitations including weakness, impaired balance, decreased endurance, decreased activity tolerance, decreased functional mobility tolerance, decreased safety awareness and fall risk, as well as mobility assessment (need for cueing for mobility technique)  The following objective measures performed on IE also reveal limitations: AM-PAC 6-Clicks: 37/52  Pt's clinical presentation is currently unstable/unpredictable seen in pt's presentation of abnormal lab value(s), need for input for task focus and mobility technique and ongoing medical assessment  Pt to benefit from continued PT tx to address deficits as defined above and maximize level of functional independent mobility and consistency  From PT/mobility standpoint, recommendation at time of d/c would be home with home health rehabilitation pending progress in order to facilitate return to PLOF  PT Discharge Recommendation: Home with home health rehabilitation     PT - OK to Discharge: Yes (when medically cleared)    See flowsheet documentation for full assessment

## 2021-07-02 NOTE — ASSESSMENT & PLAN NOTE
· Patient normally on 2 L nasal cannula around the clock  · She required 3 L nasal cannula yesterday but today is back down to 2 L  · Secondary to acute on chronic CHF   · Sats above 92%

## 2021-07-02 NOTE — ASSESSMENT & PLAN NOTE
A/C exacerbation of dHFpEF of 65% on echo 8/17/20 65% from mod MR    BNP is 2360   Mild edema   Agree with cautious diuresis    Continue to monitor renal function and potassium with supplementation  Low sodium diet, daily weights (standing scale only), and fluid restriction

## 2021-07-02 NOTE — ASSESSMENT & PLAN NOTE
Controlled on current medical regimen   Continue to monitor   Will be the limiting factor in diuresis   See holds

## 2021-07-02 NOTE — PLAN OF CARE
Problem: OCCUPATIONAL THERAPY ADULT  Goal: Performs self-care activities at highest level of function for planned discharge setting  See evaluation for individualized goals  Description: Treatment Interventions: ADL retraining, Functional transfer training, UE strengthening/ROM, Endurance training, Cognitive reorientation, Patient/family training, Compensatory technique education, Activityengagement          See flowsheet documentation for full assessment, interventions and recommendations  7/2/2021 1239 by William Andujar OT  Note: Limitation: Decreased ADL status, Decreased UE strength, Decreased cognition, Decreased Safe judgement during ADL, Decreased endurance, Decreased self-care trans, Decreased high-level ADLs  Prognosis: Good  Assessment: Patient is a 81 y o  female seen for OT evaluation s/p admit to [address] [facility]  on 7/1/2021 w/Acute on chronic respiratory failure ([facility] [address] )  Commorbidities affecting patient's functional performance at time of assessment include: acute on chronic heart failure, cognitive impairment, HTN, A-fib and pneumonia  Orders placed for OT evaluation and treatment and up with assistance  Performed at least two patient identifiers during session including name and wristband  Prior to admission, Patient reporting being independent with ADLs, ambulatory with RW and lives with son in a one level house  Personal factors affecting patient at time of initial evaluation include: steps to enter, difficulty performing ADLs and difficulty performing IADLs  Upon evaluation, patient requires supervision assist for UB ADLs, minimal  assist for LB ADLs, transfers and functional ambulation in room and bathroom with supervision assist, with the use of Rolling Walker  Patient is oriented x 4 and presents with limited ability to recall information after a brief period of time (short term memory) / working memory     Occupational performance is affected by the following deficits: decreased muscle strength, dynamic sit/ stand balance deficit with poor standing tolerance time for self care and functional mobility, decreased activity tolerance, impaired memory, impaired problem solving, decreased safety awareness and delayed righting and equilibrium reactions  Patient to benefit from continued Occupational Therapy treatment while in the hospital to address deficits as defined above and maximize level of functional independence with ADLs and functional mobility  Occupational Performance areas to address include: grooming , bathing/ shower, dressing, toilet hygiene, transfer to all surfaces, functional ambulation, medication routine/ management and IADLS: Household maintenance  From OT standpoint, recommendation at time of d/c would be Home with home health rehabilitation  OT Discharge Recommendation: Home with home health rehabilitation  OT - OK to Discharge: Yes (Once medically cleared )    7/2/2021 1239 by Johnny Reyna OT  Note: Limitation: Decreased ADL status, Decreased UE strength, Decreased cognition, Decreased Safe judgement during ADL, Decreased endurance, Decreased self-care trans, Decreased high-level ADLs  Prognosis: Good  Assessment: Patient is a 81 y o  female seen for OT evaluation s/p admit to [address] [place] [address]  on 7/1/2021 w/Acute on chronic respiratory failure ([facility] [address] )  Commorbidities affecting patient's functional performance at time of assessment include: acute on chronic heart failure, cognitive impairment, HTN, A-fib and pneumonia  Orders placed for OT evaluation and treatment and up with assistance  Performed at least two patient identifiers during session including name and wristband  Prior to admission, Patient reporting being independent with ADLs, ambulatory with RW and lives with son in a one level house  Personal factors affecting patient at time of initial evaluation include: steps to enter, difficulty performing ADLs and difficulty performing IADLs   Upon evaluation, patient requires supervision assist for UB ADLs, minimal  assist for LB ADLs, transfers and functional ambulation in room and bathroom with supervision assist, with the use of Rolling Walker  Patient is oriented x 4 and presents with limited ability to recall information after a brief period of time (short term memory) / working memory   Occupational performance is affected by the following deficits: decreased muscle strength, dynamic sit/ stand balance deficit with poor standing tolerance time for self care and functional mobility, decreased activity tolerance, impaired memory, impaired problem solving, decreased safety awareness and delayed righting and equilibrium reactions  Patient to benefit from continued Occupational Therapy treatment while in the hospital to address deficits as defined above and maximize level of functional independence with ADLs and functional mobility  Occupational Performance areas to address include: grooming , bathing/ shower, dressing, toilet hygiene, transfer to all surfaces, functional ambulation, medication routine/ management and IADLS: Household maintenance  From OT standpoint, recommendation at time of d/c would be Home with home health rehabilitation         OT Discharge Recommendation: Home with home health rehabilitation  OT - OK to Discharge: Yes (Once medically cleared )     Mauro Pino OT

## 2021-07-02 NOTE — UTILIZATION REVIEW
Inpatient Admission Authorization Request   NOTIFICATION OF INPATIENT ADMISSION/INPATIENT AUTHORIZATION REQUEST   SERVICING FACILITY:   [employer] [facility]  [facility] [place], [address]  Tax ID: [id number]  NPI: [id number]  Place of Service: Inpatient [address]  Place of Service Code: 24     ATTENDING PROVIDER:  Attending Name and NPI#: Omar Holman [place] [[id number]]  Address[facility], [place], [address]  Phone: [phone]     UTILIZATION REVIEW CONTACT:  [facility] Utilization   Network Utilization Review Department  Phone: [phone]  Fax [phone]  Email: [email or web address]     PHYSICIAN ADVISORY SERVICES:  FOR PLAC-IC-FNMS REVIEW - MEDICAL NECESSITY DENIAL  Phone: [phone]  Fax: [phone]  Email: [email or web address]     TYPE OF REQUEST:  Inpatient Status     ADMISSION INFORMATION:  ADMISSION DATE/TIME: 7/2/21 11:15 AM  PATIENT DIAGNOSIS CODE/DESCRIPTION:  Atrial fibrillation (HCC) [I48 91]  Acute on chronic diastolic heart failure (HCC) [I50 33]  Pneumonia [J18 9]  Dyspnea [R06 00]  SOB (shortness of breath) [R06 02]  DISCHARGE DATE/TIME: No discharge date for patient encounter  DISCHARGE DISPOSITION (IF DISCHARGED): Non [facility] SNF/TCU/SNU     IMPORTANT INFORMATION:  Please contact the [facility] directly with any questions or concerns regarding this request  Department voicemails are confidential     Send requests for admission clinical reviews, concurrent reviews, approvals, and administrative denials due to lack of clinical to fax [phone]

## 2021-07-02 NOTE — ASSESSMENT & PLAN NOTE
· Presented with generalized weakness, cough and shortness of breath for a week  · Chest x-ray suggestive of possible pneumonia  · Started on cefepime and vancomycin IV  · procalcitonin- negative x 2; discontinued abx   · Symptoms are likely related to CHF exacerbation

## 2021-07-03 VITALS
TEMPERATURE: 97.7 F | HEIGHT: 59 IN | HEART RATE: 88 BPM | DIASTOLIC BLOOD PRESSURE: 56 MMHG | SYSTOLIC BLOOD PRESSURE: 114 MMHG | OXYGEN SATURATION: 98 % | BODY MASS INDEX: 22.67 KG/M2 | WEIGHT: 112.43 LBS | RESPIRATION RATE: 18 BRPM

## 2021-07-03 LAB
ANION GAP SERPL CALCULATED.3IONS-SCNC: 8 MMOL/L (ref 4–13)
BUN SERPL-MCNC: 13 MG/DL (ref 7–25)
CALCIUM SERPL-MCNC: 9.4 MG/DL (ref 8.6–10.5)
CHLORIDE SERPL-SCNC: 100 MMOL/L (ref 98–107)
CO2 SERPL-SCNC: 32 MMOL/L (ref 21–31)
CREAT SERPL-MCNC: 0.84 MG/DL (ref 0.6–1.2)
ERYTHROCYTE [DISTWIDTH] IN BLOOD BY AUTOMATED COUNT: 15.9 % (ref 11.5–14.5)
GFR SERPL CREATININE-BSD FRML MDRD: 64 ML/MIN/1.73SQ M
GLUCOSE SERPL-MCNC: 92 MG/DL (ref 65–99)
HCT VFR BLD AUTO: 37.7 % (ref 42–47)
HGB BLD-MCNC: 12.7 G/DL (ref 12–16)
MCH RBC QN AUTO: 28.9 PG (ref 26–34)
MCHC RBC AUTO-ENTMCNC: 33.6 G/DL (ref 31–37)
MCV RBC AUTO: 86 FL (ref 81–99)
PLATELET # BLD AUTO: 252 THOUSANDS/UL (ref 149–390)
PMV BLD AUTO: 7.7 FL (ref 8.6–11.7)
POTASSIUM SERPL-SCNC: 3.6 MMOL/L (ref 3.5–5.5)
RBC # BLD AUTO: 4.39 MILLION/UL (ref 3.9–5.2)
SODIUM SERPL-SCNC: 140 MMOL/L (ref 134–143)
WBC # BLD AUTO: 7.1 THOUSAND/UL (ref 4.8–10.8)

## 2021-07-03 PROCEDURE — 80048 BASIC METABOLIC PNL TOTAL CA: CPT | Performed by: NURSE PRACTITIONER

## 2021-07-03 PROCEDURE — 99232 SBSQ HOSP IP/OBS MODERATE 35: CPT | Performed by: INTERNAL MEDICINE

## 2021-07-03 PROCEDURE — 85027 COMPLETE CBC AUTOMATED: CPT | Performed by: NURSE PRACTITIONER

## 2021-07-03 PROCEDURE — 99239 HOSP IP/OBS DSCHRG MGMT >30: CPT | Performed by: NURSE PRACTITIONER

## 2021-07-03 RX ORDER — POTASSIUM CHLORIDE 20 MEQ/1
20 TABLET, EXTENDED RELEASE ORAL DAILY
Qty: 30 TABLET | Refills: 0 | Status: SHIPPED | OUTPATIENT
Start: 2021-07-04 | End: 2022-08-09

## 2021-07-03 RX ORDER — FUROSEMIDE 40 MG/1
40 TABLET ORAL DAILY
Qty: 60 TABLET | Refills: 0 | Status: SHIPPED | OUTPATIENT
Start: 2021-07-03 | End: 2022-08-09

## 2021-07-03 RX ORDER — FUROSEMIDE 40 MG/1
40 TABLET ORAL
Status: DISCONTINUED | OUTPATIENT
Start: 2021-07-03 | End: 2021-07-03 | Stop reason: HOSPADM

## 2021-07-03 RX ADMIN — POLYETHYLENE GLYCOL 3350 17 G: 17 POWDER, FOR SOLUTION ORAL at 08:27

## 2021-07-03 RX ADMIN — PANTOPRAZOLE SODIUM 40 MG: 40 TABLET, DELAYED RELEASE ORAL at 08:27

## 2021-07-03 RX ADMIN — ACETAMINOPHEN 975 MG: 325 TABLET ORAL at 05:28

## 2021-07-03 RX ADMIN — ACETAMINOPHEN 975 MG: 325 TABLET ORAL at 13:10

## 2021-07-03 RX ADMIN — OXYBUTYNIN CHLORIDE 5 MG: 5 TABLET ORAL at 08:27

## 2021-07-03 RX ADMIN — DILTIAZEM HYDROCHLORIDE 90 MG: 60 TABLET, FILM COATED ORAL at 05:29

## 2021-07-03 RX ADMIN — APIXABAN 2.5 MG: 2.5 TABLET, FILM COATED ORAL at 08:27

## 2021-07-03 RX ADMIN — POTASSIUM CHLORIDE 20 MEQ: 1500 TABLET, EXTENDED RELEASE ORAL at 08:27

## 2021-07-03 RX ADMIN — DILTIAZEM HYDROCHLORIDE 90 MG: 60 TABLET, FILM COATED ORAL at 13:10

## 2021-07-03 RX ADMIN — SERTRALINE HYDROCHLORIDE 75 MG: 50 TABLET ORAL at 08:26

## 2021-07-03 RX ADMIN — FUROSEMIDE 20 MG: 10 INJECTION, SOLUTION INTRAMUSCULAR; INTRAVENOUS at 08:26

## 2021-07-03 NOTE — NURSING NOTE
Discharge instructions reviewed with patient and daughter  Verbalized understanding  Left in stable condition via w/c accompanied by PCA

## 2021-07-03 NOTE — DISCHARGE INSTR - AVS FIRST PAGE
Dear Selene Shine,     It was our pleasure to care for you here at [facility]  It is our hope that we were always able to exceed the expected standards for your care during your stay  You were hospitalized due to shortness of breath  You were cared for on the 1st floor by Shanon Kehr, CRNP with the [facility] who covers for your primary care physician (PCP), Zulema Wilkes MD, while you were hospitalized  If you have any questions or concerns related to this hospitalization, you may contact us at [phone]  For follow up as well as any medication refills, we recommend that you follow up with your primary care physician  A registered nurse will reach out to you by phone within a few days after your discharge to answer any additional questions that you may have after going home  However, at this time we provide for you here, the most important instructions / recommendations at discharge:     · Notable Medication Adjustments -   · Lasix 40 BID for 3 days through 0800 7/6/2021 then transition to lasix 40 mg daily with potassium   · Testing Required after Discharge -   · None   · Important follow up information -   · Follow up with PCP   · Follow up with cardiology   · Other Instructions -   · Please refer to discharge summary   · Please review this entire after visit summary as additional general instructions including medication list, appointments, activity, diet, any pertinent wound care, and other additional recommendations from your care team that may be provided for you        Sincerely,     Shanon Kehr, CRNP

## 2021-07-03 NOTE — ASSESSMENT & PLAN NOTE
· Patient normally on 2 L nasal cannula around the clock  · She required 3 L nasal cannula yesterday but is back down to 2 L  · Secondary to acute on chronic CHF    · Sats above 92%

## 2021-07-03 NOTE — DISCHARGE INSTRUCTIONS
Heart Failure   WHAT YOU NEED TO KNOW:   Heart failure is a condition that does not allow your heart to fill or pump properly  Not enough oxygen in your blood gets to your organs and tissues  Fluid may not move through your body properly  Fluid builds up and causes swelling and trouble breathing  This is known as congestive heart failure  Heart failure may start in the left or right ventricle  Heart failure is often caused by damage or injury to your heart  The damage may be caused by other heart problems, diabetes, or high blood pressure  The damage may have also been caused by an infection  Heart failure is a long-term condition that tends to get worse over time  It is important to manage your health to improve your quality of life  DISCHARGE INSTRUCTIONS:   Call your local emergency number (911 in the [address] [facility]) if:   · You have any of the following signs of a heart attack:      ? Squeezing, pressure, or pain in your chest    ? You may  also have any of the following:     § Discomfort or pain in your back, neck, jaw, stomach, or arm    § Shortness of breath    § Nausea or vomiting    § Lightheadedness or a sudden cold sweat      Call your doctor if:   · Your heartbeat is fast, slow, or uneven all the time  · You have symptoms of worsening heart failure:      ? Shortness of breath at rest, at night, or that is getting worse in any way    ? Weight gain of 3 or more pounds (1 4 kg) in a day, or more than your healthcare provider says is okay    ? More swelling in your legs or ankles    ? Abdominal pain or swelling    ? More coughing    ? Loss of appetite    ? Feeling tired all the time    · You feel hopeless or depressed, or you have lost interest in things you used to enjoy  · You often feel worried or afraid  · You have questions or concerns about your condition or care  Medicines:   · Medicines  may be given to help regulate your heart rhythm and lower your blood pressure   You may also need medicines to help decrease extra fluids  Medicines, such as NSAIDs, may be stopped if they are causing your heart failure to become worse  Do not stop any of your medicines on your own  · Take your medicine as directed  Contact your healthcare provider if you think your medicine is not helping or if you have side effects  Tell him or her if you are allergic to any medicine  Keep a list of the medicines, vitamins, and herbs you take  Include the amounts, and when and why you take them  Bring the list or the pill bottles to follow-up visits  Carry your medicine list with you in case of an emergency  Go to cardiac rehab if directed:  Cardiac rehab is a program run by specialists who will help you safely strengthen your heart  In the program you will learn about exercise, relaxation, stress management, and heart-healthy nutrition  Manage swelling from extra fluid:   · Elevate (raise) your legs above the level of your heart  This will help with fluid that builds up in your legs or ankles  Elevate your legs as often as possible during the day  Prop your legs on pillows or blankets to keep them elevated comfortably  Try not to stand for long periods of time during the day  Move around to keep your blood circulating  · Limit sodium (salt)  Ask how much sodium you can have each day  Your healthcare provider may give you a limit, such as 2,300 milligrams (mg) a day  Your provider or a dietitian can teach you how to read food labels for the number of mg in a food  He or she can also help you find ways to have less salt  For example, if you add salt to food as you cook, do not add more at the table  · Drink liquids as directed  You may need to limit the amount of liquid you drink within 24 hours  Your healthcare provider will tell you how much liquid to have and which liquids are best for you  He or she may tell you to limit liquid to 1 5 to 2 liters in a day   He or she will also tell you how often to drink liquid throughout the day  · Weigh yourself every morning  Use the same scale, in the same spot  Do this after you use the bathroom, but before you eat or drink  Wear the same type of clothing each time  Write down your weight and call your healthcare provider if you have a sudden weight gain  Swelling and weight gain are signs of fluid buildup  Manage heart failure: Your quality of life may improve with treatment and the following:  · Do not smoke  Nicotine and other chemicals in cigarettes and cigars can cause lung and heart damage  Ask your healthcare provider for information if you currently smoke and need help to quit  E-cigarettes or smokeless tobacco still contain nicotine  Talk to your healthcare provider before you use these products  · Do not drink alcohol or use illegal drugs  Alcohol and drugs can increase your risk for high blood pressure, diabetes, and coronary artery disease  · Eat heart-healthy foods  Heart-healthy foods include fruits, vegetables, lean meat (such as beef, chicken, or pork), and low-fat dairy products  Fatty fish such as salmon and tuna are also heart healthy  Other heart-healthy foods include walnuts, whole-grain breads, beans, and cooked beans  Replace butter and margarine with heart-healthy oils such as olive oil or canola oil  Your provider or a dietitian can help you create heart-healthy meal plans  · Manage any chronic health conditions you have  These include high blood pressure, diabetes, obesity, high cholesterol, metabolic syndrome, and COPD  You will have fewer symptoms if you manage these health conditions  Follow your healthcare provider's recommendations and follow up with him or her regularly  · Maintain a healthy weight  Being overweight can increase your risk for high blood pressure, diabetes, and coronary artery disease  These conditions can make your symptoms worse  Ask your healthcare provider how much you should weigh   Ask him or her to help you create a weight loss plan if you are overweight  · Stay active  Activity can help keep your symptoms from getting worse  Walking is a type of physical activity that helps maintain your strength and improve your mood  Physical activity also helps you manage your weight  Work with your healthcare provider to create an exercise plan that is right for you  · Get vaccines as directed  The flu and pneumonia can be severe for a person who has heart failure  Vaccines protect you from these infections  Get a flu shot every year as soon as it is recommended, usually in September or October  You may also need the pneumonia vaccine  Your healthcare provider can tell you if you need other vaccines, and when to get them  Follow up with your doctor within 7 days and as directed: You may need to return for other tests  You may need home health care  A healthcare provider will monitor your vital signs, weight, and make sure your medicines are working  Write down your questions so you remember to ask them during your visits  Join a support group:  Heart failure can be difficult to manage  It may be helpful to talk with others who have heart failure  You may learn how to better manage your condition or get emotional support  For more information:  · [address]  [place] , [place]   Phone: [phone]  Web Address: https://[employer] [place]/  [facility] 2021 Information is for End User's use only and may not be sold, redistributed or otherwise used for commercial purposes  All illustrations and images included in CareNotes® are the copyrighted property of A D A M , Inc  or [address] [place], [place] [address]   The above information is an  only  It is not intended as medical advice for individual conditions or treatments   Talk to your doctor, nurse or pharmacist before following any medical regimen to see if it is safe and effective for you

## 2021-07-03 NOTE — PROGRESS NOTES
[facility]    Cardiology Progress Note  Judi Marx 81 y o  female   YOB: 1935 MRN: [id number]  Unit/Bed#: -01 Encounter: [id number]      Subjective:   She continues to diurese well, and feels much better  No current complains of orthopnea or any significant pedal edema  No complains of chest pain or shortness of breath with mild exertion  Assessments  81-year-old female came in with complains of worsening shortness of breath, orthopnea next history of coughing on lying down  At presentation she was noted to have a small right lower lobe infiltrates on chest x-ray,  and had an elevated BNP of 2 360  As a result she was started on treatment for both heart failure and possible pneumonia    Principal Problem:    Acute on chronic respiratory failure (HCC)  Active Problems:    Longstanding persistent atrial fibrillation (HCC)    Acute on chronic diastolic heart failure (HCC)    Essential hypertension    Pneumonia    Cognitive impairment    Outpatient cardiologist - Dr Browne    Plan  Acute diastolic heart failure  · likely related to multiple valvular heart disease  · Continues to diurese well with IV Lasix 40 mg b i d  · Weight down to 112 lb, and she feels much better today  · She walked to the restroom without any major symptoms  · SpO2 98% on 2 L  · Appears nearly euvolemic  · Switch to p o  Lasix 40 mg b i d  For 3 days, and then decrease to Lasix 40 mg daily  · Symptomatically she feels back to baseline, and is otherwise stable for discharge with close outpatient follow-up in the office     Aortic stenosis,  mitral stenosis +regurgitation  · Mitral regurgitation was previously thought to be severe on catheterization, but on most recent echo was moderate  · Outpatient [facility] with [facility] for discharge from cardiac standpoint with close outpatient follow-up in the office    Discussed with primary service NEAL Blackburn    Review of Systems All other systems reviewed and are negative  Objective:   Vitals: Blood pressure 105/62, pulse 88, temperature 97 7 °F (36 5 °C), temperature source Temporal, resp  rate 18, height 4' 11" (1 499 m), weight 51 kg (112 lb 7 oz), SpO2 98 %  , Body mass index is 22 71 kg/m² ,   Orthostatic Blood Pressures      Most Recent Value   Blood Pressure  105/62 filed at 07/03/2021 0827   Patient Position - Orthostatic VS  Lying filed at 07/03/2021 6904         Systolic (89PFQ), KKV:303 , Min:104 , JZI:672     Diastolic (14KKO), VSZ:74, Min:59, Max:86    Wt Readings from Last 5 Encounters:   07/03/21 51 kg (112 lb 7 oz)   02/15/21 48 2 kg (106 lb 4 2 oz)   08/21/20 60 7 kg (133 lb 13 1 oz)   08/17/20 59 kg (130 lb 1 1 oz)   06/27/20 60 kg (132 lb 4 4 oz)     I/O       07/01 0701 - 07/02 0700 07/02 0701 - 07/03 0700 07/03 0701 - 07/04 0700    P  O   1240     I V  (mL/kg)  16 (0 3)     IV Piggyback 50      Total Intake(mL/kg) 50 (1) 1256 (24)     Urine (mL/kg/hr) 1100 1900 (1 5)     Total Output 1100 1900     Net -1050 -644            Unmeasured Urine Occurrence  3 x               Physical Exam  Vitals and nursing note reviewed  Constitutional:       General: She is not in acute distress  Appearance: Normal appearance  She is well-developed  She is not ill-appearing  HENT:      Head: Normocephalic and atraumatic  Nose: No congestion  Eyes:      General: No scleral icterus  Conjunctiva/sclera: Conjunctivae normal    Neck:      Vascular: No carotid bruit or JVD  Cardiovascular:      Rate and Rhythm: Normal rate and regular rhythm  Pulses: Normal pulses  Heart sounds: Murmur heard  No friction rub  No gallop  Pulmonary:      Effort: Pulmonary effort is normal  No respiratory distress  Breath sounds: Examination of the right-lower field reveals decreased breath sounds  Examination of the left-lower field reveals decreased breath sounds  Decreased breath sounds present  No rales  Abdominal:      General: There is no distension  Palpations: Abdomen is soft  Tenderness: There is no abdominal tenderness  Musculoskeletal:         General: No swelling or tenderness  Cervical back: Neck supple  Right lower leg: No edema  Left lower leg: No edema  Skin:     General: Skin is warm  Neurological:      General: No focal deficit present  Mental Status: She is alert and oriented to person, place, and time  Mental status is at baseline  Psychiatric:         Mood and Affect: Mood normal          Behavior: Behavior normal          Thought Content:  Thought content normal          Laboratory Results: personally reviewed  Results from last 7 days   Lab Units 07/01/21  1403   TROPONIN I ng/mL <0 03       CBC with diff:   Results from last 7 days   Lab Units 07/03/21  0542 07/02/21  0606 07/01/21  1403   WBC Thousand/uL 7 10 6 80 7 20   HEMOGLOBIN g/dL 12 7 11 4* 11 9*   HEMATOCRIT % 37 7* 34 1* 36 2*   MCV fL 86 87 88   PLATELETS Thousands/uL 252 225 260   MCH pg 28 9 29 0 28 8   MCHC g/dL 33 6 33 5 32 9   RDW % 15 9* 15 4* 15 4*   MPV fL 7 7* 7 8* 7 6*         CMP:  Results from last 7 days   Lab Units 07/03/21  0542 07/02/21  0606 07/01/21  1403   POTASSIUM mmol/L 3 6 3 4* 3 7   CHLORIDE mmol/L 100 106 104   CO2 mmol/L 32* 25 26   BUN mg/dL 13 12 14   CREATININE mg/dL 0 84 0 88 0 81   CALCIUM mg/dL 9 4 8 7 9 1   AST U/L  --   --  12*   ALT U/L  --   --  8   ALK PHOS U/L  --   --  73   EGFR ml/min/1 73sq m 64 60 66         BMP:  Results from last 7 days   Lab Units 07/03/21  0542 07/02/21  0606 07/01/21  1403   POTASSIUM mmol/L 3 6 3 4* 3 7   CHLORIDE mmol/L 100 106 104   CO2 mmol/L 32* 25 26   BUN mg/dL 13 12 14   CREATININE mg/dL 0 84 0 88 0 81   CALCIUM mg/dL 9 4 8 7 9 1       BNP:   Recent Labs     07/01/21  1403   BNP 2,360*       Magnesium:       Coags:   Results from last 7 days   Lab Units 07/01/21  1403   PTT seconds 35   INR  1 71*       TSH:        Hemoglobin A1C       Lipid Profile:       Meds/Allergies   all current active meds have been reviewed and current meds:   Current Facility-Administered Medications   Medication Dose Route Frequency    acetaminophen (TYLENOL) tablet 975 mg  975 mg Oral Q8H [address]    ALPRAZolam (XANAX) tablet 0 25 mg  0 25 mg Oral HS PRN    apixaban (ELIQUIS) tablet 2 5 mg  2 5 mg Oral BID    bisacodyl (DULCOLAX) EC tablet 5 mg  5 mg Oral Daily PRN    diltiazem (CARDIZEM) tablet 90 mg  90 mg Oral Q8H [address]    furosemide (LASIX) tablet 40 mg  40 mg Oral BID (diuretic)    metoprolol tartrate (LOPRESSOR) tablet 25 mg  25 mg Oral BID    oxybutynin (DITROPAN) tablet 5 mg  5 mg Oral TID    pantoprazole (PROTONIX) EC tablet 40 mg  40 mg Oral Early Morning    polyethylene glycol (MIRALAX) packet 17 g  17 g Oral Daily    potassium chloride (K-DUR,KLOR-CON) CR tablet 20 mEq  20 mEq Oral Daily    sertraline (ZOLOFT) tablet 75 mg  75 mg Oral Daily     Medications Prior to Admission   Medication    acetaminophen (TYLENOL) 325 mg tablet    ALPRAZolam (XANAX) 0 25 mg tablet    apixaban (ELIQUIS) 2 5 mg    bisacodyl (DULCOLAX) 5 mg EC tablet    diltiazem (CARDIZEM) 90 mg tablet    hydrOXYzine HCL (ATARAX) 25 mg tablet    metoprolol tartrate (LOPRESSOR) 25 mg tablet    omeprazole (PriLOSEC) 20 mg delayed release capsule    oxybutynin (DITROPAN) 5 mg tablet    polyethylene glycol (MIRALAX) 17 g packet    sertraline (ZOLOFT) 50 mg tablet    [DISCONTINUED] furosemide (LASIX) 40 mg tablet            Cardiac testing: reviewed  Results for orders placed during the hospital encounter of 20    Echo complete with contrast if indicated    Narrative  [address] [place] [address] [phone]    Transthoracic Echocardiogram  2D, M-mode, Doppler, and Color Doppler    Study date:  17-Aug-2020    Patient: Kashmir Whiting  MR number: [id number]  Account number: [de-identified]  : 1935  Age: 81 years  Gender: Female  Status: Inpatient  Location: Bedside  Height: 58 in  Weight: 130 lb  BP: 133/ 85 mmHg    Indications: chest wall contusion    Diagnoses: 786 59 - CHEST PAIN NEC    Sonographer:  TREVIN Delaney  Primary Physician:  Meño Moreno MD  Group:  [facility]  Interpreting Physician:  Harvey Aguilera MD    SUMMARY    LEFT VENTRICLE:  Size was normal   Systolic function was at the lower limits of normal  Ejection fraction was estimated to be 65 %  There were no regional wall motion abnormalities  LEFT ATRIUM:  The atrium was dilated  MITRAL VALVE:  There was moderate annular calcification  There was mildly reduced leaflet separation  There was mild stenosis  There was moderate regurgitation  AORTIC VALVE:  The valve was probably trileaflet  Leaflets exhibited mildly reduced cuspal separation  There was mild stenosis  TRICUSPID VALVE:  There was mild regurgitation  Estimated peak PA pressure was 60 mmHg  HISTORY: PRIOR HISTORY: fall afib    PROCEDURE: The procedure was performed at the bedside  This was a routine study  The transthoracic approach was used  The study included complete 2D imaging, M-mode, complete spectral Doppler, and color Doppler  The heart rate was 79 bpm,  at the start of the study  This was a technically difficult study  LEFT VENTRICLE: Size was normal  Systolic function was at the lower limits of normal  Ejection fraction was estimated to be 65 %  There were no regional wall motion abnormalities  Wall thickness was at the upper limits of normal     RIGHT VENTRICLE: The size was normal  Systolic function was normal  Wall thickness was normal     LEFT ATRIUM: The atrium was dilated  RIGHT ATRIUM: Size was normal     MITRAL VALVE: There was moderate annular calcification  There was mildly reduced leaflet separation  DOPPLER: There was mild stenosis  There was moderate regurgitation      AORTIC VALVE: The valve was probably trileaflet  Leaflets exhibited mildly reduced cuspal separation  DOPPLER: Transaortic velocity was within the normal range  There was mild stenosis  There was no significant regurgitation  TRICUSPID VALVE: The valve structure was normal  There was normal leaflet separation  DOPPLER: The transtricuspid velocity was within the normal range  There was no evidence for stenosis  There was mild regurgitation  Estimated peak PA  pressure was 60 mmHg  PULMONIC VALVE: Leaflets exhibited normal thickness, no calcification, and normal cuspal separation  DOPPLER: The transpulmonic velocity was within the normal range  There was no regurgitation  PERICARDIUM: There was no pericardial effusion  The pericardium was normal in appearance  AORTA: The root exhibited normal size  SYSTEM MEASUREMENT TABLES    2D  %FS: 28 34 %  Ao Diam: 3 36 cm  EDV(Teich): 85 77 ml  EF(Teich): 54 98 %  ESV(Teich): 38 62 ml  IVSd: 1 49 cm  LA Area: 22 87 cm2  LA Diam: 4 58 cm  LVEDV MOD A4C: 74 95 ml  LVEF MOD A4C: 63 4 %  LVESV MOD A4C: 27 43 ml  LVIDd: 4 36 cm  LVIDs: 3 12 cm  LVLd A4C: 6 33 cm  LVLs A4C: 5 37 cm  LVPWd: 1 38 cm  RA Area: 19 75 cm2  RVIDd: 3 64 cm  RWT: 0 63  SV MOD A4C: 47 52 ml  SV(Teich): 47 16 ml    CW  AV Env  Ti: 291 35 ms  AV VTI: 44 6 cm  AV Vmax: 2 1 m/s  AV Vmean: 1 54 m/s  AV maxP 83 mmHg  AV meanPG: 10 38 mmHg  MV PHT: 105 19 ms  MV VTI: 41 35 cm  MV Vmax: 1 81 m/s  MV Vmean: 0 87 m/s  MV maxP 11 mmHg  MV meanP 2 mmHg  MVA By PHT: 2 09 cm2  RAP: 0 mmHg  TR Vmax: 3 58 m/s  TR maxP 05 mmHg    MM  TAPSE: 2 41 cm    PW  E' Sept: 0 06 m/s  LVOT Env  Ti: 210 58 ms  LVOT VTI: 15 6 cm  LVOT Vmax: 1 02 m/s  LVOT Vmean: 0 74 m/s  LVOT maxP 24 mmHg  LVOT meanP 47 mmHg  MV E Jones: 1 64 m/s  RVSP: 52 05 mmHg    [employer] Accredited Echocardiography Laboratory    Prepared and electronically signed by    Christ Mclaughlin MD  Signed 17-Aug-2020 09:43:32    No results found for this or any previous visit  No results found for this or any previous visit  No results found for this or any previous visit

## 2021-07-03 NOTE — ASSESSMENT & PLAN NOTE
· Presented with generalized weakness, cough and shortness of breath for a week  · Chest x-ray suggestive of possible pneumonia  · Received cefepime and vancomycin IV  · procalcitonin- negative x 2; abx discontinued   · Symptoms are likely related to CHF exacerbation

## 2021-07-03 NOTE — CASE MANAGEMENT
Cm met with the pt and I made her aware that I sent to [facility] and I was not able to find a hhc that is able to accept, cm sent additional referrals today and the only response was [facility] and they are unableto accept, no responses from [facility], no responses ffom yesterdays referrals, I made the pt aware I would continue to work on finding her a home care, I placed a yesy to her daughter at 12:20pm to [phone] and I made her aware of thi information, she will bring the pt's portable oxygen tank from home for the transport home, pt and family are in agreement with the d/c and the d/c plan home with her son, she was told I would call her once I can find a hhc for her, family will transport

## 2021-07-03 NOTE — ASSESSMENT & PLAN NOTE
Wt Readings from Last 3 Encounters:   07/03/21 51 kg (112 lb 7 oz)   02/15/21 48 2 kg (106 lb 4 2 oz)   08/21/20 60 7 kg (133 lb 13 1 oz)     · Monitor I&Os  · Low-sodium diet  · Daily weights  · IV furosemide  · BNP 2360  · Standing weight noted   · Clinically the patient much improved   · Cardiology input appreciated   · Furosemide 40 mg BID for the next 3 days; transition to furosemide 40 mg daily to 7/6/2021  · Will need follow up with cardiology

## 2021-07-03 NOTE — NURSING NOTE
Pt alert and oriented, pleasant and cooperative  Heart is irregular, trace edema to LLE  Lungs are clear and decreased on 2L which is chronic, denies cough and sob  Held cardizem due to low bp and hr  No further needs, call bell within reach

## 2021-07-03 NOTE — DISCHARGE SUMMARY
[facility]  Discharge- Izaiah Armendariz 1935, 81 y o  female MRN: [id number]  Unit/Bed#: -01 Encounter: [id number]  Primary Care Provider: Yeni Márquez MD   Date and time admitted to hospital: 7/1/2021  1:37 PM    * Acute on chronic respiratory failure ([facility] )  Assessment & Plan  · Patient normally on 2 L nasal cannula around the clock  · She required 3 L nasal cannula yesterday but is back down to 2 L  · Secondary to acute on chronic CHF    · Sats above 92%     Cognitive impairment  Assessment & Plan  · Suspected underlying dementia without formal diagnosis  · Supportive care          Pneumonia  Assessment & Plan  · Presented with generalized weakness, cough and shortness of breath for a week  · Chest x-ray suggestive of possible pneumonia  · Received cefepime and vancomycin IV  · procalcitonin- negative x 2; abx discontinued   · Symptoms are likely related to CHF exacerbation    Essential hypertension  Assessment & Plan  · Well controlled  · Continue home medications      Acute on chronic diastolic heart failure (HCC)  Assessment & Plan  Wt Readings from Last 3 Encounters:   07/03/21 51 kg (112 lb 7 oz)   02/15/21 48 2 kg (106 lb 4 2 oz)   08/21/20 60 7 kg (133 lb 13 1 oz)     · Monitor I&Os  · Low-sodium diet  · Daily weights  · IV furosemide  · BNP 2360  · Standing weight noted   · Clinically the patient much improved   · Cardiology input appreciated   · Furosemide 40 mg BID for the next 3 days; transition to furosemide 40 mg daily to 7/6/2021  · Will need follow up with cardiology         Longstanding persistent atrial fibrillation ([facility] )  Assessment & Plan  · Anticoagulated with Eliquis  · Rate controlled with diltiazem and metoprolol         Discharging Physician / Practitioner: NEAL Larson  PCP: Yeni Márquez MD  Admission Date:   Admission Orders (From admission, onward)     Ordered        07/02/21 1115  Inpatient Admission  Once         07/01/21 7099 Place in Observation  Once                   Discharge Date: 07/03/21    Medical Problems     Resolved Problems  Date Reviewed: 7/3/2021    None                Consultations During Hospital Stay:  · Cardiology     Procedures Performed:   · None    Significant Findings / Test Results:   · cxr   Right base opacity which could be due to a small effusion and atelectasis but pneumonia cannot be excluded  Incidental Findings:   · None      Test Results Pending at Discharge (will require follow up): · None      Outpatient Tests Requested:  · Follow up with PCP   · Follow up with cardiology     Complications:     None     Reason for Admission:  Generalized weakness, cough, shortness of breath    Hospital Course:     Greg Zaman is a 81 y o  female patient who originally presented to the hospital on 7/1/2021 due to generalized weakness, cough, shortness of breath  Please refer to H&P for initial presenting complaint  Brief the patient presented with above complaint and was subsequently admitted for possible CHF exacerbation versus pneumonia  Bacterial pneumonia is ruled out  The patient received IV antibiotics this is stop after procalcitonin are negative x2  Cardiology evaluation was done for CHF exacerbation  Suspected that her shortness of breath is likely due to CHF exacerbation  At this time cardiology recommend to increase her furosemide dose to 40 mg b i d  Which she will take for the next 3 days and then transition to 40 mg daily with potassium supplement  Patient will need to follow up with cardiology  Please see above list of diagnoses and related plan for additional information  Condition at Discharge: good     Discharge Day Visit / Exam:     Subjective:  Feeling much improved  Would like to go home  Dyspnea is much improved     Vitals: Blood Pressure: 105/62 (07/03/21 0827)  Pulse: 88 (07/03/21 0827)  Temperature: 97 7 °F (36 5 °C) (07/03/21 0700)  Temp Source: Temporal (07/03/21 0700)  Respirations: 18 (07/03/21 0700)  Height: 4' 11" (149 9 cm) (07/01/21 1624)  Weight - Scale: 51 kg (112 lb 7 oz) (07/03/21 1117)  SpO2: 98 % (07/03/21 0700)  Exam:   Physical Exam  Vitals and nursing note reviewed  Constitutional:       General: She is not in acute distress  Appearance: Normal appearance  HENT:      Head: Normocephalic and atraumatic  Right Ear: External ear normal       Left Ear: External ear normal       Nose: Nose normal  No rhinorrhea  Mouth/Throat:      Mouth: Mucous membranes are moist       Pharynx: Oropharynx is clear  Eyes:      General:         Right eye: No discharge  Left eye: No discharge  Pupils: Pupils are equal, round, and reactive to light  Cardiovascular:      Rate and Rhythm: Normal rate  Rhythm irregular  Pulses: Normal pulses  Heart sounds: Normal heart sounds  No murmur heard  Pulmonary:      Effort: Pulmonary effort is normal  No respiratory distress  Breath sounds: Rales present  Abdominal:      General: Bowel sounds are normal  There is no distension  Palpations: Abdomen is soft  There is no mass  Tenderness: There is no abdominal tenderness  Musculoskeletal:         General: No swelling or tenderness  Normal range of motion  Cervical back: Normal range of motion and neck supple  No muscular tenderness  Skin:     General: Skin is warm and dry  Capillary Refill: Capillary refill takes less than 2 seconds  Findings: No erythema or rash  Neurological:      General: No focal deficit present  Mental Status: She is alert  Mental status is at baseline  Comments: With period of confusion and forgetfulness      Psychiatric:         Mood and Affect: Mood normal          Behavior: Behavior normal          Thought Content:  Thought content normal          Discussion with Family: daughter via phone     Discharge instructions/Information to patient and family:   See after visit summary for information provided to patient and family  Provisions for Follow-Up Care:  See after visit summary for information related to follow-up care and any pertinent home health orders  Disposition:     Home with [facility] Services (Reminder: Complete face to face encounter)      Planned Readmission:    No      Discharge Statement:  I spent 37 minutes discharging the patient  This time was spent on the day of discharge  I had direct contact with the patient on the day of discharge  Greater than 50% of the total time was spent examining patient, answering all patient questions, arranging and discussing plan of care with patient as well as directly providing post-discharge instructions  Additional time then spent on discharge activities  Discharge Medications:  See after visit summary for reconciled discharge medications provided to patient and family        ** Please Note: This note has been constructed using a voice recognition system **

## 2021-07-05 NOTE — CASE MANAGEMENT
Cm sent additional hhc referrals to [facility], [facility], [facility], [facility] cm will continue to work on finding a hhc

## 2021-07-05 NOTE — CASE MANAGEMENT
Jason placed a call top Mallory Samuel at 11:40 am to # [phone]  And I made her aware that I was able to  get [facility] for 7/6/2021 and [facility] for 7/8/2021, she stated her mother does not want hhc,I did make her aware of what she needs to do to get hhc if the patient were to change her mind, jason cx the referrals

## 2021-07-06 LAB
BACTERIA BLD CULT: NORMAL
BACTERIA BLD CULT: NORMAL

## 2021-07-06 NOTE — UTILIZATION REVIEW
Notification of Discharge   This is a Notification of Discharge from our facility [address]  Please be advised that this patient has been discharge from our facility  Below you will find the admission and discharge date and time including the patients disposition  UTILIZATION REVIEW CONTACT:  Reed Romero  Utilization   Network Utilization Review Department  Phone: [phone] x carefully listen to the prompts  All voicemails are confidential   Email: [email or web address]     PHYSICIAN ADVISORY SERVICES:  FOR [id number]-[id number] REVIEW - MEDICAL NECESSITY DENIAL  Phone: [phone]  Fax: [phone]  Email: [email or web address]     PRESENTATION DATE: 7/1/2021  1:37 PM  OBERVATION ADMISSION DATE:   INPATIENT ADMISSION DATE: 7/2/21 11:15 AM   DISCHARGE DATE: 7/3/2021  2:07 PM  DISPOSITION: Home/Self Care Home/Self Care      IMPORTANT INFORMATION:  Send all requests for admission clinical reviews, approved or denied determinations and any other requests to dedicated fax number below belonging to the campus where the patient is receiving treatment   List of dedicated fax numbers:  [address] DENIALS (Administrative/Medical Necessity) [phone]   [address] (Maternity/NICU/Pediatrics) [phone]   Justin Days [phone]   Malen Motts [phone]   [facility] [phone]   [address] [phone]   [facility]  [phone]   [address] [facility] [address]  [address] [facility] [address] [facility] [phone]

## 2021-07-12 ENCOUNTER — OFFICE VISIT (OUTPATIENT)
Dept: CARDIOLOGY CLINIC | Facility: CLINIC | Age: 86
End: 2021-07-12
Payer: COMMERCIAL

## 2021-07-12 VITALS
HEART RATE: 88 BPM | BODY MASS INDEX: 21.97 KG/M2 | HEIGHT: 59 IN | SYSTOLIC BLOOD PRESSURE: 120 MMHG | DIASTOLIC BLOOD PRESSURE: 62 MMHG | WEIGHT: 109 LBS

## 2021-07-12 DIAGNOSIS — I48.11 LONGSTANDING PERSISTENT ATRIAL FIBRILLATION (HCC): Chronic | ICD-10-CM

## 2021-07-12 DIAGNOSIS — I34.0 NONRHEUMATIC MITRAL VALVE REGURGITATION: Primary | ICD-10-CM

## 2021-07-12 DIAGNOSIS — I50.33 ACUTE ON CHRONIC DIASTOLIC HEART FAILURE (HCC): ICD-10-CM

## 2021-07-12 PROCEDURE — 99214 OFFICE O/P EST MOD 30 MIN: CPT | Performed by: INTERNAL MEDICINE

## 2021-07-12 NOTE — PROGRESS NOTES
Patient ID: Anthony Shetty is a 81 y o  female  Plan:      Longstanding persistent atrial fibrillation ([address] )  Rate reasonably controlled  Mitral regurgitation  Severe per catheterization on 2012   Moderate per echo in 2017  Quite frail for intervention and she is not truly interested  Acute on chronic diastolic heart failure ([address] )  I told the patient dyspnea can in extra furosemide every once in a while if she sees her weight is going up or she is more short of breath  Follow up Plan/Other summary comments:  As long as symptoms are stable, 1 year EKG and follow-up visit  HPI:  Patient is seen in follow-up regarding the above  She was recently hospitalized for shortness of breath and fluid retention  She felt much better with diuresis  Since her hospital stay she has continued to feel reasonably okay  She remains frail of course  To reiterate, she had LAD stenting in 2007  She is known to have significant mitral regurgitation  Heart catheterization in 2012 revealed severe mitral regurgitation but no significant coronary stenoses  Most recent or relevant cardiac/vascular testing:    TTE 08/17/2020:   EF 60%  Moderate LVH  Moderate MR  Mild TR with moderate pulm HTN     TTE 2017 - EF 55%, moderate MR     Cardiac cath 2012 - patent LAD stent, severe MR  No significant CAD         Past Surgical History:   Procedure Laterality Date    ABDOMINAL SURGERY      hysterectomy    CARDIAC CATHETERIZATION  04/10/2012    EF 65%, no significant CAD, patent stents, severe MR     CORONARY ANGIOPLASTY WITH STENT PLACEMENT  03/21/2007    EF 55%, GARRET to LAD      EYE SURGERY      b/l cataracts    HYSTERECTOMY      JOINT REPLACEMENT      Rt knee     CMP:   Lab Results   Component Value Date     04/05/2018    K 3 6 07/03/2021    K 4 5 04/05/2018     07/03/2021    CL 99 04/05/2018    CO2 32 (H) 07/03/2021    CO2 28 04/05/2018    BUN 13 07/03/2021    BUN 15 04/05/2018 CREATININE 0 84 07/03/2021    CREATININE 0 68 04/05/2018    EGFR 64 07/03/2021       Lipid Profile:   Lab Results   Component Value Date    CHOL 152 04/05/2018    TRIG 94 03/15/2019    TRIG 119 04/05/2018    HDL 61 (H) 03/15/2019    HDL 56 04/05/2018         Review of Systems   10  point ROS  was otherwise non pertinent or negative except as per HPI or as below  Gait:  Using a walker  Objective:     /62   Pulse 88   Ht 4' 11" (1 499 m)   Wt 49 4 kg (109 lb)   BMI 22 02 kg/m²     PHYSICAL EXAM:   General:  Normal appearance in no distress  Eyes:  Anicteric  Oral mucosa:  Moist   Neck:  No JVD  Carotid upstrokes are brisk without bruits  No masses  Chest:  Clear to auscultation and percussion  Cardiac:  No palpable PMI  Irregularly irregular  Normal S1 and S2   Grade 3-4 holosystolic murmur at the apex  Abdomen:  Soft and nontender  No palpable organomegaly or aortic enlargement  Extremities:  No peripheral edema  Musculoskeletal:  Symmetric  Vascular:  Femoral pulses are brisk without bruits  Popliteal pulses are intact bilaterally  Pedal pulses are intact  Neuro:  Grossly symmetric  Psych:  Alert and oriented x3          Current Outpatient Medications:     acetaminophen (TYLENOL) 325 mg tablet, Take 3 tablets (975 mg total) by mouth every 8 (eight) hours, Disp: 30 tablet, Rfl: 0    apixaban (ELIQUIS) 2 5 mg, Take 1 tablet (2 5 mg total) by mouth 2 (two) times a day, Disp: 180 tablet, Rfl: 3    bisacodyl (DULCOLAX) 5 mg EC tablet, Take 1 tablet (5 mg total) by mouth daily as needed for constipation, Disp: 30 tablet, Rfl: 0    diltiazem (CARDIZEM) 90 mg tablet, Take 1 tablet (90 mg total) by mouth every 8 (eight) hours, Disp: 42 tablet, Rfl: 0    furosemide (LASIX) 40 mg tablet, Take 1 tablet (40 mg total) by mouth daily, Disp: 60 tablet, Rfl: 0    hydrOXYzine HCL (ATARAX) 25 mg tablet, Take 25 mg by mouth 2 (two) times a day, Disp: , Rfl:     metoprolol tartrate (LOPRESSOR) 25 mg tablet, Take 1 tablet (25 mg total) by mouth 2 (two) times a day, Disp: 60 tablet, Rfl: 0    omeprazole (PriLOSEC) 20 mg delayed release capsule, Take 20 mg by mouth daily, Disp: , Rfl:     oxybutynin (DITROPAN) 5 mg tablet, Take 5 mg by mouth 3 (three) times a day , Disp: , Rfl:     polyethylene glycol (MIRALAX) 17 g packet, Take 17 g by mouth daily, Disp: 14 each, Rfl: 0    potassium chloride (K-DUR,KLOR-CON) 20 mEq tablet, Take 1 tablet (20 mEq total) by mouth daily, Disp: 30 tablet, Rfl: 0    sertraline (ZOLOFT) 50 mg tablet, Take 75 mg by mouth daily , Disp: , Rfl:     ALPRAZolam (XANAX) 0 25 mg tablet, Take 1 tablet (0 25 mg total) by mouth daily at bedtime as needed for anxiety for up to 10 days, Disp: 5 tablet, Rfl: 0  Allergies   Allergen Reactions    Chocolate Flavor - Food Allergy Itching    Shellfish-Derived Products - Food Allergy Shortness Of Breath    Iodine - Food Allergy Other (See Comments)     shellfish- difficulty breathing    Codeine Rash     Past Medical History:   Diagnosis Date    Acute on chronic diastolic congestive heart failure (HCC) 6/27/2019    Ambulatory dysfunction 8/17/2020    Arthritis     Chest wall contusion, right, initial encounter 8/17/2020    Chest wall contusion, right, subsequent encounter 8/17/2020    Chronic diastolic heart failure (HCC)     Chronic kidney disease, stage 2 (mild)     Coronary artery disease     history of stenting    Eczema     years    Edema     History of echocardiogram 07/20/2017    EF 55%, mild concentric LVH, bileaflet MVP with moderate MR, left atrial enlargement      History of transfusion     Hyperlipidemia     Hypertension     Hypo-osmolality and hyponatremia     Hypokalemia 7/11/2019    Mitral regurgitation            Social History     Tobacco Use   Smoking Status Never Smoker   Smokeless Tobacco Never Used

## 2021-07-12 NOTE — ASSESSMENT & PLAN NOTE
Severe per catheterization on 2012   Moderate per echo in 2017  Quite frail for intervention and she is not truly interested

## 2021-07-12 NOTE — ASSESSMENT & PLAN NOTE
I told the patient dyspnea can in extra furosemide every once in a while if she sees her weight is going up or she is more short of breath

## 2021-07-12 NOTE — PATIENT INSTRUCTIONS
If once every week or so you take an extra water pill in case your legs are swelling or you feel more short of breath than that is okay  You do not have to call me to let me know of that

## 2021-08-16 NOTE — DISCHARGE INSTRUCTIONS
Chief Complaint   Patient presents with   • Office Visit   • Foot     Diabetic foot care        Medications reviewed and updated.  Allergies verified.   Tobacco verified.     Pt is diabetic: yes  Pt is insulin dependent: no  Pt is on blood thinners: no    Hemoglobin A1C (%)   Date Value   07/14/2021 8.6 (H)         Influenza   WHAT YOU NEED TO KNOW:   Influenza (the flu) is an infection caused by the influenza virus  The flu is easily spread when an infected person coughs, sneezes, or has close contact with others  You may be able to spread the flu to others for 1 week or longer after signs or symptoms appear  DISCHARGE INSTRUCTIONS:   Call 911 for any of the following:   · You have trouble breathing, and your lips look purple or blue  · You have a seizure  Return to the emergency department if:   · You are dizzy, or you are urinating less or not at all  · You have a headache with a stiff neck, and you feel tired or confused  · You have new pain or pressure in your chest     · Your symptoms, such as shortness of breath, vomiting, or diarrhea, get worse  · Your symptoms, such as fever and coughing, seem to get better, but then get worse  Contact your healthcare provider if:   · You have new muscle pain or weakness  · You have questions or concerns about your condition or care  Medicines: You may need any of the following:  · Acetaminophen  decreases pain and fever  It is available without a doctor's order  Ask how much to take and how often to take it  Follow directions  Acetaminophen can cause liver damage if not taken correctly  · NSAIDs , such as ibuprofen, help decrease swelling, pain, and fever  This medicine is available with or without a doctor's order  NSAIDs can cause stomach bleeding or kidney problems in certain people  If you take blood thinner medicine, always ask your healthcare provider if NSAIDs are safe for you  Always read the medicine label and follow directions  · Antivirals  help fight a viral infection  · Take your medicine as directed  Contact your healthcare provider if you think your medicine is not helping or if you have side effects  Tell him or her if you are allergic to any medicine  Keep a list of the medicines, vitamins, and herbs you take   Include the amounts, and when and why you take them  Bring the list or the pill bottles to follow-up visits  Carry your medicine list with you in case of an emergency  Rest  as much as you can to help you recover  Drink liquids as directed  to help prevent dehydration  Ask how much liquid to drink each day and which liquids are best for you  Prevent the spread of influenza:   · Wash your hands often  Use soap and water  Wash your hands after you use the bathroom, change a child's diapers, or sneeze  Wash your hands before you prepare or eat food  Use gel hand cleanser when soap and water are not available  Do not touch your eyes, nose, or mouth unless you have washed your hands first            · Cover your mouth when you sneeze or cough  Cough into a tissue or the bend of your arm  · Clean shared items with a germ-killing   Clean table surfaces, doorknobs, and light switches  Do not share towels, silverware, and dishes with people who are sick  Wash bed sheets, towels, silverware, and dishes with soap and water  · Wear a mask  over your mouth and nose if you are sick or are near anyone who is sick  · Stay away from others  if you are sick  · Influenza vaccine  helps prevent influenza (flu)  Everyone older than 6 months should get a yearly influenza vaccine  Get the vaccine as soon as it is available, usually in September or October each year  Follow up with your healthcare provider as directed:  Write down your questions so you remember to ask them during your visits  © 2017 [employer] [employer] Information is for End User's use only and may not be sold, redistributed or otherwise used for commercial purposes  All illustrations and images included in CareNotes® are the copyrighted property of [employer] , [employer]  or [employer]  The above information is an  only  It is not intended as medical advice for individual conditions or treatments   Talk to your doctor, nurse or pharmacist before following any medical regimen to see if it is safe and effective for you

## 2022-01-01 ENCOUNTER — LAB REQUISITION (OUTPATIENT)
Dept: LAB | Facility: HOSPITAL | Age: 87
End: 2022-01-01

## 2022-01-01 ENCOUNTER — APPOINTMENT (EMERGENCY)
Dept: CT IMAGING | Facility: HOSPITAL | Age: 87
DRG: 177 | End: 2022-01-01
Payer: COMMERCIAL

## 2022-01-01 ENCOUNTER — HOSPITAL ENCOUNTER (EMERGENCY)
Facility: HOSPITAL | Age: 87
Discharge: HOME | End: 2022-07-23
Attending: EMERGENCY MEDICINE
Payer: COMMERCIAL

## 2022-01-01 ENCOUNTER — APPOINTMENT (INPATIENT)
Dept: RADIOLOGY | Facility: HOSPITAL | Age: 87
DRG: 177 | End: 2022-01-01
Payer: COMMERCIAL

## 2022-01-01 ENCOUNTER — APPOINTMENT (INPATIENT)
Dept: NON INVASIVE DIAGNOSTICS | Facility: HOSPITAL | Age: 87
DRG: 291 | End: 2022-01-01
Payer: COMMERCIAL

## 2022-01-01 ENCOUNTER — APPOINTMENT (EMERGENCY)
Dept: RADIOLOGY | Facility: HOSPITAL | Age: 87
DRG: 393 | End: 2022-01-01
Payer: COMMERCIAL

## 2022-01-01 ENCOUNTER — APPOINTMENT (EMERGENCY)
Dept: RADIOLOGY | Facility: HOSPITAL | Age: 87
DRG: 291 | End: 2022-01-01
Payer: COMMERCIAL

## 2022-01-01 ENCOUNTER — APPOINTMENT (EMERGENCY)
Dept: CT IMAGING | Facility: HOSPITAL | Age: 87
End: 2022-01-01
Payer: COMMERCIAL

## 2022-01-01 ENCOUNTER — HOSPITAL ENCOUNTER (INPATIENT)
Facility: HOSPITAL | Age: 87
LOS: 4 days | Discharge: NURSING, REHAB OR LONG-TERM CARE | DRG: 291 | End: 2022-04-06
Attending: EMERGENCY MEDICINE | Admitting: INTERNAL MEDICINE
Payer: COMMERCIAL

## 2022-01-01 ENCOUNTER — OFFICE VISIT (OUTPATIENT)
Dept: URGENT CARE | Facility: CLINIC | Age: 87
End: 2022-01-01
Payer: COMMERCIAL

## 2022-01-01 ENCOUNTER — HOSPITAL ENCOUNTER (INPATIENT)
Facility: HOSPITAL | Age: 87
LOS: 3 days | Discharge: HOME HEALTH | DRG: 393 | End: 2022-05-22
Attending: EMERGENCY MEDICINE | Admitting: SURGERY
Payer: COMMERCIAL

## 2022-01-01 ENCOUNTER — IMMUNIZATIONS (INPATIENT)
Dept: FAMILY MEDICINE CLINIC | Facility: HOSPITAL | Age: 87
DRG: 291 | End: 2022-01-01
Payer: COMMERCIAL

## 2022-01-01 ENCOUNTER — APPOINTMENT (EMERGENCY)
Dept: CT IMAGING | Facility: HOSPITAL | Age: 87
DRG: 393 | End: 2022-01-01
Payer: COMMERCIAL

## 2022-01-01 ENCOUNTER — APPOINTMENT (INPATIENT)
Dept: CT IMAGING | Facility: HOSPITAL | Age: 87
DRG: 393 | End: 2022-01-01
Payer: COMMERCIAL

## 2022-01-01 ENCOUNTER — HOSPITAL ENCOUNTER (INPATIENT)
Facility: HOSPITAL | Age: 87
LOS: 6 days | DRG: 177 | End: 2022-08-08
Attending: EMERGENCY MEDICINE | Admitting: INTERNAL MEDICINE
Payer: COMMERCIAL

## 2022-01-01 ENCOUNTER — APPOINTMENT (EMERGENCY)
Dept: RADIOLOGY | Facility: HOSPITAL | Age: 87
End: 2022-01-01
Payer: COMMERCIAL

## 2022-01-01 ENCOUNTER — APPOINTMENT (INPATIENT)
Dept: CT IMAGING | Facility: HOSPITAL | Age: 87
DRG: 177 | End: 2022-01-01
Payer: COMMERCIAL

## 2022-01-01 VITALS
TEMPERATURE: 98.6 F | WEIGHT: 109.57 LBS | SYSTOLIC BLOOD PRESSURE: 121 MMHG | RESPIRATION RATE: 17 BRPM | BODY MASS INDEX: 22.09 KG/M2 | HEART RATE: 97 BPM | OXYGEN SATURATION: 94 % | HEIGHT: 59 IN | DIASTOLIC BLOOD PRESSURE: 72 MMHG

## 2022-01-01 VITALS
BODY MASS INDEX: 24.4 KG/M2 | SYSTOLIC BLOOD PRESSURE: 126 MMHG | TEMPERATURE: 97.4 F | DIASTOLIC BLOOD PRESSURE: 94 MMHG | HEART RATE: 83 BPM | OXYGEN SATURATION: 98 % | HEIGHT: 59 IN | WEIGHT: 121.03 LBS | RESPIRATION RATE: 20 BRPM

## 2022-01-01 VITALS
HEART RATE: 78 BPM | OXYGEN SATURATION: 98 % | SYSTOLIC BLOOD PRESSURE: 128 MMHG | BODY MASS INDEX: 21.97 KG/M2 | DIASTOLIC BLOOD PRESSURE: 78 MMHG | WEIGHT: 109 LBS | TEMPERATURE: 98 F | HEIGHT: 59 IN | RESPIRATION RATE: 22 BRPM

## 2022-01-01 VITALS
HEIGHT: 59 IN | HEART RATE: 114 BPM | RESPIRATION RATE: 16 BRPM | DIASTOLIC BLOOD PRESSURE: 72 MMHG | SYSTOLIC BLOOD PRESSURE: 156 MMHG | TEMPERATURE: 97.7 F | WEIGHT: 109 LBS | BODY MASS INDEX: 21.97 KG/M2 | OXYGEN SATURATION: 94 %

## 2022-01-01 VITALS
OXYGEN SATURATION: 97 % | HEART RATE: 98 BPM | DIASTOLIC BLOOD PRESSURE: 76 MMHG | HEIGHT: 59 IN | TEMPERATURE: 97.5 F | WEIGHT: 106.7 LBS | RESPIRATION RATE: 16 BRPM | BODY MASS INDEX: 21.51 KG/M2 | SYSTOLIC BLOOD PRESSURE: 111 MMHG

## 2022-01-01 DIAGNOSIS — D35.00 ADRENAL ADENOMA: ICD-10-CM

## 2022-01-01 DIAGNOSIS — J90 PLEURAL EFFUSION: Primary | ICD-10-CM

## 2022-01-01 DIAGNOSIS — I50.33 ACUTE ON CHRONIC DIASTOLIC (CONGESTIVE) HEART FAILURE (HCC): ICD-10-CM

## 2022-01-01 DIAGNOSIS — I34.0 NONRHEUMATIC MITRAL VALVE REGURGITATION: ICD-10-CM

## 2022-01-01 DIAGNOSIS — J96.11 CHRONIC RESPIRATORY FAILURE WITH HYPOXIA (HCC): ICD-10-CM

## 2022-01-01 DIAGNOSIS — I48.11 LONGSTANDING PERSISTENT ATRIAL FIBRILLATION (HCC): ICD-10-CM

## 2022-01-01 DIAGNOSIS — R41.0 CONFUSION: ICD-10-CM

## 2022-01-01 DIAGNOSIS — N39.0 URINARY TRACT INFECTION, SITE NOT SPECIFIED: ICD-10-CM

## 2022-01-01 DIAGNOSIS — I50.9 ACUTE EXACERBATION OF CHF (CONGESTIVE HEART FAILURE) (HCC): ICD-10-CM

## 2022-01-01 DIAGNOSIS — E44.0 MODERATE PROTEIN-CALORIE MALNUTRITION (HCC): Chronic | ICD-10-CM

## 2022-01-01 DIAGNOSIS — I50.32 CHRONIC HEART FAILURE WITH PRESERVED EJECTION FRACTION (HFPEF) (HCC): ICD-10-CM

## 2022-01-01 DIAGNOSIS — U07.1 COVID-19 VIRUS INFECTION: ICD-10-CM

## 2022-01-01 DIAGNOSIS — I10 ESSENTIAL HYPERTENSION: ICD-10-CM

## 2022-01-01 DIAGNOSIS — M25.551 ACUTE RIGHT HIP PAIN: Primary | ICD-10-CM

## 2022-01-01 DIAGNOSIS — Z11.52 ENCOUNTER FOR SCREENING FOR COVID-19: ICD-10-CM

## 2022-01-01 DIAGNOSIS — K56.7 ILEUS (HCC): Primary | ICD-10-CM

## 2022-01-01 DIAGNOSIS — I48.91 ATRIAL FIBRILLATION WITH RVR (HCC): ICD-10-CM

## 2022-01-01 DIAGNOSIS — K55.059 ACUTE MESENTERIC ISCHEMIA (HCC): ICD-10-CM

## 2022-01-01 DIAGNOSIS — R07.9 CHEST PAIN: ICD-10-CM

## 2022-01-01 DIAGNOSIS — I50.33 ACUTE ON CHRONIC DIASTOLIC HEART FAILURE (HCC): ICD-10-CM

## 2022-01-01 DIAGNOSIS — R07.9 CHEST PAIN, UNSPECIFIED TYPE: Primary | ICD-10-CM

## 2022-01-01 DIAGNOSIS — R10.9 ABDOMINAL PAIN: ICD-10-CM

## 2022-01-01 DIAGNOSIS — I25.10 CORONARY ARTERY DISEASE INVOLVING NATIVE CORONARY ARTERY OF NATIVE HEART WITHOUT ANGINA PECTORIS: ICD-10-CM

## 2022-01-01 DIAGNOSIS — N18.2 CKD (CHRONIC KIDNEY DISEASE), STAGE II: ICD-10-CM

## 2022-01-01 DIAGNOSIS — M25.551 RIGHT HIP PAIN: Primary | ICD-10-CM

## 2022-01-01 DIAGNOSIS — I95.9 HYPOTENSION, UNSPECIFIED HYPOTENSION TYPE: ICD-10-CM

## 2022-01-01 DIAGNOSIS — Z23 ENCOUNTER FOR IMMUNIZATION: Primary | ICD-10-CM

## 2022-01-01 LAB
2HR DELTA HS TROPONIN: -4 NG/L
2HR DELTA HS TROPONIN: 2 NG/L
2HR DELTA HS TROPONIN: 3 NG/L
4HR DELTA HS TROPONIN: 2 NG/L
ALBUMIN SERPL BCP-MCNC: 3.5 G/DL (ref 3.5–5)
ALBUMIN SERPL BCP-MCNC: 3.6 G/DL (ref 3.5–5)
ALBUMIN SERPL BCP-MCNC: 3.7 G/DL (ref 3.5–5)
ALBUMIN SERPL BCP-MCNC: 3.8 G/DL (ref 3.5–5)
ALBUMIN SERPL BCP-MCNC: 3.9 G/DL (ref 3.5–5)
ALP SERPL-CCNC: 101 U/L (ref 34–104)
ALP SERPL-CCNC: 108 U/L (ref 34–104)
ALP SERPL-CCNC: 60 U/L (ref 34–104)
ALP SERPL-CCNC: 68 U/L (ref 34–104)
ALP SERPL-CCNC: 80 U/L (ref 46–116)
ALT SERPL W P-5'-P-CCNC: 15 U/L (ref 12–78)
ALT SERPL W P-5'-P-CCNC: 289 U/L (ref 7–52)
ALT SERPL W P-5'-P-CCNC: 7 U/L (ref 7–52)
ALT SERPL W P-5'-P-CCNC: 8 U/L (ref 7–52)
ALT SERPL W P-5'-P-CCNC: <3 U/L (ref 7–52)
ANION GAP SERPL CALCULATED.3IONS-SCNC: 1 MMOL/L (ref 4–13)
ANION GAP SERPL CALCULATED.3IONS-SCNC: 1 MMOL/L (ref 4–13)
ANION GAP SERPL CALCULATED.3IONS-SCNC: 10 MMOL/L (ref 4–13)
ANION GAP SERPL CALCULATED.3IONS-SCNC: 11 MMOL/L (ref 4–13)
ANION GAP SERPL CALCULATED.3IONS-SCNC: 23 MMOL/L (ref 4–13)
ANION GAP SERPL CALCULATED.3IONS-SCNC: 6 MMOL/L (ref 4–13)
ANION GAP SERPL CALCULATED.3IONS-SCNC: 6 MMOL/L (ref 4–13)
ANION GAP SERPL CALCULATED.3IONS-SCNC: 7 MMOL/L (ref 4–13)
ANION GAP SERPL CALCULATED.3IONS-SCNC: 8 MMOL/L (ref 4–13)
ANION GAP SERPL CALCULATED.3IONS-SCNC: 8 MMOL/L (ref 4–13)
ANION GAP SERPL CALCULATED.3IONS-SCNC: 9 MMOL/L (ref 4–13)
AORTIC ROOT: 3 CM
AORTIC VALVE MEAN VELOCITY: 16.8 M/S
APICAL FOUR CHAMBER EJECTION FRACTION: 82 %
APTT PPP: 40 SECONDS (ref 23–37)
APTT PPP: 40 SECONDS (ref 23–37)
APTT PPP: 49 SECONDS (ref 23–37)
APTT PPP: 60 SECONDS (ref 23–37)
APTT PPP: 67 SECONDS (ref 23–37)
ARTERIAL PATENCY WRIST A: YES
ASCENDING AORTA: 2.2 CM (ref 1.7–2.54)
AST SERPL W P-5'-P-CCNC: 15 U/L (ref 13–39)
AST SERPL W P-5'-P-CCNC: 16 U/L (ref 5–45)
AST SERPL W P-5'-P-CCNC: 17 U/L (ref 13–39)
AST SERPL W P-5'-P-CCNC: 341 U/L (ref 13–39)
AST SERPL W P-5'-P-CCNC: 8 U/L (ref 13–39)
ATRIAL RATE: 110 BPM
ATRIAL RATE: 120 BPM
ATRIAL RATE: 122 BPM
ATRIAL RATE: 97 BPM
AV AREA BY CONTINUOUS VTI: 1 CM2
AV AREA PEAK VELOCITY: 1.3 CM2
AV LVOT MEAN GRADIENT: 1 MMHG
AV LVOT PEAK GRADIENT: 3 MMHG
AV MEAN GRADIENT: 13 MMHG
AV PEAK GRADIENT: 22 MMHG
AV VALVE AREA: 0.95 CM2
AV VELOCITY RATIO: 0.4
BACTERIA UR CULT: NORMAL
BACTERIA UR QL AUTO: ABNORMAL /HPF
BACTERIA UR QL AUTO: ABNORMAL /HPF
BASE EXCESS BLDA CALC-SCNC: -14.9 MMOL/L
BASOPHILS # BLD AUTO: 0.01 THOUSANDS/ΜL (ref 0–0.1)
BASOPHILS # BLD AUTO: 0.04 THOUSANDS/ΜL (ref 0–0.1)
BASOPHILS # BLD AUTO: 0.06 THOUSANDS/ΜL (ref 0–0.1)
BASOPHILS # BLD AUTO: 0.08 THOUSANDS/ΜL (ref 0–0.1)
BASOPHILS # BLD AUTO: 0.09 THOUSANDS/ΜL (ref 0–0.1)
BASOPHILS # BLD MANUAL: 0 THOUSAND/UL (ref 0–0.1)
BASOPHILS NFR BLD AUTO: 0 % (ref 0–1)
BASOPHILS NFR BLD AUTO: 1 % (ref 0–1)
BASOPHILS NFR BLD AUTO: 2 % (ref 0–1)
BASOPHILS NFR MAR MANUAL: 0 % (ref 0–1)
BILIRUB DIRECT SERPL-MCNC: 2 MG/DL (ref 0–0.2)
BILIRUB SERPL-MCNC: 0.99 MG/DL (ref 0.2–1)
BILIRUB SERPL-MCNC: 1.24 MG/DL (ref 0.2–1)
BILIRUB SERPL-MCNC: 2.07 MG/DL (ref 0.2–1)
BILIRUB SERPL-MCNC: 2.15 MG/DL (ref 0.2–1)
BILIRUB SERPL-MCNC: 3.33 MG/DL (ref 0.2–1)
BILIRUB UR QL STRIP: NEGATIVE
BNP SERPL-MCNC: 3590 PG/ML (ref 1–100)
BUN SERPL-MCNC: 16 MG/DL (ref 5–25)
BUN SERPL-MCNC: 16 MG/DL (ref 5–25)
BUN SERPL-MCNC: 18 MG/DL (ref 5–25)
BUN SERPL-MCNC: 19 MG/DL (ref 5–25)
BUN SERPL-MCNC: 20 MG/DL (ref 5–25)
BUN SERPL-MCNC: 20 MG/DL (ref 5–25)
BUN SERPL-MCNC: 21 MG/DL (ref 5–25)
BUN SERPL-MCNC: 22 MG/DL (ref 5–25)
BUN SERPL-MCNC: 22 MG/DL (ref 5–25)
BUN SERPL-MCNC: 24 MG/DL (ref 5–25)
BUN SERPL-MCNC: 26 MG/DL (ref 5–25)
BUN SERPL-MCNC: 26 MG/DL (ref 5–25)
BUN SERPL-MCNC: 31 MG/DL (ref 5–25)
BUN SERPL-MCNC: 59 MG/DL (ref 5–25)
BUN SERPL-MCNC: 9 MG/DL (ref 5–25)
C DIFF TOX B TCDB STL QL NAA+PROBE: NEGATIVE
CA-I BLD-SCNC: 0.95 MMOL/L (ref 1.12–1.32)
CALCIUM SERPL-MCNC: 8.7 MG/DL (ref 8.4–10.2)
CALCIUM SERPL-MCNC: 8.8 MG/DL (ref 8.4–10.2)
CALCIUM SERPL-MCNC: 8.9 MG/DL (ref 8.4–10.2)
CALCIUM SERPL-MCNC: 8.9 MG/DL (ref 8.4–10.2)
CALCIUM SERPL-MCNC: 9 MG/DL (ref 8.4–10.2)
CALCIUM SERPL-MCNC: 9 MG/DL (ref 8.4–10.2)
CALCIUM SERPL-MCNC: 9.1 MG/DL (ref 8.4–10.2)
CALCIUM SERPL-MCNC: 9.3 MG/DL (ref 8.4–10.2)
CALCIUM SERPL-MCNC: 9.3 MG/DL (ref 8.4–10.2)
CALCIUM SERPL-MCNC: 9.4 MG/DL (ref 8.4–10.2)
CALCIUM SERPL-MCNC: 9.4 MG/DL (ref 8.4–10.2)
CALCIUM SERPL-MCNC: 9.5 MG/DL (ref 8.3–10.1)
CALCIUM SERPL-MCNC: 9.5 MG/DL (ref 8.3–10.1)
CALCIUM SERPL-MCNC: 9.7 MG/DL (ref 8.4–10.2)
CALCIUM SERPL-MCNC: 9.7 MG/DL (ref 8.4–10.2)
CAOX CRY URNS QL MICRO: ABNORMAL /HPF
CARDIAC TROPONIN I PNL SERPL HS: 10 NG/L
CARDIAC TROPONIN I PNL SERPL HS: 11 NG/L
CARDIAC TROPONIN I PNL SERPL HS: 12 NG/L
CARDIAC TROPONIN I PNL SERPL HS: 13 NG/L
CARDIAC TROPONIN I PNL SERPL HS: 40 NG/L
CARDIAC TROPONIN I PNL SERPL HS: 8 NG/L
CARDIAC TROPONIN I PNL SERPL HS: 9 NG/L
CARDIAC TROPONIN I PNL SERPL HS: 9 NG/L
CHLORIDE SERPL-SCNC: 100 MMOL/L (ref 96–108)
CHLORIDE SERPL-SCNC: 101 MMOL/L (ref 96–108)
CHLORIDE SERPL-SCNC: 102 MMOL/L (ref 100–108)
CHLORIDE SERPL-SCNC: 103 MMOL/L (ref 100–108)
CHLORIDE SERPL-SCNC: 103 MMOL/L (ref 96–108)
CHLORIDE SERPL-SCNC: 103 MMOL/L (ref 96–108)
CHLORIDE SERPL-SCNC: 107 MMOL/L (ref 96–108)
CHLORIDE SERPL-SCNC: 95 MMOL/L (ref 96–108)
CHLORIDE SERPL-SCNC: 97 MMOL/L (ref 96–108)
CHLORIDE SERPL-SCNC: 98 MMOL/L (ref 96–108)
CHLORIDE SERPL-SCNC: 98 MMOL/L (ref 96–108)
CHLORIDE SERPL-SCNC: 99 MMOL/L (ref 96–108)
CHLORIDE SERPL-SCNC: 99 MMOL/L (ref 96–108)
CLARITY UR: CLEAR
CO2 SERPL-SCNC: 10 MMOL/L (ref 21–32)
CO2 SERPL-SCNC: 22 MMOL/L (ref 21–32)
CO2 SERPL-SCNC: 24 MMOL/L (ref 21–32)
CO2 SERPL-SCNC: 24 MMOL/L (ref 21–32)
CO2 SERPL-SCNC: 26 MMOL/L (ref 21–32)
CO2 SERPL-SCNC: 27 MMOL/L (ref 21–32)
CO2 SERPL-SCNC: 27 MMOL/L (ref 21–32)
CO2 SERPL-SCNC: 29 MMOL/L (ref 21–32)
CO2 SERPL-SCNC: 29 MMOL/L (ref 21–32)
CO2 SERPL-SCNC: 32 MMOL/L (ref 21–32)
CO2 SERPL-SCNC: 33 MMOL/L (ref 21–32)
CO2 SERPL-SCNC: 33 MMOL/L (ref 21–32)
COLOR UR: ABNORMAL
COLOR UR: YELLOW
COLOR UR: YELLOW
CREAT SERPL-MCNC: 0.53 MG/DL (ref 0.6–1.3)
CREAT SERPL-MCNC: 0.64 MG/DL (ref 0.6–1.3)
CREAT SERPL-MCNC: 0.67 MG/DL (ref 0.6–1.3)
CREAT SERPL-MCNC: 0.68 MG/DL (ref 0.6–1.3)
CREAT SERPL-MCNC: 0.71 MG/DL (ref 0.6–1.3)
CREAT SERPL-MCNC: 0.74 MG/DL (ref 0.6–1.3)
CREAT SERPL-MCNC: 0.74 MG/DL (ref 0.6–1.3)
CREAT SERPL-MCNC: 0.78 MG/DL (ref 0.6–1.3)
CREAT SERPL-MCNC: 0.79 MG/DL (ref 0.6–1.3)
CREAT SERPL-MCNC: 0.84 MG/DL (ref 0.6–1.3)
CREAT SERPL-MCNC: 0.85 MG/DL (ref 0.6–1.3)
CREAT SERPL-MCNC: 0.85 MG/DL (ref 0.6–1.3)
CREAT SERPL-MCNC: 0.87 MG/DL (ref 0.6–1.3)
CREAT SERPL-MCNC: 0.96 MG/DL (ref 0.6–1.3)
CREAT SERPL-MCNC: 1.7 MG/DL (ref 0.6–1.3)
CRP SERPL QL: 24.9 MG/L
D DIMER PPP FEU-MCNC: 2.58 UG/ML FEU
DOP CALC AO PEAK VEL: 2.33 M/S
DOP CALC AO VTI: 57.99 CM
DOP CALC LVOT AREA: 3.14 CM2
DOP CALC LVOT DIAMETER: 2 CM
DOP CALC LVOT PEAK VEL VTI: 17.55 CM
DOP CALC LVOT PEAK VEL: 0.93 M/S
DOP CALC LVOT STROKE INDEX: 39.3 ML/M2
DOP CALC LVOT STROKE VOLUME: 55.11 CM3
DOP CALC MV VTI: 43.83 CM
E WAVE DECELERATION TIME: 317 MS
EOSINOPHIL # BLD AUTO: 0.02 THOUSAND/ΜL (ref 0–0.61)
EOSINOPHIL # BLD AUTO: 0.12 THOUSAND/ΜL (ref 0–0.61)
EOSINOPHIL # BLD AUTO: 0.15 THOUSAND/ΜL (ref 0–0.61)
EOSINOPHIL # BLD AUTO: 0.3 THOUSAND/ΜL (ref 0–0.61)
EOSINOPHIL # BLD AUTO: 0.33 THOUSAND/ΜL (ref 0–0.61)
EOSINOPHIL # BLD AUTO: 0.34 THOUSAND/ΜL (ref 0–0.61)
EOSINOPHIL # BLD AUTO: 0.38 THOUSAND/ΜL (ref 0–0.61)
EOSINOPHIL # BLD MANUAL: 0 THOUSAND/UL (ref 0–0.4)
EOSINOPHIL NFR BLD AUTO: 0 % (ref 0–6)
EOSINOPHIL NFR BLD AUTO: 1 % (ref 0–6)
EOSINOPHIL NFR BLD AUTO: 2 % (ref 0–6)
EOSINOPHIL NFR BLD AUTO: 4 % (ref 0–6)
EOSINOPHIL NFR BLD AUTO: 5 % (ref 0–6)
EOSINOPHIL NFR BLD AUTO: 6 % (ref 0–6)
EOSINOPHIL NFR BLD AUTO: 6 % (ref 0–6)
EOSINOPHIL NFR BLD MANUAL: 0 % (ref 0–6)
ERYTHROCYTE [DISTWIDTH] IN BLOOD BY AUTOMATED COUNT: 15.7 % (ref 11.6–15.1)
ERYTHROCYTE [DISTWIDTH] IN BLOOD BY AUTOMATED COUNT: 15.8 % (ref 11.6–15.1)
ERYTHROCYTE [DISTWIDTH] IN BLOOD BY AUTOMATED COUNT: 15.9 % (ref 11.6–15.1)
ERYTHROCYTE [DISTWIDTH] IN BLOOD BY AUTOMATED COUNT: 15.9 % (ref 11.6–15.1)
ERYTHROCYTE [DISTWIDTH] IN BLOOD BY AUTOMATED COUNT: 16 % (ref 11.6–15.1)
ERYTHROCYTE [DISTWIDTH] IN BLOOD BY AUTOMATED COUNT: 16.1 % (ref 11.6–15.1)
ERYTHROCYTE [DISTWIDTH] IN BLOOD BY AUTOMATED COUNT: 16.2 % (ref 11.6–15.1)
ERYTHROCYTE [DISTWIDTH] IN BLOOD BY AUTOMATED COUNT: 16.4 % (ref 11.6–15.1)
ERYTHROCYTE [DISTWIDTH] IN BLOOD BY AUTOMATED COUNT: 16.4 % (ref 11.6–15.1)
ERYTHROCYTE [DISTWIDTH] IN BLOOD BY AUTOMATED COUNT: 16.8 % (ref 11.6–15.1)
ERYTHROCYTE [DISTWIDTH] IN BLOOD BY AUTOMATED COUNT: 16.9 % (ref 11.6–15.1)
ERYTHROCYTE [DISTWIDTH] IN BLOOD BY AUTOMATED COUNT: 17 % (ref 11.6–15.1)
ERYTHROCYTE [DISTWIDTH] IN BLOOD BY AUTOMATED COUNT: 17 % (ref 11.6–15.1)
ERYTHROCYTE [DISTWIDTH] IN BLOOD BY AUTOMATED COUNT: 17.2 % (ref 11.6–15.1)
ERYTHROCYTE [DISTWIDTH] IN BLOOD BY AUTOMATED COUNT: 17.8 % (ref 11.6–15.1)
ERYTHROCYTE [SEDIMENTATION RATE] IN BLOOD: 12 MM/HOUR (ref 0–29)
FLUAV RNA RESP QL NAA+PROBE: NEGATIVE
FLUBV RNA RESP QL NAA+PROBE: NEGATIVE
FRACTIONAL SHORTENING: 32 % (ref 28–44)
GFR SERPL CREATININE-BSD FRML MDRD: 26 ML/MIN/1.73SQ M
GFR SERPL CREATININE-BSD FRML MDRD: 53 ML/MIN/1.73SQ M
GFR SERPL CREATININE-BSD FRML MDRD: 60 ML/MIN/1.73SQ M
GFR SERPL CREATININE-BSD FRML MDRD: 62 ML/MIN/1.73SQ M
GFR SERPL CREATININE-BSD FRML MDRD: 62 ML/MIN/1.73SQ M
GFR SERPL CREATININE-BSD FRML MDRD: 63 ML/MIN/1.73SQ M
GFR SERPL CREATININE-BSD FRML MDRD: 67 ML/MIN/1.73SQ M
GFR SERPL CREATININE-BSD FRML MDRD: 69 ML/MIN/1.73SQ M
GFR SERPL CREATININE-BSD FRML MDRD: 73 ML/MIN/1.73SQ M
GFR SERPL CREATININE-BSD FRML MDRD: 73 ML/MIN/1.73SQ M
GFR SERPL CREATININE-BSD FRML MDRD: 77 ML/MIN/1.73SQ M
GFR SERPL CREATININE-BSD FRML MDRD: 79 ML/MIN/1.73SQ M
GFR SERPL CREATININE-BSD FRML MDRD: 79 ML/MIN/1.73SQ M
GFR SERPL CREATININE-BSD FRML MDRD: 81 ML/MIN/1.73SQ M
GFR SERPL CREATININE-BSD FRML MDRD: 86 ML/MIN/1.73SQ M
GLUCOSE SERPL-MCNC: 101 MG/DL (ref 65–140)
GLUCOSE SERPL-MCNC: 103 MG/DL (ref 65–140)
GLUCOSE SERPL-MCNC: 104 MG/DL (ref 65–140)
GLUCOSE SERPL-MCNC: 105 MG/DL (ref 65–140)
GLUCOSE SERPL-MCNC: 107 MG/DL (ref 65–140)
GLUCOSE SERPL-MCNC: 123 MG/DL (ref 65–140)
GLUCOSE SERPL-MCNC: 135 MG/DL (ref 65–140)
GLUCOSE SERPL-MCNC: 80 MG/DL (ref 65–140)
GLUCOSE SERPL-MCNC: 89 MG/DL (ref 65–140)
GLUCOSE SERPL-MCNC: 89 MG/DL (ref 65–140)
GLUCOSE SERPL-MCNC: 92 MG/DL (ref 65–140)
GLUCOSE SERPL-MCNC: 93 MG/DL (ref 65–140)
GLUCOSE SERPL-MCNC: 93 MG/DL (ref 65–140)
GLUCOSE SERPL-MCNC: 95 MG/DL (ref 65–140)
GLUCOSE SERPL-MCNC: 99 MG/DL (ref 65–140)
GLUCOSE UR STRIP-MCNC: NEGATIVE MG/DL
HCO3 BLDA-SCNC: 9.6 MMOL/L (ref 22–28)
HCT VFR BLD AUTO: 33.8 % (ref 34.8–46.1)
HCT VFR BLD AUTO: 34.8 % (ref 34.8–46.1)
HCT VFR BLD AUTO: 35.4 % (ref 34.8–46.1)
HCT VFR BLD AUTO: 35.6 % (ref 34.8–46.1)
HCT VFR BLD AUTO: 35.6 % (ref 34.8–46.1)
HCT VFR BLD AUTO: 35.8 % (ref 34.8–46.1)
HCT VFR BLD AUTO: 36.2 % (ref 34.8–46.1)
HCT VFR BLD AUTO: 36.3 % (ref 34.8–46.1)
HCT VFR BLD AUTO: 37.1 % (ref 34.8–46.1)
HCT VFR BLD AUTO: 37.1 % (ref 34.8–46.1)
HCT VFR BLD AUTO: 37.2 % (ref 34.8–46.1)
HCT VFR BLD AUTO: 37.2 % (ref 34.8–46.1)
HGB BLD-MCNC: 10.4 G/DL (ref 11.5–15.4)
HGB BLD-MCNC: 11.1 G/DL (ref 11.5–15.4)
HGB BLD-MCNC: 11.2 G/DL (ref 11.5–15.4)
HGB BLD-MCNC: 11.2 G/DL (ref 11.5–15.4)
HGB BLD-MCNC: 11.4 G/DL (ref 11.5–15.4)
HGB BLD-MCNC: 11.4 G/DL (ref 11.5–15.4)
HGB BLD-MCNC: 11.6 G/DL (ref 11.5–15.4)
HGB BLD-MCNC: 11.6 G/DL (ref 11.5–15.4)
HGB BLD-MCNC: 11.7 G/DL (ref 11.5–15.4)
HGB BLD-MCNC: 11.8 G/DL (ref 11.5–15.4)
HGB UR QL STRIP.AUTO: NEGATIVE
HYALINE CASTS #/AREA URNS LPF: ABNORMAL /LPF
IMM GRANULOCYTES # BLD AUTO: 0.01 THOUSAND/UL (ref 0–0.2)
IMM GRANULOCYTES # BLD AUTO: 0.02 THOUSAND/UL (ref 0–0.2)
IMM GRANULOCYTES # BLD AUTO: 0.02 THOUSAND/UL (ref 0–0.2)
IMM GRANULOCYTES # BLD AUTO: 0.03 THOUSAND/UL (ref 0–0.2)
IMM GRANULOCYTES # BLD AUTO: 0.04 THOUSAND/UL (ref 0–0.2)
IMM GRANULOCYTES NFR BLD AUTO: 0 % (ref 0–2)
IMM GRANULOCYTES NFR BLD AUTO: 1 % (ref 0–2)
INR PPP: 1.21 (ref 0.84–1.19)
INR PPP: 13.08 (ref 0.84–1.19)
INTERVENTRICULAR SEPTUM IN DIASTOLE (PARASTERNAL SHORT AXIS VIEW): 0.8 CM
INTERVENTRICULAR SEPTUM: 0.8 CM (ref 0.46–0.86)
KETONES UR STRIP-MCNC: NEGATIVE MG/DL
LAAS-AP2: 32.3 CM2
LAAS-AP4: 28.4 CM2
LACTATE SERPL-SCNC: 0.7 MMOL/L (ref 0.5–2)
LACTATE SERPL-SCNC: 1 MMOL/L (ref 0.5–2)
LACTATE SERPL-SCNC: 10.2 MMOL/L (ref 0.5–2)
LEFT ATRIUM SIZE: 4.9 CM
LEFT INTERNAL DIMENSION IN SYSTOLE: 3.6 CM (ref 2.2–3.32)
LEFT VENTRICLE DIASTOLIC VOLUME (MOD BIPLANE): 74 ML (ref 59.07–133.03)
LEFT VENTRICLE SYSTOLIC VOLUME (MOD BIPLANE): 17 ML
LEFT VENTRICULAR INTERNAL DIMENSION IN DIASTOLE: 5.3 CM (ref 3.56–5.3)
LEFT VENTRICULAR POSTERIOR WALL IN END DIASTOLE: 0.8 CM (ref 0.45–0.85)
LEFT VENTRICULAR STROKE VOLUME: 83 ML
LEUKOCYTE ESTERASE UR QL STRIP: ABNORMAL
LEUKOCYTE ESTERASE UR QL STRIP: ABNORMAL
LEUKOCYTE ESTERASE UR QL STRIP: NEGATIVE
LIPASE SERPL-CCNC: 28 U/L (ref 11–82)
LIPASE SERPL-CCNC: 7 U/L (ref 11–82)
LV EF: 76 %
LVSV (TEICH): 83 ML
LYMPHOCYTES # BLD AUTO: 0.58 THOUSANDS/ΜL (ref 0.6–4.47)
LYMPHOCYTES # BLD AUTO: 0.62 THOUSANDS/ΜL (ref 0.6–4.47)
LYMPHOCYTES # BLD AUTO: 0.77 THOUSAND/UL (ref 0.6–4.47)
LYMPHOCYTES # BLD AUTO: 0.96 THOUSANDS/ΜL (ref 0.6–4.47)
LYMPHOCYTES # BLD AUTO: 0.97 THOUSANDS/ΜL (ref 0.6–4.47)
LYMPHOCYTES # BLD AUTO: 0.97 THOUSANDS/ΜL (ref 0.6–4.47)
LYMPHOCYTES # BLD AUTO: 0.99 THOUSANDS/ΜL (ref 0.6–4.47)
LYMPHOCYTES # BLD AUTO: 1.32 THOUSANDS/ΜL (ref 0.6–4.47)
LYMPHOCYTES # BLD AUTO: 5 % (ref 14–44)
LYMPHOCYTES NFR BLD AUTO: 10 % (ref 14–44)
LYMPHOCYTES NFR BLD AUTO: 14 % (ref 14–44)
LYMPHOCYTES NFR BLD AUTO: 15 % (ref 14–44)
LYMPHOCYTES NFR BLD AUTO: 16 % (ref 14–44)
LYMPHOCYTES NFR BLD AUTO: 18 % (ref 14–44)
LYMPHOCYTES NFR BLD AUTO: 8 % (ref 14–44)
LYMPHOCYTES NFR BLD AUTO: 9 % (ref 14–44)
MAGNESIUM SERPL-MCNC: 2 MG/DL (ref 1.9–2.7)
MAGNESIUM SERPL-MCNC: 2.4 MG/DL (ref 1.9–2.7)
MCH RBC QN AUTO: 27.7 PG (ref 26.8–34.3)
MCH RBC QN AUTO: 28 PG (ref 26.8–34.3)
MCH RBC QN AUTO: 28 PG (ref 26.8–34.3)
MCH RBC QN AUTO: 28.1 PG (ref 26.8–34.3)
MCH RBC QN AUTO: 28.2 PG (ref 26.8–34.3)
MCH RBC QN AUTO: 28.3 PG (ref 26.8–34.3)
MCH RBC QN AUTO: 28.3 PG (ref 26.8–34.3)
MCH RBC QN AUTO: 28.6 PG (ref 26.8–34.3)
MCHC RBC AUTO-ENTMCNC: 29.9 G/DL (ref 31.4–37.4)
MCHC RBC AUTO-ENTMCNC: 30.8 G/DL (ref 31.4–37.4)
MCHC RBC AUTO-ENTMCNC: 31 G/DL (ref 31.4–37.4)
MCHC RBC AUTO-ENTMCNC: 31.3 G/DL (ref 31.4–37.4)
MCHC RBC AUTO-ENTMCNC: 31.5 G/DL (ref 31.4–37.4)
MCHC RBC AUTO-ENTMCNC: 31.9 G/DL (ref 31.4–37.4)
MCHC RBC AUTO-ENTMCNC: 32 G/DL (ref 31.4–37.4)
MCHC RBC AUTO-ENTMCNC: 32.2 G/DL (ref 31.4–37.4)
MCHC RBC AUTO-ENTMCNC: 32.7 G/DL (ref 31.4–37.4)
MCHC RBC AUTO-ENTMCNC: 32.9 G/DL (ref 31.4–37.4)
MCHC RBC AUTO-ENTMCNC: 33.1 G/DL (ref 31.4–37.4)
MCV RBC AUTO: 85 FL (ref 82–98)
MCV RBC AUTO: 85 FL (ref 82–98)
MCV RBC AUTO: 86 FL (ref 82–98)
MCV RBC AUTO: 88 FL (ref 82–98)
MCV RBC AUTO: 88 FL (ref 82–98)
MCV RBC AUTO: 89 FL (ref 82–98)
MCV RBC AUTO: 90 FL (ref 82–98)
MCV RBC AUTO: 91 FL (ref 82–98)
MCV RBC AUTO: 96 FL (ref 82–98)
MONOCYTES # BLD AUTO: 0.46 THOUSAND/UL (ref 0–1.22)
MONOCYTES # BLD AUTO: 0.6 THOUSAND/ΜL (ref 0.17–1.22)
MONOCYTES # BLD AUTO: 0.61 THOUSAND/ΜL (ref 0.17–1.22)
MONOCYTES # BLD AUTO: 0.66 THOUSAND/ΜL (ref 0.17–1.22)
MONOCYTES # BLD AUTO: 0.76 THOUSAND/ΜL (ref 0.17–1.22)
MONOCYTES # BLD AUTO: 0.8 THOUSAND/ΜL (ref 0.17–1.22)
MONOCYTES # BLD AUTO: 0.9 THOUSAND/ΜL (ref 0.17–1.22)
MONOCYTES # BLD AUTO: 0.91 THOUSAND/ΜL (ref 0.17–1.22)
MONOCYTES NFR BLD AUTO: 10 % (ref 4–12)
MONOCYTES NFR BLD AUTO: 11 % (ref 4–12)
MONOCYTES NFR BLD AUTO: 12 % (ref 4–12)
MONOCYTES NFR BLD AUTO: 15 % (ref 4–12)
MONOCYTES NFR BLD AUTO: 8 % (ref 4–12)
MONOCYTES NFR BLD AUTO: 9 % (ref 4–12)
MONOCYTES NFR BLD AUTO: 9 % (ref 4–12)
MONOCYTES NFR BLD: 3 % (ref 4–12)
MV E'TISSUE VEL-SEP: 7 CM/S
MV MEAN GRADIENT: 6 MMHG
MV PEAK E VEL: 156 CM/S
MV PEAK GRADIENT: 19 MMHG
MV STENOSIS PRESSURE HALF TIME: 92 MS
MV VALVE AREA BY CONTINUITY EQUATION: 1.26 CM2
MV VALVE AREA P 1/2 METHOD: 2.39 CM2
NEUTROPHILS # BLD AUTO: 3.52 THOUSANDS/ΜL (ref 1.85–7.62)
NEUTROPHILS # BLD AUTO: 4.42 THOUSANDS/ΜL (ref 1.85–7.62)
NEUTROPHILS # BLD AUTO: 4.52 THOUSANDS/ΜL (ref 1.85–7.62)
NEUTROPHILS # BLD AUTO: 4.67 THOUSANDS/ΜL (ref 1.85–7.62)
NEUTROPHILS # BLD AUTO: 5.61 THOUSANDS/ΜL (ref 1.85–7.62)
NEUTROPHILS # BLD AUTO: 5.9 THOUSANDS/ΜL (ref 1.85–7.62)
NEUTROPHILS # BLD AUTO: 8.64 THOUSANDS/ΜL (ref 1.85–7.62)
NEUTROPHILS # BLD MANUAL: 14.2 THOUSAND/UL (ref 1.85–7.62)
NEUTS BAND NFR BLD MANUAL: 1 % (ref 0–8)
NEUTS SEG NFR BLD AUTO: 63 % (ref 43–75)
NEUTS SEG NFR BLD AUTO: 67 % (ref 43–75)
NEUTS SEG NFR BLD AUTO: 70 % (ref 43–75)
NEUTS SEG NFR BLD AUTO: 71 % (ref 43–75)
NEUTS SEG NFR BLD AUTO: 72 % (ref 43–75)
NEUTS SEG NFR BLD AUTO: 80 % (ref 43–75)
NEUTS SEG NFR BLD AUTO: 81 % (ref 43–75)
NEUTS SEG NFR BLD AUTO: 91 % (ref 43–75)
NITRITE UR QL STRIP: NEGATIVE
NON VENT ROOM AIR: 21 %
NON-SQ EPI CELLS URNS QL MICRO: ABNORMAL /HPF
NON-SQ EPI CELLS URNS QL MICRO: ABNORMAL /HPF
NRBC BLD AUTO-RTO: 0 /100 WBCS
O2 CT BLDA-SCNC: 15.8 ML/DL (ref 16–23)
OVALOCYTES BLD QL SMEAR: PRESENT
OXYHGB MFR BLDA: 93.6 % (ref 94–97)
PA SYSTOLIC PRESSURE: 60 MMHG
PCO2 BLDA: 20.2 MM HG (ref 36–44)
PH BLDA: 7.29 [PH] (ref 7.35–7.45)
PH UR STRIP.AUTO: 6 [PH]
PH UR STRIP.AUTO: 6 [PH]
PH UR STRIP.AUTO: 6.5 [PH]
PHOSPHATE SERPL-MCNC: 3.1 MG/DL (ref 2.3–4.1)
PLATELET # BLD AUTO: 191 THOUSANDS/UL (ref 149–390)
PLATELET # BLD AUTO: 197 THOUSANDS/UL (ref 149–390)
PLATELET # BLD AUTO: 201 THOUSANDS/UL (ref 149–390)
PLATELET # BLD AUTO: 202 THOUSANDS/UL (ref 149–390)
PLATELET # BLD AUTO: 204 THOUSANDS/UL (ref 149–390)
PLATELET # BLD AUTO: 209 THOUSANDS/UL (ref 149–390)
PLATELET # BLD AUTO: 213 THOUSANDS/UL (ref 149–390)
PLATELET # BLD AUTO: 214 THOUSANDS/UL (ref 149–390)
PLATELET # BLD AUTO: 219 THOUSANDS/UL (ref 149–390)
PLATELET # BLD AUTO: 220 THOUSANDS/UL (ref 149–390)
PLATELET # BLD AUTO: 222 THOUSANDS/UL (ref 149–390)
PLATELET # BLD AUTO: 235 THOUSANDS/UL (ref 149–390)
PLATELET # BLD AUTO: 245 THOUSANDS/UL (ref 149–390)
PLATELET # BLD AUTO: 286 THOUSANDS/UL (ref 149–390)
PLATELET # BLD AUTO: 293 THOUSANDS/UL (ref 149–390)
PLATELET BLD QL SMEAR: ADEQUATE
PMV BLD AUTO: 10 FL (ref 8.9–12.7)
PMV BLD AUTO: 10.3 FL (ref 8.9–12.7)
PMV BLD AUTO: 10.3 FL (ref 8.9–12.7)
PMV BLD AUTO: 10.4 FL (ref 8.9–12.7)
PMV BLD AUTO: 10.5 FL (ref 8.9–12.7)
PMV BLD AUTO: 10.6 FL (ref 8.9–12.7)
PMV BLD AUTO: 9 FL (ref 8.9–12.7)
PMV BLD AUTO: 9.5 FL (ref 8.9–12.7)
PMV BLD AUTO: 9.5 FL (ref 8.9–12.7)
PMV BLD AUTO: 9.6 FL (ref 8.9–12.7)
PMV BLD AUTO: 9.6 FL (ref 8.9–12.7)
PMV BLD AUTO: 9.7 FL (ref 8.9–12.7)
PMV BLD AUTO: 9.8 FL (ref 8.9–12.7)
PO2 BLDA: 88.2 MM HG (ref 75–129)
POTASSIUM SERPL-SCNC: 3.2 MMOL/L (ref 3.5–5.3)
POTASSIUM SERPL-SCNC: 3.3 MMOL/L (ref 3.5–5.3)
POTASSIUM SERPL-SCNC: 3.4 MMOL/L (ref 3.5–5.3)
POTASSIUM SERPL-SCNC: 3.4 MMOL/L (ref 3.5–5.3)
POTASSIUM SERPL-SCNC: 3.5 MMOL/L (ref 3.5–5.3)
POTASSIUM SERPL-SCNC: 3.6 MMOL/L (ref 3.5–5.3)
POTASSIUM SERPL-SCNC: 3.7 MMOL/L (ref 3.5–5.3)
POTASSIUM SERPL-SCNC: 3.8 MMOL/L (ref 3.5–5.3)
POTASSIUM SERPL-SCNC: 4 MMOL/L (ref 3.5–5.3)
POTASSIUM SERPL-SCNC: 4.1 MMOL/L (ref 3.5–5.3)
POTASSIUM SERPL-SCNC: 4.1 MMOL/L (ref 3.5–5.3)
POTASSIUM SERPL-SCNC: 4.2 MMOL/L (ref 3.5–5.3)
POTASSIUM SERPL-SCNC: 4.3 MMOL/L (ref 3.5–5.3)
POTASSIUM SERPL-SCNC: 4.3 MMOL/L (ref 3.5–5.3)
POTASSIUM SERPL-SCNC: 6.8 MMOL/L (ref 3.5–5.3)
PROCALCITONIN SERPL-MCNC: 0.08 NG/ML
PROCALCITONIN SERPL-MCNC: 0.74 NG/ML
PROT SERPL-MCNC: 6.2 G/DL (ref 6.4–8.4)
PROT SERPL-MCNC: 7 G/DL (ref 6.4–8.4)
PROT SERPL-MCNC: 7.3 G/DL (ref 6.4–8.2)
PROT SERPL-MCNC: 7.5 G/DL (ref 6.4–8.4)
PROT SERPL-MCNC: 7.6 G/DL (ref 6.4–8.4)
PROT UR STRIP-MCNC: NEGATIVE MG/DL
PROTHROMBIN TIME: 15.2 SECONDS (ref 11.6–14.5)
PROTHROMBIN TIME: 96.8 SECONDS (ref 11.6–14.5)
QRS AXIS: -24 DEGREES
QRS AXIS: -38 DEGREES
QRS AXIS: -45 DEGREES
QRS AXIS: -52 DEGREES
QRSD INTERVAL: 82 MS
QRSD INTERVAL: 88 MS
QRSD INTERVAL: 88 MS
QRSD INTERVAL: 94 MS
QT INTERVAL: 298 MS
QT INTERVAL: 336 MS
QT INTERVAL: 382 MS
QT INTERVAL: 386 MS
QTC INTERVAL: 447 MS
QTC INTERVAL: 474 MS
QTC INTERVAL: 490 MS
QTC INTERVAL: 509 MS
RBC # BLD AUTO: 3.71 MILLION/UL (ref 3.81–5.12)
RBC # BLD AUTO: 3.88 MILLION/UL (ref 3.81–5.12)
RBC # BLD AUTO: 3.93 MILLION/UL (ref 3.81–5.12)
RBC # BLD AUTO: 3.99 MILLION/UL (ref 3.81–5.12)
RBC # BLD AUTO: 3.99 MILLION/UL (ref 3.81–5.12)
RBC # BLD AUTO: 4.01 MILLION/UL (ref 3.81–5.12)
RBC # BLD AUTO: 4.04 MILLION/UL (ref 3.81–5.12)
RBC # BLD AUTO: 4.05 MILLION/UL (ref 3.81–5.12)
RBC # BLD AUTO: 4.05 MILLION/UL (ref 3.81–5.12)
RBC # BLD AUTO: 4.12 MILLION/UL (ref 3.81–5.12)
RBC # BLD AUTO: 4.14 MILLION/UL (ref 3.81–5.12)
RBC # BLD AUTO: 4.17 MILLION/UL (ref 3.81–5.12)
RBC # BLD AUTO: 4.18 MILLION/UL (ref 3.81–5.12)
RBC # BLD AUTO: 4.22 MILLION/UL (ref 3.81–5.12)
RBC # BLD AUTO: 4.22 MILLION/UL (ref 3.81–5.12)
RBC #/AREA URNS AUTO: ABNORMAL /HPF
RBC #/AREA URNS AUTO: ABNORMAL /HPF
RBC MORPH BLD: PRESENT
RIGHT ATRIUM AREA SYSTOLE A4C: 15.3 CM2
RIGHT VENTRICLE ID DIMENSION: 3.3 CM
RSV RNA RESP QL NAA+PROBE: NEGATIVE
SARS-COV-2 RNA RESP QL NAA+PROBE: NEGATIVE
SARS-COV-2 RNA RESP QL NAA+PROBE: POSITIVE
SL CV LEFT ATRIUM LENGTH A2C: 6.1 CM
SL CV LV DIAS VOL ENDO Z SCORE: -1.19
SL CV LV EF: 55
SL CV PED ECHO LEFT VENTRICLE DIASTOLIC VOLUME (MOD BIPLANE) 2D: 136 ML
SL CV PED ECHO LEFT VENTRICLE SYSTOLIC VOLUME (MOD BIPLANE) 2D: 53 ML
SODIUM SERPL-SCNC: 128 MMOL/L (ref 135–147)
SODIUM SERPL-SCNC: 131 MMOL/L (ref 135–147)
SODIUM SERPL-SCNC: 132 MMOL/L (ref 135–147)
SODIUM SERPL-SCNC: 133 MMOL/L (ref 135–147)
SODIUM SERPL-SCNC: 133 MMOL/L (ref 135–147)
SODIUM SERPL-SCNC: 135 MMOL/L (ref 135–147)
SODIUM SERPL-SCNC: 135 MMOL/L (ref 135–147)
SODIUM SERPL-SCNC: 136 MMOL/L (ref 135–147)
SODIUM SERPL-SCNC: 136 MMOL/L (ref 135–147)
SODIUM SERPL-SCNC: 136 MMOL/L (ref 136–145)
SODIUM SERPL-SCNC: 137 MMOL/L (ref 135–147)
SODIUM SERPL-SCNC: 137 MMOL/L (ref 136–145)
SODIUM SERPL-SCNC: 138 MMOL/L (ref 135–147)
SP GR UR STRIP.AUTO: 1.01 (ref 1–1.03)
SPECIMEN SOURCE: ABNORMAL
T WAVE AXIS: -12 DEGREES
T WAVE AXIS: 26 DEGREES
T WAVE AXIS: 32 DEGREES
T WAVE AXIS: 50 DEGREES
TR MAX PG: 49 MMHG
TR PEAK VELOCITY: 3.5 M/S
TRANS CELLS #/AREA URNS HPF: PRESENT /[HPF]
TRICUSPID VALVE PEAK REGURGITATION VELOCITY: 3.5 M/S
TSH SERPL DL<=0.05 MIU/L-ACNC: 2.69 UIU/ML (ref 0.45–4.5)
UROBILINOGEN UR QL STRIP.AUTO: 0.2 E.U./DL
UROBILINOGEN UR QL STRIP.AUTO: 0.2 E.U./DL
UROBILINOGEN UR STRIP-ACNC: <2 MG/DL
VENTRICULAR RATE: 107 BPM
VENTRICULAR RATE: 120 BPM
VENTRICULAR RATE: 135 BPM
VENTRICULAR RATE: 97 BPM
WBC # BLD AUTO: 10.78 THOUSAND/UL (ref 4.31–10.16)
WBC # BLD AUTO: 15.43 THOUSAND/UL (ref 4.31–10.16)
WBC # BLD AUTO: 21.88 THOUSAND/UL (ref 4.31–10.16)
WBC # BLD AUTO: 5.39 THOUSAND/UL (ref 4.31–10.16)
WBC # BLD AUTO: 5.52 THOUSAND/UL (ref 4.31–10.16)
WBC # BLD AUTO: 5.54 THOUSAND/UL (ref 4.31–10.16)
WBC # BLD AUTO: 6.22 THOUSAND/UL (ref 4.31–10.16)
WBC # BLD AUTO: 6.6 THOUSAND/UL (ref 4.31–10.16)
WBC # BLD AUTO: 6.72 THOUSAND/UL (ref 4.31–10.16)
WBC # BLD AUTO: 6.97 THOUSAND/UL (ref 4.31–10.16)
WBC # BLD AUTO: 7.13 THOUSAND/UL (ref 4.31–10.16)
WBC # BLD AUTO: 7.47 THOUSAND/UL (ref 4.31–10.16)
WBC # BLD AUTO: 7.78 THOUSAND/UL (ref 4.31–10.16)
WBC # BLD AUTO: 8.32 THOUSAND/UL (ref 4.31–10.16)
WBC # BLD AUTO: 9.74 THOUSAND/UL (ref 4.31–10.16)
WBC #/AREA URNS AUTO: ABNORMAL /HPF
WBC #/AREA URNS AUTO: ABNORMAL /HPF
Z-SCORE OF ASCENDING AORTA: 0.39
Z-SCORE OF INTERVENTRICULAR SEPTUM IN END DIASTOLE: 1.33
Z-SCORE OF LEFT VENTRICULAR DIMENSION IN END DIASTOLE: 1.99
Z-SCORE OF LEFT VENTRICULAR DIMENSION IN END SYSTOLE: 2.28
Z-SCORE OF LEFT VENTRICULAR POSTERIOR WALL IN END DIASTOLE: 1.44

## 2022-01-01 PROCEDURE — 97167 OT EVAL HIGH COMPLEX 60 MIN: CPT

## 2022-01-01 PROCEDURE — 85027 COMPLETE CBC AUTOMATED: CPT | Performed by: NURSE PRACTITIONER

## 2022-01-01 PROCEDURE — 71045 X-RAY EXAM CHEST 1 VIEW: CPT

## 2022-01-01 PROCEDURE — 97530 THERAPEUTIC ACTIVITIES: CPT

## 2022-01-01 PROCEDURE — 99285 EMERGENCY DEPT VISIT HI MDM: CPT | Performed by: EMERGENCY MEDICINE

## 2022-01-01 PROCEDURE — 99233 SBSQ HOSP IP/OBS HIGH 50: CPT | Performed by: INTERNAL MEDICINE

## 2022-01-01 PROCEDURE — 85027 COMPLETE CBC AUTOMATED: CPT | Performed by: INTERNAL MEDICINE

## 2022-01-01 PROCEDURE — 97163 PT EVAL HIGH COMPLEX 45 MIN: CPT

## 2022-01-01 PROCEDURE — 93005 ELECTROCARDIOGRAM TRACING: CPT

## 2022-01-01 PROCEDURE — 93010 ELECTROCARDIOGRAM REPORT: CPT | Performed by: INTERNAL MEDICINE

## 2022-01-01 PROCEDURE — G1004 CDSM NDSC: HCPCS

## 2022-01-01 PROCEDURE — 81001 URINALYSIS AUTO W/SCOPE: CPT | Performed by: EMERGENCY MEDICINE

## 2022-01-01 PROCEDURE — 99232 SBSQ HOSP IP/OBS MODERATE 35: CPT | Performed by: SURGERY

## 2022-01-01 PROCEDURE — 87086 URINE CULTURE/COLONY COUNT: CPT | Performed by: INTERNAL MEDICINE

## 2022-01-01 PROCEDURE — 83605 ASSAY OF LACTIC ACID: CPT | Performed by: NURSE PRACTITIONER

## 2022-01-01 PROCEDURE — 71046 X-RAY EXAM CHEST 2 VIEWS: CPT

## 2022-01-01 PROCEDURE — 82330 ASSAY OF CALCIUM: CPT | Performed by: NURSE PRACTITIONER

## 2022-01-01 PROCEDURE — 80048 BASIC METABOLIC PNL TOTAL CA: CPT | Performed by: PHYSICIAN ASSISTANT

## 2022-01-01 PROCEDURE — 99285 EMERGENCY DEPT VISIT HI MDM: CPT

## 2022-01-01 PROCEDURE — 74176 CT ABD & PELVIS W/O CONTRAST: CPT

## 2022-01-01 PROCEDURE — 99253 IP/OBS CNSLTJ NEW/EST LOW 45: CPT

## 2022-01-01 PROCEDURE — 87635 SARS-COV-2 COVID-19 AMP PRB: CPT | Performed by: INTERNAL MEDICINE

## 2022-01-01 PROCEDURE — 36415 COLL VENOUS BLD VENIPUNCTURE: CPT | Performed by: EMERGENCY MEDICINE

## 2022-01-01 PROCEDURE — 99232 SBSQ HOSP IP/OBS MODERATE 35: CPT | Performed by: PHYSICIAN ASSISTANT

## 2022-01-01 PROCEDURE — 97110 THERAPEUTIC EXERCISES: CPT

## 2022-01-01 PROCEDURE — 84484 ASSAY OF TROPONIN QUANT: CPT | Performed by: EMERGENCY MEDICINE

## 2022-01-01 PROCEDURE — 80048 BASIC METABOLIC PNL TOTAL CA: CPT | Performed by: HOSPITALIST

## 2022-01-01 PROCEDURE — 85025 COMPLETE CBC W/AUTO DIFF WBC: CPT | Performed by: PHYSICIAN ASSISTANT

## 2022-01-01 PROCEDURE — 96372 THER/PROPH/DIAG INJ SC/IM: CPT

## 2022-01-01 PROCEDURE — 84443 ASSAY THYROID STIM HORMONE: CPT | Performed by: STUDENT IN AN ORGANIZED HEALTH CARE EDUCATION/TRAINING PROGRAM

## 2022-01-01 PROCEDURE — 80048 BASIC METABOLIC PNL TOTAL CA: CPT | Performed by: INTERNAL MEDICINE

## 2022-01-01 PROCEDURE — 0064A COVID-19 MODERNA VACC 0.25 ML BOOSTER: CPT

## 2022-01-01 PROCEDURE — 80048 BASIC METABOLIC PNL TOTAL CA: CPT | Performed by: NURSE PRACTITIONER

## 2022-01-01 PROCEDURE — 86140 C-REACTIVE PROTEIN: CPT | Performed by: INTERNAL MEDICINE

## 2022-01-01 PROCEDURE — 99232 SBSQ HOSP IP/OBS MODERATE 35: CPT | Performed by: HOSPITALIST

## 2022-01-01 PROCEDURE — 99232 SBSQ HOSP IP/OBS MODERATE 35: CPT | Performed by: INTERNAL MEDICINE

## 2022-01-01 PROCEDURE — 85730 THROMBOPLASTIN TIME PARTIAL: CPT | Performed by: PHYSICIAN ASSISTANT

## 2022-01-01 PROCEDURE — 84145 PROCALCITONIN (PCT): CPT | Performed by: PHYSICIAN ASSISTANT

## 2022-01-01 PROCEDURE — 85027 COMPLETE CBC AUTOMATED: CPT | Performed by: EMERGENCY MEDICINE

## 2022-01-01 PROCEDURE — 85025 COMPLETE CBC W/AUTO DIFF WBC: CPT | Performed by: NURSE PRACTITIONER

## 2022-01-01 PROCEDURE — 80048 BASIC METABOLIC PNL TOTAL CA: CPT | Performed by: EMERGENCY MEDICINE

## 2022-01-01 PROCEDURE — 85610 PROTHROMBIN TIME: CPT | Performed by: NURSE PRACTITIONER

## 2022-01-01 PROCEDURE — 81003 URINALYSIS AUTO W/O SCOPE: CPT | Performed by: EMERGENCY MEDICINE

## 2022-01-01 PROCEDURE — 99223 1ST HOSP IP/OBS HIGH 75: CPT | Performed by: NURSE PRACTITIONER

## 2022-01-01 PROCEDURE — 80053 COMPREHEN METABOLIC PANEL: CPT | Performed by: EMERGENCY MEDICINE

## 2022-01-01 PROCEDURE — 84484 ASSAY OF TROPONIN QUANT: CPT | Performed by: STUDENT IN AN ORGANIZED HEALTH CARE EDUCATION/TRAINING PROGRAM

## 2022-01-01 PROCEDURE — 72131 CT LUMBAR SPINE W/O DYE: CPT

## 2022-01-01 PROCEDURE — 84100 ASSAY OF PHOSPHORUS: CPT | Performed by: PHYSICIAN ASSISTANT

## 2022-01-01 PROCEDURE — 99497 ADVNCD CARE PLAN 30 MIN: CPT | Performed by: NURSE PRACTITIONER

## 2022-01-01 PROCEDURE — 93306 TTE W/DOPPLER COMPLETE: CPT

## 2022-01-01 PROCEDURE — 82805 BLOOD GASES W/O2 SATURATION: CPT | Performed by: NURSE PRACTITIONER

## 2022-01-01 PROCEDURE — U0005 INFEC AGEN DETEC AMPLI PROBE: HCPCS | Performed by: NURSE PRACTITIONER

## 2022-01-01 PROCEDURE — 71275 CT ANGIOGRAPHY CHEST: CPT

## 2022-01-01 PROCEDURE — 97166 OT EVAL MOD COMPLEX 45 MIN: CPT

## 2022-01-01 PROCEDURE — 85025 COMPLETE CBC W/AUTO DIFF WBC: CPT | Performed by: STUDENT IN AN ORGANIZED HEALTH CARE EDUCATION/TRAINING PROGRAM

## 2022-01-01 PROCEDURE — 80053 COMPREHEN METABOLIC PANEL: CPT | Performed by: NURSE PRACTITIONER

## 2022-01-01 PROCEDURE — 99232 SBSQ HOSP IP/OBS MODERATE 35: CPT | Performed by: NURSE PRACTITIONER

## 2022-01-01 PROCEDURE — 83880 ASSAY OF NATRIURETIC PEPTIDE: CPT | Performed by: EMERGENCY MEDICINE

## 2022-01-01 PROCEDURE — 85610 PROTHROMBIN TIME: CPT | Performed by: PHYSICIAN ASSISTANT

## 2022-01-01 PROCEDURE — 81001 URINALYSIS AUTO W/SCOPE: CPT | Performed by: INTERNAL MEDICINE

## 2022-01-01 PROCEDURE — 85025 COMPLETE CBC W/AUTO DIFF WBC: CPT | Performed by: EMERGENCY MEDICINE

## 2022-01-01 PROCEDURE — NC001 PR NO CHARGE: Performed by: PHYSICIAN ASSISTANT

## 2022-01-01 PROCEDURE — 83690 ASSAY OF LIPASE: CPT | Performed by: STUDENT IN AN ORGANIZED HEALTH CARE EDUCATION/TRAINING PROGRAM

## 2022-01-01 PROCEDURE — 83735 ASSAY OF MAGNESIUM: CPT | Performed by: NURSE PRACTITIONER

## 2022-01-01 PROCEDURE — 80053 COMPREHEN METABOLIC PANEL: CPT | Performed by: STUDENT IN AN ORGANIZED HEALTH CARE EDUCATION/TRAINING PROGRAM

## 2022-01-01 PROCEDURE — 99221 1ST HOSP IP/OBS SF/LOW 40: CPT | Performed by: PHYSICIAN ASSISTANT

## 2022-01-01 PROCEDURE — 91306 COVID-19 MODERNA VACC 0.25 ML BOOSTER: CPT

## 2022-01-01 PROCEDURE — 83690 ASSAY OF LIPASE: CPT | Performed by: EMERGENCY MEDICINE

## 2022-01-01 PROCEDURE — 84145 PROCALCITONIN (PCT): CPT | Performed by: NURSE PRACTITIONER

## 2022-01-01 PROCEDURE — XW023W7 INTRODUCTION OF COVID-19 VACCINE BOOSTER INTO MUSCLE, PERCUTANEOUS APPROACH, NEW TECHNOLOGY GROUP 7: ICD-10-PCS | Performed by: HOSPITALIST

## 2022-01-01 PROCEDURE — 87493 C DIFF AMPLIFIED PROBE: CPT | Performed by: PHYSICIAN ASSISTANT

## 2022-01-01 PROCEDURE — 36600 WITHDRAWAL OF ARTERIAL BLOOD: CPT

## 2022-01-01 PROCEDURE — U0003 INFECTIOUS AGENT DETECTION BY NUCLEIC ACID (DNA OR RNA); SEVERE ACUTE RESPIRATORY SYNDROME CORONAVIRUS 2 (SARS-COV-2) (CORONAVIRUS DISEASE [COVID-19]), AMPLIFIED PROBE TECHNIQUE, MAKING USE OF HIGH THROUGHPUT TECHNOLOGIES AS DESCRIBED BY CMS-2020-01-R: HCPCS | Performed by: INTERNAL MEDICINE

## 2022-01-01 PROCEDURE — 85652 RBC SED RATE AUTOMATED: CPT | Performed by: INTERNAL MEDICINE

## 2022-01-01 PROCEDURE — 72192 CT PELVIS W/O DYE: CPT

## 2022-01-01 PROCEDURE — 80076 HEPATIC FUNCTION PANEL: CPT | Performed by: NURSE PRACTITIONER

## 2022-01-01 PROCEDURE — 97116 GAIT TRAINING THERAPY: CPT

## 2022-01-01 PROCEDURE — 0241U HB NFCT DS VIR RESP RNA 4 TRGT: CPT | Performed by: HOSPITALIST

## 2022-01-01 PROCEDURE — 85730 THROMBOPLASTIN TIME PARTIAL: CPT | Performed by: NURSE PRACTITIONER

## 2022-01-01 PROCEDURE — 36415 COLL VENOUS BLD VENIPUNCTURE: CPT | Performed by: STUDENT IN AN ORGANIZED HEALTH CARE EDUCATION/TRAINING PROGRAM

## 2022-01-01 PROCEDURE — 85025 COMPLETE CBC W/AUTO DIFF WBC: CPT | Performed by: HOSPITALIST

## 2022-01-01 PROCEDURE — 74174 CTA ABD&PLVS W/CONTRAST: CPT

## 2022-01-01 PROCEDURE — 84484 ASSAY OF TROPONIN QUANT: CPT | Performed by: NURSE PRACTITIONER

## 2022-01-01 PROCEDURE — 81003 URINALYSIS AUTO W/O SCOPE: CPT | Performed by: STUDENT IN AN ORGANIZED HEALTH CARE EDUCATION/TRAINING PROGRAM

## 2022-01-01 PROCEDURE — 83605 ASSAY OF LACTIC ACID: CPT | Performed by: STUDENT IN AN ORGANIZED HEALTH CARE EDUCATION/TRAINING PROGRAM

## 2022-01-01 PROCEDURE — 93306 TTE W/DOPPLER COMPLETE: CPT | Performed by: INTERNAL MEDICINE

## 2022-01-01 PROCEDURE — 99223 1ST HOSP IP/OBS HIGH 75: CPT | Performed by: INTERNAL MEDICINE

## 2022-01-01 PROCEDURE — 0241U HB NFCT DS VIR RESP RNA 4 TRGT: CPT | Performed by: EMERGENCY MEDICINE

## 2022-01-01 PROCEDURE — 85379 FIBRIN DEGRADATION QUANT: CPT | Performed by: INTERNAL MEDICINE

## 2022-01-01 PROCEDURE — 85007 BL SMEAR W/DIFF WBC COUNT: CPT | Performed by: EMERGENCY MEDICINE

## 2022-01-01 PROCEDURE — 83735 ASSAY OF MAGNESIUM: CPT | Performed by: PHYSICIAN ASSISTANT

## 2022-01-01 PROCEDURE — 99239 HOSP IP/OBS DSCHRG MGMT >30: CPT | Performed by: HOSPITALIST

## 2022-01-01 PROCEDURE — XW033E5 INTRODUCTION OF REMDESIVIR ANTI-INFECTIVE INTO PERIPHERAL VEIN, PERCUTANEOUS APPROACH, NEW TECHNOLOGY GROUP 5: ICD-10-PCS | Performed by: INTERNAL MEDICINE

## 2022-01-01 PROCEDURE — U0005 INFEC AGEN DETEC AMPLI PROBE: HCPCS | Performed by: INTERNAL MEDICINE

## 2022-01-01 PROCEDURE — 36415 COLL VENOUS BLD VENIPUNCTURE: CPT | Performed by: PHYSICIAN ASSISTANT

## 2022-01-01 PROCEDURE — 96374 THER/PROPH/DIAG INJ IV PUSH: CPT

## 2022-01-01 PROCEDURE — 99213 OFFICE O/P EST LOW 20 MIN: CPT

## 2022-01-01 PROCEDURE — U0003 INFECTIOUS AGENT DETECTION BY NUCLEIC ACID (DNA OR RNA); SEVERE ACUTE RESPIRATORY SYNDROME CORONAVIRUS 2 (SARS-COV-2) (CORONAVIRUS DISEASE [COVID-19]), AMPLIFIED PROBE TECHNIQUE, MAKING USE OF HIGH THROUGHPUT TECHNOLOGIES AS DESCRIBED BY CMS-2020-01-R: HCPCS | Performed by: NURSE PRACTITIONER

## 2022-01-01 PROCEDURE — 80053 COMPREHEN METABOLIC PANEL: CPT | Performed by: PHYSICIAN ASSISTANT

## 2022-01-01 PROCEDURE — 99239 HOSP IP/OBS DSCHRG MGMT >30: CPT | Performed by: NURSE PRACTITIONER

## 2022-01-01 PROCEDURE — 83605 ASSAY OF LACTIC ACID: CPT | Performed by: PHYSICIAN ASSISTANT

## 2022-01-01 PROCEDURE — 97535 SELF CARE MNGMENT TRAINING: CPT

## 2022-01-01 RX ORDER — HYDROXYZINE HYDROCHLORIDE 25 MG/1
25 TABLET, FILM COATED ORAL 2 TIMES DAILY
Status: DISCONTINUED | OUTPATIENT
Start: 2022-01-01 | End: 2022-01-01

## 2022-01-01 RX ORDER — DILTIAZEM HYDROCHLORIDE 60 MG/1
60 TABLET, FILM COATED ORAL EVERY 8 HOURS SCHEDULED
Status: DISCONTINUED | OUTPATIENT
Start: 2022-01-01 | End: 2022-01-01

## 2022-01-01 RX ORDER — HYDROXYZINE HYDROCHLORIDE 25 MG/1
25 TABLET, FILM COATED ORAL 2 TIMES DAILY PRN
Status: DISCONTINUED | OUTPATIENT
Start: 2022-01-01 | End: 2022-01-01 | Stop reason: HOSPADM

## 2022-01-01 RX ORDER — POTASSIUM CHLORIDE 20 MEQ/1
40 TABLET, EXTENDED RELEASE ORAL ONCE
Status: COMPLETED | OUTPATIENT
Start: 2022-01-01 | End: 2022-01-01

## 2022-01-01 RX ORDER — ASPIRIN 81 MG/1
81 TABLET ORAL DAILY
Status: DISCONTINUED | OUTPATIENT
Start: 2022-01-01 | End: 2022-01-01

## 2022-01-01 RX ORDER — POTASSIUM CHLORIDE 750 MG/1
40 TABLET, EXTENDED RELEASE ORAL
Status: COMPLETED | OUTPATIENT
Start: 2022-01-01 | End: 2022-01-01

## 2022-01-01 RX ORDER — ALPRAZOLAM 0.25 MG/1
0.25 TABLET ORAL ONCE
Status: COMPLETED | OUTPATIENT
Start: 2022-01-01 | End: 2022-01-01

## 2022-01-01 RX ORDER — OXYCODONE HYDROCHLORIDE 5 MG/1
2.5 TABLET ORAL EVERY 6 HOURS PRN
Status: DISCONTINUED | OUTPATIENT
Start: 2022-01-01 | End: 2022-01-01

## 2022-01-01 RX ORDER — FENTANYL CITRATE 50 UG/ML
25 INJECTION, SOLUTION INTRAMUSCULAR; INTRAVENOUS EVERY 2 HOUR PRN
Status: DISCONTINUED | OUTPATIENT
Start: 2022-01-01 | End: 2022-01-01

## 2022-01-01 RX ORDER — ATORVASTATIN CALCIUM 40 MG/1
40 TABLET, FILM COATED ORAL
Status: DISCONTINUED | OUTPATIENT
Start: 2022-01-01 | End: 2022-01-01 | Stop reason: HOSPADM

## 2022-01-01 RX ORDER — METOPROLOL TARTRATE 50 MG/1
50 TABLET, FILM COATED ORAL 2 TIMES DAILY
Status: DISCONTINUED | OUTPATIENT
Start: 2022-01-01 | End: 2022-01-01

## 2022-01-01 RX ORDER — ACETAMINOPHEN 325 MG/1
650 TABLET ORAL ONCE
Status: COMPLETED | OUTPATIENT
Start: 2022-01-01 | End: 2022-01-01

## 2022-01-01 RX ORDER — FUROSEMIDE 10 MG/ML
40 INJECTION INTRAMUSCULAR; INTRAVENOUS
Status: DISCONTINUED | OUTPATIENT
Start: 2022-01-01 | End: 2022-01-01

## 2022-01-01 RX ORDER — POLYETHYLENE GLYCOL 3350 17 G/17G
17 POWDER, FOR SOLUTION ORAL DAILY
Status: DISCONTINUED | OUTPATIENT
Start: 2022-01-01 | End: 2022-01-01 | Stop reason: HOSPADM

## 2022-01-01 RX ORDER — ALPRAZOLAM 0.25 MG/1
0.25 TABLET ORAL
Status: DISCONTINUED | OUTPATIENT
Start: 2022-01-01 | End: 2022-01-01

## 2022-01-01 RX ORDER — MAGNESIUM HYDROXIDE/ALUMINUM HYDROXICE/SIMETHICONE 120; 1200; 1200 MG/30ML; MG/30ML; MG/30ML
30 SUSPENSION ORAL EVERY 4 HOURS PRN
Status: DISCONTINUED | OUTPATIENT
Start: 2022-01-01 | End: 2022-01-01 | Stop reason: HOSPADM

## 2022-01-01 RX ORDER — HEPARIN SODIUM 10000 [USP'U]/100ML
3-30 INJECTION, SOLUTION INTRAVENOUS
Status: DISCONTINUED | OUTPATIENT
Start: 2022-01-01 | End: 2022-01-01

## 2022-01-01 RX ORDER — ASPIRIN 81 MG/1
81 TABLET ORAL DAILY
Status: DISCONTINUED | OUTPATIENT
Start: 2022-01-01 | End: 2022-01-01 | Stop reason: HOSPADM

## 2022-01-01 RX ORDER — SODIUM CHLORIDE 9 MG/ML
3 INJECTION INTRAVENOUS
Status: DISCONTINUED | OUTPATIENT
Start: 2022-01-01 | End: 2022-01-01 | Stop reason: HOSPADM

## 2022-01-01 RX ORDER — ACETAMINOPHEN 325 MG/1
650 TABLET ORAL EVERY 6 HOURS PRN
Status: DISCONTINUED | OUTPATIENT
Start: 2022-01-01 | End: 2022-01-01 | Stop reason: HOSPADM

## 2022-01-01 RX ORDER — ASPIRIN 81 MG/1
81 TABLET ORAL DAILY
Refills: 0 | COMMUNITY
Start: 2022-01-01 | End: 2022-08-09

## 2022-01-01 RX ORDER — OXYCODONE HYDROCHLORIDE 5 MG/1
5 TABLET ORAL EVERY 6 HOURS PRN
Status: DISCONTINUED | OUTPATIENT
Start: 2022-01-01 | End: 2022-01-01

## 2022-01-01 RX ORDER — HALOPERIDOL 5 MG/ML
0.5 INJECTION INTRAMUSCULAR EVERY 2 HOUR PRN
Status: DISCONTINUED | OUTPATIENT
Start: 2022-01-01 | End: 2022-01-01 | Stop reason: HOSPADM

## 2022-01-01 RX ORDER — ONDANSETRON 2 MG/ML
4 INJECTION INTRAMUSCULAR; INTRAVENOUS EVERY 6 HOURS PRN
Status: DISCONTINUED | OUTPATIENT
Start: 2022-01-01 | End: 2022-01-01 | Stop reason: HOSPADM

## 2022-01-01 RX ORDER — HEPARIN SODIUM 5000 [USP'U]/ML
5000 INJECTION, SOLUTION INTRAVENOUS; SUBCUTANEOUS EVERY 8 HOURS SCHEDULED
Status: DISCONTINUED | OUTPATIENT
Start: 2022-01-01 | End: 2022-01-01

## 2022-01-01 RX ORDER — DILTIAZEM HYDROCHLORIDE 5 MG/ML
INJECTION INTRAVENOUS
Status: DISPENSED
Start: 2022-01-01 | End: 2022-01-01

## 2022-01-01 RX ORDER — DEXTROSE, SODIUM CHLORIDE, AND POTASSIUM CHLORIDE 5; .45; .15 G/100ML; G/100ML; G/100ML
50 INJECTION INTRAVENOUS CONTINUOUS
Status: DISCONTINUED | OUTPATIENT
Start: 2022-01-01 | End: 2022-01-01

## 2022-01-01 RX ORDER — DILTIAZEM HYDROCHLORIDE 60 MG/1
60 TABLET, FILM COATED ORAL EVERY 8 HOURS SCHEDULED
Refills: 0
Start: 2022-01-01 | End: 2022-08-09

## 2022-01-01 RX ORDER — POTASSIUM CHLORIDE 14.9 MG/ML
20 INJECTION INTRAVENOUS ONCE
Status: COMPLETED | OUTPATIENT
Start: 2022-01-01 | End: 2022-01-01

## 2022-01-01 RX ORDER — DEXAMETHASONE 4 MG/1
6 TABLET ORAL DAILY
Status: DISCONTINUED | OUTPATIENT
Start: 2022-01-01 | End: 2022-01-01

## 2022-01-01 RX ORDER — PANTOPRAZOLE SODIUM 20 MG/1
20 TABLET, DELAYED RELEASE ORAL
Status: DISCONTINUED | OUTPATIENT
Start: 2022-01-01 | End: 2022-01-01 | Stop reason: HOSPADM

## 2022-01-01 RX ORDER — DEXAMETHASONE SODIUM PHOSPHATE 4 MG/ML
6 INJECTION, SOLUTION INTRA-ARTICULAR; INTRALESIONAL; INTRAMUSCULAR; INTRAVENOUS; SOFT TISSUE EVERY 24 HOURS
Status: DISCONTINUED | OUTPATIENT
Start: 2022-01-01 | End: 2022-01-01

## 2022-01-01 RX ORDER — SODIUM PHOSPHATE, DIBASIC AND SODIUM PHOSPHATE, MONOBASIC 7; 19 G/133ML; G/133ML
1 ENEMA RECTAL ONCE
Status: COMPLETED | OUTPATIENT
Start: 2022-01-01 | End: 2022-01-01

## 2022-01-01 RX ORDER — PANTOPRAZOLE SODIUM 40 MG/1
40 TABLET, DELAYED RELEASE ORAL
Status: DISCONTINUED | OUTPATIENT
Start: 2022-01-01 | End: 2022-01-01

## 2022-01-01 RX ORDER — FENTANYL CITRATE 50 UG/ML
50 INJECTION, SOLUTION INTRAMUSCULAR; INTRAVENOUS ONCE
Status: COMPLETED | OUTPATIENT
Start: 2022-01-01 | End: 2022-01-01

## 2022-01-01 RX ORDER — CEFTRIAXONE 1 G/50ML
1000 INJECTION, SOLUTION INTRAVENOUS ONCE
Status: DISCONTINUED | OUTPATIENT
Start: 2022-01-01 | End: 2022-01-01

## 2022-01-01 RX ORDER — HYDROMORPHONE HCL IN WATER/PF 6 MG/30 ML
0.2 PATIENT CONTROLLED ANALGESIA SYRINGE INTRAVENOUS EVERY 4 HOURS PRN
Status: DISCONTINUED | OUTPATIENT
Start: 2022-01-01 | End: 2022-01-01

## 2022-01-01 RX ORDER — AMOXICILLIN 250 MG
1 CAPSULE ORAL 2 TIMES DAILY PRN
Status: DISCONTINUED | OUTPATIENT
Start: 2022-01-01 | End: 2022-01-01 | Stop reason: HOSPADM

## 2022-01-01 RX ORDER — DILTIAZEM HYDROCHLORIDE 60 MG/1
60 TABLET, FILM COATED ORAL EVERY 8 HOURS SCHEDULED
Status: DISCONTINUED | OUTPATIENT
Start: 2022-01-01 | End: 2022-01-01 | Stop reason: HOSPADM

## 2022-01-01 RX ORDER — DILTIAZEM HYDROCHLORIDE 60 MG/1
60 TABLET, FILM COATED ORAL EVERY 6 HOURS SCHEDULED
Status: DISCONTINUED | OUTPATIENT
Start: 2022-01-01 | End: 2022-01-01

## 2022-01-01 RX ORDER — HYDROMORPHONE HCL IN WATER/PF 6 MG/30 ML
0.2 PATIENT CONTROLLED ANALGESIA SYRINGE INTRAVENOUS
Status: DISCONTINUED | OUTPATIENT
Start: 2022-01-01 | End: 2022-01-01 | Stop reason: HOSPADM

## 2022-01-01 RX ORDER — LORAZEPAM 2 MG/ML
0.5 INJECTION INTRAMUSCULAR EVERY 6 HOURS PRN
Status: DISCONTINUED | OUTPATIENT
Start: 2022-01-01 | End: 2022-01-01 | Stop reason: HOSPADM

## 2022-01-01 RX ORDER — LIDOCAINE 50 MG/G
1 PATCH TOPICAL DAILY
Status: DISCONTINUED | OUTPATIENT
Start: 2022-01-01 | End: 2022-01-01

## 2022-01-01 RX ORDER — HEPARIN SODIUM 5000 [USP'U]/ML
5000 INJECTION, SOLUTION INTRAVENOUS; SUBCUTANEOUS EVERY 12 HOURS SCHEDULED
Status: DISCONTINUED | OUTPATIENT
Start: 2022-01-01 | End: 2022-01-01

## 2022-01-01 RX ORDER — KETOROLAC TROMETHAMINE 30 MG/ML
15 INJECTION, SOLUTION INTRAMUSCULAR; INTRAVENOUS ONCE
Status: COMPLETED | OUTPATIENT
Start: 2022-01-01 | End: 2022-01-01

## 2022-01-01 RX ORDER — FUROSEMIDE 40 MG/1
40 TABLET ORAL DAILY
Status: DISCONTINUED | OUTPATIENT
Start: 2022-01-01 | End: 2022-01-01 | Stop reason: HOSPADM

## 2022-01-01 RX ORDER — POTASSIUM CHLORIDE 20 MEQ/1
40 TABLET, EXTENDED RELEASE ORAL DAILY
Status: DISCONTINUED | OUTPATIENT
Start: 2022-01-01 | End: 2022-01-01 | Stop reason: HOSPADM

## 2022-01-01 RX ORDER — FUROSEMIDE 40 MG/1
40 TABLET ORAL DAILY
Status: DISCONTINUED | OUTPATIENT
Start: 2022-01-01 | End: 2022-01-01

## 2022-01-01 RX ORDER — ONDANSETRON 2 MG/ML
4 INJECTION INTRAMUSCULAR; INTRAVENOUS EVERY 4 HOURS PRN
Status: DISCONTINUED | OUTPATIENT
Start: 2022-01-01 | End: 2022-01-01 | Stop reason: HOSPADM

## 2022-01-01 RX ORDER — OXYCODONE HYDROCHLORIDE 5 MG/1
2.5 TABLET ORAL EVERY 4 HOURS PRN
Status: DISCONTINUED | OUTPATIENT
Start: 2022-01-01 | End: 2022-01-01

## 2022-01-01 RX ORDER — OXYBUTYNIN CHLORIDE 5 MG/1
5 TABLET ORAL 3 TIMES DAILY
Status: DISCONTINUED | OUTPATIENT
Start: 2022-01-01 | End: 2022-01-01

## 2022-01-01 RX ORDER — FUROSEMIDE 10 MG/ML
40 INJECTION INTRAMUSCULAR; INTRAVENOUS ONCE
Status: COMPLETED | OUTPATIENT
Start: 2022-01-01 | End: 2022-01-01

## 2022-01-01 RX ORDER — ACETAMINOPHEN 325 MG/1
650 TABLET ORAL EVERY 6 HOURS PRN
Status: DISCONTINUED | OUTPATIENT
Start: 2022-01-01 | End: 2022-01-01

## 2022-01-01 RX ORDER — FENTANYL CITRATE 50 UG/ML
25 INJECTION, SOLUTION INTRAMUSCULAR; INTRAVENOUS ONCE
Status: COMPLETED | OUTPATIENT
Start: 2022-01-01 | End: 2022-01-01

## 2022-01-01 RX ORDER — ATORVASTATIN CALCIUM 40 MG/1
40 TABLET, FILM COATED ORAL
Status: DISCONTINUED | OUTPATIENT
Start: 2022-01-01 | End: 2022-01-01

## 2022-01-01 RX ORDER — GLYCOPYRROLATE 0.2 MG/ML
0.1 INJECTION INTRAMUSCULAR; INTRAVENOUS EVERY 4 HOURS PRN
Status: DISCONTINUED | OUTPATIENT
Start: 2022-01-01 | End: 2022-01-01 | Stop reason: HOSPADM

## 2022-01-01 RX ORDER — ATORVASTATIN CALCIUM 40 MG/1
40 TABLET, FILM COATED ORAL
Qty: 30 TABLET | Refills: 0 | Status: SHIPPED | OUTPATIENT
Start: 2022-01-01 | End: 2022-08-09

## 2022-01-01 RX ORDER — POTASSIUM CHLORIDE 20 MEQ/1
20 TABLET, EXTENDED RELEASE ORAL DAILY
Status: DISCONTINUED | OUTPATIENT
Start: 2022-01-01 | End: 2022-01-01

## 2022-01-01 RX ORDER — ACETAMINOPHEN 325 MG/1
975 TABLET ORAL EVERY 8 HOURS SCHEDULED
Status: DISCONTINUED | OUTPATIENT
Start: 2022-01-01 | End: 2022-01-01

## 2022-01-01 RX ORDER — SODIUM CHLORIDE, SODIUM GLUCONATE, SODIUM ACETATE, POTASSIUM CHLORIDE, MAGNESIUM CHLORIDE, SODIUM PHOSPHATE, DIBASIC, AND POTASSIUM PHOSPHATE .53; .5; .37; .037; .03; .012; .00082 G/100ML; G/100ML; G/100ML; G/100ML; G/100ML; G/100ML; G/100ML
75 INJECTION, SOLUTION INTRAVENOUS ONCE
Status: COMPLETED | OUTPATIENT
Start: 2022-01-01 | End: 2022-01-01

## 2022-01-01 RX ORDER — LANOLIN ALCOHOL/MO/W.PET/CERES
6 CREAM (GRAM) TOPICAL
Status: DISCONTINUED | OUTPATIENT
Start: 2022-01-01 | End: 2022-01-01 | Stop reason: HOSPADM

## 2022-01-01 RX ADMIN — DOCUSATE SODIUM AND SENNOSIDES 1 TABLET: 8.6; 5 TABLET ORAL at 17:20

## 2022-01-01 RX ADMIN — DILTIAZEM HYDROCHLORIDE 60 MG: 60 TABLET, FILM COATED ORAL at 06:26

## 2022-01-01 RX ADMIN — FENTANYL CITRATE 50 MCG: 50 INJECTION, SOLUTION INTRAMUSCULAR; INTRAVENOUS at 14:12

## 2022-01-01 RX ADMIN — ATORVASTATIN CALCIUM 40 MG: 40 TABLET, FILM COATED ORAL at 16:39

## 2022-01-01 RX ADMIN — OXYBUTYNIN CHLORIDE 5 MG: 5 TABLET ORAL at 16:00

## 2022-01-01 RX ADMIN — REMDESIVIR 200 MG: 100 INJECTION, POWDER, LYOPHILIZED, FOR SOLUTION INTRAVENOUS at 10:09

## 2022-01-01 RX ADMIN — Medication 12.5 MG: at 09:13

## 2022-01-01 RX ADMIN — OXYBUTYNIN CHLORIDE 5 MG: 5 TABLET ORAL at 10:55

## 2022-01-01 RX ADMIN — OXYBUTYNIN CHLORIDE 5 MG: 5 TABLET ORAL at 16:28

## 2022-01-01 RX ADMIN — ALPRAZOLAM 0.25 MG: 0.25 TABLET ORAL at 00:02

## 2022-01-01 RX ADMIN — MORPHINE SULFATE 2 MG: 2 INJECTION, SOLUTION INTRAMUSCULAR; INTRAVENOUS at 18:40

## 2022-01-01 RX ADMIN — DILTIAZEM HYDROCHLORIDE 60 MG: 60 TABLET, FILM COATED ORAL at 16:38

## 2022-01-01 RX ADMIN — ATORVASTATIN CALCIUM 40 MG: 40 TABLET, FILM COATED ORAL at 15:30

## 2022-01-01 RX ADMIN — ACETAMINOPHEN 975 MG: 325 TABLET ORAL at 21:15

## 2022-01-01 RX ADMIN — ACETAMINOPHEN 975 MG: 325 TABLET ORAL at 16:38

## 2022-01-01 RX ADMIN — HEPARIN SODIUM 5000 UNITS: 5000 INJECTION INTRAVENOUS; SUBCUTANEOUS at 02:21

## 2022-01-01 RX ADMIN — PANTOPRAZOLE SODIUM 20 MG: 20 TABLET, DELAYED RELEASE ORAL at 05:58

## 2022-01-01 RX ADMIN — METOPROLOL TARTRATE 50 MG: 50 TABLET, FILM COATED ORAL at 18:35

## 2022-01-01 RX ADMIN — DILTIAZEM HYDROCHLORIDE 60 MG: 60 TABLET, FILM COATED ORAL at 15:11

## 2022-01-01 RX ADMIN — FUROSEMIDE 40 MG: 40 TABLET ORAL at 05:59

## 2022-01-01 RX ADMIN — APIXABAN 2.5 MG: 2.5 TABLET, FILM COATED ORAL at 08:56

## 2022-01-01 RX ADMIN — ASPIRIN 81 MG: 81 TABLET, COATED ORAL at 09:11

## 2022-01-01 RX ADMIN — METOPROLOL TARTRATE 25 MG: 25 TABLET, FILM COATED ORAL at 08:15

## 2022-01-01 RX ADMIN — PANTOPRAZOLE SODIUM 40 MG: 40 TABLET, DELAYED RELEASE ORAL at 05:51

## 2022-01-01 RX ADMIN — POTASSIUM CHLORIDE 20 MEQ: 1500 TABLET, EXTENDED RELEASE ORAL at 09:01

## 2022-01-01 RX ADMIN — FUROSEMIDE 40 MG: 10 INJECTION, SOLUTION INTRAMUSCULAR; INTRAVENOUS at 06:29

## 2022-01-01 RX ADMIN — APIXABAN 2.5 MG: 2.5 TABLET, FILM COATED ORAL at 10:55

## 2022-01-01 RX ADMIN — OXYCODONE HYDROCHLORIDE 2.5 MG: 5 TABLET ORAL at 21:34

## 2022-01-01 RX ADMIN — METOPROLOL TARTRATE 50 MG: 50 TABLET, FILM COATED ORAL at 17:27

## 2022-01-01 RX ADMIN — POTASSIUM CHLORIDE 20 MEQ: 1500 TABLET, EXTENDED RELEASE ORAL at 08:33

## 2022-01-01 RX ADMIN — ACETAMINOPHEN 975 MG: 325 TABLET ORAL at 21:34

## 2022-01-01 RX ADMIN — DILTIAZEM HYDROCHLORIDE 60 MG: 60 TABLET, FILM COATED ORAL at 05:59

## 2022-01-01 RX ADMIN — FUROSEMIDE 40 MG: 10 INJECTION, SOLUTION INTRAMUSCULAR; INTRAVENOUS at 08:15

## 2022-01-01 RX ADMIN — POTASSIUM CHLORIDE 20 MEQ: 14.9 INJECTION, SOLUTION INTRAVENOUS at 17:40

## 2022-01-01 RX ADMIN — HYDROXYZINE HYDROCHLORIDE 25 MG: 25 TABLET ORAL at 09:11

## 2022-01-01 RX ADMIN — PANTOPRAZOLE SODIUM 20 MG: 20 TABLET, DELAYED RELEASE ORAL at 06:25

## 2022-01-01 RX ADMIN — LIDOCAINE 1 PATCH: 50 PATCH TOPICAL at 10:55

## 2022-01-01 RX ADMIN — ASPIRIN 81 MG: 81 TABLET, COATED ORAL at 08:32

## 2022-01-01 RX ADMIN — METOPROLOL TARTRATE 25 MG: 25 TABLET, FILM COATED ORAL at 20:41

## 2022-01-01 RX ADMIN — APIXABAN 2.5 MG: 2.5 TABLET, FILM COATED ORAL at 09:11

## 2022-01-01 RX ADMIN — APIXABAN 2.5 MG: 2.5 TABLET, FILM COATED ORAL at 08:36

## 2022-01-01 RX ADMIN — DILTIAZEM HYDROCHLORIDE 60 MG: 60 TABLET, FILM COATED ORAL at 13:29

## 2022-01-01 RX ADMIN — METOPROLOL TARTRATE 25 MG: 25 TABLET, FILM COATED ORAL at 08:36

## 2022-01-01 RX ADMIN — DILTIAZEM HYDROCHLORIDE 30 MG: 30 TABLET, FILM COATED ORAL at 14:34

## 2022-01-01 RX ADMIN — ACETAMINOPHEN 975 MG: 325 TABLET ORAL at 06:44

## 2022-01-01 RX ADMIN — METOPROLOL TARTRATE 25 MG: 25 TABLET, FILM COATED ORAL at 17:42

## 2022-01-01 RX ADMIN — METOPROLOL TARTRATE 25 MG: 25 TABLET, FILM COATED ORAL at 08:56

## 2022-01-01 RX ADMIN — REMDESIVIR 100 MG: 100 INJECTION, POWDER, LYOPHILIZED, FOR SOLUTION INTRAVENOUS at 12:20

## 2022-01-01 RX ADMIN — OXYBUTYNIN CHLORIDE 5 MG: 5 TABLET ORAL at 21:15

## 2022-01-01 RX ADMIN — DILTIAZEM HYDROCHLORIDE 90 MG: 30 TABLET, FILM COATED ORAL at 08:56

## 2022-01-01 RX ADMIN — OXYBUTYNIN CHLORIDE 5 MG: 5 TABLET ORAL at 16:39

## 2022-01-01 RX ADMIN — FENTANYL CITRATE 25 MCG: 50 INJECTION, SOLUTION INTRAMUSCULAR; INTRAVENOUS at 19:54

## 2022-01-01 RX ADMIN — ACETAMINOPHEN 975 MG: 325 TABLET ORAL at 05:54

## 2022-01-01 RX ADMIN — DILTIAZEM HYDROCHLORIDE 60 MG: 60 TABLET, FILM COATED ORAL at 10:55

## 2022-01-01 RX ADMIN — LIDOCAINE 1 PATCH: 50 PATCH TOPICAL at 10:53

## 2022-01-01 RX ADMIN — DILTIAZEM HYDROCHLORIDE 60 MG: 60 TABLET, FILM COATED ORAL at 22:11

## 2022-01-01 RX ADMIN — Medication 12.5 MG: at 17:05

## 2022-01-01 RX ADMIN — APIXABAN 2.5 MG: 2.5 TABLET, FILM COATED ORAL at 21:15

## 2022-01-01 RX ADMIN — DEXTROSE, SODIUM CHLORIDE, AND POTASSIUM CHLORIDE 75 ML/HR: 5; .45; .15 INJECTION INTRAVENOUS at 14:45

## 2022-01-01 RX ADMIN — OXYBUTYNIN CHLORIDE 5 MG: 5 TABLET ORAL at 09:11

## 2022-01-01 RX ADMIN — PANTOPRAZOLE SODIUM 40 MG: 40 TABLET, DELAYED RELEASE ORAL at 06:44

## 2022-01-01 RX ADMIN — DOCUSATE SODIUM AND SENNOSIDES 1 TABLET: 8.6; 5 TABLET ORAL at 08:15

## 2022-01-01 RX ADMIN — METOPROLOL TARTRATE 25 MG: 25 TABLET, FILM COATED ORAL at 17:43

## 2022-01-01 RX ADMIN — METOPROLOL TARTRATE 25 MG: 25 TABLET, FILM COATED ORAL at 17:01

## 2022-01-01 RX ADMIN — ACETAMINOPHEN 975 MG: 325 TABLET ORAL at 05:51

## 2022-01-01 RX ADMIN — APIXABAN 2.5 MG: 2.5 TABLET, FILM COATED ORAL at 18:03

## 2022-01-01 RX ADMIN — ACETAMINOPHEN 975 MG: 325 TABLET ORAL at 22:09

## 2022-01-01 RX ADMIN — METOPROLOL TARTRATE 50 MG: 50 TABLET, FILM COATED ORAL at 17:22

## 2022-01-01 RX ADMIN — ASPIRIN 81 MG: 81 TABLET, COATED ORAL at 09:00

## 2022-01-01 RX ADMIN — OXYBUTYNIN CHLORIDE 5 MG: 5 TABLET ORAL at 16:18

## 2022-01-01 RX ADMIN — DEXAMETHASONE SODIUM PHOSPHATE 6 MG: 4 INJECTION, SOLUTION INTRAMUSCULAR; INTRAVENOUS at 10:09

## 2022-01-01 RX ADMIN — APIXABAN 2.5 MG: 2.5 TABLET, FILM COATED ORAL at 17:22

## 2022-01-01 RX ADMIN — OXYBUTYNIN CHLORIDE 5 MG: 5 TABLET ORAL at 21:14

## 2022-01-01 RX ADMIN — APIXABAN 2.5 MG: 2.5 TABLET, FILM COATED ORAL at 08:21

## 2022-01-01 RX ADMIN — DILTIAZEM HYDROCHLORIDE 60 MG: 60 TABLET, FILM COATED ORAL at 09:12

## 2022-01-01 RX ADMIN — DILTIAZEM HYDROCHLORIDE 60 MG: 60 TABLET, FILM COATED ORAL at 14:00

## 2022-01-01 RX ADMIN — FUROSEMIDE 40 MG: 40 TABLET ORAL at 16:20

## 2022-01-01 RX ADMIN — OXYBUTYNIN CHLORIDE 5 MG: 5 TABLET ORAL at 08:31

## 2022-01-01 RX ADMIN — ASPIRIN 81 MG: 81 TABLET, COATED ORAL at 09:01

## 2022-01-01 RX ADMIN — FENTANYL CITRATE 25 MCG: 50 INJECTION, SOLUTION INTRAMUSCULAR; INTRAVENOUS at 17:00

## 2022-01-01 RX ADMIN — FUROSEMIDE 40 MG: 10 INJECTION, SOLUTION INTRAMUSCULAR; INTRAVENOUS at 17:20

## 2022-01-01 RX ADMIN — FUROSEMIDE 40 MG: 40 TABLET ORAL at 09:12

## 2022-01-01 RX ADMIN — POTASSIUM CHLORIDE 40 MEQ: 750 TABLET, EXTENDED RELEASE ORAL at 08:36

## 2022-01-01 RX ADMIN — PANTOPRAZOLE SODIUM 20 MG: 20 TABLET, DELAYED RELEASE ORAL at 06:37

## 2022-01-01 RX ADMIN — ATORVASTATIN CALCIUM 40 MG: 40 TABLET, FILM COATED ORAL at 17:23

## 2022-01-01 RX ADMIN — Medication 6 MG: at 00:40

## 2022-01-01 RX ADMIN — APIXABAN 2.5 MG: 2.5 TABLET, FILM COATED ORAL at 08:33

## 2022-01-01 RX ADMIN — HYDROXYZINE HYDROCHLORIDE 25 MG: 25 TABLET ORAL at 17:22

## 2022-01-01 RX ADMIN — SODIUM PHOSPHATE, DIBASIC AND SODIUM PHOSPHATE, MONOBASIC 1 ENEMA: 7; 19 ENEMA RECTAL at 20:19

## 2022-01-01 RX ADMIN — ACETAMINOPHEN 975 MG: 325 TABLET ORAL at 14:09

## 2022-01-01 RX ADMIN — KETOROLAC TROMETHAMINE 15 MG: 30 INJECTION, SOLUTION INTRAMUSCULAR at 14:30

## 2022-01-01 RX ADMIN — POTASSIUM CHLORIDE 40 MEQ: 1500 TABLET, EXTENDED RELEASE ORAL at 08:25

## 2022-01-01 RX ADMIN — DILTIAZEM HYDROCHLORIDE 60 MG: 60 TABLET, FILM COATED ORAL at 10:34

## 2022-01-01 RX ADMIN — Medication 5 MG/HR: at 06:42

## 2022-01-01 RX ADMIN — HYDROXYZINE HYDROCHLORIDE 25 MG: 25 TABLET ORAL at 08:31

## 2022-01-01 RX ADMIN — ACETAMINOPHEN 975 MG: 325 TABLET ORAL at 21:45

## 2022-01-01 RX ADMIN — ACETAMINOPHEN 975 MG: 325 TABLET ORAL at 14:54

## 2022-01-01 RX ADMIN — ATORVASTATIN CALCIUM 40 MG: 40 TABLET, FILM COATED ORAL at 16:10

## 2022-01-01 RX ADMIN — OXYBUTYNIN CHLORIDE 5 MG: 5 TABLET ORAL at 09:00

## 2022-01-01 RX ADMIN — ASPIRIN 81 MG: 81 TABLET, COATED ORAL at 10:55

## 2022-01-01 RX ADMIN — ACETAMINOPHEN 975 MG: 325 TABLET ORAL at 05:20

## 2022-01-01 RX ADMIN — APIXABAN 2.5 MG: 2.5 TABLET, FILM COATED ORAL at 09:00

## 2022-01-01 RX ADMIN — FUROSEMIDE 40 MG: 40 TABLET ORAL at 12:20

## 2022-01-01 RX ADMIN — DILTIAZEM HYDROCHLORIDE 30 MG: 30 TABLET, FILM COATED ORAL at 21:15

## 2022-01-01 RX ADMIN — APIXABAN 2.5 MG: 2.5 TABLET, FILM COATED ORAL at 17:31

## 2022-01-01 RX ADMIN — SERTRALINE HYDROCHLORIDE 75 MG: 50 TABLET ORAL at 09:11

## 2022-01-01 RX ADMIN — OXYBUTYNIN CHLORIDE 5 MG: 5 TABLET ORAL at 22:10

## 2022-01-01 RX ADMIN — APIXABAN 2.5 MG: 2.5 TABLET, FILM COATED ORAL at 18:35

## 2022-01-01 RX ADMIN — ACETAMINOPHEN 975 MG: 325 TABLET ORAL at 05:49

## 2022-01-01 RX ADMIN — OXYBUTYNIN CHLORIDE 5 MG: 5 TABLET ORAL at 10:47

## 2022-01-01 RX ADMIN — PANTOPRAZOLE SODIUM 40 MG: 40 TABLET, DELAYED RELEASE ORAL at 05:55

## 2022-01-01 RX ADMIN — OXYBUTYNIN CHLORIDE 5 MG: 5 TABLET ORAL at 17:23

## 2022-01-01 RX ADMIN — HYDROXYZINE HYDROCHLORIDE 25 MG: 25 TABLET ORAL at 09:01

## 2022-01-01 RX ADMIN — ACETAMINOPHEN 975 MG: 325 TABLET ORAL at 14:47

## 2022-01-01 RX ADMIN — SERTRALINE HYDROCHLORIDE 75 MG: 50 TABLET ORAL at 08:32

## 2022-01-01 RX ADMIN — LIDOCAINE 1 PATCH: 50 PATCH TOPICAL at 08:30

## 2022-01-01 RX ADMIN — DILTIAZEM HYDROCHLORIDE 60 MG: 60 TABLET, FILM COATED ORAL at 16:18

## 2022-01-01 RX ADMIN — HEPARIN SODIUM 5000 UNITS: 5000 INJECTION INTRAVENOUS; SUBCUTANEOUS at 17:40

## 2022-01-01 RX ADMIN — SERTRALINE HYDROCHLORIDE 75 MG: 50 TABLET ORAL at 10:55

## 2022-01-01 RX ADMIN — Medication 12.5 MG: at 18:07

## 2022-01-01 RX ADMIN — APIXABAN 2.5 MG: 2.5 TABLET, FILM COATED ORAL at 11:12

## 2022-01-01 RX ADMIN — ACETAMINOPHEN 650 MG: 325 TABLET ORAL at 22:19

## 2022-01-01 RX ADMIN — ATORVASTATIN CALCIUM 40 MG: 40 TABLET, FILM COATED ORAL at 16:00

## 2022-01-01 RX ADMIN — PANTOPRAZOLE SODIUM 40 MG: 40 TABLET, DELAYED RELEASE ORAL at 05:49

## 2022-01-01 RX ADMIN — ACETAMINOPHEN 650 MG: 325 TABLET ORAL at 13:59

## 2022-01-01 RX ADMIN — LIDOCAINE 1 PATCH: 50 PATCH TOPICAL at 09:00

## 2022-01-01 RX ADMIN — ASPIRIN 81 MG: 81 TABLET, COATED ORAL at 14:35

## 2022-01-01 RX ADMIN — HYDROXYZINE HYDROCHLORIDE 25 MG: 25 TABLET ORAL at 17:43

## 2022-01-01 RX ADMIN — LIDOCAINE 1 PATCH: 50 PATCH TOPICAL at 09:14

## 2022-01-01 RX ADMIN — OXYBUTYNIN CHLORIDE 5 MG: 5 TABLET ORAL at 09:13

## 2022-01-01 RX ADMIN — ATORVASTATIN CALCIUM 40 MG: 40 TABLET, FILM COATED ORAL at 17:01

## 2022-01-01 RX ADMIN — HYDROXYZINE HYDROCHLORIDE 25 MG: 25 TABLET ORAL at 10:56

## 2022-01-01 RX ADMIN — ALPRAZOLAM 0.25 MG: 0.25 TABLET ORAL at 06:10

## 2022-01-01 RX ADMIN — DILTIAZEM HYDROCHLORIDE 30 MG: 30 TABLET, FILM COATED ORAL at 14:35

## 2022-01-01 RX ADMIN — OXYCODONE HYDROCHLORIDE 2.5 MG: 5 TABLET ORAL at 10:38

## 2022-01-01 RX ADMIN — METOPROLOL TARTRATE 25 MG: 25 TABLET, FILM COATED ORAL at 08:25

## 2022-01-01 RX ADMIN — ALPRAZOLAM 0.25 MG: 0.25 TABLET ORAL at 23:00

## 2022-01-01 RX ADMIN — METOPROLOL TARTRATE 25 MG: 25 TABLET, FILM COATED ORAL at 09:00

## 2022-01-01 RX ADMIN — DOCUSATE SODIUM AND SENNOSIDES 1 TABLET: 8.6; 5 TABLET ORAL at 08:21

## 2022-01-01 RX ADMIN — HYDROXYZINE HYDROCHLORIDE 25 MG: 25 TABLET ORAL at 18:35

## 2022-01-01 RX ADMIN — SERTRALINE HYDROCHLORIDE 75 MG: 50 TABLET ORAL at 10:46

## 2022-01-01 RX ADMIN — APIXABAN 2.5 MG: 2.5 TABLET, FILM COATED ORAL at 08:25

## 2022-01-01 RX ADMIN — OXYBUTYNIN CHLORIDE 5 MG: 5 TABLET ORAL at 21:45

## 2022-01-01 RX ADMIN — ALPRAZOLAM 0.25 MG: 0.25 TABLET ORAL at 21:45

## 2022-01-01 RX ADMIN — METOPROLOL TARTRATE 25 MG: 25 TABLET, FILM COATED ORAL at 20:19

## 2022-01-01 RX ADMIN — POLYETHYLENE GLYCOL 3350 17 G: 17 POWDER, FOR SOLUTION ORAL at 11:36

## 2022-01-01 RX ADMIN — HYDROXYZINE HYDROCHLORIDE 25 MG: 25 TABLET ORAL at 17:42

## 2022-01-01 RX ADMIN — APIXABAN 2.5 MG: 2.5 TABLET, FILM COATED ORAL at 17:42

## 2022-01-01 RX ADMIN — DILTIAZEM HYDROCHLORIDE 60 MG: 60 TABLET, FILM COATED ORAL at 00:02

## 2022-01-01 RX ADMIN — FUROSEMIDE 40 MG: 40 TABLET ORAL at 06:26

## 2022-01-01 RX ADMIN — SODIUM CHLORIDE, SODIUM GLUCONATE, SODIUM ACETATE, POTASSIUM CHLORIDE, MAGNESIUM CHLORIDE, SODIUM PHOSPHATE, DIBASIC, AND POTASSIUM PHOSPHATE 75 ML/HR: .53; .5; .37; .037; .03; .012; .00082 INJECTION, SOLUTION INTRAVENOUS at 16:07

## 2022-01-01 RX ADMIN — POTASSIUM CHLORIDE 40 MEQ: 1500 TABLET, EXTENDED RELEASE ORAL at 16:20

## 2022-01-01 RX ADMIN — POTASSIUM CHLORIDE 40 MEQ: 750 TABLET, EXTENDED RELEASE ORAL at 10:34

## 2022-01-01 RX ADMIN — ACETAMINOPHEN 975 MG: 325 TABLET ORAL at 21:13

## 2022-01-01 RX ADMIN — POTASSIUM CHLORIDE 20 MEQ: 14.9 INJECTION, SOLUTION INTRAVENOUS at 09:07

## 2022-01-01 RX ADMIN — OXYBUTYNIN CHLORIDE 5 MG: 5 TABLET ORAL at 17:00

## 2022-01-01 RX ADMIN — ACETAMINOPHEN 975 MG: 325 TABLET ORAL at 00:47

## 2022-01-01 RX ADMIN — DILTIAZEM HYDROCHLORIDE 60 MG: 60 TABLET, FILM COATED ORAL at 09:00

## 2022-01-01 RX ADMIN — Medication 6 MG: at 22:10

## 2022-01-01 RX ADMIN — METOPROLOL TARTRATE 50 MG: 50 TABLET, FILM COATED ORAL at 10:56

## 2022-01-01 RX ADMIN — POTASSIUM CHLORIDE 20 MEQ: 1500 TABLET, EXTENDED RELEASE ORAL at 09:11

## 2022-01-01 RX ADMIN — METOPROLOL TARTRATE 25 MG: 25 TABLET, FILM COATED ORAL at 09:11

## 2022-01-01 RX ADMIN — ATORVASTATIN CALCIUM 40 MG: 40 TABLET, FILM COATED ORAL at 16:18

## 2022-01-01 RX ADMIN — APIXABAN 2.5 MG: 2.5 TABLET, FILM COATED ORAL at 17:20

## 2022-01-01 RX ADMIN — IOHEXOL 70 ML: 350 INJECTION, SOLUTION INTRAVENOUS at 20:44

## 2022-01-01 RX ADMIN — ATORVASTATIN CALCIUM 40 MG: 40 TABLET, FILM COATED ORAL at 16:28

## 2022-01-01 RX ADMIN — POTASSIUM CHLORIDE 20 MEQ: 1500 TABLET, EXTENDED RELEASE ORAL at 10:55

## 2022-01-01 RX ADMIN — DILTIAZEM HYDROCHLORIDE 60 MG: 60 TABLET, FILM COATED ORAL at 17:39

## 2022-01-01 RX ADMIN — HYDROXYZINE HYDROCHLORIDE 25 MG: 25 TABLET ORAL at 17:01

## 2022-01-01 RX ADMIN — METOPROLOL TARTRATE 25 MG: 25 TABLET, FILM COATED ORAL at 09:01

## 2022-01-01 RX ADMIN — DILTIAZEM HYDROCHLORIDE 60 MG: 60 TABLET, FILM COATED ORAL at 17:42

## 2022-01-01 RX ADMIN — OXYCODONE HYDROCHLORIDE 2.5 MG: 5 TABLET ORAL at 03:24

## 2022-01-01 RX ADMIN — ACETAMINOPHEN 975 MG: 325 TABLET ORAL at 13:47

## 2022-01-01 RX ADMIN — POLYETHYLENE GLYCOL 3350 17 G: 17 POWDER, FOR SOLUTION ORAL at 08:16

## 2022-01-01 RX ADMIN — SERTRALINE HYDROCHLORIDE 75 MG: 50 TABLET ORAL at 09:13

## 2022-01-01 RX ADMIN — APIXABAN 2.5 MG: 2.5 TABLET, FILM COATED ORAL at 18:27

## 2022-01-01 RX ADMIN — DILTIAZEM HYDROCHLORIDE 60 MG: 60 TABLET, FILM COATED ORAL at 13:46

## 2022-01-01 RX ADMIN — DILTIAZEM HYDROCHLORIDE 60 MG: 60 TABLET, FILM COATED ORAL at 23:00

## 2022-01-01 RX ADMIN — DEXTROSE, SODIUM CHLORIDE, AND POTASSIUM CHLORIDE 50 ML/HR: 5; .45; .15 INJECTION INTRAVENOUS at 22:14

## 2022-01-01 RX ADMIN — DEXTROSE, SODIUM CHLORIDE, AND POTASSIUM CHLORIDE 75 ML/HR: 5; .45; .15 INJECTION INTRAVENOUS at 19:54

## 2022-01-01 RX ADMIN — METOPROLOL TARTRATE 25 MG: 25 TABLET, FILM COATED ORAL at 22:10

## 2022-01-01 RX ADMIN — DILTIAZEM HYDROCHLORIDE 60 MG: 60 TABLET, FILM COATED ORAL at 16:28

## 2022-01-01 RX ADMIN — ACETAMINOPHEN 975 MG: 325 TABLET ORAL at 06:39

## 2022-01-01 RX ADMIN — METOPROLOL TARTRATE 25 MG: 25 TABLET, FILM COATED ORAL at 17:40

## 2022-01-01 RX ADMIN — HYDROXYZINE HYDROCHLORIDE 25 MG: 25 TABLET ORAL at 17:27

## 2022-01-01 RX ADMIN — DEXAMETHASONE 6 MG: 4 TABLET ORAL at 12:21

## 2022-01-01 RX ADMIN — DILTIAZEM HYDROCHLORIDE 90 MG: 30 TABLET, FILM COATED ORAL at 14:55

## 2022-01-01 RX ADMIN — APIXABAN 2.5 MG: 2.5 TABLET, FILM COATED ORAL at 09:12

## 2022-01-01 RX ADMIN — POLYETHYLENE GLYCOL 3350 17 G: 17 POWDER, FOR SOLUTION ORAL at 08:24

## 2022-01-01 RX ADMIN — DILTIAZEM HYDROCHLORIDE 60 MG: 60 TABLET, FILM COATED ORAL at 17:00

## 2022-01-01 RX ADMIN — IOHEXOL 65 ML: 350 INJECTION, SOLUTION INTRAVENOUS at 09:39

## 2022-01-01 RX ADMIN — DILTIAZEM HYDROCHLORIDE 30 MG: 30 TABLET, FILM COATED ORAL at 06:37

## 2022-01-01 RX ADMIN — HYDROXYZINE HYDROCHLORIDE 25 MG: 25 TABLET ORAL at 09:12

## 2022-01-01 RX ADMIN — PANTOPRAZOLE SODIUM 40 MG: 40 TABLET, DELAYED RELEASE ORAL at 05:20

## 2022-01-01 RX ADMIN — OXYBUTYNIN CHLORIDE 5 MG: 5 TABLET ORAL at 17:42

## 2022-01-01 RX ADMIN — APIXABAN 2.5 MG: 2.5 TABLET, FILM COATED ORAL at 17:43

## 2022-01-01 RX ADMIN — APIXABAN 2.5 MG: 2.5 TABLET, FILM COATED ORAL at 09:01

## 2022-01-01 RX ADMIN — SERTRALINE HYDROCHLORIDE 75 MG: 50 TABLET ORAL at 09:00

## 2022-01-01 RX ADMIN — SODIUM CHLORIDE 500 ML: 0.9 INJECTION, SOLUTION INTRAVENOUS at 23:10

## 2022-01-01 RX ADMIN — APIXABAN 2.5 MG: 2.5 TABLET, FILM COATED ORAL at 08:15

## 2022-01-01 RX ADMIN — ACETAMINOPHEN 975 MG: 325 TABLET ORAL at 13:42

## 2022-01-01 RX ADMIN — FUROSEMIDE 40 MG: 10 INJECTION, SOLUTION INTRAMUSCULAR; INTRAVENOUS at 08:36

## 2022-01-01 RX ADMIN — APIXABAN 2.5 MG: 2.5 TABLET, FILM COATED ORAL at 17:01

## 2022-01-01 RX ADMIN — POTASSIUM CHLORIDE 20 MEQ: 1500 TABLET, EXTENDED RELEASE ORAL at 09:10

## 2022-01-01 RX ADMIN — DILTIAZEM HYDROCHLORIDE 30 MG: 30 TABLET, FILM COATED ORAL at 05:57

## 2022-01-01 RX ADMIN — ASPIRIN 81 MG: 81 TABLET, COATED ORAL at 10:47

## 2022-01-01 RX ADMIN — SERTRALINE HYDROCHLORIDE 75 MG: 50 TABLET ORAL at 09:01

## 2022-01-01 RX ADMIN — LIDOCAINE 1 PATCH: 50 PATCH TOPICAL at 08:45

## 2022-01-01 RX ADMIN — DILTIAZEM HYDROCHLORIDE 60 MG: 60 TABLET, FILM COATED ORAL at 16:00

## 2022-01-01 RX ADMIN — APIXABAN 2.5 MG: 2.5 TABLET, FILM COATED ORAL at 17:27

## 2022-01-01 RX ADMIN — POTASSIUM CHLORIDE 40 MEQ: 1500 TABLET, EXTENDED RELEASE ORAL at 09:00

## 2022-01-01 RX ADMIN — DILTIAZEM HYDROCHLORIDE 60 MG: 60 TABLET, FILM COATED ORAL at 06:10

## 2022-01-01 RX ADMIN — OXYBUTYNIN CHLORIDE 5 MG: 5 TABLET ORAL at 21:43

## 2022-01-01 RX ADMIN — POTASSIUM CHLORIDE 40 MEQ: 1500 TABLET, EXTENDED RELEASE ORAL at 08:22

## 2022-01-01 RX ADMIN — FUROSEMIDE 40 MG: 10 INJECTION, SOLUTION INTRAMUSCULAR; INTRAVENOUS at 14:17

## 2022-01-01 RX ADMIN — OXYCODONE HYDROCHLORIDE 2.5 MG: 5 TABLET ORAL at 03:08

## 2022-01-01 RX ADMIN — POLYETHYLENE GLYCOL 3350 17 G: 17 POWDER, FOR SOLUTION ORAL at 08:21

## 2022-01-01 RX ADMIN — DILTIAZEM HYDROCHLORIDE 60 MG: 60 TABLET, FILM COATED ORAL at 17:23

## 2022-01-01 RX ADMIN — ATORVASTATIN CALCIUM 40 MG: 40 TABLET, FILM COATED ORAL at 17:41

## 2022-01-01 RX ADMIN — HYDROXYZINE HYDROCHLORIDE 25 MG: 25 TABLET ORAL at 10:47

## 2022-01-01 RX ADMIN — APIXABAN 2.5 MG: 2.5 TABLET, FILM COATED ORAL at 10:47

## 2022-01-01 RX ADMIN — POTASSIUM CHLORIDE 20 MEQ: 1500 TABLET, EXTENDED RELEASE ORAL at 10:47

## 2022-01-01 RX ADMIN — HEPARIN SODIUM 18 UNITS/KG/HR: 10000 INJECTION, SOLUTION INTRAVENOUS at 09:11

## 2022-01-01 RX ADMIN — OXYBUTYNIN CHLORIDE 5 MG: 5 TABLET ORAL at 22:00

## 2022-01-01 RX ADMIN — PANTOPRAZOLE SODIUM 40 MG: 40 TABLET, DELAYED RELEASE ORAL at 06:39

## 2022-04-02 PROBLEM — I48.91 ATRIAL FIBRILLATION WITH RVR (HCC): Status: ACTIVE | Noted: 2022-01-01

## 2022-04-02 PROBLEM — R65.10 SIRS (SYSTEMIC INFLAMMATORY RESPONSE SYNDROME) (HCC): Status: ACTIVE | Noted: 2022-01-01

## 2022-04-02 PROBLEM — J96.20 ACUTE ON CHRONIC RESPIRATORY FAILURE (HCC): Status: RESOLVED | Noted: 2020-02-29 | Resolved: 2022-01-01

## 2022-04-02 NOTE — OCCUPATIONAL THERAPY NOTE
Occupational Therapy Evaluation      Margret Ashanti    4/2/2022    Principal Problem:    Acute on chronic diastolic heart failure ([facility] )  Active Problems:    Atrial fibrillation with RVR ([facility])    SIRS (systemic inflammatory response syndrome) ([facility])      Past Medical History:   Diagnosis Date    Acute on chronic diastolic congestive heart failure ([facility] ) 6/27/2019    Ambulatory dysfunction 8/17/2020    Arthritis     Chest wall contusion, right, initial encounter 8/17/2020    Chest wall contusion, right, subsequent encounter 8/17/2020    Chronic diastolic heart failure ([facility])     Chronic kidney disease, stage 2 (mild)     Coronary artery disease     history of stenting    Eczema     years    Edema     History of echocardiogram 07/20/2017    EF 55%, mild concentric LVH, bileaflet MVP with moderate MR, left atrial enlargement   History of transfusion     Hyperlipidemia     Hypertension     Hypo-osmolality and hyponatremia     Hypokalemia 7/11/2019    Mitral regurgitation        Past Surgical History:   Procedure Laterality Date    ABDOMINAL SURGERY      hysterectomy    CARDIAC CATHETERIZATION  04/10/2012    EF 65%, no significant CAD, patent stents, severe MR     CORONARY ANGIOPLASTY WITH STENT PLACEMENT  03/21/2007    EF 55%, GARRET to LAD   EYE SURGERY      b/l cataracts    HYSTERECTOMY      JOINT REPLACEMENT      Rt knee        04/02/22 0957   OT Last Visit   OT Visit Date 04/02/22   Note Type   Note type Evaluation   Restrictions/Precautions   Weight Bearing Precautions Per Order No   Other Precautions Fall Risk;Multiple lines;O2;Telemetry  (1 5 L O2 via NC)   Pain Assessment   Pain Assessment Tool 0-10   Pain Score 8   Pain Location/Orientation Orientation: Bilateral;Location: Leg   Pain Onset/Description Onset: Ongoing;Frequency: Constant/Continuous; Descriptor: Esaunita Duran always have pain")   Patient's Stated Pain Goal No pain   Hospital Pain Intervention(s) Repositioned; Emotional support Home Living   Type of [address] [place] [address] Two level;Performs ADLs on one level; Able to live on main level with bedroom/bathroom; Ramped entrance  (1st floor setup)   Bathroom Shower/Tub   (pt sponge bathes)   [address] Walker;Cane;Wheelchair-manual  (pt uses RW at baseline)   Additional Comments pt reports bed on 1st floor   Prior Function   Level of [place] Needs assistance with ADLs and functional mobility; Needs assistance with IADLs   Lives With Son  (2 sons)   Receives Help From Family   ADL Assistance Needs assistance  (pt reports normal (I) ADLs, recent needing assistance)   IADLs Needs assistance   Falls in the last 6 months 0   Comments pt does not drive, pt's family provides transportation   ADL   UB Bathing Assistance 5  Supervision/Setup   LB Bathing Assistance 3  Moderate Assistance   [address]  [address] 2  Maximal Assistance   Bed Mobility   Supine to Sit 5  Supervision   Additional items Assist x 1; Increased time required;Verbal cues   Sit to Supine 4  Minimal assistance   Additional items Assist x 1; Increased time required;Verbal cues;LE management   Additional Comments BP pre mobility = 127/75, HR at rest = 113 bpm, SpO2 on1 5 L = 93%   Transfers   Sit to Stand 3  Moderate assistance   Additional items Assist x 2; Increased time required;Verbal cues   Stand to Sit 3  Moderate assistance   Additional items Assist x 2; Increased time required;Verbal cues   Stand pivot Unable to assess  (Pt c/o increased pain in B LEs with standing)   Balance   Static Sitting Good   Dynamic Sitting Fair +   Static Standing Fair -   Dynamic Standing Poor +   Activity Tolerance   Activity Tolerance Patient limited by fatigue;Patient limited by pain   Nurse Made Aware MARY Ramirez   [place] Assessment   RUE Assessment WFL   LUE Assessment   LUE Assessment WFL   Hand Function   Gross Motor Coordination Functional   Fine Motor Coordination Functional   Vision-Basic Assessment   Current Vision Does not wear glasses   Cognition   Overall Cognitive Status WFL   Arousal/Participation Alert; Cooperative   Attention Attends with cues to redirect   Orientation Level Oriented X4   Memory Decreased recall of precautions   Following Commands Follows one step commands without difficulty   Assessment   Limitation Decreased ADL status; Decreased endurance;Decreased self-care trans;Decreased high-level ADLs; Decreased Safe judgement during ADL   Prognosis Good   Assessment Pt is a 81 y o  female seen for OT evaluation s/p admit to [address] on 4/2/2022 w/ Acute on chronic diastolic heart failure ([facility] )  Comorbidities affecting pt's functional performance at time of assessment include: CHF, Ambulatory dysfunction, Arthritis, CKD, CAD, HLD, HTN  Personal factors affecting pt at time of IE include:difficulty performing ADLS  Prior to admission, pt was Mod I with ADLs  Upon evaluation: the following deficits impact occupational performance: decreased balance, decreased tolerance and decreased safety awareness  Pt to benefit from continued skilled OT tx while in the hospital to address deficits as defined above and maximize level of functional independence w ADL's and functional mobility  Occupational Performance areas to address include: bathing/shower, toilet hygiene, dressing, functional mobility and clothing management  From OT standpoint, recommendation at time of d/c would be STR  Goals   Patient Goals To have less pain   Plan   Treatment Interventions ADL retraining;Functional transfer training; Endurance training;Equipment evaluation/education; Neuromuscular reeducation; Compensatory technique education; Energy conservation; Activityengagement   Goal Expiration Date 04/12/22   OT Treatment Day 0   OT Frequency 3-5x/wk   Recommendation   OT Discharge Recommendation Post acute rehabilitation services   OT - OK to Discharge Yes  (when medically stable) Additional Comments  Pt seen as a co-eval with PT due to the patient's co-morbidities, clinically unstable presentation, and present impairments which are a regression from the patient's baseline  Additional Comments 2 The patient's raw score on the AM-PAC Daily Activity inpatient short form is 16, standardized score is 35 96, less than 39 4  Patients at this level are likely to benefit from discharge to post-acute rehabilitation services  Please refer to the recommendation of the Occupational Therapist for safe discharge planning     AM-PAC Daily Activity Inpatient   Lower Body Dressing 2   Bathing 2   Toileting 1   Upper Body Dressing 3   Grooming 4   Eating 4   Daily Activity Raw Score 16   Daily Activity Standardized Score (Calc for Raw Score >=11) 35 96   AM-PAC Applied Cognition Inpatient   Following a Speech/Presentation 4   Understanding Ordinary Conversation 4   Taking Medications 3   Remembering Where Things Are Placed or Put Away 3   Remembering List of 4-5 Errands 3   Taking Care of Complicated Tasks 3   Applied Cognition Raw Score 20   Applied Cognition Standardized Score 41 76     GOALS:    Pt will achieve the following within specified time frame: STG  Pt will achieve the following goals within 5 days    *ADL transfers with Mod (A) for inc'd independence with ADLs/purposeful tasks    *UB ADL with (S) for inc'd independence with self cares    *LB ADL with Mod (A) using AE prn for inc'd independence with self cares    *Toileting with Max (A) for clothing management and hygiene for return to PLOF with personal care    *Increase static stand balance to F and dyn stand balance to F- for inc'd safety with standing purposeful tasks    *Increase stand tolerance x3 m for inc'd tolerance with standing purposeful tasks    *Participate in 10m UE therex to increase overall stamina/activity tolerance for purposeful tasks    *Bed mobility- CGA for inc'd independence to manage own comfort and initiate EOB & OOB purposeful tasks    Pt will achieve the following within specified time frame: LTG  Pt will achieve the following goals within 10 days    *ADL transfers with Min (A) for inc'd independence with ADLs/purposeful tasks    *UB ADL with (I) for inc'd independence with self cares    *LB ADL with Min (A) using AE prn for inc'd independence with self cares    *Toileting with Mod (A) for clothing management and hygiene for return to OF with personal care    *Increase static stand balance to F+ and dyn stand balance to F for inc'd safety with standing purposeful tasks    *Increase stand tolerance x5 m for inc'd tolerance with standing purposeful tasks    *Bed mobility- (S) for inc'd independence to manage own comfort and initiate EOB & OOB purposeful tasks      Ferdinand Doan MS, OTR/L

## 2022-04-02 NOTE — ASSESSMENT & PLAN NOTE
Patient with heart rates between 130s to 140s at time of presentation  · EKG shows AFib with RVR, heart rate 135  LAD  T-wave inversion v1-v2  Similar prior EKGs  · Patient given IV Cardizem in ED  · Started on Cardizem gtt   · Patient unable to confirm home meds, please reach out to son today to confirm med list  Pt not sure if on oral Cardizem or metoprolol     · Continue Eliquis

## 2022-04-02 NOTE — H&P
Kel[facility] 46  H&P- [place] 1935, 81 y o  female MRN: [id number]  Unit/Bed#: ED 27 Encounter: [id number]  Primary Care Provider: Nancy Jerry MD   Date and time admitted to hospital: 4/2/2022  3:33 AM    * Acute on chronic diastolic heart failure [facility])  Assessment & Plan  Wt Readings from Last 3 Encounters:   07/12/21 49 4 kg (109 lb)   07/03/21 51 kg (112 lb 7 oz)   02/15/21 48 2 kg (106 lb 4 2 oz)     Chest x-ray shows pulmonary vascular congestion  · Lasix 40 mg on home med list  · Last echo 08/2020: LVEF 65 %  Mild mitral stenosis, moderate MR, mild AS  · BNP 3 590, troponin 9, negative delta  · Currently on 3 L supplemental O2, satting mid 90s  · Continue diuresis  · Monitor I/o, daily weight, fluid restriction    Atrial fibrillation with RVR ([address] )  Assessment & Plan  Patient with heart rates between 130s to 140s at time of presentation  · EKG shows AFib with RVR, heart rate 135  LAD  T-wave inversion v1-v2  Similar prior EKGs  · Patient given IV Cardizem in ED  · Started on Cardizem gtt   · Patient unable to confirm home meds, please reach out to son today to confirm med list  Pt not sure if on oral Cardizem or metoprolol  · Continue Eliquis    SIRS   · Tachycardia, tachypnea likely due to CHF exacerbation vs superimposed PNA as pt has productive cough  awaiting CXR read  · ceftriaxone 1g x1  · Check Procal    VTE Pharmacologic Prophylaxis: VTE Score: 6 High Risk (Score >/= 5) - Pharmacological DVT Prophylaxis Ordered: apixaban (Eliquis)  Sequential Compression Devices Ordered  Code Status: Prior     Anticipated Length of Stay: Patient will be admitted on an inpatient basis with an anticipated length of stay of greater than 2 midnights secondary to CHF exacerbation, atrial fibrillation      Total Time for Visit, including Counseling / Coordination of Care: 60 minutes Greater than 50% of this total time spent on direct patient counseling and coordination of care     Chief Complaint:   Chief Complaint   Patient presents with    Chest Pain     with nausea         History of Present Illness:  Analia Becerra is a 81 y o  female with a PMH of CHF, atrial fibrillation, CKD stage 2, CAD who presents with chest pain and x1 week  Patient reports that she has had chest pain, shortness a breath, fatigue and difficulty with ambulation due to dizziness x1 week  She does have history of atrial fibrillation and has had similar symptoms in the past with high heart rate  No swelling in LE  She has had chills without fevers  She has noticed known exposure to sick contacts  Patient unable to verify her med list, her son does her medication for her  Review of Systems:  Review of Systems   Constitutional: Positive for chills  Negative for fever  HENT: Negative for ear pain and sore throat  Eyes: Negative for pain  Respiratory: Positive for cough (productive) and shortness of breath  Cardiovascular: Positive for chest pain and palpitations  Gastrointestinal: Negative for abdominal pain and vomiting  Genitourinary: Negative for difficulty urinating and dysuria  Musculoskeletal: Negative for arthralgias and back pain  Skin: Negative for color change and rash  Neurological: Positive for dizziness  Negative for light-headedness  All other systems reviewed and are negative        Past Medical and Surgical History:   Past Medical History:   Diagnosis Date    Acute on chronic diastolic congestive heart failure ([facility] [address] ) 6/27/2019    Ambulatory dysfunction 8/17/2020    Arthritis     Chest wall contusion, right, initial encounter 8/17/2020    Chest wall contusion, right, subsequent encounter 8/17/2020    Chronic diastolic heart failure (HCC)     Chronic kidney disease, stage 2 (mild)     Coronary artery disease     history of stenting    Eczema     years    Edema     History of echocardiogram 07/20/2017    EF 55%, mild concentric LVH, bileaflet MVP with moderate MR, left atrial enlargement   History of transfusion     Hyperlipidemia     Hypertension     Hypo-osmolality and hyponatremia     Hypokalemia 7/11/2019    Mitral regurgitation        Past Surgical History:   Procedure Laterality Date    ABDOMINAL SURGERY      hysterectomy    CARDIAC CATHETERIZATION  04/10/2012    EF 65%, no significant CAD, patent stents, severe MR     CORONARY ANGIOPLASTY WITH STENT PLACEMENT  03/21/2007    EF 55%, GARRET to LAD   EYE SURGERY      b/l cataracts    HYSTERECTOMY      JOINT REPLACEMENT      Rt knee       Meds/Allergies:  Prior to Admission medications    Medication Sig Start Date End Date Taking?  Authorizing Provider   acetaminophen (TYLENOL) 325 mg tablet Take 3 tablets (975 mg total) by mouth every 8 (eight) hours 6/27/20   Leo Delgado MD   ALPRAZolam Brenda Curtis) 0 25 mg tablet Take 1 tablet (0 25 mg total) by mouth daily at bedtime as needed for anxiety for up to 10 days 8/21/20 8/31/20  Leo Delgado MD   apixaban (ELIQUIS) 2 5 mg Take 1 tablet (2 5 mg total) by mouth 2 (two) times a day 8/8/19   Cornelio Velasquez PA-C   bisacodyl (DULCOLAX) 5 mg EC tablet Take 1 tablet (5 mg total) by mouth daily as needed for constipation 6/27/20   Leo Delgado MD   diltiazem (CARDIZEM) 90 mg tablet Take 1 tablet (90 mg total) by mouth every 8 (eight) hours 7/20/20   Artemio Garner MD   furosemide (LASIX) 40 mg tablet Take 1 tablet (40 mg total) by mouth daily 7/3/21 9/1/21  NEAL Guillaume   hydrOXYzine HCL (ATARAX) 25 mg tablet Take 25 mg by mouth 2 (two) times a day    Historical Provider, MD   metoprolol tartrate (LOPRESSOR) 25 mg tablet Take 1 tablet (25 mg total) by mouth 2 (two) times a day 3/5/20   NEAL Gasca   omeprazole (PriLOSEC) 20 mg delayed release capsule Take 20 mg by mouth daily    Historical Provider, MD   oxybutynin (DITROPAN) 5 mg tablet Take 5 mg by mouth 3 (three) times a day     Historical Provider, MD   polyethylene glycol (MIRALAX) 17 g packet Take 17 g by mouth daily 6/27/20   Manish Armas MD   potassium chloride (K-DUR,KLOR-CON) 20 mEq tablet Take 1 tablet (20 mEq total) by mouth daily 7/4/21 8/3/21  NEAL Fuchs   sertraline (ZOLOFT) 50 mg tablet Take 75 mg by mouth daily     Historical Provider, MD     I have been unable to obtain / verify an up to date medication list despite all reasonable attempts  Allergies: Allergies   Allergen Reactions    Chocolate Flavor - Food Allergy Itching    Shellfish-Derived Products - Food Allergy Shortness Of Breath    Iodine - Food Allergy Other (See Comments)     shellfish- difficulty breathing    Codeine Rash       Social History:  Marital Status:    Occupation:   Patient Pre-hospital Living Situation: With other family member: son  Patient Pre-hospital Level of Mobility: walks  Patient Pre-hospital Diet Restrictions:   Substance Use History:   Social History     Substance and Sexual Activity   Alcohol Use Never    Alcohol/week: 0 0 standard drinks     Social History     Tobacco Use   Smoking Status Never Smoker   Smokeless Tobacco Never Used     Social History     Substance and Sexual Activity   Drug Use Never       Family History:  Family History   Problem Relation Age of Onset    Arthritis Mother     Cancer Mother     Heart disease Mother    Maurice Flower Hypertension Mother     Alcohol abuse Father     Arthritis Father     Birth defects Son     Hearing loss Son     Diabetes Daughter     Stroke Maternal Grandmother     Asthma Maternal Grandfather        Physical Exam:     Vitals:   Blood Pressure: 119/89 (04/02/22 0720)  Pulse: (!) 133 (04/02/22 0720)  Respirations: (!) 25 (04/02/22 0720)  SpO2: 93 % (04/02/22 0720)    Physical Exam  Vitals and nursing note reviewed  Constitutional:       General: She is not in acute distress  Appearance: She is well-developed  HENT:      Head: Normocephalic and atraumatic     Eyes:      Extraocular Movements: Extraocular movements intact  Conjunctiva/sclera: Conjunctivae normal    Cardiovascular:      Rate and Rhythm: Tachycardia present  Rhythm irregular  Heart sounds: No murmur heard  Pulmonary:      Effort: Pulmonary effort is normal  No respiratory distress  Breath sounds: Rales present  Abdominal:      Palpations: Abdomen is soft  Tenderness: There is no abdominal tenderness  Musculoskeletal:      Cervical back: Neck supple  Skin:     General: Skin is warm and dry  Neurological:      General: No focal deficit present  Mental Status: She is alert and oriented to person, place, and time  Additional Data:     Lab Results:  Results from last 7 days   Lab Units 04/02/22  0429   WBC Thousand/uL 6 97   HEMOGLOBIN g/dL 11 4*   HEMATOCRIT % 36 2   PLATELETS Thousands/uL 201   NEUTROS PCT % 80*   LYMPHS PCT % 8*   MONOS PCT % 10   EOS PCT % 0     Results from last 7 days   Lab Units 04/02/22  0429   SODIUM mmol/L 136   POTASSIUM mmol/L 4 3   CHLORIDE mmol/L 103   CO2 mmol/L 24   BUN mg/dL 19   CREATININE mg/dL 0 78   ANION GAP mmol/L 9   CALCIUM mg/dL 9 7   GLUCOSE RANDOM mg/dL 123                       Imaging: Personally reviewed the following imaging: chest xray  X-ray chest 1 view portable    (Results Pending)       EKG and Other Studies Reviewed on Admission:   · EKG: Atrial fibrillation    ** Please Note: This note has been constructed using a voice recognition system   **

## 2022-04-02 NOTE — ED PROVIDER NOTES
History  Chief Complaint   Patient presents with    Chest Pain     with nausea     This is an 81year-old female who complains of chest pain shortness of breath and nausea for the past day  States this feels similar to when she previously had pneumonia  She also reports she is having some chills and a marginally productive cough  States continuing to worsen  Nothing is making it feel better  She requires 2 L of oxygen at baseline while at home  She reports she is also having chest pain from substernal radiating up to the right shoulder  Rates the pain as moderate  Worse when taking deep breath  Prior to Admission Medications   Prescriptions Last Dose Informant Patient Reported? Taking?    ALPRAZolam (XANAX) 0 25 mg tablet   No No   Sig: Take 1 tablet (0 25 mg total) by mouth daily at bedtime as needed for anxiety for up to 10 days   acetaminophen (TYLENOL) 325 mg tablet   No No   Sig: Take 3 tablets (975 mg total) by mouth every 8 (eight) hours   apixaban (ELIQUIS) 2 5 mg   No No   Sig: Take 1 tablet (2 5 mg total) by mouth 2 (two) times a day   bisacodyl (DULCOLAX) 5 mg EC tablet   No No   Sig: Take 1 tablet (5 mg total) by mouth daily as needed for constipation   diltiazem (CARDIZEM) 90 mg tablet   No No   Sig: Take 1 tablet (90 mg total) by mouth every 8 (eight) hours   furosemide (LASIX) 40 mg tablet   No No   Sig: Take 1 tablet (40 mg total) by mouth daily   hydrOXYzine HCL (ATARAX) 25 mg tablet   Yes No   Sig: Take 25 mg by mouth 2 (two) times a day   metoprolol tartrate (LOPRESSOR) 25 mg tablet   No No   Sig: Take 1 tablet (25 mg total) by mouth 2 (two) times a day   omeprazole (PriLOSEC) 20 mg delayed release capsule   Yes No   Sig: Take 20 mg by mouth daily   oxybutynin (DITROPAN) 5 mg tablet   Yes No   Sig: Take 5 mg by mouth 3 (three) times a day    polyethylene glycol (MIRALAX) 17 g packet   No No   Sig: Take 17 g by mouth daily   potassium chloride (K-DUR,KLOR-CON) 20 mEq tablet   No No Sig: Take 1 tablet (20 mEq total) by mouth daily   sertraline (ZOLOFT) 50 mg tablet   Yes No   Sig: Take 75 mg by mouth daily       Facility-Administered Medications: None       Past Medical History:   Diagnosis Date    Acute on chronic diastolic congestive heart failure ([facility] [address] ) 6/27/2019    Ambulatory dysfunction 8/17/2020    Arthritis     Chest wall contusion, right, initial encounter 8/17/2020    Chest wall contusion, right, subsequent encounter 8/17/2020    Chronic diastolic heart failure (HCC)     Chronic kidney disease, stage 2 (mild)     Coronary artery disease     history of stenting    Eczema     years    Edema     History of echocardiogram 07/20/2017    EF 55%, mild concentric LVH, bileaflet MVP with moderate MR, left atrial enlargement   History of transfusion     Hyperlipidemia     Hypertension     Hypo-osmolality and hyponatremia     Hypokalemia 7/11/2019    Mitral regurgitation        Past Surgical History:   Procedure Laterality Date    ABDOMINAL SURGERY      hysterectomy    CARDIAC CATHETERIZATION  04/10/2012    EF 65%, no significant CAD, patent stents, severe MR     CORONARY ANGIOPLASTY WITH STENT PLACEMENT  03/21/2007    EF 55%, GARRET to LAD   EYE SURGERY      b/l cataracts    HYSTERECTOMY      JOINT REPLACEMENT      Rt knee       Family History   Problem Relation Age of Onset    Arthritis Mother     Cancer Mother     Heart disease Mother     Hypertension Mother     Alcohol abuse Father     Arthritis Father     Birth defects Son     Hearing loss Son     Diabetes Daughter     Stroke Maternal Grandmother     Asthma Maternal Grandfather      I have reviewed and agree with the history as documented      E-Cigarette/Vaping    E-Cigarette Use Never User      E-Cigarette/Vaping Substances    Nicotine No     THC No     CBD No     Flavoring No     Other No     Unknown No      Social History     Tobacco Use    Smoking status: Never Smoker    Smokeless tobacco: Never Used   Vaping Use    Vaping Use: Never used   Substance Use Topics    Alcohol use: Never     Alcohol/week: 0 0 standard drinks    Drug use: Never       Review of Systems   Constitutional: Positive for activity change, chills and fatigue  Negative for fever  HENT: Negative for congestion  Eyes: Negative for visual disturbance  Respiratory: Positive for cough and shortness of breath  Negative for chest tightness  Cardiovascular: Positive for chest pain and leg swelling  Negative for palpitations  Gastrointestinal: Negative for abdominal pain, diarrhea and vomiting  Genitourinary: Negative for dysuria  Skin: Negative for rash  Neurological: Negative for dizziness, weakness and numbness  Physical Exam  Physical Exam  Constitutional:       General: She is not in acute distress  Appearance: She is well-developed  She is not ill-appearing, toxic-appearing or diaphoretic  HENT:      Head: Normocephalic and atraumatic  Eyes:      Conjunctiva/sclera: Conjunctivae normal       Pupils: Pupils are equal, round, and reactive to light  Cardiovascular:      Rate and Rhythm: Normal rate and regular rhythm  Heart sounds: Normal heart sounds  Pulmonary:      Effort: Pulmonary effort is normal  No respiratory distress  Breath sounds: Normal breath sounds  Abdominal:      General: Bowel sounds are normal       Palpations: Abdomen is soft  Musculoskeletal:         General: Normal range of motion  Cervical back: Normal range of motion and neck supple  Skin:     General: Skin is warm and dry  Capillary Refill: Capillary refill takes less than 2 seconds  Neurological:      Mental Status: She is alert and oriented to person, place, and time     Psychiatric:         Behavior: Behavior normal          Vital Signs  ED Triage Vitals   Temperature Pulse Respirations Blood Pressure SpO2   04/02/22 0853 04/02/22 0334 04/02/22 0334 04/02/22 0334 04/02/22 0334   98 2 °F (36 8 °C) 77 20 139/81 91 %      Temp Source Heart Rate Source Patient Position - Orthostatic VS BP Location FiO2 (%)   04/02/22 1700 04/02/22 0334 04/02/22 0334 04/02/22 0334 --   Tympanic Monitor Lying Right arm       Pain Score       04/02/22 0334       7           Vitals:    04/02/22 1500 04/02/22 1700 04/02/22 1900 04/02/22 2206   BP: 126/79 134/74 108/63 104/69   Pulse: 95 104 93    Patient Position - Orthostatic VS:  Lying Lying          Visual Acuity      ED Medications  Medications   sodium chloride (PF) 0 9 % injection 3 mL (has no administration in time range)   diltiazem (CARDIZEM) 125 mg/25 mL injection **ADS Override Pull** (has no administration in time range)   apixaban (ELIQUIS) tablet 2 5 mg (2 5 mg Oral Given 4/2/22 1803)   furosemide (LASIX) injection 40 mg (40 mg Intravenous Given 4/2/22 1417)   diltiazem (CARDIZEM) tablet 90 mg (90 mg Oral Not Given 4/2/22 2206)   metoprolol tartrate (LOPRESSOR) tablet 25 mg (25 mg Oral Not Given 4/2/22 2207)   furosemide (LASIX) injection 40 mg (40 mg Intravenous Given 4/2/22 0629)   diltiazem (CARDIZEM) 125 mg in sodium chloride 0 9 % 125 mL infusion (0 mg/hr Intravenous Stopped 4/2/22 1803)       Diagnostic Studies  Results Reviewed     Procedure Component Value Units Date/Time    HS Troponin I 4hr [[id number]]  (Normal) Collected: 04/02/22 0918    Lab Status: Final result Specimen: Blood from Arm, Right Updated: 04/02/22 0958     hs TnI 4hr 11 ng/L      Delta 4hr hsTnI 2 ng/L     Procalcitonin [[id number]]  (Normal) Collected: 04/02/22 0429    Lab Status: Final result Specimen: Blood from Arm, Left Updated: 04/02/22 0826     Procalcitonin 0 08 ng/ml     HS Troponin I 2hr [[id number]]  (Normal) Collected: 04/02/22 0628    Lab Status: Final result Specimen: Blood from Arm, Right Updated: 04/02/22 0658     hs TnI 2hr 12 ng/L      Delta 2hr hsTnI 3 ng/L     COVID/FLU/RSV [[id number]]  (Normal) Collected: 04/02/22 0444    Lab Status: Final result Specimen: Nares from Nose Updated: 04/02/22 0528     SARS-CoV-2 Negative     INFLUENZA A PCR Negative     INFLUENZA B PCR Negative     RSV PCR Negative    Narrative:      FOR PEDIATRIC PATIENTS - copy/paste COVID Guidelines URL to browser: [email or web address]  [employer]    SARS-CoV-2 assay is a Nucleic Acid Amplification assay intended for the  qualitative detection of nucleic acid from SARS-CoV-2 in nasopharyngeal  swabs  Results are for the presumptive identification of SARS-CoV-2 RNA  Positive results are indicative of infection with SARS-CoV-2, the virus  causing COVID-19, but do not rule out bacterial infection or co-infection  with other viruses  Laboratories within the [place] and its  territories are required to report all positive results to the appropriate  public health authorities  Negative results do not preclude SARS-CoV-2  infection and should not be used as the sole basis for treatment or other  patient management decisions  Negative results must be combined with  clinical observations, patient history, and epidemiological information  This test has not been FDA cleared or approved  This test has been authorized by FDA under an Emergency Use Authorization  (EUA)  This test is only authorized for the duration of time the  declaration that circumstances exist justifying the authorization of the  emergency use of an in vitro diagnostic tests for detection of SARS-CoV-2  virus and/or diagnosis of COVID-19 infection under section 564(b)(1) of  the Act, 21 U  S C  621HOW-7(H)(9), unless the authorization is terminated  or revoked sooner  The test has been validated but independent review by FDA  and CLIA is pending  Test performed using [employer] Gene[employer]pert: This RT-PCR assay targets N2,  a region unique to SARS-CoV-2  A conserved region in the E-gene was chosen  for pan-Sarbecovirus detection which includes SARS-CoV-2      B-Type Natriuretic Peptide(BNP) [place], , [facility] Only [[id number]]  (Abnormal) Collected: 04/02/22 0429    Lab Status: Final result Specimen: Blood from Arm, Left Updated: 04/02/22 0500     BNP 3,590 pg/mL     HS Troponin 0hr (reflex protocol) [[id number]]  (Normal) Collected: 04/02/22 0429    Lab Status: Final result Specimen: Blood from Arm, Left Updated: 04/02/22 0457     hs TnI 0hr 9 ng/L     Basic metabolic panel [[id number]] Collected: 04/02/22 0429    Lab Status: Final result Specimen: Blood from Arm, Left Updated: 04/02/22 0449     Sodium 136 mmol/L      Potassium 4 3 mmol/L      Chloride 103 mmol/L      CO2 24 mmol/L      ANION GAP 9 mmol/L      BUN 19 mg/dL      Creatinine 0 78 mg/dL      Glucose 123 mg/dL      Calcium 9 7 mg/dL      eGFR 69 ml/min/1 73sq m     Narrative:      [place] guidelines for Chronic Kidney Disease (CKD):     Stage 1 with normal or high GFR (GFR > 90 mL/min/1 73 square meters)    Stage 2 Mild CKD (GFR = 60-89 mL/min/1 73 square meters)    Stage 3A Moderate CKD (GFR = 45-59 mL/min/1 73 square meters)    Stage 3B Moderate CKD (GFR = 30-44 mL/min/1 73 square meters)    Stage 4 Severe CKD (GFR = 15-29 mL/min/1 73 square meters)    Stage 5 End Stage CKD (GFR <15 mL/min/1 73 square meters)  Note: GFR calculation is accurate only with a steady state creatinine    CBC and differential [[id number]]  (Abnormal) Collected: 04/02/22 0429    Lab Status: Final result Specimen: Blood from Arm, Left Updated: 04/02/22 0435     WBC 6 97 Thousand/uL      RBC 4 04 Million/uL      Hemoglobin 11 4 g/dL      Hematocrit 36 2 %      MCV 90 fL      MCH 28 2 pg      MCHC 31 5 g/dL      RDW 17 0 %      MPV 9 5 fL      Platelets 858 Thousands/uL      nRBC 0 /100 WBCs      Neutrophils Relative 80 %      Immat GRANS % 1 %      Lymphocytes Relative 8 %      Monocytes Relative 10 %      Eosinophils Relative 0 %      Basophils Relative 1 %      Neutrophils Absolute 5 61 Thousands/µL      Immature Grans Absolute 0 04 Thousand/uL Lymphocytes Absolute 0 58 Thousands/µL      Monocytes Absolute 0 66 Thousand/µL      Eosinophils Absolute 0 02 Thousand/µL      Basophils Absolute 0 06 Thousands/µL                  X-ray chest 1 view portable   Final Result by Doris Jaquez MD (04/02 0740)      Mild pulmonary edema with moderate right effusion and right base atelectasis  Workstation performed: [id number]                    Procedures  Procedures         ED Course                                             MDM  Number of Diagnoses or Management Options  Acute exacerbation of CHF (congestive heart failure) ([place] [address] ): new and requires workup  Atrial fibrillation with RVR [facility]): new and requires workup  Chest pain, unspecified type: new and requires workup  Diagnosis management comments: This is an 81-year-old female with CHF exacerbation and AFib with RVR  Started on low-dose diltiazem  Given Lasix  She is clinically stable at the time of admission  States understanding agreement with plan         Amount and/or Complexity of Data Reviewed  Clinical lab tests: ordered and reviewed  Tests in the radiology section of CPT®: ordered and reviewed  Discuss the patient with other providers: yes  Independent visualization of images, tracings, or specimens: yes        Disposition  Final diagnoses:   Chest pain, unspecified type   Acute exacerbation of CHF (congestive heart failure) ([place] [address] )   Atrial fibrillation with RVR ([place] [address] )     Time reflects when diagnosis was documented in both MDM as applicable and the Disposition within this note     Time User Action Codes Description Comment    4/2/2022  6:18 AM Werner Skiff Add [R07 9] Chest pain, unspecified type     4/2/2022  6:18 AM Rodger Lawrenceff Add [I50 9] Acute exacerbation of CHF (congestive heart failure) ([place] [address] )     4/2/2022  6:18 AM Werner Skiff Add [I48 91] Atrial fibrillation with RVR [facility])       ED Disposition     ED Disposition Condition Date/Time Comment    Admit Stable Sat Apr 2, 2022  6:18 AM Case was discussed with delmi and the patient's admission status was agreed to be Admission Status: inpatient status to the service of Dr Xochitl Dowell   Follow-up Information    None         Current Discharge Medication List      CONTINUE these medications which have NOT CHANGED    Details   acetaminophen (TYLENOL) 325 mg tablet Take 3 tablets (975 mg total) by mouth every 8 (eight) hours  Qty: 30 tablet, Refills: 0    Associated Diagnoses: Rib fracture      ALPRAZolam (XANAX) 0 25 mg tablet Take 1 tablet (0 25 mg total) by mouth daily at bedtime as needed for anxiety for up to 10 days  Qty: 5 tablet, Refills: 0    Associated Diagnoses: Anxiety      apixaban (ELIQUIS) 2 5 mg Take 1 tablet (2 5 mg total) by mouth 2 (two) times a day  Qty: 180 tablet, Refills: 3    Associated Diagnoses: A-fib ([facility])      bisacodyl (DULCOLAX) 5 mg EC tablet Take 1 tablet (5 mg total) by mouth daily as needed for constipation  Qty: 30 tablet, Refills: 0    Associated Diagnoses: Constipation      diltiazem (CARDIZEM) 90 mg tablet Take 1 tablet (90 mg total) by mouth every 8 (eight) hours  Qty: 42 tablet, Refills: 0    Associated Diagnoses: Atrial fibrillation with RVR ([facility])      furosemide (LASIX) 40 mg tablet Take 1 tablet (40 mg total) by mouth daily  Qty: 60 tablet, Refills: 0    Associated Diagnoses: Mitral valve insufficiency, unspecified etiology;  Chronic diastolic heart failure (HCC)      hydrOXYzine HCL (ATARAX) 25 mg tablet Take 25 mg by mouth 2 (two) times a day      metoprolol tartrate (LOPRESSOR) 25 mg tablet Take 1 tablet (25 mg total) by mouth 2 (two) times a day  Qty: 60 tablet, Refills: 0    Associated Diagnoses: Essential hypertension      omeprazole (PriLOSEC) 20 mg delayed release capsule Take 20 mg by mouth daily      oxybutynin (DITROPAN) 5 mg tablet Take 5 mg by mouth 3 (three) times a day       polyethylene glycol (MIRALAX) 17 g packet Take 17 g by mouth daily  Qty: 14 each, Refills: 0 Associated Diagnoses: Constipation      potassium chloride (K-DUR,KLOR-CON) 20 mEq tablet Take 1 tablet (20 mEq total) by mouth daily  Qty: 30 tablet, Refills: 0    Associated Diagnoses: Acute on chronic diastolic heart failure (HCC)      sertraline (ZOLOFT) 50 mg tablet Take 75 mg by mouth daily              No discharge procedures on file      PDMP Review       Value Time User    PDMP Reviewed  Yes 3/5/2020  1:47 PM Gwendolyn Villatoro, [address]           ED Provider  Electronically Signed by           Arnold Ro MD  04/02/22 6503

## 2022-04-02 NOTE — PLAN OF CARE
Problem: PHYSICAL THERAPY ADULT  Goal: Performs mobility at highest level of function for planned discharge setting  See evaluation for individualized goals  Description: Treatment/Interventions: Functional transfer training,LE strengthening/ROM,Elevations,Therapeutic exercise,Endurance training,Patient/family training,Equipment eval/education,Bed mobility,Gait training,Spoke to nursing,Spoke to case management,OT  Equipment Recommended:  (TBD pending pt progress)       See flowsheet documentation for full assessment, interventions and recommendations  Note: Prognosis: Fair  Problem List: Decreased strength,Decreased endurance,Impaired balance,Decreased mobility,Impaired judgement,Pain  Assessment: Pt is 81 y o  female seen for high-complexity PT evaluation on 4/2/2022 s/p admit to [facility] 19 on 4/2/2022 w/ Acute on chronic diastolic heart failure ([facility] [address] ); pt presented to ED with chest pain, SOB, fatigue, difficulty amb x 1 wk  PT was consulted to assess pt's functional mobility and d/c needs  Order placed for PT eval and tx, w/ up as tolerated order  PTA, pt was independent w/ all functional mobility w/ RW, ambulates household distances, has + LOLA, lives w/ son in 2 level home with 1st floor setup and retired  At time of eval, pt requires SUP/min A for bed mobility, mod Ax2 for STS attempt, unable to advance to ambulation d/t pain and fatigue  Upon evaluation, pt presenting with impaired functional mobility d/t decreased strength, decreased endurance, impaired balance, decreased mobility, impaired judgement and pain  Pertinent PMHx and current co-morbidities affecting pt's physical performance at time of assessment include: AFib c RVR, SIRS, CHF, CKD stage 2  Personal factors affecting pt at time of eval include: inaccessible home environment and unable to perform physical activity  The following objective measures performed on IE also reveal limitations: AM-PAC 6-Clicks: 99/35   Pt's clinical presentation is currently unstable/unpredictable  Overall, pt's rehab potential and prognosis to return to PLOF is fair as impacted by objective findings, warranting pt to receive further skilled PT interventions to address identified impairments, activity limitation(s), and participation restriction(s)  Goal for patient is to have less pain  Pt to benefit from continued PT tx to address deficits as defined above and maximize level of functional independent mobility and consistency in order for pt to improve activity tolerance  From PT/mobility standpoint, recommendation at time of d/c would be post acute rehabilitation services pending progress in order to facilitate return to PLOF  Barriers to Discharge: Inaccessible home environment        PT Discharge Recommendation: Post acute rehabilitation services          See flowsheet documentation for full assessment

## 2022-04-02 NOTE — ASSESSMENT & PLAN NOTE
Wt Readings from Last 3 Encounters:   07/12/21 49 4 kg (109 lb)   07/03/21 51 kg (112 lb 7 oz)   02/15/21 48 2 kg (106 lb 4 2 oz)     Chest x-ray shows pulmonary vascular congestion  · Lasix 40 mg on home med list  · Last echo 08/2020: LVEF 65 %   Mild mitral stenosis, moderate MR, mild AS  · BNP 3 590, troponin 9, negative delta  · Currently on 3 L supplemental O2, satting mid 90s  · Continue diuresis  · Monitor I/o, daily weight, fluid restriction

## 2022-04-02 NOTE — PHYSICAL THERAPY NOTE
Physical Therapy Evaluation   Time in: 4751  Time out: 6105  Total evaluation time: 14 minutes    Patient's Name: Yuliya Whitaker    Admitting Diagnosis  Chest pain [R07 9]    Problem List  Patient Active Problem List   Diagnosis    CAD (coronary artery disease)    Mitral regurgitation    Longstanding persistent atrial fibrillation (HCC)    Acute on chronic diastolic heart failure ([facility] )    Essential hypertension    Acute hypokalemia    Depression with anxiety    Gastroesophageal reflux disease without esophagitis    OAB (overactive bladder)    Hypomagnesemia    Mixed hyperlipidemia    Osteoarthritis of knee    Pneumonia    Generalized weakness    Dizziness    Chest wall contusion, right, initial encounter    Ambulatory dysfunction    Hypokalemia    Liver lesion    Rib fractures    CKD (chronic kidney disease), stage II    Pulmonary hypertension (HCC)    Visual hallucinations    Close exposure to COVID-19 virus    Cognitive impairment    Atrial fibrillation with RVR ([facility] )    S[facility] response syndrome) [facility]       Past Medical History  Past Medical History:   Diagnosis Date    Acute on chronic diastolic congestive heart failure ([facility] [facility] ) 6/27/2019    Ambulatory dysfunction 8/17/2020    Arthritis     Chest wall contusion, right, initial encounter 8/17/2020    Chest wall contusion, right, subsequent encounter 8/17/2020    Chronic diastolic heart failure (HCC)     Chronic kidney disease, stage 2 (mild)     Coronary artery disease     history of stenting    Eczema     years    Edema     History of echocardiogram 07/20/2017    EF 55%, mild concentric LVH, bileaflet MVP with moderate MR, left atrial enlargement      History of transfusion     Hyperlipidemia     Hypertension     Hypo-osmolality and hyponatremia     Hypokalemia 7/11/2019    Mitral regurgitation        Past Surgical History  Past Surgical History:   Procedure Laterality Date    ABDOMINAL SURGERY hysterectomy    CARDIAC CATHETERIZATION  04/10/2012    EF 65%, no significant CAD, patent stents, severe MR     CORONARY ANGIOPLASTY WITH STENT PLACEMENT  03/21/2007    EF 55%, GARRET to LAD   EYE SURGERY      b/l cataracts    HYSTERECTOMY      JOINT REPLACEMENT      Rt knee       PT performed at least 2 patient identifiers during session: Name and wristband  04/02/22 0944   PT Last Visit   PT Visit Date 04/02/22   Note Type   Note type Evaluation   Pain Assessment   Pain Assessment Tool 0-10   Pain Score 8   Pain Location/Orientation Orientation: Bilateral;Location: Leg   Pain Onset/Description Onset: Ongoing;Frequency: Constant/Continuous; Descriptor: Izell MaskI always have pain")   Patient's Stated Pain Goal No pain   Hospital Pain Intervention(s) Repositioned; Emotional support   Restrictions/Precautions   Weight Bearing Precautions Per Order No   Other Precautions Fall Risk;Multiple lines;O2;Telemetry  (1 5 L O2 via NC)   Home Living   Type of [address] [place] [address] Two level;Performs ADLs on one level; Able to live on main level with bedroom/bathroom; Ramped entrance  (1st floor setup)   Bathroom Shower/Tub   (pt sponge bathes)   [address] Walker;Cane;Wheelchair-manual  (pt uses RW at baseline)   Additional Comments pt reports bed on 1st floor   Prior Function   Level of [place] Needs assistance with ADLs and functional mobility; Needs assistance with IADLs   Lives With Son  (2 sons)   Receives Help From Family   ADL Assistance Needs assistance  (pt reports normal (I) ADLs, recent needing assistance)   IADLs Needs assistance   Falls in the last 6 months 0   Comments pt does not drive, pt's family provides transportation   General   Family/Caregiver Present No   Cognition   Arousal/Participation Alert   Orientation Level Oriented X4   Memory Decreased recall of precautions   Following Commands Follows one step commands without difficulty   Comments pt agreeable to PT session   RLE Assessment   RLE Assessment X   Strength RLE   RLE Overall Strength 3-/5   LLE Assessment   LLE Assessment X   Strength LLE   LLE Overall Strength 3-/5   Vision-Basic Assessment   Current Vision Does not wear glasses   Coordination   Movements are Fluid and Coordinated 0   Sensation   (occasional N/T in B feet)   Bed Mobility   Supine to Sit 5  Supervision   Additional items Assist x 1;HOB elevated; Increased time required;Verbal cues   Sit to Supine 4  Minimal assistance   Additional items Assist x 1; Increased time required;LE management   Additional Comments BP pre mobility = 127/75, HR at rest = 113 bpm, SpO2 on1 5 L = 93%   Transfers   Sit to Stand 3  Moderate assistance   Additional items Assist x 2; Increased time required;Verbal cues   Stand to Sit 3  Moderate assistance   Additional items Assist x 2; Increased time required   Stand pivot   (Pt c/o increased pain in B LEs with standing)   Ambulation/Elevation   Gait pattern Not tested  (pt reports increased pain)   Balance   Static Sitting Good   Dynamic Sitting Fair +   Static Standing Poor   Endurance Deficit   Endurance Deficit Yes   Activity Tolerance   Activity Tolerance Patient limited by fatigue;Patient limited by pain   Nurse Made Aware Yes, RN made aware of session outcomes   Assessment   Prognosis Fair   Problem List Decreased strength;Decreased endurance; Impaired balance;Decreased mobility; Impaired judgement;Pain   Assessment Pt is 81 y o  female seen for high-complexity PT evaluation on 4/2/2022 s/p admit to [facility] on 4/2/2022 w/ Acute on chronic diastolic heart failure ([facility] [address] [facility] ); pt presented to ED with chest pain, SOB, fatigue, difficulty amb x 1 wk  PT was consulted to assess pt's functional mobility and d/c needs  Order placed for PT eval and tx, w/ up as tolerated order   PTA, pt was independent w/ all functional mobility w/ RW, ambulates household distances, has + LOLA, lives w/ son in 2 level home with 1st floor setup and retired  At time of eval, pt requires SUP/min A for bed mobility, mod Ax2 for STS attempt, unable to advance to ambulation d/t pain and fatigue  Upon evaluation, pt presenting with impaired functional mobility d/t decreased strength, decreased endurance, impaired balance, decreased mobility, impaired judgement and pain  Pertinent PMHx and current co-morbidities affecting pt's physical performance at time of assessment include: AFib c RVR, SIRS, CHF, CKD stage 2  Personal factors affecting pt at time of eval include: inaccessible home environment and unable to perform physical activity  The following objective measures performed on IE also reveal limitations: AM-PAC 6-Clicks: 21/49  Pt's clinical presentation is currently unstable/unpredictable  Overall, pt's rehab potential and prognosis to return to PLOF is fair as impacted by objective findings, warranting pt to receive further skilled PT interventions to address identified impairments, activity limitation(s), and participation restriction(s)  Goal for patient is to have less pain  Pt to benefit from continued PT tx to address deficits as defined above and maximize level of functional independent mobility and consistency in order for pt to improve activity tolerance  From PT/mobility standpoint, recommendation at time of d/c would be post acute rehabilitation services pending progress in order to facilitate return to PLOF     Barriers to Discharge Inaccessible home environment   Goals   Patient Goals "to have less pain"   [facility] Expiration Date 04/12/22   Short Term Goal #1 In 7-10 days: Increase bilateral LE strength 1/2 grade to facilitate independent mobility, Perform all bed mobility tasks modified independent to decrease caregiver burden, Perform all transfers with close S to improve independence, Ambulate > 50 ft  with RW with close S w/o LOB and w/ normalized gait pattern 100% of the time, Increase all balance 1/2 grade to decrease risk for falls, Tolerate 4 hr OOB to faciliate upright tolerance, Complete % of the time, Tolerate seated at EOB 15 minutes to facilitate functional task performance and Tolerate standing 2 minutes to facilitate functional task performance   PT Treatment Day 0   Plan   Treatment/Interventions Functional transfer training;LE strengthening/ROM; Elevations; Therapeutic exercise; Endurance training;Patient/family training;Equipment eval/education; Bed mobility;Gait training;Spoke to nursing;Spoke to case management;OT   PT Frequency 3-5x/wk   Recommendation   PT Discharge Recommendation Post acute rehabilitation services   Equipment Recommended   (TBD pending pt progress)   Additional Comments Pt's raw score on the AM-PAC Basic Mobility inpatient short form is 12, standardized score is 32 23  Patients at this level are likely to benefit from DC to [facility], however, please refer to therapist recommendation for safe DC planning  AM-[facility] Basic Mobility Inpatient   Turning in Bed Without Bedrails 3   Lying on Back to Sitting on Edge of Flat Bed 2   Moving Bed to Chair 2   Standing Up From Chair 2   Walk in Room 2   Climb 3-5 Stairs 1   Basic Mobility Inpatient Raw Score 12   Basic Mobility Standardized Score 32 23   Highest Level Of Mobility   JH-HLM Goal 4: Move to chair/commode   JH-HLM Highest Level of Mobility 3: Sit at edge of bed   JH-HLM Goal Achieved No   End of Consult   Patient Position at End of Consult Supine; All needs within reach       Alex Baker, PT, DPT

## 2022-04-02 NOTE — ASSESSMENT & PLAN NOTE
· Tachycardia and tachypnea present on arrival  · No suspicion for acute infectious process  · This is likely due to decompensated heart failure and subsequent atrial fibrillation with RVR  · Management as above

## 2022-04-02 NOTE — ASSESSMENT & PLAN NOTE
Wt Readings from Last 3 Encounters:   07/12/21 49 4 kg (109 lb)   07/03/21 51 kg (112 lb 7 oz)   02/15/21 48 2 kg (106 lb 4 2 oz)     · Documented net output only 120mL  · TTE (08/2020): LVEF 65 %  Mild mitral stenosis, moderate MR, mild AS  · CXR (4/2):  Mild pulmonary edema with moderate right pleural effusion  · Continue IV Lasix 40 mg twice daily  · Continue metoprolol 25 mg b i d    · Careful monitoring of I's and O's and daily weights  · Repeat echocardiogram pending as above  · Cardiology following

## 2022-04-03 PROBLEM — J96.11 CHRONIC RESPIRATORY FAILURE WITH HYPOXIA (HCC): Status: ACTIVE | Noted: 2022-01-01

## 2022-04-03 PROBLEM — K59.09 OTHER CONSTIPATION: Status: ACTIVE | Noted: 2022-01-01

## 2022-04-03 NOTE — ASSESSMENT & PLAN NOTE
-with history of PCI to LAD per documentation in 2007 with repeat catheterization in 2012 without significant restenoses  -troponins negative x3  -continue monitor patient clinically at this time

## 2022-04-03 NOTE — ASSESSMENT & PLAN NOTE
-currently in atrial fibrillation heart rates in the 120s  -patient has not received her diltiazem 90 mg 3 times daily at this time as blood pressures been slightly below hold parameters  -will lower patient's diltiazem to 60 mg 3 times daily and change hold parameters and will continue metoprolol tartrate 25 mg twice daily   -continue oral anticoagulation with Eliquis  -continue monitor patient on telemetry  -continue monitor renal function electrolytes with goal potassium 4 0, magnesium 2 0   -follow-up transthoracic echocardiogram

## 2022-04-03 NOTE — PROGRESS NOTES
[address]  Progress Note - Emelyn Hearing 1935, 81 y o  female MRN: [id number]  Unit/Bed#: [address] [address] Encounter: [id number]  Primary Care Provider: Yolanda Dockery MD   Date and time admitted to hospital: 4/2/2022  3:33 AM    * Atrial fibrillation with RVR ([facility] [address] )  Assessment & Plan  · History of MR and chronic hypoxic respiratory failure (not compliant with O2 at home)  · HR has been in the 90s to low 100s but has not received Diltiazem and Metoprolol due to BP hold parameters  · Will decrease hold parameters on medications today  · Continue Metoprolol 25 mg twice daily  · Diltiazem decreased to 60 mg Q8H (from 90 Q6) to hopefully avoid hypotension  · Continue home Eliquis 2 5 mg BID  · TTE pending  · Cardiology following    Acute on chronic diastolic heart failure [facility])  Assessment & Plan  Wt Readings from Last 3 Encounters:   07/12/21 49 4 kg (109 lb)   07/03/21 51 kg (112 lb 7 oz)   02/15/21 48 2 kg (106 lb 4 2 oz)     · Documented net output only 120mL  · TTE (08/2020): LVEF 65 %  Mild mitral stenosis, moderate MR, mild AS  · CXR (4/2):  Mild pulmonary edema with moderate right pleural effusion  · Continue IV Lasix 40 mg twice daily  · Continue metoprolol 25 mg b i d    · Careful monitoring of I's and O's and daily weights  · Repeat echocardiogram pending as above  · Cardiology following    Chronic respiratory failure with hypoxia (HCC)  Assessment & Plan  · Chronically maintained on 2 L nasal cannula  · The patient's daughter notes that she is often times noncompliant with home oxygen    Acute hypokalemia  Assessment & Plan  · Replete and recheck in the morning    Other constipation  Assessment & Plan  · Begin daily MiraLax  · Senna-Colace twice daily as needed    SIRS (systemic inflammatory response syndrome) (HCC)  Assessment & Plan  · Tachycardia and tachypnea present on arrival  · No suspicion for acute infectious process  · This is likely due to decompensated heart failure and subsequent atrial fibrillation with RVR  · Management as above    CAD (coronary artery disease)  Assessment & Plan  · History of PCI to LAD per documentation in        Mitral regurgitation  Assessment & Plan  · Demonstrated on prior echocardiogram  · Repeat TTE pending      VTE Pharmacologic Prophylaxis: VTE Score: 6 High Risk (Score >/= 5) - Pharmacological DVT Prophylaxis Ordered: apixaban (Eliquis)  Sequential Compression Devices Ordered  Patient Centered Rounds: I performed bedside rounds with nursing staff today  Discussions with Specialists or Other Care Team Provider:  Cardiology and nursing    Education and Discussions with Family / Patient: Patient declined call to   Time Spent for Care: 45 minutes  More than 50% of total time spent on counseling and coordination of care as described above  Current Length of Stay: 1 day(s)  Current Patient Status: Inpatient   Certification Statement: The patient will continue to require additional inpatient hospital stay due to Atrial fibrillation with RVR and decompensated heart failure  Discharge Plan: To be determined  PT evaluated the patient and recommended post acute rehab placement  The patient states that she normally feels fairly weak when she is hospitalized but believes that she would be safe for discharge home once medically cleared  Will ask PT to re-evaluate tomorrow  Code Status: Level 1 - Full Code    Subjective:   Patient is sitting up in the bedside chair  She is breathing comfortably and denies any shortness of breath or chest discomfort  She does note difficulty with constipation  No acute events reported overnight  Objective:     Vitals:   Temp (24hrs), Av 9 °F (36 1 °C), Min:96 °F (35 6 °C), Max:97 5 °F (36 4 °C)    Temp:  [96 °F (35 6 °C)-97 5 °F (36 4 °C)] 96 4 °F (35 8 °C)  HR:  [] 96  Resp:  [18-20] 18  BP: (101-134)/(55-89) 128/89  SpO2:  [93 %-96 %] 93 %  Body mass index is 21 33 kg/m²       Input and Output Summary (last 24 hours): Intake/Output Summary (Last 24 hours) at 4/3/2022 1112  Last data filed at 4/3/2022 0700  Gross per 24 hour   Intake 420 ml   Output 300 ml   Net 120 ml       Physical Exam:   Physical Exam  Vitals and nursing note reviewed  Constitutional:       General: She is not in acute distress  Appearance: Normal appearance  She is well-developed  HENT:      Head: Normocephalic and atraumatic  Mouth/Throat:      Mouth: Mucous membranes are moist       Pharynx: Oropharynx is clear  Eyes:      Extraocular Movements: Extraocular movements intact  Conjunctiva/sclera: Conjunctivae normal    Cardiovascular:      Rate and Rhythm: Tachycardia present  Rhythm irregular  Heart sounds: Murmur heard  Pulmonary:      Effort: Pulmonary effort is normal  No respiratory distress  Breath sounds: Normal breath sounds  Comments: 2 L nasal cannula  Abdominal:      General: Bowel sounds are normal  There is no distension  Palpations: Abdomen is soft  Tenderness: There is no abdominal tenderness  Musculoskeletal:         General: Normal range of motion  Cervical back: Normal range of motion and neck supple  Right lower leg: No edema  Left lower leg: No edema  Skin:     General: Skin is warm and dry  Neurological:      General: No focal deficit present  Mental Status: She is alert  Mental status is at baseline  Psychiatric:         Mood and Affect: Mood normal          Behavior: Behavior normal          Judgment: Judgment normal         Labs:  Results from last 7 days   Lab Units 04/03/22  0510 04/02/22  0429 04/02/22  0429   WBC Thousand/uL 7 78   < > 6 97   HEMOGLOBIN g/dL 11 7   < > 11 4*   HEMATOCRIT % 35 8   < > 36 2   PLATELETS Thousands/uL 197   < > 201   NEUTROS PCT %  --   --  80*   LYMPHS PCT %  --   --  8*   MONOS PCT %  --   --  10   EOS PCT %  --   --  0    < > = values in this interval not displayed       Results from last 7 days   Lab Units 04/03/22  0510   SODIUM mmol/L 137   POTASSIUM mmol/L 3 2*   CHLORIDE mmol/L 98   CO2 mmol/L 29   BUN mg/dL 22   CREATININE mg/dL 0 74   ANION GAP mmol/L 10   CALCIUM mg/dL 9 0   GLUCOSE RANDOM mg/dL 93                 Results from last 7 days   Lab Units 04/02/22  0429   PROCALCITONIN ng/ml 0 08       Lines/Drains:  Invasive Devices  Report    Peripheral Intravenous Line            Peripheral IV 04/02/22 Left Antecubital 1 day                  Telemetry:  Telemetry Orders (From admission, onward)             48 Hour Telemetry Monitoring  Continuous x 48 hours        References:    Telemetry Guidelines   Question:  Reason for 48 Hour Telemetry  Answer:  Arrhythmias Requiring Medical Therapy (eg  SVT, Vtach/fib, Bradycardia, Uncontrolled A-fib)                 Telemetry Reviewed: Atrial fibrillation  HR averaging   Indication for Continued Telemetry Use: Arrthymias requiring medical therapy      Imaging: Reviewed radiology reports from this admission including: chest xray    Recent Cultures (last 7 days):         Last 24 Hours Medication List:   Current Facility-Administered Medications   Medication Dose Route Frequency Provider Last Rate    apixaban  2 5 mg Oral BID Lyle Marx PA-C      diltiazem  60 mg Oral [facility] Nathalie Falling, DO      furosemide  40 mg Intravenous BID (diuretic) Lyle Marx PA-C      metoprolol tartrate  25 mg Oral Q12H 1 [facility], DO      polyethylene glycol  17 g Oral Daily [place], [place]      senna-docusate sodium  1 tablet Oral BID PRN Maday Wendy Hugo, DO      sodium chloride (PF)  3 mL Intravenous Q1H PRN Leroy Ward MD          Today, Patient Was Seen By: Velma Villasenor DO    **Please Note: This note may have been constructed using a voice recognition system  **

## 2022-04-03 NOTE — ASSESSMENT & PLAN NOTE
Wt Readings from Last 3 Encounters:   04/03/22 47 9 kg (105 lb 9 6 oz)   07/12/21 49 4 kg (109 lb)   07/03/21 51 kg (112 lb 7 oz)   -BNP elevated on admission 3590  -will follow-up transthoracic echocardiogram  -chest x-ray with our pulmonary edema and moderate right pleural effusion  -will continue b i d  IV diuretics for today and transition patient to daily IV diuretic tomorrow  -counseled patient on dietary and lifestyle modifications including following fluid and salt restricted diet   -while on diuretics would continue monitor renal function electrolytes closely with goal potassium 4 0, magnesium 2 0

## 2022-04-03 NOTE — ASSESSMENT & PLAN NOTE
· History of MR and chronic hypoxic respiratory failure (not compliant with O2 at home)  · HR has been in the 90s to low 100s but has not received Diltiazem and Metoprolol due to BP hold parameters  · Will decrease hold parameters on medications today  · Continue Metoprolol 25 mg twice daily  · Diltiazem decreased to 60 mg Q8H (from 90 Q6) to hopefully avoid hypotension  · Continue home Eliquis 2 5 mg BID  · TTE pending  · Cardiology following

## 2022-04-03 NOTE — ASSESSMENT & PLAN NOTE
-potassium 3 2 this morning would recommend repletion with goal potassium 4 0, magnesium 2 0  -would continue monitor labs closely while on IV diuretics  Plan: OD first Vivity tgt ifli, OS second Vivity tgt -1.00. +PMN.

## 2022-04-03 NOTE — CONSULTS
[address] 1935, 81 y o  female MRN: [id number]  Unit/Bed#: APU 07 Encounter: [id number]  Primary Care Provider: Monika Bonilla MD   Date and time admitted to hospital: 4/2/2022  3:33 AM    Inpatient consult to Cardiology  Consult performed by: Valorie Lundborg, DO  Consult ordered by: Keke Ragland DO          Atrial fibrillation with RVR [facility])  Assessment & Plan  -currently in atrial fibrillation heart rates in the 120s  -patient has not received her diltiazem 90 mg 3 times daily at this time as blood pressures been slightly below hold parameters  -will lower patient's diltiazem to 60 mg 3 times daily and change hold parameters and will continue metoprolol tartrate 25 mg twice daily   -continue oral anticoagulation with Eliquis  -continue monitor patient on telemetry  -continue monitor renal function electrolytes with goal potassium 4 0, magnesium 2 0   -follow-up transthoracic echocardiogram     Acute hypokalemia  Assessment & Plan  -potassium 3 2 this morning would recommend repletion with goal potassium 4 0, magnesium 2 0  -would continue monitor labs closely while on IV diuretics  CAD (coronary artery disease)  Assessment & Plan  -with history of PCI to LAD per documentation in 2007 with repeat catheterization in 2012 without significant restenoses  -troponins negative x3  -continue monitor patient clinically at this time      * Acute on chronic diastolic heart failure (HCC)  Assessment & Plan  Wt Readings from Last 3 Encounters:   04/03/22 47 9 kg (105 lb 9 6 oz)   07/12/21 49 4 kg (109 lb)   07/03/21 51 kg (112 lb 7 oz)   -BNP elevated on admission 3590  -will follow-up transthoracic echocardiogram  -chest x-ray with our pulmonary edema and moderate right pleural effusion  -will continue b i d  IV diuretics for today and transition patient to daily IV diuretic tomorrow  -counseled patient on dietary and lifestyle modifications including following fluid and salt restricted diet   -while on diuretics would continue monitor renal function electrolytes closely with goal potassium 4 0, magnesium 2 0  Other summary comments:   -will change hold parameters and reduce medications to allow hopefully for patient to tolerate oral AV andrew blocking agents will continue IV be right ear diuretics for today with likely transition to daily diuretic regimen tomorrow  Would aggressively monitor renal function electrolytes with goal potassium 4 0, magnesium 2 0  -counseled patient on dietary lifestyle modifications including following a fluid and salt restricted diet  Outpatient Cardiologist: Dr Magno Emerson    HPI: Colt Saini is a 81y o  year old female with hypertension, atrial fibrillation on oral anticoagulation with Eliquis, chronic kidney disease, CAD and heart failure preserved ejection fraction along with mitral regurgitation who presents to the hospital on 04/02/2020 to with chest pain and dizziness for 1 week duration  The patient notes that she had been having these issues for approximately a week but when they did not get better presented to the hospital for further care and evaluation   -she notes that since being admitted her symptoms have significantly improved and she no longer has significant shortness of breath or chest discomfort  Overall is feeling better today  MOST  RECENT CARDIAC IMAGING:   -transthoracic echocardiogram 08/17/2020 showing left ventricular systolic function normal estimated LVEF 65% with a dilated left atrium mild mitral stenosis with moderate mitral regurgitation, mild aortic stenosis being and mild tricuspid regurgitation with pulmonary hypertension PASP estimated at 60 mmHg  Review of Systems:  Review of Systems   Constitutional: Negative for chills, diaphoresis, fatigue and fever  HENT: Negative for trouble swallowing and voice change  Eyes: Negative for pain and redness     Respiratory: Negative for shortness of breath and wheezing  Cardiovascular: Negative for chest pain, palpitations and leg swelling  Gastrointestinal: Negative for constipation, diarrhea, nausea and vomiting  Genitourinary: Negative for dysuria  Musculoskeletal: Positive for arthralgias  Negative for neck pain and neck stiffness  Neurological: Negative for dizziness, syncope, light-headedness and headaches  Psychiatric/Behavioral: Negative for agitation and confusion  All other systems reviewed and are negative  Historical Information   Past Medical History:   Diagnosis Date    Acute on chronic diastolic congestive heart failure ([facility] [address] ) 6/27/2019    Ambulatory dysfunction 8/17/2020    Arthritis     Chest wall contusion, right, initial encounter 8/17/2020    Chest wall contusion, right, subsequent encounter 8/17/2020    Chronic diastolic heart failure (HCC)     Chronic kidney disease, stage 2 (mild)     Coronary artery disease     history of stenting    Eczema     years    Edema     History of echocardiogram 07/20/2017    EF 55%, mild concentric LVH, bileaflet MVP with moderate MR, left atrial enlargement   History of transfusion     Hyperlipidemia     Hypertension     Hypo-osmolality and hyponatremia     Hypokalemia 7/11/2019    Mitral regurgitation      Past Surgical History:   Procedure Laterality Date    ABDOMINAL SURGERY      hysterectomy    CARDIAC CATHETERIZATION  04/10/2012    EF 65%, no significant CAD, patent stents, severe MR     CORONARY ANGIOPLASTY WITH STENT PLACEMENT  03/21/2007    EF 55%, GARRET to LAD      EYE SURGERY      b/l cataracts    HYSTERECTOMY      JOINT REPLACEMENT      Rt knee     Social History     Substance and Sexual Activity   Alcohol Use Never    Alcohol/week: 0 0 standard drinks     Social History     Substance and Sexual Activity   Drug Use Never     Social History     Tobacco Use   Smoking Status Never Smoker   Smokeless Tobacco Never Used       Family History:   Family History   Problem Relation Age of Onset    Arthritis Mother     Cancer Mother     Heart disease Mother     Hypertension Mother     Alcohol abuse Father     Arthritis Father     Birth defects Son     Hearing loss Son     Diabetes Daughter     Stroke Maternal Grandmother     Asthma Maternal Grandfather        Meds/Allergies   all current active meds have been reviewed  Medications Prior to Admission   Medication    acetaminophen (TYLENOL) 325 mg tablet    ALPRAZolam (XANAX) 0 25 mg tablet    apixaban (ELIQUIS) 2 5 mg    bisacodyl (DULCOLAX) 5 mg EC tablet    diltiazem (CARDIZEM) 90 mg tablet    furosemide (LASIX) 40 mg tablet    hydrOXYzine HCL (ATARAX) 25 mg tablet    metoprolol tartrate (LOPRESSOR) 25 mg tablet    omeprazole (PriLOSEC) 20 mg delayed release capsule    oxybutynin (DITROPAN) 5 mg tablet    polyethylene glycol (MIRALAX) 17 g packet    potassium chloride (K-DUR,KLOR-CON) 20 mEq tablet    sertraline (ZOLOFT) 50 mg tablet       Allergies   Allergen Reactions    Chocolate Flavor - Food Allergy Itching    Shellfish-Derived Products - Food Allergy Shortness Of Breath    Iodine - Food Allergy Other (See Comments)     shellfish- difficulty breathing    Codeine Rash       Objective   Vitals: Blood pressure 111/59, pulse 96, temperature (!) 96 4 °F (35 8 °C), temperature source Tympanic, resp  rate 18, weight 47 9 kg (105 lb 9 6 oz), SpO2 93 %  , Body mass index is 21 33 kg/m² ,   Orthostatic Blood Pressures      Most Recent Value   Blood Pressure 111/59 filed at 04/03/2022 0700   Patient Position - Orthostatic VS Lying filed at 04/03/2022 8403          Systolic (01ZSO), ROK:529 , Min:101 , LVY:340     Diastolic (11RHM), YWH:29, Min:55, Max:84    Physical Exam:  Physical Exam  Vitals reviewed  Constitutional:       General: She is not in acute distress  Appearance: Normal appearance  She is not diaphoretic  HENT:      Head: Normocephalic and atraumatic  Comments: Nasal cannula oxygen in place  Eyes:      General:         Right eye: No discharge  Left eye: No discharge  Neck:      Comments: Trachea midline,  JVD present  Cardiovascular:      Heart sounds: Murmur heard  No friction rub  Comments: Irregularly irregular rate and rhythm  Pulmonary:      Effort: Pulmonary effort is normal  No respiratory distress  Breath sounds: No wheezing  Comments: Decreased breath sound bilaterally right greater than left  Abdominal:      General: Bowel sounds are normal  There is no distension  Palpations: Abdomen is soft  Musculoskeletal:      Right lower leg: Edema (Trace) present  Left lower leg: Edema ( trace) present  Skin:     General: Skin is warm and dry  Neurological:      Mental Status: She is alert  Comments: Awake, alert, able to answer questions appropriately     Psychiatric:         Mood and Affect: Mood normal          Behavior: Behavior normal            Lab Results:     Troponins:    Results from last 7 days   Lab Units 04/02/22  0918 04/02/22  0628 04/02/22  0429   HS TNI 0HR ng/L  --   --  9   HS TNI 2HR ng/L  --  12  --    HSTNI D2 ng/L  --  3  --    HS TNI 4HR ng/L 11  --   --    HSTNI D4 ng/L 2  --   --      BNP:   Results from last 6 Months   Lab Units 04/02/22  0429   BNP pg/mL 3,590*       CBC :   Results from last 7 days   Lab Units 04/03/22  0510 04/02/22  0429   WBC Thousand/uL 7 78 6 97   HEMOGLOBIN g/dL 11 7 11 4*   HEMATOCRIT % 35 8 36 2   MCV fL 85 90   PLATELETS Thousands/uL 197 201     TSH:     CMP:   Results from last 7 days   Lab Units 04/03/22  0510 04/02/22  0429   POTASSIUM mmol/L 3 2* 4 3   CHLORIDE mmol/L 98 103   CO2 mmol/L 29 24   BUN mg/dL 22 19   CREATININE mg/dL 0 74 0 78   EGFR ml/min/1 73sq m 73 69     Lipid Profile:     Coags:

## 2022-04-03 NOTE — ASSESSMENT & PLAN NOTE
· Chronically maintained on 2 L nasal cannula  · The patient's daughter notes that she is often times noncompliant with home oxygen

## 2022-04-03 NOTE — PLAN OF CARE
Problem: MOBILITY - ADULT  Goal: Maintain or return to baseline ADL function  Description: INTERVENTIONS:  -  Assess patient's ability to carry out ADLs; assess patient's baseline for ADL function and identify physical deficits which impact ability to perform ADLs (bathing, care of mouth/teeth, toileting, grooming, dressing, etc )  - Assess/evaluate cause of self-care deficits   - Assess range of motion  - Assess patient's mobility; develop plan if impaired  - Assess patient's need for assistive devices and provide as appropriate  - Encourage maximum independence but intervene and supervise when necessary  - Involve family in performance of ADLs  - Assess for home care needs following discharge   - Consider OT consult to assist with ADL evaluation and planning for discharge  - Provide patient education as appropriate  4/3/2022 1600 by Marina Raymundo RN  Outcome: Progressing  4/3/2022 1600 by Marina Raymundo RN  Outcome: Progressing  Goal: Maintains/Returns to pre admission functional level  Description: INTERVENTIONS:  - Perform BMAT or MOVE assessment daily    - Set and communicate daily mobility goal to care team and patient/family/caregiver     - Collaborate with rehabilitation services on mobility goals if consulted  - Out of bed for toileting  - Record patient progress and toleration of activity level   4/3/2022 1600 by Marina Raymundo RN  Outcome: Progressing  4/3/2022 1600 by Marina Raymundo RN  Outcome: Progressing     Problem: Potential for Falls  Goal: Patient will remain free of falls  Description: INTERVENTIONS:  - Educate patient/family on patient safety including physical limitations  - Instruct patient to call for assistance with activity   - Consult OT/PT to assist with strengthening/mobility   - Keep Call bell within reach  - Keep bed low and locked with side rails adjusted as appropriate  - Keep care items and personal belongings within reach  - Initiate and maintain comfort rounds  - Make Fall Risk Sign visible to staff  - Offer Toileting every 2 Hours, in advance of need  - Initiate/Maintain bed/chair alarm  - Apply yellow socks and bracelet for high fall risk patients  - Consider moving patient to room near nurses station  4/3/2022 1600 by Sharmila Agustin RN  Outcome: Progressing  4/3/2022 1600 by Sharmila Agustin RN  Outcome: Progressing     Problem: Prexisting or High Potential for Compromised Skin Integrity  Goal: Skin integrity is maintained or improved  Description: INTERVENTIONS:  - Identify patients at risk for skin breakdown  - Assess and monitor skin integrity  - Assess and monitor nutrition and hydration status  - Monitor labs   - Assess for incontinence   - Turn and reposition patient  - Assist with mobility/ambulation  - Relieve pressure over bony prominences  - Avoid friction and shearing  - Provide appropriate hygiene as needed including keeping skin clean and dry  - Evaluate need for skin moisturizer/barrier cream  - Collaborate with interdisciplinary team   - Patient/family teaching  - Consider wound care consult   4/3/2022 1600 by Sharmila Agustin RN  Outcome: Progressing  4/3/2022 1600 by Sharmila Agustin RN  Outcome: Progressing     Problem: PAIN - ADULT  Goal: Verbalizes/displays adequate comfort level or baseline comfort level  Description: Interventions:  - Encourage patient to monitor pain and request assistance  - Assess pain using appropriate pain scale  - Administer analgesics based on type and severity of pain and evaluate response  - Implement non-pharmacological measures as appropriate and evaluate response  - Consider cultural and social influences on pain and pain management  - Notify physician/advanced practitioner if interventions unsuccessful or patient reports new pain  4/3/2022 1600 by Sharmila Agustin RN  Outcome: Progressing  4/3/2022 1600 by Sharmila Agustin RN  Outcome: Progressing     Problem: INFECTION - ADULT  Goal: Absence or prevention of progression during hospitalization  Description: INTERVENTIONS:  - Assess and monitor for signs and symptoms of infection  - Monitor lab/diagnostic results  - Monitor all insertion sites, i e  indwelling lines, tubes, and drains  - Monitor endotracheal if appropriate and nasal secretions for changes in amount and color  - [place] appropriate cooling/warming therapies per order  - Administer medications as ordered  - Instruct and encourage patient and family to use good hand hygiene technique  - Identify and instruct in appropriate isolation precautions for identified infection/condition  4/3/2022 1600 by Sharmila Agustin RN  Outcome: Progressing  4/3/2022 1600 by Sharmila Agustin RN  Outcome: Progressing  Goal: Absence of fever/infection during neutropenic period  Description: INTERVENTIONS:  - Monitor WBC    4/3/2022 1600 by Sharmila Agustin RN  Outcome: Progressing  4/3/2022 1600 by Sharmila Agustin RN  Outcome: Progressing     Problem: DISCHARGE PLANNING  Goal: Discharge to home or other facility with appropriate resources  Description: INTERVENTIONS:  - Identify barriers to discharge w/patient and caregiver  - Arrange for needed discharge resources and transportation as appropriate  - Identify discharge learning needs (meds, wound care, etc )  - Arrange for interpretive services to assist at discharge as needed  - Refer to Case Management Department for coordinating discharge planning if the patient needs post-hospital services based on physician/advanced practitioner order or complex needs related to functional status, cognitive ability, or social support system  4/3/2022 1600 by Sharmila Agustin RN  Outcome: Progressing  4/3/2022 1600 by Sharmila Agustin RN  Outcome: Progressing     Problem: Knowledge Deficit  Goal: Patient/family/caregiver demonstrates understanding of disease process, treatment plan, medications, and discharge instructions  Description: Complete learning assessment and assess knowledge base  Interventions:  - Provide teaching at level of understanding  - Provide teaching via preferred learning methods  4/3/2022 1600 by Marina Raymundo RN  Outcome: Progressing  4/3/2022 1600 by Marina Raymundo RN  Outcome: Progressing     Problem: Nutrition/Hydration-ADULT  Goal: Nutrient/Hydration intake appropriate for improving, restoring or maintaining nutritional needs  Description: Monitor and assess patient's nutrition/hydration status for malnutrition  Collaborate with interdisciplinary team and initiate plan and interventions as ordered  Monitor patient's weight and dietary intake as ordered or per policy  Utilize nutrition screening tool and intervene as necessary  Determine patient's food preferences and provide high-protein, high-caloric foods as appropriate       INTERVENTIONS:  - Monitor oral intake, urinary output, labs, and treatment plans  - Assess nutrition and hydration status and recommend course of action  - Evaluate amount of meals eaten  - Assist patient with eating if necessary   - Allow adequate time for meals  - Recommend/ encourage appropriate diets, oral nutritional supplements, and vitamin/mineral supplements  - Order, calculate, and assess calorie counts as needed  - Recommend, monitor, and adjust tube feedings and TPN/PPN based on assessed needs  - Assess need for intravenous fluids  - Provide specific nutrition/hydration education as appropriate  - Include patient/family/caregiver in decisions related to nutrition  4/3/2022 1600 by Marina Raymundo RN  Outcome: Progressing  4/3/2022 1600 by Marina Raymundo RN  Outcome: Progressing

## 2022-04-04 NOTE — ASSESSMENT & PLAN NOTE
Wt Readings from Last 3 Encounters:   04/04/22 47 2 kg (104 lb)   07/12/21 49 4 kg (109 lb)   07/03/21 51 kg (112 lb 7 oz)     -BNP elevated on admission 3590  Evidence of volume overload on imaging   -transition to bid PO dosing today  -low Na diet, daily weights, I/O monitoring

## 2022-04-04 NOTE — ARC ADMISSION
Referral received for [facility]  Reviewed with ARC MD and patient was denied  MD did not feel she could tolerate ARC program and recommended SNF

## 2022-04-04 NOTE — NUTRITION
04/04/22 1447   Biochemical Data,Medical Tests, and Procedures   Biochemical Data/Medical Tests/Procedures Lab values reviewed; Meds reviewed   Labs (Comment) no abnormal nutrition labs   Meds (Comment) lasix miralax KCl   Speech Therapy Recommendations (Comment) poor dentition   Nutrition-Focused Physical Exam   Nutrition-Focused Physical Exam Findings No edema documented   Nutrition-Focused Physical Exam Findings pt appears thin, 4 teeth in mouth  reddened skin around eyes   Medical-Related Concerns CHF CAD CKD HTN HLD   Adequacy of Intake   Nutrition Modality PO   Feeding Route   PO Independent   Current PO Intake   Current Diet Order 2G Na diet with 1500 mL fluid restriction   Estimated calorie intake compared to estimated need variable intakes, endorsed inability to chew foods   PES Statement   Problem Clinical   Functional (1) Biting/Chewing (masticatory) difficulty NC-1 2   Related to Partial/complete edentulous   As evidenced by: Per patient/family interview   Recommendations/Interventions   Malnutrition/BMI Present No   Summary Consult for CHf education  Pt does not use added salt on food  Downgrade to level 3 dysphagia diet  1500 mL fluid restriction due to pt request/inability to chew whole foods     Interventions/Recommendations Adjust diet order   Education Assessment   Education Education not indicated at this time   Education Notes previous low sodium diet ed   Patient Nutrition Goals   Goal Meet PO needs   Goal Status Initiated   Timeframe to complete goal by next f/u   Nutrition Complexity Risk   Nutrition complexity level Moderate risk   Follow up date 04/11/22

## 2022-04-04 NOTE — PROGRESS NOTES
[address]  Progress Note - Kane Groves 1935, 81 y o  female MRN: [id number]  Unit/Bed#: APU 07 Encounter: [id number]  Primary Care Provider: Martine Huber MD   Date and time admitted to hospital: 4/2/2022  3:33 AM    Essential hypertension  Assessment & Plan  Blood pressure well controlled  Continue current regimen    Acute on chronic diastolic heart failure ([facility] [address] )  Assessment & Plan  Wt Readings from Last 3 Encounters:   04/04/22 47 2 kg (104 lb)   07/12/21 49 4 kg (109 lb)   07/03/21 51 kg (112 lb 7 oz)     -BNP elevated on admission 3590  Evidence of volume overload on imaging   -transition to bid PO dosing today  -low Na diet, daily weights, I/O monitoring     CAD (coronary artery disease)  Assessment & Plan  -with history of PCI to LAD per documentation in 2007 with repeat catheterization in 2012 without significant restenoses  -troponins negative x3  -continue monitor patient clinically at this time  * Atrial fibrillation with RVR (HCC)  Assessment & Plan  Rate controlled at this time  -patient had not received her diltiazem 90 mg 3 times yesterday as blood pressures been slightly below hold parameters  Cardizem dose adjusted yesterday to 60 tid and metoprolol tartrate 25 mg twice daily   -continue oral anticoagulation with [facility]        Outpatient Cardiologist: Dr Jarad Vann:   Patient seen and examined  No significant events overnight  Says she is feeling better    Summary comments:  HR remains controlled on current regimen  Recommend continuing Cardizem at lower dose on discharge  Continue bid dosing of diuretics today with K replacement  Can reduce to once daily dosing tomorrow  Unclear if pt is going home vs STR  Will arrange OP cardiology follow up       Telemetry/ECG/Cardiac testing:   Echo 4/4/2022 EF 55%   Mod AS, mod-severe MR, mild-mod MS, mild TR  Elevated PAP    Echo 8/17/2020 EF 65%  Mild MS, mod MR, mild AS, mild TR  Elevated PAP    Vitals: Blood pressure 111/65, pulse 82, temperature 98 3 °F (36 8 °C), temperature source Tympanic, resp  rate 19, height 4' 11" (1 499 m), weight 47 2 kg (104 lb), SpO2 93 % ,   Orthostatic Blood Pressures      Most Recent Value   Blood Pressure 111/65 filed at 04/04/2022 0705   Patient Position - Orthostatic VS Lying filed at 04/04/2022 0435      ,   Weight (last 2 days)     Date/Time Weight    04/04/22 0705 47 2 (104)    04/04/22 0600 47 5 (104 72)    04/03/22 0553 47 9 (105 6)          Physical Exam:    General:  Normal appearance in no distress  Eyes:  Anicteric  Oral mucosa:  Moist   Neck:  No JVD  Carotid upstrokes are brisk without bruits  No masses  Chest:  Clear to auscultation   Cardiac:  Irregularly irregular  Murmur noted  Abdomen:  Soft and nontender  No palpable organomegaly or aortic enlargement  Extremities:  No peripheral edema  Neuro:  Grossly symmetric  Psych:  Alert and oriented x3        Medications:      Current Facility-Administered Medications:     apixaban (ELIQUIS) tablet 2 5 mg, 2 5 mg, Oral, BID, Kassie Jain PA-C, 2 5 mg at 04/04/22 0815    diltiazem (CARDIZEM) tablet 60 mg, 60 mg, Oral, Q8H [facility], 60 mg at 04/04/22 0610    furosemide (LASIX) injection 40 mg, 40 mg, Intravenous, BID (diuretic), Kassie Jain PA-C, 40 mg at 04/04/22 0815    metoprolol tartrate (LOPRESSOR) tablet 25 mg, 25 mg, Oral, Q12H [facility], DO, 25 mg at 04/04/22 0815    polyethylene glycol (MIRALAX) packet 17 g, 17 g, Oral, Daily, [facility], , 17 g at 04/04/22 0816    senna-docusate sodium (SENOKOT S) 8 6-50 mg per tablet 1 tablet, 1 tablet, Oral, BID PRN, [facility], , 1 tablet at 04/04/22 0815    Insert peripheral IV, , , Once **AND** sodium chloride (PF) 0 9 % injection 3 mL, 3 mL, Intravenous, Q1H PRN, Patrice Sin MD     Labs & Results:    Troponins:    Results from last 7 days   Lab Units 04/02/22  0918 04/02/22  5620 04/02/22  0429   HS TNI 0HR ng/L  --   --  9   HS TNI 2HR ng/L  --  12  --    HSTNI D2 ng/L  --  3  --    HS TNI 4HR ng/L 11  --   --    HSTNI D4 ng/L 2  --   --         BNP:   Results from last 6 Months   Lab Units 04/02/22  0429   BNP pg/mL 3,590*     CBC with diff:   Results from last 7 days   Lab Units 04/04/22  0440 04/03/22  0510   WBC Thousand/uL 7 13 7 78   HEMOGLOBIN g/dL 11 8 11 7   HEMATOCRIT % 35 6 35 8   MCV fL 85 85   PLATELETS Thousands/uL 214 197     TSH:     CMP:   Results from last 7 days   Lab Units 04/04/22  0440 04/03/22  0510   POTASSIUM mmol/L 3 5 3 2*   CHLORIDE mmol/L 98 98   CO2 mmol/L 32 29   BUN mg/dL 21 22   CREATININE mg/dL 0 74 0 74   EGFR ml/min/1 73sq m 73 73     Lipid Profile:     Coags:

## 2022-04-04 NOTE — PHYSICAL THERAPY NOTE
PHYSICAL THERAPY TREATMENT NOTE  NAME:  Shanelle Mg  DATE: 04/04/22    Length Of Stay: 2  Performed at least 2 patient identifiers during session: Name and ID bracelet    TREATMENT NOTE     04/04/22 1437   PT Last Visit   PT Visit Date 04/04/22   Note Type   Note Type Treatment   Pain Assessment   Pain Assessment Tool 0-10   Pain Score No Pain   Restrictions/Precautions   Weight Bearing Precautions Per Order No   Other Precautions Multiple lines;Telemetry;O2;Fall Risk   General   Chart Reviewed Yes   Family/Caregiver Present No   Cognition   Overall Cognitive Status WFL   Arousal/Participation Alert; Cooperative   Attention Attends with cues to redirect   Orientation Level Oriented X4   Memory Decreased recall of precautions   Following Commands Follows one step commands without difficulty   Comments pt agreeable to PT session   Bed Mobility   Rolling R 6  Modified independent   Additional items Bedrails; Increased time required;Verbal cues   Rolling L 6  Modified independent   Additional items Bedrails; Increased time required;Verbal cues   Supine to Sit 5  Supervision   Additional items Assist x 1;HOB elevated; Increased time required;Verbal cues; Bedrails   Sit to Supine 4  Minimal assistance   Additional items Assist x 1;Bedrails; Increased time required;Verbal cues;LE management   Additional Comments pt sat at EOBx25 min to complete BLE ther ex   Transfers   Sit to Stand 4  Minimal assistance   Additional items Assist x 1;Bedrails; Increased time required;Verbal cues   Stand to Sit 4  Minimal assistance   Additional items Assist x 1; Increased time required;Verbal cues; Bedrails   Additional Comments verbal cues for proper hand placement   Ambulation/Elevation   Gait pattern Improper Weight shift;Narrow FILOMENA; Forward Flexion;Decreased foot clearance; Short stride   Gait Assistance 4  Minimal assist   Additional items Assist x 1;Verbal cues; Tactile cues   Assistive Device Rolling walker   Distance 4 ft sidestepping at EOB x4   Balance   Static Sitting Good   Dynamic Sitting Fair +   Static Standing Fair -   Dynamic Standing Poor +   Ambulatory Poor +   Endurance Deficit   Endurance Deficit Yes   Activity Tolerance   Activity Tolerance Patient limited by fatigue   Nurse Made Aware yes   Exercises   Quad Sets Sitting;15 reps;AROM; Bilateral   Heelslides Sitting;15 reps;AROM; Bilateral   Glute Sets Sitting;15 reps;AROM; Bilateral   Hip Abduction Sitting;15 reps;AROM; Bilateral   Hip Adduction Sitting;15 reps;AROM; Bilateral   Knee AROM Long Arc Quad Sitting;15 reps;AROM; Bilateral   Ankle Pumps Sitting;15 reps;AROM; Bilateral   Marching Sitting;15 reps;AROM; Bilateral   Assessment   Prognosis Fair   Problem List Decreased strength;Decreased endurance; Impaired balance;Decreased mobility; Impaired judgement;Decreased safety awareness;Pain   Assessment Pt seen for PT treatment session this date with interventions consisting of gait training w/ emphasis on improving pt's ability to ambulate level surfaces x 4 ft x4 sidestepping at EOB with min A provided by therapist with RW, Therapeutic exercise consisting of: AROM 15 reps B LE in seated at EOB position and therapeutic activity consisting of training: bed mobility, supine<>sit transfers, sit<>stand transfers, static sitting tolerance at EOB for 25 minutes w/ min UE support, vc and tactile cues for static sitting posture faciliation and vc and tactile cues for static standing posture faciliation  Pt agreeable to PT treatment session upon arrival, pt found supine in bed w/ HOB elevated, in no apparent distress and responsive  In comparison to previous session, pt with improvements in ability to ambulated and amount of assistance required  Post session: pt returned BTB, all needs in reach and RN notified of session findings/recommendations   Continue to recommend post acute rehabilitation services at time of d/c in order to maximize pt's functional independence and safety w/ mobility  Pt continues to be functioning below baseline level  PT will continue to see pt during current hospitalization in order to address the deficits listed above and provide interventions consistent w/ POC in effort to achieve STGs  Barriers to Discharge Inaccessible home environment   Goals   PT Treatment Day 1   Plan   Treatment/Interventions Functional transfer training; Therapeutic exercise; Endurance training;Elevations; Bed mobility;Gait training;Spoke to nursing   Progress Progressing toward goals   PT Frequency 3-5x/wk   Recommendation   PT Discharge Recommendation Post acute rehabilitation services   Additional Comments upon conclusion, pt resting in bed with all needs in reach   [address] Inpatient   Turning in Bed Without Bedrails 3   Lying on Back to Sitting on Edge of Flat Bed 3   Moving Bed to Chair 2   Standing Up From Chair 3   Walk in Room 2   Climb 3-5 Stairs 1   Basic Mobility Inpatient Raw Score 14   Basic Mobility Standardized Score 35 55   Highest Level Of Mobility   JH-HLM Goal 4: Move to chair/commode   JH-HLM Highest Level of Mobility 5: Stand (1 or more minutes)   JH-HLM Goal Achieved Yes   Education   Education Provided Mobility training;Assistive device   Patient Demonstrates verbal understanding   End of Consult   Patient Position at End of Consult Supine; All needs within reach       The patient's AM-PAC Basic Mobility Inpatient Short Form Raw Score is 14  A Raw score of less than or equal to 16 suggests the patient may benefit from discharge to post-acute rehabilitation services  Please also refer to the recommendation of the Physical Therapist for safe discharge planning        Eros Rivera PTA,PTA

## 2022-04-04 NOTE — UTILIZATION REVIEW
Inpatient Admission Authorization Request   NOTIFICATION OF INPATIENT ADMISSION/INPATIENT AUTHORIZATION REQUEST   SERVICING FACILITY:   [address] [place], [address]  Tax ID: [id number]  NPI: [id number]  Place of Service: Inpatient [facility]  60[address]  Place of Service Code: 24     ATTENDING PROVIDER:  Attending Name and NPI#: Donelvis Cook  [[id number]]  Address: [address] [place] [address] [place], [address]  Phone: [phone]     UTILIZATION REVIEW CONTACT:  [facility] Utilization   Network Utilization Review Department  Phone: [phone]  Fax [phone]  Email: [email or web address]     PHYSICIAN ADVISORY SERVICES:  FOR [id number] REVIEW - MEDICAL NECESSITY DENIAL  Phone: [phone]  Fax: [phone]  Email: [email or web address]     TYPE OF REQUEST:  Inpatient Status     ADMISSION INFORMATION:  ADMISSION DATE/TIME: 4/2/22  6:19 AM  PATIENT DIAGNOSIS CODE/DESCRIPTION:  Chest pain [R07 9]  Acute exacerbation of CHF (congestive heart failure) ([address] ) [I50 9]  Atrial fibrillation with RVR (HCC) [I48 91]  Chest pain, unspecified type [R07 9]  DISCHARGE DATE/TIME: No discharge date for patient encounter  IMPORTANT INFORMATION:  Please contact the [facility] directly with any questions or concerns regarding this request  Department voicemails are confidential     Send requests for admission clinical reviews, concurrent reviews, approvals, and administrative denials due to lack of clinical to fax [phone]

## 2022-04-04 NOTE — PROGRESS NOTES
[address]  Progress Note - Janet Schumacher 1935, 81 y o  female MRN: [id number]  Unit/Bed#: APU 07 Encounter: [id number]  Primary Care Provider: Momo Chen MD   Date and time admitted to hospital: 4/2/2022  3:33 AM    * Atrial fibrillation with RVR ([address] )  Assessment & Plan  · Now rate controlled  · Appreciate cardiology input  · Continue diltiazem 60 mg p o  Q 8 hours, and metoprolol tartrate 25 mg p o  q 12 hours for further rate control  · Continue apixaban 2 5 mg p o  b i d  for anticoagulation purposes  · Further medication optimization as per Cardiology  · Patient is otherwise is medically stable for discharge  · Status post a PT and OT evaluation, they recommend short-term rehab, the patient and her daughter are agreeable, case management is working on rehab options      Acute on chronic diastolic heart failure (HCC)  Assessment & Plan  Wt Readings from Last 3 Encounters:   04/04/22 47 2 kg (104 lb)   07/12/21 49 4 kg (109 lb)   07/03/21 51 kg (112 lb 7 oz)     · CXR (4/2):  Mild pulmonary edema with moderate right pleural effusion  · Continue IV Lasix 40 mg twice daily  · Continue metoprolol 25 mg b i d   · Continue to monitor daily weight, as well as input and output  · Further medication optimization as per Cardiology    Essential hypertension  Assessment & Plan  · Blood pressure is controlled, continue Lasix, metoprolol and diltiazem    Acute hypokalemia  Assessment & Plan  · Resolved with repletion    CAD (coronary artery disease)  Assessment & Plan  · History of PCI to LAD per documentation in 2007   · Continue current cardiac based medications as outlined above      Chronic respiratory failure with hypoxia (HCC)  Assessment & Plan  · Chronically maintained on 2 L nasal cannula  · The patient's daughter notes that she is often times noncompliant with home oxygen    Mitral regurgitation  Assessment & Plan  · 2D echocardiogram results appreciated  · Further management as per Cardiology    Other constipation  Assessment & Plan  · Resolved  · Continue MiraLax, and Senokot    SIRS (systemic inflammatory response syndrome) (HCC)  Assessment & Plan  · Tachycardia and tachypnea present on arrival  · No suspicion for acute infectious process  · This is likely due to decompensated heart failure and subsequent atrial fibrillation with RVR  · Management as above    Cognitive impairment  Assessment & Plan  · At baseline        VTE Prophylaxis:  Apixaban (Eliquis)    Patient Centered Rounds: I have performed bedside rounds with nursing staff today  Discussions with Specialists or Other Care Team Provider:  Cardiology, case management, nursing, pharmacy  Education and Discussions with Family / Patient:  Patient's daughter Kristine Dominguez was brought up to par via telephone    Current Length of Stay: 2 day(s)    Current Patient Status: Inpatient   Certification Statement: The patient will continue to require additional inpatient hospital stay due to The need for placement    Discharge Plan:  Patient is medically stable for discharge, case management is working on post acute rehab services options    Code Status: Level 1 - Full Code    Subjective:   Patient seen and examined, feels okay, denies pain and or discomfort    Objective:     Vitals:   Temp (24hrs), Av 5 °F (36 4 °C), Min:96 4 °F (35 8 °C), Max:98 3 °F (36 8 °C)    Temp:  [96 4 °F (35 8 °C)-98 3 °F (36 8 °C)] 98 3 °F (36 8 °C)  HR:  [] 82  Resp:  [18-19] 19  BP: (111-128)/(53-89) 111/65  SpO2:  [93 %-98 %] 93 %  Body mass index is 21 01 kg/m²  Input and Output Summary (last 24 hours): Intake/Output Summary (Last 24 hours) at 2022 1012  Last data filed at 2022 0900  Gross per 24 hour   Intake 380 ml   Output 250 ml   Net 130 ml       Physical Exam:   Physical Exam  Constitutional:       General: She is not in acute distress  Appearance: Normal appearance  She is normal weight  She is not ill-appearing     HENT: Head: Normocephalic and atraumatic  Nose: Nose normal       Mouth/Throat:      Mouth: Mucous membranes are moist    Eyes:      Extraocular Movements: Extraocular movements intact  Pupils: Pupils are equal, round, and reactive to light  Cardiovascular:      Rate and Rhythm: Normal rate  Rhythm irregular  Pulses: Normal pulses  Heart sounds: Normal heart sounds  No murmur heard  No friction rub  No gallop  Pulmonary:      Effort: Pulmonary effort is normal  No respiratory distress  Breath sounds: Normal breath sounds  No wheezing, rhonchi or rales  Abdominal:      General: There is no distension  Palpations: Abdomen is soft  There is no mass  Tenderness: There is no abdominal tenderness  Hernia: No hernia is present  Musculoskeletal:         General: No swelling or tenderness  Normal range of motion  Cervical back: Normal range of motion and neck supple  No rigidity  Right lower leg: No edema  Left lower leg: No edema  Skin:     General: Skin is warm  Capillary Refill: Capillary refill takes less than 2 seconds  Findings: No erythema or rash  Neurological:      General: No focal deficit present  Mental Status: She is alert and oriented to person, place, and time  Mental status is at baseline  Cranial Nerves: No cranial nerve deficit  Motor: No weakness  Psychiatric:         Mood and Affect: Mood normal          Behavior: Behavior normal          Additional Data:     Labs:    Results from last 7 days   Lab Units 04/04/22  0440 04/03/22  0510 04/02/22  0429   WBC Thousand/uL 7 13   < > 6 97   HEMOGLOBIN g/dL 11 8   < > 11 4*   HEMATOCRIT % 35 6   < > 36 2   PLATELETS Thousands/uL 214   < > 201   NEUTROS PCT %  --   --  80*   LYMPHS PCT %  --   --  8*   MONOS PCT %  --   --  10   EOS PCT %  --   --  0    < > = values in this interval not displayed       Results from last 7 days   Lab Units 04/04/22  0440   SODIUM mmol/L 138   POTASSIUM mmol/L 3 5   CHLORIDE mmol/L 98   CO2 mmol/L 32   BUN mg/dL 21   CREATININE mg/dL 0 74   CALCIUM mg/dL 9 4                   * I Have Reviewed All Lab Data Listed Above  * Additional Pertinent Lab Tests Reviewed: [address] Admission  Reviewed    Imaging:  Imaging Reports Reviewed Today Include:  None    Recent Cultures (last 7 days):           Last 24 Hours Medication List:   Current Facility-Administered Medications   Medication Dose Route Frequency Provider Last Rate    apixaban  2 5 mg Oral BID Jayjay Cooper PA-C      diltiazem  60 mg Oral [facility] Anton Abernathy DO      furosemide  40 mg Intravenous BID (diuretic) Jayjay Cooper PA-C      metoprolol tartrate  25 mg Oral Q12H 1 [facility], DO      polyethylene glycol  17 g Oral Daily [place], [place]      senna-docusate sodium  1 tablet Oral BID PRN [facility], DO      sodium chloride (PF)  3 mL Intravenous Q1H PRN Brandee Oquendo MD          Today, Patient Was Seen By: Chuy Jackson MD    ** Please Note: Dictation voice to text software may have been used in the creation of this document   **

## 2022-04-04 NOTE — CASE MANAGEMENT
Case Management Assessment & Discharge Planning Note    Patient name Ashtyn Cedillo  Location [facility] MRN [id number]  : 1935 Date 2022       Current Admission Date: 2022  Current Admission Diagnosis:Atrial fibrillation with RVR [facility])   Patient Active Problem List    Diagnosis Date Noted    Chronic [facility] failure with hypoxia ([facility] ) 2022    Other constipation 2022    Atrial fibrillation with RVR ([facility] ) 2022    SIRS (systemic inflammatory response syndrome) ([facility] ) 2022    Cognitive impairment 2021    Close exposure to COVID-19 virus 2021    Visual hallucinations 2021    Chest wall contusion, right, initial encounter 2020    Ambulatory dysfunction 2020    Hypokalemia 2020    Liver lesion 2020    Rib fractures 2020    CKD (chronic kidney disease), stage II 2020    Pulmonary hypertension ([facility] ) 2020    Dizziness 2020    Generalized weakness 2020    Osteoarthritis of knee 2020    Pneumonia 2020    Mixed hyperlipidemia 2019    Hypomagnesemia 2019    Essential hypertension 2019    Acute hypokalemia 2019    Depression with anxiety 2019    Gastroesophageal reflux disease without esophagitis 2019    OAB (overactive bladder) 2019    Acute on chronic diastolic heart failure ([facility] ) 2019    Longstanding persistent atrial fibrillation ([facility] ) 2019    CAD (coronary artery disease) 2019    Mitral regurgitation 2019      LOS (days): 2  Geometric Mean LOS (GMLOS) (days):   Days to GMLOS:     OBJECTIVE:    Risk of Unplanned Readmission Score: 12         Current admission status: Inpatient  Referral Reason: Other (d/c planning)    Preferred Pharmacy:    [place] [id number]  Phone: [phone] Fax: [phone]    Primary Care Provider: Anni Cerrato MD    Primary Insurance: [facility] REP  Secondary Insurance:     ASSESSMENT:  Aleshia Bennett Proxies    There are no active Health Care Proxies on file  Readmission Root Cause  30 Day Readmission: No    Patient Information  Admitted from[de-identified] Home  Mental Status: Alert  During Assessment patient was accompanied by: Not accompanied during assessment  Assessment information provided by[de-identified] Patient  Primary Caregiver: Family  Caregiver's Name[de-identified] Meredith Cline Relationship to Patient[de-identified] Family Member (sons)  Caregiver's Telephone Number[de-identified] [phone]  Support Systems: [facility] of Residence: [address] do you live in?: [address] entry access options   Select all that apply : Ramp  Type of Current Residence: 2 story home  Upon entering residence, is there a bedroom on the main floor (no further steps)?: Yes  Upon entering residence, is there a bathroom on the main floor (no further steps)?: Yes  In the last 12 months, was there a time when you were not able to pay the mortgage or rent on time?: No  In the last 12 months, how many places have you lived?: 1  In the last 12 months, was there a time when you did not have a steady place to sleep or slept in a shelter (including now)?: No  Homeless/housing insecurity resource given?: N/A  Living Arrangements: Lives w/ Son  Is patient a ?: No    Activities of Daily Living Prior to Admission  Functional Status: Assistance (cooking, cleaning, shopping, meds)  Completes ADLs independently?: Yes  Ambulates independently?: Yes  Does patient use assisted devices?: Yes  Assisted Devices (DME) used: Arturo Paiz  Does patient currently own DME?: Yes  What DME does the patient currently own?: Home Oxygen concentrator,Portable Oxygen concentrator,Walker,Straight Cane,Bedside Commode,Shower Chair  Does patient have a history of Outpatient Therapy (PT/OT)?: No  Does the patient have a history of Short-Term Rehab?: Yes ([facility], fabio [facility]urent)  Does patient have a history of HHC?: Yes ([facility])  Does patient currently have [address]?: No         Patient Information Continued  Income Source: Pension/FPC  Does patient have prescription coverage?: Yes (maandrew poole)  Within the past 12 months, you worried that your food would run out before you got the money to buy more : Never true  Within the past 12 months, the food you bought just didnt last and you didnt have money to get more : Never true  Food insecurity resource given?: N/A  Does patient receive dialysis treatments?: No  Does patient have a history of substance abuse?: No  Does patient have a history of Mental Health Diagnosis?: No         Means of Transportation  Means of Transport to Appts[de-identified] Family transport  In the past 12 months, has lack of transportation kept you from medical appointments or from getting medications?: No  In the past 12 months, has lack of transportation kept you from meetings, work, or from getting things needed for daily living?: No  Was application for public transport provided?: N/A        DISCHARGE DETAILS:    Discharge planning discussed with[de-identified] patient and daughter was called a11:04 am & 15:55 pm  Freedom of Choice: Yes  Comments - Freedom of Choice: rehab recommened, pt had declined rehab- she equested [facility] requested pt's choice,, pt;s daughter spoke with the pt and she did give permission to send referrals  CM contacted family/caregiver?: Yes             Contacts  Patient Contacts: Terry Diaz  Relationship to Patient[de-identified] Family (daughter)  Contact Method: Phone  Phone Number: [phone]  Reason/Outcome: Discharge [address]         Is the patient interested in [address] at discharge?: Yes  [address] requested[de-identified] [address] Name[de-identified] [address] [facility] Provider[de-identified] PCP  Home Health Services Needed[de-identified] Heart Failure Management,Strengthening/Theraputic Exercises to Improve Function,Oxygen Via Nasal Cannula  Oxygen LPM Ordered (if applicable based on home health services needed):: 2 LPM  Homebound Criteria Met[de-identified] Uses an Assist Device (i e  cane, walker, etc),Requires the Assistance of Another Person for Safe Ambulation or to Leave the Home  Supporting Clincal Findings[de-identified] Limited Endurance         Other Referral/Resources/Interventions Provided:  Interventions: HHC,Acute Rehab,Short Term Rehab  Referral Comments: referral sent to peter , aru and sekou,lenin and teresas         Treatment Team Recommendation:  (d/c plan tbd-pt will need transportation)

## 2022-04-04 NOTE — ASSESSMENT & PLAN NOTE
· History of PCI to LAD per documentation in 2007   · Continue current cardiac based medications as outlined above

## 2022-04-04 NOTE — ASSESSMENT & PLAN NOTE
Rate controlled at this time  -patient had not received her diltiazem 90 mg 3 times yesterday as blood pressures been slightly below hold parameters  Cardizem dose adjusted yesterday to 60 tid and metoprolol tartrate 25 mg twice daily   -continue oral anticoagulation with Eliquis

## 2022-04-04 NOTE — ASSESSMENT & PLAN NOTE
· Now rate controlled  · Appreciate cardiology input  · Continue diltiazem 60 mg p o  Q 8 hours, and metoprolol tartrate 25 mg p o  q 12 hours for further rate control  · Continue apixaban 2 5 mg p o  b i d  for anticoagulation purposes  · Further medication optimization as per Cardiology  · Patient is otherwise is medically stable for discharge  · Status post a PT and OT evaluation, they recommend short-term rehab, the patient and her daughter are agreeable, case management is working on rehab options

## 2022-04-04 NOTE — PLAN OF CARE
Problem: PHYSICAL THERAPY ADULT  Goal: Performs mobility at highest level of function for planned discharge setting  See evaluation for individualized goals  Description: Treatment/Interventions: Functional transfer training,LE strengthening/ROM,Elevations,Therapeutic exercise,Endurance training,Patient/family training,Equipment eval/education,Bed mobility,Gait training,Spoke to nursing,Spoke to case management,OT  Equipment Recommended:  (TBD pending pt progress)       See flowsheet documentation for full assessment, interventions and recommendations  Outcome: Progressing  Note: Prognosis: Fair  Problem List: Decreased strength,Decreased endurance,Impaired balance,Decreased mobility,Impaired judgement,Decreased safety awareness,Pain  Assessment: Pt seen for PT treatment session this date with interventions consisting of gait training w/ emphasis on improving pt's ability to ambulate level surfaces x 4 ft x4 sidestepping at EOB with min A provided by therapist with RW, Therapeutic exercise consisting of: AROM 15 reps B LE in seated at EOB position and therapeutic activity consisting of training: bed mobility, supine<>sit transfers, sit<>stand transfers, static sitting tolerance at EOB for 25 minutes w/ min UE support, vc and tactile cues for static sitting posture faciliation and vc and tactile cues for static standing posture faciliation  Pt agreeable to PT treatment session upon arrival, pt found supine in bed w/ HOB elevated, in no apparent distress and responsive  In comparison to previous session, pt with improvements in ability to ambulated and amount of assistance required  Post session: pt returned BTB, all needs in reach and RN notified of session findings/recommendations  Continue to recommend post acute rehabilitation services at time of d/c in order to maximize pt's functional independence and safety w/ mobility  Pt continues to be functioning below baseline level   PT will continue to see pt during current hospitalization in order to address the deficits listed above and provide interventions consistent w/ POC in effort to achieve STGs  Barriers to Discharge: Inaccessible home environment        PT Discharge Recommendation: Post acute rehabilitation services          See flowsheet documentation for full assessment

## 2022-04-04 NOTE — ASSESSMENT & PLAN NOTE
Wt Readings from Last 3 Encounters:   04/04/22 47 2 kg (104 lb)   07/12/21 49 4 kg (109 lb)   07/03/21 51 kg (112 lb 7 oz)     · CXR (4/2):  Mild pulmonary edema with moderate right pleural effusion  · Continue IV Lasix 40 mg twice daily  · Continue metoprolol 25 mg b i d   · Continue to monitor daily weight, as well as input and output  · Further medication optimization as per Cardiology

## 2022-04-04 NOTE — UTILIZATION REVIEW
Initial Clinical Review    Admission: Date/Time/Statement:   Admission Orders (From admission, onward)     Ordered        04/02/22 0619  Inpatient Admission  Once                      Orders Placed This Encounter   Procedures    Inpatient Admission     Standing Status:   Standing     Number of Occurrences:   1     Order Specific Question:   Level of Care     Answer:   Med Surg [16]     Order Specific Question:   Estimated length of stay     Answer:   More than 2 Midnights     Order Specific Question:   Certification     Answer:   I certify that inpatient services are medically necessary for this patient for a duration of greater than two midnights  See H&P and MD Progress Notes for additional information about the patient's course of treatment  ED Arrival Information     Expected Arrival Acuity    - 4/2/2022 03:31 Emergent         Means of arrival Escorted by Service Admission type    Ambulance [facility] Emergency         Arrival complaint    chest pain        Chief Complaint   Patient presents with    Chest Pain     with nausea       Initial Presentation: 81 y o  female to the ED via EMS with complaints of chest pain, shortness of breath, nausea for a day prior to arrival  Admitted to inpatient for acute on chronic chf, afib with rvr, SIRS  H/O CHF, afib, CKD, CAd  She wears 2LNC oxygen at home  Arrives with clear lungs, elevated PRo BNP, CXR shows mild pulmonary edema  Tachycardic with AFib with RVR  Given IV lasix in the ED  Given IV bolus cardizem and started on drip  ChecK ECHO  I/O, fluid restriction  Continue with Eliquis  Trend procal   Started on IV abx  Cardiology consult  Date: 4/3   Day 2: H/O MR and chronic hypoxic respi failure, not compliant with home O2  Continue with Eliquis  Pt/OT recommend post acute rehab  Medication adjusted and continue to monitor  Cardiology consult: afib with RVR  Continue with Eliquis, monitor tele  Check ECHO   Heart rate remains elevated  Cardizem po has been held due to BP  Change cardizem dosing and parameters  Trace lower extremity edema  NO JVD  Decreased breath sounds       ED Triage Vitals   Temperature Pulse Respirations Blood Pressure SpO2   04/02/22 0853 04/02/22 0334 04/02/22 0334 04/02/22 0334 04/02/22 0334   98 2 °F (36 8 °C) 77 20 139/81 91 %      Temp Source Heart Rate Source Patient Position - Orthostatic VS BP Location FiO2 (%)   04/02/22 1700 04/02/22 0334 04/02/22 0334 04/02/22 0334 --   Tympanic Monitor Lying Right arm       Pain Score       04/02/22 0334       7          Wt Readings from Last 1 Encounters:   04/04/22 47 2 kg (104 lb)     Additional Vital Signs:   Time Temp Pulse Resp BP MAP (mmHg) SpO2 Calculated FIO2 (%) - Nasal Cannula Nasal Cannula O2 Flow Rate (L/min) O2 Device Patient Position - Orthostatic VS   04/03/22 1900 97 6 °F (36 4 °C) 94 18 118/74 -- 93 % 28 2 L/min Nasal cannula Lying   04/03/22 1511 97 2 °F (36 2 °C) Abnormal  100 18 111/72 -- 95 % 28 2 L/min Nasal cannula Sitting   04/03/22 1100 96 4 °F (35 8 °C) Abnormal  112 Abnormal  18 115/53 -- 98 % 28 2 L/min Nasal cannula Sitting   04/03/22 1034 -- -- -- 128/89 -- -- -- -- -- --   04/03/22 0836 -- -- -- -- -- -- 28 2 L/min Nasal cannula --   04/03/22 0700 96 4 °F (35 8 °C) Abnormal  96 18 111/59 -- 93 % -- -- None (Room air) Lying   04/03/22 0500 -- -- -- 107/72 -- -- -- -- -- Lying   04/03/22 0300 97 5 °F (36 4 °C) 104 19 107/75 -- 96 % 28 2 L/min Nasal cannula Lying   04/02/22 2300 97 3 °F (36 3 °C) Abnormal  97 20 101/55 -- 96 % 28 2 L/min Nasal cannula Lying   04/02/22 2206 -- -- -- 104/69 -- -- -- -- -- --   04/02/22 1900 97 1 °F (36 2 °C) Abnormal  93 18 108/63 74 95 % 28 2 L/min Nasal cannula Lying   04/02/22 1700 96 °F (35 6 °C) Abnormal  104 -- 134/74 -- 94 % -- -- Nasal cannula Lying   04/02/22 1100 -- 98 -- 116/84 95 93 % -- -- -- --   04/02/22 1045 -- 109 Abnormal  -- 124/76 95 94 % -- -- -- --   04/02/22 0853 98 2 °F (36 8 °C) 103 20 115/74 -- 94 % 28 2 L/min Nasal cannula --   04/02/22 0720 -- 133 Abnormal  25 Abnormal  119/89 -- 93 % 28 2 L/min Nasal cannula --   04/02/22 0559 -- 147 Abnormal  20 107/71 -- 96 % 32 3 L/min Nasal cannula Lying   04/02/22 0440 -- 130 Abnormal  22 127/84 -- 96 % 28 2 L/min Nasal cannula Lying   04/02/22 0334 -- 77 20 139/81 -- 91 % -- -- None (Room air)        Pertinent Labs/Diagnostic Test Results:   4/2 EKG:Atrial fibrillation with rapid ventricular response with premature ventricular or aberrantly conducted complexes  Left axis deviation  Incomplete right bundle branch block  ST & T wave abnormality, consider anterior ischemia  Abnormal ECG  4/4 ECHO: Left Ventricle: Left ventricular cavity size is normal  Wall thickness is mildly increased  The left ventricular ejection fraction is 55%  Systolic function is normal  Although no diagnostic regional wall motion abnormality was identified, this possibility cannot be completely excluded on the basis of this study    Left Atrium: The atrium is moderately dilated    Right Atrium: The atrium is mildly dilated    Aortic Valve: There is moderate stenosis    Mitral Valve: There is severe annular calcification  The posterior leaflet is prolapsed  There is moderate to severe regurgitation with an eccentrically directed jet  There is mild to moderate stenosis    Tricuspid Valve: There is mild regurgitation    Pulmonary Artery: The estimated pulmonary artery systolic pressure is 04 2 mmHg        X-ray chest 1 view portable   Final Result by Isael Cristobal MD (04/02 0740)      Mild pulmonary edema with moderate right effusion and right base atelectasis                    Workstation performed: [id number]           Results from last 7 days   Lab Units 04/02/22 0444   SARS-COV-2  Negative     Results from last 7 days   Lab Units 04/04/22  0440 04/03/22  0510 04/02/22  0429   WBC Thousand/uL 7 13 7 78 6 97   HEMOGLOBIN g/dL 11 8 11 7 11 4*   HEMATOCRIT % 35 6 35 8 36 2   PLATELETS Thousands/uL 214 197 201   NEUTROS ABS Thousands/µL  --   --  5 61         Results from last 7 days   Lab Units 04/04/22  0440 04/03/22  0510 04/02/22  0429   SODIUM mmol/L 138 137 136   POTASSIUM mmol/L 3 5 3 2* 4 3   CHLORIDE mmol/L 98 98 103   CO2 mmol/L 32 29 24   ANION GAP mmol/L 8 10 9   BUN mg/dL 21 22 19   CREATININE mg/dL 0 74 0 74 0 78   EGFR ml/min/1 73sq m 73 73 69   CALCIUM mg/dL 9 4 9 0 9 7             Results from last 7 days   Lab Units 04/04/22  0440 04/03/22  0510 04/02/22  0429   GLUCOSE RANDOM mg/dL 89 93 123             Results from last 7 days   Lab Units 04/02/22  0918 04/02/22  0628 04/02/22  0429   HS TNI 0HR ng/L  --   --  9   HS TNI 2HR ng/L  --  12  --    HSTNI D2 ng/L  --  3  --    HS TNI 4HR ng/L 11  --   --    HSTNI D4 ng/L 2  --   --          Results from last 7 days   Lab Units 04/02/22  0429   PROCALCITONIN ng/ml 0 08       Results from last 7 days   Lab Units 04/02/22  0429   BNP pg/mL 3,590*       Results from last 7 days   Lab Units 04/02/22  0444   INFLUENZA A PCR  Negative   INFLUENZA B PCR  Negative   RSV PCR  Negative     ED Treatment:   Medication Administration from 04/02/2022 0331 to 04/02/2022 1723       Date/Time Order Dose Route Action     04/02/2022 0629 furosemide (LASIX) injection 40 mg 40 mg Intravenous Given     04/02/2022 0642 diltiazem (CARDIZEM) 125 mg in sodium chloride 0 9 % 125 mL infusion 5 mg/hr Intravenous New Bag     04/02/2022 0856 apixaban (ELIQUIS) tablet 2 5 mg 2 5 mg Oral Given     04/02/2022 1417 furosemide (LASIX) injection 40 mg 40 mg Intravenous Given     04/02/2022 1455 diltiazem (CARDIZEM) tablet 90 mg 90 mg Oral Given     04/02/2022 0856 diltiazem (CARDIZEM) tablet 90 mg 90 mg Oral Given     04/02/2022 0856 metoprolol tartrate (LOPRESSOR) tablet 25 mg 25 mg Oral Given        Past Medical History:   Diagnosis Date    [facility]    Ambulatory dysfunction 8/17/2020    Arthritis     Chest wall contusion, right, initial encounter 8/17/2020    Chest wall contusion, right, subsequent encounter 8/17/2020    Chronic diastolic heart failure (HCC)     Chronic kidney disease, stage 2 (mild)     Coronary artery disease     history of stenting    Eczema     years    Edema     History of echocardiogram 07/20/2017    EF 55%, mild concentric LVH, bileaflet MVP with moderate MR, left atrial enlargement   History of transfusion     Hyperlipidemia     Hypertension     Hypo-osmolality and hyponatremia     Hypokalemia 7/11/2019    Mitral regurgitation      Admitting Diagnosis: Chest pain [R07 9]  Acute exacerbation of CHF (congestive heart failure) ([facility]) [I50 9]  Atrial fibrillation with RVR ([facility]) [I48 91]  Chest pain, unspecified type [R07 9]  Age/Sex: 81 y o  female  Admission Orders:   SCDS  I/o  Up as tolerated  1500 ml fluid restriction  Scheduled Medications:  apixaban, 2 5 mg, Oral, BID  diltiazem, 60 mg, Oral, Q8H AGUILAR  furosemide, 40 mg, Intravenous, BID (diuretic)  metoprolol tartrate, 25 mg, Oral, Q12H AGUILAR  polyethylene glycol, 17 g, Oral, Daily      Continuous IV Infusions:     PRN Meds:  senna-docusate sodium, 1 tablet, Oral, BID PRN  sodium chloride (PF), 3 mL, Intravenous, Q1H PRN        IP CONSULT TO NUTRITION SERVICES  IP CONSULT TO CARDIOLOGY    Network Utilization Review Department  ATTENTION: Please call with any questions or concerns to [phone] and carefully listen to the prompts so that you are directed to the right person  All voicemails are confidential   [facility] all requests for admission clinical reviews, approved or denied determinations and any other requests to dedicated fax number below belonging to the campus where the patient is receiving treatment   List of dedicated fax numbers for the Facilities:  [address] [place] [address] DENIALS (Administrative/Medical Necessity) [phone]   [address] [place] [address] (Maternity/NICU/Pediatrics) [address] [facility] [facility] 40 125 [facility]  [phone]   Layne Bower [address] [facility] [place] [address] [address] [place] [address] [address]ec[address] Oriana Zhang [address] [facility] [place] [address] [phone]

## 2022-04-04 NOTE — PLAN OF CARE
Problem: MOBILITY - ADULT  Goal: Maintain or return to baseline ADL function  Description: INTERVENTIONS:  -  Assess patient's ability to carry out ADLs; assess patient's baseline for ADL function and identify physical deficits which impact ability to perform ADLs (bathing, care of mouth/teeth, toileting, grooming, dressing, etc )  - Assess/evaluate cause of self-care deficits   - Assess range of motion  - Assess patient's mobility; develop plan if impaired  - Assess patient's need for assistive devices and provide as appropriate  - Encourage maximum independence but intervene and supervise when necessary  - Involve family in performance of ADLs  - Assess for home care needs following discharge   - Consider OT consult to assist with ADL evaluation and planning for discharge  - Provide patient education as appropriate  Outcome: Progressing  Goal: Maintains/Returns to pre admission functional level  Description: INTERVENTIONS:  - Perform BMAT or MOVE assessment daily    - Set and communicate daily mobility goal to care team and patient/family/caregiver  - Collaborate with rehabilitation services on mobility goals if consulted  - Perform Range of Motion 2 times a day  - Reposition patient every 2 hours    - Dangle patient 3 times a day  - Stand patient 3 times a day  - Ambulate patient 3 times a day  - Out of bed to chair 3 times a day   - Out of bed for meals 3 times a day  - Out of bed for toileting  - Record patient progress and toleration of activity level   Outcome: Progressing     Problem: Potential for Falls  Goal: Patient will remain free of falls  Description: INTERVENTIONS:  - Educate patient/family on patient safety including physical limitations  - Instruct patient to call for assistance with activity   - Consult OT/PT to assist with strengthening/mobility   - Keep Call bell within reach  - Keep bed low and locked with side rails adjusted as appropriate  - Keep care items and personal belongings within reach  - Initiate and maintain comfort rounds  - Make Fall Risk Sign visible to staff  - Offer Toileting every 2 Hours, in advance of need  - Initiate/Maintain bed alarm  - Obtain necessary fall risk management equipment: non slip socks  - Apply yellow socks and bracelet for high fall risk patients  - Consider moving patient to room near nurses station  Outcome: Progressing     Problem: Prexisting or High Potential for Compromised Skin Integrity  Goal: Skin integrity is maintained or improved  Description: INTERVENTIONS:  - Identify patients at risk for skin breakdown  - Assess and monitor skin integrity  - Assess and monitor nutrition and hydration status  - Monitor labs   - Assess for incontinence   - Turn and reposition patient  - Assist with mobility/ambulation  - Relieve pressure over bony prominences  - Avoid friction and shearing  - Provide appropriate hygiene as needed including keeping skin clean and dry  - Evaluate need for skin moisturizer/barrier cream  - Collaborate with interdisciplinary team   - Patient/family teaching  - Consider wound care consult   Outcome: Progressing     Problem: PAIN - ADULT  Goal: Verbalizes/displays adequate comfort level or baseline comfort level  Description: Interventions:  - Encourage patient to monitor pain and request assistance  - Assess pain using appropriate pain scale  - Administer analgesics based on type and severity of pain and evaluate response  - Implement non-pharmacological measures as appropriate and evaluate response  - Consider cultural and social influences on pain and pain management  - Notify physician/advanced practitioner if interventions unsuccessful or patient reports new pain  Outcome: Progressing     Problem: INFECTION - ADULT  Goal: Absence or prevention of progression during hospitalization  Description: INTERVENTIONS:  - Assess and monitor for signs and symptoms of infection  - Monitor lab/diagnostic results  - Monitor all insertion sites, i e  indwelling lines, tubes, and drains  - Monitor endotracheal if appropriate and nasal secretions for changes in amount and color  - [place] appropriate cooling/warming therapies per order  - Administer medications as ordered  - Instruct and encourage patient and family to use good hand hygiene technique  - Identify and instruct in appropriate isolation precautions for identified infection/condition  Outcome: Progressing  Goal: Absence of fever/infection during neutropenic period  Description: INTERVENTIONS:  - Monitor WBC    Outcome: Progressing     Problem: DISCHARGE PLANNING  Goal: Discharge to home or other facility with appropriate resources  Description: INTERVENTIONS:  - Identify barriers to discharge w/patient and caregiver  - Arrange for needed discharge resources and transportation as appropriate  - Identify discharge learning needs (meds, wound care, etc )  - Arrange for interpretive services to assist at discharge as needed  - Refer to Case Management Department for coordinating discharge planning if the patient needs post-hospital services based on physician/advanced practitioner order or complex needs related to functional status, cognitive ability, or social support system  Outcome: Progressing     Problem: Knowledge Deficit  Goal: Patient/family/caregiver demonstrates understanding of disease process, treatment plan, medications, and discharge instructions  Description: Complete learning assessment and assess knowledge base  Interventions:  - Provide teaching at level of understanding  - Provide teaching via preferred learning methods  Outcome: Progressing     Problem: Nutrition/Hydration-ADULT  Goal: Nutrient/Hydration intake appropriate for improving, restoring or maintaining nutritional needs  Description: Monitor and assess patient's nutrition/hydration status for malnutrition  Collaborate with interdisciplinary team and initiate plan and interventions as ordered    Monitor patient's weight and dietary intake as ordered or per policy  Utilize nutrition screening tool and intervene as necessary  Determine patient's food preferences and provide high-protein, high-caloric foods as appropriate       INTERVENTIONS:  - Monitor oral intake, urinary output, labs, and treatment plans  - Assess nutrition and hydration status and recommend course of action  - Evaluate amount of meals eaten  - Assist patient with eating if necessary   - Allow adequate time for meals  - Recommend/ encourage appropriate diets, oral nutritional supplements, and vitamin/mineral supplements  - Order, calculate, and assess calorie counts as needed  - Recommend, monitor, and adjust tube feedings and TPN/PPN based on assessed needs  - Assess need for intravenous fluids  - Provide specific nutrition/hydration education as appropriate  - Include patient/family/caregiver in decisions related to nutrition  Outcome: Progressing

## 2022-04-05 NOTE — PLAN OF CARE
Problem: INFECTION - ADULT  Goal: Absence or prevention of progression during hospitalization  Description: INTERVENTIONS:  - Assess and monitor for signs and symptoms of infection  - Monitor lab/diagnostic results  - Monitor all insertion sites, i e  indwelling lines, tubes, and drains  - Monitor endotracheal if appropriate and nasal secretions for changes in amount and color  - [place] appropriate cooling/warming therapies per order  - Administer medications as ordered  - Instruct and encourage patient and family to use good hand hygiene technique  - Identify and instruct in appropriate isolation precautions for identified infection/condition  Outcome: Not Progressing  Goal: Absence of fever/infection during neutropenic period  Description: INTERVENTIONS:  - Monitor WBC    Outcome: Not Progressing

## 2022-04-05 NOTE — ASSESSMENT & PLAN NOTE
Wt Readings from Last 3 Encounters:   04/05/22 47 2 kg (104 lb 0 9 oz)   07/12/21 49 4 kg (109 lb)   07/03/21 51 kg (112 lb 7 oz)     · Status post treatment with b i d  IV  - now on Lasix 40 mg p o  daily  · Continue metoprolol 25 mg b i d   · Continue to monitor daily weight, as well as input and output  · Appreciate Cardiology input

## 2022-04-05 NOTE — NURSING NOTE
Pt encouraged to stay on side when turned, pressure injury education explained to pt, turned to left side wedge in place  Sacrum red, blanchable

## 2022-04-05 NOTE — CASE MANAGEMENT
Per [facility] call ref# [id number], no Brittni Ni is needed for transport from hospital to SNF

## 2022-04-05 NOTE — ASSESSMENT & PLAN NOTE
· Now rate controlled  · Appreciate cardiology input  · Continue diltiazem 60 mg p o  Q 8 hours, and metoprolol tartrate 25 mg p o  q 12 hours for further rate control  · Continue apixaban 2 5 mg p o  b i d  for anticoagulation purposes  · Patient is otherwise is medically stable for discharge  · Status post a PT and OT evaluation, they recommend short-term rehab, the patient and her daughter are agreeable, case management is working on rehab options

## 2022-04-05 NOTE — PLAN OF CARE
Problem: MOBILITY - ADULT  Goal: Maintain or return to baseline ADL function  Description: INTERVENTIONS:  -  Assess patient's ability to carry out ADLs; assess patient's baseline for ADL function and identify physical deficits which impact ability to perform ADLs (bathing, care of mouth/teeth, toileting, grooming, dressing, etc )  - Assess/evaluate cause of self-care deficits   - Assess range of motion  - Assess patient's mobility; develop plan if impaired  - Assess patient's need for assistive devices and provide as appropriate  - Encourage maximum independence but intervene and supervise when necessary  - Involve family in performance of ADLs  - Assess for home care needs following discharge   - Consider OT consult to assist with ADL evaluation and planning for discharge  - Provide patient education as appropriate  Outcome: Progressing  Goal: Maintains/Returns to pre admission functional level  Description: INTERVENTIONS:  - Perform BMAT or MOVE assessment daily    - Set and communicate daily mobility goal to care team and patient/family/caregiver  - Collaborate with rehabilitation services on mobility goals if consulted  - Perform Range of Motion 2 times a day  - Reposition patient every 2 hours    - Dangle patient 3 times a day  - Stand patient 3 times a day  - Ambulate patient 3 times a day  - Out of bed to chair 3 times a day   - Out of bed for meals 3 times a day  - Out of bed for toileting  - Record patient progress and toleration of activity level   Outcome: Progressing     Problem: Potential for Falls  Goal: Patient will remain free of falls  Description: INTERVENTIONS:  - Educate patient/family on patient safety including physical limitations  - Instruct patient to call for assistance with activity   - Consult OT/PT to assist with strengthening/mobility   - Keep Call bell within reach  - Keep bed low and locked with side rails adjusted as appropriate  - Keep care items and personal belongings within reach  - Initiate and maintain comfort rounds  - Make Fall Risk Sign visible to staff  - Offer Toileting every 2 Hours, in advance of need  - Initiate/Maintain bedalarm  - Obtain necessary fall risk management equipment: non slip socks  - Apply yellow socks and bracelet for high fall risk patients  - Consider moving patient to room near nurses station  Outcome: Progressing     Problem: Prexisting or High Potential for Compromised Skin Integrity  Goal: Skin integrity is maintained or improved  Description: INTERVENTIONS:  - Identify patients at risk for skin breakdown  - Assess and monitor skin integrity  - Assess and monitor nutrition and hydration status  - Monitor labs   - Assess for incontinence   - Turn and reposition patient  - Assist with mobility/ambulation  - Relieve pressure over bony prominences  - Avoid friction and shearing  - Provide appropriate hygiene as needed including keeping skin clean and dry  - Evaluate need for skin moisturizer/barrier cream  - Collaborate with interdisciplinary team   - Patient/family teaching  - Consider wound care consult   Outcome: Progressing     Problem: PAIN - ADULT  Goal: Verbalizes/displays adequate comfort level or baseline comfort level  Description: Interventions:  - Encourage patient to monitor pain and request assistance  - Assess pain using appropriate pain scale  - Administer analgesics based on type and severity of pain and evaluate response  - Implement non-pharmacological measures as appropriate and evaluate response  - Consider cultural and social influences on pain and pain management  - Notify physician/advanced practitioner if interventions unsuccessful or patient reports new pain  Outcome: Progressing     Problem: INFECTION - ADULT  Goal: Absence or prevention of progression during hospitalization  Description: INTERVENTIONS:  - Assess and monitor for signs and symptoms of infection  - Monitor lab/diagnostic results  - Monitor all insertion sites, i e  indwelling lines, tubes, and drains  - Monitor endotracheal if appropriate and nasal secretions for changes in amount and color  - [place] appropriate cooling/warming therapies per order  - Administer medications as ordered  - Instruct and encourage patient and family to use good hand hygiene technique  - Identify and instruct in appropriate isolation precautions for identified infection/condition  Outcome: Progressing  Goal: Absence of fever/infection during neutropenic period  Description: INTERVENTIONS:  - Monitor WBC    Outcome: Progressing     Problem: DISCHARGE PLANNING  Goal: Discharge to home or other facility with appropriate resources  Description: INTERVENTIONS:  - Identify barriers to discharge w/patient and caregiver  - Arrange for needed discharge resources and transportation as appropriate  - Identify discharge learning needs (meds, wound care, etc )  - Arrange for interpretive services to assist at discharge as needed  - Refer to Case Management Department for coordinating discharge planning if the patient needs post-hospital services based on physician/advanced practitioner order or complex needs related to functional status, cognitive ability, or social support system  Outcome: Progressing     Problem: Knowledge Deficit  Goal: Patient/family/caregiver demonstrates understanding of disease process, treatment plan, medications, and discharge instructions  Description: Complete learning assessment and assess knowledge base  Interventions:  - Provide teaching at level of understanding  - Provide teaching via preferred learning methods  Outcome: Progressing     Problem: Nutrition/Hydration-ADULT  Goal: Nutrient/Hydration intake appropriate for improving, restoring or maintaining nutritional needs  Description: Monitor and assess patient's nutrition/hydration status for malnutrition  Collaborate with interdisciplinary team and initiate plan and interventions as ordered    Monitor patient's weight and dietary intake as ordered or per policy  Utilize nutrition screening tool and intervene as necessary  Determine patient's food preferences and provide high-protein, high-caloric foods as appropriate       INTERVENTIONS:  - Monitor oral intake, urinary output, labs, and treatment plans  - Assess nutrition and hydration status and recommend course of action  - Evaluate amount of meals eaten  - Assist patient with eating if necessary   - Allow adequate time for meals  - Recommend/ encourage appropriate diets, oral nutritional supplements, and vitamin/mineral supplements  - Order, calculate, and assess calorie counts as needed  - Recommend, monitor, and adjust tube feedings and TPN/PPN based on assessed needs  - Assess need for intravenous fluids  - Provide specific nutrition/hydration education as appropriate  - Include patient/family/caregiver in decisions related to nutrition  Outcome: Progressing

## 2022-04-05 NOTE — CASE MANAGEMENT
Case Management Discharge Planning Note    Patient name Rupali Elaine  Location [facility] MRN [id number]  : 1935 Date 2022       Current Admission Date: 2022  Current Admission Diagnosis:Atrial fibrillation with RVR [facility])   Patient Active Problem List    Diagnosis Date Noted    Chronic respiratory failure with hypoxia ([facility] ) 2022    Other constipation 2022    Atrial fibrillation with RVR ([place] [address] ) 2022    SIRS (systemic inflammatory response syndrome) ([place] [address] ) 2022    Cognitive impairment 2021    Close exposure to COVID-19 virus 2021    Visual hallucinations 2021    Chest wall contusion, right, initial encounter 2020    Ambulatory dysfunction 2020    Hypokalemia 2020    Liver lesion 2020    Rib fractures 2020    CKD (chronic kidney disease), stage II 2020    Pulmonary hypertension ([place] [address] ) 2020    Dizziness 2020    Generalized weakness 2020    Osteoarthritis of knee 2020    Pneumonia 2020    Mixed hyperlipidemia 2019    Hypomagnesemia 2019    Essential hypertension 2019    Acute hypokalemia 2019    Depression with anxiety 2019    Gastroesophageal reflux disease without esophagitis 2019    OAB (overactive bladder) 2019    Acute on chronic diastolic heart failure ([facility] ) 2019    Longstanding persistent atrial fibrillation ([facility] ) 2019    CAD (coronary artery disease) 2019    Mitral regurgitation 2019      LOS (days): 3  Geometric Mean LOS (GMLOS) (days):   Days to GMLOS:     OBJECTIVE:  Risk of Unplanned Readmission Score: 12         Current admission status: Inpatient   Preferred Pharmacy:    [address]  [place] [address]  Phone: [phone] Fax: [phone]    Primary Care Provider: Hussein Reza MD    Primary Insurance: [facility] SHIELD [employer] REP  Secondary Insurance:     DISCHARGE DETAILS:    Discharge planning discussed with[de-identified] patient and daughter was called at 09:10 am  Freedom of Choice: Yes  Comments - Freedom of Choice: rehab recommended,  [facility] a [facility] contacted family/caregiver?: Yes             Contacts  Patient Contacts: Josefina Metz  Relationship to Patient[de-identified] Family (daughter)  Contact Method: Phone  Phone Number: [phone]  Reason/Outcome: Discharge [address]         Is the patient interested in [facility] at discharge?: Yes (family would like [facility] if pt is not accepted to one of [facility] facilites that pt requested)         Other Referral/Resources/Interventions Provided:  Interventions: [address]  Term Rehab  Referral Comments: pt has been accepted by [facility],  family and pt aware that [facility] not [facility] beds, [place] is reviewing pt is hoping to go there, permission was given to go to [address] [place] [address], family will not gvie any other choices, I was told if I cannot get  a bed at the  [facility] or [place] she will go home with [facility]    Would you like to participate in our [address] service program?  : No - Declined    Treatment Team Recommendation: Short Term Rehab (referrals sent for rehab bed, pt will need authorization - pt will need transportation)                                         Family notified[de-identified] daughter was called

## 2022-04-05 NOTE — PROGRESS NOTES
[facility]  Progress Note - Shruti Hurtado 1935, 81 y o  female MRN: [id number]  Unit/Bed#: [facility] Encounter: [id number]  Primary Care Provider: Trev Charles MD   Date and time admitted to hospital: 4/2/2022  3:33 AM  Addendum at 3:00 p m  - notified by case management that the patient will need a COVID-19 test prior to being transferred to the [place] tomorrow  Will order a COVID-19 test now  Additionally, the patient is stable to get the COVID-19 vaccine booster and it will be provided to the patient prior to her going to the skilled nursing facility on 04/06/2022  * Atrial fibrillation with RVR [facility])  Assessment & Plan  · Now rate controlled  · Appreciate cardiology input  · Continue diltiazem 60 mg p o  Q 8 hours, and metoprolol tartrate 25 mg p o  q 12 hours for further rate control  · Continue apixaban 2 5 mg p o  b i d  for anticoagulation purposes  · Patient is otherwise is medically stable for discharge  · Status post a PT and OT evaluation, they recommend short-term rehab, the patient and her daughter are agreeable, case management is working on rehab options      Acute on chronic diastolic heart failure (HCC)  Assessment & Plan  Wt Readings from Last 3 Encounters:   04/05/22 47 2 kg (104 lb 0 9 oz)   07/12/21 49 4 kg (109 lb)   07/03/21 51 kg (112 lb 7 oz)     · Status post treatment with b i d  IV  - now on Lasix 40 mg p o  daily  · Continue metoprolol 25 mg b i d   · Continue to monitor daily weight, as well as input and output  · Appreciate Cardiology input    Essential hypertension  Assessment & Plan  · Blood pressure is controlled, continue Lasix, metoprolol and diltiazem    Acute hypokalemia  Assessment & Plan  · Resolved with repletion    CAD (coronary artery disease)  Assessment & Plan  · History of PCI to LAD per documentation in 2007   · Continue current cardiac based medications as outlined above      Chronic respiratory failure with hypoxia [facility])  Assessment & Plan  · Chronically maintained on 2 L nasal cannula  · The patient's daughter notes that she is often times noncompliant with home oxygen    Mitral regurgitation  Assessment & Plan  · 2D echocardiogram results appreciated  · Further management as per Cardiology    Other constipation  Assessment & Plan  · Resolved  · Continue MiraLax, and Senokot    SIRS (systemic inflammatory response syndrome) (HCC)  Assessment & Plan  · Tachycardia and tachypnea present on arrival  · No suspicion for acute infectious process  · This is likely due to decompensated heart failure and subsequent atrial fibrillation with RVR  · Management as above    Cognitive impairment  Assessment & Plan  · At baseline        VTE Prophylaxis:  Apixaban (Eliquis)    Patient Centered Rounds: I have performed bedside rounds with nursing staff today  Discussions with Specialists or Other Care Team Provider:  Cardiology, case management, nursing  Education and Discussions with Family / Patient:  Attempts to update the patient's daughter Maxie Phalen at 9:20 a m -no answer at the cell phone    Current Length of Stay: 3 day(s)    Current Patient Status: Inpatient   Certification Statement: The patient will continue to require additional inpatient hospital stay due to The need for placement    Discharge Plan:  Patient is medically stable for discharge, case management is working on placement    Code Status: Level 1 - Full Code    Subjective:   Patient seen and examined, resting in bed, no complaints, no distress    Objective:     Vitals:   Temp (24hrs), Av °F (36 7 °C), Min:97 3 °F (36 3 °C), Max:99 °F (37 2 °C)    Temp:  [97 3 °F (36 3 °C)-99 °F (37 2 °C)] 97 3 °F (36 3 °C)  HR:  [] 66  Resp:  [16-20] 16  BP: ()/(52-67) 107/65  SpO2:  [95 %-97 %] 95 %  Body mass index is 21 02 kg/m²  Input and Output Summary (last 24 hours):        Intake/Output Summary (Last 24 hours) at 2022 9803  Last data filed at 2022 0800  Gross per 24 hour   Intake 658 ml   Output --   Net 658 ml       Physical Exam:   Physical Exam  Constitutional:       General: She is not in acute distress  Appearance: Normal appearance  She is normal weight  She is not ill-appearing  HENT:      Head: Normocephalic and atraumatic  Nose: Nose normal       Mouth/Throat:      Mouth: Mucous membranes are moist    Eyes:      Extraocular Movements: Extraocular movements intact  Pupils: Pupils are equal, round, and reactive to light  Cardiovascular:      Rate and Rhythm: Normal rate and regular rhythm  Pulses: Normal pulses  Heart sounds: Normal heart sounds  No murmur heard  No friction rub  No gallop  Pulmonary:      Effort: Pulmonary effort is normal  No respiratory distress  Breath sounds: Normal breath sounds  No wheezing, rhonchi or rales  Abdominal:      General: There is no distension  Palpations: Abdomen is soft  There is no mass  Tenderness: There is no abdominal tenderness  Hernia: No hernia is present  Musculoskeletal:         General: No swelling or tenderness  Normal range of motion  Cervical back: Normal range of motion and neck supple  No rigidity  Right lower leg: No edema  Left lower leg: No edema  Skin:     General: Skin is warm  Capillary Refill: Capillary refill takes less than 2 seconds  Findings: No erythema or rash  Neurological:      General: No focal deficit present  Mental Status: She is alert and oriented to person, place, and time  Mental status is at baseline  Cranial Nerves: No cranial nerve deficit  Motor: No weakness     Psychiatric:         Mood and Affect: Mood normal          Behavior: Behavior normal          Additional Data:     Labs:    Results from last 7 days   Lab Units 04/05/22  0435   WBC Thousand/uL 6 60   HEMOGLOBIN g/dL 11 7   HEMATOCRIT % 35 6   PLATELETS Thousands/uL 220   NEUTROS PCT % 67   LYMPHS PCT % 15   MONOS PCT % 12   EOS PCT % 5     Results from last 7 days   Lab Units 04/05/22  0435   SODIUM mmol/L 133*   POTASSIUM mmol/L 3 7   CHLORIDE mmol/L 97   CO2 mmol/L 29   BUN mg/dL 18   CREATININE mg/dL 0 79   CALCIUM mg/dL 9 4                   * I Have Reviewed All Lab Data Listed Above  * Additional Pertinent Lab Tests Reviewed: [facility] Admission  Reviewed    Imaging:  Imaging Reports Reviewed Today Include:  None    Recent Cultures (last 7 days):           Last 24 Hours Medication List:   Current Facility-Administered Medications   Medication Dose Route Frequency Provider Last Rate    apixaban  2 5 mg Oral BID Becca Solis PA-C      diltiazem  60 mg Oral [facility] Pixie Lockwood, DO      furosemide  40 mg Oral Daily [address] [facility], [address]      melatonin  6 mg Oral HS Johnson Lala PA-C      metoprolol tartrate  25 mg Oral Q12H [facility],       polyethylene glycol  17 g Oral Daily [place], [place]      potassium chloride  40 mEq Oral Daily [address] [facility],  [address]      senna-docusate sodium  1 tablet Oral BID PRN [facility], DO      sodium chloride (PF)  3 mL Intravenous Q1H PRN Kaitlin Leija MD          Today, Patient Was Seen By: Shaun Gomez MD    ** Please Note: Dictation voice to text software may have been used in the creation of this document   **

## 2022-04-06 NOTE — NURSING NOTE
Patient stable and medically cleared for discharge  Order confirmed  Ra removed  Patient belongings returned   Discharge paperwork provided to transport team  Patient transferred to [facility] and exited the unit with transport team

## 2022-04-06 NOTE — ASSESSMENT & PLAN NOTE
· History of PCI to LAD per documentation in 2007   · Continue current cardiac based medications as outlined above post discharge

## 2022-04-06 NOTE — DISCHARGE SUMMARY
[address] 46  Discharge- Christmelody Sleet 1935, 81 y o  female MRN: [id number]  Unit/Bed#: -01 Encounter: [id number]  Primary Care Provider: Hussein Reza MD   Date and time admitted to hospital: 4/2/2022  3:33 AM    * Atrial fibrillation with RVR ([address] )  Assessment & Plan  · Now rate controlled  · Appreciate cardiology input  · Continue diltiazem 60 mg p o  Q 8 hours, and metoprolol tartrate 25 mg p o  q 12 hours for further rate control  · Continue apixaban 2 5 mg p o  b i d  for anticoagulation purposes  · Patient is otherwise is medically stable for discharge  · Status post a PT and OT evaluation, they recommend short-term rehab, the patient and her daughter are agreeable, case management is working on rehab options    · Update on 04/06/2022-notified by case management that the patient has been accepted to the [facility] skilled nursing facility  · Okay for discharge to the [facility] today on the aforementioned medications  · Outpatient follow-up with PCP, and Cardiology    Acute on chronic diastolic heart failure (HCC)  Assessment & Plan  Wt Readings from Last 3 Encounters:   04/06/22 48 4 kg (106 lb 11 2 oz)   07/12/21 49 4 kg (109 lb)   07/03/21 51 kg (112 lb 7 oz)     · Status post treatment with b i d  IV  - now on Lasix 40 mg p o  daily  · Continue metoprolol 25 mg b i d   · Continue to monitor daily weight, as well as input and output  · Appreciate Cardiology input  · DC to the [facility] on Lasix and metoprolol as outlined above    Essential hypertension  Assessment & Plan  · Blood pressure is controlled, continue Lasix, metoprolol and diltiazem post discharge    Acute hypokalemia  Assessment & Plan  · Resolved with repletion    CAD (coronary artery disease)  Assessment & Plan  · History of PCI to LAD per documentation in 2007   · Continue current cardiac based medications as outlined above post discharge      Chronic respiratory failure with hypoxia (HCC)  Assessment & Plan  · Chronically maintained on 2 L nasal cannula  · The patient's daughter notes that she is often times noncompliant with home oxygen    Mitral regurgitation  Assessment & Plan  · 2D echocardiogram results appreciated  · Further management as per Cardiology    Other constipation  Assessment & Plan  · Resolved  · Continue MiraLax, and Senokot post discharge    SIRS (systemic inflammatory response syndrome) (HCC)  Assessment & Plan  · Tachycardia and tachypnea present on arrival  · No suspicion for acute infectious process  · This is likely due to decompensated heart failure and subsequent atrial fibrillation with RVR  · Stable for discharge    Cognitive impairment  Assessment & Plan  · At baseline        Discharging Physician / Practitioner: Theo Pang MD  PCP: Diane Anguiano MD  Admission Date:   Admission Orders (From admission, onward)     Ordered        04/02/22 0619  Inpatient Admission  Once                      Discharge Date: 04/06/22    Medical Problems             Resolved Problems  Date Reviewed: 4/4/2022    None                Consultations During Hospital Stay:  · Cardiology    Procedures Performed:   · None    Significant Findings / Test Results:   · Chest x-ray-mild pulmonary edema with moderate right effusion and right base atelectasis    Incidental Findings:   · None     Test Results Pending at Discharge (will require follow up): · None     Outpatient Tests Requested:  · None    Complications:     None    Reason for Admission:  Decompensated heart failure, AFib with RVR    Hospital Course:     Deidra Forde is a 81 y o  female patient who originally presented to the hospital on 4/2/2022 due to chest pain and nausea  Please refer to the initial history and physical examination completed by Summer TAFOYA for the initial presenting features and complaints    In brief, the patient is an 78-year-old female, that presented to the hospital back on day of admission with chest pain   She was found to be in a state of AFib with RVR, and was also noted to have decompensated heart failure  She was treated diuretics  She was seen by Cardiology  Medications were optimized  Additionally, she was seen by PT and OT, they recommended short-term rehab  The patient was successfully placed at the [facility] on 04/06/2022 for post acute rehabilitation services  Diltiazem dosing was actually reduced a little bit on this admission, but otherwise was discharged on all of her other pre-admission medications at the preadmission dosages  She will follow up in the outpatient setting with the physicians assigned to her at the [facility], and then subsequently with her own PCP and Cardiology post release from the [facility]  Please refer to the assessment/plan portion of this discharge summary as outlined above for the remainder of the details in regards to her stay  Please see above list of diagnoses and related plan for additional information  Condition at Discharge: good     Discharge Day Visit / Exam:     Subjective:  Patient seen and examined, no new complaints, no distress  Vitals: Blood Pressure: 111/76 (04/06/22 1044)  Pulse: 98 (04/06/22 1044)  Temperature: 97 5 °F (36 4 °C) (04/06/22 1044)  Temp Source: Temporal (04/06/22 0733)  Respirations: 16 (04/06/22 1044)  Height: 4' 11" (149 9 cm) (04/04/22 0705)  Weight - Scale: 48 4 kg (106 lb 11 2 oz) (04/06/22 0600)  SpO2: 97 % (04/06/22 1044)  Exam:   Physical Exam  Constitutional:       General: She is not in acute distress  Appearance: Normal appearance  She is normal weight  She is not ill-appearing  HENT:      Head: Normocephalic and atraumatic  Nose: Nose normal       Mouth/Throat:      Mouth: Mucous membranes are moist    Eyes:      Extraocular Movements: Extraocular movements intact  Pupils: Pupils are equal, round, and reactive to light     Cardiovascular:      Rate and Rhythm: Normal rate and regular rhythm  Pulses: Normal pulses  Heart sounds: Normal heart sounds  No murmur heard  No friction rub  No gallop  Pulmonary:      Effort: Pulmonary effort is normal  No respiratory distress  Breath sounds: Normal breath sounds  No wheezing, rhonchi or rales  Abdominal:      General: There is no distension  Palpations: Abdomen is soft  There is no mass  Tenderness: There is no abdominal tenderness  Hernia: No hernia is present  Musculoskeletal:         General: No swelling or tenderness  Normal range of motion  Cervical back: Normal range of motion and neck supple  No rigidity  Right lower leg: No edema  Left lower leg: No edema  Skin:     General: Skin is warm  Capillary Refill: Capillary refill takes less than 2 seconds  Findings: No erythema or rash  Neurological:      General: No focal deficit present  Mental Status: She is alert and oriented to person, place, and time  Mental status is at baseline  Cranial Nerves: No cranial nerve deficit  Motor: No weakness  Psychiatric:         Mood and Affect: Mood normal          Behavior: Behavior normal          Discussion with Family:  Patient's daughter was brought up to par    Discharge instructions/Information to patient and family:   See after visit summary for information provided to patient and family  Provisions for Follow-Up Care:  See after visit summary for information related to follow-up care and any pertinent home health orders  Disposition:     [facility]       Planned Readmission:    None     Discharge Statement:  I spent 40 minutes discharging the patient  This time was spent on the day of discharge  I had direct contact with the patient on the day of discharge   Greater than 50% of the total time was spent examining patient, answering all patient questions, arranging and discussing plan of care with patient as well as directly providing post-discharge instructions  Additional time then spent on discharge activities  Discharge Medications:  See after visit summary for reconciled discharge medications provided to patient and family        ** Please Note: This note has been constructed using a voice recognition system **

## 2022-04-06 NOTE — UTILIZATION REVIEW
Continued Stay Review    Date: 4/6                        Current Patient Class: Inpatient  Current Level of Care: Med Surg    HPI:87 y o  female initially admitted on 4/2    Assessment/Plan: Atrial Fib, Acute on chronic diastolic heart failure  Transition to Lasix po from Iv  Awaiting IP placement  4/6  Per CM notes; Tentative d/c today to [facility]  Vital Signs:   04/06/22 10:44:58 97 5 °F (36 4 °C) 98 16 111/76 88 97 % -- -- -- --   04/06/22 07:33:38 97 1 °F (36 2 °C) Abnormal  129 Abnormal  18 128/82 97 93 % 28 2 L/min Nasal cannula Lying   04/06/22 06:28:32 -- 107 Abnormal  -- 104/71 82 94 % -- -- -- --   04/06/22 0626 -- -- -- 104/61 -- -- -- -- --      Pertinent Labs/Diagnostic Results:   Results from last 7 days   Lab Units 04/05/22  1603 04/02/22  0444   SARS-COV-2  Negative Negative     Results from last 7 days   Lab Units 04/05/22  0435 04/04/22  0440 04/03/22  0510 04/02/22  0429 04/02/22  0429   WBC Thousand/uL 6 60 7 13 7 78   < > 6 97   HEMOGLOBIN g/dL 11 7 11 8 11 7  --  11 4*   HEMATOCRIT % 35 6 35 6 35 8  --  36 2   PLATELETS Thousands/uL 220 214 197   < > 201   NEUTROS ABS Thousands/µL 4 42  --   --   --  5 61    < > = values in this interval not displayed           Results from last 7 days   Lab Units 04/05/22  0435 04/04/22  0440 04/03/22  0510 04/02/22  0429   SODIUM mmol/L 133* 138 137 136   POTASSIUM mmol/L 3 7 3 5 3 2* 4 3   CHLORIDE mmol/L 97 98 98 103   CO2 mmol/L 29 32 29 24   ANION GAP mmol/L 7 8 10 9   BUN mg/dL 18 21 22 19   CREATININE mg/dL 0 79 0 74 0 74 0 78   EGFR ml/min/1 73sq m 67 73 73 69   CALCIUM mg/dL 9 4 9 4 9 0 9 7             Results from last 7 days   Lab Units 04/05/22  0435 04/04/22  0440 04/03/22  0510 04/02/22  0429   GLUCOSE RANDOM mg/dL 92 89 93 123       Results from last 7 days   Lab Units 04/02/22  0918 04/02/22  0628 04/02/22  0429   HS TNI 0HR ng/L  --   --  9   HS TNI 2HR ng/L  --  12  --    HSTNI D2 ng/L  --  3  --    HS TNI 4HR ng/L 11  --   --    HSTNI D4 ng/L 2  --   --                  Results from last 7 days   Lab Units 04/02/22  0429   PROCALCITONIN ng/ml 0 08         Results from last 7 days   Lab Units 04/02/22  0429   BNP pg/mL 3,590*         Results from last 7 days   Lab Units 04/05/22  1603 04/02/22  0444   INFLUENZA A PCR  Negative Negative   INFLUENZA B PCR  Negative Negative   RSV PCR  Negative Negative       Medications:   Scheduled Medications:  apixaban, 2 5 mg, Oral, BID  diltiazem, 60 mg, Oral, Q8H AGUILAR  furosemide, 40 mg, Oral, Daily  melatonin, 6 mg, Oral, HS  metoprolol tartrate, 25 mg, Oral, Q12H AGUILAR  polyethylene glycol, 17 g, Oral, Daily  potassium chloride, 40 mEq, Oral, Daily      Continuous IV Infusions: None     PRN Meds:  acetaminophen, 650 mg, Oral, Q6H PRN  senna-docusate sodium, 1 tablet, Oral, BID PRN  sodium chloride (PF), 3 mL, Intravenous, Q1H PRN      Discharge Plan: See above    Network Utilization Review Department  ATTENTION: Please call with any questions or concerns to [phone] and carefully listen to the prompts so that you are directed to the right person  All voicemails are confidential   Awilda Estevez all requests for admission clinical reviews, approved or denied determinations and any other requests to dedicated fax number below belonging to the campus where the patient is receiving treatment   List of dedicated fax numbers for the Facilities:  [address] DENIALS (Administrative/Medical Necessity) [phone]   [address] (Maternity/NICU/Pediatrics) [phone]   [address] [phone]   [address]  [address] [facility] [place] [address] [address] [address] [facility] Oriana Zhang 1481 [address] [place] [address] [phone]

## 2022-04-06 NOTE — PLAN OF CARE
Problem: OCCUPATIONAL THERAPY ADULT  Goal: Performs self-care activities at highest level of function for planned discharge setting  See evaluation for individualized goals  Description: Treatment Interventions: ADL retraining,Functional transfer training,Endurance training,Equipment evaluation/education,Neuromuscular reeducation,Compensatory technique education,Energy conservation,Activityengagement  See flowsheet documentation for full assessment, interventions and recommendations  Outcome: Adequate for Discharge     Problem: PHYSICAL THERAPY ADULT  Goal: Performs mobility at highest level of function for planned discharge setting  See evaluation for individualized goals  Description: Treatment/Interventions: Functional transfer training,LE strengthening/ROM,Elevations,Therapeutic exercise,Endurance training,Patient/family training,Equipment eval/education,Bed mobility,Gait training,Spoke to nursing,Spoke to case management,OT  Equipment Recommended:  (TBD pending pt progress)       See flowsheet documentation for full assessment, interventions and recommendations    Outcome: Adequate for Discharge     Problem: MOBILITY - ADULT  Goal: Maintain or return to baseline ADL function  Description: INTERVENTIONS:  -  Assess patient's ability to carry out ADLs; assess patient's baseline for ADL function and identify physical deficits which impact ability to perform ADLs (bathing, care of mouth/teeth, toileting, grooming, dressing, etc )  - Assess/evaluate cause of self-care deficits   - Assess range of motion  - Assess patient's mobility; develop plan if impaired  - Assess patient's need for assistive devices and provide as appropriate  - Encourage maximum independence but intervene and supervise when necessary  - Involve family in performance of ADLs  - Assess for home care needs following discharge   - Consider OT consult to assist with ADL evaluation and planning for discharge  - Provide patient education as appropriate  4/6/2022 1414 by Alma Diggs RN  Outcome: Adequate for Discharge  4/6/2022 1414 by Alma Diggs RN  Outcome: Progressing  Goal: Maintains/Returns to pre admission functional level  Description: INTERVENTIONS:  - Perform BMAT or MOVE assessment daily    - Set and communicate daily mobility goal to care team and patient/family/caregiver  - Collaborate with rehabilitation services on mobility goals if consulted  - Perform Range of Motion 3 times a day  - Reposition patient every 2 hours    - Dangle patient 3 times a day  - Stand patient 3 times a day  - Ambulate patient 3 times a day  - Out of bed to chair 3 times a day   - Out of bed for meals 3 times a day  - Out of bed for toileting  - Record patient progress and toleration of activity level   4/6/2022 1414 by Alma Diggs RN  Outcome: Adequate for Discharge  4/6/2022 1414 by Alma Diggs RN  Outcome: Progressing     Problem: Potential for Falls  Goal: Patient will remain free of falls  Description: INTERVENTIONS:  - Educate patient/family on patient safety including physical limitations  - Instruct patient to call for assistance with activity   - Consult OT/PT to assist with strengthening/mobility   - Keep Call bell within reach  - Keep bed low and locked with side rails adjusted as appropriate  - Keep care items and personal belongings within reach  - Initiate and maintain comfort rounds  - Make Fall Risk Sign visible to staff  - Offer Toileting every 2 Hours, in advance of need  - Initiate/Maintain bed alarm  - Obtain necessary fall risk management equipment: bed alarm  - Apply yellow socks and bracelet for high fall risk patients  - Consider moving patient to room near nurses station  4/6/2022 1414 by Alma Diggs RN  Outcome: Adequate for Discharge  4/6/2022 1414 by Alma Diggs RN  Outcome: Progressing     Problem: Prexisting or High Potential for Compromised Skin Integrity  Goal: Skin integrity is maintained or improved  Description: INTERVENTIONS:  - Identify patients at risk for skin breakdown  - Assess and monitor skin integrity  - Assess and monitor nutrition and hydration status  - Monitor labs   - Assess for incontinence   - Turn and reposition patient  - Assist with mobility/ambulation  - Relieve pressure over bony prominences  - Avoid friction and shearing  - Provide appropriate hygiene as needed including keeping skin clean and dry  - Evaluate need for skin moisturizer/barrier cream  - Collaborate with interdisciplinary team   - Patient/family teaching  - Consider wound care consult   4/6/2022 1414 by Brianne Eason RN  Outcome: Adequate for Discharge  4/6/2022 1414 by Brianne Eason RN  Outcome: Progressing     Problem: PAIN - ADULT  Goal: Verbalizes/displays adequate comfort level or baseline comfort level  Description: Interventions:  - Encourage patient to monitor pain and request assistance  - Assess pain using appropriate pain scale  - Administer analgesics based on type and severity of pain and evaluate response  - Implement non-pharmacological measures as appropriate and evaluate response  - Consider cultural and social influences on pain and pain management  - Notify physician/advanced practitioner if interventions unsuccessful or patient reports new pain  4/6/2022 1414 by Brianne Eason RN  Outcome: Adequate for Discharge  4/6/2022 1414 by Brinane Eason RN  Outcome: Progressing     Problem: INFECTION - ADULT  Goal: Absence or prevention of progression during hospitalization  Description: INTERVENTIONS:  - Assess and monitor for signs and symptoms of infection  - Monitor lab/diagnostic results  - Monitor all insertion sites, i e  indwelling lines, tubes, and drains  - Monitor endotracheal if appropriate and nasal secretions for changes in amount and color  - [place] appropriate cooling/warming therapies per order  - Administer medications as ordered  - Instruct and encourage patient and family to use good hand hygiene technique  - Identify and instruct in appropriate isolation precautions for identified infection/condition  4/6/2022 1414 by Luis Miguel Rasmussen RN  Outcome: Adequate for Discharge  4/6/2022 1414 by Luis Miguel Rasmussen RN  Outcome: Progressing  Goal: Absence of fever/infection during neutropenic period  Description: INTERVENTIONS:  - Monitor WBC    4/6/2022 1414 by Luis Miguel Rasmussen RN  Outcome: Adequate for Discharge  4/6/2022 1414 by Luis Miguel Rasmussen RN  Outcome: Progressing     Problem: DISCHARGE PLANNING  Goal: Discharge to home or other facility with appropriate resources  Description: INTERVENTIONS:  - Identify barriers to discharge w/patient and caregiver  - Arrange for needed discharge resources and transportation as appropriate  - Identify discharge learning needs (meds, wound care, etc )  - Arrange for interpretive services to assist at discharge as needed  - Refer to Case Management Department for coordinating discharge planning if the patient needs post-hospital services based on physician/advanced practitioner order or complex needs related to functional status, cognitive ability, or social support system  4/6/2022 1414 by Luis Miguel Rasmussen RN  Outcome: Adequate for Discharge  4/6/2022 1414 by Luis Miguel Rasmussen RN  Outcome: Progressing     Problem: Knowledge Deficit  Goal: Patient/family/caregiver demonstrates understanding of disease process, treatment plan, medications, and discharge instructions  Description: Complete learning assessment and assess knowledge base    Interventions:  - Provide teaching at level of understanding  - Provide teaching via preferred learning methods  4/6/2022 1414 by Luis Miguel Rasmussen RN  Outcome: Adequate for Discharge  4/6/2022 1414 by Luis Miguel Rasmussen RN  Outcome: Progressing     Problem: Nutrition/Hydration-ADULT  Goal: Nutrient/Hydration intake appropriate for improving, restoring or maintaining nutritional needs  Description: Monitor and assess patient's nutrition/hydration status for malnutrition  Collaborate with interdisciplinary team and initiate plan and interventions as ordered  Monitor patient's weight and dietary intake as ordered or per policy  Utilize nutrition screening tool and intervene as necessary  Determine patient's food preferences and provide high-protein, high-caloric foods as appropriate       INTERVENTIONS:  - Monitor oral intake, urinary output, labs, and treatment plans  - Assess nutrition and hydration status and recommend course of action  - Evaluate amount of meals eaten  - Assist patient with eating if necessary   - Allow adequate time for meals  - Recommend/ encourage appropriate diets, oral nutritional supplements, and vitamin/mineral supplements  - Order, calculate, and assess calorie counts as needed  - Recommend, monitor, and adjust tube feedings and TPN/PPN based on assessed needs  - Assess need for intravenous fluids  - Provide specific nutrition/hydration education as appropriate  - Include patient/family/caregiver in decisions related to nutrition  4/6/2022 1414 by Geovanna Quintanilla RN  Outcome: Adequate for Discharge  4/6/2022 1414 by Geovanna Quintanilla RN  Outcome: Progressing

## 2022-04-06 NOTE — NURSING NOTE
Administered 0 25 moderna vaccine lot # [id number] expiration 4/17/22 in left deltoid  Two person verification completed prior to administration

## 2022-04-06 NOTE — CASE MANAGEMENT
Case Management Discharge Planning Note    Patient name Emelyn Sommers  Location /-98 MRN [id number]  : 1935 Date 2022       Current Admission Date: 2022  Current Admission Diagnosis:Atrial fibrillation with RVR [facility])   Patient Active Problem List    Diagnosis Date Noted    Chronic [facility] failure with [facility] ([facility] ) 2022    Other constipation 2022    Atrial fibrillation with RVR ([facility] ) 2022    SIRS (systemic inflammatory response syndrome) ([facility] ) 2022    Cognitive impairment 2021    Close exposure to COVID-19 virus 2021    Visual hallucinations 2021    Chest wall contusion, right, initial encounter 2020    Ambulatory dysfunction 2020    Hypokalemia 2020    Liver lesion 2020    Rib fractures 2020    CKD (chronic kidney disease), stage II 2020    Pulmonary hypertension ([facility] ) 2020    Dizziness 2020    Generalized weakness 2020    Osteoarthritis of knee 2020    Pneumonia 2020    Mixed hyperlipidemia 2019    Hypomagnesemia 2019    Essential hypertension 2019    Acute hypokalemia 2019    Depression with anxiety 2019    Gastroesophageal reflux disease without esophagitis 2019    OAB (overactive bladder) 2019    Acute on chronic diastolic heart failure ([facility] ) 2019    Longstanding persistent atrial fibrillation ([facility] ) 2019    CAD (coronary artery disease) 2019    Mitral regurgitation 2019      LOS (days): 4  Geometric Mean LOS (GMLOS) (days): 3 80  Days to GMLOS:-0 3     OBJECTIVE:  Risk of Unplanned Readmission Score: 12         Current admission status: Inpatient   Preferred Pharmacy:    [facility]  [facility]  [place] [address] [place] [address]  Phone: [phone] Fax: [phone]    Primary Care Provider: Yolanda Dockery MD    Primary Insurance: [address]  Secondary Insurance:     DISCHARGE DETAILS:              Facility Insurance Auth Number: Approved [facility] auth # [id number] for [employer] at level 2 recc  532 theraoy mins/week SOC: 4/6/2022 NRD: 4/8/2022 Care Coordinator: Davon Mendoza Fax clinical review to [phone]   Any questions about Peyman Watson can be called into p# [phone]

## 2022-04-06 NOTE — ASSESSMENT & PLAN NOTE
Wt Readings from Last 3 Encounters:   04/06/22 48 4 kg (106 lb 11 2 oz)   07/12/21 49 4 kg (109 lb)   07/03/21 51 kg (112 lb 7 oz)     · Status post treatment with b i d  IV  - now on Lasix 40 mg p o  daily  · Continue metoprolol 25 mg b i d   · Continue to monitor daily weight, as well as input and output  · Appreciate Cardiology input  · DC to the [facility] on Lasix and metoprolol as outlined above

## 2022-04-06 NOTE — DISCHARGE INSTR - AVS FIRST PAGE
11/6/2020    Regarding: Nina Lopez  YOB: 2007  [address] [facility] [address]    Dear  MrVin and Mrs. Lopez,     The results of Nina's recent laboratory tests are enclosed.  Nina's iron level is slightly high at 105. This is not a concern when the Ferritin is low.  Nina's ferritin level is lower end of normal at 19. Ferritin is the long term storage form of iron and is felt to reflect the iron stored in various organs like brain and muscle. Our goal is a level of 100-150.  Nina's vitamin D level is normal at 48.8.  Nina's Complete blood count was normal.     I recommend that Nina take the following supplements:  For iron - NovaFerrum iron 125 mg/5 mL (available online). Take 2.5 mL twice daily or 5 ml once daily. Take with orange juice or Vitamin C 250 mg to improve absorption.  Eggs, milk/dairy products and leafy greens like spinach and kale can reduce the absorption of iron. Try to avoid serving them within 60 minutes of a dose of iron but don't worry about  them at breakfast.    ..... For vitamin D - Nina could take the [employer]ons D3 + K2 drops at a dose of 2 drops in whole or 2% milk once daily.     ..... Nina should also take a multivitamin like [place] Complete hard chewable with iron, 1 tablet daily.      I would like to repeat these blood tests in 6 months to follow the progress with supplementation. We will call with a reminder at that time.     Sincerely,          Yovani Carver MD  [facility]  [address] [place]   [address]  [phone]          Component      Latest Ref Rng & Units 11/4/2020   IRON      25 - 102 mcg/dL 105 (H)   Total Iron Binding Capacity      250 - 420 mcg/dL 371   PERCENT IRON SATURATION      15 - 45 % 28   Ferritin      15 - 112 ng/mL 19   VITAMIND, 25 HYDROXY      30.0 - 100.0 ng/mL 48.8     Component      Latest Ref Rng & Units 11/4/2020   WBC      4.2 - 11.0 K/mcL 5.6   RBC   Dear Adithya Santo,     It was our pleasure to care for you here at [facility],  [address]  It is our hope that we were always able to exceed the expected standards for your care during your stay  You were hospitalized due to atrial fibrillation with rapid ventricular response  You were cared for on the medical/surgical floor by Rainer العراقي MD with the [facility] who covers for your primary care physician (PCP), Killian Castillo MD, while you were hospitalized  If you have any questions or concerns related to this hospitalization, you may contact us at [phone]  For follow up as well as any medication refills, we recommend that you follow up with your primary care physician  A registered nurse will reach out to you by phone within a few days after your discharge to answer any additional questions that you may have after going home  However, at this time we provide for you here, the most important instructions / recommendations at discharge:     Notable Medication Adjustments -   Okay to resume all pre-admit meds at pre-admit dosages  Testing Required after Discharge -   To be further determined in the outpatient setting by your primary care provider  Important follow up information -   Please follow-up with the providers as outlined in this discharge package  Other Instructions -   Please participate to the best of your ability with the physical therapy and occupational therapy at the skilled nursing facility  Please review this entire after visit summary as additional general instructions including medication list, appointments, activity, diet, any pertinent wound care, and other additional recommendations from your care team that may be provided for you        Sincerely,     Rainer العراقي MD 3.90 - 5.30 mil/mcL 5.08   HGB      12.0 - 15.5 g/dL 14.1   HCT      36.0 - 46.5 % 41.4   MCV      78.0 - 100.0 fl 81.5   MCH      26.0 - 34.0 pg 27.8   MCHC      32.0 - 36.5 g/dL 34.1   PLT      140 - 450 K/mcL 287   RDW-SD      35.0 - 47.0 fL 39.7   RDW-CV      11.0 - 15.0 % 13.7

## 2022-04-06 NOTE — CASE MANAGEMENT
Case Management Discharge Planning Note    Patient name Antonina Sees  Location /-38 MRN [id number]  : 1935 Date 2022       Current Admission Date: 2022  Current Admission Diagnosis:Atrial fibrillation with RVR [facility])   Patient Active Problem List    Diagnosis Date Noted    Chronic respiratory failure with hypoxia ([facility] ) 2022    Other constipation 2022    Atrial fibrillation with RVR ([facility] ) 2022    SIRS (systemic inflammatory response syndrome) ([facility] ) 2022    Cognitive impairment 2021    Close exposure to COVID-19 virus 2021    Visual hallucinations 2021    Chest wall contusion, right, initial encounter 2020    Ambulatory dysfunction 2020    Hypokalemia 2020    Liver lesion 2020    Rib fractures 2020    CKD (chronic kidney disease), stage II 2020    Pulmonary hypertension ([facility] ) 2020    Dizziness 2020    Generalized weakness 2020    Osteoarthritis of knee 2020    Pneumonia 2020    Mixed hyperlipidemia 2019    Hypomagnesemia 2019    Essential hypertension 2019    Acute hypokalemia 2019    Depression with anxiety 2019    Gastroesophageal reflux disease without esophagitis 2019    OAB (overactive bladder) 2019    Acute on chronic diastolic heart failure ([facility] ) 2019    Longstanding persistent atrial fibrillation ([facility] ) 2019    CAD (coronary artery disease) 2019    Mitral regurgitation 2019      LOS (days): 4  Geometric Mean LOS (GMLOS) (days): 3 80  Days to GMLOS:-0 5     OBJECTIVE:  Risk of Unplanned Readmission Score: 12         Current admission status: Inpatient   Preferred Pharmacy:    [facility]  [facility]  [place] [address]  Phone: [phone] Fax: [phone]    Primary Care Provider: Dasha Calderon MD    Primary Insurance: [address]  Secondary Insurance:     DISCHARGE DETAILS:    Discharge planning discussed with[de-identified] patient and daughter  Freedom of Choice: Yes  Comments - Freedom of Choice: rehab recommended   pt was accepted to the [facility]   auth received    and vaccine was given  CM contacted family/caregiver?: Yes  Were Treatment Team discharge recommendations reviewed with patient/caregiver?: Yes  Did patient/caregiver verbalize understanding of patient care needs?: Yes  Were patient/caregiver advised of the risks associated with not following Treatment Team discharge recommendations?: Yes    Contacts  Patient Contacts: Margobautista Shaferyash  Relationship to Patient[de-identified] Family (daughter)  Contact Method: Phone  Phone Number: [phone]  Reason/Outcome: Discharge [address]         Is the patient interested in [place] [address] at discharge?: No    DME Referral Provided  Referral made for DME?: No (d/c to snf)    Other Referral/Resources/Interventions Provided:  Interventions: Short Term Rehab  Referral Comments: accepted to [facility] auth# [id number] level 2 icos525 therapy mins per week    Would you like to participate in our [address] service program?  : No - Declined (d/c to snf)    Treatment Team Recommendation: Short Term Rehab (summit at 14:400 bls)     Transport at Discharge : [facility]  Dispatcher Contacted: Yes  Number/Name of Dispatcher: [phone]  [facility]  [place]  ETA of Transport (Date): 04/06/22  ETA of Transport (Time): 1400            covid test completed        Family notified[de-identified] daughter was called  Additional Comments: rn was given the report and fax #   Booster to be given  , no authorization is needed for transportation

## 2022-04-06 NOTE — ASSESSMENT & PLAN NOTE
· Now rate controlled  · Appreciate cardiology input  · Continue diltiazem 60 mg p o  Q 8 hours, and metoprolol tartrate 25 mg p o  q 12 hours for further rate control  · Continue apixaban 2 5 mg p o  b i d  for anticoagulation purposes  · Patient is otherwise is medically stable for discharge  · Status post a PT and OT evaluation, they recommend short-term rehab, the patient and her daughter are agreeable, case management is working on rehab options    · Update on 04/06/2022-notified by case management that the patient has been accepted to the [facility] skilled nursing facility  · Okay for discharge to the [facility] today on the aforementioned medications  · Outpatient follow-up with PCP, and Cardiology

## 2022-04-06 NOTE — PLAN OF CARE
Patient is AxOx4, but is forgetful  Patient is on 2L NC & VSS  Patient complains of chest soreness, PRN Tylenol was given  Patient sleeps between care  Patient is an assist x2 with walker RORO Myrick is utilized  Bed is in lowest position  Alarms audible  All needs are within reach    Problem: MOBILITY - ADULT  Goal: Maintain or return to baseline ADL function  Description: INTERVENTIONS:  -  Assess patient's ability to carry out ADLs; assess patient's baseline for ADL function and identify physical deficits which impact ability to perform ADLs (bathing, care of mouth/teeth, toileting, grooming, dressing, etc )  - Assess/evaluate cause of self-care deficits   - Assess range of motion  - Assess patient's mobility; develop plan if impaired  - Assess patient's need for assistive devices and provide as appropriate  - Encourage maximum independence but intervene and supervise when necessary  - Involve family in performance of ADLs  - Assess for home care needs following discharge   - Consider OT consult to assist with ADL evaluation and planning for discharge  - Provide patient education as appropriate  Outcome: Progressing     Problem: Potential for Falls  Goal: Patient will remain free of falls  Description: INTERVENTIONS:  - Educate patient/family on patient safety including physical limitations  - Instruct patient to call for assistance with activity   - Consult OT/PT to assist with strengthening/mobility   - Keep Call bell within reach  - Keep bed low and locked with side rails adjusted as appropriate  - Keep care items and personal belongings within reach  - Initiate and maintain comfort rounds  - Make Fall Risk Sign visible to staff  - Offer Toileting every 2 Hours, in advance of need  - Initiate/Maintain bed alarm  - Obtain necessary fall risk management equipment:  - Apply yellow socks and bracelet for high fall risk patients  - Consider moving patient to room near nurses station  Outcome: Progressing Problem: Prexisting or High Potential for Compromised Skin Integrity  Goal: Skin integrity is maintained or improved  Description: INTERVENTIONS:  - Identify patients at risk for skin breakdown  - Assess and monitor skin integrity  - Assess and monitor nutrition and hydration status  - Monitor labs   - Assess for incontinence   - Turn and reposition patient  - Assist with mobility/ambulation  - Relieve pressure over bony prominences  - Avoid friction and shearing  - Provide appropriate hygiene as needed including keeping skin clean and dry  - Evaluate need for skin moisturizer/barrier cream  - Collaborate with interdisciplinary team   - Patient/family teaching  - Consider wound care consult   Outcome: Progressing     Problem: PAIN - ADULT  Goal: Verbalizes/displays adequate comfort level or baseline comfort level  Description: Interventions:  - Encourage patient to monitor pain and request assistance  - Assess pain using appropriate pain scale  - Administer analgesics based on type and severity of pain and evaluate response  - Implement non-pharmacological measures as appropriate and evaluate response  - Consider cultural and social influences on pain and pain management  - Notify physician/advanced practitioner if interventions unsuccessful or patient reports new pain  Outcome: Progressing     Problem: INFECTION - ADULT  Goal: Absence or prevention of progression during hospitalization  Description: INTERVENTIONS:  - Assess and monitor for signs and symptoms of infection  - Monitor lab/diagnostic results  - Monitor all insertion sites, i e  indwelling lines, tubes, and drains  - Monitor endotracheal if appropriate and nasal secretions for changes in amount and color  - [place] appropriate cooling/warming therapies per order  - Administer medications as ordered  - Instruct and encourage patient and family to use good hand hygiene technique  - Identify and instruct in appropriate isolation precautions for identified infection/condition  Outcome: Progressing     Problem: Knowledge Deficit  Goal: Patient/family/caregiver demonstrates understanding of disease process, treatment plan, medications, and discharge instructions  Description: Complete learning assessment and assess knowledge base  Interventions:  - Provide teaching at level of understanding  - Provide teaching via preferred learning methods  Outcome: Progressing     Problem: Nutrition/Hydration-ADULT  Goal: Nutrient/Hydration intake appropriate for improving, restoring or maintaining nutritional needs  Description: Monitor and assess patient's nutrition/hydration status for malnutrition  Collaborate with interdisciplinary team and initiate plan and interventions as ordered  Monitor patient's weight and dietary intake as ordered or per policy  Utilize nutrition screening tool and intervene as necessary  Determine patient's food preferences and provide high-protein, high-caloric foods as appropriate       INTERVENTIONS:  - Monitor oral intake, urinary output, labs, and treatment plans  - Assess nutrition and hydration status and recommend course of action  - Evaluate amount of meals eaten  - Assist patient with eating if necessary   - Allow adequate time for meals  - Recommend/ encourage appropriate diets, oral nutritional supplements, and vitamin/mineral supplements  - Order, calculate, and assess calorie counts as needed  - Recommend, monitor, and adjust tube feedings and TPN/PPN based on assessed needs  - Assess need for intravenous fluids  - Provide specific nutrition/hydration education as appropriate  - Include patient/family/caregiver in decisions related to nutrition  Outcome: Progressing

## 2022-04-06 NOTE — ASSESSMENT & PLAN NOTE
· Tachycardia and tachypnea present on arrival  · No suspicion for acute infectious process  · This is likely due to decompensated heart failure and subsequent atrial fibrillation with RVR  · Stable for discharge

## 2022-04-07 NOTE — UTILIZATION REVIEW
Notification of Discharge   This is a Notification of Discharge from our facility [address]  Please be advised that this patient has been discharge from our facility  Below you will find the admission and discharge date and time including the patients disposition  UTILIZATION REVIEW CONTACT:  Ashley Damon  Utilization   Network Utilization Review Department  Phone: [phone] carefully listen to the prompts  All voicemails are confidential   Email: [email or web address]     PHYSICIAN ADVISORY SERVICES:  FOR DZHT-RF-NYFG REVIEW - MEDICAL NECESSITY DENIAL  Phone: [phone]  Fax: [phone]  Email: [email or web address]     PRESENTATION DATE: 4/2/2022  3:33 AM  OBERVATION ADMISSION DATE:   INPATIENT ADMISSION DATE: 4/2/22  6:19 AM   DISCHARGE DATE: 4/6/2022  2:25 PM  DISPOSITION: Non [facility] SN[facility]/TCU/SNU Non [facility] SN[facility]/TC[facility]/SNU      IMPORTANT INFORMATION:  Send all requests for admission clinical reviews, approved or denied determinations and any other requests to dedicated fax number below belonging to the campus where the patient is receiving treatment   List of dedicated fax numbers:  [address] [place] [address] DENIALS (Administrative/Medical Necessity) [phone]   [address]  (Maternity/NICU/Pediatrics) [phone]   [facility] [phone]   [address] [place] [address] [phone]   [address] [place] [address] [phone]   [address] [place] [address] [address] [place] [address],[address] [phone]   [facility]  [phone]   [address] [place] [address]  [address] [facility] [address] [facility] [phone]

## 2022-05-19 PROBLEM — E44.0 MODERATE PROTEIN-CALORIE MALNUTRITION (HCC): Chronic | Status: ACTIVE | Noted: 2022-01-01

## 2022-05-19 PROBLEM — K56.7 ILEUS (HCC): Status: ACTIVE | Noted: 2022-01-01

## 2022-05-19 NOTE — ED PROVIDER NOTES
History  Chief Complaint   Patient presents with    Abdominal Pain     44-year-old female presenting from home for few days of abdominal pain  States she had 1 episode of diarrhea today  Also has intermittent chest pain which she gets regularly  Denies fevers, night sweats, chills, sore throat, cough, nausea or vomiting, dysuria, hematuria  No new medications  Reportedly her son has similar symptoms with nausea and vomiting at home  No prior abdominal surgeries reported  Prior to Admission Medications   Prescriptions Last Dose Informant Patient Reported? Taking?    ALPRAZolam (XANAX) 0 25 mg tablet   No No   Sig: Take 1 tablet (0 25 mg total) by mouth daily at bedtime as needed for anxiety for up to 10 days   acetaminophen (TYLENOL) 325 mg tablet   No No   Sig: Take 3 tablets (975 mg total) by mouth every 8 (eight) hours   apixaban (ELIQUIS) 2 5 mg   No No   Sig: Take 1 tablet (2 5 mg total) by mouth 2 (two) times a day   bisacodyl (DULCOLAX) 5 mg EC tablet   No No   Sig: Take 1 tablet (5 mg total) by mouth daily as needed for constipation   diltiazem (CARDIZEM) 60 mg tablet   No No   Sig: Take 1 tablet (60 mg total) by mouth every 8 (eight) hours   furosemide (LASIX) 40 mg tablet   No No   Sig: Take 1 tablet (40 mg total) by mouth daily   hydrOXYzine HCL (ATARAX) 25 mg tablet   Yes No   Sig: Take 25 mg by mouth 2 (two) times a day   metoprolol tartrate (LOPRESSOR) 25 mg tablet   No No   Sig: Take 1 tablet (25 mg total) by mouth 2 (two) times a day   omeprazole (PriLOSEC) 20 mg delayed release capsule   Yes No   Sig: Take 20 mg by mouth daily   oxybutynin (DITROPAN) 5 mg tablet   Yes No   Sig: Take 5 mg by mouth 3 (three) times a day    polyethylene glycol (MIRALAX) 17 g packet   No No   Sig: Take 17 g by mouth daily   potassium chloride (K-DUR,KLOR-CON) 20 mEq tablet   No No   Sig: Take 1 tablet (20 mEq total) by mouth daily   sertraline (ZOLOFT) 50 mg tablet   Yes No   Sig: Take 75 mg by mouth daily Facility-Administered Medications: None       Past Medical History:   Diagnosis Date    Acute on chronic diastolic congestive heart failure ([facility] [address] ) 6/27/2019    Ambulatory dysfunction 8/17/2020    Arthritis     Chest wall contusion, right, initial encounter 8/17/2020    Chest wall contusion, right, subsequent encounter 8/17/2020    Chronic diastolic heart failure (HCC)     Chronic kidney disease, stage 2 (mild)     Coronary artery disease     history of stenting    Eczema     years    Edema     History of echocardiogram 07/20/2017    EF 55%, mild concentric LVH, bileaflet MVP with moderate MR, left atrial enlargement   History of transfusion     Hyperlipidemia     Hypertension     Hypo-osmolality and hyponatremia     Hypokalemia 7/11/2019    Mitral regurgitation        Past Surgical History:   Procedure Laterality Date    ABDOMINAL SURGERY      hysterectomy    CARDIAC CATHETERIZATION  04/10/2012    EF 65%, no significant CAD, patent stents, severe MR     CORONARY ANGIOPLASTY WITH STENT PLACEMENT  03/21/2007    EF 55%, GARRET to LAD   EYE SURGERY      b/l cataracts    HYSTERECTOMY      JOINT REPLACEMENT      Rt knee       Family History   Problem Relation Age of Onset    Arthritis Mother     Cancer Mother     Heart disease Mother     Hypertension Mother     Alcohol abuse Father     Arthritis Father     Birth defects Son     Hearing loss Son     Diabetes Daughter     Stroke Maternal Grandmother     Asthma Maternal Grandfather      I have reviewed and agree with the history as documented      E-Cigarette/Vaping    E-Cigarette Use Never User      E-Cigarette/Vaping Substances    Nicotine No     THC No     CBD No     Flavoring No     Other No     Unknown No      Social History     Tobacco Use    Smoking status: Never Smoker    Smokeless tobacco: Never Used   Vaping Use    Vaping Use: Never used   Substance Use Topics    Alcohol use: Never     Alcohol/week: 0 0 standard drinks    Drug use: Never       Review of Systems   Cardiovascular: Positive for chest pain  Gastrointestinal: Positive for abdominal pain and diarrhea  All other systems reviewed and are negative  Physical Exam  Physical Exam  Vitals and nursing note reviewed  Constitutional:       General: She is not in acute distress  Appearance: She is well-developed  She is not diaphoretic  HENT:      Head: Normocephalic and atraumatic  Right Ear: External ear normal       Left Ear: External ear normal       Nose: Nose normal    Eyes:      General: No scleral icterus  Right eye: No discharge  Left eye: No discharge  Conjunctiva/sclera: Conjunctivae normal       Pupils: Pupils are equal, round, and reactive to light  Neck:      Vascular: No JVD  Trachea: No tracheal deviation  Cardiovascular:      Rate and Rhythm: Normal rate and regular rhythm  Heart sounds: Normal heart sounds  No murmur heard  No friction rub  No gallop  Pulmonary:      Effort: Pulmonary effort is normal  No respiratory distress  Breath sounds: Normal breath sounds  No stridor  No wheezing or rales  Abdominal:      General: Bowel sounds are normal  There is no distension  Palpations: Abdomen is soft  There is no mass  Tenderness: There is generalized abdominal tenderness  There is no right CVA tenderness, left CVA tenderness or guarding  Musculoskeletal:         General: No tenderness or deformity  Normal range of motion  Cervical back: Normal range of motion and neck supple  Skin:     General: Skin is warm and dry  Coloration: Skin is not pale  Findings: No erythema or rash  Neurological:      Mental Status: She is alert and oriented to person, place, and time  Cranial Nerves: No cranial nerve deficit  Sensory: No sensory deficit  Motor: No abnormal muscle tone  Psychiatric:         Behavior: Behavior normal          Thought Content:  Thought content normal          Judgment: Judgment normal          Vital Signs  ED Triage Vitals [05/19/22 1320]   Temperature Pulse Respirations Blood Pressure SpO2   98 1 °F (36 7 °C) 67 20 105/59 91 %      Temp src Heart Rate Source Patient Position - Orthostatic VS BP Location FiO2 (%)   -- -- Lying Left arm --      Pain Score       6           Vitals:    05/19/22 1320   BP: 105/59   Pulse: 67   Patient Position - Orthostatic VS: Lying         Visual Acuity      ED Medications  Medications   fentanyl citrate (PF) 100 MCG/2ML 50 mcg (50 mcg Intravenous Given 5/19/22 1412)       Diagnostic Studies  Results Reviewed     Procedure Component Value Units Date/Time    HS Troponin I 4hr [[id number]]     Lab Status: No result Specimen: Blood     COVID/FLU/RSV [[id number]]  (Normal) Collected: 05/19/22 1407    Lab Status: Final result Specimen: Nares from Nose Updated: 05/19/22 1451     SARS-CoV-2 Negative     INFLUENZA A PCR Negative     INFLUENZA B PCR Negative     RSV PCR Negative    Narrative:      FOR PEDIATRIC PATIENTS - copy/paste COVID Guidelines URL to browser: [email or web address]  [employer]    SARS-CoV-2 assay is a Nucleic Acid Amplification assay intended for the  qualitative detection of nucleic acid from SARS-CoV-2 in nasopharyngeal  swabs  Results are for the presumptive identification of SARS-CoV-2 RNA  Positive results are indicative of infection with SARS-CoV-2, the virus  causing COVID-19, but do not rule out bacterial infection or co-infection  with other viruses  Laboratories within the [place] and its  territories are required to report all positive results to the appropriate  public health authorities  Negative results do not preclude SARS-CoV-2  infection and should not be used as the sole basis for treatment or other  patient management decisions   Negative results must be combined with  clinical observations, patient history, and epidemiological information  This test has not been FDA cleared or approved  This test has been authorized by FDA under an Emergency Use Authorization  (EUA)  This test is only authorized for the duration of time the  declaration that circumstances exist justifying the authorization of the  emergency use of an in vitro diagnostic tests for detection of SARS-CoV-2  virus and/or diagnosis of COVID-19 infection under section 564(b)(1) of  the Act, 21 U  S C  635SNF-9(B)(7), unless the authorization is terminated  or revoked sooner  The test has been validated but independent review by FDA  and CLIA is pending  Test performed using [employer] GeneXpert: This RT-PCR assay targets N2,  a region unique to SARS-CoV-2  A conserved region in the E-gene was chosen  for pan-Sarbecovirus detection which includes SARS-CoV-2  HS Troponin 0hr (reflex protocol) [[id number]]  (Normal) Collected: 05/19/22 1407    Lab Status: Final result Specimen: Blood from Arm, Left Updated: 05/19/22 1438     hs TnI 0hr 13 ng/L     HS Troponin I 2hr [[id number]]     Lab Status: No result Specimen: Blood     CBC and differential [[id number]]  (Abnormal) Collected: 05/19/22 1407    Lab Status: Final result Specimen: Blood from Arm, Left Updated: 05/19/22 1438     WBC 15 43 Thousand/uL      RBC 3 93 Million/uL      Hemoglobin 11 1 g/dL      Hematocrit 34 8 %      MCV 89 fL      MCH 28 2 pg      MCHC 31 9 g/dL      RDW 15 9 %      MPV 9 6 fL      Platelets 589 Thousands/uL     Narrative: This is an appended report  These results have been appended to a previously verified report      Manual Differential(PHLEBS Do Not Order) [[id number]]  (Abnormal) Collected: 05/19/22 1407    Lab Status: Final result Specimen: Blood from Arm, Left Updated: 05/19/22 1438     Segmented % 91 %      Bands % 1 %      Lymphocytes % 5 %      Monocytes % 3 %      Eosinophils, % 0 %      Basophils % 0 %      Absolute Neutrophils 14 20 Thousand/uL      Lymphocytes Absolute 0 77 Thousand/uL      Monocytes Absolute 0 46 Thousand/uL      Eosinophils Absolute 0 00 Thousand/uL      Basophils Absolute 0 00 Thousand/uL      Total Counted --     RBC Morphology Present     Ovalocytes Present     Platelet Estimate Adequate    Comprehensive metabolic panel [[id number]]  (Abnormal) Collected: 05/19/22 1407    Lab Status: Final result Specimen: Blood from Arm, Left Updated: 05/19/22 1435     Sodium 135 mmol/L      Potassium 3 4 mmol/L      Chloride 99 mmol/L      CO2 26 mmol/L      ANION GAP 10 mmol/L      BUN 22 mg/dL      Creatinine 0 64 mg/dL      Glucose 135 mg/dL      Calcium 9 7 mg/dL      AST 15 U/L      ALT 7 U/L      Alkaline Phosphatase 68 U/L      Total Protein 7 6 g/dL      Albumin 3 9 g/dL      Total Bilirubin 2 15 mg/dL      eGFR 81 ml/min/1 73sq m     Narrative:      [place] guidelines for Chronic Kidney Disease (CKD):     Stage 1 with normal or high GFR (GFR > 90 mL/min/1 73 square meters)    Stage 2 Mild CKD (GFR = 60-89 mL/min/1 73 square meters)    Stage 3A Moderate CKD (GFR = 45-59 mL/min/1 73 square meters)    Stage 3B Moderate CKD (GFR = 30-44 mL/min/1 73 square meters)    Stage 4 Severe CKD (GFR = 15-29 mL/min/1 73 square meters)    Stage 5 End Stage CKD (GFR <15 mL/min/1 73 square meters)  Note: GFR calculation is accurate only with a steady state creatinine    Lipase [[id number]]  (Abnormal) Collected: 05/19/22 1407    Lab Status: Final result Specimen: Blood from Arm, Left Updated: 05/19/22 1435     Lipase 7 u/L     UA w Reflex to Microscopic w Reflex to Culture [[id number]]     Lab Status: No result Specimen: Urine                  XR chest 1 view portable   Final Result by Jess Perry MD (05/19 1601)      Mild central pulmonary vascular congestion  No focal airspace consolidation                    Workstation performed: [id number]         CT abdomen pelvis wo contrast   Final Result by Zackary Olivares MD (05/19 7660)      There is fluid distention of both large and small bowel suggestive of an ileus  An early obstructive process is not able to be excluded  There is trace fluid adjacent to liver and mesenteric edema  The study was marked in [facility] for immediate notification  Workstation performed: [id number]                    Procedures  ECG 12 Lead Documentation Only    Date/Time: 5/19/2022 2:23 PM  Performed by: Vance Guerra DO  Authorized by: Vance Guerra DO     Interpretation:     Interpretation: abnormal    Rate:     ECG rate:  80    ECG rate assessment: normal    Rhythm:     Rhythm: atrial fibrillation    Ectopy:     Ectopy: none    QRS:     QRS axis:  Normal    QRS intervals:  Normal  Conduction:     Conduction: normal    ST segments:     ST segments:  Abnormal    Depression:  V1, V2 and V3  T waves:     T waves: inverted      Inverted:  III, V1, V2 and V3             ED Course  ED Course as of 05/19/22 1634   Thu May 19, 2022   1414 WBC(!): 15 43             HEART Risk Score    Flowsheet Row Most Recent Value   Heart Score Risk Calculator    History 0 Filed at: 05/19/2022 1620   ECG 1 Filed at: 05/19/2022 1620   Age 2 Filed at: 05/19/2022 1620   Risk Factors 2 Filed at: 05/19/2022 1620   Troponin 1 Filed at: 05/19/2022 1620   HEART Score 6 Filed at: 05/19/2022 1620                                      MDM  Number of Diagnoses or Management Options  [facility] ([facility] )  Diagnosis management comments: Will check abdominal labs, EKG, troponin, CT abdomen pelvis without contrast as there is a national critical shortness of contrast material, urinalysis  Will give pain medication at this time        Disposition  Final diagnoses:   Ileus ([facility] )     Time reflects when diagnosis was documented in both MDM as applicable and the Disposition within this note     Time User Action Codes Description Comment    5/19/2022  3:36 PM Lorelei Fontenot [K56 7] Ileus ([facility] [address] )     5/19/2022  4:32 PM Rip Eid [N18 2] CKD (chronic kidney disease), stage II     5/19/2022  4:32 PM Dimitriadis, Jose Remove [N18 2] CKD (chronic kidney disease), stage II     5/19/2022  4:32 PM Dimitriadis, Jose Add [I48 91] Atrial fibrillation with RVR ([facility] [address] )     5/19/2022  4:32 PM Dimitriadis, Jose Remove [I48 91] Atrial fibrillation with RVR ([facility] [address] )     5/19/2022  4:32 PM Dimitriadis, Jose Add [R53 1] Generalized weakness     5/19/2022  4:32 PM Dimitriadis, Jose Remove [R53 1] Generalized weakness     5/19/2022  4:32 PM Dimitriadis, Jose Add [J96 11] Chronic respiratory failure with hypoxia ([facility] [address] )     5/19/2022  4:32 PM Dimitriadis, Jose Add [I50 33] Acute on chronic diastolic heart failure ([facility] [address] )     5/19/2022  4:32 PM Dimitriadis, Jose Add [I25 10] Coronary artery disease involving native coronary artery of native heart without angina pectoris     5/19/2022  4:32 PM Dimitriadis, Jose Add [I10] Essential hypertension     5/19/2022  4:33 PM Dimitriadis, Jose Add [I48 11] Longstanding persistent atrial fibrillation ([facility] [address] )     5/19/2022  4:33 PM Dimitriadis, Dennys Fordeven Add [N18 2] CKD (chronic kidney disease), stage II       ED Disposition     ED Disposition   Admit    Condition   Stable    Date/Time   Thu May 19, 2022  4:04 PM    Comment   Case was discussed with General Surgery and the patient's admission status was agreed to be Admission Status: inpatient status to the service of Dr Herrera Rodrigues  Follow-up Information    None         Patient's Medications   Discharge Prescriptions    No medications on file       No discharge procedures on file      PDMP Review       Value Time User    PDMP Reviewed  Yes 3/5/2020  1:47 PM Sharron Gracia, [address]          ED Provider  Electronically Signed by           Claudia Dsouza DO  05/19/22 1614

## 2022-05-19 NOTE — Clinical Note
Case was discussed with General Surgery and the patient's admission status was agreed to be Admission Status: observation status to the service of Dr Jesusita Sullivan

## 2022-05-19 NOTE — H&P
[address]  [facility]  H&P- Joyce Olivarez 1935, 81 y o  female MRN: [id number]  Unit/Bed#: ED 22 Encounter: [id number]  Primary Care Provider: Jake Parker MD   Date and time admitted to hospital: 5/19/2022  1:16 PM    * [facility])  Assessment & Plan  80 yo F on Eliquis for A  Fib, on home O2 for chronic respiratory failure, and with recent SNF stay for debility presenting with nausea and abdominal pain, CT indicating ileus without obvious obstruction  Ongoing nausea w/o vomiting, diarrhea this morning, abdominal pain  Abdomen flat and soft, generalized moderate TTP, no guarding  T 98 1, HR 67, RR 20, BP 06/59, SpO2 91%  K 3 4, Cr 0 64, T bili 2 15, WBC 15 43, Hgb 11 1, Plt 213    CT a/p 5/19   "There is fluid distention of both large and small bowel suggestive of an ileus  An early obstructive process is not able to be excluded  There is trace fluid adjacent to liver and mesenteric edema "    Assessment:  Post-viral ileus, suspected due to family history acute GI bug within household  DDX to include early small bowel obstruction and acute/chronic mesenteric ischemia 2/2 atherosclerosis of aorta  Plan:  Admit to general surgery  May have ice chips, sips of clears  IVF, zofran PRN, dilaudid PRN  Trend vitals, I/O, daily weights, serial exams, am labs  SLIM agreeable to see in consult  History of Present Illness   HPI:  Joyce Olivarez is a 81 y o  female who presents with 3 day history of nausea, poor appetite, abdominal pain, and new diarrhea this morning  Daughter suggests multiple sick family members in the household with similar symptoms, however also suggests symptoms may have been present since 5/8/22  Reports normal urination  No fever/chills  Chronic occasional peripheral edema of legs  On O2 at home at night, no acute dyspnea or chest pain  Heartburn and reflux that has been new, no significant bloating/gas      Review of Systems   Constitutional: Negative for chills and fever  Respiratory: Negative for cough, chest tightness and shortness of breath  Cardiovascular: Positive for leg swelling  Negative for chest pain  Gastrointestinal: Positive for abdominal pain, diarrhea and nausea  Negative for vomiting  Historical Information   Past Medical History:   Diagnosis Date    Acute on chronic diastolic congestive heart failure ([facility] [address] ) 6/27/2019    Ambulatory dysfunction 8/17/2020    Arthritis     Chest wall contusion, right, initial encounter 8/17/2020    Chest wall contusion, right, subsequent encounter 8/17/2020    Chronic diastolic heart failure (HCC)     Chronic kidney disease, stage 2 (mild)     Coronary artery disease     history of stenting    Eczema     years    Edema     History of echocardiogram 07/20/2017    EF 55%, mild concentric LVH, bileaflet MVP with moderate MR, left atrial enlargement   History of transfusion     Hyperlipidemia     Hypertension     Hypo-osmolality and hyponatremia     Hypokalemia 7/11/2019    Mitral regurgitation      Past Surgical History:   Procedure Laterality Date    ABDOMINAL SURGERY      hysterectomy    CARDIAC CATHETERIZATION  04/10/2012    EF 65%, no significant CAD, patent stents, severe MR     CORONARY ANGIOPLASTY WITH STENT PLACEMENT  03/21/2007    EF 55%, GARRET to LAD      EYE SURGERY      b/l cataracts    HYSTERECTOMY      JOINT REPLACEMENT      Rt knee     Social History   Social History     Substance and Sexual Activity   Alcohol Use Never    Alcohol/week: 0 0 standard drinks     Social History     Substance and Sexual Activity   Drug Use Never     Social History     Tobacco Use   Smoking Status Never Smoker   Smokeless Tobacco Never Used     E-Cigarette/Vaping    E-Cigarette Use Never User      E-Cigarette/Vaping Substances    Nicotine No     THC No     CBD No     Flavoring No     Other No     Unknown No      Family History:   Family History   Problem Relation Age of Onset    Arthritis Mother     Cancer Mother     Heart disease Mother     Hypertension Mother     Alcohol abuse Father     Arthritis Father     Birth defects Son     Hearing loss Son     Diabetes Daughter     Stroke Maternal Grandmother     Asthma Maternal Grandfather        Meds/Allergies   PTA meds:   Prior to Admission Medications   Prescriptions Last Dose Informant Patient Reported? Taking?    ALPRAZolam (XANAX) 0 25 mg tablet   No No   Sig: Take 1 tablet (0 25 mg total) by mouth daily at bedtime as needed for anxiety for up to 10 days   acetaminophen (TYLENOL) 325 mg tablet   No No   Sig: Take 3 tablets (975 mg total) by mouth every 8 (eight) hours   apixaban (ELIQUIS) 2 5 mg   No No   Sig: Take 1 tablet (2 5 mg total) by mouth 2 (two) times a day   bisacodyl (DULCOLAX) 5 mg EC tablet   No No   Sig: Take 1 tablet (5 mg total) by mouth daily as needed for constipation   diltiazem (CARDIZEM) 60 mg tablet   No No   Sig: Take 1 tablet (60 mg total) by mouth every 8 (eight) hours   furosemide (LASIX) 40 mg tablet   No No   Sig: Take 1 tablet (40 mg total) by mouth daily   hydrOXYzine HCL (ATARAX) 25 mg tablet   Yes No   Sig: Take 25 mg by mouth 2 (two) times a day   metoprolol tartrate (LOPRESSOR) 25 mg tablet   No No   Sig: Take 1 tablet (25 mg total) by mouth 2 (two) times a day   omeprazole (PriLOSEC) 20 mg delayed release capsule   Yes No   Sig: Take 20 mg by mouth daily   oxybutynin (DITROPAN) 5 mg tablet   Yes No   Sig: Take 5 mg by mouth 3 (three) times a day    polyethylene glycol (MIRALAX) 17 g packet   No No   Sig: Take 17 g by mouth daily   potassium chloride (K-DUR,KLOR-CON) 20 mEq tablet   No No   Sig: Take 1 tablet (20 mEq total) by mouth daily   sertraline (ZOLOFT) 50 mg tablet   Yes No   Sig: Take 75 mg by mouth daily       Facility-Administered Medications: None     Allergies   Allergen Reactions    Chocolate Flavor - Food Allergy Itching    Shellfish-Derived Products - Food Allergy Shortness Of Breath    Iodine - Food Allergy Other (See Comments)     shellfish- difficulty breathing    Codeine Rash       Objective   First Vitals:   Blood Pressure: 105/59 (05/19/22 1320)  Pulse: 67 (05/19/22 1320)  Temperature: 98 1 °F (36 7 °C) (05/19/22 1320)  Respirations: 20 (05/19/22 1320)  Height: 4' 11" (149 9 cm) (05/19/22 1320)  Weight - Scale: 50 8 kg (112 lb) (05/19/22 1320)  SpO2: 91 % (05/19/22 1320)    Current Vitals:   Blood Pressure: 105/59 (05/19/22 1320)  Pulse: 67 (05/19/22 1320)  Temperature: 98 1 °F (36 7 °C) (05/19/22 1320)  Respirations: 20 (05/19/22 1320)  Height: 4' 11" (149 9 cm) (05/19/22 1320)  Weight - Scale: 50 8 kg (112 lb) (05/19/22 1320)  SpO2: 91 % (05/19/22 1320)    No intake or output data in the 24 hours ending 05/19/22 1650    Invasive Devices  Report    Peripheral Intravenous Line  Duration           Peripheral IV 04/02/22 Left Antecubital 47 days                Physical Exam  Vitals and nursing note reviewed  Constitutional:       General: She is not in acute distress  Appearance: She is well-developed  She is not diaphoretic  Comments: Frail, chronically ill appearing, fatigued and debilitated  HENT:      Head: Normocephalic and atraumatic  Eyes:      Conjunctiva/sclera: Conjunctivae normal       Pupils: Pupils are equal, round, and reactive to light  Cardiovascular:      Rate and Rhythm: Normal rate  Rhythm irregular  Heart sounds: Murmur heard  Comments: Loud systolic and diastolic murmur  Pulmonary:      Effort: Pulmonary effort is normal  No respiratory distress  Breath sounds: Normal breath sounds  Comments: Comfortable on 2LNC  Abdominal:      Comments: Flat, non-distended, tender to percussion and palpation in all quadrants  No guarding or rigidity  No masses  Musculoskeletal:         General: Normal range of motion  Cervical back: Normal range of motion  Skin:     General: Skin is warm and dry        Capillary Refill: Capillary refill takes less than 2 seconds  Neurological:      Mental Status: She is alert and oriented to person, place, and time  Psychiatric:         Behavior: Behavior normal          Lab Results:   I have personally reviewed pertinent lab results  , CBC:   Lab Results   Component Value Date    WBC 15 43 (H) 05/19/2022    HGB 11 1 (L) 05/19/2022    HCT 34 8 05/19/2022    MCV 89 05/19/2022     05/19/2022    MCH 28 2 05/19/2022    MCHC 31 9 05/19/2022    RDW 15 9 (H) 05/19/2022    MPV 9 6 05/19/2022   , CMP:   Lab Results   Component Value Date    SODIUM 135 05/19/2022    K 3 4 (L) 05/19/2022    CL 99 05/19/2022    CO2 26 05/19/2022    BUN 22 05/19/2022    CREATININE 0 64 05/19/2022    CALCIUM 9 7 05/19/2022    AST 15 05/19/2022    ALT 7 05/19/2022    ALKPHOS 68 05/19/2022    EGFR 81 05/19/2022     Imaging: I have personally reviewed pertinent reports  EKG, Pathology, and Other Studies: I have personally reviewed pertinent reports  Code Status: Level 1 - Full Code  Advance Directive and Living Will:      Power of :    POLST:      Counseling / Coordination of Care  Total floor / unit time spent today 15 minutes  Greater than 50% of total time was spent with the patient and / or family counseling and / or coordination of care  A description of the counseling / coordination of care: treatment planning

## 2022-05-19 NOTE — ASSESSMENT & PLAN NOTE
82 yo F on Eliquis for A  Fib, on home O2 for chronic respiratory failure, and with recent SNF stay for debility presenting with nausea and abdominal pain, CT indicating ileus without obvious obstruction  Ongoing nausea w/o vomiting, diarrhea this morning, abdominal pain  Abdomen flat and soft, generalized moderate TTP, no guarding  T 98 1, HR 67, RR 20, BP 06/59, SpO2 91%  K 3 4, Cr 0 64, T bili 2 15, WBC 15 43, Hgb 11 1, Plt 213    CT a/p 5/19   "There is fluid distention of both large and small bowel suggestive of an ileus  An early obstructive process is not able to be excluded  There is trace fluid adjacent to liver and mesenteric edema "    Assessment:  Post-viral ileus, suspected due to family history acute GI bug within household  DDX to include early small bowel obstruction and acute/chronic mesenteric ischemia 2/2 atherosclerosis of aorta  Plan:  Admit to general surgery  May have ice chips, sips of clears  IVF, zofran PRN, dilaudid PRN  Trend vitals, I/O, daily weights, serial exams, am labs  SLIM agreeable to see in consult

## 2022-05-20 PROBLEM — K55.059 ACUTE MESENTERIC ISCHEMIA (HCC): Status: ACTIVE | Noted: 2022-01-01

## 2022-05-20 PROBLEM — I12.9 BENIGN HYPERTENSION WITH CKD (CHRONIC KIDNEY DISEASE) STAGE III (HCC): Status: ACTIVE | Noted: 2022-01-01

## 2022-05-20 PROBLEM — N18.30 BENIGN HYPERTENSION WITH CKD (CHRONIC KIDNEY DISEASE) STAGE III (HCC): Status: ACTIVE | Noted: 2022-01-01

## 2022-05-20 PROBLEM — I50.32 CHRONIC HEART FAILURE WITH PRESERVED EJECTION FRACTION (HFPEF) (HCC): Status: ACTIVE | Noted: 2022-01-01

## 2022-05-20 NOTE — ASSESSMENT & PLAN NOTE
· History of PCI to LAD in 2007 as documented in patient's chart  · Continue pre-admission Cardizem, metoprolol

## 2022-05-20 NOTE — PLAN OF CARE
Problem: OCCUPATIONAL THERAPY ADULT  Goal: Performs self-care activities at highest level of function for planned discharge setting  See evaluation for individualized goals  Description: Treatment Interventions: ADL retraining, Functional transfer training, UE strengthening/ROM, Endurance training, Patient/family training, Equipment evaluation/education, Compensatory technique education, Energy conservation, Activityengagement  Equipment Recommended: Bedside commode       See flowsheet documentation for full assessment, interventions and recommendations  Note: Limitation: Decreased ADL status, Decreased UE strength, Decreased endurance, Decreased self-care trans, Decreased high-level ADLs  Prognosis: Good  Assessment: Patient is a 81 y o  female seen for OT evaluation s/p admit to [place] [id number] on 5/19/2022 w/Acute mesenteric ischemia ([facility] [address] )  Commorbidities affecting patient's functional performance at time of assessment include: CKD, CAD, chronic heart failure, chronic respiratory failure, hypokalemia, A-fib, and malnutrition  Orders placed for OT evaluation and treatment and up with assistance  Performed at least two patient identifiers during session including name and wristband  Prior to admission, Patient reporting being independent with ADLs, ambulatory with RW and lives with sons in a two story house with 1st floor set-up  Personal factors affecting patient at time of initial evaluation include: decreased initiation and engagement, difficulty performing ADLs and difficulty performing IADLs  Upon evaluation, patient requires supervision assist for UB ADLs, minimal  and moderate assist for LB ADLs, transfers and functional ambulation in room and bathroom with minimal  assist, with the use of Rolling Walker    Patient is oriented x 4 and presents with ability to recognize a problem, define a problem, identify alternative plan, select a plan, organize steps in a plan, implement plan and evaluate outcome (problem solving)  Occupational performance is affected by the following deficits: decreased muscle strength, dynamic sit/ stand balance deficit with poor standing tolerance time for self care and functional mobility, decreased activity tolerance, increased pain and delayed righting and equilibrium reactions  Based on the mentioned OT evaluation outcomes, functional performance deficits, and assessment findings, pt has been identified as a moderate complexity evaluation  Patient to benefit from continued Occupational Therapy treatment while in the hospital to address deficits as defined above and maximize level of functional independence with ADLs and functional mobility  Occupational Performance areas to address include: grooming , bathing/ shower, dressing, toilet hygiene, transfer to all surfaces, functional ambulation, medication routine/ management, IADLS: Household maintenance, IADLs: safety procedures and IADLs: meal prep/ clean up  From OT standpoint, recommendation at time of d/c would be Home with home health rehabilitation       OT Discharge Recommendation: Home with home health rehabilitation  OT - OK to Discharge: Yes (Once medically cleared)     Francine Larry OT

## 2022-05-20 NOTE — ASSESSMENT & PLAN NOTE
Lab Results   Component Value Date    EGFR 63 05/20/2022    EGFR 81 05/19/2022    EGFR 53 04/15/2022    CREATININE 0 84 05/20/2022    CREATININE 0 64 05/19/2022    CREATININE 0 96 04/15/2022     · Creatinine at baseline  · Continue to monitor renal function   · Avoid hypotension/nephrotoxins  · Blood pressure reviewed, well controlled  · Continue pre-admission Cardizem, metoprolol

## 2022-05-20 NOTE — PROGRESS NOTES
[address]  Progress Note - [place] 1935, 81 y o  female MRN: [id number]  Unit/Bed#: ED 22 Encounter: [id number]  Primary Care Provider: Chyna Busby MD   Date and time admitted to hospital: 5/19/2022  1:16 PM    * Acute mesenteric ischemia [facility])  Assessment & Plan  80 yo F on Eliquis for A  Fib, on home O2 for chronic respiratory failure, and with recent SNF stay for debility HD2 nausea /abdominal pain/diarrhea, and CT evidence of ileus  Nausea resolved, diarrhea x 4 over 24 hours, ongoing intermittent abdominal pain  Abdomen flat and soft, generalized moderate TTP, no guarding or rigidity  T 98 1, HR 77, /70 (low BP of 98/55 overnight), RR 16, SpO2 94% on RA   K 3 4, BUN 26, Cr 0 84, Hgb 11 7, Plt 219, WBC 10 78 (15 43)    CT a/p 5/19   "There is fluid distention of both large and small bowel suggestive of an ileus  An early obstructive process is not able to be excluded  There is trace fluid adjacent to liver and mesenteric edema "    Assessment:  Acute on chronic mesenteric ischemia, patient with reported 40 lb weight loss  DDX viral/bacterial gastroenteritis, vs SBO  Plan:  Clear liquids as tolerated for comfort  Enteric stool panel and C  Diff for ongoing diarrhea  Will initiate heparin gtt for hx A  Fib and suspected mesenteric ischemia  Will pursue CTA abdomen/pelvis w/wo contrast for diagnosis  Continue close monitoring and daily labs  Patient's daughter updated by phone  SLIM agreeable to see in consult today  Subjective/Objective   Chief Complaint: pain    Subjective: reports ongoing pain, described as intermittent and severe at times  Occurring almost hourly with pain ranging from 2 up to 8/10  Pain is poorly localized and not associated with movement  Nausea has resolved and no vomiting overnight  She reports loose brown diarrhea x 4 yesterday with no red blood or black blood per rectum   No fever/chills, no chest pain/dyspnea  Objective: no overnight events    Blood pressure 112/70, pulse 77, temperature 98 1 °F (36 7 °C), resp  rate 16, height 4' 11" (1 499 m), weight 50 8 kg (112 lb), SpO2 94 %  ,Body mass index is 22 62 kg/m²  No intake or output data in the 24 hours ending 05/20/22 0904    Invasive Devices  Report    Peripheral Intravenous Line  Duration           Peripheral IV 04/02/22 Left Antecubital 48 days    Peripheral IV 05/20/22 Left Hand <1 day                Physical Exam  Vitals and nursing note reviewed  Constitutional:       General: She is not in acute distress  Appearance: She is well-developed  She is not diaphoretic  Comments: Frail, sleeping comfortably  HENT:      Head: Normocephalic and atraumatic  Eyes:      Conjunctiva/sclera: Conjunctivae normal       Pupils: Pupils are equal, round, and reactive to light  Cardiovascular:      Rate and Rhythm: Normal rate  Rhythm irregular  Heart sounds: Murmur heard  Pulmonary:      Effort: Pulmonary effort is normal  No respiratory distress  Breath sounds: Normal breath sounds  Abdominal:      Comments: Flat, non-distended  Soft  Generalized moderate-severe TTP  Generalized without localization  No guarding/rigidity  Musculoskeletal:         General: Normal range of motion  Cervical back: Normal range of motion  Skin:     General: Skin is warm and dry  Capillary Refill: Capillary refill takes less than 2 seconds  Neurological:      Mental Status: She is alert and oriented to person, place, and time  Psychiatric:         Behavior: Behavior normal            Lab, Imaging and other studies:  I have personally reviewed pertinent lab results    , CBC:   Lab Results   Component Value Date    WBC 10 78 (H) 05/20/2022    HGB 11 7 05/20/2022    HCT 37 2 05/20/2022    MCV 89 05/20/2022     05/20/2022    MCH 28 0 05/20/2022    MCHC 31 5 05/20/2022    RDW 15 9 (H) 05/20/2022    MPV 10 3 05/20/2022    NRBC 0 05/20/2022   , CMP:   Lab Results   Component Value Date    SODIUM 135 05/20/2022    K 3 4 (L) 05/20/2022     05/20/2022    CO2 27 05/20/2022    BUN 26 (H) 05/20/2022    CREATININE 0 84 05/20/2022    CALCIUM 9 3 05/20/2022    AST 8 (L) 05/20/2022    ALT <3 (L) 05/20/2022    ALKPHOS 60 05/20/2022    EGFR 63 05/20/2022     VTE Pharmacologic Prophylaxis: Heparin  VTE Mechanical Prophylaxis: sequential compression device

## 2022-05-20 NOTE — PHYSICAL THERAPY NOTE
Physical Therapy Evaluation   Time in: 0853  Time out: 0908  Total evaluation time: 15 minutes    Patient's Name: Beck Acuna    Admitting Diagnosis  Abdominal pain [R10 9]    Problem List  Patient Active Problem List   Diagnosis    CAD (coronary artery disease)    Mitral regurgitation    Longstanding persistent atrial fibrillation (HCC)    Acute on chronic diastolic heart failure ([facility] )    Essential hypertension    Acute hypokalemia    Depression with anxiety    Gastroesophageal reflux disease without esophagitis    OAB (overactive bladder)    Hypomagnesemia    Mixed hyperlipidemia    Osteoarthritis of knee    Pneumonia    Generalized weakness    Dizziness    Chest wall contusion, right, initial encounter    Ambulatory dysfunction    Hypokalemia    Liver lesion    Rib fractures    CKD (chronic kidney disease), stage II    Pulmonary hypertension (HCC)    Visual hallucinations    Close exposure to COVID-19 virus    Cognitive impairment    Atrial fibrillation with RVR (HCC)    SIRS (systemic inflammatory response syndrome) (HCC)    Chronic respiratory failure with hypoxia (HCC)    Other constipation    Moderate protein-calorie malnutrition ([facility] )    Acute mesenteric ischemia (HCC)    Chronic heart failure with preserved ejection fraction (HFpEF) ([facility] )    Benign hypertension with CKD (chronic kidney disease) stage III ([facility] )       Past Medical History  Past Medical History:   Diagnosis Date    Acute on chronic diastolic congestive heart failure ([facility] ) 6/27/2019    Ambulatory dysfunction 8/17/2020    Arthritis     Chest wall contusion, right, initial encounter 8/17/2020    Chest wall contusion, right, subsequent encounter 8/17/2020    Chronic diastolic heart failure (HCC)     Chronic kidney disease, stage 2 (mild)     Coronary artery disease     history of stenting    Eczema     years    Edema     History of echocardiogram 07/20/2017    EF 55%, mild concentric LVH, bileaflet MVP with moderate MR, left atrial enlargement  History of transfusion     Hyperlipidemia     Hypertension     Hypo-osmolality and hyponatremia     Hypokalemia 7/11/2019    Mitral regurgitation        Past Surgical History  Past Surgical History:   Procedure Laterality Date    ABDOMINAL SURGERY      hysterectomy    CARDIAC CATHETERIZATION  04/10/2012    EF 65%, no significant CAD, patent stents, severe MR     CORONARY ANGIOPLASTY WITH STENT PLACEMENT  03/21/2007    EF 55%, GARRET to LAD  EYE SURGERY      b/l cataracts    HYSTERECTOMY      JOINT REPLACEMENT      Rt knee       PT performed at least 2 patient identifiers during session: Name and wristband  05/20/22 0854   PT Last Visit   PT Visit Date 05/20/22   Note Type   Note type Evaluation   Pain Assessment   Pain Assessment Tool 0-10   Pain Score 5   Pain Location/Orientation Location: Abdomen;Orientation: Right   Pain Onset/Description Onset: Ongoing; Descriptor: Aching   Patient's Stated Pain Goal No pain   Hospital Pain Intervention(s) Repositioned; Ambulation/increased activity   Restrictions/Precautions   Weight Bearing Precautions Per Order No   Other Precautions Contact/isolation;Multiple lines; Fall Risk;O2;Pain   Home Living   Type of [address] [place] [address] Av Two level;Performs ADLs on one level; Able to live on main level with bedroom/bathroom; Ramped entrance  (1st floor setup)   Bathroom Shower/Tub Tub/shower unit   Bathroom Toilet Raised   Bathroom Equipment Shower chair;Grab bars in shower;Grab bars around toilet  (pt recently acquired a shower chair- hasn't used it yet)   [facility]  (pt uses RW at baseline)   Additional Comments pt admits to chronic 2 L NC O2 wearing at HS   Prior Function   Level of [place] Independent with ADLs and functional mobility   Lives With Son  (2 sons)   Receives Help From Family   ADL Assistance Independent   IADLs Needs assistance  (sons provide support with cooking, laundry)   Falls in the last 6 months 0   Vocational Retired   General   Family/Caregiver Present No   Cognition   Arousal/Participation Alert   Orientation Level Oriented X4   Memory Within functional limits   Following Commands Follows one step commands without difficulty   Comments pt agreeable to PT session   Subjective   Subjective "I am tired"   RLE Assessment   RLE Assessment X   LLE Assessment   LLE Assessment X   Vision-Basic Assessment   Current Vision Does not wear glasses   Coordination   Movements are Fluid and Coordinated 1   Sensation WFL  (pt reports no current numbness/tingling)   Bed Mobility   Supine to Sit 5  Supervision   Additional items Assist x 1;HOB elevated; Increased time required   Additional Comments Pt reported mild dizziness upon sitting at EOB  Symptoms resolved with increased seated rest    Transfers   Sit to Stand 4  Minimal assistance   Additional items Assist x 1; Increased time required;Verbal cues   Stand to Sit 4  Minimal assistance   Additional items Assist x 1; Increased time required   Additional Comments Occasional verbal cues provided throughout session for proper body mechanics and safe hand placement   Ambulation/Elevation   Gait pattern Improper Weight shift;Decreased foot clearance; Short stride   Gait Assistance 4  Minimal assist   Additional items Assist x 1;Verbal cues   Assistive Device Rolling walker   Distance 6 ft from bed>Recliner   Balance   Static Sitting Good   Dynamic Sitting Fair +   Static Standing Fair   Dynamic Standing Fair -   Ambulatory Fair -   Endurance Deficit   Endurance Deficit Yes   Activity Tolerance   Activity Tolerance Patient limited by fatigue   Medical Staff Made Aware Pt seen as a co-eval with OT due to the patient's co-morbidities, clinically unstable presentation, and present impairments which are a regression from the patient's baseline     Nurse Made Aware RN made aware of outcomes   Assessment   Prognosis Fair   Problem List Decreased strength;Decreased endurance; Impaired balance;Decreased mobility;Pain; Impaired judgement;Decreased safety awareness   Assessment Pt is 81 y o  female seen for high-complexity PT evaluation on 5/20/2022 s/p admit to [facility] on 5/19/2022 w/ Acute mesenteric ischemia ([facility] )  PT was consulted to assess pt's functional mobility and d/c needs  Order placed for PT eval and tx, w/ up w/ A and out of bed to chair orders  PTA, pt lives c 2 sons in 2 [facility] c 1st floor setup, amb c RW at baseline  At time of eval, pt requires SUP for bed mob, min Ax1 for transfers and OOB ambulation attempt x 5 ft with RW use  Upon evaluation, pt presenting with impaired functional mobility d/t decreased strength, decreased endurance, impaired balance, decreased mobility, impaired judgement, decreased safety awareness and pain  Pertinent PMHx and current co-morbidities affecting pt's physical performance at time of assessment include: HTN, CKD stage 3, chronic heart failure, chronic respiratory failure, hypokalemia, AFib, CAD  Personal factors affecting pt at time of eval include: decreased initiation and engagement, unable to perform physical activity and increased age  The following objective measures performed on IE also reveal limitations: AM-PAC 6-Clicks: 29/32  Pt's clinical presentation is currently unstable/unpredictable seen in pt's presentation of abnormal lab value(s), need for input for task focus and mobility technique, ongoing medical assessment, on telemetry monitoring and abdominal pain impacting pt's mobility tolerance  Overall, pt's rehab potential and prognosis to return to PLOF is fair as impacted by objective findings, warranting pt to receive further skilled PT interventions to address identified impairments, activity limitation(s), and participation restriction(s)   Pt to benefit from continued PT tx to address deficits as defined above and maximize level of functional independent mobility and consistency in order for pt to improve OOB activity tolerance  From PT/mobility standpoint, recommendation at time of d/c would be home with home health rehabilitation pending progress in order to facilitate return to PLOF  Barriers to Discharge None   Goals   Patient Goals "to get better"   [facility] Expiration Date 05/30/22   Short Term Goal #1 In 7-10 days: Increase bilateral LE strength 1/2 grade to facilitate independent mobility, Perform all bed mobility tasks modified independent to decrease caregiver burden, Perform all transfers with close S to improve independence, Ambulate > 50 ft  with RW with close S w/o LOB and w/ normalized gait pattern 100% of the time, Increase all balance 1/2 grade to decrease risk for falls, Tolerate 4 hr OOB to faciliate upright tolerance, Complete % of the time, Tolerate seated at EOB 15 minutes to facilitate functional task performance and Tolerate standing 2 minutes to facilitate functional task performance   PT Treatment Day 0   Plan   Treatment/Interventions Functional transfer training;LE strengthening/ROM; Therapeutic exercise; Endurance training;Patient/family training;Equipment eval/education; Bed mobility;Gait training;Spoke to case management;Spoke to nursing   PT Frequency 3-5x/wk   Recommendation   PT Discharge Recommendation Home with home health rehabilitation   Equipment Recommended   (continue RW use)   Additional Comments Pt's raw score on the AM-[facility] Basic Mobility inpatient short form is 19, standardized score is 42 48  Patients at this level are likely to benefit from DC to home with home care, however, please refer to therapist recommendation for safe DC planning     AM-PAC Basic Mobility Inpatient   Turning in Bed Without Bedrails 4   Lying on Back to Sitting on Edge of Flat Bed 4   Moving Bed to Chair 3   Standing Up From Chair 3   Walk in Room 3   Climb 3-5 Stairs 2   Basic Mobility Inpatient Raw Score 19   Basic Mobility Standardized Score 42 48   Highest Level Of Mobility   -[facility] Goal 6: Walk 10 steps or more   -HLM Achieved 5: Stand (1 or more minutes)   End of Consult   Patient Position at End of Consult Seated edge of bed; All needs within reach       Maricarmen Guerra, PT, DPT

## 2022-05-20 NOTE — UTILIZATION REVIEW
Initial Clinical Review    Admission: Date/Time/Statement:   Admission Orders (From admission, onward)     Ordered        05/19/22 1603  Inpatient Admission  Once                      Orders Placed This Encounter   Procedures    Inpatient Admission     Standing Status:   Standing     Number of Occurrences:   1     Order Specific Question:   Level of Care     Answer:   Med Surg [16]     Order Specific Question:   Estimated length of stay     Answer:   More than 2 Midnights     Order Specific Question:   Certification     Answer:   I certify that inpatient services are medically necessary for this patient for a duration of greater than two midnights  See H&P and MD Progress Notes for additional information about the patient's course of treatment  ED Arrival Information     Expected   -    Arrival   5/19/2022 13:15    Acuity   Urgent            Means of arrival   Ambulance    Escorted by   -    Service   Surgery-General    Admission type   Urgent            Arrival complaint   abdominal pain           Chief Complaint   Patient presents with    Abdominal Pain       Initial Presentation: 81 y o  female to the ED from via EMS home with complaints of abdominal pain for a few days, diarhhea starting the day of arrival  Admitted to inpatient for  Ileus  H/O afib on eliquis, on chronic O2 for chronic respi failure  Arrives hypoxic, has elevated wbcs  Generalized abdominal tenderness  CT shows: There is fluid distention of both large and small bowel suggestive of an ileus   An early obstructive process is not able to be excluded  Norah Desai is trace fluid adjacent to liver and mesenteric edema " Admitted under gen/surg service  Keep NPO  IV fluids, IV zofran with serial abdominal exam   BNP elevated  Date: 5/20   Day 2:   CLear liquids as tolerated  Ongoing diarrhea  Check stool for cdiff and enteric panel  Start heparin drip for suspected mesenteric ischemia   Check CTA A/P with and without contrast   Medicine consult  Continue with mod/severe abdominal TTP  Medicine:  BP well controlled  Continue with cardizem, metoprolol  Hold lAsix and Eliquis  Continue with heparin drip  Faint crackles bibasilarly  Tachycardic, pale  Cardiology consult:  Longstanding h/o afib, CHF with preserved EF  Hear rate well controlled  No IV fluids givenv h/o hf    ED Triage Vitals   Temperature Pulse Respirations Blood Pressure SpO2   05/19/22 1320 05/19/22 1320 05/19/22 1320 05/19/22 1320 05/19/22 1320   98 1 °F (36 7 °C) 67 20 105/59 91 %      Temp src Heart Rate Source Patient Position - Orthostatic VS BP Location FiO2 (%)   -- 05/20/22 0730 05/19/22 1320 05/19/22 1320 --    Monitor Lying Left arm       Pain Score       05/19/22 1320       6          Wt Readings from Last 1 Encounters:   05/19/22 50 8 kg (112 lb)     Additional Vital Signs:   /Time Temp Pulse Resp BP MAP (mmHg) SpO2 O2 Device Patient Position - Orthostatic VS   05/20/22 1130 -- 89 -- 100/68 74 96 % -- --   05/20/22 1100 -- 92 -- 94/57 68 95 % None (Room air) Sitting   05/20/22 0800 -- 77 -- 112/70 84 94 % -- --   05/20/22 0730 -- 68 -- 98/55 69 96 % None (Room air) Lying   05/20/22 0700 -- 63 16 105/60 77 97 % -- --   05/20/22 0220 -- -- -- 108/62 -- -- -- --   05/19/22 1700 -- 91 -- 118/73 90 95 % -- --   05/19/22 1500 -- 59 -- 124/77 95 93 % -- --   05/19/22 1320 98 1 °F (36 7 °C) 67 20 105/59 -- 91 % None (Room air) Lying       Pertinent Labs/Diagnostic Test Results:   5/19 EKG: Interpretation:   Interpretation: abnormal   Rate:   ECG rate: 80   ECG rate assessment: normal   Rhythm:   Rhythm: atrial fibrillation   Ectopy:   Ectopy: none   QRS:   QRS axis: Normal   QRS intervals: Normal   Conduction:   Conduction: normal   ST segments:   ST segments: Abnormal   Depression: V1, V2 and V3   T waves:   T waves: inverted   Inverted: III, V1, V2 and V3    CTA abdomen pelvis w wo contrast   Final Result by Kalina Abraham MD (05/20 5702)      1   Mild SMA ostial stenosis, approximately 25%  The celiac artery is patent  Major mesenteric branch vessels are patent  2  Left lateral mid abdominal small bowel wall thickening, with associated adjacent scattered prominent mesenteric nodes  Correlate clinically for enteritis  3  Interval resolution of recently described small and large bowel fluid distention  4  Near the level of the renal artery takeoffs, there is calcific aortic atherosclerotic disease that focally intrudes upon the lumen, resulting in approximately 50% stenosis  5  Moderate to severe bilateral renal artery ostial stenosis  6  Left adrenal adenoma, no specific follow-up recommended  7  Marked mitral annular calcifications  Workstation performed: [id number]         XR chest 1 view portable   Final Result by Tomy Rahman MD (05/19 1601)      Mild central pulmonary vascular congestion  No focal airspace consolidation  Workstation performed: [id number]         CT abdomen pelvis wo contrast   Final Result by Joelle Crane MD (05/19 1430)      There is fluid distention of both large and small bowel suggestive of an ileus  An early obstructive process is not able to be excluded  There is trace fluid adjacent to liver and mesenteric edema  The study was marked in [facility] for immediate notification        Workstation performed: [id number]           Results from last 7 days   Lab Units 05/19/22  1407   SARS-COV-2  Negative     Results from last 7 days   Lab Units 05/20/22  0616 05/19/22  1407   WBC Thousand/uL 10 78* 15 43*   HEMOGLOBIN g/dL 11 7 11 1*   HEMATOCRIT % 37 2 34 8   PLATELETS Thousands/uL 219 213   NEUTROS ABS Thousands/µL 8 64*  --    BANDS PCT %  --  1         Results from last 7 days   Lab Units 05/20/22  0616 05/19/22  1407   SODIUM mmol/L 135 135   POTASSIUM mmol/L 3 4* 3 4*   CHLORIDE mmol/L 100 99   CO2 mmol/L 27 26   ANION GAP mmol/L 8 10   BUN mg/dL 26* 22   CREATININE mg/dL 0 84 0 64 EGFR ml/min/1 73sq m 63 81   CALCIUM mg/dL 9 3 9 7   MAGNESIUM mg/dL 2 0  --    PHOSPHORUS mg/dL 3 1  --      Results from last 7 days   Lab Units 05/20/22  0616 05/19/22  1407   AST U/L 8* 15   ALT U/L <3* 7   ALK PHOS U/L 60 68   TOTAL PROTEIN g/dL 7 0 7 6   ALBUMIN g/dL 3 7 3 9   TOTAL BILIRUBIN mg/dL 2 07* 2 15*         Results from last 7 days   Lab Units 05/20/22  0616 05/19/22  1407   GLUCOSE RANDOM mg/dL 89 135       Results from last 7 days   Lab Units 05/19/22  1836 05/19/22  1407   HS TNI 0HR ng/L  --  13   HS TNI 2HR ng/L 9  --    HSTNI D2 ng/L -4  --          Results from last 7 days   Lab Units 05/20/22  0930   PROTIME seconds 15 2*   INR  1 21*   PTT seconds 40*             Results from last 7 days   Lab Units 05/20/22  0616   LACTIC ACID mmol/L 0 7         Results from last 7 days   Lab Units 05/19/22  1407   LIPASE u/L 7*       Results from last 7 days   Lab Units 05/20/22  1024   CLARITY UA  Clear   COLOR UA  Yellow   SPEC GRAV UA  1 010   PH UA  6 5   GLUCOSE UA mg/dl Negative   KETONES UA mg/dl Negative   BLOOD UA  Negative   PROTEIN UA mg/dl Negative   NITRITE UA  Negative   BILIRUBIN UA  Negative   UROBILINOGEN UA E U /dl 0 2   LEUKOCYTES UA  1+*   WBC UA /hpf 4-10*   RBC UA /hpf 0-1   BACTERIA UA /hpf Occasional   EPITHELIAL CELLS WET PREP /hpf Occasional     Results from last 7 days   Lab Units 05/19/22  1407   INFLUENZA A PCR  Negative   INFLUENZA B PCR  Negative   RSV PCR  Negative     ED Treatment:   Medication Administration from 05/19/2022 1300 to 05/20/2022 1218       Date/Time Order Dose Route Action     05/19/2022 1412 fentanyl citrate (PF) 100 MCG/2ML 50 mcg 50 mcg Intravenous Given     05/19/2022 1954 dextrose 5 % and sodium chloride 0 45 % with KCl 20 mEq/L infusion 75 mL/hr Intravenous New Bag     05/19/2022 1739 diltiazem (CARDIZEM) tablet 60 mg 60 mg Oral Given     05/19/2022 1740 metoprolol tartrate (LOPRESSOR) tablet 25 mg 25 mg Oral Given     05/20/2022 0632 pantoprazole (PROTONIX) EC tablet 20 mg 20 mg Oral Given     05/19/2022 1740 potassium chloride 20 mEq IVPB (premix) 20 mEq Intravenous New Bag     05/20/2022 0221 heparin (porcine) subcutaneous injection 5,000 Units 5,000 Units Subcutaneous Given     05/19/2022 1740 heparin (porcine) subcutaneous injection 5,000 Units 5,000 Units Subcutaneous Given     05/20/2022 0907 potassium chloride 20 mEq IVPB (premix) 20 mEq Intravenous New Bag     05/20/2022 0911 heparin (porcine) 25,000 units in 0 45% NaCl 250 mL infusion (premix) 18 Units/kg/hr Intravenous New Bag        Past Medical History:   Diagnosis Date    Acute on chronic diastolic congestive heart failure ([facility] [address] ) 6/27/2019    Ambulatory dysfunction 8/17/2020    Arthritis     Chest wall contusion, right, initial encounter 8/17/2020    Chest wall contusion, right, subsequent encounter 8/17/2020    Chronic diastolic heart failure (HCC)     Chronic kidney disease, stage 2 (mild)     Coronary artery disease     history of stenting    Eczema     years    Edema     History of echocardiogram 07/20/2017    EF 55%, mild concentric LVH, bileaflet MVP with moderate MR, left atrial enlargement      History of transfusion     Hyperlipidemia     Hypertension     Hypo-osmolality and hyponatremia     Hypokalemia 7/11/2019    Mitral regurgitation      Admitting Diagnosis: Abdominal pain [R10 9]  Age/Sex: 81 y o  female  Admission Orders:  I/O  Heparin drip with routine pTT  SCDS  Clear liquid diet  Scheduled Medications:  diltiazem, 30 mg, Oral, Q8H AGUILAR  metoprolol tartrate, 12 5 mg, Oral, BID  pantoprazole, 20 mg, Oral, Early Morning  sertraline, 75 mg, Oral, Daily      Continuous IV Infusions:  dextrose 5 % and sodium chloride 0 45 % with KCl 20 mEq/L, 75 mL/hr, Intravenous, Continuous  heparin (porcine), 3-30 Units/kg/hr (Order-Specific), Intravenous, Titrated      PRN Meds:  acetaminophen, 650 mg, Oral, Q6H PRN  aluminum-magnesium hydroxide-simethicone, 30 mL, Oral, Q4H PRN  HYDROmorphone, 0 2 mg, Intravenous, Q3H PRN  hydrOXYzine HCL, 25 mg, Oral, BID PRN  ondansetron, 4 mg, Intravenous, Q6H PRN        IP CONSULT TO NUTRITION SERVICES  IP CONSULT TO INTERNAL MEDICINE  IP CONSULT TO CARDIOLOGY    Network Utilization Review Department  ATTENTION: Please call with any questions or concerns to [phone] and carefully listen to the prompts so that you are directed to the right person  All voicemails are confidential   Lona Coughlin all requests for admission clinical reviews, approved or denied determinations and any other requests to dedicated fax number below belonging to the campus where the patient is receiving treatment   List of dedicated fax numbers for the Facilities:  [address] DENIALS (Administrative/Medical Necessity) [phone]   [address] (Maternity/NICU/Pediatrics) [phone]   [address] [place] [address]  [address] [address] [place] [address] [address] [facility] [place] [address] [phone]

## 2022-05-20 NOTE — ASSESSMENT & PLAN NOTE
82 yo F on Eliquis for A  Fib, on home O2 for chronic respiratory failure, and with recent SNF stay for debility HD2 nausea /abdominal pain/diarrhea, and CT evidence of ileus  Nausea resolved, diarrhea x 4 over 24 hours, ongoing intermittent abdominal pain  Abdomen flat and soft, generalized moderate TTP, no guarding or rigidity  T 98 1, HR 77, /70 (low BP of 98/55 overnight), RR 16, SpO2 94% on RA   K 3 4, BUN 26, Cr 0 84, Hgb 11 7, Plt 219, WBC 10 78 (15 43)    CT a/p 5/19   "There is fluid distention of both large and small bowel suggestive of an ileus  An early obstructive process is not able to be excluded  There is trace fluid adjacent to liver and mesenteric edema "    Assessment:  Post-viral ileus, suspected due to family history acute GI bug within household  DDX to include early small bowel obstruction and acute/chronic mesenteric ischemia 2/2 atherosclerosis of aorta  Plan:  Clear liquids as tolerated for comfort  Enteric stool panel and C  Diff for ongoing diarrhea  Will initiate heparin gtt for hx A  Fib and suspected mesenteric ischemia  Will pursue CTA abdomen/pelvis w/wo contrast for diagnosis  Continue close monitoring and daily labs  Patient's daughter updated by phone  SLIM agreeable to see in consult today

## 2022-05-20 NOTE — ASSESSMENT & PLAN NOTE
80 yo F on Eliquis for A  Fib, on home O2 for chronic respiratory failure, and with recent SNF stay for debility HD2 nausea /abdominal pain/diarrhea, and CT evidence of ileus  Nausea resolved, diarrhea x 4 over 24 hours, ongoing intermittent abdominal pain  Abdomen flat and soft, generalized moderate TTP, no guarding or rigidity  T 98 1, HR 77, /70 (low BP of 98/55 overnight), RR 16, SpO2 94% on RA   K 3 4, BUN 26, Cr 0 84, Hgb 11 7, Plt 219, WBC 10 78 (15 43)    CT a/p 5/19   "There is fluid distention of both large and small bowel suggestive of an ileus  An early obstructive process is not able to be excluded  There is trace fluid adjacent to liver and mesenteric edema "    Assessment:  Acute on chronic mesenteric ischemia, patient with reported 40 lb weight loss  DDX viral/bacterial gastroenteritis, vs SBO  Plan:  Clear liquids as tolerated for comfort  Enteric stool panel and C  Diff for ongoing diarrhea  Will initiate heparin gtt for hx A  Fib and suspected mesenteric ischemia  Will pursue CTA abdomen/pelvis w/wo contrast for diagnosis  Continue close monitoring and daily labs  Patient's daughter updated by phone  SLIM agreeable to see in consult today

## 2022-05-20 NOTE — CONSULTS
[address] 1935, 81 y o  female MRN: [id number]  Unit/Bed#: ED 22 Encounter: [id number]  Primary Care Provider: Nancy Jerry MD   Date and time admitted to hospital: 5/19/2022  1:16 PM    Inpatient consult to Internal Medicine  Consult performed by: Kendra Woods PA-C  Consult ordered by: Kana Eid PA-C        * Acute mesenteric ischemia [facility])  Assessment & Plan  · Management per primary service    Benign hypertension with CKD (chronic kidney disease) stage III [facility])  Assessment & Plan  Lab Results   Component Value Date    EGFR 63 05/20/2022    EGFR 81 05/19/2022    EGFR 53 04/15/2022    CREATININE 0 84 05/20/2022    CREATININE 0 64 05/19/2022    CREATININE 0 96 04/15/2022     · Creatinine at baseline  · Continue to monitor renal function   · Avoid hypotension/nephrotoxins  · Blood pressure reviewed, well controlled  · Continue pre-admission Cardizem, metoprolol    Chronic heart failure with preserved ejection fraction (HFpEF) ([facility] [address] )  Assessment & Plan  Wt Readings from Last 3 Encounters:   05/19/22 50 8 kg (112 lb)   04/06/22 48 4 kg (106 lb 11 2 oz)   07/12/21 49 4 kg (109 lb)       · Patient recently discharged from [facility] on 04/06/2022 for acute on chronic heart failure   · Weight is slightly up from discharge; however, does not appear volume overloaded at this time  · Patient was discharged on Lasix 40 mg p o  Daily; however, currently on hold as patient presented with persistent diarrhea  Will restart as appropriate  · Continue metoprolol 25 mg b i d   · Continue to monitor I&Os closely      Chronic respiratory failure with hypoxia (HCC)  Assessment & Plan  · Chronically on 2 L    Hypokalemia  Assessment & Plan  · Replete and monitor    Longstanding persistent atrial fibrillation (HCC)  Assessment & Plan  · Rate controlled  · Continue pre-admission metoprolol, Cardizem  · Eliquis on hold, per surgery  Restart as appropriate  Patient currently on heparin gtt  CAD (coronary artery disease)  Assessment & Plan  · History of PCI to LAD in  as documented in patient's chart  · Continue pre-admission Cardizem, metoprolol      VTE Pharmacologic Prophylaxis: VTE Score: 5 High Risk (Score >/= 5) - Pharmacological DVT Prophylaxis Ordered: heparin drip  Sequential Compression Devices Ordered  Patient Centered Rounds: I performed bedside rounds with nursing staff today  Discussions with Specialists or Other Care Team Provider:  Case Management, nursing, general surgery    Education and Discussions with Family / Patient: Updated patient at the bedside  All questions/concerns addressed to her satisfaction        Time Spent for Care: 60 minutes  More than 50% of total time spent on counseling and coordination of care as described above  Current Length of Stay: 1 day(s)  Current Patient Status: Inpatient     Code Status: Level 1 - Full Code    Subjective:   Patient is an 80 y/o femal with a PMH significant for HFpEF recently discharged for acute on chronic exacbertion, CAD, Afib on Eliquis, metoprolol, cardizem, CKD, HTN who originally presented to the ED on 22 due to abdominal pain, poor appetite, nausea without vomiting, and diarrhea  Patient states she has been feeling unwell since around the 8th of May  She denies fevers/chills, vomiting, chest pain, shortness of breath, leg pain/swelling  CT a/p in the ED significant for small and large bowel suggesting ileus  Patient is under the primary service of general surgery for monitoring of possible mesenteric ischemia  Patient seen and examined by the medicine team  Patient is pleasant, resting comfortably in bedside chair watching television, and in no apparent distress  No acute events overnight  Patient states that she is feeling okay and her pain level is tolerable, and offers no other complaints at this time       Objective:     Vitals:   Temp (24hrs), Av 1 °F (36 7 °C), Min:98 1 °F (36 7 °C), Max:98 1 °F (36 7 °C)    Temp:  [98 1 °F (36 7 °C)] 98 1 °F (36 7 °C)  HR:  [59-92] 92  Resp:  [16-20] 16  BP: ()/(55-77) 94/57  SpO2:  [91 %-97 %] 95 %  Body mass index is 22 62 kg/m²  Input and Output Summary (last 24 hours): Intake/Output Summary (Last 24 hours) at 5/20/2022 1115  Last data filed at 5/20/2022 1021  Gross per 24 hour   Intake --   Output 415 ml   Net -415 ml       Physical Exam:   Physical Exam  Vitals and nursing note reviewed  Constitutional:       General: She is not in acute distress  Appearance: She is underweight  She is ill-appearing  She is not toxic-appearing  HENT:      Head: Normocephalic and atraumatic  Mouth/Throat:      Mouth: Mucous membranes are moist    Eyes:      Conjunctiva/sclera: Conjunctivae normal    Cardiovascular:      Rate and Rhythm: Tachycardia present  Rhythm irregular  Pulses: Normal pulses  Heart sounds: Normal heart sounds  Pulmonary:      Effort: Pulmonary effort is normal  No respiratory distress  Breath sounds: Normal breath sounds  No wheezing, rhonchi or rales  Comments: Faint crackles bilaterally at the bases  Abdominal:      General: Bowel sounds are normal  There is no distension  Palpations: Abdomen is soft  Tenderness: There is no abdominal tenderness  Musculoskeletal:      Cervical back: Neck supple  Right lower leg: No edema  Left lower leg: No edema  Skin:     General: Skin is warm and dry  Coloration: Skin is pale  Neurological:      General: No focal deficit present  Mental Status: She is alert  Cranial Nerves: No cranial nerve deficit  Sensory: No sensory deficit  Motor: No weakness  Coordination: Coordination normal    Psychiatric:         Mood and Affect: Mood normal          Behavior: Behavior normal          Thought Content:  Thought content normal           Additional Data:     Labs:  Results from last 7 days   Lab Units 05/20/22  0616 05/19/22  1407   WBC Thousand/uL 10 78* 15 43*   HEMOGLOBIN g/dL 11 7 11 1*   HEMATOCRIT % 37 2 34 8   PLATELETS Thousands/uL 219 213   BANDS PCT %  --  1   NEUTROS PCT % 81*  --    LYMPHS PCT % 9*  --    LYMPHO PCT %  --  5*   MONOS PCT % 8  --    MONO PCT %  --  3*   EOS PCT % 1 0     Results from last 7 days   Lab Units 05/20/22  0616   SODIUM mmol/L 135   POTASSIUM mmol/L 3 4*   CHLORIDE mmol/L 100   CO2 mmol/L 27   BUN mg/dL 26*   CREATININE mg/dL 0 84   ANION GAP mmol/L 8   CALCIUM mg/dL 9 3   ALBUMIN g/dL 3 7   TOTAL BILIRUBIN mg/dL 2 07*   ALK PHOS U/L 60   ALT U/L <3*   AST U/L 8*   GLUCOSE RANDOM mg/dL 89     Results from last 7 days   Lab Units 05/20/22  0930   INR  1 21*             Results from last 7 days   Lab Units 05/20/22  0616   LACTIC ACID mmol/L 0 7       Lines/Drains:  Invasive Devices  Report    Peripheral Intravenous Line  Duration           Peripheral IV 04/02/22 Left Antecubital 48 days    Peripheral IV 05/20/22 Left Hand <1 day    Peripheral IV 05/20/22 Right Antecubital <1 day                      Imaging: Reviewed radiology reports from this admission including: CT a/P, chest x-ray    Recent Cultures (last 7 days):         Last 24 Hours Medication List:   Current Facility-Administered Medications   Medication Dose Route Frequency Provider Last Rate    acetaminophen  650 mg Oral Q6H PRN Jose Dimitriadis, PA-C      aluminum-magnesium hydroxide-simethicone  30 mL Oral Q4H PRN Jose Dimitriadis, PA-C      dextrose 5 % and sodium chloride 0 45 % with KCl 20 mEq/L  75 mL/hr Intravenous Continuous Jose Dimitriadis, PA-C 75 mL/hr (05/19/22 1954)    diltiazem  60 mg Oral Q8H [address] Jose Dimitriadis, PA-C      heparin (porcine)  3-30 Units/kg/hr (Order-Specific) Intravenous Titrated Jose Dimitriadis, PA-C 18 Units/kg/hr (05/20/22 0951)    HYDROmorphone  0 2 mg Intravenous Q3H PRN Jose Dimitriadis, PA-C      hydrOXYzine HCL  25 mg Oral BID PRN Jose Eid PA-C      metoprolol tartrate  25 mg Oral BID Jose Eid PA-C      ondansetron  4 mg Intravenous Q6H PRN Jose Eid PA-C      pantoprazole  20 mg Oral Early Morning Jose Eid PA-C      sertraline  75 mg Oral Daily Jose Eid PA-C          Today, Patient Was Seen By: [place] PA-C    **Please Note: This note may have been constructed using a voice recognition system  **

## 2022-05-20 NOTE — ASSESSMENT & PLAN NOTE
History of PCI to LAD per documentation in 2007 with repeat catheterization in 2012 without significant restenoses   Continue beta-blocker; she is not on aspirin as she is on Eliquis at home    Not noted to be on a statin

## 2022-05-20 NOTE — ED NOTES
Report received; pt received in bed;  Tech help pt to bed pan; pt then repositioned in bed to position of comfort       Gt Bryant, MARY  05/20/22 7879

## 2022-05-20 NOTE — PLAN OF CARE
Problem: PHYSICAL THERAPY ADULT  Goal: Performs mobility at highest level of function for planned discharge setting  See evaluation for individualized goals  Description: Treatment/Interventions: Functional transfer training, LE strengthening/ROM, Therapeutic exercise, Endurance training, Patient/family training, Equipment eval/education, Bed mobility, Gait training, Spoke to case management, Spoke to nursing  Equipment Recommended:  (continue RW use)       See flowsheet documentation for full assessment, interventions and recommendations  Note: Prognosis: Fair  Problem List: Decreased strength, Decreased endurance, Impaired balance, Decreased mobility, Pain, Impaired judgement, Decreased safety awareness  Assessment: Pt is 81 y o  female seen for high-complexity PT evaluation on 5/20/2022 s/p admit to [facility] on 5/19/2022 w/ Acute mesenteric ischemia ([facility] [address] )  PT was consulted to assess pt's functional mobility and d/c needs  Order placed for PT eval and tx, w/ up w/ A and out of bed to chair orders  PTA, pt lives c 2 sons in [address] [place] [address] c 1st floor setup, amb c RW at baseline  At time of eval, pt requires SUP for bed mob, min Ax1 for transfers and OOB ambulation attempt x 5 ft with RW use  Upon evaluation, pt presenting with impaired functional mobility d/t decreased strength, decreased endurance, impaired balance, decreased mobility, impaired judgement, decreased safety awareness and pain  Pertinent PMHx and current co-morbidities affecting pt's physical performance at time of assessment include: HTN, CKD stage 3, chronic heart failure, chronic respiratory failure, hypokalemia, AFib, CAD  Personal factors affecting pt at time of eval include: decreased initiation and engagement, unable to perform physical activity and increased age  The following objective measures performed on IE also reveal limitations: AM-PAC 6-Clicks: 94/54   Pt's clinical presentation is currently unstable/unpredictable seen in pt's presentation of abnormal lab value(s), need for input for task focus and mobility technique, ongoing medical assessment, on telemetry monitoring and abdominal pain impacting pt's mobility tolerance  Overall, pt's rehab potential and prognosis to return to PLOF is fair as impacted by objective findings, warranting pt to receive further skilled PT interventions to address identified impairments, activity limitation(s), and participation restriction(s)  Pt to benefit from continued PT tx to address deficits as defined above and maximize level of functional independent mobility and consistency in order for pt to improve OOB activity tolerance  From PT/mobility standpoint, recommendation at time of d/c would be home with home health rehabilitation pending progress in order to facilitate return to PLOF  Barriers to Discharge: None        PT Discharge Recommendation: Home with home health rehabilitation          See flowsheet documentation for full assessment

## 2022-05-20 NOTE — ASSESSMENT & PLAN NOTE
Wt Readings from Last 3 Encounters:   05/19/22 50 8 kg (112 lb)   04/06/22 48 4 kg (106 lb 11 2 oz)   07/12/21 49 4 kg (109 lb)       · Patient recently discharged from [facility] on 04/06/2022 for acute on chronic heart failure   · Weight is slightly up from discharge; however, does not appear volume overloaded at this time  · Patient was discharged on Lasix 40 mg p o  Daily; however, currently on hold as patient presented with persistent diarrhea  Will restart as appropriate    · Continue metoprolol 25 mg b i d   · Continue to monitor I&Os closely

## 2022-05-20 NOTE — OCCUPATIONAL THERAPY NOTE
Occupational Therapy Evaluation      Kane Groves    5/20/2022    Patient Active Problem List   Diagnosis    CAD (coronary artery disease)    Mitral regurgitation    Longstanding persistent atrial fibrillation (HCC)    Acute on chronic diastolic heart failure ([facility] [address] )    Essential hypertension    Acute hypokalemia    Depression with anxiety    Gastroesophageal reflux disease without esophagitis    OAB (overactive bladder)    Hypomagnesemia    Mixed hyperlipidemia    Osteoarthritis of knee    Pneumonia    Generalized weakness    Dizziness    Chest wall contusion, right, initial encounter    Ambulatory dysfunction    Hypokalemia    Liver lesion    Rib fractures    CKD (chronic kidney disease), stage II    Pulmonary hypertension (HCC)    Visual hallucinations    Close exposure to COVID-19 virus    Cognitive impairment    Atrial fibrillation with RVR (HCC)    SIRS (systemic inflammatory response syndrome) (HCC)    Chronic respiratory failure with hypoxia (HCC)    Other constipation    Moderate protein-calorie malnutrition ([facility] [address] )    Acute mesenteric ischemia (HCC)    Chronic heart failure with preserved ejection fraction (HFpEF) (HCC)    Benign hypertension with CKD (chronic kidney disease) stage III ([facility] [address] )       Past Medical History:   Diagnosis Date    Acute on chronic diastolic congestive heart failure ([facility] [address] ) 6/27/2019    Ambulatory dysfunction 8/17/2020    Arthritis     Chest wall contusion, right, initial encounter 8/17/2020    Chest wall contusion, right, subsequent encounter 8/17/2020    Chronic diastolic heart failure (HCC)     Chronic kidney disease, stage 2 (mild)     Coronary artery disease     history of stenting    Eczema     years    Edema     History of echocardiogram 07/20/2017    EF 55%, mild concentric LVH, bileaflet MVP with moderate MR, left atrial enlargement      History of transfusion     Hyperlipidemia     Hypertension     Hypo-osmolality and hyponatremia     Hypokalemia 7/11/2019    Mitral regurgitation        Past Surgical History:   Procedure Laterality Date    ABDOMINAL SURGERY      hysterectomy    CARDIAC CATHETERIZATION  04/10/2012    EF 65%, no significant CAD, patent stents, severe MR     CORONARY ANGIOPLASTY WITH STENT PLACEMENT  03/21/2007    EF 55%, GARRET to LAD  EYE SURGERY      b/l cataracts    HYSTERECTOMY      JOINT REPLACEMENT      Rt knee        05/20/22 0907   OT Last Visit   OT Visit Date 05/20/22   Note Type   Note type Evaluation   Additional Comments Pt agreeable to OT eval  Upon arrival pt supine in bed with HOB elevated   Restrictions/Precautions   Weight Bearing Precautions Per Order No   Other Precautions Contact/isolation;Multiple lines; Fall Risk;Pain;O2  (utilizes 2 L O2 via NC at night only)   Pain Assessment   Pain Assessment Tool 0-10   Pain Score 5   Pain Location/Orientation Orientation: Right;Location: Abdomen   Pain Onset/Description Onset: Ongoing;Frequency: Constant/Continuous; Descriptor: [address] Pain Intervention(s) Ambulation/increased activity;Repositioned;Medication (See MAR)   Home Living   Type of [address] [place] [address] Two level;Performs ADLs on one level; Able to live on main level with bedroom/bathroom; Ramped entrance  (1st floor set-up)   Bathroom Shower/Tub Tub/shower unit   Bathroom Toilet Raised   Bathroom Equipment Shower chair;Grab bars in shower;Grab bars around toilet   [facility]  (utilizes RW at baseline)   Prior Function   Level of [place] Independent with ADLs and functional mobility   Lives With [employer] Help From Family   ADL Assistance Independent   IADLs Needs assistance  (sons assist c cooking and laundry)   Falls in the last 6 months 0   Vocational Retired   Lifestyle   Autonomy Patient reporting being independent with ADLs, ambulatory with RW and lives with sons in a two story house with 1st floor set-up   Reciprocal Relationships Two sons   Service to Others Retired ADL   Eating Assistance 6  Modified independent   Grooming Assistance 6  Modified Independent   UB Bathing Assistance 5  Supervision/Setup   LB Bathing Assistance 4  Minimal Assistance   UB Dressing Assistance 5  Supervision/Setup   LB Dressing Assistance 3  Moderate Assistance   Toileting Assistance  4  Minimal Assistance   Additional Comments Pt limited by decreased activity tolerance, strength, balance and increased pain  Bed Mobility   Supine to Sit 5  Supervision   Additional items Assist x 1;HOB elevated; Bedrails; Increased time required   Sit to Supine   (DNT: pt seated OOB in recliner at end of eval)   Additional Comments Pt reported mild dizziness upon sitting at EOB  Symptoms resolved with increased seated rest    Transfers   Sit to Stand 4  Minimal assistance   Additional items Assist x 1; Increased time required;Verbal cues   Stand to Sit 4  Minimal assistance   Additional items Assist x 1; Armrests; Increased time required;Verbal cues   Additional Comments Occasional verbal cues provided throughout session for proper body mechanics and safe hand placement   Functional Mobility   Functional Mobility 4  Minimal assistance   Additional Comments Pt ambulated short distance from bed to recliner  Pt declined further mobility d/t weakness and fatigue  No overt SOB but pt grossly unsteady and requires cues for RW management   Additional items Rolling walker   Balance   Static Sitting Good   Dynamic Sitting Fair +   Static Standing Fair   Dynamic Standing Fair -   Activity Tolerance   Activity Tolerance Patient limited by fatigue   Medical Staff Made Aware Pt seen as a co-eval with PT due to the patient's co-morbidities, clinically unstable presentation, and present impairments which are a regression from the patient's baseline     Nurse Made Aware RN made aware of outcomes   RUE Assessment   RUE Assessment WFL  (grossly 4-/5 MMT)   LUE Assessment   LUE Assessment WFL  (grossly 4-/5 MMT)   Hand Function   Gross Motor Coordination Functional   Fine Motor Coordination Functional   Sensation   Light Touch Partial deficits in the RUE;Partial deficits in the LUE  ("they fall asleep sometimes")   Vision-Basic Assessment   Current Vision Does not wear glasses   Patient Visual Report Blurring of print when reading   Cognition   [facility]   Arousal/Participation Alert; Responsive; Cooperative   Attention Attends with cues to redirect   Orientation Level Oriented X4   Memory Within functional limits   Following Commands Follows one step commands without difficulty   Assessment   Limitation Decreased ADL status; Decreased UE strength;Decreased endurance;Decreased self-care trans;Decreased high-level ADLs   Prognosis Good   Assessment Patient is a 81 y o  female seen for OT evaluation s/p admit to [place] [address] on 5/19/2022 w/Acute mesenteric ischemia ([facility] [address] )  Commorbidities affecting patient's functional performance at time of assessment include: CKD, CAD, chronic heart failure, chronic respiratory failure, hypokalemia, A-fib, and malnutrition  Orders placed for OT evaluation and treatment and up with assistance  Performed at least two patient identifiers during session including name and wristband  Prior to admission, Patient reporting being independent with ADLs, ambulatory with RW and lives with sons in a two story house with 1st floor set-up  Personal factors affecting patient at time of initial evaluation include: decreased initiation and engagement, difficulty performing ADLs and difficulty performing IADLs  Upon evaluation, patient requires supervision assist for UB ADLs, minimal  and moderate assist for LB ADLs, transfers and functional ambulation in room and bathroom with minimal  assist, with the use of Rolling Walker    Patient is oriented x 4 and presents with ability to recognize a problem, define a problem, identify alternative plan, select a plan, organize steps in a plan, implement plan and evaluate outcome (problem solving)  Occupational performance is affected by the following deficits: decreased muscle strength, dynamic sit/ stand balance deficit with poor standing tolerance time for self care and functional mobility, decreased activity tolerance, increased pain and delayed righting and equilibrium reactions  Based on the mentioned OT evaluation outcomes, functional performance deficits, and assessment findings, pt has been identified as a moderate complexity evaluation  Patient to benefit from continued Occupational Therapy treatment while in the hospital to address deficits as defined above and maximize level of functional independence with ADLs and functional mobility  Occupational Performance areas to address include: grooming , bathing/ shower, dressing, toilet hygiene, transfer to all surfaces, functional ambulation, medication routine/ management, IADLS: Household maintenance, IADLs: safety procedures and IADLs: meal prep/ clean up  From OT standpoint, recommendation at time of d/c would be Home with home health rehabilitation  Goals   Patient Goals to get better   Plan   Treatment Interventions ADL retraining;Functional transfer training;UE strengthening/ROM; Endurance training;Patient/family training;Equipment evaluation/education; Compensatory technique education; Energy conservation; Activityengagement   Goal Expiration Date 05/30/22   OT Treatment Day 0   OT Frequency 3-5x/wk   Recommendation   OT Discharge Recommendation Home with home health rehabilitation   Equipment Recommended Bedside commode   Commode Type Standard   OT - OK to Discharge Yes  (Once medically cleared)   Additional Comments  At end of eval, pt seated OOB in recliner with all needs met   Additional Comments 2 The patient's raw score on the AM-PAC Daily Activity inpatient short form is 19, standardized score is 40 22, greater than 39 4  Patients at this level are likely to benefit from discharge to home   Please refer to the recommendation of the Occupational Therapist for safe discharge planning  AM-PAC Daily Activity Inpatient   Lower Body Dressing 2   Bathing 3   Toileting 3   Upper Body Dressing 3   Grooming 4   Eating 4   Daily Activity Raw Score 19   Daily Activity Standardized Score (Calc for Raw Score >=11) 40 22   AM-PAC Applied Cognition Inpatient   Following a Speech/Presentation 4   Understanding Ordinary Conversation 4   Taking Medications 3   Remembering Where Things Are Placed or Put Away 3   Remembering List of 4-5 Errands 3   Taking Care of Complicated Tasks 3   Applied Cognition Raw Score 20   Applied Cognition Standardized Score 41 76     GOALS:    *ADL transfers with (I) for inc'd independence with ADLs/purposeful tasks    *UB ADL with (I) for inc'd independence with self cares    *LB ADL with (I) using AE prn for inc'd independence with self cares    *Toileting with (I) for clothing management and hygiene for return to PLOF with personal care    *Increase stand tolerance x 5  m for inc'd tolerance with standing purposeful tasks    *Participate in 10m UE therex to increase overall stamina/activity tolerance for purposeful tasks    *Bed mobility- (I) for inc'd independence to manage own comfort and initiate EOB & OOB purposeful tasks    *Patient will verbalize 3 safety awareness/ principles to prevent falls in the home setting  *Patient will verbalize and demonstrate use of energy conservation/deep breathing techniques and work simplification skills during functional activities with no verbal cues  *Patient will increase OOB/sitting tolerance to 2-4 hours per day to increase participation in self-care and leisure tasks with no s/s of exertion        *Pt will demonstrate use of long handled AE during 100% of tx sessions for increased ADL safety and independence following D/C     Stephan Castro, OTD, OTR/L

## 2022-05-20 NOTE — PLAN OF CARE
Problem: Potential for Falls  Goal: Patient will remain free of falls  Description: INTERVENTIONS:  - Educate patient/family on patient safety including physical limitations  - Instruct patient to call for assistance with activity   - Consult OT/PT to assist with strengthening/mobility   - Keep Call bell within reach  - Keep bed low and locked with side rails adjusted as appropriate  - Keep care items and personal belongings within reach  - Initiate and maintain comfort rounds  - Make Fall Risk Sign visible to staff  - Apply yellow socks and bracelet for high fall risk patients  - Consider moving patient to room near nurses station  Outcome: Progressing     Problem: Prexisting or High Potential for Compromised Skin Integrity  Goal: Skin integrity is maintained or improved  Description: INTERVENTIONS:  - Identify patients at risk for skin breakdown  - Assess and monitor skin integrity  - Assess and monitor nutrition and hydration status  - Monitor labs   - Assess for incontinence   - Turn and reposition patient  - Assist with mobility/ambulation  - Relieve pressure over bony prominences  - Avoid friction and shearing  - Provide appropriate hygiene as needed including keeping skin clean and dry  - Evaluate need for skin moisturizer/barrier cream  - Collaborate with interdisciplinary team   - Patient/family teaching  - Consider wound care consult   Outcome: Progressing     Problem: INFECTION - ADULT  Goal: Absence or prevention of progression during hospitalization  Description: INTERVENTIONS:  - Assess and monitor for signs and symptoms of infection  - Monitor lab/diagnostic results  - Monitor all insertion sites, i e  indwelling lines, tubes, and drains  - Monitor endotracheal if appropriate and nasal secretions for changes in amount and color  - [place] appropriate cooling/warming therapies per order  - Administer medications as ordered  - Instruct and encourage patient and family to use good hand hygiene technique  - Identify and instruct in appropriate isolation precautions for identified infection/condition  Outcome: Progressing     Problem: Knowledge Deficit  Goal: Patient/family/caregiver demonstrates understanding of disease process, treatment plan, medications, and discharge instructions  Description: Complete learning assessment and assess knowledge base    Interventions:  - Provide teaching at level of understanding  - Provide teaching via preferred learning methods  Outcome: Progressing

## 2022-05-20 NOTE — ASSESSMENT & PLAN NOTE
Wt Readings from Last 3 Encounters:   05/19/22 50 8 kg (112 lb)   04/06/22 48 4 kg (106 lb 11 2 oz)   07/12/21 49 4 kg (109 lb)     Euvolemic-no evidence of volume overload on exam or symptoms to suggest volume overload  Home dose of Lasix 40 mg has been held on admission and she was given gentle IVF yesterday    Continue to monitor closely and reinitiate diuretics when clinically appropriate

## 2022-05-20 NOTE — ASSESSMENT & PLAN NOTE
· Rate controlled  · Continue pre-admission metoprolol, Cardizem  · Eliquis on hold, per surgery  Restart as appropriate  Patient currently on heparin gtt

## 2022-05-20 NOTE — CONSULTS
18 Tracie Youngblood 1935, 81 y o  female MRN: [id number]  Unit/Bed#: ED 22 Encounter: [id number]  Primary Care Provider: Leeanna Clarke MD   Date and time admitted to hospital: 5/19/2022  1:16 PM    Inpatient consult to Cardiology  Consult performed by: NEAL Abbasi  Consult ordered by: Rip Eid PA-C          Chronic heart failure with preserved ejection fraction (HFpEF) ([facility] [address] )  Assessment & Plan  Wt Readings from Last 3 Encounters:   05/19/22 50 8 kg (112 lb)   04/06/22 48 4 kg (106 lb 11 2 oz)   07/12/21 49 4 kg (109 lb)     Euvolemic-no evidence of volume overload on exam or symptoms to suggest volume overload  Home dose of Lasix 40 mg has been held on admission and she was given gentle IVF yesterday  Continue to monitor closely and reinitiate diuretics when clinically appropriate      Longstanding persistent atrial fibrillation (HCC)  Assessment & Plan  Rate controlled  OK to reduce medications in an effort to allow slightly higher BP's  Continue Cardizem-will reduce to 30 mg PO Q 8 hours  Continue metoprolol-reduce dose to 12 5 mg BID  She is maintained on Eliquis at home, however she has been placed on a Heparin drip while in the hospital      CAD (coronary artery disease)  Assessment & Plan  History of PCI to LAD per documentation in 2007 with repeat catheterization in 2012 without significant restenoses   Continue beta-blocker; she is not on aspirin as she is on Eliquis at home  Not noted to be on a statin    Other summary comments:   From a cardiac perspective, she appears to be stable  Her heart rate is well controlled a despite missing several doses of daily meds which were held due to low blood pressures  Would opt for decreasing doses of Cardizem and metoprolol (see below-These adjustments have been made)  Given her heart failure and mitral regurgitation, do not advise giving IV fluids at this time      Continue to monitor on current regimen  Outpatient Cardiologist:  Dr Estela Ruiz    HPI: Maryjane Warren is a 81y o  year old female who presented with  abdominal pain  She has been admitted to the surgical team for suspected acute on chronic mesenteric ischemia  Given her cardiac history, Cardiology was consulted to provide further recommendations  Deidra Corbin is noted to have soft blood pressures, however it appears that her blood pressures are normally on the soft side  She is maintained on  Cardizem 60 mg every 8 hours and metoprolol 25 mg twice daily at home for AFib rate control  She is also noted to be on Eliquis for stroke risk reduction  She has known diastolic dysfunction and moderate to severe MR  At the time of my evaluation, Deidra Corbin tapia snot offer any concerns  She has mild abd discomfort when asked  She denies any chest pain, pressure, tightness, palpitations, or shortness of breath  She has not noted any edema  EKG:  AFib, rate 80      MOST  RECENT CARDIAC IMAGING:   Echo 4/4/2022 EF 55%   Mod AS, mod-severe MR, mild-mod MS, mild TR  Elevated PAP     Echo 8/17/2020 EF 65%  Mild MS, mod MR, mild AS, mild TR  Elevated PAP        Review of Systems: a 10 point review of systems was conducted and is negative except for as mentioned in the HPI or as below  Historical Information   Past Medical History:   Diagnosis Date    Acute on chronic diastolic congestive heart failure ([facility] [address] ) 6/27/2019    Ambulatory dysfunction 8/17/2020    Arthritis     Chest wall contusion, right, initial encounter 8/17/2020    Chest wall contusion, right, subsequent encounter 8/17/2020    Chronic diastolic heart failure (HCC)     Chronic kidney disease, stage 2 (mild)     Coronary artery disease     history of stenting    Eczema     years    Edema     History of echocardiogram 07/20/2017    EF 55%, mild concentric LVH, bileaflet MVP with moderate MR, left atrial enlargement      History of transfusion     Hyperlipidemia     Hypertension     Hypo-osmolality and hyponatremia     Hypokalemia 7/11/2019    Mitral regurgitation      Past Surgical History:   Procedure Laterality Date    ABDOMINAL SURGERY      hysterectomy    CARDIAC CATHETERIZATION  04/10/2012    EF 65%, no significant CAD, patent stents, severe MR     CORONARY ANGIOPLASTY WITH STENT PLACEMENT  03/21/2007    EF 55%, GARRET to LAD   EYE SURGERY      b/l cataracts    HYSTERECTOMY      JOINT REPLACEMENT      Rt knee     Social History     Substance and Sexual Activity   Alcohol Use Never    Alcohol/week: 0 0 standard drinks     Social History     Substance and Sexual Activity   Drug Use Never     Social History     Tobacco Use   Smoking Status Never Smoker   Smokeless Tobacco Never Used       Family History:  No longer pertinent due to patient age      Meds/Allergies   all current active meds have been reviewed  (Not in a hospital admission)      Allergies   Allergen Reactions    Chocolate Flavor - Food Allergy Itching    Shellfish-Derived Products - Food Allergy Shortness Of Breath    Iodine - Food Allergy Other (See Comments)     shellfish- difficulty breathing    Codeine Rash       Objective   Vitals: Blood pressure 100/68, pulse 89, temperature 98 1 °F (36 7 °C), resp  rate 16, height 4' 11" (1 499 m), weight 50 8 kg (112 lb), SpO2 96 %  , Body mass index is 22 62 kg/m² ,   Orthostatic Blood Pressures    Flowsheet Row Most Recent Value   Blood Pressure 100/68 filed at 05/20/2022 1130   Patient Position - Orthostatic VS Sitting filed at 05/20/2022 6838          Systolic (05QXW), AVV:124 , Min:94 , HOE:543     Diastolic (31DFD), WNE:17, Min:55, Max:77      Physical Exam:    General:  Normal appearance in no distress  Eyes:  Anicteric  Oral mucosa:  Moist   Neck:  No JVD  Carotid upstrokes are brisk without bruits  No masses  Chest:  Clear to auscultation  Cardiac:  Irregularly irregular  No palpable PMI    Normal S1 and S2   3/6  murmur noted  Abdomen:  Deferred due to reported discomfort  Extremities:  No peripheral edema  Musculoskeletal:  Symmetric  Vascular:  Pedal pulses are intact  Neuro:  Grossly symmetric  Psych:  Alert and oriented x3          Lab Results:     Troponins:    Results from last 7 days   Lab Units 05/19/22  1836 05/19/22  1407   HS TNI 0HR ng/L  --  13   HS TNI 2HR ng/L 9  --    HSTNI D2 ng/L -4  --      BNP:   Results from last 6 Months   Lab Units 04/02/22  0429   BNP pg/mL 3,590*       CBC :   Results from last 7 days   Lab Units 05/20/22  0616 05/19/22  1407   WBC Thousand/uL 10 78* 15 43*   HEMOGLOBIN g/dL 11 7 11 1*   HEMATOCRIT % 37 2 34 8   MCV fL 89 89   PLATELETS Thousands/uL 219 213     TSH:     CMP:   Results from last 7 days   Lab Units 05/20/22  0616 05/19/22  1407   POTASSIUM mmol/L 3 4* 3 4*   CHLORIDE mmol/L 100 99   CO2 mmol/L 27 26   BUN mg/dL 26* 22   CREATININE mg/dL 0 84 0 64   AST U/L 8* 15   ALT U/L <3* 7   EGFR ml/min/1 73sq m 63 81     Lipid Profile:     Coags:   Results from last 7 days   Lab Units 05/20/22  0930   INR  1 21*

## 2022-05-20 NOTE — ASSESSMENT & PLAN NOTE
Rate controlled  OK to reduce medications in an effort to allow slightly higher BP's  Continue Cardizem-will reduce to 30 mg PO Q 8 hours  Continue metoprolol-reduce dose to 12 5 mg BID  She is maintained on Eliquis at home, however she has been placed on a Heparin drip while in the hospital

## 2022-05-20 NOTE — QUICK NOTE
Update:    CTA negative for critical ischemia or acute occlusion  Review of BP notes borderline hypotension with MAPS over 65  Clinical picture consistent with acute on chronic mesenteric edema 2/2 low flow state  Cardiology graciously adjusting heart meds to promote higher BP, will monitor closely  Continue clears as tolerated, trial full liquids tomorrow, follow-up stool studies

## 2022-05-20 NOTE — UTILIZATION REVIEW
Inpatient Admission Authorization Request   NOTIFICATION OF INPATIENT ADMISSION/INPATIENT AUTHORIZATION REQUEST   SERVICING FACILITY:   [facility]  [address] [facility] [place], [address]  Tax ID: [id number]  NPI: [id number]  Place of Service: Inpatient [address] [facility]  [address]  Place of Service Code: 24     ATTENDING PROVIDER:  Attending Name and NPI#: Abril Bernal Md [[id number]]  Address: [address] [facility] [place], [address]  Phone: [phone]     UTILIZATION REVIEW CONTACT:  Latrell Fermin, Utilization   Network Utilization Review Department  Phone: [phone]  Fax [phone]  Email: [email or web address]     PHYSICIAN ADVISORY SERVICES:  FOR OIFM-OQ-[id number] REVIEW - MEDICAL NECESSITY DENIAL  Phone: [phone]  Fax: [phone]  Email: [email or web address]     TYPE OF REQUEST:  Inpatient Status     ADMISSION INFORMATION:  ADMISSION DATE/TIME: 5/19/22  4:03 PM  PATIENT DIAGNOSIS CODE/DESCRIPTION:  Abdominal pain [R10 9]  DISCHARGE DATE/TIME: No discharge date for patient encounter  IMPORTANT INFORMATION:  Please contact the Latrell Fermin directly with any questions or concerns regarding this request  Department voicemails are confidential     Send requests for admission clinical reviews, concurrent reviews, approvals, and administrative denials due to lack of clinical to fax [phone]

## 2022-05-21 NOTE — ASSESSMENT & PLAN NOTE
· History of PCI to LAD in 2007 as documented in patient's chart  · Continue pre-admission Cardizem, metoprolol   · Added asa and statin per cardiology

## 2022-05-21 NOTE — ASSESSMENT & PLAN NOTE
Lab Results   Component Value Date    EGFR 79 05/21/2022    EGFR 63 05/20/2022    EGFR 81 05/19/2022    CREATININE 0 67 05/21/2022    CREATININE 0 84 05/20/2022    CREATININE 0 64 05/19/2022     · Creatinine at baseline  · Continue to monitor renal function   · Avoid hypotension/nephrotoxins  · Blood pressure reviewed, well controlled  · Continue pre-admission Cardizem, metoprolol

## 2022-05-21 NOTE — ASSESSMENT & PLAN NOTE
· Rate controlled  · Continue pre-admission metoprolol, Cardizem  · Heparin drip is being transitioned to Eliquis

## 2022-05-21 NOTE — PROGRESS NOTES
[facility]  67   Progress Note - [place] 1935, 81 y o  female MRN: [id number]  Unit/Bed#: -01 Encounter: [id number]  Primary Care Provider: Jaime Haque MD   Date and time admitted to hospital: 5/19/2022  1:16 PM    * Acute mesenteric ischemia [facility])  Assessment & Plan  · Management per primary service     Benign hypertension with CKD (chronic kidney disease) stage III [facility])  Assessment & Plan  Lab Results   Component Value Date    EGFR 79 05/21/2022    EGFR 63 05/20/2022    EGFR 81 05/19/2022    CREATININE 0 67 05/21/2022    CREATININE 0 84 05/20/2022    CREATININE 0 64 05/19/2022     · Creatinine at baseline  · Continue to monitor renal function   · Avoid hypotension/nephrotoxins  · Blood pressure reviewed, well controlled  · Continue pre-admission Cardizem, metoprolol     Chronic heart failure with preserved ejection fraction (HFpEF) ([facility] [address] )  Assessment & Plan  Wt Readings from Last 3 Encounters:   05/21/22 49 2 kg (108 lb 5 7 oz)   04/06/22 48 4 kg (106 lb 11 2 oz)   07/12/21 49 4 kg (109 lb)       · Patient recently discharged from [facility] on 04/06/2022 for acute on chronic heart failure   · Weight is slightly up from discharge; however, does not appear volume overloaded at this time  · Cautious with IVF   · Patient was discharged on Lasix 40 mg p o  Daily; however, currently on hold as patient presented with persistent diarrhea  Will restart as appropriate    · Continue metoprolol 25 mg b i d    · Continue to monitor I&Os closely       Hypokalemia  Assessment & Plan  · Replete and monitor     Longstanding persistent atrial fibrillation (HCC)  Assessment & Plan  · Rate controlled  · Continue pre-admission metoprolol, Cardizem  · Heparin drip is being transitioned to Eliquis    Coronary artery disease involving native coronary artery of native heart  Assessment & Plan  · History of PCI to LAD in 2007 as documented in patient's chart  · Continue pre-admission Cardizem, metoprolol   · Added asa and statin per cardiology       VTE Prophylaxis:  Apixaban (Eliquis)    Patient Centered Rounds: I have performed bedside rounds with nursing staff today  Discussions with Specialists or Other Care Team Provider: surgery, cardiology   Education and Discussions with Family / Patient: patient     Current Length of Stay: 2 day(s)    Current Patient Status: Inpatient     Discharge Plan: per primary team     Code Status: Level 1 - Full Code    Subjective:   Feeling okay  Continues to have abdominal pain  Objective:     Vitals:   Temp (24hrs), Av 3 °F (36 8 °C), Min:98 1 °F (36 7 °C), Max:98 4 °F (36 9 °C)    Temp:  [98 1 °F (36 7 °C)-98 4 °F (36 9 °C)] 98 4 °F (36 9 °C)  HR:  [] 91  Resp:  [17-18] 17  BP: (108-126)/(65-89) 123/72  SpO2:  [93 %-95 %] 93 %  Body mass index is 21 89 kg/m²  Input and Output Summary (last 24 hours): Intake/Output Summary (Last 24 hours) at 2022 1447  Last data filed at 2022 1300  Gross per 24 hour   Intake 180 ml   Output 400 ml   Net -220 ml       Physical Exam:   Physical Exam  Vitals and nursing note reviewed  Constitutional:       General: She is not in acute distress  Appearance: Normal appearance  She is ill-appearing (chronically )  HENT:      Head: Normocephalic and atraumatic  Right Ear: External ear normal       Left Ear: External ear normal       Nose: Nose normal  No rhinorrhea  Mouth/Throat:      Mouth: Mucous membranes are moist       Pharynx: Oropharynx is clear  Eyes:      General:         Right eye: No discharge  Left eye: No discharge  Pupils: Pupils are equal, round, and reactive to light  Cardiovascular:      Rate and Rhythm: Normal rate  Rhythm irregular  Pulses: Normal pulses  Heart sounds: Normal heart sounds  No murmur heard  Pulmonary:      Effort: Pulmonary effort is normal  No respiratory distress  Breath sounds: Normal breath sounds  Abdominal:      General: Bowel sounds are normal  There is distension  Palpations: Abdomen is soft  There is no mass  Tenderness: There is no abdominal tenderness  Musculoskeletal:         General: No swelling or tenderness  Normal range of motion  Cervical back: Normal range of motion and neck supple  No muscular tenderness  Skin:     General: Skin is warm and dry  Capillary Refill: Capillary refill takes less than 2 seconds  Coloration: Skin is pale  Findings: No erythema or rash  Neurological:      General: No focal deficit present  Mental Status: She is alert and oriented to person, place, and time  Mental status is at baseline  Psychiatric:         Mood and Affect: Mood normal          Behavior: Behavior normal          Thought Content: Thought content normal          Judgment: Judgment normal          Additional Data:     Labs:    Results from last 7 days   Lab Units 05/21/22  0445   WBC Thousand/uL 6 72   HEMOGLOBIN g/dL 11 2*   HEMATOCRIT % 35 8   PLATELETS Thousands/uL 222   NEUTROS PCT % 70   LYMPHS PCT % 14   MONOS PCT % 9   EOS PCT % 6     Results from last 7 days   Lab Units 05/21/22  0445 05/20/22  0616   SODIUM mmol/L 136 135   POTASSIUM mmol/L 3 8 3 4*   CHLORIDE mmol/L 103 100   CO2 mmol/L 22 27   BUN mg/dL 16 26*   CREATININE mg/dL 0 67 0 84   CALCIUM mg/dL 9 0 9 3   ALK PHOS U/L  --  60   ALT U/L  --  <3*   AST U/L  --  8*     Results from last 7 days   Lab Units 05/20/22  0930   INR  1 21*               * I Have Reviewed All Lab Data Listed Above  * Additional Pertinent Lab Tests Reviewed:  Alex 67 Admission  Reviewed    Imaging:  Imaging Reports Reviewed Today Include: n/a     Recent Cultures (last 7 days):     Results from last 7 days   Lab Units 05/20/22  1438   C DIFF TOXIN B BY PCR  Negative       Last 24 Hours Medication List:   Current Facility-Administered Medications   Medication Dose Route Frequency Provider Last Rate  acetaminophen  650 mg Oral Q6H PRN Jose Dimitriadis, PA-C      aluminum-magnesium hydroxide-simethicone  30 mL Oral Q4H PRN Jose Dimitriadis, PA-C      apixaban  2 5 mg Oral BID IVONNE Spivey      aspirin  81 mg Oral Daily María Matthews MD      atorvastatin  40 mg Oral Daily With Monica Catherine MD      dextrose 5 % and sodium chloride 0 45 % with KCl 20 mEq/L  50 mL/hr Intravenous Continuous Francy Duran PA-C 50 mL/hr (05/21/22 1111)    diltiazem  30 mg Oral Q8H [address] NEAL Cm      HYDROmorphone  0 2 mg Intravenous Q3H PRN Jose Dimitriadis, PAABIODUN      hydrOXYzine HCL  25 mg Oral BID PRN Jose Dimitriadis, PA-C      metoprolol tartrate  12 5 mg Oral BID NEAL Riley      ondansetron  4 mg Intravenous Q6H PRN Jose Dimitriadis, PA-LILIA      pantoprazole  20 mg Oral Early Morning Jose Dimitriadis, PA-C      sertraline  75 mg Oral Daily Jose Dimitriadis, IVONNE          Today, Patient Was Seen By: NEAL Holguin    ** Please Note: Dictation voice to text software may have been used in the creation of this document   **

## 2022-05-21 NOTE — PROGRESS NOTES
Progress Note - General Surgery   Janet Schumacher 81 y o  female MRN: [id number]  Unit/Bed#: -01 Encounter: [id number]    Assessment/Plan      87F with afib on eliquis, home 02 for chronic respiratory failure, now with likely acute on chronic mesenteric ischemia secondary to low flow state  Tolerating clear liquids, advanced to full liquids, currently no abdominal pain and is nontender  IVF decreased to 50/hour  Added aspirin and statin per cardiology, transitioned from heparin drip to eliquis  Added telemetry to assist with medication titration during remaining stay  Will plan for trial of soft foods off of fluids tomorrow if still doing well and discharge home with services in the next 1-2 days pending clinical progress  Subjective/Objective     Subjective: no abdominal pain, no nausea, passing gas and having bowel movements, tolerating clears, dilt and metoprolol decreased yesterday, appreciate cardiology recommendations    Objective:     Blood pressure 119/76, pulse (!) 114, temperature 98 4 °F (36 9 °C), resp  rate 17, height 4' 11" (1 499 m), weight 49 2 kg (108 lb 5 7 oz), SpO2 94 %  ,Body mass index is 21 89 kg/m²  Intake/Output Summary (Last 24 hours) at 5/21/2022 1341  Last data filed at 5/21/2022 0400  Gross per 24 hour   Intake 1509 05 ml   Output 700 ml   Net 809 05 ml       Invasive Devices  Report    Peripheral Intravenous Line  Duration           Peripheral IV 04/02/22 Left Antecubital 49 days    Peripheral IV 05/20/22 Left Hand 1 day    Peripheral IV 05/20/22 Right Antecubital 1 day                Physical Exam  Vitals reviewed  Constitutional:       General: She is not in acute distress  HENT:      Mouth/Throat:      Mouth: Mucous membranes are moist    Cardiovascular:      Rate and Rhythm: Tachycardia present  Rhythm irregular  Pulmonary:      Effort: Pulmonary effort is normal  No respiratory distress  Abdominal:      General: There is no distension        Palpations: Abdomen is soft  Tenderness: There is no abdominal tenderness  There is no guarding or rebound  Skin:     General: Skin is warm and dry  Neurological:      Mental Status: She is alert and oriented to person, place, and time  Mental status is at baseline  Psychiatric:         Mood and Affect: Mood normal          Behavior: Behavior normal          Lab, Imaging and other studies:  I have personally reviewed pertinent lab results    , CBC:   Lab Results   Component Value Date    WBC 6 72 05/21/2022    HGB 11 2 (L) 05/21/2022    HCT 35 8 05/21/2022    MCV 90 05/21/2022     05/21/2022    MCH 28 1 05/21/2022    MCHC 31 3 (L) 05/21/2022    RDW 15 8 (H) 05/21/2022    MPV 10 6 05/21/2022    NRBC 0 05/21/2022   , CMP:   Lab Results   Component Value Date    SODIUM 136 05/21/2022    K 3 8 05/21/2022     05/21/2022    CO2 22 05/21/2022    BUN 16 05/21/2022    CREATININE 0 67 05/21/2022    CALCIUM 9 0 05/21/2022    EGFR 79 05/21/2022     VTE Pharmacologic Prophylaxis: Reason for no pharmacologic prophylaxis On therapeutic anticoagulation  VTE Mechanical Prophylaxis: sequential compression device

## 2022-05-21 NOTE — NURSING NOTE
AWAKE AND ALERT/ORIENTED X4  R/A STATUS  RESP EASY   NO SOB  HEART IS REGULAR  DENIES PAIN  ABDOMEN IS SOFT/NON TENDER  NO EDEMA  IVF CONTINUE AS ORDERED  PLUS 2 PEDAL/RADIAL PULSES  CALL BELL NEAR  BED ALARM MAINTAINED AND NO DISTRESS

## 2022-05-21 NOTE — PLAN OF CARE
Problem: Nutrition/Hydration-ADULT  Goal: Nutrient/Hydration intake appropriate for improving, restoring or maintaining nutritional needs  Description: Monitor and assess patient's nutrition/hydration status for malnutrition  Collaborate with interdisciplinary team and initiate plan and interventions as ordered  Monitor patient's weight and dietary intake as ordered or per policy  Utilize nutrition screening tool and intervene as necessary  Determine patient's food preferences and provide high-protein, high-caloric foods as appropriate       INTERVENTIONS:  - Monitor oral intake, urinary output, labs, and treatment plans  - Assess nutrition and hydration status and recommend course of action  - Evaluate amount of meals eaten  - Assist patient with eating if necessary   - Allow adequate time for meals  - Recommend/ encourage appropriate diets, oral nutritional supplements, and vitamin/mineral supplements  - Order, calculate, and assess calorie counts as needed  - Recommend, monitor, and adjust tube feedings and TPN/PPN based on assessed needs  - Assess need for intravenous fluids  - Provide specific nutrition/hydration education as appropriate  - Include patient/family/caregiver in decisions related to nutrition  Outcome: Progressing     Problem: MOBILITY - ADULT  Goal: Maintain or return to baseline ADL function  Description: INTERVENTIONS:  -  Assess patient's ability to carry out ADLs; assess patient's baseline for ADL function and identify physical deficits which impact ability to perform ADLs (bathing, care of mouth/teeth, toileting, grooming, dressing, etc )  - Assess/evaluate cause of self-care deficits   - Assess range of motion  - Assess patient's mobility; develop plan if impaired  - Assess patient's need for assistive devices and provide as appropriate  - Encourage maximum independence but intervene and supervise when necessary  - Involve family in performance of ADLs  - Assess for home care needs following discharge   - Consider OT consult to assist with ADL evaluation and planning for discharge  - Provide patient education as appropriate  Outcome: Progressing  Goal: Maintains/Returns to pre admission functional level  Description: INTERVENTIONS:  - Perform BMAT or MOVE assessment daily    - Set and communicate daily mobility goal to care team and patient/family/caregiver     - Collaborate with rehabilitation services on mobility goals if consulted  - Out of bed for toileting  - Record patient progress and toleration of activity level   Outcome: Progressing     Problem: Potential for Falls  Goal: Patient will remain free of falls  Description: INTERVENTIONS:  - Educate patient/family on patient safety including physical limitations  - Instruct patient to call for assistance with activity   - Consult OT/PT to assist with strengthening/mobility   - Keep Call bell within reach  - Keep bed low and locked with side rails adjusted as appropriate  - Keep care items and personal belongings within reach  - Initiate and maintain comfort rounds  - Make Fall Risk Sign visible to staff  - Apply yellow socks and bracelet for high fall risk patients  - Consider moving patient to room near nurses station  Outcome: Progressing     Problem: Prexisting or High Potential for Compromised Skin Integrity  Goal: Skin integrity is maintained or improved  Description: INTERVENTIONS:  - Identify patients at risk for skin breakdown  - Assess and monitor skin integrity  - Assess and monitor nutrition and hydration status  - Monitor labs   - Assess for incontinence   - Turn and reposition patient  - Assist with mobility/ambulation  - Relieve pressure over bony prominences  - Avoid friction and shearing  - Provide appropriate hygiene as needed including keeping skin clean and dry  - Evaluate need for skin moisturizer/barrier cream  - Collaborate with interdisciplinary team   - Patient/family teaching  - Consider wound care consult Outcome: Progressing     Problem: PAIN - ADULT  Goal: Verbalizes/displays adequate comfort level or baseline comfort level  Description: Interventions:  - Encourage patient to monitor pain and request assistance  - Assess pain using appropriate pain scale  - Administer analgesics based on type and severity of pain and evaluate response  - Implement non-pharmacological measures as appropriate and evaluate response  - Consider cultural and social influences on pain and pain management  - Notify physician/advanced practitioner if interventions unsuccessful or patient reports new pain  Outcome: Progressing     Problem: INFECTION - ADULT  Goal: Absence or prevention of progression during hospitalization  Description: INTERVENTIONS:  - Assess and monitor for signs and symptoms of infection  - Monitor lab/diagnostic results  - Monitor all insertion sites, i e  indwelling lines, tubes, and drains  - Monitor endotracheal if appropriate and nasal secretions for changes in amount and color  - Cos[place] appropriate cooling/warming therapies per order  - Administer medications as ordered  - Instruct and encourage patient and family to use good hand hygiene technique  - Identify and instruct in appropriate isolation precautions for identified infection/condition  Outcome: Progressing     Problem: SAFETY ADULT  Goal: Maintain or return to baseline ADL function  Description: INTERVENTIONS:  -  Assess patient's ability to carry out ADLs; assess patient's baseline for ADL function and identify physical deficits which impact ability to perform ADLs (bathing, care of mouth/teeth, toileting, grooming, dressing, etc )  - Assess/evaluate cause of self-care deficits   - Assess range of motion  - Assess patient's mobility; develop plan if impaired  - Assess patient's need for assistive devices and provide as appropriate  - Encourage maximum independence but intervene and supervise when necessary  - Involve family in performance of ADLs  - Assess for home care needs following discharge   - Consider OT consult to assist with ADL evaluation and planning for discharge  - Provide patient education as appropriate  Outcome: Progressing  Goal: Maintains/Returns to pre admission functional level  Description: INTERVENTIONS:  - Perform BMAT or MOVE assessment daily    - Set and communicate daily mobility goal to care team and patient/family/caregiver     - Collaborate with rehabilitation services on mobility goals if consulted  - Dangle patient 3 times a day  - Stand patient 3 times a day  - Ambulate patient 3 times a day  - Out of bed to chair 3 times a day   - Out of bed for meals 3 times a day  - Out of bed for toileting  - Record patient progress and toleration of activity level   Outcome: Progressing  Goal: Patient will remain free of falls  Description: INTERVENTIONS:  - Educate patient/family on patient safety including physical limitations  - Instruct patient to call for assistance with activity   - Consult OT/PT to assist with strengthening/mobility   - Keep Call bell within reach  - Keep bed low and locked with side rails adjusted as appropriate  - Keep care items and personal belongings within reach  - Initiate and maintain comfort rounds  - Make Fall Risk Sign visible to staff  - Offer Toileting every 2 Hours, in advance of need  - Obtain necessary fall risk management equipment  - Apply yellow socks and bracelet for high fall risk patients  - Consider moving patient to room near nurses station  Outcome: Progressing     Problem: DISCHARGE PLANNING  Goal: Discharge to home or other facility with appropriate resources  Description: INTERVENTIONS:  - Identify barriers to discharge w/patient and caregiver  - Arrange for needed discharge resources and transportation as appropriate  - Identify discharge learning needs (meds, wound care, etc )  - Arrange for interpretive services to assist at discharge as needed  - Refer to Case Management Department for coordinating discharge planning if the patient needs post-hospital services based on physician/advanced practitioner order or complex needs related to functional status, cognitive ability, or social support system  Outcome: Progressing     Problem: Knowledge Deficit  Goal: Patient/family/caregiver demonstrates understanding of disease process, treatment plan, medications, and discharge instructions  Description: Complete learning assessment and assess knowledge base    Interventions:  - Provide teaching at level of understanding  - Provide teaching via preferred learning methods  Outcome: Progressing

## 2022-05-21 NOTE — PROGRESS NOTES
Cardiology Progress Note   Jesi Blackmon 81 y o  female MRN: [id number]  Unit/Bed#: -01 Encounter: [id number]    Hospital Course/Assessment  45ZW woman with persistent AF, CAD (GARRET to LAD in 2007, repeat [address] 2012 without restenosis) on Eliquis and not aspirin at home, mod/sev MR and mild/mod MS (4/4/22), and acute on chronic HFpEF (55%) who presented with suspected acute on chronic mesenteric ischemia    Subjective: Today she is not on tele  Her HR while in the room speaking with her is in the 100's    Plan  1  Persistent AF  - Heparin gtt  Home NOAC when clinically reasonable  - Doing well on lower dose of metop tartrate 12 5mg q12H, diltiazem 30mg q8H  Continue this for now given tenuous hemodynamics  - Target HR <110  Since we are actively managing her AF rate control meds will place her on tele    2  Acute on chronic HFpEF (55%) due to moderate-to-severe MR with mild/mod MS  - Net positive 394mL this hospitalization  Would avoid excessive IVF  - Follows with Dr Shahla Redd outpatient and prior discussions showed not interested in intervention  We breifly discussed her long-term prognosis with regards to the mod/sev MR and I brought up the discussion of a non-urgent palliative care team consult with regards to her chronic cardiac conditions that are unrelated to her current clinical condition  At this time she is not interested in speaking with palliative care  - Avoid excessive IVF    3  CAD s/p GARRET to LAD (2007): recommend she should be on aspirin and statin  Unclear reason for this not to be on    4  Acute on chronic mesenteric ischemia  - Should be on aspirin, statin    Ericka Johnson MD  - PGY-6 Cardiology Fellow    ======================================================  Objective  VS: Blood pressure 119/76, pulse (!) 114, temperature 98 4 °F (36 9 °C), resp  rate 17, height 4' 11" (1 499 m), weight 49 2 kg (108 lb 5 7 oz), SpO2 94 %    Gen: well appearing  Psych: AOx3  Skin: intact  Cardiac: S1, S2, regular rate, no S3 or S4 appreciated  No murmurs  +2 PT, radial pulses  No peripheral edema No carotid bruits  Resp: CTABL  No crackles  MSK: 5/5 strength throughout muscle groups  Neuro: CN grossly intact  Sensory to light touch, pain, proprioception intact BL LE, UE  LN: no cervical LAD  Rheum: no joint deformities in UE or LE  ======================================================  TREADMILL STRESS  No results found for this or any previous visit      ----------------------------------------------------------------------------------------------  NUCLEAR STRESS TEST: No results found for this or any previous visit  No results found for this or any previous visit       --------------------------------------------------------------------------------  CATH:  No results found for this or any previous visit     --------------------------------------------------------------------------------  ECHO:   Results for orders placed during the hospital encounter of 20    Echo complete with contrast if indicated    Narrative  [address] [facility] [address] [facility] [address], [facility]  [phone]    Transthoracic Echocardiogram  2D, M-mode, Doppler, and Color Doppler    Study date:  17-Aug-2020    Patient: Gini Rodriguez  MR number: [id number]  Account number: [de-identified]  : 1935  Age: 81 years  Gender: Female  Status: Inpatient  Location: Bedside  Height: 58 in  Weight: 130 lb  BP: 133/ 85 mmHg    Indications: chest wall contusion    Diagnoses: 786 59 - CHEST PAIN NEC    Sonographer:  Roseann Castrejon CCT  Primary Physician:  Amilcar Fernandze MD  Group:  [id number]  Interpreting Physician:  Angel Dickson MD    SUMMARY    LEFT VENTRICLE:  Size was normal   Systolic function was at the lower limits of normal  Ejection fraction was estimated to be 65 %  There were no regional wall motion abnormalities  LEFT ATRIUM:  The atrium was dilated      MITRAL VALVE:  There was moderate annular calcification  There was mildly reduced leaflet separation  There was mild stenosis  There was moderate regurgitation  AORTIC VALVE:  The valve was probably trileaflet  Leaflets exhibited mildly reduced cuspal separation  There was mild stenosis  TRICUSPID VALVE:  There was mild regurgitation  Estimated peak PA pressure was 60 mmHg  HISTORY: PRIOR HISTORY: fall afib    PROCEDURE: The procedure was performed at the bedside  This was a routine study  The transthoracic approach was used  The study included complete 2D imaging, M-mode, complete spectral Doppler, and color Doppler  The heart rate was 79 bpm,  at the start of the study  This was a technically difficult study  LEFT VENTRICLE: Size was normal  Systolic function was at the lower limits of normal  Ejection fraction was estimated to be 65 %  There were no regional wall motion abnormalities  Wall thickness was at the upper limits of normal     RIGHT VENTRICLE: The size was normal  Systolic function was normal  Wall thickness was normal     LEFT ATRIUM: The atrium was dilated  RIGHT ATRIUM: Size was normal     MITRAL VALVE: There was moderate annular calcification  There was mildly reduced leaflet separation  DOPPLER: There was mild stenosis  There was moderate regurgitation  AORTIC VALVE: The valve was probably trileaflet  Leaflets exhibited mildly reduced cuspal separation  DOPPLER: Transaortic velocity was within the normal range  There was mild stenosis  There was no significant regurgitation  TRICUSPID VALVE: The valve structure was normal  There was normal leaflet separation  DOPPLER: The transtricuspid velocity was within the normal range  There was no evidence for stenosis  There was mild regurgitation  Estimated peak PA  pressure was 60 mmHg  PULMONIC VALVE: Leaflets exhibited normal thickness, no calcification, and normal cuspal separation   DOPPLER: The transpulmonic velocity was within the normal range  There was no regurgitation  PERICARDIUM: There was no pericardial effusion  The pericardium was normal in appearance  AORTA: The root exhibited normal size  SYSTEM MEASUREMENT TABLES    2D  %FS: 28 34 %  Ao Diam: 3 36 cm  EDV(Teich): 85 77 ml  EF(Teich): 54 98 %  ESV(Teich): 38 62 ml  IVSd: 1 49 cm  LA Area: 22 87 cm2  LA Diam: 4 58 cm  LVEDV MOD A4C: 74 95 ml  LVEF MOD A4C: 63 4 %  LVESV MOD A4C: 27 43 ml  LVIDd: 4 36 cm  LVIDs: 3 12 cm  LVLd A4C: 6 33 cm  LVLs A4C: 5 37 cm  LVPWd: 1 38 cm  RA Area: 19 75 cm2  RVIDd: 3 64 cm  RWT: 0 63  SV MOD A4C: 47 52 ml  SV(Teich): 47 16 ml    CW  AV Env  Ti: 291 35 ms  AV VTI: 44 6 cm  AV Vmax: 2 1 m/s  AV Vmean: 1 54 m/s  AV maxP 83 mmHg  AV meanPG: 10 38 mmHg  MV PHT: 105 19 ms  MV VTI: 41 35 cm  MV Vmax: 1 81 m/s  MV Vmean: 0 87 m/s  MV maxP 11 mmHg  MV meanP 2 mmHg  MVA By PHT: 2 09 cm2  RAP: 0 mmHg  TR Vmax: 3 58 m/s  TR maxP 05 mmHg    MM  TAPSE: 2 41 cm    PW  E' Sept: 0 06 m/s  LVOT Env  Ti: 210 58 ms  LVOT VTI: 15 6 cm  LVOT Vmax: 1 02 m/s  LVOT Vmean: 0 74 m/s  LVOT maxP 24 mmHg  LVOT meanP 47 mmHg  MV E Jones: 1 64 m/s  RVSP: 52 05 mmHg    [facility]    Prepared and electronically signed by    David Diaz MD  Signed 17-Aug-2020 09:43:32    No results found for this or any previous visit     --------------------------------------------------------------------------------  HOLTER  No results found for this or any previous visit     --------------------------------------------------------------------------------  CAROTIDS  No results found for this or any previous visit         [unfilled]   =====================================================    Active Meds    Current Facility-Administered Medications:     acetaminophen (TYLENOL) tablet 650 mg, 650 mg, Oral, Q6H PRN, Jose Eid PA-C    aluminum-magnesium hydroxide-simethicone (MYLANTA) oral suspension 30 mL, 30 mL, Oral, Q4H PRN, Jose Dimitriadis, PA-C    dextrose 5 % and sodium chloride 0 45 % with KCl 20 mEq/L infusion, 75 mL/hr, Intravenous, Continuous, Jose Dimitriadis, PA-C, Last Rate: 75 mL/hr at 05/20/22 1445, 75 mL/hr at 05/20/22 1445    diltiazem (CARDIZEM) tablet 30 mg, 30 mg, Oral, Q8H [facility], America Kendrick, CRNP, 30 mg at 05/21/22 1221    heparin (porcine) 25,000 units in 0 45% NaCl 250 mL infusion (premix), 3-30 Units/kg/hr (Order-Specific), Intravenous, Titrated, Jose Dimitriadis, PA-C, Last Rate: 11 mL/hr at 05/21/22 0726, 22 Units/kg/hr at 05/21/22 0726    HYDROmorphone HCl (DILAUDID) injection 0 2 mg, 0 2 mg, Intravenous, Q3H PRN, Jose Dimitriadis, PA-C    hydrOXYzine HCL (ATARAX) tablet 25 mg, 25 mg, Oral, BID PRN, Jose Dimitriadis, PA-C    metoprolol tartrate (LOPRESSOR) partial tablet 12 5 mg, 12 5 mg, Oral, BID, America Kendrick, CRNP, 12 5 mg at 05/21/22 0913    ondansetron (ZOFRAN) injection 4 mg, 4 mg, Intravenous, Q6H PRN, Jose Dimitriadis, PA-C    pantoprazole (PROTONIX) EC tablet 20 mg, 20 mg, Oral, Early Morning, Jose Dimitriadis, PA-C, 20 mg at 05/21/22 9692    sertraline (ZOLOFT) tablet 75 mg, 75 mg, Oral, Daily, Jose Dimitriadis, PA-C, 75 mg at 05/21/22 0913    Labs & Results  Lab Results   Component Value Date    TROPONINI <0 03 07/01/2021    TROPONINI <0 03 02/03/2021    TROPONINI <0 02 08/18/2020     Lab Results   Component Value Date    CALCIUM 9 0 05/21/2022     04/05/2018    K 3 8 05/21/2022    CO2 22 05/21/2022     05/21/2022    BUN 16 05/21/2022    CREATININE 0 67 05/21/2022     Lab Results   Component Value Date    WBC 6 72 05/21/2022    HGB 11 2 (L) 05/21/2022    HCT 35 8 05/21/2022    MCV 90 05/21/2022     05/21/2022     Results from last 7 days   Lab Units 05/20/22  0930   INR  1 21*     Lab Results   Component Value Date    CHOL 152 04/05/2018     Lab Results   Component Value Date    HDL 61 (H) 03/15/2019    HDL 56 04/05/2018     Lab Results   Component Value Date    LDLCALC 69 03/15/2019    LDLCALC 72 1 (L) 04/05/2018     Lab Results   Component Value Date    TRIG 94 03/15/2019    TRIG 119 04/05/2018     Lab Results   Component Value Date    ALT <3 (L) 05/20/2022    AST 8 (L) 05/20/2022

## 2022-05-21 NOTE — CASE MANAGEMENT
Case Management Assessment & Discharge Planning Note    Patient name [facility]  Location [address] [id number] MRN [id number]  : 1935 Date 2022       Current Admission Date: 2022  Current Admission Diagnosis:Acute mesenteric ischemia [facility])   Patient Active Problem List    Diagnosis Date Noted    Chronic heart failure with preserved ejection fraction (HFpEF) ([facility] ) 2022    Benign hypertension with CKD (chronic kidney disease) stage III ([facility] ) 2022    Moderate protein-calorie malnutrition ([facility] ) 2022    Acute mesenteric ischemia ([place] [address] ) 2022    Chronic respiratory failure with hypoxia ([place] [address] ) 2022    Other constipation 2022    Atrial fibrillation with RVR ([place] [address] ) 2022    SIRS (systemic inflammatory response syndrome) ([place] [address] ) 2022    Cognitive impairment 2021    Close exposure to COVID-19 virus 2021    Visual hallucinations 2021    Chest wall contusion, right, initial encounter 2020    Ambulatory dysfunction 2020    Hypokalemia 2020    Liver lesion 2020    Rib fractures 2020    CKD (chronic kidney disease), stage II 2020    Pulmonary hypertension ([facility] ) 2020    Dizziness 2020    Generalized weakness 2020    Osteoarthritis of knee 2020    Pneumonia 2020    Mixed hyperlipidemia 2019    Hypomagnesemia 2019    Essential hypertension 2019    Acute hypokalemia 2019    Depression with anxiety 2019    Gastroesophageal reflux disease without esophagitis 2019    OAB (overactive bladder) 2019    Acute on chronic diastolic heart failure ([facility] ) 2019    Longstanding persistent atrial fibrillation ([facility] ) 2019    CAD (coronary artery disease) 2019    Mitral regurgitation 2019      LOS (days): 2  Geometric Mean LOS (GMLOS) (days): 3 70  Days to GMLOS:1 8     OBJECTIVE:    Risk of Unplanned Readmission Score: 20 95     Current admission status: Inpatient    Preferred Pharmacy:   [address], [address] [facility]  Phone: [phone] Fax: [phone]    Primary Care Provider: April Valiente MD    Primary Insurance: [facility] REP  Secondary Insurance:     ASSESSMENT:  [address] Proxies    There are no active Health Care Proxies on file  Advance Directives  Does patient have a [address]?: Yes  Does patient have Advance Directives?: Yes  Advance Directives: Living will, Power of  for health care  Primary Contact: Josseline Guardian daughter    Readmission Root Cause  30 Day Readmission: No    Patient Information  Admitted from[de-identified] Home  Mental Status: Alert  During Assessment patient was accompanied by: Not accompanied during assessment  Assessment information provided by[de-identified] Patient  Primary Caregiver: Self  Support Systems: Family members  [place] of Residence: [address] do you live in?: [address] access options   Select all that apply : Ramp  Type of Current Residence: 2 story home  Upon entering residence, is there a bedroom on the main floor (no further steps)?: Yes  Upon entering residence, is there a bathroom on the main floor (no further steps)?: Yes  In the last 12 months, was there a time when you were not able to pay the mortgage or rent on time?: No  In the last 12 months, how many places have you lived?: 1  In the last 12 months, was there a time when you did not have a steady place to sleep or slept in a shelter (including now)?: No  Homeless/housing insecurity resource given?: N/A  Living Arrangements: Lives w/ Son  Is patient a ?: No    Activities of Daily Living Prior to Admission  Functional Status: Independent  Completes ADLs independently?: Yes  Ambulates independently?: Yes  Does patient use assisted devices?: Yes  Assisted Devices (DME) used: Walker  Does patient currently own DME?: Yes  What DME does the patient currently own?: Joan Gardner, Bedside Commode, Straight Latesha Boas lift  Does patient have a history of Outpatient Therapy (PT/OT)?: No  Does the patient have a history of Short-Term Rehab?: Yes ([address])  Does patient have a history of HHC?: Yes  Does patient currently have [address]?: Yes Nikhil Herrera)    [address] [facility] [address][de-identified] [facility] Provider[de-identified] PCP    Patient Information Continued  Income Source: Pension/USP  Does patient have prescription coverage?: Yes  Within the past 12 months, you worried that your food would run out before you got the money to buy more : Never true  Within the past 12 months, the food you bought just didn't last and you didn't have money to get more : Never true  Food insecurity resource given?: N/A  Does patient receive dialysis treatments?: No  Does patient have a history of substance abuse?: No  Does patient have a history of Mental Health Diagnosis?: No    PHQ 2/9 Screening   Reviewed PHQ 2/9 Depression Screening Score?: No    Means of Transportation  Means of Transport to Appts[de-identified] Family transport  In the past 12 months, has lack of transportation kept you from medical appointments or from getting medications?: No  In the past 12 months, has lack of transportation kept you from meetings, work, or from getting things needed for daily living?: No  Was application for public transport provided?: N/A    DISCHARGE DETAILS:    Discharge planning discussed with[de-identified] Pt  Freedom of Choice: Yes  Comments - Freedom of Choice: Wants to continue services with [address]         Is the patient interested in [address] at discharge?: Yes  [address] requested[de-identified] Physical Therapy, Occupational Therapy, [address]or[address] Agency Name[de-identified] [facility] Provider[de-identified] PCP  Home Health Services Needed[de-identified] Heart Failure Management, Evaluate Functional Status and Safety, Strengthening/Theraputic Exercises to Improve Function  Supporting Clincal Findings[de-identified] Limited Endurance, Dyspnea with Exertion    DME Referral Provided  Referral made for DME?: No  Treatment Team Recommendation: Home with [address] [place] [address]  Discharge Destination Plan[de-identified] Home with Татьяна at Discharge : Family   Pt wants to continue services with [facility]  Referral made for same

## 2022-05-22 NOTE — ASSESSMENT & PLAN NOTE
Wt Readings from Last 3 Encounters:   05/22/22 49 7 kg (109 lb 9 1 oz)   04/06/22 48 4 kg (106 lb 11 2 oz)   07/12/21 49 4 kg (109 lb)       · Patient recently discharged from [facility] on 04/06/2022 for acute on chronic heart failure   · Weight is slightly up from discharge; however, does not appear volume overloaded at this time  · Cautious with IVF   · Patient was discharged on Lasix 40 mg p o  Daily; however, currently on hold as patient presented with persistent diarrhea  Will restart as appropriate    · Continue to monitor I&Os closely

## 2022-05-22 NOTE — NURSING NOTE
Patient discharged via wheelchair, escorted out by RN  KELLI reviewed with and understood by patient and her daughter  IV catheter's removed by nursing student and intact

## 2022-05-22 NOTE — PROGRESS NOTES
Progress Note - General Surgery   Pippa Miramontes 81 y o  female MRN: [id number]  Unit/Bed#: -01 Encounter: [id number]      ASSESSMENT:  81 /yo F with afib on eliquis, home 02 for chronic respiratory failure, now with likely acute on chronic mesenteric ischemia secondary to low flow state  Advanced to full liquids yesterday, tolerated well  Abdominal pain much improved  Passing flatus, last BM (diarrhea 2 days ago)  Since home metoprolol and diltiazem dose has been decreased pressures improved, but HR elevated  Cardiology following, see plan below  PLAN:  · IVF d/c  · K+ 3 6, replete with oral KDur  · Analgesia/antiemetics prn  · Continue ASA/statin therapy for CMI  · Continue home Eliquis   · Discussed w/ cardiology  Her heart rate has been elevated, they increased her metoprolol and diltiazem back to her home dosing  Plan to monitor HR/BP today  If vitals stable and improved, consider d/c this evening on current regimen  · Advanced to soft surgical diet, monitor toleration  · D/c w/ home PT/OT, will discuss w/ CM possible d/c in the next 24 hrs  · Discussed plan with nursing & Dr Hernandez [place]:  abdominal pain much improved since admission, tolerating clears, passing flatus, no BM since 2 days ago when she had diarrhea, no n/v, no problems voiding      OBJECTIVE:   Vitals:  Blood pressure 111/71, pulse (!) 117, temperature 98 4 °F (36 9 °C), resp  rate 18, height 4' 11" (1 499 m), weight 49 7 kg (109 lb 9 1 oz), SpO2 92 %  ,Body mass index is 22 13 kg/m²    I/Os:    Intake/Output Summary (Last 24 hours) at 5/22/2022 6171  Last data filed at 5/21/2022 1300  Gross per 24 hour   Intake 180 ml   Output --   Net 180 ml     Lines/Drains:  Invasive Devices  Report    Peripheral Intravenous Line  Duration           Peripheral IV 05/20/22 Left Hand 2 days    Peripheral IV 05/20/22 Right Antecubital 2 days                PHYSICAL EXAMINATION:  GA: NAD  Eyes: EOMI  Ears: Gross hearing intact  Cardio/Vas: tachycardiac, irregular heart rhythm, +2 radial pulse  Pulm: normal lung effort on RA, anterior lung fields CTA  Abd: ND, soft, generalized mild tenderness but distractable, active BS, no guarding/rebound  Extr: No LE edema, no calf tenderness  Skin: Warm, dry  No cyanosis, pallor  No rashes/lesions  MSK: No gross motor deficits   Neuro: Speech clear, no gross CN deficits   Psych: Normal affect, AAOx3      Diagnostics:  I have personally reviewed pertinent lab results  CT abdomen pelvis wo contrast    Result Date: 5/19/2022  Impression: There is fluid distention of both large and small bowel suggestive of an ileus  An early obstructive process is not able to be excluded  There is trace fluid adjacent to liver and mesenteric edema  The study was marked in [facility] for immediate notification  Workstation performed: [id number]     XR chest 1 view portable    Result Date: 5/19/2022  Impression: Mild central pulmonary vascular congestion  No focal airspace consolidation  Workstation performed: [id number]     CTA abdomen pelvis w wo contrast    Result Date: 5/20/2022  Impression: 1  Mild SMA ostial stenosis, approximately 25%  The celiac artery is patent  Major mesenteric branch vessels are patent  2  Left lateral mid abdominal small bowel wall thickening, with associated adjacent scattered prominent mesenteric nodes  Correlate clinically for enteritis  3  Interval resolution of recently described small and large bowel fluid distention  4  Near the level of the renal artery takeoffs, there is calcific aortic atherosclerotic disease that focally intrudes upon the lumen, resulting in approximately 50% stenosis  5  Moderate to severe bilateral renal artery ostial stenosis  6  Left adrenal adenoma, no specific follow-up recommended  7  Marked mitral annular calcifications   Workstation performed: [id number]     Recent Results (from the past 36 hour(s))   APTT    Collection Time: 05/21/22  4:45 AM Result Value Ref Range    PTT 40 (H) 23 - 37 seconds   CBC and differential    Collection Time: 05/21/22  4:45 AM   Result Value Ref Range    WBC 6 72 4 31 - 10 16 Thousand/uL    RBC 3 99 3 81 - 5 12 Million/uL    Hemoglobin 11 2 (L) 11 5 - 15 4 g/dL    Hematocrit 35 8 34 8 - 46 1 %    MCV 90 82 - 98 fL    MCH 28 1 26 8 - 34 3 pg    MCHC 31 3 (L) 31 4 - 37 4 g/dL    RDW 15 8 (H) 11 6 - 15 1 %    MPV 10 6 8 9 - 12 7 fL    Platelets 147 724 - 378 Thousands/uL    nRBC 0 /100 WBCs    Neutrophils Relative 70 43 - 75 %    Immat GRANS % 0 0 - 2 %    Lymphocytes Relative 14 14 - 44 %    Monocytes Relative 9 4 - 12 %    Eosinophils Relative 6 0 - 6 %    Basophils Relative 1 0 - 1 %    Neutrophils Absolute 4 67 1 85 - 7 62 Thousands/µL    Immature Grans Absolute 0 03 0 00 - 0 20 Thousand/uL    Lymphocytes Absolute 0 97 0 60 - 4 47 Thousands/µL    Monocytes Absolute 0 61 0 17 - 1 22 Thousand/µL    Eosinophils Absolute 0 38 0 00 - 0 61 Thousand/µL    Basophils Absolute 0 06 0 00 - 0 10 Thousands/µL   Basic metabolic panel    Collection Time: 05/21/22  4:45 AM   Result Value Ref Range    Sodium 136 135 - 147 mmol/L    Potassium 3 8 3 5 - 5 3 mmol/L    Chloride 103 96 - 108 mmol/L    CO2 22 21 - 32 mmol/L    ANION GAP 11 4 - 13 mmol/L    BUN 16 5 - 25 mg/dL    Creatinine 0 67 0 60 - 1 30 mg/dL    Glucose 80 65 - 140 mg/dL    Calcium 9 0 8 4 - 10 2 mg/dL    eGFR 79 ml/min/1 73sq m   CBC and differential    Collection Time: 05/22/22  4:30 AM   Result Value Ref Range    WBC 5 54 4 31 - 10 16 Thousand/uL    RBC 3 71 (L) 3 81 - 5 12 Million/uL    Hemoglobin 10 4 (L) 11 5 - 15 4 g/dL    Hematocrit 33 8 (L) 34 8 - 46 1 %    MCV 91 82 - 98 fL    MCH 28 0 26 8 - 34 3 pg    MCHC 30 8 (L) 31 4 - 37 4 g/dL    RDW 15 7 (H) 11 6 - 15 1 %    MPV 9 8 8 9 - 12 7 fL    Platelets 996 652 - 282 Thousands/uL    nRBC 0 /100 WBCs    Neutrophils Relative 63 43 - 75 %    Immat GRANS % 0 0 - 2 %    Lymphocytes Relative 18 14 - 44 %    Monocytes Relative 11 4 - 12 %    Eosinophils Relative 6 0 - 6 %    Basophils Relative 2 (H) 0 - 1 %    Neutrophils Absolute 3 52 1 85 - 7 62 Thousands/µL    Immature Grans Absolute 0 02 0 00 - 0 20 Thousand/uL    Lymphocytes Absolute 0 97 0 60 - 4 47 Thousands/µL    Monocytes Absolute 0 60 0 17 - 1 22 Thousand/µL    Eosinophils Absolute 0 34 0 00 - 0 61 Thousand/µL    Basophils Absolute 0 09 0 00 - 0 10 Thousands/µL   Basic metabolic panel    Collection Time: 05/22/22  4:30 AM   Result Value Ref Range    Sodium 138 135 - 147 mmol/L    Potassium 3 6 3 5 - 5 3 mmol/L    Chloride 107 96 - 108 mmol/L    CO2 24 21 - 32 mmol/L    ANION GAP 7 4 - 13 mmol/L    BUN 9 5 - 25 mg/dL    Creatinine 0 53 (L) 0 60 - 1 30 mg/dL    Glucose 93 65 - 140 mg/dL    Calcium 9 3 8 4 - 10 2 mg/dL    eGFR 86 ml/min/1 73sq m       Current Medications:  Scheduled Meds:  Current Facility-Administered Medications   Medication Dose Route Frequency Provider Last Rate    acetaminophen  650 mg Oral Q6H PRN Jose Dimitriadis, PA-C      aluminum-magnesium hydroxide-simethicone  30 mL Oral Q4H PRN Jose Dimitriadis, PA-C      apixaban  2 5 mg Oral BID IVONNE Spivey      aspirin  81 mg Oral Daily Sandeep Ayala MD      atorvastatin  40 mg Oral Daily With Michael Bradley MD      diltiazem  60 mg Oral [facility] Shayy Meraz MD      HYDROmorphone  0 2 mg Intravenous Q3H PRN Jose Dimitriadis, PA-C      hydrOXYzine HCL  25 mg Oral BID PRN Jose Dimitriadis, PA-C      metoprolol tartrate  25 mg Oral BID Shayy Meraz MD      ondansetron  4 mg Intravenous Q6H PRN Jose Dimitriadis, PA-C      pantoprazole  20 mg Oral Early Morning Jose Dimitriadis, PA-C      sertraline  75 mg Oral Daily Jose Dimitriadis, PA-C       Continuous Infusions:   PRN Meds:    acetaminophen    aluminum-magnesium hydroxide-simethicone    HYDROmorphone    hydrOXYzine HCL    ondansetron      ALAN SpiveyC  5/22/2022  9:23 AM

## 2022-05-22 NOTE — PROGRESS NOTES
Cardiology Progress Note   Stacy Llamas 81 y o  female MRN: [id number]  Unit/Bed#: -01 Encounter: [id number]    Hospital Course/Assessment  77TH woman with persistent AF, CAD (GARRET to LAD in 2007, repeat [facility] 2012 without restenosis) on Eliquis and not aspirin at home, mod/sev MR and mild/mod MS (4/4/22), and acute on chronic HFpEF (55%) who presented with suspected acute on chronic mesenteric ischemia  Her metop 25mg q12H and dilt 60mg q8H were both halved on admission    Subjective: On tele today she is running around 's and BP can tolerate going back to home dose of rate control meds  She has no other complaints including SOB, palpitations  Tolerating diet without significant abd discomfort or pain    Plan  1  Persistent AF  - Home Eliquis 2 5mg BID restarted  - Her rates could be better controlled and her BP is now more stable  Will go back to home dosage of metop 25mg q12H and dilt 60mg q8H  If her HR are better controlled anticipate okay for discharge from cardiac perspective    2  Acute on chronic HFpEF (55%) due to moderate-to-severe MR with mild/mod MS  - Follow up with Dr Myles Hines outpatient  - Not interested in outpatient palliative care consult at this time    3  CAD s/p GARRET to LAD (2007): during this hospitalization I started her on Aspirin 81mg, and atorvastatin 40mg given her significant PVD as indicated by her mesenteric ischemic and prior stenting    4  Acute on chronic mesenteric ischemia    Nemo Garcia MD  - PGY-6 Cardiology Fellow    ======================================================  Objective  VS: Blood pressure 132/70, pulse 77, temperature 99 °F (37 2 °C), resp  rate 18, height 4' 11" (1 499 m), weight 49 7 kg (109 lb 9 1 oz), SpO2 93 %  Gen: well appearing  Psych: AOx3  Skin: intact  Cardiac: S1, S2, regular rate, no S3 or S4 appreciated  No murmurs  +2 PT, radial pulses  No peripheral edema No carotid bruits  Resp: CTABL   No crackles  MSK: 5/5 strength throughout muscle groups  Neuro: CN grossly intact  Sensory to light touch, pain, proprioception intact BL LE, UE  LN: no cervical LAD  Rheum: no joint deformities in UE or LE  ======================================================  TREADMILL STRESS  No results found for this or any previous visit      ----------------------------------------------------------------------------------------------  NUCLEAR STRESS TEST: No results found for this or any previous visit  No results found for this or any previous visit       --------------------------------------------------------------------------------  CATH:  No results found for this or any previous visit     --------------------------------------------------------------------------------  ECHO:   Results for orders placed during the hospital encounter of 20    Echo complete with contrast if indicated    Narrative  [facility] [address] [facility] [address] , [place] [address]  [phone]    Transthoracic Echocardiogram  2D, M-mode, Doppler, and Color Doppler    Study date:  17-Aug-2020    Patient: Hassell Goldmann  MR number: [id number]  Account number: [de-identified]  : 1935  Age: 81 years  Gender: Female  Status: Inpatient  Location: Bedside  Height: 58 in  Weight: 130 lb  BP: 133/ 85 mmHg    Indications: chest wall contusion    Diagnoses: 786 59 - CHEST PAIN NEC    Sonographer:  TREVIN Heaton  Primary Physician:  Kaye Richardson MD  Group:  [id number]  Interpreting Physician:  Hans Davis MD    SUMMARY    LEFT VENTRICLE:  Size was normal   Systolic function was at the lower limits of normal  Ejection fraction was estimated to be 65 %  There were no regional wall motion abnormalities  LEFT ATRIUM:  The atrium was dilated  MITRAL VALVE:  There was moderate annular calcification  There was mildly reduced leaflet separation  There was mild stenosis  There was moderate regurgitation      AORTIC VALVE:  The valve was probably trileaflet  Leaflets exhibited mildly reduced cuspal separation  There was mild stenosis  TRICUSPID VALVE:  There was mild regurgitation  Estimated peak PA pressure was 60 mmHg  HISTORY: PRIOR HISTORY: fall afib    PROCEDURE: The procedure was performed at the bedside  This was a routine study  The transthoracic approach was used  The study included complete 2D imaging, M-mode, complete spectral Doppler, and color Doppler  The heart rate was 79 bpm,  at the start of the study  This was a technically difficult study  LEFT VENTRICLE: Size was normal  Systolic function was at the lower limits of normal  Ejection fraction was estimated to be 65 %  There were no regional wall motion abnormalities  Wall thickness was at the upper limits of normal     RIGHT VENTRICLE: The size was normal  Systolic function was normal  Wall thickness was normal     LEFT ATRIUM: The atrium was dilated  RIGHT ATRIUM: Size was normal     MITRAL VALVE: There was moderate annular calcification  There was mildly reduced leaflet separation  DOPPLER: There was mild stenosis  There was moderate regurgitation  AORTIC VALVE: The valve was probably trileaflet  Leaflets exhibited mildly reduced cuspal separation  DOPPLER: Transaortic velocity was within the normal range  There was mild stenosis  There was no significant regurgitation  TRICUSPID VALVE: The valve structure was normal  There was normal leaflet separation  DOPPLER: The transtricuspid velocity was within the normal range  There was no evidence for stenosis  There was mild regurgitation  Estimated peak PA  pressure was 60 mmHg  PULMONIC VALVE: Leaflets exhibited normal thickness, no calcification, and normal cuspal separation  DOPPLER: The transpulmonic velocity was within the normal range  There was no regurgitation  PERICARDIUM: There was no pericardial effusion  The pericardium was normal in appearance      AORTA: The root exhibited normal size  SYSTEM MEASUREMENT TABLES    2D  %FS: 28 34 %  Ao Diam: 3 36 cm  EDV(Teich): 85 77 ml  EF(Teich): 54 98 %  ESV(Teich): 38 62 ml  IVSd: 1 49 cm  LA Area: 22 87 cm2  LA Diam: 4 58 cm  LVEDV MOD A4C: 74 95 ml  LVEF MOD A4C: 63 4 %  LVESV MOD A4C: 27 43 ml  LVIDd: 4 36 cm  LVIDs: 3 12 cm  LVLd A4C: 6 33 cm  LVLs A4C: 5 37 cm  LVPWd: 1 38 cm  RA Area: 19 75 cm2  RVIDd: 3 64 cm  RWT: 0 63  SV MOD A4C: 47 52 ml  SV(Teich): 47 16 ml    CW  AV Env  Ti: 291 35 ms  AV VTI: 44 6 cm  AV Vmax: 2 1 m/s  AV Vmean: 1 54 m/s  AV maxP 83 mmHg  AV meanPG: 10 38 mmHg  MV PHT: 105 19 ms  MV VTI: 41 35 cm  MV Vmax: 1 81 m/s  MV Vmean: 0 87 m/s  MV maxP 11 mmHg  MV meanP 2 mmHg  MVA By PHT: 2 09 cm2  RAP: 0 mmHg  TR Vmax: 3 58 m/s  TR maxP 05 mmHg    MM  TAPSE: 2 41 cm    PW  E' Sept: 0 06 m/s  LVOT Env  Ti: 210 58 ms  LVOT VTI: 15 6 cm  LVOT Vmax: 1 02 m/s  LVOT Vmean: 0 74 m/s  LVOT maxP 24 mmHg  LVOT meanP 47 mmHg  MV E Jones: 1 64 m/s  RVSP: 52 05 mmHg    [facility]    Prepared and electronically signed by    Yoana Aguirre MD  Signed 17-Aug-2020 09:43:32    No results found for this or any previous visit     --------------------------------------------------------------------------------  HOLTER  No results found for this or any previous visit     --------------------------------------------------------------------------------  CAROTIDS  No results found for this or any previous visit         [unfilled]   =====================================================    Active Meds    Current Facility-Administered Medications:     acetaminophen (TYLENOL) tablet 650 mg, 650 mg, Oral, Q6H PRN, Jose Eid PA-C    aluminum-magnesium hydroxide-simethicone (MYLANTA) oral suspension 30 mL, 30 mL, Oral, Q4H PRN, Jose Edi PA-C    apixaban (ELIQUIS) tablet 2 5 mg, 2 5 mg, Oral, BID, Francy Duran PA-C, 2 5 mg at 22 2115    aspirin (ECOTRIN LOW STRENGTH) EC tablet 81 mg, 81 mg, Oral, Daily, Josh Ballesteros MD, 81 mg at 05/21/22 1435    atorvastatin (LIPITOR) tablet 40 mg, 40 mg, Oral, Daily With Tanja Evans MD, 40 mg at 05/21/22 1530    diltiazem (CARDIZEM) tablet 30 mg, 30 mg, Oral, Q8H AGUILAR, America Wilson Shadow, CRNP, 30 mg at 05/22/22 0557    HYDROmorphone HCl (DILAUDID) injection 0 2 mg, 0 2 mg, Intravenous, Q3H PRN, Jose Dimitriadis, PA-C    hydrOXYzine HCL (ATARAX) tablet 25 mg, 25 mg, Oral, BID PRN, Jose Dimitriadis, PA-C    metoprolol tartrate (LOPRESSOR) tablet 25 mg, 25 mg, Oral, BID, Cristino Ogden MD    ondansetron [facility]) injection 4 mg, 4 mg, Intravenous, Q6H PRN, Jose Dimitriadis, PA-C    pantoprazole (PROTONIX) EC tablet 20 mg, 20 mg, Oral, Early Morning, Jose Dimitriadis, PA-C, 20 mg at 05/22/22 0558    potassium chloride (K-DUR,KLOR-CON) CR tablet 40 mEq, 40 mEq, Oral, Once, Josh Ballesteros MD    sertraline (ZOLOFT) tablet 75 mg, 75 mg, Oral, Daily, Jose Dimitriadis, PA-C, 75 mg at 05/21/22 0913    Labs & Results  Lab Results   Component Value Date    TROPONINI <0 03 07/01/2021    TROPONINI <0 03 02/03/2021    TROPONINI <0 02 08/18/2020     Lab Results   Component Value Date    CALCIUM 9 3 05/22/2022     04/05/2018    K 3 6 05/22/2022    CO2 24 05/22/2022     05/22/2022    BUN 9 05/22/2022    CREATININE 0 53 (L) 05/22/2022     Lab Results   Component Value Date    WBC 5 54 05/22/2022    HGB 10 4 (L) 05/22/2022    HCT 33 8 (L) 05/22/2022    MCV 91 05/22/2022     05/22/2022     Results from last 7 days   Lab Units 05/20/22  0930   INR  1 21*     Lab Results   Component Value Date    CHOL 152 04/05/2018     Lab Results   Component Value Date    HDL 61 (H) 03/15/2019    HDL 56 04/05/2018     Lab Results   Component Value Date    LDLCALC 69 03/15/2019    LDLCALC 72 1 (L) 04/05/2018     Lab Results   Component Value Date    TRIG 94 03/15/2019    TRIG 119 04/05/2018     Lab Results   Component Value Date    ALT <3 (L) 05/20/2022    AST 8 (L) 05/20/2022

## 2022-05-22 NOTE — ASSESSMENT & PLAN NOTE
Lab Results   Component Value Date    EGFR 86 05/22/2022    EGFR 79 05/21/2022    EGFR 63 05/20/2022    CREATININE 0 53 (L) 05/22/2022    CREATININE 0 67 05/21/2022    CREATININE 0 84 05/20/2022     · Creatinine at baseline  · Continue to monitor renal function   · Avoid hypotension/nephrotoxins   · Blood pressure reviewed, well controlled  · Continue pre-admission Cardizem, metoprolol

## 2022-05-22 NOTE — PROGRESS NOTES
[address]  Progress Note - Devin Nesha 1935, 81 y o  female MRN: [id number]  Unit/Bed#: -01 Encounter: [id number]  Primary Care Provider: Anni Cerrato MD   Date and time admitted to hospital: 5/19/2022  1:16 PM    * Acute mesenteric ischemia ([address] )  Assessment & Plan  · Management per primary service    · Tolerating diet; advance per primary team     Benign hypertension with CKD (chronic kidney disease) stage III [facility])  Assessment & Plan  Lab Results   Component Value Date    EGFR 86 05/22/2022    EGFR 79 05/21/2022    EGFR 63 05/20/2022    CREATININE 0 53 (L) 05/22/2022    CREATININE 0 67 05/21/2022    CREATININE 0 84 05/20/2022     · Creatinine at baseline  · Continue to monitor renal function   · Avoid hypotension/nephrotoxins   · Blood pressure reviewed, well controlled  · Continue pre-admission Cardizem, metoprolol     Chronic heart failure with preserved ejection fraction (HFpEF) (HCC)  Assessment & Plan  Wt Readings from Last 3 Encounters:   05/22/22 49 7 kg (109 lb 9 1 oz)   04/06/22 48 4 kg (106 lb 11 2 oz)   07/12/21 49 4 kg (109 lb)       · Patient recently discharged from [facility] on 04/06/2022 for acute on chronic heart failure   · Weight is slightly up from discharge; however, does not appear volume overloaded at this time  · Cautious with IVF   · Patient was discharged on Lasix 40 mg p o  Daily; however, currently on hold as patient presented with persistent diarrhea  Will restart as appropriate    · Continue to monitor I&Os closely       Chronic respiratory failure with hypoxia (HCC)  Assessment & Plan  · Chronically on 2 L     Hypokalemia  Assessment & Plan  · Replete and monitor      Longstanding persistent atrial fibrillation (HCC)  Assessment & Plan  · Rate controlled  · Continue pre-admission metoprolol increased to 25mg BID, Cardizem 60 mg q8hr  · Heparin drip was discontinued; transitioned back to Eliquis    Coronary artery disease involving native coronary artery of native heart  Assessment & Plan  · History of PCI to LAD in  as documented in patient's chart  · Continue pre-admission Cardizem, metoprolol   · Added asa and statin per cardiology        Internal medicine will sign off  Please contact us with any questions or concerns  Thank you for letting us participate in the patient's care  VTE Prophylaxis:  Apixaban (Eliquis)    Patient Centered Rounds: I have performed bedside rounds with nursing staff today  Discussions with Specialists or Other Care Team Provider: surgery and cardiology   Education and Discussions with Family / Patient: patient     Current Length of Stay: 3 day(s)    Current Patient Status: Inpatient     Discharge Plan: per primary team     Code Status: Level 1 - Full Code    Subjective:   Feeling slightly better  Continues to have abdominal pain but able to tolerate diet better  No n/v      Objective:     Vitals:   Temp (24hrs), Av 5 °F (36 9 °C), Min:98 2 °F (36 8 °C), Max:99 °F (37 2 °C)    Temp:  [98 2 °F (36 8 °C)-99 °F (37 2 °C)] 98 4 °F (36 9 °C)  HR:  [] 117  Resp:  [17-18] 18  BP: (105-134)/(64-75) 111/71  SpO2:  [91 %-95 %] 92 %  Body mass index is 22 13 kg/m²  Input and Output Summary (last 24 hours):        Intake/Output Summary (Last 24 hours) at 2022 1144  Last data filed at 2022 1300  Gross per 24 hour   Intake 180 ml   Output --   Net 180 ml       Physical Exam:   Physical Exam    Additional Data:     Labs:    Results from last 7 days   Lab Units 22  0430   WBC Thousand/uL 5 54   HEMOGLOBIN g/dL 10 4*   HEMATOCRIT % 33 8*   PLATELETS Thousands/uL 209   NEUTROS PCT % 63   LYMPHS PCT % 18   MONOS PCT % 11   EOS PCT % 6     Results from last 7 days   Lab Units 22  0430 22  0445 22  0616   SODIUM mmol/L 138   < > 135   POTASSIUM mmol/L 3 6   < > 3 4*   CHLORIDE mmol/L 107   < > 100   CO2 mmol/L 24   < > 27   BUN mg/dL 9   < > 26*   CREATININE mg/dL 0 53*   < > 0  84   CALCIUM mg/dL 9 3   < > 9 3   ALK PHOS U/L  --   --  60   ALT U/L  --   --  <3*   AST U/L  --   --  8*    < > = values in this interval not displayed  Results from last 7 days   Lab Units 05/20/22  0930   INR  1 21*               * I Have Reviewed All Lab Data Listed Above  * Additional Pertinent Lab Tests Reviewed: Alex 67 Admission  Reviewed    Imaging:  Imaging Reports Reviewed Today Include: n/a     Recent Cultures (last 7 days):     Results from last 7 days   Lab Units 05/20/22  1438   C DIFF TOXIN B BY PCR  Negative       Last 24 Hours Medication List:   Current Facility-Administered Medications   Medication Dose Route Frequency Provider Last Rate    acetaminophen  650 mg Oral Q6H PRN Jose Dimitriadis, PA-C      aluminum-magnesium hydroxide-simethicone  30 mL Oral Q4H PRN Jose Dimitriadis, PA-C      apixaban  2 5 mg Oral BID [employer], PA-C      aspirin  81 mg Oral Daily Lianne Cho MD      atorvastatin  40 mg Oral Daily With Nacho Sagastume MD      diltiazem  60 mg Oral [facility] Khalif Edmond MD      HYDROmorphone  0 2 mg Intravenous Q3H PRN Jose Dimitriadis, PA-C      hydrOXYzine HCL  25 mg Oral BID PRN Jose Dimitriadis, PA-C      metoprolol tartrate  25 mg Oral BID Khalif Edmond MD      ondansetron  4 mg Intravenous Q6H PRN Jose Dimitriadis, PA-C      pantoprazole  20 mg Oral Early Morning Jose Dimitriadis, PA-C      sertraline  75 mg Oral Daily Jose Dimitriadis, PA-C          Today, Patient Was Seen By: NEAL Blackwood    ** Please Note: Dictation voice to text software may have been used in the creation of this document   **

## 2022-05-22 NOTE — DISCHARGE INSTR - AVS FIRST PAGE
-Continue all home meds as prescribed  -Continue daily 81 mg aspirin daily, can take over the counter   -Continue daily 40 mg atorvastatin (Lipitor), script sent to pharmacy  -Follow up with primary care provider next week to review/discuss blood pressure management, goal of systolic [id number] to prevent hypoperfusion of the bowel   -Schedule close follow up after discharge from the hospital with general surgery and cardiology

## 2022-05-22 NOTE — PLAN OF CARE
Problem: Nutrition/Hydration-ADULT  Goal: Nutrient/Hydration intake appropriate for improving, restoring or maintaining nutritional needs  Description: Monitor and assess patient's nutrition/hydration status for malnutrition  Collaborate with interdisciplinary team and initiate plan and interventions as ordered  Monitor patient's weight and dietary intake as ordered or per policy  Utilize nutrition screening tool and intervene as necessary  Determine patient's food preferences and provide high-protein, high-caloric foods as appropriate       INTERVENTIONS:  - Monitor oral intake, urinary output, labs, and treatment plans  - Assess nutrition and hydration status and recommend course of action  - Evaluate amount of meals eaten  - Assist patient with eating if necessary   - Allow adequate time for meals  - Recommend/ encourage appropriate diets, oral nutritional supplements, and vitamin/mineral supplements  - Order, calculate, and assess calorie counts as needed  - Recommend, monitor, and adjust tube feedings and TPN/PPN based on assessed needs  - Assess need for intravenous fluids  - Provide specific nutrition/hydration education as appropriate  - Include patient/family/caregiver in decisions related to nutrition  Outcome: Progressing     Problem: MOBILITY - ADULT  Goal: Maintain or return to baseline ADL function  Description: INTERVENTIONS:  -  Assess patient's ability to carry out ADLs; assess patient's baseline for ADL function and identify physical deficits which impact ability to perform ADLs (bathing, care of mouth/teeth, toileting, grooming, dressing, etc )  - Assess/evaluate cause of self-care deficits   - Assess range of motion  - Assess patient's mobility; develop plan if impaired  - Assess patient's need for assistive devices and provide as appropriate  - Encourage maximum independence but intervene and supervise when necessary  - Involve family in performance of ADLs  - Assess for home care needs following discharge   - Consider OT consult to assist with ADL evaluation and planning for discharge  - Provide patient education as appropriate  Outcome: Progressing  Goal: Maintains/Returns to pre admission functional level  Description: INTERVENTIONS:  - Perform BMAT or MOVE assessment daily    - Set and communicate daily mobility goal to care team and patient/family/caregiver     - Collaborate with rehabilitation services on mobility goals if consulted  - Out of bed for toileting  - Record patient progress and toleration of activity level   Outcome: Progressing     Problem: Potential for Falls  Goal: Patient will remain free of falls  Description: INTERVENTIONS:  - Educate patient/family on patient safety including physical limitations  - Instruct patient to call for assistance with activity   - Consult OT/PT to assist with strengthening/mobility   - Keep Call bell within reach  - Keep bed low and locked with side rails adjusted as appropriate  - Keep care items and personal belongings within reach  - Initiate and maintain comfort rounds  - Make Fall Risk Sign visible to staff  - Apply yellow socks and bracelet for high fall risk patients  - Consider moving patient to room near nurses station  Outcome: Progressing     Problem: Prexisting or High Potential for Compromised Skin Integrity  Goal: Skin integrity is maintained or improved  Description: INTERVENTIONS:  - Identify patients at risk for skin breakdown  - Assess and monitor skin integrity  - Assess and monitor nutrition and hydration status  - Monitor labs   - Assess for incontinence   - Turn and reposition patient  - Assist with mobility/ambulation  - Relieve pressure over bony prominences  - Avoid friction and shearing  - Provide appropriate hygiene as needed including keeping skin clean and dry  - Evaluate need for skin moisturizer/barrier cream  - Collaborate with interdisciplinary team   - Patient/family teaching  - Consider wound care consult Outcome: Progressing     Problem: PAIN - ADULT  Goal: Verbalizes/displays adequate comfort level or baseline comfort level  Description: Interventions:  - Encourage patient to monitor pain and request assistance  - Assess pain using appropriate pain scale  - Administer analgesics based on type and severity of pain and evaluate response  - Implement non-pharmacological measures as appropriate and evaluate response  - Consider cultural and social influences on pain and pain management  - Notify physician/advanced practitioner if interventions unsuccessful or patient reports new pain  Outcome: Progressing     Problem: INFECTION - ADULT  Goal: Absence or prevention of progression during hospitalization  Description: INTERVENTIONS:  - Assess and monitor for signs and symptoms of infection  - Monitor lab/diagnostic results  - Monitor all insertion sites, i e  indwelling lines, tubes, and drains  - Monitor endotracheal if appropriate and nasal secretions for changes in amount and color  - [place] appropriate cooling/warming therapies per order  - Administer medications as ordered  - Instruct and encourage patient and family to use good hand hygiene technique  - Identify and instruct in appropriate isolation precautions for identified infection/condition  Outcome: Progressing     Problem: SAFETY ADULT  Goal: Maintain or return to baseline ADL function  Description: INTERVENTIONS:  -  Assess patient's ability to carry out ADLs; assess patient's baseline for ADL function and identify physical deficits which impact ability to perform ADLs (bathing, care of mouth/teeth, toileting, grooming, dressing, etc )  - Assess/evaluate cause of self-care deficits   - Assess range of motion  - Assess patient's mobility; develop plan if impaired  - Assess patient's need for assistive devices and provide as appropriate  - Encourage maximum independence but intervene and supervise when necessary  - Involve family in performance of ADLs  - Assess for home care needs following discharge   - Consider OT consult to assist with ADL evaluation and planning for discharge  - Provide patient education as appropriate  Outcome: Progressing  Goal: Maintains/Returns to pre admission functional level  Description: INTERVENTIONS:  - Perform BMAT or MOVE assessment daily    - Set and communicate daily mobility goal to care team and patient/family/caregiver     - Collaborate with rehabilitation services on mobility goals if consulted  - Stand patient 3 times a day  - Ambulate patient 3 times a day  - Out of bed to chair 3 times a day   - Out of bed for meals 3 times a day  - Out of bed for toileting  - Record patient progress and toleration of activity level   Outcome: Progressing  Goal: Patient will remain free of falls  Description: INTERVENTIONS:  - Educate patient/family on patient safety including physical limitations  - Instruct patient to call for assistance with activity   - Consult OT/PT to assist with strengthening/mobility   - Keep Call bell within reach  - Keep bed low and locked with side rails adjusted as appropriate  - Keep care items and personal belongings within reach  - Initiate and maintain comfort rounds  - Make Fall Risk Sign visible to staff  - Offer Toileting every 2 Hours, in advance of need  - Initiate/Maintain bed alarm  - Obtain necessary fall risk management equipment  - Apply yellow socks and bracelet for high fall risk patients  - Consider moving patient to room near nurses station  Outcome: Progressing     Problem: DISCHARGE PLANNING  Goal: Discharge to home or other facility with appropriate resources  Description: INTERVENTIONS:  - Identify barriers to discharge w/patient and caregiver  - Arrange for needed discharge resources and transportation as appropriate  - Identify discharge learning needs (meds, wound care, etc )  - Arrange for interpretive services to assist at discharge as needed  - Refer to Case Management Department for coordinating discharge planning if the patient needs post-hospital services based on physician/advanced practitioner order or complex needs related to functional status, cognitive ability, or social support system  Outcome: Progressing     Problem: Knowledge Deficit  Goal: Patient/family/caregiver demonstrates understanding of disease process, treatment plan, medications, and discharge instructions  Description: Complete learning assessment and assess knowledge base    Interventions:  - Provide teaching at level of understanding  - Provide teaching via preferred learning methods  Outcome: Progressing

## 2022-05-22 NOTE — ASSESSMENT & PLAN NOTE
· Rate controlled  · Continue pre-admission metoprolol increased to 25mg BID, Cardizem 60 mg q8hr  · Heparin drip was discontinued; transitioned back to Eliquis

## 2022-05-22 NOTE — QUICK NOTE
Pt examined at lunch time after diet advancement  Tolerated well  No abdominal pain n/v  Abdomen soft, mild tenderness  Overall feeling better  Vitals monitored w/ increase in BB/CCB this morning  Pressure are stable, systolic >665  Stable for d/c home  Should FU w/ PCP next week to keep pressures controlled  FU w/ gen surg and cards as outpatient as well  Continue ASA/statin  All reviewed in d/c instructions

## 2022-05-23 NOTE — UTILIZATION REVIEW
Notification of Discharge   This is a Notification of Discharge from our facility [address]  Please be advised that this patient has been discharge from our facility  Below you will find the admission and discharge date and time including the patients disposition  UTILIZATION REVIEW CONTACT:  Jr Fulton  Utilization   Network Utilization Review Department  Phone: [phone] carefully listen to the prompts  All voicemails are confidential   Email: [email or web address]     PHYSICIAN ADVISORY SERVICES:  FOR [id number] REVIEW - MEDICAL NECESSITY DENIAL  Phone: [phone]  Fax: [phone]  Email: [email or web address]     PRESENTATION DATE: 5/19/2022  1:16 PM  OBERVATION ADMISSION DATE:   INPATIENT ADMISSION DATE: 5/19/22  4:03 PM   DISCHARGE DATE: 5/22/2022  5:21 PM  DISPOSITION: Home/Self Care Home/Self Care      IMPORTANT INFORMATION:  Send all requests for admission clinical reviews, approved or denied determinations and any other requests to dedicated fax number below belonging to the campus where the patient is receiving treatment   List of dedicated fax numbers:  [address] DENIALS (Administrative/Medical Necessity) [phone]   [address] (Maternity/NICU/Pediatrics) [phone]   Idris Catalan [phone]   130 [facility] [phone]   [address] [place] [address] [phone]   [address] [phone]   [facility]  [phone]   2205 [facility] [address]  [address] [facility] 1000 [facility] [phone]

## 2022-05-23 NOTE — CASE MANAGEMENT
Case Management Progress Note    Patient name Adithya Santo  Location [address] 221/-16 MRN [id number]  : 1935 Date 2022       LOS (days): 3  Geometric Mean LOS (GMLOS) (days): 3 70  Days to GMLOS:0 6        OBJECTIVE:        Current admission status: Inpatient  Preferred Pharmacy:    [facility] [id number]  Phone: [phone] Fax: [phone]    Primary Care Provider: Killian Castillo MD    Primary Insurance: [address] REP  Secondary Insurance:     PROGRESS NOTE:  Pt DC over the weekend  Accepted by [facility]  Faxed AVAS to [phone]

## 2022-05-23 NOTE — DISCHARGE SUMMARY
Discharge Summary   Kane Groves 81 y o  female MRN: [id number]  Unit/Bed#: -01 Encounter: [id number]  5/23/2022    Admission Date: 5/19/2022   Discharge Date: 5/22/2022    Reason for Admission:  Abdominal pain, nausea/vomiting   Admitting Diagnosis:   Acute on chronic mesenteric ischemia   Acute on chronic diastolic heart failure ([facility] [address] ) [I50 33]  Ileus ([facility] [address] ) [K56 7]  Essential hypertension [I10]  Abdominal pain [R10 9]  CKD (chronic kidney disease), stage II [N18 2]  Moderate protein-calorie malnutrition (HCC) [E44 0]  Atrial fibrillation with RVR (HCC) [I48 91]  Chronic respiratory failure with hypoxia (HCC) [J96 11]  Hypotension, unspecified hypotension type [I95 9]  Longstanding persistent atrial fibrillation ([facility] [address] ) [I48 11]  Coronary artery disease involving native coronary artery of native heart without angina pectoris [I25 10]  Nonrheumatic mitral valve regurgitation [I34 0]  Chronic heart failure with preserved ejection fraction (HFpEF) ([facility] [address] ) [I50 32]    PMH/Secondary Diagnoses:  Past Medical History:   Diagnosis Date    Acute on chronic diastolic congestive heart failure ([address] ) 6/27/2019    Ambulatory dysfunction 8/17/2020    Arthritis     Chest wall contusion, right, initial encounter 8/17/2020    Chest wall contusion, right, subsequent encounter 8/17/2020    Chronic diastolic heart failure (HCC)     Chronic kidney disease, stage 2 (mild)     Coronary artery disease     history of stenting    Eczema     years    Edema     History of echocardiogram 07/20/2017    EF 55%, mild concentric LVH, bileaflet MVP with moderate MR, left atrial enlargement   History of transfusion     Hyperlipidemia     Hypertension     Hypo-osmolality and hyponatremia     Hypokalemia 7/11/2019    Mitral regurgitation      Past Surgical History:   Procedure Laterality Date    ABDOMINAL SURGERY      hysterectomy    CARDIAC CATHETERIZATION  04/10/2012    EF 65%, no significant CAD, patent stents, severe MR   CORONARY ANGIOPLASTY WITH STENT PLACEMENT  03/21/2007    EF 55%, GARRET to LAD   EYE SURGERY      b/l cataracts    HYSTERECTOMY      JOINT REPLACEMENT      Rt knee       Discharge Diagnoses:   Principal Problem:    Acute mesenteric ischemia (HCC)  Active Problems:    Coronary artery disease involving native coronary artery of native heart    Longstanding persistent atrial fibrillation (HCC)    Hypokalemia    Chronic respiratory failure with hypoxia (HCC)    Moderate protein-calorie malnutrition (HCC)    Chronic heart failure with preserved ejection fraction (HFpEF) (HCC)    Benign hypertension with CKD (chronic kidney disease) stage III [facility])      Consultants: Cardiology, Internal medicine       Procedures/Surgeries Performed: None      Significant Findings:  CT abdomen pelvis wo contrast  Result Date: 5/19/2022  Impression: There is fluid distention of both large and small bowel suggestive of an ileus  An early obstructive process is not able to be excluded  There is trace fluid adjacent to liver and mesenteric edema  The study was marked in [facility] for immediate notification  Workstation performed: [id number]     XR chest 1 view portable  Result Date: 5/19/2022  Impression: Mild central pulmonary vascular congestion  No focal airspace consolidation  Workstation performed: [id number]     CTA abdomen pelvis w wo contrast  Result Date: 5/20/2022  Impression: 1  Mild SMA ostial stenosis, approximately 25%  The celiac artery is patent  Major mesenteric branch vessels are patent  2  Left lateral mid abdominal small bowel wall thickening, with associated adjacent scattered prominent mesenteric nodes  Correlate clinically for enteritis  3  Interval resolution of recently described small and large bowel fluid distention  4  Near the level of the renal artery takeoffs, there is calcific aortic atherosclerotic disease that focally intrudes upon the lumen, resulting in approximately 50% stenosis   5  Moderate to severe bilateral renal artery ostial stenosis  6  Left adrenal adenoma, no specific follow-up recommended  7  Marked mitral annular calcifications  Workstation performed: [id number]         Inpatient Medications:  Scheduled Meds:           Current Facility-Administered Medications   Medication Dose Route Frequency Provider Last Rate    acetaminophen  650 mg Oral Q6H PRN Jose Dimitriadis, PA-C      aluminum-magnesium hydroxide-simethicone  30 mL Oral Q4H PRN Jose Dimitriadis, PA-C      apixaban  2 5 mg Oral BID [employer], PA-C      aspirin  81 mg Oral Daily Yung Myrick MD      atorvastatin  40 mg Oral Daily With Shanika Cary MD      diltiazem  60 mg Oral [facility] Amy Alcocer MD      HYDROmorphone  0 2 mg Intravenous Q3H PRN Jose Dimitriadis, PA-C      hydrOXYzine HCL  25 mg Oral BID PRN Jose Dimitriadis, PA-C      metoprolol tartrate  25 mg Oral BID Amy Alcocer MD      ondansetron  4 mg Intravenous Q6H PRN Jose Dimitriadis, PA-C      pantoprazole  20 mg Oral Early Morning Jose Dimitriadis, PA-C      sertraline  75 mg Oral Daily Jose Dimitriadis, PA-C        Continuous Infusions:   PRN Meds:    acetaminophen    aluminum-magnesium hydroxide-simethicone    HYDROmorphone    hydrOXYzine HCL    ondansetron           History of Present Illness: As per Nehemias Eid PA-C: "Melissa Mathews is a 81 y o  female who presents with 3 day history of nausea, poor appetite, abdominal pain, and new diarrhea this morning  Daughter suggests multiple sick family members in the household with similar symptoms, however also suggests symptoms may have been present since 5/8/22  Reports normal urination  No fever/chills  Chronic occasional peripheral edema of legs  On O2 at home at night, no acute dyspnea or chest pain   Heartburn and reflux that has been new, no significant bloating/gas "      Summary of Hospital Course: Alin Ward is a 82 y/o F who presented on 5/19/22 with abdominal pain, nausea/vomiting, and diarrhea  CDiff and enteric stool studies were obtained and were negative  CT imaging revealed calcified aorta  Pt w/ hx of persistent Afib on Eliquis  Pt was placed on heparin gtt and CTA of the abdomen was performed to r/o mesenteric ischemia  CTA was negative for critical for ischemia or acute occulusion  Acute on chronic mesenteric ischemia was thought to be 2/2 to low flow state as BP were 90s/60s  Cardiology was consulted and they decreased her metoprolol/diltiazem, her pressures improved  Her diet was advanced and her abdominal pain significantly improved  She was transitioned from hep gtt to her home Eliquis  Day of discharge cardiology increased her metoprolol/diltiazem back to her home dosing since her HR was slightly elevated 90s-110s  She was tolerating regular diet, voiding well, and having bowel function  Vitals were monitored and her blood pressures continued to remain stable  Complications: None      Plan Upon Discharge:  · Continue ASA/statin therapy for CMI  · Continue home Eliquis   · HHC, PT/OT  · Close outpatient follow up with PCP, cardiology, general surgery  · Goal to maintain systolic -726Q  · Discharge instructions were discussed with the patient and a print out AVS of these instructions were given to the patient by the nurse upon discharge         Discharge Diagnosis:   Principal Problem:    Acute mesenteric ischemia (HCC)  Active Problems:    Coronary artery disease involving native coronary artery of native heart    Longstanding persistent atrial fibrillation (HCC)    Hypokalemia    Chronic respiratory failure with hypoxia (HCC)    Moderate protein-calorie malnutrition (HCC)    Chronic heart failure with preserved ejection fraction (HFpEF) (HCC)    Benign hypertension with CKD (chronic kidney disease) stage III (HCC)      Physical Exam on Day of Discharge:   See PE completed by Allyssa CORONADO on day of discharge 5/22/2022  Condition at Discharge: stable       Discharge instructions/Information to patient and family:   See after visit summary for information provided to patient and family  Provisions for Follow-Up Care:  See after visit summary for information related to follow-up care and any pertinent home health orders  PCP: Nash Law MD    Disposition: Home w/ [facility] PT/OT    Planned Readmission: No    Discharge Statement   I spent 20 minutes discharging the patient  This time was spent on the day of discharge  I had direct contact with the patient on the day of discharge  Additional documentation is required if more than 30 minutes were spent on discharge  Discharge Medications:  See after visit summary for reconciled discharge medications provided to patient and family        [address] [address]   5/23/2022

## 2022-07-23 NOTE — ED ATTENDING ATTESTATION
7/23/2022  IRan DO, saw and evaluated the patient  I have discussed the patient with the resident/non-physician practitioner and agree with the resident's/non-physician practitioner's findings, Plan of Care, and MDM as documented in the resident's/non-physician practitioner's note, except where noted  All available labs and Radiology studies were reviewed  I was present for key portions of any procedure(s) performed by the resident/non-physician practitioner and I was immediately available to provide assistance  At this point I agree with the current assessment done in the Emergency Department  I have conducted an independent evaluation of this patient a history and physical is as follows:    ED Course     Few days of intermittent R sided flank pain  Severe when present however relieved with tylenol  No radiation  L sided chest pain      ROS:  R hip and L sided chest pain  Otherwise negative ROS    Physical Exam  Vitals and nursing note reviewed  Constitutional:       General: She is not in acute distress  Appearance: She is well-developed  She is not ill-appearing, toxic-appearing or diaphoretic  HENT:      Head: Normocephalic and atraumatic  Right Ear: External ear normal       Left Ear: External ear normal       Nose: Nose normal    Eyes:      General: No scleral icterus  Right eye: No discharge  Left eye: No discharge  Conjunctiva/sclera: Conjunctivae normal       Pupils: Pupils are equal, round, and reactive to light  Neck:      Vascular: No JVD  Trachea: No tracheal deviation  Cardiovascular:      Rate and Rhythm: Normal rate and regular rhythm  Heart sounds: Normal heart sounds  No murmur heard  No friction rub  No gallop  Pulmonary:      Effort: Pulmonary effort is normal  No respiratory distress  Breath sounds: Normal breath sounds  No stridor  No wheezing or rales     Abdominal:      General: Bowel sounds are normal  There is no distension  Palpations: Abdomen is soft  There is no mass  Tenderness: There is no abdominal tenderness  There is no guarding  Musculoskeletal:         General: No deformity  Normal range of motion  Cervical back: Normal range of motion and neck supple  Right hip: Tenderness and bony tenderness present  No deformity, lacerations or crepitus  Normal range of motion  Normal strength  Comments: DP and PT pulses 2+ B/L  Pain with hip flexion  No pain with hip internal or external rotation  No deformity or leg length discrepancy noted  Sensation equal in B/L LEs     Skin:     General: Skin is warm and dry  Coloration: Skin is not pale  Findings: No erythema or rash  Neurological:      Mental Status: She is alert and oriented to person, place, and time  Cranial Nerves: No cranial nerve deficit  Sensory: No sensory deficit  Motor: No abnormal muscle tone  Psychiatric:         Behavior: Behavior normal          Thought Content:  Thought content normal          Judgment: Judgment normal        Assessment:  82 yo F with R lower back and R hip pain and L sided chest pain    Plan:  CT abdomen pelvis, abdominal and cardiac labs        Critical Care Time  Procedures

## 2022-07-23 NOTE — DISCHARGE INSTRUCTIONS
You have been evaluated in the Emergency Department today for abdominal pain  Your evaluation was not suggestive of any emergent condition requiring medical intervention at this time  However, some abdominal problems make take more time to appear  Therefore, it is important for you to watch for any new symptoms or worsening of your current condition  Incidentally, a new pleural effusion and adrenal adenomas were seen on your CT  These should be evaluated further  Please follow-up with your primary care doctor soon as possible for this finding  Once again, please follow up as soon as possible for the following abnormal CT scan findings:  "Left adrenal 1 6 cm nodule and right adrenal gland millimeters nodule suggesting adenomas "  "New small right pleural effusion "    Please follow up with your primary care physician  If you do not have a primary doctor, you can call "[facility]" to schedule an appointment with one  Return to the Emergency Department if you experience worsening or uncontrolled pain, fevers 100 4°F or greater, recurrent vomiting, inability to tolerate food or fluids by mouth, bloody stools or vomit, black or tarry stools, or any other concerning symptoms

## 2022-07-23 NOTE — ED PROVIDER NOTES
History  Chief Complaint   Patient presents with    Abdominal Pain     Patient is an 69-year-old female, past medical history CHF, CKD, coronary artery disease, hyperlipidemia, and hypertension, who presents to the emergency department for flank pain  Patient states the pain started approximately 3 days ago  The pain is intermittent  She states when she gets it, it is severe  It comes randomly  Pain is improved with Tylenol  There are no other modifying factors  Associated symptoms include left-sided chest pain (patient states it is only with palpation and at rest she does not have it, has been present for several days)  Patient denies any prior history of pain like this in the past   She denies any nausea, vomiting, diarrhea, constipation (last bowel movement this a m  and was normal), fevers, chills, or any other new or concerning symptoms  History provided by:  Patient   used: No        Prior to Admission Medications   Prescriptions Last Dose Informant Patient Reported? Taking? ALPRAZolam (XANAX) 0 25 mg tablet   No No   Sig: Take 1 tablet (0 25 mg total) by mouth daily at bedtime as needed for anxiety for up to 10 days   acetaminophen (TYLENOL) 325 mg tablet   No No   Sig: Take 3 tablets (975 mg total) by mouth every 8 (eight) hours   Patient not taking: Reported on 5/20/2022   apixaban (ELIQUIS) 2 5 mg   No No   Sig: Take 1 tablet (2 5 mg total) by mouth 2 (two) times a day   aspirin (ECOTRIN LOW STRENGTH) 81 mg EC tablet   No No   Sig: Take 1 tablet (81 mg total) by mouth in the morning     atorvastatin (LIPITOR) 40 mg tablet   No No   Sig: Take 1 tablet (40 mg total) by mouth daily with dinner   bisacodyl (DULCOLAX) 5 mg EC tablet   No No   Sig: Take 1 tablet (5 mg total) by mouth daily as needed for constipation   diltiazem (CARDIZEM) 60 mg tablet   No No   Sig: Take 1 tablet (60 mg total) by mouth every 8 (eight) hours   furosemide (LASIX) 40 mg tablet   No No   Sig: Take 1 tablet (40 mg total) by mouth daily   hydrOXYzine HCL (ATARAX) 25 mg tablet   Yes No   Sig: Take 25 mg by mouth 2 (two) times a day   metoprolol tartrate (LOPRESSOR) 25 mg tablet   No No   Sig: Take 1 tablet (25 mg total) by mouth 2 (two) times a day   omeprazole (PriLOSEC) 20 mg delayed release capsule   Yes No   Sig: Take 20 mg by mouth daily   oxybutynin (DITROPAN) 5 mg tablet   Yes No   Sig: Take 5 mg by mouth 3 (three) times a day    polyethylene glycol (MIRALAX) 17 g packet   No No   Sig: Take 17 g by mouth daily   potassium chloride (K-DUR,KLOR-CON) 20 mEq tablet   No No   Sig: Take 1 tablet (20 mEq total) by mouth daily   sertraline (ZOLOFT) 50 mg tablet   Yes No   Sig: Take 75 mg by mouth daily       Facility-Administered Medications: None       Past Medical History:   Diagnosis Date    Acute on chronic diastolic congestive heart failure ([facility] [address] ) 6/27/2019    Ambulatory dysfunction 8/17/2020    Arthritis     Chest wall contusion, right, initial encounter 8/17/2020    Chest wall contusion, right, subsequent encounter 8/17/2020    Chronic diastolic heart failure (HCC)     Chronic kidney disease, stage 2 (mild)     Coronary artery disease     history of stenting    Eczema     years    Edema     History of echocardiogram 07/20/2017    EF 55%, mild concentric LVH, bileaflet MVP with moderate MR, left atrial enlargement   History of transfusion     Hyperlipidemia     Hypertension     Hypo-osmolality and hyponatremia     Hypokalemia 7/11/2019    Mitral regurgitation        Past Surgical History:   Procedure Laterality Date    ABDOMINAL SURGERY      hysterectomy    CARDIAC CATHETERIZATION  04/10/2012    EF 65%, no significant CAD, patent stents, severe MR     CORONARY ANGIOPLASTY WITH STENT PLACEMENT  03/21/2007    EF 55%, GARRET to LAD      EYE SURGERY      b/l cataracts    HYSTERECTOMY      JOINT REPLACEMENT      Rt knee       Family History   Problem Relation Age of Onset    Arthritis Mother  Cancer Mother     Heart disease Mother     Hypertension Mother     Alcohol abuse Father     Arthritis Father     Birth defects Son     Hearing loss Son     Diabetes Daughter     Stroke Maternal Grandmother     Asthma Maternal Grandfather      I have reviewed and agree with the history as documented  E-Cigarette/Vaping    E-Cigarette Use Never User      E-Cigarette/Vaping Substances    Nicotine No     THC No     CBD No     Flavoring No     Other No     Unknown No      Social History     Tobacco Use    Smoking status: Never Smoker    Smokeless tobacco: Never Used   Vaping Use    Vaping Use: Never used   Substance Use Topics    Alcohol use: Never     Alcohol/week: 0 0 standard drinks    Drug use: Never        Review of Systems   Constitutional: Negative for chills and fever  Respiratory: Negative for cough and shortness of breath  Cardiovascular: Positive for chest pain  Negative for leg swelling  Gastrointestinal: Positive for abdominal pain  Negative for diarrhea, nausea and vomiting  Genitourinary: Positive for frequency (Unchanged)  Negative for difficulty urinating, dysuria and hematuria  Musculoskeletal: Negative for back pain and neck pain  All other systems reviewed and are negative  Physical Exam  ED Triage Vitals [07/23/22 1248]   Temperature Pulse Respirations Blood Pressure SpO2   97 7 °F (36 5 °C) 98 16 120/76 97 %      Temp Source Heart Rate Source Patient Position - Orthostatic VS BP Location FiO2 (%)   Tympanic Monitor -- Right arm --      Pain Score       8             Orthostatic Vital Signs  Vitals:    07/23/22 1400 07/23/22 1600 07/23/22 1615 07/23/22 1630   BP:  156/72     Pulse: (!) 107 (!) 111 (!) 112 (!) 114       Physical Exam  Vitals and nursing note reviewed  Constitutional:       General: She is not in acute distress  Appearance: She is well-developed  She is not diaphoretic  HENT:      Head: Normocephalic and atraumatic        Right Ear: External ear normal       Left Ear: External ear normal       Nose: Nose normal    Eyes:      General: Lids are normal  No scleral icterus  Cardiovascular:      Rate and Rhythm: Normal rate and regular rhythm  Heart sounds: Normal heart sounds  No murmur heard  No friction rub  No gallop  Pulmonary:      Effort: Pulmonary effort is normal  No respiratory distress  Breath sounds: Normal breath sounds  No wheezing or rales  Chest:       Abdominal:      Palpations: Abdomen is soft  Tenderness: There is no abdominal tenderness  There is right CVA tenderness  There is no guarding or rebound  Musculoskeletal:         General: No deformity  Normal range of motion  Cervical back: Normal range of motion and neck supple  Comments: There is some tenderness to palpation over the right iliac crest   No obvious deformities  Full range of motion of the right lower extremity  No overlying skin changes  Skin:     General: Skin is warm and dry  Neurological:      General: No focal deficit present  Mental Status: She is alert     Psychiatric:         Mood and Affect: Mood normal          Behavior: Behavior normal          ED Medications  Medications   acetaminophen (TYLENOL) tablet 650 mg (650 mg Oral Given 7/23/22 1359)       Diagnostic Studies  Results Reviewed     Procedure Component Value Units Date/Time    HS Troponin I 2hr [[id number]]  (Normal) Collected: 07/23/22 1527    Lab Status: Final result Specimen: Blood from Arm, Left Updated: 07/23/22 1556     hs TnI 2hr 10 ng/L      Delta 2hr hsTnI 2 ng/L     HS Troponin I 4hr [[id number]]     Lab Status: No result Specimen: Blood     UA w Reflex to Microscopic w Reflex to Culture [[id number]]  (Normal) Collected: 07/23/22 1421    Lab Status: Final result Specimen: Urine, Clean Catch Updated: 07/23/22 1434     Color, UA Yellow     Clarity, UA Clear     Specific Gravity, UA 1 015     pH, UA 6 0     Leukocytes, UA Negative     Nitrite, UA Negative     Protein, UA Negative mg/dl      Glucose, UA Negative mg/dl      Ketones, UA Negative mg/dl      Urobilinogen, UA 0 2 E U /dl      Bilirubin, UA Negative     Occult Blood, UA Negative    TSH, 3rd generation with Free T4 reflex [[id number]]  (Normal) Collected: 07/23/22 1312    Lab Status: Final result Specimen: Blood from Arm, Left Updated: 07/23/22 1400     TSH 3RD GENERATON 2 692 uIU/mL     Narrative:      Patients undergoing fluorescein dye angiography may retain small amounts of fluorescein in the body for 48-72 hours post procedure  Samples containing fluorescein can produce falsely depressed TSH values  If the patient had this procedure,a specimen should be resubmitted post fluorescein clearance  HS Troponin 0hr (reflex protocol) [[id number]]  (Normal) Collected: 07/23/22 1312    Lab Status: Final result Specimen: Blood from Arm, Left Updated: 07/23/22 1352     hs TnI 0hr 8 ng/L     Lactic acid, plasma [[id number]]  (Normal) Collected: 07/23/22 1318    Lab Status: Final result Specimen: Blood from Arm, Left Updated: 07/23/22 1350     LACTIC ACID 1 0 mmol/L     Narrative:      Result may be elevated if tourniquet was used during collection      CMP [[id number]]  (Abnormal) Collected: 07/23/22 1312    Lab Status: Final result Specimen: Blood from Arm, Left Updated: 07/23/22 1347     Sodium 138 mmol/L      Potassium 3 3 mmol/L      Chloride 101 mmol/L      CO2 27 mmol/L      ANION GAP 10 mmol/L      BUN 16 mg/dL      Creatinine 0 71 mg/dL      Glucose 107 mg/dL      Calcium 8 9 mg/dL      AST 17 U/L      ALT 8 U/L      Alkaline Phosphatase 101 U/L      Total Protein 7 5 g/dL      Albumin 3 8 g/dL      Total Bilirubin 1 24 mg/dL      eGFR 77 ml/min/1 73sq m     Narrative:      [place] guidelines for Chronic Kidney Disease (CKD):     Stage 1 with normal or high GFR (GFR > 90 mL/min/1 73 square meters)    Stage 2 Mild CKD (GFR = 60-89 mL/min/1 73 square meters)    Stage 3A Moderate CKD (GFR = 45-59 mL/min/1 73 square meters)    Stage 3B Moderate CKD (GFR = 30-44 mL/min/1 73 square meters)    Stage 4 Severe CKD (GFR = 15-29 mL/min/1 73 square meters)    Stage 5 End Stage CKD (GFR <15 mL/min/1 73 square meters)  Note: GFR calculation is accurate only with a steady state creatinine    Lipase [[id number]]  (Normal) Collected: 07/23/22 1312    Lab Status: Final result Specimen: Blood from Arm, Left Updated: 07/23/22 1347     Lipase 28 u/L     CBC and differential [[id number]]  (Abnormal) Collected: 07/23/22 1312    Lab Status: Final result Specimen: Blood from Arm, Left Updated: 07/23/22 1320     WBC 6 22 Thousand/uL      RBC 3 99 Million/uL      Hemoglobin 11 4 g/dL      Hematocrit 35 4 %      MCV 89 fL      MCH 28 6 pg      MCHC 32 2 g/dL      RDW 17 2 %      MPV 9 8 fL      Platelets 238 Thousands/uL      nRBC 0 /100 WBCs      Neutrophils Relative 72 %      Immat GRANS % 0 %      Lymphocytes Relative 10 %      Monocytes Relative 15 %      Eosinophils Relative 2 %      Basophils Relative 1 %      Neutrophils Absolute 4 52 Thousands/µL      Immature Grans Absolute 0 02 Thousand/uL      Lymphocytes Absolute 0 62 Thousands/µL      Monocytes Absolute 0 90 Thousand/µL      Eosinophils Absolute 0 12 Thousand/µL      Basophils Absolute 0 04 Thousands/µL                  CT abdomen pelvis wo contrast   Final Result by Migdalia Zambrano MD (07/23 9364)      Acute intra-abdominal finding              Workstation performed: [id number]         XR chest 1 view portable    (Results Pending)         Procedures  ECG 12 Lead Documentation Only    Date/Time: 7/23/2022 4:37 PM  Performed by: Sam Siegel DO  Authorized by: Sam Siegel DO     ECG reviewed by me, the ED Provider: yes    Patient location:  ED  Interpretation:     Interpretation: abnormal    Rate:     ECG rate:  107    ECG rate assessment: tachycardic    Rhythm:     Rhythm: atrial fibrillation    Ectopy:     Ectopy: none    QRS: QRS axis:  Normal  Conduction:     Conduction: normal    ST segments:     ST segments:  Normal  T waves:     T waves: normal    ECG 12 Lead Documentation Only    Date/Time: 7/23/2022 4:37 PM  Performed by: Sayda Sauceda DO  Authorized by: Sayda Sauceda DO     ECG reviewed by me, the ED Provider: yes    Patient location:  ED  Interpretation:     Interpretation: abnormal    Rate:     ECG rate:  97    ECG rate assessment: normal    Rhythm:     Rhythm: atrial fibrillation    Ectopy:     Ectopy: none    QRS:     QRS axis:  Normal  Conduction:     Conduction: normal    ST segments:     ST segments:  Normal  T waves:     T waves: normal            ED Course  ED Course as of 07/23/22 1657   Sat Jul 23, 2022   1335 WBC: 6 22   1335 Hemoglobin(!): 11 4   1347 CMP(!)  Stable   1347 Lipase   1351 LACTIC ACID: 1 0   1352 hs TnI 0hr: 8   1401 TSH 3RD GENERATON: 2 692   1410 CT abdomen pelvis wo contrast  "Acute intra-abdominal finding " Likely a dictation error  Body of report does not list any acute findings      1434 Leukocytes, UA: Negative   1435 Nitrite, UA: Negative   1559 Delta 2hr hsTnI: 2   1638 Patient re-evaluated  Resting comfortably  States pain is controlled at this time  No further complaints  Will discharge  Discussed incidental findings including new pleural effusion and adrenal adenoma  Explained she needs to follow-up with her primary care doctor as soon as possible further evaluation of these findings  Recommended Tylenol as needed for pain  Return precautions discussed  Patient verbalized understanding and agreed to plan of care               HEART Risk Score    Flowsheet Row Most Recent Value   Heart Score Risk Calculator    History 0 Filed at: 07/23/2022 1625   ECG 0 Filed at: 07/23/2022 1625   Age 2 Filed at: 07/23/2022 1625   Risk Factors 2 Filed at: 07/23/2022 1625   Troponin 0 Filed at: 07/23/2022 1625   HEART Score 4 Filed at: 07/23/2022 1625                      SBIRT 20yo+ Flowsheet Row Most Recent Value   SBIRT (23 yo +)    In order to provide better care to our patients, we are screening all of our patients for alcohol and drug use  Would it be okay to ask you these screening questions? Yes Filed at: 07/23/2022 1301   Initial Alcohol Screen: US AUDIT-C     1  How often do you have a drink containing alcohol? 0 Filed at: 07/23/2022 1301   2  How many drinks containing alcohol do you have on a typical day you are drinking? 0 Filed at: 07/23/2022 1301   3b  FEMALE Any Age, or MALE 65+: How often do you have 4 or more drinks on one occassion? 0 Filed at: 07/23/2022 1301   Audit-C Score 0 Filed at: 07/23/2022 1301   DAVID: How many times in the past year have you    Used an illegal drug or used a prescription medication for non-medical reasons? Never Filed at: 07/23/2022 1301                MDM  Number of Diagnoses or Management Options  Abdominal pain  Adrenal adenoma  Chest pain  Pleural effusion  Diagnosis management comments: Patient is a 81 y o  female who presents to the ED for right-sided flank pain  Patient does have some mild right iliac crest tenderness on exam Abdominal exam without peritoneal signs  No evidence of acute abdomen at this time  Well appearing  Differential diagnosis includes:  Nephrolithiasis, pyelonephritis, diverticulitis, appendicitis, musculoskeletal pain  Low suspicion for acute hepatobiliary disease (includng acute cholecystitis), acute pancreatitis, PUD (including perforation), vascular catastrophe, bowel obstruction or viscus perforation  Presentation not consistent with other acute, emergent causes of abdominal pain at this time  Plan: labs, UA, CT AP (without contrast, patient has allergy), pain control p r n , serial reassessment                 Portions of the record may have been created with voice recognition software   Occasional wrong word or "sound a like" substitutions may have occurred due to the inherent limitations of voice recognition software  Read the chart carefully and recognize, using context, where substitutions have occurred  Amount and/or Complexity of Data Reviewed  Clinical lab tests: ordered  Tests in the radiology section of CPT®: ordered  Tests in the medicine section of CPT®: ordered  Review and summarize past medical records: yes  Independent visualization of images, tracings, or specimens: yes    Risk of Complications, Morbidity, and/or Mortality  Presenting problems: moderate  Diagnostic procedures: low  Management options: low    Patient Progress  Patient progress: stable      Disposition  Final diagnoses:   Pleural effusion   Abdominal pain   Adrenal adenoma   Chest pain     Time reflects when diagnosis was documented in both MDM as applicable and the Disposition within this note     Time User Action Codes Description Comment    7/23/2022  2:50 PM Verlon Phong Add [J90] Pleural effusion     7/23/2022  2:50 PM Verlon Phong Add [R10 9] Abdominal pain     7/23/2022  4:00 PM Verlon Phong Add [D35 00] Adrenal adenoma     7/23/2022  4:24 PM Verlon Phong Add [R07 9] Chest pain       ED Disposition     ED Disposition   Discharge    Condition   Stable    Date/Time   Sat Jul 23, 2022  2:50 PM    Comment   Evens Cazares discharge to home/self care                 Follow-up Information     Follow up With Specialties Details Why Contact Info Additional Information    Kayli Khoury MD Family Medicine Schedule an appointment as soon as possible for a visit   [address] [facility]  [facility] Emergency Department Emergency Medicine  As needed Jovanni Aguero [id number]  [facility] Emergency Department, [address] [place] [address] Rocky Ovalle [place], MD Gastroenterology Schedule an appointment as soon as possible for a visit   [address] [facility]  [address] [address]  [phone]       [facility]  Call in 1 day  [phone]             Patient's Medications   Discharge Prescriptions    No medications on file     No discharge procedures on file  PDMP Review       Value Time User    PDMP Reviewed  Yes 3/5/2020  1:47 PM Rakan Moscoso [place]           ED Provider  Attending physically available and evaluated Antoinebrenda Love  JACINTA managed the patient along with the ED Attending      Electronically Signed by         Abhi Gleason DO  07/23/22 7993

## 2022-07-26 NOTE — PATIENT INSTRUCTIONS
Follow-up with your PCP  Recommended to proceed to the ER for further evaluation given extent of pain

## 2022-07-26 NOTE — PROGRESS NOTES
[address] [employer]        NAME: Erin Skinner is a 81 y o  female  : 1935    MRN: [id number]  DATE: 2022  TIME: 12:04 PM      Assessment and Plan     Acute right hip pain [M25 551]  1  Acute right hip pain         Reviewed ct abdomen pelvis form - showed no acute osseous abnormality in hip/pelvis  Shows degenerative changes in spine  Given presentation of pain and symptoms  Spoke with Vamshi Wagner NP at Dr Shanthi Feliciano office  Maren NP asking if we can give the patient toradol for pain- aware that patient in on eliquis and aspirin  Asking if we can give the prescribe the patient tramadol- aware that I do not feel comfortable prescribing tramadol to this patient d/t age and risk factors  Patient aware that Maren NP from doctor's office said for her to go home and the NP will call her toschedule an apt and talk to her via phone  Recommended patient to proceed to the ER with pain, pt refused  Patient Instructions       Chief Complaint     Chief Complaint   Patient presents with    Hip Pain     Left hip x 5 days          History of Present Illness     Patient is an 69-year-old female who presents with right hip pain for five days  Reports the right hip pain radiates from the right hip  States she woke up with the pain 5 days ago when she got up  States the pain was worse when she got out of bed  States the pain is constant with standing, sitting, or ambulation  Denies numbness or tingling  Denies weakness  Denies a known injury to hip or back  Review of Systems     Review of Systems   Musculoskeletal: Positive for gait problem (ambulates with walker)  Negative for back pain, joint swelling, neck pain and neck stiffness  Reports pain in right hip that wraps around her right leg to mid thigh   Skin: Negative for wound  Neurological: Negative for weakness and numbness  All other systems reviewed and are negative          Current Medications       Current Outpatient Medications:     acetaminophen (TYLENOL) 325 mg tablet, Take 3 tablets (975 mg total) by mouth every 8 (eight) hours (Patient not taking: Reported on 5/20/2022), Disp: 30 tablet, Rfl: 0    ALPRAZolam (XANAX) 0 25 mg tablet, Take 1 tablet (0 25 mg total) by mouth daily at bedtime as needed for anxiety for up to 10 days, Disp: 5 tablet, Rfl: 0    apixaban (ELIQUIS) 2 5 mg, Take 1 tablet (2 5 mg total) by mouth 2 (two) times a day, Disp: 180 tablet, Rfl: 3    aspirin (ECOTRIN LOW STRENGTH) 81 mg EC tablet, Take 1 tablet (81 mg total) by mouth in the morning , Disp: , Rfl: 0    atorvastatin (LIPITOR) 40 mg tablet, Take 1 tablet (40 mg total) by mouth daily with dinner, Disp: 30 tablet, Rfl: 0    bisacodyl (DULCOLAX) 5 mg EC tablet, Take 1 tablet (5 mg total) by mouth daily as needed for constipation, Disp: 30 tablet, Rfl: 0    diltiazem (CARDIZEM) 60 mg tablet, Take 1 tablet (60 mg total) by mouth every 8 (eight) hours, Disp: , Rfl: 0    furosemide (LASIX) 40 mg tablet, Take 1 tablet (40 mg total) by mouth daily, Disp: 60 tablet, Rfl: 0    hydrOXYzine HCL (ATARAX) 25 mg tablet, Take 25 mg by mouth 2 (two) times a day, Disp: , Rfl:     metoprolol tartrate (LOPRESSOR) 25 mg tablet, Take 1 tablet (25 mg total) by mouth 2 (two) times a day, Disp: 60 tablet, Rfl: 0    omeprazole (PriLOSEC) 20 mg delayed release capsule, Take 20 mg by mouth daily, Disp: , Rfl:     oxybutynin (DITROPAN) 5 mg tablet, Take 5 mg by mouth 3 (three) times a day , Disp: , Rfl:     polyethylene glycol (MIRALAX) 17 g packet, Take 17 g by mouth daily, Disp: 14 each, Rfl: 0    potassium chloride (K-DUR,KLOR-CON) 20 mEq tablet, Take 1 tablet (20 mEq total) by mouth daily, Disp: 30 tablet, Rfl: 0    sertraline (ZOLOFT) 50 mg tablet, Take 75 mg by mouth daily , Disp: , Rfl:     Current Allergies     Allergies as of 07/26/2022 - Reviewed 07/26/2022   Allergen Reaction Noted    Chocolate flavor - food allergy Itching 06/05/2019    Shellfish-derived products - food allergy Shortness Of Breath 06/02/2014    Iodine - food allergy Other (See Comments) 06/05/2019    Codeine Rash 06/05/2019              The following portions of the patient's history were reviewed and updated as appropriate: allergies, current medications, past family history, past medical history, past social history, past surgical history and problem list      Past Medical History:   Diagnosis Date    Acute on chronic diastolic congestive heart failure ([facility] [address] ) 6/27/2019    Ambulatory dysfunction 8/17/2020    Arthritis     Chest wall contusion, right, initial encounter 8/17/2020    Chest wall contusion, right, subsequent encounter 8/17/2020    Chronic diastolic heart failure (HCC)     Chronic kidney disease, stage 2 (mild)     Coronary artery disease     history of stenting    Eczema     years    Edema     History of echocardiogram 07/20/2017    EF 55%, mild concentric LVH, bileaflet MVP with moderate MR, left atrial enlargement   History of transfusion     Hyperlipidemia     Hypertension     Hypo-osmolality and hyponatremia     Hypokalemia 7/11/2019    Mitral regurgitation        Past Surgical History:   Procedure Laterality Date    ABDOMINAL SURGERY      hysterectomy    CARDIAC CATHETERIZATION  04/10/2012    EF 65%, no significant CAD, patent stents, severe MR     CORONARY ANGIOPLASTY WITH STENT PLACEMENT  03/21/2007    EF 55%, GARRET to LAD   EYE SURGERY      b/l cataracts    HYSTERECTOMY      JOINT REPLACEMENT      Rt knee       Family History   Problem Relation Age of Onset    Arthritis Mother     Cancer Mother     Heart disease Mother    Khadra Tom Hypertension Mother     Alcohol abuse Father     Arthritis Father     Birth defects Son     Hearing loss Son     Diabetes Daughter     Stroke Maternal Grandmother     Asthma Maternal Grandfather          Medications have been verified          Objective     /78   Pulse 78   Temp 98 °F (36 7 °C) Resp 22   Ht 4' 11" (1 499 m)   Wt 49 4 kg (109 lb)   SpO2 98%   BMI 22 02 kg/m²   No LMP recorded  Patient is postmenopausal          Physical Exam     Physical Exam  Vitals and nursing note reviewed  Constitutional:       General: She is awake  She is not in acute distress  Appearance: Normal appearance  She is not ill-appearing, toxic-appearing or diaphoretic  Cardiovascular:      Rate and Rhythm: Normal rate  Pulses: Normal pulses  Heart sounds: Normal heart sounds, S1 normal and S2 normal    Pulmonary:      Effort: Pulmonary effort is normal       Breath sounds: Normal breath sounds and air entry  Musculoskeletal:      Cervical back: Normal       Thoracic back: Normal       Right hip: Tenderness and bony tenderness present  No deformity  Legs:    Skin:     General: Skin is warm  Capillary Refill: Capillary refill takes less than 2 seconds  Neurological:      Mental Status: She is alert  Psychiatric:         Mood and Affect: Mood normal          Behavior: Behavior normal          Thought Content:  Thought content normal          Judgment: Judgment normal

## 2022-08-02 PROBLEM — J90 PLEURAL EFFUSION: Status: ACTIVE | Noted: 2022-01-01

## 2022-08-02 PROBLEM — M25.551 HIP PAIN, RIGHT: Status: ACTIVE | Noted: 2022-01-01

## 2022-08-02 PROBLEM — Z99.81 ON HOME OXYGEN THERAPY: Status: ACTIVE | Noted: 2022-01-01

## 2022-08-02 NOTE — PHYSICAL THERAPY NOTE
Physical Therapy Cancellation Note       08/02/22 1503   PT Last Visit   PT Visit Date 08/02/22   Note Type   Note type Evaluation   Cancel Reasons Other  (awaiting CT scan results prior to mobilization)     Alie Manning, PT

## 2022-08-02 NOTE — ASSESSMENT & PLAN NOTE
· CT L Spine (8/2): No acute osseous abnormality of lumbar spine  Multilevel degenerative changes of lumbar spine with varying degrees of canal stenosis (mild L3-4 and L4-5) and foraminal narrowing (severe left L5-S1), as detailed above  Partially imaged small right and trace left pleural effusions with bilateral lower lobe subsegmental atelectasis, similar to CT abdomen pelvis 7/23/2022  Ascites     · Check CXR PA/Lateral  · Continue home Lasix 40mg daily

## 2022-08-02 NOTE — ASSESSMENT & PLAN NOTE
· Presents for five days of hip pain and ambulatory dysfunction  · CT pelvis: Radiographically healed right intertrochanteric fracture with stable hardware   No new fracture seen   · PT OT eval  · Pain control  · Serial assessments

## 2022-08-02 NOTE — ASSESSMENT & PLAN NOTE
Wt Readings from Last 3 Encounters:   08/02/22 49 9 kg (110 lb)   07/26/22 49 4 kg (109 lb)   07/23/22 49 4 kg (109 lb)     · Weight at baseline, no lower extremity edema, lungs clear  · CT:  Increasing presacral edema and increasing fluid in the abdomen      · Patient has been immobile for 5 days  · Patient verifies that she takes Lasix  · Daily weights and I&Os  · Monitor volume status

## 2022-08-02 NOTE — ASSESSMENT & PLAN NOTE
· CT L-spine: Partially imaged small right and trace left pleural effusions with bilateral lower lobe subsegmental atelectasis, similar to CT abdomen pelvis 7/23/2022  Recommend dedicated chest radiograph for further evaluation     · Obtain chest x-ray PA and Lat

## 2022-08-02 NOTE — ED NOTES
ekg completed and shown to Dr Harinder David text sent to inpatient team     Heather Zhu, [facility]  08/02/22 3892

## 2022-08-02 NOTE — OCCUPATIONAL THERAPY NOTE
Occupational Therapy Cancel Note     08/02/22 1504   OT Last Visit   OT Visit Date 08/02/22   Note Type   Note type Cancelled Session   Cancel Reasons Other  (awaiting CT scan results prior to mobilization)     Ferdinand Bravo MS, OTR/L

## 2022-08-02 NOTE — ED NOTES
Patient repositioned, new gown placed, new blankets  Patient resting comfortably at this time       [facility], [address] [place] [address]  08/02/22 [id number]

## 2022-08-02 NOTE — ASSESSMENT & PLAN NOTE
· Presented with right hip pain and ambulatory dysfunction  · CT Pelvis (8/2): Radiographically healed right intertrochanteric fracture with stable hardware  No new fracture seen   · Continue pain control with Lidocaine patch and Oxycodone 2 5mg Q6H PRN  · PT/OT recommending post-acute rehab placement  · Otherwise medically cleared for discharge pending placement

## 2022-08-02 NOTE — ASSESSMENT & PLAN NOTE
Wt Readings from Last 3 Encounters:   08/02/22 49 9 kg (110 lb)   07/26/22 49 4 kg (109 lb)   07/23/22 49 4 kg (109 lb)     · Not acute decompensated at this time    · Continue home Metoprolol 25mg BID and Lasix 40mg daily  · Monitor daily weights and Is and Os  · Fluid and Na restriction

## 2022-08-02 NOTE — H&P
[address]  H&P- Sada Hilliard 1935, 81 y o  female MRN: [id number]  Unit/Bed#: Z1 H4 Encounter: [id number]  Primary Care Provider: Rita Mason MD   Date and time admitted to hospital: 8/2/2022  1:45 PM    * Hip pain, right  Assessment & Plan  · Presents for five days of hip pain and ambulatory dysfunction  · CT pelvis: Radiographically healed right intertrochanteric fracture with stable hardware   No new fracture seen   · PT OT eval  · Pain control  · Serial assessments    Chronic heart failure with preserved ejection fraction (HFpEF) ([facility])  Assessment & Plan  Wt Readings from Last 3 Encounters:   08/02/22 49 9 kg (110 lb)   07/26/22 49 4 kg (109 lb)   07/23/22 49 4 kg (109 lb)     · Weight at baseline, no lower extremity edema, lungs clear  · CT:  Increasing presacral edema and increasing fluid in the abdomen  · Patient has been immobile for 5 days  · Patient verifies that she takes Lasix  · Daily weights and I&Os  · Monitor volume status        Pleural effusion  Assessment & Plan  · CT L-spine: Partially imaged small right and trace left pleural effusions with bilateral lower lobe subsegmental atelectasis, similar to CT abdomen pelvis 7/23/2022  Recommend dedicated chest radiograph for further evaluation     · Obtain chest x-ray PA and Lat    On home oxygen therapy  Assessment & Plan  · Uses oxygen 2 L nasal cannula at night  · Oxygen protocol    Gastroesophageal reflux disease without esophagitis  Assessment & Plan  · Currently controlled  · Continue PPI    Depression with anxiety  Assessment & Plan  · Continue hydroxyizine, sertraline, and alprazolam  · Supportive care    Essential hypertension  Assessment & Plan  · BP reviewed  · Continue diltiazem and metoprolol  · Monitor blood pressure    Longstanding persistent atrial fibrillation (HCC)  Assessment & Plan  · Continue diltiazem, metoprolol, and apixaban    Coronary artery disease involving native coronary artery of native heart  Assessment & Plan  · Denies chest pain  · Continue aspirin and metoprolol, atorvastatin      VTE Prophylaxis: Apixaban (Eliquis)  / sequential compression device   Code Status:  Full  POLST: POLST form is not discussed and not completed at this time  Discussion with family:  Patient    Anticipated Length of Stay:  Patient will be admitted on an Inpatient basis with an anticipated length of stay of  greater than 2 midnights  Justification for Hospital Stay:  Per plan above    Total Time for Visit, including Counseling / Coordination of Care: 30 minutes  Greater than 50% of this total time spent on direct patient counseling and coordination of care  Chief Complaint:   Right hip pain, ambulatory dysfunction    History of Present Illness:    Oskar Montilla is a 81 y o  female with history as listed below who presents with right hip pain, ambulatory dysfunction  She says the pain started about 5 days ago, she feels as if there is a "ball" in her right hip  It is worsened with movement  She normally gets around with a walker, and she has tried to get up at home but she describes the pain as moderate to severe  She denies fall or injury  She has history of right intratrochanteric fracture repair  She denies fever chills, cough, shortness of breath, chest pain, abdominal pain, nausea, dysuria, dizziness, or focal weakness/    Review of Systems:    Review of Systems   Constitutional: Negative for chills and fever  HENT: Negative for congestion  Eyes: Negative for visual disturbance  Respiratory: Negative for cough and shortness of breath  Cardiovascular: Negative for chest pain  Gastrointestinal: Negative for abdominal pain, diarrhea and vomiting  Genitourinary: Negative for dysuria and hematuria  Musculoskeletal: Positive for arthralgias and gait problem  Negative for back pain  Skin: Negative for color change and rash  Neurological: Negative for dizziness and syncope  Psychiatric/Behavioral: Negative for confusion and decreased concentration  All other systems reviewed and are negative  Past Medical and Surgical History:     Past Medical History:   Diagnosis Date    Acute on chronic diastolic congestive heart failure ([facility] [address] ) 6/27/2019    Ambulatory dysfunction 8/17/2020    Arthritis     Chest wall contusion, right, initial encounter 8/17/2020    Chest wall contusion, right, subsequent encounter 8/17/2020    Chronic diastolic heart failure (HCC)     Chronic kidney disease, stage 2 (mild)     Coronary artery disease     history of stenting    Eczema     years    Edema     History of echocardiogram 07/20/2017    EF 55%, mild concentric LVH, bileaflet MVP with moderate MR, left atrial enlargement   History of transfusion     Hyperlipidemia     Hypertension     Hypo-osmolality and hyponatremia     Hypokalemia 7/11/2019    Mitral regurgitation        Past Surgical History:   Procedure Laterality Date    ABDOMINAL SURGERY      hysterectomy    CARDIAC CATHETERIZATION  04/10/2012    EF 65%, no significant CAD, patent stents, severe MR     CORONARY ANGIOPLASTY WITH STENT PLACEMENT  03/21/2007    EF 55%, GARRET to LAD   EYE SURGERY      b/l cataracts    HYSTERECTOMY      JOINT REPLACEMENT      Rt knee       Meds/Allergies:    Prior to Admission medications    Medication Sig Start Date End Date Taking?  Authorizing Provider   ALPRAZolam Earleen Bump) 0 25 mg tablet Take 1 tablet (0 25 mg total) by mouth daily at bedtime as needed for anxiety for up to 10 days 8/21/20 8/31/20  Jordan Pacheco MD   apixaban (ELIQUIS) 2 5 mg Take 1 tablet (2 5 mg total) by mouth 2 (two) times a day 8/8/19   Omayra Ochoa PA-C   aspirin (ECOTRIN LOW STRENGTH) 81 mg EC tablet Take 1 tablet (81 mg total) by mouth in the morning  5/23/22   Francy Duran PA-C   atorvastatin (LIPITOR) 40 mg tablet Take 1 tablet (40 mg total) by mouth daily with dinner 5/22/22 6/21/22  Hair Do IVONNE   bisacodyl (DULCOLAX) 5 mg EC tablet Take 1 tablet (5 mg total) by mouth daily as needed for constipation 6/27/20   Lisa Harp MD   diltiazem (CARDIZEM) 60 mg tablet Take 1 tablet (60 mg total) by mouth every 8 (eight) hours 4/6/22   Jasmin Chopra MD   furosemide (LASIX) 40 mg tablet Take 1 tablet (40 mg total) by mouth daily 7/3/21 9/1/21  NEAL Saeed   hydrOXYzine HCL (ATARAX) 25 mg tablet Take 25 mg by mouth 2 (two) times a day    Historical Provider, MD   metoprolol tartrate (LOPRESSOR) 25 mg tablet Take 1 tablet (25 mg total) by mouth 2 (two) times a day 3/5/20   NEAL Ruiz   omeprazole (PriLOSEC) 20 mg delayed release capsule Take 20 mg by mouth daily    Historical Provider, MD   oxybutynin (DITROPAN) 5 mg tablet Take 5 mg by mouth 3 (three) times a day     Historical Provider, MD   polyethylene glycol (MIRALAX) 17 g packet Take 17 g by mouth daily 6/27/20   Lisa Harp MD   potassium chloride (K-DUR,KLOR-CON) 20 mEq tablet Take 1 tablet (20 mEq total) by mouth daily 7/4/21 8/3/21  NEAL Saeed   sertraline (ZOLOFT) 50 mg tablet Take 75 mg by mouth daily     Historical Provider, MD   acetaminophen (TYLENOL) 325 mg tablet Take 3 tablets (975 mg total) by mouth every 8 (eight) hours  Patient not taking: Reported on 5/20/2022 6/27/20 8/2/22  Lisa Harp MD     I have reviewed home medications with patient personally  Allergies: Allergies   Allergen Reactions    Chocolate Flavor - Food Allergy Itching    Shellfish-Derived Products - Food Allergy Shortness Of Breath    Iodine - Food Allergy Other (See Comments)     shellfish- difficulty breathing    Codeine Rash       Social History:     Marital Status:     Occupation:  Retired  Patient Pre-hospital Living Situation:  Home with family  Patient Pre-hospital Level of Mobility:  Walker  Patient Pre-hospital Diet Restrictions:  Heart healthy  Substance Use History:   Social History     Substance and Sexual Activity   Alcohol Use Never    Alcohol/week: 0 0 standard drinks     Social History     Tobacco Use   Smoking Status Never Smoker   Smokeless Tobacco Never Used     Social History     Substance and Sexual Activity   Drug Use Never       Family History:    Family History   Problem Relation Age of Onset    Arthritis Mother     Cancer Mother     Heart disease Mother     Hypertension Mother     Alcohol abuse Father     Arthritis Father     Birth defects Son     Hearing loss Son     Diabetes Daughter     Stroke Maternal Grandmother     Asthma Maternal Grandfather        Physical Exam:     Vitals:   Blood Pressure: 135/87 (08/02/22 1635)  Pulse: (!) 134 (08/02/22 1635)  Temperature: 98 °F (36 7 °C) (08/02/22 1340)  Temp Source: Oral (08/02/22 1340)  Respirations: 18 (08/02/22 1635)  Height: 4' 11" (149 9 cm) (08/02/22 1439)  Weight - Scale: 49 9 kg (110 lb) (08/02/22 1439)  SpO2: 98 % (08/02/22 1635)    Physical Exam  Vitals and nursing note reviewed  HENT:      Head: Normocephalic and atraumatic  Mouth/Throat:      Mouth: Mucous membranes are moist       Pharynx: Oropharynx is clear  Eyes:      Extraocular Movements: Extraocular movements intact  Pupils: Pupils are equal, round, and reactive to light  Cardiovascular:      Rate and Rhythm: Normal rate  Rhythm irregular  Heart sounds: Murmur heard  Pulmonary:      Effort: Pulmonary effort is normal  No respiratory distress  Breath sounds: Normal breath sounds  Abdominal:      General: Bowel sounds are normal  There is no distension  Palpations: Abdomen is soft  Tenderness: There is no abdominal tenderness  Musculoskeletal:         General: Tenderness present  Cervical back: Neck supple  Right lower leg: No edema  Left lower leg: No edema  Comments: Right hip tenderness to palpation and with movement   Skin:     General: Skin is warm and dry        Capillary Refill: Capillary refill takes less than 2 seconds  Coloration: Skin is pale  Neurological:      General: No focal deficit present  Mental Status: She is alert and oriented to person, place, and time  Sensory: No sensory deficit  Additional Data:     Lab Results: I have personally reviewed pertinent reports  Imaging: I have personally reviewed pertinent reports  CT lumbar spine without contrast   Final Result by Karol Wiseman MD (08/02 1525)      No acute osseous abnormality of lumbar spine  Multilevel degenerative changes of lumbar spine with varying degrees of canal stenosis (mild L3-4 and L4-5) and foraminal narrowing (severe left L5-S1), as detailed above  Partially imaged small right and trace left pleural effusions with bilateral lower lobe subsegmental atelectasis, similar to CT abdomen pelvis 7/23/2022  Recommend dedicated chest radiograph for further evaluation  Ascites  Please see same-day CT pelvis without contrast for further evaluation  The study was marked in [facility] for immediate notification  Workstation performed: [id number]         CT pelvis wo contrast   Final Result by Yimi Gonzalez MD (08/02 1520)   Radiographically healed right intertrochanteric fracture with stable hardware   No new fracture seen   There is increasing presacral edema and increasing fluid in the abdomen, evaluate for volume overload/CHF      Workstation performed: [id number]         XR chest pa & lateral    (Results Pending)     Allscripts / Epic Records Reviewed: Yes     ** Please Note: This note has been constructed using a voice recognition system   **

## 2022-08-02 NOTE — ED PROVIDER NOTES
History  Chief Complaint   Patient presents with    Hip Pain     Pt presents via EMS c/o R hip pain x 1 week  Pt denies known trauma/injury/fall  56-year-old female presents emergency room complaining of significant right hip pain which is been ongoing for about a week  The patient denies any trauma from this injury  Patient states that she was seen in the emergency room initially for flank pain and was discharged and then a few days later in urgent care  The patient was going to be following up for further evaluation but the pain was getting too severe so her daughter called the ambulance and sent her back to the emergency department  The patient notes that the pain hurts in any position and seems to be mildly worse with range of motion of the hip  When discussing her pain, she notes it to be in the posterior buttock region of the right hip/pelvis  Patient denies fever chills nausea vomiting or trauma  Prior to Admission Medications   Prescriptions Last Dose Informant Patient Reported? Taking? ALPRAZolam (XANAX) 0 25 mg tablet   No No   Sig: Take 1 tablet (0 25 mg total) by mouth daily at bedtime as needed for anxiety for up to 10 days   apixaban (ELIQUIS) 2 5 mg   No No   Sig: Take 1 tablet (2 5 mg total) by mouth 2 (two) times a day   aspirin (ECOTRIN LOW STRENGTH) 81 mg EC tablet   No No   Sig: Take 1 tablet (81 mg total) by mouth in the morning     atorvastatin (LIPITOR) 40 mg tablet   No No   Sig: Take 1 tablet (40 mg total) by mouth daily with dinner   bisacodyl (DULCOLAX) 5 mg EC tablet   No No   Sig: Take 1 tablet (5 mg total) by mouth daily as needed for constipation   diltiazem (CARDIZEM) 60 mg tablet   No No   Sig: Take 1 tablet (60 mg total) by mouth every 8 (eight) hours   furosemide (LASIX) 40 mg tablet   No No   Sig: Take 1 tablet (40 mg total) by mouth daily   hydrOXYzine HCL (ATARAX) 25 mg tablet   Yes No   Sig: Take 25 mg by mouth 2 (two) times a day   metoprolol tartrate (LOPRESSOR) 25 mg tablet   No No   Sig: Take 1 tablet (25 mg total) by mouth 2 (two) times a day   omeprazole (PriLOSEC) 20 mg delayed release capsule   Yes No   Sig: Take 20 mg by mouth daily   oxybutynin (DITROPAN) 5 mg tablet   Yes No   Sig: Take 5 mg by mouth 3 (three) times a day    polyethylene glycol (MIRALAX) 17 g packet   No No   Sig: Take 17 g by mouth daily   potassium chloride (K-DUR,KLOR-CON) 20 mEq tablet   No No   Sig: Take 1 tablet (20 mEq total) by mouth daily   sertraline (ZOLOFT) 50 mg tablet   Yes No   Sig: Take 75 mg by mouth daily       Facility-Administered Medications: None       Past Medical History:   Diagnosis Date    Acute on chronic diastolic congestive heart failure ([facility] [address] ) 6/27/2019    Ambulatory dysfunction 8/17/2020    Arthritis     Chest wall contusion, right, initial encounter 8/17/2020    Chest wall contusion, right, subsequent encounter 8/17/2020    Chronic diastolic heart failure (HCC)     Chronic kidney disease, stage 2 (mild)     Coronary artery disease     history of stenting    Eczema     years    Edema     History of echocardiogram 07/20/2017    EF 55%, mild concentric LVH, bileaflet MVP with moderate MR, left atrial enlargement   History of transfusion     Hyperlipidemia     Hypertension     Hypo-osmolality and hyponatremia     Hypokalemia 7/11/2019    Mitral regurgitation        Past Surgical History:   Procedure Laterality Date    ABDOMINAL SURGERY      hysterectomy    CARDIAC CATHETERIZATION  04/10/2012    EF 65%, no significant CAD, patent stents, severe MR     CORONARY ANGIOPLASTY WITH STENT PLACEMENT  03/21/2007    EF 55%, GARRET to LAD      EYE SURGERY      b/l cataracts    HYSTERECTOMY      JOINT REPLACEMENT      Rt knee       Family History   Problem Relation Age of Onset    Arthritis Mother     Cancer Mother     Heart disease Mother     Hypertension Mother     Alcohol abuse Father     Arthritis Father     Birth defects Son     Hearing loss Son     Diabetes Daughter     Stroke Maternal Grandmother     Asthma Maternal Grandfather      I have reviewed and agree with the history as documented  E-Cigarette/Vaping    E-Cigarette Use Never User      E-Cigarette/Vaping Substances    Nicotine No     THC No     CBD No     Flavoring No     Other No     Unknown No      Social History     Tobacco Use    Smoking status: Never Smoker    Smokeless tobacco: Never Used   Vaping Use    Vaping Use: Never used   Substance Use Topics    Alcohol use: Never     Alcohol/week: 0 0 standard drinks    Drug use: Never       Review of Systems   Constitutional: Negative for chills and fever  HENT: Negative for ear pain and sore throat  Eyes: Negative for pain and visual disturbance  Respiratory: Negative for cough and shortness of breath  Cardiovascular: Negative for chest pain and palpitations  Gastrointestinal: Negative for abdominal pain and vomiting  Genitourinary: Negative for dysuria and hematuria  Musculoskeletal: Positive for arthralgias, back pain and gait problem  Skin: Negative for color change and rash  Neurological: Negative for seizures and syncope  All other systems reviewed and are negative  Physical Exam  Physical Exam  Vitals and nursing note reviewed  Constitutional:       General: She is in acute distress  Appearance: She is well-developed  She is not ill-appearing  HENT:      Head: Normocephalic and atraumatic  Eyes:      Conjunctiva/sclera: Conjunctivae normal    Cardiovascular:      Rate and Rhythm: Normal rate and regular rhythm  Heart sounds: Murmur heard  Pulmonary:      Effort: Pulmonary effort is normal  No respiratory distress  Breath sounds: Normal breath sounds  Abdominal:      Palpations: Abdomen is soft  Tenderness: There is no abdominal tenderness  Musculoskeletal:         General: Tenderness present  Cervical back: Neck supple        Comments: Patient with tenderness to the hip over the greater trochanter but more so in the posterior hip/buttock region  Patient with increasing pain with range of motion  Pulses are 2/4 in the right lower extremity and there is no evidence of ecchymosis  Skin:     General: Skin is warm and dry  Capillary Refill: Capillary refill takes less than 2 seconds  Coloration: Skin is pale  Neurological:      General: No focal deficit present  Mental Status: She is alert           Vital Signs  ED Triage Vitals   Temperature Pulse Respirations Blood Pressure SpO2   08/02/22 1340 08/02/22 1340 08/02/22 1340 08/02/22 1340 08/02/22 1340   98 °F (36 7 °C) (!) 118 18 118/82 96 %      Temp Source Heart Rate Source Patient Position - Orthostatic VS BP Location FiO2 (%)   08/02/22 1340 08/02/22 1340 08/02/22 1340 08/02/22 1340 --   Oral Monitor Sitting Left arm       Pain Score       08/02/22 1430       7           Vitals:    08/02/22 1734 08/02/22 1741 08/02/22 1814 08/02/22 1816   BP: 124/76 119/87     Pulse: (!) 120 (!) 129 (!) 106 (!) 126   Patient Position - Orthostatic VS: Sitting Sitting Sitting          Visual Acuity      ED Medications  Medications   ALPRAZolam (XANAX) tablet 0 25 mg (has no administration in time range)   apixaban (ELIQUIS) tablet 2 5 mg (2 5 mg Oral Given 8/2/22 1742)   aspirin (ECOTRIN LOW STRENGTH) EC tablet 81 mg (has no administration in time range)   atorvastatin (LIPITOR) tablet 40 mg (40 mg Oral Given 8/2/22 1741)   diltiazem (CARDIZEM) tablet 60 mg (60 mg Oral Given 8/2/22 1742)   hydrOXYzine HCL (ATARAX) tablet 25 mg (25 mg Oral Given 8/2/22 1742)   metoprolol tartrate (LOPRESSOR) tablet 25 mg (25 mg Oral Given 8/2/22 1742)   pantoprazole (PROTONIX) EC tablet 40 mg (has no administration in time range)   oxybutynin (DITROPAN) tablet 5 mg (5 mg Oral Given 8/2/22 1742)   sertraline (ZOLOFT) tablet 75 mg (has no administration in time range)   acetaminophen (TYLENOL) tablet 650 mg (has no administration in time range)   ondansetron (ZOFRAN) injection 4 mg (has no administration in time range)   fentanyl citrate (PF) 100 MCG/2ML 25 mcg (has no administration in time range)   furosemide (LASIX) tablet 40 mg (has no administration in time range)   potassium chloride (K-DUR,KLOR-CON) CR tablet 20 mEq (has no administration in time range)   ketorolac (TORADOL) injection 15 mg (15 mg Intramuscular Given 8/2/22 1430)   fentanyl citrate (PF) 100 MCG/2ML 25 mcg (25 mcg Intravenous Given 8/2/22 1700)       Diagnostic Studies  Results Reviewed     None                 CT lumbar spine without contrast   Final Result by Karol Wiseman MD (08/02 1525)      No acute osseous abnormality of lumbar spine  Multilevel degenerative changes of lumbar spine with varying degrees of canal stenosis (mild L3-4 and L4-5) and foraminal narrowing (severe left L5-S1), as detailed above  Partially imaged small right and trace left pleural effusions with bilateral lower lobe subsegmental atelectasis, similar to CT abdomen pelvis 7/23/2022  Recommend dedicated chest radiograph for further evaluation  Ascites  Please see same-day CT pelvis without contrast for further evaluation  The study was marked in [facility] for immediate notification  Workstation performed: [id number]         CT pelvis wo contrast   Final Result by Yimi Gonzalez MD (08/02 1520)   Radiographically healed right intertrochanteric fracture with stable hardware   No new fracture seen   There is increasing presacral edema and increasing fluid in the abdomen, evaluate for volume overload/CHF      Workstation performed: [id number]         XR chest pa & lateral    (Results Pending)              Procedures  Procedures         ED Course  ED Course as of 08/02/22 1858   Tue Aug 02, 2022   1445 Order placed for physical therapy evaluation  [id number] Will admit patient due to lack of physical therapy evaluation as well as intractable pain  SBIRT 20yo+    Flowsheet Row Most Recent Value   SBIRT (23 yo +)    In order to provide better care to our patients, we are screening all of our patients for alcohol and drug use  Would it be okay to ask you these screening questions? Yes Filed at: 08/02/2022 1430   Initial Alcohol Screen: US AUDIT-C     1  How often do you have a drink containing alcohol? 0 Filed at: 08/02/2022 1430   2  How many drinks containing alcohol do you have on a typical day you are drinking? 0 Filed at: 08/02/2022 1430   3a  Male UNDER 65: How often do you have five or more drinks on one occasion? 0 Filed at: 08/02/2022 1430   3b  FEMALE Any Age, or MALE 65+: How often do you have 4 or more drinks on one occassion? 0 Filed at: 08/02/2022 1430   Audit-C Score 0 Filed at: 08/02/2022 1430   DAVID: How many times in the past year have you    Used an illegal drug or used a prescription medication for non-medical reasons? Never Filed at: 08/02/2022 1430                    MDM    Disposition  Final diagnoses:   None     ED Disposition     ED Disposition   Admit    Condition   Stable    Date/Time   Tue Aug 2, 2022  4:14 PM    Comment   Case was discussed with Carina Summers and the patient's admission status was agreed to be Admission Status: inpatient status to the service of Dr Autumn Wolfe   Follow-up Information    None         Patient's Medications   Discharge Prescriptions    No medications on file       No discharge procedures on file      PDMP Review       Value Time User    PDMP Reviewed  Yes 3/5/2020  1:47 PM Emiliano Mccarthy          ED Provider  Electronically Signed by           Eda Forrester DO  08/02/22 2198

## 2022-08-02 NOTE — ED NOTES
Patient reports only being on oxygen at night     Kristen Tyson, [facility]  08/02/22 0748

## 2022-08-02 NOTE — ED NOTES
Daughter at bedside made aware of pending ct and possible ot/pt eval        Floridalma Madden RN  08/02/22 3973

## 2022-08-02 NOTE — ED NOTES
Inpatient team stating give oral meds for afib at this time; will continue to monitor patient and vital signs       Melody [facility]  08/02/22 [id number]

## 2022-08-03 NOTE — PHYSICAL THERAPY NOTE
Physical Therapy Evaluation     Patient's Name: Olimpia Enamorado    Admitting Diagnosis  Hip pain [M25 559]  Right hip pain [M25 551]    Problem List  Patient Active Problem List   Diagnosis    Coronary artery disease involving native coronary artery of native heart    Mitral regurgitation    Longstanding persistent atrial fibrillation (HCC)    Acute on chronic diastolic heart failure (HCC)    Essential hypertension    Acute hypokalemia    Depression with anxiety    Gastroesophageal reflux disease without esophagitis    OAB (overactive bladder)    Hypomagnesemia    Mixed hyperlipidemia    Osteoarthritis of knee    Pneumonia    Generalized weakness    Dizziness    Chest wall contusion, right, initial encounter    Ambulatory dysfunction    Hypokalemia    Liver lesion    Rib fractures    CKD (chronic kidney disease), stage II    Pulmonary hypertension (HCC)    Visual hallucinations    Close exposure to COVID-19 virus    Cognitive impairment    Atrial fibrillation with RVR (HCC)    SIRS (systemic inflammatory response syndrome) (HCC)    Chronic respiratory failure with hypoxia (HCC)    Other constipation    Moderate protein-calorie malnutrition ([address] )    Acute mesenteric ischemia (HCC)    Chronic heart failure with preserved ejection fraction (HFpEF) ([address] )    Benign hypertension with CKD (chronic kidney disease) stage III ([address] )    On home oxygen therapy    Hip pain, right    Pleural effusion       Past Medical History  Past Medical History:   Diagnosis Date    Acute on chronic diastolic congestive heart failure ([address] ) 6/27/2019    Ambulatory dysfunction 8/17/2020    Arthritis     Chest wall contusion, right, initial encounter 8/17/2020    Chest wall contusion, right, subsequent encounter 8/17/2020    Chronic diastolic heart failure (HCC)     Chronic kidney disease, stage 2 (mild)     Coronary artery disease     history of stenting    Eczema     years    Edema     History of echocardiogram 07/20/2017    EF 55%, mild concentric LVH, bileaflet MVP with moderate MR, left atrial enlargement  History of transfusion     Hyperlipidemia     Hypertension     Hypo-osmolality and hyponatremia     Hypokalemia 7/11/2019    Mitral regurgitation        Past Surgical History  Past Surgical History:   Procedure Laterality Date    ABDOMINAL SURGERY      hysterectomy    CARDIAC CATHETERIZATION  04/10/2012    EF 65%, no significant CAD, patent stents, severe MR     CORONARY ANGIOPLASTY WITH STENT PLACEMENT  03/21/2007    EF 55%, GARRET to LAD  EYE SURGERY      b/l cataracts    HYSTERECTOMY      JOINT REPLACEMENT      Rt knee        08/03/22 0825   PT Last Visit   PT Visit Date 08/03/22   Note Type   Note type Evaluation   Pain Assessment   Pain Assessment Tool FLACC   Pain Rating: FLACC (Rest) - Face 1   Pain Rating: FLACC (Rest) - Legs 1   Pain Rating: FLACC (Rest) - Activity 1   Pain Rating: FLACC (Rest) - Cry 0   Pain Rating: FLACC (Rest) - Consolability 1   Score: FLACC (Rest) 4   Pain Rating: FLACC (Activity) - Face 1   Pain Rating: FLACC (Activity) - Legs 1   Pain Rating: FLACC (Activity) - Activity 1   Pain Rating: FLACC (Activity) - Cry 1   Pain Rating: FLACC (Activity) - Consolability 1   Score: FLACC (Activity) 5   Restrictions/Precautions   Weight Bearing Precautions Per Order No  (CT pelvis: 'Radiographically healed right intertrochanteric fracture with stable hardware  No new fracture seen")   Other Precautions O2;Fall Risk;Pain;Cognitive;Multiple lines;Hard of hearing  (2 L O2 via NC)   Home Living   Type of Home House   Home Layout Two level; Able to live on main level with bedroom/bathroom; Performs ADLs on one level; Access   Bathroom Shower/Tub Tub/shower unit   [employer] Raised   Bathroom Equipment Shower chair;Grab bars in shower;Grab bars around toilet;Commode  (reported occasional commode usage)   Bathroom Accessibility Accessible   Home Equipment Walker;Cane  (baseline rollator usage)   Prior Function   Level of [place] Independent with ADLs and functional mobility; Needs assistance with IADLs   Lives With Son  (two sons)   Receives Help From Family   ADL Assistance Independent   IADLs Needs assistance   Falls in the last 6 months 0  (denies)   Vocational Retired   General   Additional Pertinent History Myrisa OT present for co-assessment due to medical complexity, required skilled interventions of 2 clinicians for care delivery  Family/Caregiver Present No   Cognition   Overall Cognitive Status Impaired   Arousal/Participation Arousable   Orientation Level Oriented X4   Memory Decreased short term memory   Following Commands Follows one step commands with increased time or repetition   Comments Pt  agreeable to PT assessment ,pleasant  Subjective   Subjective pt  reason admittance, "my hip hurts, and I couldn't walk "   RLE Assessment   RLE Assessment X  (3/5 gross musculature)   LLE Assessment   LLE Assessment X  (3/5 gross musculature)   Vision-Basic Assessment   Current Vision No visual deficits  (denies)   Vestibular   Spontaneous Nystagmus (-) no evidence of nystagmus at rest in room light   Coordination   Movements are Fluid and Coordinated 0   Coordination and Movement Description Incremental mobility requiring increased time and tactile facilitation of 2  Bed Mobility   Supine to Sit 4  Minimal assistance   Additional items Assist x 1;HOB elevated; Bedrails; Increased time required;Verbal cues;LE management   Sit to Supine 3  Moderate assistance   Additional items Assist x 2;Bedrails; Increased time required;Verbal cues;LE management   Additional Comments Verbal cues and tactile cues for postural correction, proper body mechanics  Pt  required intermittent tactile cues to stabilize balance, noted L LOB  Transfers   Sit to Stand 4  Minimal assistance   Additional items Assist x 2;Bedrails; Increased time required;Verbal cues   Stand to Sit 4  Minimal assistance   Additional items Assist x 2;Bedrails; Increased time required;Verbal cues   Additional Comments Verbal cues for proper BUE placement with transitional movements, truncal stabilization  Ambulation/Elevation   Gait pattern Improper Weight shift; Forward Flexion;Decreased foot clearance; Short stride; Excessively slow   Gait Assistance 3  Moderate assist   Additional items Assist x 2;Verbal cues; Tactile cues   Assistive Device Rolling walker   Distance 4 feet total  (fwd,bwd, toward head of bed-could not safely advance distance due to fatigue severity)   Stair Management Assistance Not tested   Balance   Static Sitting Poor +   Dynamic Sitting Poor   Static Standing Poor   Dynamic Standing Poor -   Ambulatory Poor -   Endurance Deficit   Endurance Deficit Yes: BRIZUELA demonstrated with functional advancement  Symptomatic resolution with increased time and compensatory strategies   Activity Tolerance   Activity Tolerance Patient limited by fatigue;Patient limited by pain   Medical Staff Made Aware Yes, Kavya DINERO was informed of d/c disposition recommendation  Nurse Made Aware Yes, Salvatore Mason RN & Kami PCA   Assessment   Prognosis Fair   Problem List Decreased strength;Decreased endurance; Impaired balance;Decreased mobility; Decreased coordination;Decreased cognition;Pain; Impaired hearing   Assessment Pt is 81 y o  female seen for PT evaluation s/p [facility] on 8/2/2022 w/ Hip pain, right  PT consulted to assess pt's functional mobility and d/c needs  Order placed for PT eval and tx, w/ up w/ A order  Comorbidities affecting pt's physical performance at time of assessment include: weakness,R hip pain,chronic heart failure,CAD,depression with anxiety,HTN,longstanding persistent A-fib, pleural effusion with chronic home O2 usage   PTA, pt was living with family and mobilizing w/rollator walker   Personal factors affecting pt at time of IE include: inability to ambulate household distances, inability to navigate community distances, inability to navigate level surfaces w/o external assistance, unable to perform dynamic tasks in community, decreased cognition, hearing impairments, unable to perform physical activity, limited insight into impairments and inability to perform ADLs  Please find objective findings from PT assessment regarding body systems outlined above with impairments and limitations including weakness, impaired balance, decreased endurance, impaired coordination, gait deviations, pain, decreased activity tolerance, decreased functional mobility tolerance, decreased safety awareness, fall risk and decreased cognition  From PT/mobility standpoint, recommendation at time of d/c would be post acute rehabilitation services pending progress in order to facilitate return to PLOF  Goals   Patient Goals to feel better   LTG Expiration Date 08/13/22   Long Term Goal #1 1 )Patient will complete bed mobility supervision of 1 for decrease need for caregiver assistance, decrease burden of care  2 ) Patient will complete transfers min A of 1 to decrease risk of falls, facilitate upright standing posture  3 ) BLE strength to greater than/equal to 3+/5 gross musculature to increase ability to safely transfer, control descent to chair  4 ) Patient will exhibit increase dynamic standing to Fair 3-4 minutes without LOB min A of 1 to improve activity tolerance  5 ) Patient will exhibit increase dynamic ambulatory balance to Fair 25-50 feet w/AD min A of 1 to improve ability to mobilize to toilet, chair and decrease risk for additional medical complications  6 ) Patient will exhibit good self monitoring and ability to follow 2 step commands to increase complexity of tasks and resume ADL's without LOB  PT Treatment Day 0   Plan   Treatment/Interventions Functional transfer training;LE strengthening/ROM; Therapeutic exercise;Elevations; Endurance training;Cognitive reorientation;Patient/family training;Equipment eval/education; Bed mobility;Gait training;Spoke to nursing;Spoke to case management;OT   PT Frequency 3-5x/wk   Recommendation   PT Discharge Recommendation (S)  Post acute rehabilitation services   Equipment Recommended [facility] Recommended Wheeled walker   Change/add to Extenda-Dent? No   Additional Comments Upon conclusion, pt  was resting in bed w/all needs within reach  Additional Comments 2 Pt's raw score on the AM-PAC Basic Mobility inpatient short form is 11, standardized score is 30 25  Patients at this level are likely to benefit from DC to [facility]  However, please refer to therapist recommendation for safe DC planning     AM-PAC Basic Mobility Inpatient   Turning in Bed Without Bedrails 3   Lying on Back to Sitting on Edge of Flat Bed 2   Moving Bed to Chair 2   Standing Up From Chair 2   Walk in Room 1   Climb 3-5 Stairs 1   Basic Mobility Inpatient Raw Score 11   Basic Mobility Standardized Score 30 25   Highest Level Of Mobility   -HLM Goal 4: Move to chair/commode   JH-HLM Achieved 3: Sit at edge of bed     History/Personal Factors/Comorbidities: weakness,R hip pain,chronic heart failure,CAD,depression with anxiety,HTN,longstanding persistent A-fib, pleural effusion with chronic home O2 usage     # of body structures/limitations: muscle weakness, activity intolerance,decreased endurance with BRIZUELA, impaired balance, gait deviations    Clinical presentation: unstable as seen in pain, hearing impairment, high fall risk, progressive symptoms prior to hospitalization,physical assistance of 2,BRIZUELA    Initial Assessment Time: 5212-0105      Ellie Ambrose, PT

## 2022-08-03 NOTE — PLAN OF CARE
Problem: PHYSICAL THERAPY ADULT  Goal: Performs mobility at highest level of function for planned discharge setting  See evaluation for individualized goals  Description: Treatment/Interventions: Functional transfer training, LE strengthening/ROM, Therapeutic exercise, Elevations, Endurance training, Cognitive reorientation, Patient/family training, Equipment eval/education, Bed mobility, Gait training, Spoke to nursing, Spoke to case management, OT  Equipment Recommended: Obie Castleman       See flowsheet documentation for full assessment, interventions and recommendations  Note: Prognosis: Fair  Problem List: Decreased strength, Decreased endurance, Impaired balance, Decreased mobility, Decreased coordination, Decreased cognition, Pain, Impaired hearing  Assessment: Pt is 81 y o  female seen for PT evaluation s/p admit to [facility] on 8/2/2022 w/ Hip pain, right  PT consulted to assess pt's functional mobility and d/c needs  Order placed for PT eval and tx, w/ up w/ A order  Comorbidities affecting pt's physical performance at time of assessment include: weakness,R hip pain,chronic heart failure,CAD,depression with anxiety,HTN,longstanding persistent A-fib, pleural effusion with chronic home O2 usage   PTA, pt was living with family and mobilizing w/rollator walker  Personal factors affecting pt at time of IE include: inability to ambulate household distances, inability to navigate community distances, inability to navigate level surfaces w/o external assistance, unable to perform dynamic tasks in community, decreased cognition, hearing impairments, unable to perform physical activity, limited insight into impairments and inability to perform ADLs   Please find objective findings from PT assessment regarding body systems outlined above with impairments and limitations including weakness, impaired balance, decreased endurance, impaired coordination, gait deviations, pain, decreased activity tolerance, decreased functional mobility tolerance, decreased safety awareness, fall risk and decreased cognition  From PT/mobility standpoint, recommendation at time of d/c would be post acute rehabilitation services pending progress in order to facilitate return to PLOF  PT Discharge Recommendation: (S) Post acute rehabilitation services    See flowsheet documentation for full assessment

## 2022-08-03 NOTE — NURSING NOTE
Pt having some periods of sleep apnea and destating into 70's-80's  Pt states has been going on for years  O2 increased from 2L to 3L, no further destats noted, will continue to monitor throughout shift

## 2022-08-03 NOTE — PLAN OF CARE
Problem: Potential for Falls  Goal: Patient will remain free of falls  Description: INTERVENTIONS:  - Educate patient/family on patient safety including physical limitations  - Instruct patient to call for assistance with activity   - Consult OT/PT to assist with strengthening/mobility   - Keep Call bell within reach  - Keep bed low and locked with side rails adjusted as appropriate  - Keep care items and personal belongings within reach  - Initiate and maintain comfort rounds  - Make Fall Risk Sign visible to staff  - Offer Toileting every 2 Hours, in advance of need  - Initiate/Maintain bed alarm  - Obtain necessary fall risk management equipment: yellow socks  - Apply yellow socks and bracelet for high fall risk patients  - Consider moving patient to room near nurses station  8/2/2022 2215 by Elba Garcia RN  Outcome: Progressing  8/2/2022 2214 by Elba Garcia RN  Outcome: Progressing     Problem: MOBILITY - ADULT  Goal: Maintain or return to baseline ADL function  Description: INTERVENTIONS:  -  Assess patient's ability to carry out ADLs; assess patient's baseline for ADL function and identify physical deficits which impact ability to perform ADLs (bathing, care of mouth/teeth, toileting, grooming, dressing, etc )  - Assess/evaluate cause of self-care deficits   - Assess range of motion  - Assess patient's mobility; develop plan if impaired  - Assess patient's need for assistive devices and provide as appropriate  - Encourage maximum independence but intervene and supervise when necessary  - Involve family in performance of ADLs  - Assess for home care needs following discharge   - Consider OT consult to assist with ADL evaluation and planning for discharge  - Provide patient education as appropriate  8/2/2022 2215 by Elba Garcia RN  Outcome: Progressing  8/2/2022 2214 by Elba Garcia RN  Outcome: Progressing  Goal: Maintains/Returns to pre admission functional level  Description: INTERVENTIONS:  - Perform BMAT or MOVE assessment daily    - Set and communicate daily mobility goal to care team and patient/family/caregiver  - Collaborate with rehabilitation services on mobility goals if consulted  - Perform Range of Motion 3 times a day  - Reposition patient every 2 hours    - Dangle patient 3 times a day  - Stand patient 3 times a day  - Ambulate patient 3 times a day  - Out of bed to chair 3 times a day   - Out of bed for meals 3 times a day  - Out of bed for toileting  - Record patient progress and toleration of activity level   8/2/2022 2215 by Destiney Lowery RN  Outcome: Progressing  8/2/2022 2214 by Destiney Lowery RN  Outcome: Progressing     Problem: Prexisting or High Potential for Compromised Skin Integrity  Goal: Skin integrity is maintained or improved  Description: INTERVENTIONS:  - Identify patients at risk for skin breakdown  - Assess and monitor skin integrity  - Assess and monitor nutrition and hydration status  - Monitor labs   - Assess for incontinence   - Turn and reposition patient  - Assist with mobility/ambulation  - Relieve pressure over bony prominences  - Avoid friction and shearing  - Provide appropriate hygiene as needed including keeping skin clean and dry  - Evaluate need for skin moisturizer/barrier cream  - Collaborate with interdisciplinary team   - Patient/family teaching  - Consider wound care consult   8/2/2022 2215 by Destiney Lowery RN  Outcome: Progressing  8/2/2022 2214 by Destiney Lowery RN  Outcome: Progressing

## 2022-08-03 NOTE — PROGRESS NOTES
[facility]  67   Progress Note - [facility] 1935, 81 y o  female MRN: [id number]  Unit/Bed#: [facility] 16 Encounter: [id number]  Primary Care Provider: Neoma Nissen, MD   Date and time admitted to hospital: 8/2/2022  1:45 PM    * Hip pain, right  Assessment & Plan  · Presented with right hip pain and ambulatory dysfunction  · CT Pelvis (8/2): Radiographically healed right intertrochanteric fracture with stable hardware  No new fracture seen   · Continue pain control with Lidocaine patch and Oxycodone 2 5mg Q6H PRN  · PT/OT recommending post-acute rehab placement  · Otherwise medically cleared for discharge pending placement  Pleural effusion  Assessment & Plan  · CT L Spine (8/2): No acute osseous abnormality of lumbar spine  Multilevel degenerative changes of lumbar spine with varying degrees of canal stenosis (mild L3-4 and L4-5) and foraminal narrowing (severe left L5-S1), as detailed above  Partially imaged small right and trace left pleural effusions with bilateral lower lobe subsegmental atelectasis, similar to CT abdomen pelvis 7/23/2022  Ascites  · Check CXR PA/Lateral  · Continue home Lasix 40mg daily    Chronic heart failure with preserved ejection fraction (HFpEF) (HCC)  Assessment & Plan  Wt Readings from Last 3 Encounters:   08/02/22 49 9 kg (110 lb)   07/26/22 49 4 kg (109 lb)   07/23/22 49 4 kg (109 lb)     · Not acute decompensated at this time    · Continue home Metoprolol 25mg BID and Lasix 40mg daily  · Monitor daily weights and Is and Os  · Fluid and Na restriction      Coronary artery disease involving native coronary artery of native heart  Assessment & Plan  · Continue home aspirin 81mg daily, metoprolol 25mg BID, and atorvastatin 40mg daily      Longstanding persistent atrial fibrillation (HCC)  Assessment & Plan  · Continue Metoprolol as above and Diltiazem 60mg Q8H  · Continue home Eliquis 2 5mg BID    VTE Pharmacologic Prophylaxis: VTE Score: 5 High Risk (Score >/= 5) - Pharmacological DVT Prophylaxis Ordered: apixaban (Eliquis)  Sequential Compression Devices Ordered  Patient Centered Rounds: I performed bedside rounds with nursing staff today  Discussions with Specialists or Other Care Team Provider: Nursing, PT/OT, and CM    Education and Discussions with Family / Patient: Patient updated on plan of care        Time Spent for Care: 30 minutes  More than 50% of total time spent on counseling and coordination of care as described above  Current Length of Stay: 1 day(s)  Current Patient Status: Inpatient   Certification Statement: The patient will continue to require additional inpatient hospital stay due to awaiting placement  Discharge Plan: Medically cleared for discharge once placement has been obtained  Code Status: Level 1 - Full Code    Subjective:   Patient resting comfortably in bed without any acute complaints  She notes improvement right hip discomfort since admission  Evaluated by PT this morning had her standing at the bedside and recommended post acute rehab placement  No significant overnight events reported by nursing  Objective:     Vitals:   Temp (24hrs), Av 6 °F (36 4 °C), Min:96 9 °F (36 1 °C), Max:98 1 °F (36 7 °C)    Temp:  [96 9 °F (36 1 °C)-98 1 °F (36 7 °C)] 98 1 °F (36 7 °C)  HR:  [] 96  Resp:  [18-24] 18  BP: ()/(57-87) 113/78  SpO2:  [93 %-99 %] 98 %  Body mass index is 23 95 kg/m²  Input and Output Summary (last 24 hours): Intake/Output Summary (Last 24 hours) at 8/3/2022 1102  Last data filed at 8/3/2022 0341  Gross per 24 hour   Intake 240 ml   Output 174 ml   Net 66 ml       Physical Exam:   Physical Exam  Vitals and nursing note reviewed  Constitutional:       General: She is not in acute distress  Appearance: She is well-developed and normal weight  HENT:      Head: Normocephalic and atraumatic        Mouth/Throat:      Mouth: Mucous membranes are moist       Pharynx: Oropharynx is clear    Eyes:      Extraocular Movements: Extraocular movements intact  Conjunctiva/sclera: Conjunctivae normal    Cardiovascular:      Rate and Rhythm: Tachycardia present  Rhythm irregular  Heart sounds: Murmur heard  Pulmonary:      Effort: Pulmonary effort is normal  No respiratory distress  Breath sounds: Normal breath sounds  No wheezing  Comments: 2L NC  Abdominal:      General: Bowel sounds are normal  There is no distension  Palpations: Abdomen is soft  Tenderness: There is no abdominal tenderness  Musculoskeletal:         General: Normal range of motion  Cervical back: Normal range of motion and neck supple  Skin:     General: Skin is warm and dry  Neurological:      General: No focal deficit present  Mental Status: She is alert and oriented to person, place, and time     Psychiatric:         Mood and Affect: Mood normal          Behavior: Behavior normal           Labs:  Results from last 7 days   Lab Units 08/03/22  0610   WBC Thousand/uL 8 32   HEMOGLOBIN g/dL 11 6   HEMATOCRIT % 37 1   PLATELETS Thousands/uL 293   NEUTROS PCT % 71   LYMPHS PCT % 16   MONOS PCT % 9   EOS PCT % 4     Results from last 7 days   Lab Units 08/03/22  0610   SODIUM mmol/L 132*   POTASSIUM mmol/L 4 2   CHLORIDE mmol/L 100   CO2 mmol/L 26   BUN mg/dL 31*   CREATININE mg/dL 0 85   ANION GAP mmol/L 6   CALCIUM mg/dL 8 9   GLUCOSE RANDOM mg/dL 101                       Lines/Drains:  Invasive Devices  Report    Peripheral Intravenous Line  Duration           Peripheral IV 08/02/22 Left Wrist <1 day              Imaging: Reviewed radiology reports from this admission including: CT pelvis/L spine    Recent Cultures (last 7 days):         Last 24 Hours Medication List:   Current Facility-Administered Medications   Medication Dose Route Frequency Provider Last Rate    acetaminophen  975 mg Oral [place], [place]      ALPRAZolam  0 25 mg Oral HS PRN Jaime Feldman CRNP      apixaban  2 5 mg Oral BID Tommie Tatiana, CRNP      aspirin  81 mg Oral Daily Tommie Tatiana, CRNP      atorvastatin  40 mg Oral Daily With [address] Elaine Resendez, NEAL      diltiazem  60 mg Oral [facility] Tommie Tatiana, [address] May[address] Mcdonald      furosemide  40 mg Oral Daily Tommie Tatiana, CRNP      hydrOXYzine HCL  25 mg Oral BID Tommie Tatiana, CRNP      lidocaine  1 patch Topical Daily [place], [place]      metoprolol tartrate  25 mg Oral BID Tommie Tatiana, CRNP      ondansetron  4 mg Intravenous Q4H PRN Tommie Simpson, CRNP      oxybutynin  5 mg Oral TID Tommie Tatiana, CRNP      oxyCODONE  2 5 mg Oral Q6H PRN Marcelina Voss PA-C      pantoprazole  40 mg Oral Early Morning Tommie Tatiana, CRNP      potassium chloride  20 mEq Oral Daily Tommie Tatiana, CRNP      sertraline  75 mg Oral Daily Tommie Simpson, NEAL          Today, Patient Was Seen By: Darline Oppenheim, DO    **Please Note: This note may have been constructed using a voice recognition system  **

## 2022-08-03 NOTE — OCCUPATIONAL THERAPY NOTE
Occupational Therapy Evaluation      Yazmin Cody    8/3/2022    Principal Problem:    Hip pain, right  Active Problems:    Coronary artery disease involving native coronary artery of native heart    Longstanding persistent atrial fibrillation (HCC)    Essential hypertension    Depression with anxiety    Gastroesophageal reflux disease without esophagitis    Chronic heart failure with preserved ejection fraction (HFpEF) (HCC)    On home oxygen therapy    Pleural effusion      Past Medical History:   Diagnosis Date    Acute on chronic diastolic congestive heart failure ([address] ) 6/27/2019    Ambulatory dysfunction 8/17/2020    Arthritis     Chest wall contusion, right, initial encounter 8/17/2020    Chest wall contusion, right, subsequent encounter 8/17/2020    Chronic diastolic heart failure (HCC)     Chronic kidney disease, stage 2 (mild)     Coronary artery disease     history of stenting    Eczema     years    Edema     History of echocardiogram 07/20/2017    EF 55%, mild concentric LVH, bileaflet MVP with moderate MR, left atrial enlargement  History of transfusion     Hyperlipidemia     Hypertension     Hypo-osmolality and hyponatremia     Hypokalemia 7/11/2019    Mitral regurgitation        Past Surgical History:   Procedure Laterality Date    ABDOMINAL SURGERY      hysterectomy    CARDIAC CATHETERIZATION  04/10/2012    EF 65%, no significant CAD, patent stents, severe MR     CORONARY ANGIOPLASTY WITH STENT PLACEMENT  03/21/2007    EF 55%, GARRET to LAD      EYE SURGERY      b/l cataracts    HYSTERECTOMY      JOINT REPLACEMENT      Rt knee        08/03/22 0843   OT Last Visit   OT Visit Date 08/03/22   Note Type   Note type Evaluation   Restrictions/Precautions   Weight Bearing Precautions Per Order No   Other Precautions O2;Fall Risk;Pain  (2L O2 NC, wears 2L O2 at home at night)   Pain Assessment   Pain Assessment Tool FLACC   Pain Location/Orientation Orientation: Right;Location: Hip   Pain Rating: FLACC (Rest) - Face 1   Pain Rating: FLACC (Rest) - Legs 1   Pain Rating: FLACC (Rest) - Activity 1   Pain Rating: FLACC (Rest) - Cry 0   Pain Rating: FLACC (Rest) - Consolability 1   Score: FLACC (Rest) 4   Pain Rating: FLACC (Activity) - Face 1   Pain Rating: FLACC (Activity) - Legs 1   Pain Rating: FLACC (Activity) - Activity 1   Pain Rating: FLACC (Activity) - Cry 1   Pain Rating: FLACC (Activity) - Consolability 1   Score: FLACC (Activity) 5   Home Living   Type of Home House   Home Layout Two level; Able to live on main level with bedroom/bathroom; Performs ADLs on one level; Access   Bathroom Shower/Tub Tub/shower unit   Bathroom Toilet Raised   Bathroom Equipment Shower chair;Grab bars in shower;Grab bars around toilet;Commode  (uses commode occasionally)   [address]  (Rollator- uses at baseline)   Prior Function   Level of [place] Independent with ADLs and functional mobility   Lives With Son  (two sons)   Receives Help From Family   ADL Assistance Independent   IADLs Needs assistance   Falls in the last 6 months 0   Vocational Retired   Comments -  (sons help with transport)   ADL   UB Bathing Assistance 4  Minimal Assistance   LB [address] 4  Minimal Assistance   [address] 4  Minimal Assistance    [address] 3  Moderate Assistance   Bed Mobility   Supine to Sit 4  Minimal assistance   Additional items Assist x 1;HOB elevated; Bedrails; Increased time required;Verbal cues;LE management   Sit to Supine 3  Moderate assistance   Additional items Assist x 2;Bedrails; Increased time required;Verbal cues;LE management   Transfers   Sit to Stand 4  Minimal assistance   Additional items Assist x 2;Bedrails; Increased time required;Verbal cues   Stand to Sit 4  Minimal assistance   Additional items Assist x 2;Bedrails; Increased time required;Verbal cues   Balance   Static Sitting Poor +   Dynamic Sitting Poor   Static Standing Poor Dynamic Standing Poor -   Ambulatory Poor -   Activity Tolerance   Activity Tolerance Patient limited by fatigue;Patient limited by pain   Medical Staff Made Aware ROSETTE Kavya   Nurse Made Aware MARY Raines   RUE Assessment   RUE Assessment X  (AROM WFL)   RUE Strength   RUE Overall Strength Deficits  (3+/5)   LUE Assessment   LUE Assessment X  (AROM WFL)   LUE Strength   LUE Overall Strength Deficits  (3+/5)   Vision-Basic Assessment   Current Vision No visual deficits   Cognition   Overall Cognitive Status Impaired   Arousal/Participation Cooperative;Lethargic   Attention Attends with cues to redirect   Orientation Level Oriented X4   Memory Decreased short term memory   Following Commands Follows one step commands with increased time or repetition   Assessment   Limitation Decreased ADL status; Decreased UE strength;Decreased Safe judgement during ADL;Decreased cognition;Decreased endurance;Decreased self-care trans;Decreased high-level ADLs   Prognosis Good   Assessment Pt is a 81 y o  female seen for OT evaluation s/p admit to [place] [id number] on 8/2/2022 w/ Hip pain, right  Comorbidities affecting pt's functional performance at time of assessment include: A-fib, HTN, CAD, Depression, Anxiety  Personal factors affecting pt at time of IE include:difficulty performing ADLS, limited insight into deficits and decreased initiation and engagement   Prior to admission, pt was Mod I with ADLs  Upon evaluation: the following deficits impact occupational performance: decreased strength, decreased balance, decreased tolerance, impaired attention, impaired initiation, impaired memory, impaired sequencing, impaired problem solving, decreased safety awareness and increased pain  Pt to benefit from continued skilled OT tx while in the hospital to address deficits as defined above and maximize level of functional independence w ADL's and functional mobility   Occupational Performance areas to address include: bathing/shower, toilet hygiene, dressing, functional mobility and clothing management  From OT standpoint, recommendation at time of d/c would be STR  Goals   Patient Goals to feel better   Plan   Treatment Interventions ADL retraining;Functional transfer training;UE strengthening/ROM; Endurance training;Cognitive reorientation;Patient/family training;Equipment evaluation/education; Compensatory technique education; Energy conservation; Activityengagement   Goal Expiration Date 08/13/22   OT Treatment Day 0   OT Frequency 3-5x/wk   Recommendation   OT Discharge Recommendation Post acute rehabilitation services   Additional Comments  Pt seen as a co-eval with PT due to the patient's co-morbidities, clinically unstable presentation, and present impairments which are a regression from the patient's baseline  Additional Comments 2 The patient's raw score on the AM-PAC Daily Activity inpatient short form is 14, standardized score is 33 39, less than 39 4  Patients at this level are likely to benefit from discharge to post-acute rehabilitation services  Please refer to the recommendation of the Occupational Therapist for safe discharge planning     AM-PAC Daily Activity Inpatient   Lower Body Dressing 2   Bathing 2   Toileting 1   Upper Body Dressing 3   Grooming 3   Eating 3   Daily Activity Raw Score 14   Daily Activity Standardized Score (Calc for Raw Score >=11) 33 39   AM-[facility] Applied Cognition Inpatient   Following a Speech/Presentation 3   Understanding Ordinary Conversation 3   Taking Medications 1   Remembering Where Things Are Placed or Put Away 1   Remembering List of 4-5 Errands 1   Taking Care of Complicated Tasks 1   Applied Cognition Raw Score 10   Applied Cognition Standardized Score 24 98     GOALS:    Pt will achieve the following within specified time frame: STG  Pt will achieve the following goals within 5 days    *ADL transfers with Min (A) for inc'd independence with ADLs/purposeful tasks    *UB ADL with (S) for inc'd independence with self cares    *LB ADL with Min (A) using AE prn for inc'd independence with self cares    *Toileting with Min (A) for clothing management and hygiene for return to PLOF with personal care    *Increase static stand balance to P+ and dyn stand balance to P for inc'd safety with standing purposeful tasks    *Increase stand tolerance x3 m for inc'd tolerance with standing purposeful tasks    *Participate in 10m UE therex to increase overall stamina/activity tolerance for purposeful tasks    *Bed mobility- CGA for inc'd independence to manage own comfort and initiate EOB & OOB purposeful tasks    Pt will achieve the following within specified time frame: LTG  Pt will achieve the following goals within 10 days    *ADL transfers with CGA for inc'd independence with ADLs/purposeful tasks    *Self Feeding- (I) for inc'd independence with providing self nourishment    *UB ADL with (I) for inc'd independence with self cares    *LB ADL with CGA using AE prn for inc'd independence with self cares    *Toileting with CGA for clothing management and hygiene for return to PLOF with personal care    *Increase static stand balance to F- and dyn stand balance to P+ for inc'd safety with standing purposeful tasks    *Increase stand tolerance x5 m for inc'd tolerance with standing purposeful tasks    *Bed mobility- (S) for inc'd independence to manage own comfort and initiate EOB & OOB purposeful tasks      Ferdinand Hernández MS, OTR/L

## 2022-08-03 NOTE — CASE MANAGEMENT
Case Management Assessment & Discharge Planning Note    Patient name Ninfa Zepeda  Location [facility] MRN [id number]  : 1935 Date 8/3/2022       Current Admission Date: 2022  Current Admission Diagnosis:Hip pain, right   Patient Active Problem List    Diagnosis Date Noted    On home oxygen therapy 2022    Hip pain, right 2022    Pleural effusion 2022    Chronic heart failure with preserved ejection fraction (HFpEF) ([facility] ) 2022    Benign hypertension with CKD (chronic kidney disease) stage III ([facility] ) 2022    Moderate protein-calorie malnutrition ([facility] ) 2022    Acute mesenteric ischemia ([facility] ) 2022    Chronic respiratory failure with hypoxia ([facility] ) 2022    Other constipation 2022    Atrial fibrillation with RVR ([facility] ) 2022    SIRS (systemic inflammatory response syndrome) ([facility] ) 2022    Cognitive impairment 2021    Close exposure to COVID-19 virus 2021    Visual hallucinations 2021    Chest wall contusion, right, initial encounter 2020    Ambulatory dysfunction 2020    Hypokalemia 2020    Liver lesion 2020    Rib fractures 2020    CKD (chronic kidney disease), stage II 2020    Pulmonary hypertension ([facility] ) 2020    Dizziness 2020    Generalized weakness 2020    Osteoarthritis of knee 2020    Pneumonia 2020    Mixed hyperlipidemia 2019    Hypomagnesemia 2019    Essential hypertension 2019    Acute hypokalemia 2019    Depression with anxiety 2019    Gastroesophageal reflux disease without esophagitis 2019    OAB (overactive bladder) 2019    Acute on chronic diastolic heart failure ([facility] ) 2019    Longstanding persistent atrial fibrillation ([facility] ) 2019    Coronary artery disease involving native coronary artery of native heart 2019    Mitral regurgitation 06/05/2019      LOS (days): 1  Geometric Mean LOS (GMLOS) (days): 3 20  Days to GMLOS:2 2     OBJECTIVE:    Risk of Unplanned Readmission Score: 21 63         Current admission status: Inpatient  Referral Reason: Other (d/c planning)    Preferred Pharmacy:   [address] [address]  Phone: [phone] Fax: [phone]    Primary Care Provider: Carli Felix MD    Primary Insurance: [facility]  Secondary Insurance:     ASSESSMENT:  [address]   Primary Phone: [phone] (Mobile)  Home Phone: [phone]               Advance Directives  Does patient have a [address]?: Yes (family will bring paperwork)  Does patient have Advance Directives?: Yes (family will bring paperwork)         Readmission Root Cause  30 Day Readmission: No    Patient Information  Admitted from[de-identified] Home  Mental Status: Alert  During Assessment patient was accompanied by: Not accompanied during assessment  Assessment information provided by[de-identified] Daughter  Primary Caregiver: Family  Caregiver's Name[de-identified] Amadou Mal and Khurram Hicks Relationship to Patient[de-identified] Family Member (sons)  Caregiver's Telephone Number[de-identified] [phone]  Support Systems: Daughter, [facility] of Residence: [address] do [address] live in?: [address] access options   Select all that apply : Ramp  Type of Current Residence: 2 story home  Upon entering residence, is there a bedroom on the main floor (no further steps)?: Yes  Upon entering residence, is there a bathroom on the main floor (no further steps)?: Yes  In the last 12 months, was there a time when you were not able to pay the mortgage or rent on time?: No  In the last 12 months, how many places have you lived?: 1  In the last 12 months, was there a time when you did not have a steady place to sleep or slept in a shelter (including now)?: No  Homeless/housing insecurity resource given?: N/A  Living Arrangements: Lives w/ Son (lives with her sons)    Activities of Daily Living Prior to Admission  Functional Status: Assistance  Completes ADLs independently?: No  Level of ADL dependence: Assistance (pt is able to dress herself, she does need assistances with bathing)  Ambulates independently?: Yes  Does patient use assisted devices?: Yes  Assisted Devices (DME) used: Walker, Bedside Commode, Home Oxygen concentrator, Portable Oxygen tanks  O2 Rate(s): 2 5 liters  Does patient currently own DME?: Yes  What DME does the patient currently own?: [employer] [place], Branden lift, Portable Oxygen tanks, Home Oxygen concentrator (oxygen 2 5 liters)  Does patient have a history of Outpatient Therapy (PT/OT)?: No  Does the patient have a history of Short-Term Rehab?: Yes Kody Mauricio, [address], [address] [place] [address])  Does patient have a history of [facility]?: Yes Merry Daisha)  Does patient currently have [facility]?: No         Patient Information Continued  Income Source: Pension/group home  Does patient have prescription coverage?: Yes (Riley Sandoval)  Within the past 12 months, you worried that your food would run out before you got the money to buy more : Never true  Within the past 12 months, the food you bought just didn't last and you didn't have money to get more : Never true  Food insecurity resource given?: N/A  Does patient receive dialysis treatments?: No  Does patient have a history of substance abuse?: No  Does patient have a history of Mental Health Diagnosis?: Yes (depression  medications from pcp)  Is patient receiving treatment for mental health?: Yes (medication from pcp)         Means of Transportation  Means of Transport to Appts[de-identified] Family transport  In the past 12 months, has lack of transportation kept you from medical appointments or from getting medications?: No  In the past 12 months, has lack of transportation kept you from meetings, work, or from getting things needed for daily living?: No  Was application for public transport provided?: N/A        DISCHARGE DETAILS:    Discharge planning discussed with[de-identified] patient and daughter was called at 11:18am and her  answered and he took a message, Starla Melendez called back at 11:23 am  Freedom of Choice: Yes  Comments - Freedom of Choice: rehab is recommended  daughter gave permission to make a blanket referral  CM contacted family/caregiver?: Yes             Contacts  Patient Contacts: Saran Dumont  Relationship to Patient[de-identified] Family (daughter)  Contact Method: Phone  Phone Number: [phone]  Reason/Outcome: Discharge William Simental         Is the patient interested in [facility] at discharge?: No    DME Referral Provided  Referral made for DME?: No    Other Referral/Resources/Interventions Provided:  Interventions: Short Term Rehab  Referral Comments: referrals sent as requested    Would you like to participate in our [address] service program?  : No - Declined    Treatment Team Recommendation: Short Term Rehab (snf rehab - transportation tbd)

## 2022-08-03 NOTE — PLAN OF CARE
Problem: OCCUPATIONAL THERAPY ADULT  Goal: Performs self-care activities at highest level of function for planned discharge setting  See evaluation for individualized goals  Description: Treatment Interventions: ADL retraining, Functional transfer training, UE strengthening/ROM, Endurance training, Cognitive reorientation, Patient/family training, Equipment evaluation/education, Compensatory technique education, Energy conservation, Activityengagement     See flowsheet documentation for full assessment, interventions and recommendations  Note: Limitation: Decreased ADL status, Decreased UE strength, Decreased Safe judgement during ADL, Decreased cognition, Decreased endurance, Decreased self-care trans, Decreased high-level ADLs  Prognosis: Good  Assessment: Pt is a 81 y o  female seen for OT evaluation s/p admit to [place] [address] on 8/2/2022 w/ Hip pain, right  Comorbidities affecting pt's functional performance at time of assessment include: A-fib, HTN, CAD, Depression, Anxiety  Personal factors affecting pt at time of IE include:difficulty performing ADLS, limited insight into deficits and decreased initiation and engagement   Prior to admission, pt was Mod I with ADLs  Upon evaluation: the following deficits impact occupational performance: decreased strength, decreased balance, decreased tolerance, impaired attention, impaired initiation, impaired memory, impaired sequencing, impaired problem solving, decreased safety awareness and increased pain  Pt to benefit from continued skilled OT tx while in the hospital to address deficits as defined above and maximize level of functional independence w ADL's and functional mobility  Occupational Performance areas to address include: bathing/shower, toilet hygiene, dressing, functional mobility and clothing management  From OT standpoint, recommendation at time of d/c would be STR       OT Discharge Recommendation: Post acute rehabilitation services     Ferdinand DENSON Jaime Barrera, MS, OTR/L

## 2022-08-03 NOTE — PLAN OF CARE
Problem: Potential for Falls  Goal: Patient will remain free of falls  Description: INTERVENTIONS:  - Educate patient/family on patient safety including physical limitations  - Instruct patient to call for assistance with activity   - Consult OT/PT to assist with strengthening/mobility   - Keep Call bell within reach  - Keep bed low and locked with side rails adjusted as appropriate  - Keep care items and personal belongings within reach  - Initiate and maintain comfort rounds  - Make Fall Risk Sign visible to staff  - Offer Toileting every 2 Hours, in advance of need  - Initiate/Maintain bed alarm  - Obtain necessary fall risk management equipment: yellow socks  - Apply yellow socks and bracelet for high fall risk patients  - Consider moving patient to room near nurses station  Outcome: Progressing     Problem: MOBILITY - ADULT  Goal: Maintain or return to baseline ADL function  Description: INTERVENTIONS:  -  Assess patient's ability to carry out ADLs; assess patient's baseline for ADL function and identify physical deficits which impact ability to perform ADLs (bathing, care of mouth/teeth, toileting, grooming, dressing, etc )  - Assess/evaluate cause of self-care deficits   - Assess range of motion  - Assess patient's mobility; develop plan if impaired  - Assess patient's need for assistive devices and provide as appropriate  - Encourage maximum independence but intervene and supervise when necessary  - Involve family in performance of ADLs  - Assess for home care needs following discharge   - Consider OT consult to assist with ADL evaluation and planning for discharge  - Provide patient education as appropriate  Outcome: Progressing  Goal: Maintains/Returns to pre admission functional level  Description: INTERVENTIONS:  - Perform BMAT or MOVE assessment daily    - Set and communicate daily mobility goal to care team and patient/family/caregiver     - Collaborate with rehabilitation services on mobility goals if consulted  - Perform Range of Motion 3 times a day  - Reposition patient every 2 hours    - Dangle patient 3 times a day  - Stand patient 3 times a day  - Ambulate patient 3 times a day  - Out of bed to chair 3 times a day   - Out of bed for meals 3 times a day  - Out of bed for toileting  - Record patient progress and toleration of activity level   Outcome: Progressing     Problem: Prexisting or High Potential for Compromised Skin Integrity  Goal: Skin integrity is maintained or improved  Description: INTERVENTIONS:  - Identify patients at risk for skin breakdown  - Assess and monitor skin integrity  - Assess and monitor nutrition and hydration status  - Monitor labs   - Assess for incontinence   - Turn and reposition patient  - Assist with mobility/ambulation  - Relieve pressure over bony prominences  - Avoid friction and shearing  - Provide appropriate hygiene as needed including keeping skin clean and dry  - Evaluate need for skin moisturizer/barrier cream  - Collaborate with interdisciplinary team   - Patient/family teaching  - Consider wound care consult   Outcome: Progressing

## 2022-08-04 NOTE — PHYSICAL THERAPY NOTE
PT Treatment Note    NAME:  Vero Abernathy  DATE: 08/04/22    AGE:   81 y o  Mrn:   [id number]  ADMIT DX:  Hip pain [M25 559]  Right hip pain [M25 551]  Performed at least 2 patient identifiers during session: Name and Birthday       08/04/22 0851   PT Last Visit   PT Visit Date 08/04/22   Note Type   Note Type Treatment   Pain Assessment   Pain Assessment Tool 0-10   Pain Score 6   Pain Location/Orientation Location: Hip;Orientation: Right   Pain Onset/Description Onset: Ongoing   Hospital Pain Intervention(s) Medication (See MAR); Repositioned   Restrictions/Precautions   Weight Bearing Precautions Per Order No   Other Precautions O2;Fall Risk;Pain   General   Chart Reviewed Yes   Response to Previous Treatment Patient with no complaints from previous session  Family/Caregiver Present No   Cognition   Overall Cognitive Status Impaired   Arousal/Participation Alert; Cooperative   Attention Attends with cues to redirect   Orientation Level Oriented to person;Oriented to place;Oriented to time   Memory Decreased short term memory   Following Commands Follows one step commands with increased time or repetition   Bed Mobility   Supine to Sit 5  Supervision   Additional items HOB elevated;Verbal cues   Additional Comments pt sat EOB with Supervision x 30 mins with reaching activity and weight shifts cues to shift weight forward   Transfers   Sit to Stand 4  Minimal assistance   Additional items Assist x 2;Verbal cues; Increased time required;HOB elevated   Stand to Sit 4  Minimal assistance   Additional items Assist x 1;Verbal cues; Increased time required   Stand pivot 4  Minimal assistance   Additional items Assist x 2;Verbal cues; Increased time required   Additional Comments SPS into high bariatric chair requiring Ax2; used HHA (RW too bulky in room)   Balance   Static Sitting Fair +   Dynamic Sitting Fair   Static Standing Fair -   Dynamic Standing Poor +   Endurance Deficit   Endurance Deficit Yes   Endurance Deficit Description repoated fatigue with sititng EOB   Activity Tolerance   Activity Tolerance Patient limited by fatigue   Exercises   Hip Adduction Sitting;20 reps;AROM; Bilateral   Knee AROM Long Arc Quad Sitting;20 reps;AROM; Bilateral   Ankle Pumps Sitting;20 reps;AROM; Bilateral   Assessment   Prognosis Fair   Assessment Pt seen for PT treatment session this date with interventions consisting of therapeutic exercise to improve strength to improve functional mobility and therapeutic activity to improve transfers and increase activity tolerance with functional mobility to decrease fall risk  Pt agreeable to PT treatment session upon arrival, pt found supine on stretcher, in no apparent distress  Since previous session, pt has made fair progress as evidenced by decreased assistance required with mobility and decreased pain  Barriers during this session include pain and fatigue  Pt continues to be functioning below baseline level, and remains limited 2* factors listed above and including decreased strength, impaired activity tolerance, decreased balance and pain  Pt prognosis for achieving goals is fair, pending pt progress with hospitalization/medical status improvements, and indicated by ability to follow cues and continued required assistance  PT will continue to see pt during current hospitalization in order to address the deficits listed above and provide interventions consistent w/ POC in effort to achieve goals  Current goals and POC remain appropriate, pt continues to have rehab potential  Continue to recommend post acute rehabilitation services at time of d/c in order to maximize pt's functional independence and safety w/ mobility  Upon conclusion pt seated OOB in gerichiar  The patient's AM-PAC Basic Mobility Inpatient Short Form Raw Score is 11  A Raw score of less than or equal to 16 suggests the patient may benefit from discharge to post-acute rehabilitation services   Please also refer to the recommendation of the Physical Therapist for safe discharge planning  This session, pt required and most appropriately benefited from skilled OT/PT co-treat due to extensive physical assistance of SKILLED therapists, significant regression from functional baseline and decreased activity tolerance  OT and PT goals were addressed separately as seen in documentation  Goals   Patient Goals to feel better   PT Treatment Day 1   Plan   Progress Progressing toward goals   Recommendation   PT Discharge Recommendation Post acute rehabilitation services   AM-PAC Basic Mobility Inpatient   Turning in Bed Without Bedrails 3   Lying on Back to Sitting on Edge of Flat Bed 2   Moving Bed to Chair 2   Standing Up From Chair 2   Walk in Room 1   Climb 3-5 Stairs 1   Basic Mobility Inpatient Raw Score 11   Basic Mobility Standardized Score 30 25   Highest Level Of Mobility   -HLM Goal 4: Move to chair/commode   JH-HLM Achieved 4: Move to chair/commode   End of Consult   Patient Position at End of Consult Bed/Chair alarm activated; All needs within reach       Time In: 813  Time Out:0900  Total Treatment Minutes: 355 Carli Rowe, PT

## 2022-08-04 NOTE — PLAN OF CARE
Problem: PHYSICAL THERAPY ADULT  Goal: Performs mobility at highest level of function for planned discharge setting  See evaluation for individualized goals  Description: Treatment/Interventions: Functional transfer training, LE strengthening/ROM, Therapeutic exercise, Elevations, Endurance training, Cognitive reorientation, Patient/family training, Equipment eval/education, Bed mobility, Gait training, Spoke to nursing, Spoke to case management, OT  Equipment Recommended: [place]       See flowsheet documentation for full assessment, interventions and recommendations  Outcome: Progressing  Note: Prognosis: Fair  Problem List: Decreased strength, Decreased endurance, Impaired balance, Decreased mobility, Decreased coordination, Decreased cognition, Pain, Impaired hearing  Assessment: Pt seen for PT treatment session this date with interventions consisting of therapeutic exercise to improve strength to improve functional mobility and therapeutic activity to improve transfers and increase activity tolerance with functional mobility to decrease fall risk  Pt agreeable to PT treatment session upon arrival, pt found supine on stretcher, in no apparent distress  Since previous session, pt has made fair progress as evidenced by decreased assistance required with mobility and decreased pain  Barriers during this session include pain and fatigue  Pt continues to be functioning below baseline level, and remains limited 2* factors listed above and including decreased strength, impaired activity tolerance, decreased balance and pain  Pt prognosis for achieving goals is fair, pending pt progress with hospitalization/medical status improvements, and indicated by ability to follow cues and continued required assistance  PT will continue to see pt during current hospitalization in order to address the deficits listed above and provide interventions consistent w/ POC in effort to achieve goals   Current goals and POC remain appropriate, pt continues to have rehab potential  Continue to recommend post acute rehabilitation services at time of d/c in order to maximize pt's functional independence and safety w/ mobility  Upon conclusion pt seated OOB in gerichiar  The patient's AM-PAC Basic Mobility Inpatient Short Form Raw Score is 11  A Raw score of less than or equal to 16 suggests the patient may benefit from discharge to post-acute rehabilitation services  Please also refer to the recommendation of the Physical Therapist for safe discharge planning  This session, pt required and most appropriately benefited from skilled OT/PT co-treat due to extensive physical assistance of SKILLED therapists, significant regression from functional baseline and decreased activity tolerance  OT and PT goals were addressed separately as seen in documentation  PT Discharge Recommendation: Post acute rehabilitation services    See flowsheet documentation for full assessment   Wilfrid Smith, PT

## 2022-08-04 NOTE — ASSESSMENT & PLAN NOTE
· Presented with right hip pain and ambulatory dysfunction  · CT Pelvis (8/2): Radiographically healed right intertrochanteric fracture with stable hardware  No new fracture seen   · Continue pain control with Lidocaine patch and Oxycodone 2 5mg Q6H PRN  · PT/OT recommending post-acute rehab placement  · Remains medically cleared for discharge pending placement

## 2022-08-04 NOTE — CASE MANAGEMENT
Case Management Discharge Planning Note    Patient name Mya [facility]  Location APU 16/APU 12 MRN [id number]  : 1935 Date 2022       Current Admission Date: 2022  Current Admission Diagnosis:Hip pain, right   Patient Active Problem List    Diagnosis Date Noted    On home oxygen therapy 2022    Hip pain, right 2022    Pleural effusion 2022    Chronic heart failure with preserved ejection fraction (HFpEF) ([facility] ) 2022    Benign hypertension with CKD (chronic kidney disease) stage III ([facility] ) 2022    Moderate protein-calorie malnutrition ([facility] ) 2022    Acute mesenteric ischemia ([place] [address] ) 2022    Chronic respiratory failure with hypoxia ([place] [address] ) 2022    Other constipation 2022    Atrial fibrillation with RVR ([place] [address] ) 2022    SIRS (systemic inflammatory response syndrome) ([place] [address] ) 2022    Cognitive impairment 2021    Close exposure to COVID-19 virus 2021    Visual hallucinations 2021    Chest wall contusion, right, initial encounter 2020    Ambulatory dysfunction 2020    Hypokalemia 2020    Liver lesion 2020    Rib fractures 2020    CKD (chronic kidney disease), stage II 2020    Pulmonary hypertension ([facility] ) 2020    Dizziness 2020    Generalized weakness 2020    Osteoarthritis of knee 2020    Pneumonia 2020    Mixed hyperlipidemia 2019    Hypomagnesemia 2019    Essential hypertension 2019    Acute hypokalemia 2019    Depression with anxiety 2019    Gastroesophageal reflux disease without esophagitis 2019    OAB (overactive bladder) 2019    [facility] 2019    Longstanding persistent atrial fibrillation ([facility] ) 2019    Coronary artery disease involving native coronary artery of native heart 2019    Mitral regurgitation 2019      LOS (days): 2  Geometric Mean LOS (GMLOS) (days): 3 20  Days to GMLOS:1 2     OBJECTIVE:  Risk of Unplanned Readmission Score: 21 98         Current admission status: Inpatient   Preferred Pharmacy:   [address]  Phone: [phone] Fax: [phone]    Primary Care Provider: Venus Dorsey MD    Primary Insurance: [address] [place] [address]  Secondary Insurance:     DISCHARGE DETAILS:    Discharge planning discussed with[de-identified] patient and daughter was called at 14:03 and 17:24 pm     Comments - [facility] of [facility]: [facility]  authorization has been started  CM contacted family/caregiver?: Yes             Contacts  Patient Contacts: Lc Juan F  Relationship to Patient[de-identified] Family (daughter)  Contact Method: Phone  Phone Number: [phone]  Reason/Outcome: Discharge Planning              Other Referral/Resources/Interventions Provided:  Interventions: Short Term Rehab  Referral Comments: pt accepted to the [facility], authorization has been started  pt will need a booster prior to d/c    Would you like to participate in our [address] service program?  : No - Declined    Treatment Team Recommendation:  ([place] when Eb Oven is received- transportation needed)                                   IMM Given (Date):: 08/04/22  IMM Given to[de-identified] Family (daughter was called at 17:25pm and she stated she understood, copy of IMM was mailed)

## 2022-08-04 NOTE — ASSESSMENT & PLAN NOTE
Wt Readings from Last 3 Encounters:   08/04/22 52 9 kg (116 lb 10 oz)   07/26/22 49 4 kg (109 lb)   07/23/22 49 4 kg (109 lb)     · Not acute decompensated at this time  · Continue home Metoprolol 25mg BID  · Lasix held as above  · Monitor daily weights and Is and Os;  Fluid and Na restriction

## 2022-08-04 NOTE — UTILIZATION REVIEW
Initial Clinical Review    Admission: Date/Time/Statement:   Admission Orders (From admission, onward)     Ordered        08/02/22 1638  INPATIENT ADMISSION  Once                      Orders Placed This Encounter   Procedures    INPATIENT ADMISSION     Standing Status:   Standing     Number of Occurrences:   1     Order Specific Question:   Level of Care     Answer:   Med Surg [16]     Order Specific Question:   Estimated length of stay     Answer:   More than 2 Midnights     Order Specific Question:   Certification     Answer:   I certify that inpatient services are medically necessary for this patient for a duration of greater than two midnights  See H&P and MD Progress Notes for additional information about the patient's course of treatment  ED Arrival Information     Expected   -    Arrival   8/2/2022 13:24    Acuity   Urgent            Means of arrival   Ambulance    Escorted by   [facility]    Service   Hospitalist    Admission type   Urgent            Arrival complaint   Abdominal Pain           Chief Complaint   Patient presents with    Hip Pain     Pt presents via EMS c/o R hip pain x 1 week  Pt denies known trauma/injury/fall  Initial Presentation: 81 y o  female to the ED from home via EMS with complaints of right sided hip pain for about a week  Denies trauma to the area  H/O HF, afibe with RVR, GERD, depression with anxiety, htn  Admitted to inpatient for right hip pain, CHF, pleural effusion  Arrives tachycardic with afib with RVR, tachypneic  Recently seen in the ED on 7/23 for abdominal pain  CT pelvis: Radiographically healed right intertrochanteric fracture with stable hardware   PT/OT eval   Pain control  Given Tylenol, fentanyl, toradol in the ED  CT abdomin shows increase edema and increasing fluid in the abdominal   Has been taking lasix at home  On 2LNC chronically  She is pale  Date: 8/4   Day 2: Appears to have periods of sleep apnea with pulse ox into 70-80s  PT/OT recommends post acute rehab  Case management working on safe dc plan  Tachycardia continues        ED Triage Vitals   Temperature Pulse Respirations Blood Pressure SpO2   08/02/22 1340 08/02/22 1340 08/02/22 1340 08/02/22 1340 08/02/22 1340   98 °F (36 7 °C) (!) 118 18 118/82 96 %      Temp Source Heart Rate Source Patient Position - Orthostatic VS BP Location FiO2 (%)   08/02/22 1340 08/02/22 1340 08/02/22 1340 08/02/22 1340 --   Oral Monitor Sitting Left arm       Pain Score       08/02/22 1430       7          Wt Readings from Last 1 Encounters:   08/04/22 52 9 kg (116 lb 10 oz)     Additional Vital Signs:   Time Temp Pulse Resp BP MAP (mmHg) SpO2 Calculated FIO2 (%) - Nasal Cannula Nasal Cannula O2 Flow Rate (L/min) O2 Device Patient Position - Orthostatic VS   08/03/22 1045 -- -- -- 113/78 -- -- -- -- -- Lying   08/03/22 0911 -- 96 -- 117/80 -- -- -- -- -- --   08/03/22 0700 98 1 °F (36 7 °C) 87 18 103/73 84 98 % 32 3 L/min Nasal cannula Lying   08/03/22 0341 -- -- -- -- -- -- 32 3 L/min -- --   08/03/22 0132 97 6 °F (36 4 °C) 80 18 110/82 -- 97 % 28 2 L/min Nasal cannula Lying   08/02/22 2300 96 9 °F (36 1 °C) Abnormal  106 Abnormal  18 117/78 92 96 % 28 2 L/min Nasal cannula Lying   08/02/22 2200 -- 119 Abnormal  -- 136/57 81 93 % -- -- -- --   08/02/22 2100 -- 107 Abnormal  -- 87/71 Abnormal  76 99 % -- -- -- --   08/02/22 2000 -- 116 Abnormal  20 109/77 85 99 % 28 2 L/min Nasal cannula Lying   08/02/22 1950 97 2 °F (36 2 °C) Abnormal  122 Abnormal  24 Abnormal  109/77 85 98 % 28 2 L/min Nasal cannula Lying   08/02/22 1900 -- 128 Abnormal  -- -- -- 97 % -- -- -- --   08/02/22 1530 -- 115 Abnormal  18 125/78 -- 97 % -- -- -- Lying   08/02/22 1439 -- 109 Abnormal  18 121/84 -- 96 % -- -- Nasal cannula Lying   08/02/22 1340 98 °F (36 7 °C) 118 Abnormal  18 118/82 94 96 % -- -- None (Room air) Sitting       Pertinent Labs/Diagnostic Test Results:   8/2 EKG: Atrial fibrillation with rapid ventricular response  Left axis deviation  Incomplete right bundle branch block  Nonspecific T wave abnormality  Abnormal ECG  CT lumbar spine without contrast   Final Result by Benny Kiser MD (08/02 1525)      No acute osseous abnormality of lumbar spine  Multilevel degenerative changes of lumbar spine with varying degrees of canal stenosis (mild L3-4 and L4-5) and foraminal narrowing (severe left L5-S1), as detailed above  Partially imaged small right and trace left pleural effusions with bilateral lower lobe subsegmental atelectasis, similar to CT abdomen pelvis 7/23/2022  Recommend dedicated chest radiograph for further evaluation  Ascites  Please see same-day CT pelvis without contrast for further evaluation  The study was marked in [facility] for immediate notification           Workstation performed: [id number]         CT pelvis wo contrast   Final Result by Rodolfo Jiang MD (08/02 1520)   Radiographically healed right intertrochanteric fracture with stable hardware   No new fracture seen   There is increasing presacral edema and increasing fluid in the abdomen, evaluate for volume overload/CHF      Workstation performed: [id number]         XR chest pa & lateral    (Results Pending)         Results from last 7 days   Lab Units 08/04/22  0513 08/03/22  0610   WBC Thousand/uL 9 74 8 32   HEMOGLOBIN g/dL 11 6 11 6   HEMATOCRIT % 36 3 37 1   PLATELETS Thousands/uL 286 293   NEUTROS ABS Thousands/µL  --  5 90         Results from last 7 days   Lab Units 08/04/22  0513 08/03/22  0610   SODIUM mmol/L 133* 132*   POTASSIUM mmol/L 4 1 4 2   CHLORIDE mmol/L 100 100   CO2 mmol/L 26 26   ANION GAP mmol/L 7 6   BUN mg/dL 26* 31*   CREATININE mg/dL 0 87 0 85   EGFR ml/min/1 73sq m 60 62   CALCIUM mg/dL 9 1 8 9             Results from last 7 days   Lab Units 08/04/22  0513 08/03/22  0610   GLUCOSE RANDOM mg/dL 99 101       ED Treatment:   Medication Administration from 08/02/2022 1324 to 08/03/2022 0152 Date/Time Order Dose Route Action     08/02/2022 1430 ketorolac (TORADOL) injection 15 mg 15 mg Intramuscular Given     08/02/2022 1700 fentanyl citrate (PF) 100 MCG/2ML 25 mcg 25 mcg Intravenous Given     08/02/2022 1742 apixaban (ELIQUIS) tablet 2 5 mg 2 5 mg Oral Given     08/02/2022 1741 atorvastatin (LIPITOR) tablet 40 mg 40 mg Oral Given     08/02/2022 2300 diltiazem (CARDIZEM) tablet 60 mg 60 mg Oral Given     08/02/2022 1742 diltiazem (CARDIZEM) tablet 60 mg 60 mg Oral Given     08/02/2022 1742 hydrOXYzine HCL (ATARAX) tablet 25 mg 25 mg Oral Given     08/02/2022 1742 metoprolol tartrate (LOPRESSOR) tablet 25 mg 25 mg Oral Given     08/02/2022 2200 oxybutynin (DITROPAN) tablet 5 mg 5 mg Oral Given     08/02/2022 1742 oxybutynin (DITROPAN) tablet 5 mg 5 mg Oral Given     08/02/2022 1954 fentanyl citrate (PF) 100 MCG/2ML 25 mcg 25 mcg Intravenous Given     08/03/2022 0047 acetaminophen (TYLENOL) tablet 975 mg 975 mg Oral Given        Past Medical History:   Diagnosis Date    Acute on chronic diastolic congestive heart failure ([facility] ) 6/27/2019    Ambulatory dysfunction 8/17/2020    Arthritis     Chest wall contusion, right, initial encounter 8/17/2020    Chest wall contusion, right, subsequent encounter 8/17/2020    Chronic diastolic heart failure (HCC)     Chronic kidney disease, stage 2 (mild)     Coronary artery disease     history of stenting    Eczema     years    Edema     History of echocardiogram 07/20/2017    EF 55%, mild concentric LVH, bileaflet MVP with moderate MR, left atrial enlargement      History of transfusion     Hyperlipidemia     Hypertension     Hypo-osmolality and hyponatremia     Hypokalemia 7/11/2019    Mitral regurgitation        Admitting Diagnosis: Hip pain [M25 559]  Right hip pain [M25 551]  Age/Sex: 81 y o  female  Admission Orders:  Scheduled Medications:  acetaminophen, 975 mg, Oral, Q8H AGUILAR  apixaban, 2 5 mg, Oral, BID  aspirin, 81 mg, Oral, Daily  atorvastatin, 40 mg, Oral, Daily With Dinner  diltiazem, 60 mg, Oral, Q8H [facility]  furosemide, 40 mg, Oral, Daily  hydrOXYzine HCL, 25 mg, Oral, BID  lidocaine, 1 patch, Topical, Daily  metoprolol tartrate, 25 mg, Oral, BID  oxybutynin, 5 mg, Oral, TID  pantoprazole, 40 mg, Oral, Early Morning  potassium chloride, 20 mEq, Oral, Daily  sertraline, 75 mg, Oral, Daily      Continuous IV Infusions:     PRN Meds:  ALPRAZolam, 0 25 mg, Oral, HS PRN  ondansetron, 4 mg, Intravenous, Q4H PRN  oxyCODONE, 2 5 mg, Oral, Q6H PRN        Network Utilization Review Department  ATTENTION: Please call with any questions or concerns to [phone] and carefully listen to the prompts so that you are directed to the right person  All voicemails are confidential   [facility] [place] all requests for admission clinical reviews, approved or denied determinations and any other requests to dedicated fax number below belonging to the campus where the patient is receiving treatment   List of dedicated fax numbers for the Facilities:  [address] DENIALS (Administrative/Medical Necessity) [phone]   [address] (Maternity/NICU/Pediatrics) [phone]   [address]  [address] [facility] [address] [place] [address] [address] [place] [address] [address] [facility] [place] [address] [phone]

## 2022-08-04 NOTE — CASE MANAGEMENT
Case Management Discharge Planning Note    Patient name Veronica Bowman  Location APU 16/APU 12 MRN [id number]  : 1935 Date 2022       Current Admission Date: 2022  Current Admission Diagnosis:Hip pain, right   Patient Active Problem List    Diagnosis Date Noted    On home oxygen therapy 2022    Hip pain, right 2022    Pleural effusion 2022    Chronic heart failure with preserved ejection fraction (HFpEF) ([facility] ) 2022    Benign hypertension with CKD (chronic kidney disease) stage III ([facility] ) 2022    Moderate protein-calorie malnutrition ([facility] ) 2022    Acute mesenteric ischemia ([facility] ) 2022    Chronic respiratory failure with hypoxia ([facility] ) 2022    Other constipation 2022    Atrial fibrillation with RVR ([facility] ) 2022    SIRS (systemic inflammatory response syndrome) ([place] [address] ) 2022    Cognitive impairment 2021    Close exposure to COVID-19 virus 2021    Visual hallucinations 2021    Chest wall contusion, right, initial encounter 2020    Ambulatory dysfunction 2020    Hypokalemia 2020    Liver lesion 2020    Rib fractures 2020    CKD (chronic kidney disease), stage II 2020    Pulmonary hypertension ([facility] ) 2020    Dizziness 2020    Generalized weakness 2020    Osteoarthritis of knee 2020    Pneumonia 2020    Mixed hyperlipidemia 2019    Hypomagnesemia 2019    Essential hypertension 2019    Acute hypokalemia 2019    Depression with anxiety 2019    Gastroesophageal reflux disease without esophagitis 2019    OAB (overactive bladder) 2019    Acute on chronic diastolic heart failure ([facility] ) 2019    [facility] 2019    Coronary artery disease involving native coronary artery of native heart 2019    Mitral regurgitation 2019      LOS (days): 2  Geometric Mean LOS (GMLOS) (days): 3 20  Days to GMLOS:1 3     OBJECTIVE:  Risk of Unplanned Readmission Score: 21 93         Current admission status: Inpatient   Preferred Pharmacy:   [address]  Phone: [phone] Fax: [phone]    Primary Care Provider: Zee Burrell MD    Primary Insurance: [address] [facility]  Secondary Insurance:     [address] [facility] Number: submitted SNF auth request to [facility]  pending ref #[id number] for [place]  NPI: [id number]  Dr Nasima Mejia  NPI: [id number]  Clinicals faxed to [phone]

## 2022-08-04 NOTE — PLAN OF CARE
Problem: OCCUPATIONAL THERAPY ADULT  Goal: Performs self-care activities at highest level of function for planned discharge setting  See evaluation for individualized goals  Description: Treatment Interventions: ADL retraining, Functional transfer training, UE strengthening/ROM, Endurance training, Cognitive reorientation, Patient/family training, Equipment evaluation/education, Compensatory technique education, Energy conservation, Activityengagement     See flowsheet documentation for full assessment, interventions and recommendations  Outcome: Progressing  Note: Limitation: Decreased ADL status, Decreased UE strength, Decreased Safe judgement during ADL, Decreased cognition, Decreased endurance, Decreased self-care trans, Decreased high-level ADLs  Prognosis: Good  Assessment: Patient participated in Skilled OT session this date with interventions consisting of ADL re training with the use of correct body mechnaics, safety awareness and fall prevention techniques, therapeutic exercise to: increase functional use of BUEs, increase BUE muscle strength ,  therapeutic activities to: increase activity tolerance and increase dynamic sit/ stand balance during functional activity    Patient agreeable to OT treatment session, upon arrival patient was found supine in bed  In comparison to previous session, patient with improvements in unsupported sitting balance tolerance and increase participation in ADLs   Patient requiring verbal cues for safety, verbal cues for correct technique, verbal cues for pacing thru activity steps and frequent rest periods  Patient continues to be functioning below baseline level, occupational performance remains limited secondary to factors listed above and increased risk for falls and injury  From OT standpoint, recommendation at time of d/c would be Short Term Rehab     Patient to benefit from continued Occupational Therapy treatment while in the hospital to address deficits as defined above and maximize level of functional independence with ADLs and functional mobility       OT Discharge Recommendation: Post acute rehabilitation services        Richard Favre, OTA

## 2022-08-04 NOTE — OCCUPATIONAL THERAPY NOTE
Occupational Therapy Treatment Note      Klaudia Barajas    8/4/2022    Principal Problem:    Hip pain, right  Active Problems:    Coronary artery disease involving native coronary artery of native heart    Longstanding persistent atrial fibrillation (HCC)    Chronic heart failure with preserved ejection fraction (HFpEF) (HCC)    Pleural effusion      Past Medical History:   Diagnosis Date    Acute on chronic diastolic congestive heart failure ([facility] [address] ) 6/27/2019    Ambulatory dysfunction 8/17/2020    Arthritis     Chest wall contusion, right, initial encounter 8/17/2020    Chest wall contusion, right, subsequent encounter 8/17/2020    Chronic diastolic heart failure (HCC)     Chronic kidney disease, stage 2 (mild)     Coronary artery disease     history of stenting    Eczema     years    Edema     History of echocardiogram 07/20/2017    EF 55%, mild concentric LVH, bileaflet MVP with moderate MR, left atrial enlargement  History of transfusion     Hyperlipidemia     Hypertension     Hypo-osmolality and hyponatremia     Hypokalemia 7/11/2019    Mitral regurgitation        Past Surgical History:   Procedure Laterality Date    ABDOMINAL SURGERY      hysterectomy    CARDIAC CATHETERIZATION  04/10/2012    EF 65%, no significant CAD, patent stents, severe MR     CORONARY ANGIOPLASTY WITH STENT PLACEMENT  03/21/2007    EF 55%, GARRET to LAD      EYE SURGERY      b/l cataracts    HYSTERECTOMY      JOINT REPLACEMENT      Rt knee        08/04/22 0816   OT Last Visit   OT Visit Date 08/04/22   Note Type   Note Type Treatment   Restrictions/Precautions   Weight Bearing Precautions Per Order No   Other Precautions O2;Fall Risk;Pain   Pain Assessment   Pain Assessment Tool 0-10   Pain Score 6   Pain Location/Orientation Location: Hip  (Right)   Pain Onset/Description Onset: Sudden   Patient's Stated Pain Goal No pain   Hospital Pain Intervention(s) Medication (See MAR)   Multiple Pain Sites No   ADL   Where Assessed Edge of bed Grooming Assistance 5  Supervision/Setup   Grooming Deficit Verbal cueing;Setup;Standing with assistive device; Supervision/safety; Increased time to complete;Wash/dry hands; Wash/dry face; Teeth care;Brushing hair   UB Bathing Assistance 4  Minimal Assistance   UB Bathing Deficit Setup;Verbal cueing;Supervision/safety;Right arm;Left arm; Abdomen;Perineal area;Right upper leg;Left upper leg   UB Bathing Comments Pt  sat on EOB X 30 mins  LB Bathing Assistance 4  Minimal Assistance   LB Bathing Deficit Right lower leg including foot; Left lower leg including foot   UB Dressing Deficit Setup;Verbal cueing; Increased time to complete; Thread RUE; Thread LUE   UB Dressing Comments hospital gown change   LB Dressing Assistance 2  Maximal Assistance   LB Dressing Deficit Don/doff R sock; Don/doff L sock   Bed Mobility   Supine to Sit 5  Supervision   Additional items HOB elevated;Verbal cues   Transfers   Sit to Stand 4  Minimal assistance   Additional items Assist x 2   Stand to Sit 4  Minimal assistance   Additional items Assist x 1   Stand pivot 4  Minimal assistance   Additional items Assist x 2   Therapeutic Excerise-Strength   UE Strength Yes   Right Upper Extremity- Strength   R Shoulder Flexion; Extension   R Weight/Reps/Sets   (no wt  10 reps X 2 sets)   RUE Strength Comment   (Sitting unsupported on EOB)   Left Upper Extremity-Strength   LUE Strength Comment same as RUE   Cognition   Overall Cognitive Status Impaired   Arousal/Participation Alert; Cooperative   Attention Attends with cues to redirect   Orientation Level Oriented to person;Oriented to place;Oriented to time   Memory Decreased short term memory   Following Commands Follows one step commands with increased time or repetition   Comments   (Pt  agreeable to OT treatment)   Activity Tolerance   Activity Tolerance Patient tolerated treatment well   Assessment   Assessment Patient participated in Skilled OT session this date with interventions consisting of ADL re training with the use of correct body mechnaics, safety awareness and fall prevention techniques, therapeutic exercise to: increase functional use of BUEs, increase BUE muscle strength ,  therapeutic activities to: increase activity tolerance and increase dynamic sit/ stand balance during functional activity    Patient agreeable to OT treatment session, upon arrival patient was found supine in bed  In comparison to previous session, patient with improvements in unsupported sitting balance tolerance and increase participation in ADLs   Patient requiring verbal cues for safety, verbal cues for correct technique, verbal cues for pacing thru activity steps and frequent rest periods  Patient continues to be functioning below baseline level, occupational performance remains limited secondary to factors listed above and increased risk for falls and injury  From OT standpoint, recommendation at time of d/c would be Short Term Rehab  Patient to benefit from continued Occupational Therapy treatment while in the hospital to address deficits as defined above and maximize level of functional independence with ADLs and functional mobility  Plan   Treatment Interventions   (ADL retraining; Functional transfer training; UE strengthening/ROM; Endurance training; Cognitive reorientation; Patient/family training; Equipment evaluation/education; Compensatory technique education; Energy conservation; Activityengagement)   Goal Expiration Date 08/13/22   OT Treatment Day 1   OT Frequency 3-5x/wk   Recommendation   OT Discharge Recommendation Post acute rehabilitation services   Additional Comments  Co treatment with PT completed secondary to complex medical condition of pt,  Min  A of 2 required to achieve and maintain transitional movements, requiring the need of skilled therapeutic intervention of 2 therapists to achieve delivery of services     Additional Comments 2 The patient's raw score on the AM-PAC Daily Activity inpatient short form is 15, standardized score is 34 69, less than 39 4  Patients at this level are likely to benefit from discharge to post-acute rehabilitation services  Please refer to the recommendation of the Occupational Therapist for safe discharge planning     AM-PAC Daily Activity Inpatient   Lower Body Dressing 2   Bathing 3   Toileting 1   Upper Body Dressing 3   Grooming 3   Eating 3   Daily Activity Raw Score 15   Daily Activity Standardized Score (Calc for Raw Score >=11) 34 69   AM-PAC Applied Cognition Inpatient   Following a Speech/Presentation 3   Understanding Ordinary Conversation 3   Taking Medications 1   Remembering Where Things Are Placed or Put Away 1   Remembering List of 4-5 Errands 1   Taking Care of Complicated Tasks 1   Applied Cognition Raw Score 10   Applied Cognition Standardized Score 24 98   Johny Braga, FABRIZIO

## 2022-08-04 NOTE — PROGRESS NOTES
[address]  Progress Note - Evens Cazares 1935, 81 y o  female MRN: [id number]  Unit/Bed#: [facility] Encounter: [id number]  Primary Care Provider: Kayli Khoury MD   Date and time admitted to hospital: 8/2/2022  1:45 PM    * Hip pain, right  Assessment & Plan  · Presented with right hip pain and ambulatory dysfunction  · CT Pelvis (8/2): Radiographically healed right intertrochanteric fracture with stable hardware  No new fracture seen   · Continue pain control with Lidocaine patch and Oxycodone 2 5mg Q6H PRN  · PT/OT recommending post-acute rehab placement  · Remains medically cleared for discharge pending placement  Pleural effusion  Assessment & Plan  · CXR PA/Lateral (8/4): Small B/L pleural effusion  · Home lasix held given relative hypotension      Chronic heart failure with preserved ejection fraction (HFpEF) (HCC)  Assessment & Plan  Wt Readings from Last 3 Encounters:   08/04/22 52 9 kg (116 lb 10 oz)   07/26/22 49 4 kg (109 lb)   07/23/22 49 4 kg (109 lb)     · Not acute decompensated at this time  · Continue home Metoprolol 25mg BID  · Lasix held as above  · Monitor daily weights and Is and Os; Fluid and Na restriction      Coronary artery disease involving native coronary artery of native heart  Assessment & Plan  · Continue home Aspirin 81mg daily, Metoprolol 25mg BID, and Atorvastatin 40mg daily      Longstanding persistent atrial fibrillation (HCC)  Assessment & Plan  · Continue Metoprolol as above and Diltiazem 60mg Q8H  · Continue home Eliquis 2 5mg BID      VTE Pharmacologic Prophylaxis: VTE Score: 5 High Risk (Score >/= 5) - Pharmacological DVT Prophylaxis Ordered: apixaban (Eliquis)  Sequential Compression Devices Ordered  Patient Centered Rounds: I performed bedside rounds with nursing staff today    Discussions with Specialists or Other Care Team Provider: Nursing, PT/OT, and CM    Education and Discussions with Family / Patient: Patient declined call to   Time Spent for Care: 30 minutes  More than 50% of total time spent on counseling and coordination of care as described above  Current Length of Stay: 2 day(s)  Current Patient Status: Inpatient   Certification Statement: The patient will continue to require additional inpatient hospital stay due to awaiting placement  Discharge Plan: Medically cleared for DC once STR was been obtained  The [place] is reviewing the patient but requiring that she get her 4th COVID booster  Authorization pending  Code Status: Level 1 - Full Code    Subjective:   Patient sitting up in bed  She notes feeling mildly dizzy this AM  Blood pressure was on the lower side as well  No other significant over night events reported  Objective:     Vitals:   Temp (24hrs), Av 8 °F (36 6 °C), Min:97 6 °F (36 4 °C), Max:98 1 °F (36 7 °C)    Temp:  [97 6 °F (36 4 °C)-98 1 °F (36 7 °C)] 97 7 °F (36 5 °C)  HR:  [] 102  Resp:  [18-21] 21  BP: ()/(57-79) 99/64  SpO2:  [96 %-98 %] 98 %  Body mass index is 23 56 kg/m²  Input and Output Summary (last 24 hours): Intake/Output Summary (Last 24 hours) at 2022 1237  Last data filed at 2022 0901  Gross per 24 hour   Intake 360 ml   Output 1050 ml   Net -690 ml       Physical Exam:   Physical Exam  Vitals and nursing note reviewed  Constitutional:       General: She is not in acute distress  Appearance: She is well-developed and normal weight  HENT:      Head: Normocephalic and atraumatic  Mouth/Throat:      Mouth: Mucous membranes are moist       Pharynx: Oropharynx is clear  Eyes:      Conjunctiva/sclera: Conjunctivae normal    Cardiovascular:      Rate and Rhythm: Normal rate  Rhythm irregular  Pulses: Normal pulses  Heart sounds: Murmur heard  Pulmonary:      Effort: Pulmonary effort is normal  No respiratory distress  Breath sounds: Normal breath sounds  No wheezing        Comments: 3L NC  Abdominal:      General: Bowel sounds are normal  There is no distension  Palpations: Abdomen is soft  Tenderness: There is no abdominal tenderness  Musculoskeletal:         General: Normal range of motion  Cervical back: Normal range of motion and neck supple  Skin:     General: Skin is warm and dry  Neurological:      General: No focal deficit present  Mental Status: She is alert and oriented to person, place, and time     Psychiatric:         Mood and Affect: Mood normal          Behavior: Behavior normal        Labs:  Results from last 7 days   Lab Units 08/04/22  0513 08/03/22  0610   WBC Thousand/uL 9 74 8 32   HEMOGLOBIN g/dL 11 6 11 6   HEMATOCRIT % 36 3 37 1   PLATELETS Thousands/uL 286 293   NEUTROS PCT %  --  71   LYMPHS PCT %  --  16   MONOS PCT %  --  9   EOS PCT %  --  4     Results from last 7 days   Lab Units 08/04/22  0513   SODIUM mmol/L 133*   POTASSIUM mmol/L 4 1   CHLORIDE mmol/L 100   CO2 mmol/L 26   BUN mg/dL 26*   CREATININE mg/dL 0 87   ANION GAP mmol/L 7   CALCIUM mg/dL 9 1   GLUCOSE RANDOM mg/dL 99                       Lines/Drains:  Invasive Devices  Report    Peripheral Intravenous Line  Duration           Peripheral IV 08/02/22 Left Wrist 1 day          Drain  Duration           External Urinary Catheter <1 day              Imaging: Reviewed radiology reports from this admission including: chest xray    Recent Cultures (last 7 days):         Last 24 Hours Medication List:   Current Facility-Administered Medications   Medication Dose Route Frequency Provider Last Rate    acetaminophen  975 mg Oral [place], [place]      ALPRAZolam  0 25 mg Oral HS PRN Vik Mims, CRNP      apixaban  2 5 mg Oral BID Vik Mims, NEAL      aspirin  81 mg Oral Daily NEAL Berry      atorvastatin  40 mg Oral Daily With [employer], CRNP      COVID-19 Moderna vaccine booster  0 25 mL Intramuscular Once [address] [facility], DO      diltiazem  60 mg Oral [facility], CRNP      hydrOXYzine HCL  25 mg Oral BID NEAL Morin      lidocaine  1 patch Topical Daily [place], [place]      metoprolol tartrate  25 mg Oral BID NEAL Morin      ondansetron  4 mg Intravenous Q4H PRN NEAL Morin      oxybutynin  5 mg Oral TID NEAL Morin      oxyCODONE  2 5 mg Oral Q6H PRN [place], [place]      pantoprazole  40 mg Oral Early Morning NEAL Morin      potassium chloride  20 mEq Oral Daily NEAL Morin      sertraline  75 mg Oral Daily NEAL Morin          Today, Patient Was Seen By: Tigre Bernard DO    **Please Note: This note may have been constructed using a voice recognition system  **

## 2022-08-05 NOTE — CASE MANAGEMENT
Case Management Discharge Planning Note    Patient name Tika Anne  Location /-04 MRN [id number]  : 1935 Date 2022       Current Admission Date: 2022  Current Admission Diagnosis:Hip pain, right   Patient Active Problem List    Diagnosis Date Noted    On home oxygen therapy 2022    Hip pain, right 2022    Pleural effusion 2022    Chronic heart failure with preserved ejection fraction (HFpEF) ([facility] ) 2022    Benign hypertension with CKD (chronic kidney disease) stage III ([facility] ) 2022    Moderate protein-calorie malnutrition ([facility] ) 2022    Acute mesenteric ischemia ([facility] ) 2022    Chronic respiratory failure with hypoxia ([facility] ) 2022    Other constipation 2022    Atrial fibrillation with RVR ([facility] ) 2022    SIRS (systemic inflammatory response syndrome) ([facility] ) 2022    Cognitive impairment 2021    Close exposure to COVID-19 virus 2021    Visual hallucinations 2021    Chest wall contusion, right, initial encounter 2020    Ambulatory dysfunction 2020    Hypokalemia 2020    Liver lesion 2020    Rib fractures 2020    CKD (chronic kidney disease), stage II 2020    [facility]    Dizziness 2020    Generalized weakness 2020    Osteoarthritis of knee 2020    Pneumonia 2020    Mixed hyperlipidemia 2019    Hypomagnesemia 2019    Essential hypertension 2019    Acute hypokalemia 2019    Depression with anxiety 2019    Gastroesophageal reflux disease without esophagitis 2019    OAB (overactive bladder) 2019    Acute on chronic diastolic heart failure ([facility] [address] ) 2019    Longstanding persistent [facility] fibrillation [facility] ) 2019    Coronary artery disease involving native coronary artery of native heart 2019    Mitral regurgitation 2019      LOS (days): 3  Geometric Mean LOS (GMLOS) (days): 3 20  Days to GMLOS:0 2     OBJECTIVE:  Risk of Unplanned Readmission Score: 21 69         Current admission status: Inpatient   Preferred Pharmacy:   [address]  Phone: [phone] Fax: [phone]    Primary Care Provider: Kwame Genao MD    Primary Insurance: [address] [facility]  Secondary Insurance:     DISCHARGE DETAILS:     kishor colin test Melany Chamber at the [facility] with the transport time 10:30 am bls, pt , RN and daughter was called at 17:30pm to make her aware of the time, covid was completed and pt needs to receive her booster prior to d/c                                                                                                    Accepting Facility Name, [facility] : [facility]  Receiving Facility/Agency Phone Number: [phone]  Facility/Agency Fax Number: [phone]

## 2022-08-05 NOTE — CASE MANAGEMENT
Case Management Progress Note    Patient name Aarti Long  Location [address] [id number] MRN [id number]  : 1935 Date 2022       LOS (days): 3  Geometric Mean LOS (GMLOS) (days): 3 20  Days to GMLOS:0 2        OBJECTIVE:        Current admission status: Inpatient  Preferred Pharmacy:    [place] [address]  Phone: [phone] Fax: [phone]    Primary Care Provider: Sue Youssef MD    Primary Insurance: [address] REP  Secondary Insurance:     PROGRESS NOTE:  Cm received authorization for the pt to go to the [facility], cm made Vidal Mcrae aware, they are not able to accept at this hour, they are able to accept tomorrow, pt needs a  covid test and booster prior to d/c , sent for transport via "round trip", sent for authorization for s transport, pt is on oxygen, cm place a call to Henry Reyez at 16:34pm to # [phone] and made her aware we received authorization and pt will be d/c tomorrow

## 2022-08-05 NOTE — CASE MANAGEMENT
Case Management Discharge Planning Note    Patient name Alexia Peter  Location /-09 MRN [id number]  : 1935 Date 2022       Current Admission Date: 2022  Current Admission Diagnosis:Hip pain, right   Patient Active Problem List    Diagnosis Date Noted    On home oxygen therapy 2022    Hip pain, right 2022    Pleural effusion 2022    Chronic heart failure with preserved ejection fraction (HFpEF) ([facility] ) 2022    Benign hypertension with CKD (chronic kidney disease) stage III ([facility] ) 2022    Moderate protein-calorie malnutrition ([facility] ) 2022    Acute mesenteric ischemia ([facility] ) 2022    Chronic respiratory failure with hypoxia ([facility] ) 2022    Other constipation 2022    Atrial fibrillation with RVR ([place] [address] ) 2022    SIRS (systemic inflammatory response syndrome) ([place] [address] ) 2022    Cognitive impairment 2021    Close exposure to COVID-19 virus 2021    Visual hallucinations 2021    Chest wall contusion, right, initial encounter 2020    Ambulatory dysfunction 2020    Hypokalemia 2020    Liver lesion 2020    Rib fractures 2020    CKD (chronic kidney disease), stage II 2020    Pulmonary hypertension ([facility] ) 2020    Dizziness 2020    Generalized weakness 2020    Osteoarthritis of knee 2020    Pneumonia 2020    Mixed hyperlipidemia 2019    Hypomagnesemia 2019    Essential hypertension 2019    Acute hypokalemia 2019    Depression with anxiety 2019    Gastroesophageal reflux disease without esophagitis 2019    OAB (overactive bladder) 2019    Acute on chronic diastolic heart failure ([facility] ) 2019    [facility]    Coronary artery disease involving native coronary artery of native heart 2019    Mitral regurgitation 2019      LOS (days): 3  Geometric Mean LOS (GMLOS) (days): 3 20  Days to GMLOS:0 1     OBJECTIVE:  Risk of Unplanned Readmission Score: 21 69         Current admission status: Inpatient   Preferred Pharmacy:   [address] [place] [address]  Phone: [phone] Fax: [phone]    Primary Care Provider: Meena Dawson MD    Primary Insurance: [address] [facility]  Secondary Insurance:     DISCHARGE DETAILS:     pt tested positive for covid, Qujose Leung was made aware , [facility] does not have an Isolation bed, [facility] has a bed  , insurance auth needs to be changed  their  npi # [id number] and Dr Torin Orozco npi# [id number], kishor met with pt's daughter at 18:30 and she is not sure she wants her in [facility], I explained that for her to go to snf for Rehab she needs to go to [facility], she is thinking about just taking the pt home, she will make her decision tomorrow, kishor cx the 10:30 am transport to the [facility]

## 2022-08-05 NOTE — ASSESSMENT & PLAN NOTE
Wt Readings from Last 3 Encounters:   08/05/22 54 kg (119 lb 0 8 oz)   07/26/22 49 4 kg (109 lb)   07/23/22 49 4 kg (109 lb)     · Not acutely decompensated at this time  · Increase home Metoprolol to 50mg BID  · Lasix held as above  · Monitor daily weights and Is and Os;  Fluid and Na restriction

## 2022-08-05 NOTE — ASSESSMENT & PLAN NOTE
· CXR PA/Lateral (8/4): Small B/L pleural effusion  · Continue to hold lasix given relative hypotension

## 2022-08-05 NOTE — CASE MANAGEMENT
Case Management Discharge Planning Note    Patient name Mya [facility]  Location /-45 MRN [id number]  : 1935 Date 2022       Current Admission Date: 2022  Current Admission Diagnosis:Hip pain, right   Patient Active Problem List    Diagnosis Date Noted    On home oxygen therapy 2022    Hip pain, right 2022    Pleural effusion 2022    Chronic heart failure with preserved ejection fraction (HFpEF) ([facility] ) 2022    Benign hypertension with CKD (chronic kidney disease) stage III [facility] 2022    Moderate protein-calorie malnutrition ([facility] ) 2022    Acute mesenteric ischemia ([facility] ) 2022    Chronic respiratory failure with hypoxia ([facility] ) 2022    Other constipation 2022    Atrial fibrillation with RVR ([facility] ) 2022    SIRS (systemic inflammatory response syndrome) ([facility] ) 2022    Cognitive impairment 2021    Close exposure to COVID-19 virus 2021    Visual hallucinations 2021    Chest wall contusion, right, initial encounter 2020    Ambulatory dysfunction 2020    Hypokalemia 2020    Liver lesion 2020    Rib fractures 2020    CKD (chronic kidney disease), stage II 2020    Pulmonary hypertension ([facility] ) 2020    Dizziness 2020    Generalized weakness 2020    Osteoarthritis of knee 2020    Pneumonia 2020    Mixed hyperlipidemia 2019    Hypomagnesemia 2019    Essential hypertension 2019    Acute hypokalemia 2019    Depression with anxiety 2019    Gastroesophageal reflux disease without esophagitis 2019    OAB (overactive bladder) 2019    Acute on chronic diastolic heart failure ([facility] ) 2019    Longstanding persistent atrial fibrillation [facility] ) 2019    Coronary artery disease involving native coronary artery of native heart 2019    Mitral regurgitation 2019      LOS (days): 3  Geometric Mean LOS (GMLOS) (days): 3 20  Days to GMLOS:0 2     OBJECTIVE:  Risk of Unplanned Readmission Score: 21 64         Current admission status: Inpatient   Preferred Pharmacy:   [address]  Phone: [phone] Fax: [phone]    Primary Care Provider: Santa Martinez MD    Primary Insurance: [address] [place] [address]  Secondary Insurance:     [address] [place] [address] Number: approved SNF auth # [id number] for [facility]: 8/5  NRD: 8/9  Care Coord: Michelle Oscar  P: [phone]   F: [phone]

## 2022-08-05 NOTE — CASE MANAGEMENT
Case Management Discharge Planning Note    Patient name Oskar Montilla  Location /-93 MRN [id number]  : 1935 Date 2022       Current Admission Date: 2022  Current Admission Diagnosis:Hip pain, right   Patient Active Problem List    Diagnosis Date Noted    On home oxygen therapy 2022    Hip pain, right 2022    Pleural effusion 2022    Chronic heart failure with preserved ejection fraction (HFpEF) ([facility] ) 2022    Benign hypertension with CKD (chronic kidney disease) stage III ([facility] ) 2022    Moderate protein-calorie malnutrition ([facility] ) 2022    Acute mesenteric ischemia ([facility] ) 2022    Chronic respiratory failure with hypoxia ([facility] ) 2022    Other constipation 2022    Atrial fibrillation with RVR ([place] [address] ) 2022    SIRS (systemic inflammatory response syndrome) ([place] [address] ) 2022    Cognitive impairment 2021    Close exposure to COVID-19 virus 2021    Visual hallucinations 2021    Chest wall contusion, right, initial encounter 2020    Ambulatory dysfunction 2020    Hypokalemia 2020    Liver lesion 2020    Rib fractures 2020    CKD (chronic kidney disease), stage II 2020    Pulmonary hypertension ([facility] ) 2020    Dizziness 2020    Generalized weakness 2020    Osteoarthritis of knee 2020    Pneumonia 2020    Mixed hyperlipidemia 2019    Hypomagnesemia 2019    Essential hypertension 2019    Acute hypokalemia 2019    Depression with anxiety 2019    Gastroesophageal reflux disease without esophagitis 2019    OAB (overactive bladder) 2019    Acute on chronic diastolic heart failure ([facility] ) 2019    [facility] persistent [facility] 2019    Coronary artery disease involving native coronary artery of native heart 2019    Mitral regurgitation 2019      LOS (days): 3  Geometric Mean LOS (GMLOS) (days): 3 20  Days to GMLOS:0 2     OBJECTIVE:  Risk of Unplanned Readmission Score: 21 64         Current admission status: Inpatient   Preferred Pharmacy:   [address]  Phone: [phone] Fax: [phone]    Primary Care Provider: Renata Gaston MD    Primary Insurance: [address] [facility]  Secondary Insurance:     DISCHARGE DETAILS:    Discharge planning discussed with[de-identified] patient and daughter was called at 15:47pm  Freedom of Choice: Yes  Comments - Freedom of [facility]: [facility] and [facility]  CM contacted family/caregiver?: Yes             Contacts  Patient Contacts: Vickie Burris  Relationship to Patient[de-identified] Family (daughter)  Phone Number: [phone]  Reason/Outcome: Discharge [address]         Is the patient interested in [facility] at discharge?: No    DME Referral Provided  Referral made for DME?: No    Other Referral/Resources/Interventions Provided:  Interventions: Short Term Rehab  Referral Comments: pt accepted to [facility] , authorization has been started  -if Erngenia Soy is received tomorrow [facility] is able to accept on Saturday-  transport will be needed  bls pt is on oxygen,  pt needs a covid test and covid booster prior to d/c    Would you like to participate in our [address] service program?  : No - Declined    Treatment Team Recommendation: [facility] Term Rehab ([facility] accepted-[facility] started- needs bls on oxygen)

## 2022-08-05 NOTE — ASSESSMENT & PLAN NOTE
· Presented with right hip pain and ambulatory dysfunction  · CT Pelvis (8/2): Radiographically healed right intertrochanteric fracture with stable hardware  No new fracture seen   · Continue pain control with Lidocaine patch and Oxycodone 2 5mg Q6H PRN  · PT/OT recommending post-acute rehab placement  · Remains medically cleared for discharge pending placement    · Accepted to [facility]- Awaiting authorization

## 2022-08-05 NOTE — OCCUPATIONAL THERAPY NOTE
08/05/22 1045   OT Last Visit   OT Visit Date 08/05/22   Note Type   Note Type Treatment   Restrictions/Precautions   Weight Bearing Precautions Per Order No   Other Precautions O2;Fall Risk;Pain   Pain Assessment   Pain Assessment Tool 0-10   Pain Score 7   Pain Location/Orientation Orientation: Right;Location: Hip   Pain Onset/Description Onset: Ongoing   Patient's Stated Pain Goal No pain   Hospital Pain Intervention(s) Medication (See MAR); Emotional support   Therapeutic Exercise - ROM   UE-ROM Yes   ROM- Right Upper Extremities   R Shoulder AROM; Flexion; Extension   R Elbow AROM;Elbow flexion;Elbow extension   R Wrist AROM; Wrist flexion;Wrist extension  (wrist rotation)   R Weight/Reps/Sets No weight x 10 reps   ROM - Left Upper Extremities    L Weight/Reps/Sets TE same as R UE above   Cognition   Overall Cognitive Status Impaired   Arousal/Participation Alert; Cooperative   Attention Attends with cues to redirect   Orientation Level Oriented X4   Memory Decreased short term memory   Following Commands Follows one step commands with increased time or repetition   Comments Pt agreeable to OT treatment,   Activity Tolerance   Activity Tolerance Patient limited by fatigue;Patient limited by pain   Assessment   Assessment Patient participated in Skilled OT session this date with interventions consisting of Energy Conservation techniques and therapeutic exercise to: increase functional use of BUEs, increase BUE muscle strength    Patient agreeable to OT treatment session, upon arrival patient was found supine in bed, alert, responsive  and in no apparent distress  Patient states "I will do what I can " Performs UB TE AROM to B UEs x 10 reps as detailed in flow sheet  Patient does report pain 7/10 in R hip  Patient then states "That's enough " Session ended with patient supine in bed, all needs met, and call bell within reach   In comparison to previous session, patient with improvements in endurance and UB strength  Patient requiring verbal cues for correct technique, verbal cues for pacing thru activity steps, cognitive assistance to anticipate next step and frequent rest periods  Patient performance  demonstrated varied carryover of learned techniques and strategies to facilitate safety during functional tasks  Patient continues to be functioning below baseline level, occupational performance remains limited secondary to factors listed above and increased risk for falls and injury  From OT standpoint, recommendation at time of d/c would be Short Term Rehab  Patient to benefit from continued Occupational Therapy treatment while in the hospital to address deficits as defined above and maximize level of functional independence with ADLs and functional mobility in order to return to [facility]  Plan   Treatment Interventions UE strengthening/ROM; Endurance training;Energy conservation   Goal Expiration Date 08/13/22   OT Treatment Day 2   OT Frequency 3-5x/wk   Recommendation   OT Discharge Recommendation Post acute rehabilitation services   AM-PAC Daily Activity Inpatient   Lower Body Dressing 2   Bathing 3   Toileting 1   Upper Body Dressing 3   Grooming 3   Eating 3   Daily Activity Raw Score 15   Daily Activity Standardized Score (Calc for Raw Score >=11) 34 69   AM-PAC Applied Cognition Inpatient   Following a Speech/Presentation 3   Understanding Ordinary Conversation 3   Taking Medications 1   Remembering Where Things Are Placed or Put Away 1   Remembering List of 4-5 Errands 1   Taking Care of Complicated Tasks 1   Applied Cognition Raw Score 10   Applied Cognition Standardized Score 24 98   DON Veras

## 2022-08-05 NOTE — PLAN OF CARE
Problem: OCCUPATIONAL THERAPY ADULT  Goal: Performs self-care activities at highest level of function for planned discharge setting  See evaluation for individualized goals  Description: Treatment Interventions: ADL retraining, Functional transfer training, UE strengthening/ROM, Endurance training, Cognitive reorientation, Patient/family training, Equipment evaluation/education, Compensatory technique education, Energy conservation, Activityengagement     See flowsheet documentation for full assessment, interventions and recommendations  Outcome: Progressing  Note: Limitation: Decreased ADL status, Decreased UE strength, Decreased Safe judgement during ADL, Decreased cognition, Decreased endurance, Decreased self-care trans, Decreased high-level ADLs  Prognosis: Good  Assessment: Patient participated in Skilled OT session this date with interventions consisting of Energy Conservation techniques and therapeutic exercise to: increase functional use of BUEs, increase BUE muscle strength    Patient agreeable to OT treatment session, upon arrival patient was found supine in bed, alert, responsive  and in no apparent distress  Patient states "I will do what I can " Performs UB TE AROM to B UEs x 10 reps as detailed in flow sheet  Patient does report pain 7/10 in R hip  Patient then states "That's enough " Session ended with patient supine in bed, all needs met, and call bell within reach  In comparison to previous session, patient with improvements in endurance and UB strength  Patient requiring verbal cues for correct technique, verbal cues for pacing thru activity steps, cognitive assistance to anticipate next step and frequent rest periods  Patient performance  demonstrated varied carryover of learned techniques and strategies to facilitate safety during functional tasks   Patient continues to be functioning below baseline level, occupational performance remains limited secondary to factors listed above and increased risk for falls and injury  From OT standpoint, recommendation at time of d/c would be Short Term Rehab  Patient to benefit from continued Occupational Therapy treatment while in the hospital to address deficits as defined above and maximize level of functional independence with ADLs and functional mobility in order to return to P[facility]       OT Discharge Recommendation: Post acute rehabilitation services

## 2022-08-06 PROBLEM — U07.1 COVID-19: Status: ACTIVE | Noted: 2022-01-01

## 2022-08-06 NOTE — PROGRESS NOTES
[facility]  Progress Note - Milagrosandreia Long 1935, 81 y o  female MRN: [id number]  Unit/Bed#: -[facility] Encounter: [id number]  Primary Care Provider: Sue Youssef MD   Date and time admitted to hospital: 8/2/2022  1:45 PM    * Hip pain, right  Assessment & Plan  · Presented with right hip pain and ambulatory dysfunction  · CT Pelvis (8/2): Radiographically healed right intertrochanteric fracture with stable hardware  No new fracture seen   · Continue pain control with Lidocaine patch and Oxycodone 5mg Q6H PRN  · PT/OT recommending post-acute rehab placement  · Was accepted at [facility] but incidentally found to be COVID positive on screening prior to discharge    Pleural effusion  Assessment & Plan  · CXR PA/Lateral (8/4): Small B/L pleural effusion  · Continue to hold lasix given relative hypotension      COVID-19  Assessment & Plan  · Found incidentally upon screening prior to SNF DC on 8/5/2022  · Patient uses 2 L nasal cannula at baseline- primarily at night  · She has fluctuated between requiring 2-3 L nasal cannula  · Given mild increase in oxygen requirements will initiate Decadron and Remdesivir  · Further supportive care    Chronic heart failure with preserved ejection fraction (HFpEF) (HCC)  Assessment & Plan  Wt Readings from Last 3 Encounters:   08/06/22 54 1 kg (119 lb 4 3 oz)   07/26/22 49 4 kg (109 lb)   07/23/22 49 4 kg (109 lb)     · Not acutely decompensated at this time  · Continue Metoprolol to 50mg BID  · Lasix held as above  · Monitor daily weights and Is and Os;  Fluid and Na restriction      Coronary artery disease involving native coronary artery of native heart  Assessment & Plan  · Continue home Aspirin 81mg daily, Metoprolol 50mg BID, and Atorvastatin 40mg daily      Longstanding persistent atrial fibrillation (HCC)  Assessment & Plan  · Continue Metoprolol as above and Diltiazem 60mg Q8H  · Continue home Eliquis 2 5mg BID      VTE Pharmacologic Prophylaxis: VTE Score: 5 High Risk (Score >/= 5) - Pharmacological DVT Prophylaxis Ordered: apixaban (Eliquis)  Sequential Compression Devices Ordered  Patient Centered Rounds: I performed bedside rounds with nursing staff today  Discussions with Specialists or Other Care Team Provider: Nursing and CM    Education and Discussions with Family / Patient: Patient declined call to   Time Spent for Care: 30 minutes  More than 50% of total time spent on counseling and coordination of care as described above  Current Length of Stay: 4 day(s)  Current Patient Status: Inpatient   Certification Statement: The patient will continue to require additional inpatient hospital stay due to Right hip pain requiring short-term rehab placement  Was incidentally found to be COVID positive on screening prior to discharge  Discharge Plan: To be determined based on facility accepting COVID patients  Code Status: Level 1 - Full Code    Subjective:   Patient appears to be resting comfortably in bed without any acute complaints  She does note a mild cough and continues to report right hip pain  No significant overnight events reported by nursing  Objective:     Vitals:   Temp (24hrs), Av 6 °F (36 4 °C), Min:97 3 °F (36 3 °C), Max:97 9 °F (36 6 °C)    Temp:  [97 3 °F (36 3 °C)-97 9 °F (36 6 °C)] 97 3 °F (36 3 °C)  HR:  [] 120  Resp:  [19-24] 24  BP: (102-125)/(62-80) 125/80  SpO2:  [89 %-98 %] 98 %  Body mass index is 24 09 kg/m²  Input and Output Summary (last 24 hours): Intake/Output Summary (Last 24 hours) at 2022 1507  Last data filed at 2022 0900  Gross per 24 hour   Intake 245 ml   Output --   Net 245 ml       Physical Exam:   Physical Exam  Vitals and nursing note reviewed  Constitutional:       General: She is not in acute distress  Appearance: She is well-developed  HENT:      Head: Normocephalic and atraumatic        Mouth/Throat:      Mouth: Mucous membranes are moist       Pharynx: Oropharynx is clear  Eyes:      Extraocular Movements: Extraocular movements intact  Conjunctiva/sclera: Conjunctivae normal    Cardiovascular:      Rate and Rhythm: Tachycardia present  Rhythm irregular  Heart sounds: Murmur heard  Pulmonary:      Effort: Pulmonary effort is normal  No respiratory distress  Breath sounds: Normal breath sounds  Comments: 3 L nasal cannula  Abdominal:      General: Bowel sounds are normal  There is no distension  Palpations: Abdomen is soft  Tenderness: There is no abdominal tenderness  Musculoskeletal:         General: Normal range of motion  Cervical back: Neck supple  Skin:     General: Skin is warm and dry  Neurological:      Mental Status: She is alert and oriented to person, place, and time  Mental status is at baseline  Psychiatric:         Mood and Affect: Mood normal          Behavior: Behavior normal          Judgment: Judgment normal          Labs:  Results from last 7 days   Lab Units 08/05/22  0519 08/04/22  0513 08/03/22  0610   WBC Thousand/uL 7 47   < > 8 32   HEMOGLOBIN g/dL 11 1*   < > 11 6   HEMATOCRIT % 35 8   < > 37 1   PLATELETS Thousands/uL 245   < > 293   NEUTROS PCT %  --   --  71   LYMPHS PCT %  --   --  16   MONOS PCT %  --   --  9   EOS PCT %  --   --  4    < > = values in this interval not displayed  Results from last 7 days   Lab Units 08/05/22  0519   SODIUM mmol/L 131*   POTASSIUM mmol/L 4 0   CHLORIDE mmol/L 99   CO2 mmol/L 26   BUN mg/dL 20   CREATININE mg/dL 0 68   ANION GAP mmol/L 6   CALCIUM mg/dL 8 7   GLUCOSE RANDOM mg/dL 104                       Lines/Drains:  Invasive Devices  Report    Peripheral Intravenous Line  Duration           Peripheral IV 08/02/22 Left Wrist 3 days          Drain  Duration           External Urinary Catheter 2 days              Imaging:  No new imaging      Recent Cultures (last 7 days):         Last 24 Hours Medication List:   Current Facility-Administered Medications   Medication Dose Route Frequency Provider Last Rate    acetaminophen  975 mg Oral [facility] [place], [place]      ALPRAZolam  0 25 mg Oral HS PRN Sonya Ramosop, CRNP      apixaban  2 5 mg Oral BID Sonya Ramosop, CRNP      aspirin  81 mg Oral Daily Sonyasandeep Ramosop, CRNP      atorvastatin  40 mg Oral Daily With Arnel and Laura Ham, COURTNEYNP      dexamethasone  6 mg Intravenous Q24H [address] [place], [place] [address] [facility] [place], [place]      diltiazem  60 mg Oral [facility] Sonya Ramosop, CRNP      hydrOXYzine HCL  25 mg Oral BID Sonya Ramosop, CRNP      lidocaine  1 patch Topical Daily [place], [place]      metoprolol tartrate  50 mg Oral BID [address] [place] [address],[address], DO      ondansetron  4 mg Intravenous Q4H PRN Sonya Ramosop, CRNP      oxybutynin  5 mg Oral TID Sonya Perez, CRNP      oxyCODONE  5 mg Oral Q6H PRN [address] [place] [address],[facility] Floor, DO      pantoprazole  40 mg Oral Early Morning Sonya Ramosop, CRNP      potassium chloride  20 mEq Oral Daily Sonya Perez, NEAL      [START ON 8/7/2022] remdesivir  100 mg Intravenous Q24H [address] [place] [address],[address], DO      sertraline  75 mg Oral Daily Sonya Perez, NEAL          Today, Patient Was Seen By: [address] [place] [address],[address], DO    **Please Note: This note may have been constructed using a voice recognition system  **

## 2022-08-06 NOTE — PLAN OF CARE
Problem: MOBILITY - ADULT  Goal: Maintain or return to baseline ADL function  Description: INTERVENTIONS:  -  Assess patient's ability to carry out ADLs; assess patient's baseline for ADL function and identify physical deficits which impact ability to perform ADLs (bathing, care of mouth/teeth, toileting, grooming, dressing, etc )  - Assess/evaluate cause of self-care deficits   - Assess range of motion  - Assess patient's mobility; develop plan if impaired  - Assess patient's need for assistive devices and provide as appropriate  - Encourage maximum independence but intervene and supervise when necessary  - Involve family in performance of ADLs  - Assess for home care needs following discharge   - Consider OT consult to assist with ADL evaluation and planning for discharge  - Provide patient education as appropriate  Outcome: Progressing     Problem: PAIN - ADULT  Goal: Verbalizes/displays adequate comfort level or baseline comfort level  Description: Interventions:  - Encourage patient to monitor pain and request assistance  - Assess pain using appropriate pain scale  - Administer analgesics based on type and severity of pain and evaluate response  - Implement non-pharmacological measures as appropriate and evaluate response  - Consider cultural and social influences on pain and pain management  - Notify physician/advanced practitioner if interventions unsuccessful or patient reports new pain  Outcome: Progressing     Problem: SAFETY ADULT  Goal: Maintain or return to baseline ADL function  Description: INTERVENTIONS:  -  Assess patient's ability to carry out ADLs; assess patient's baseline for ADL function and identify physical deficits which impact ability to perform ADLs (bathing, care of mouth/teeth, toileting, grooming, dressing, etc )  - Assess/evaluate cause of self-care deficits   - Assess range of motion  - Assess patient's mobility; develop plan if impaired  - Assess patient's need for assistive devices and provide as appropriate  - Encourage maximum independence but intervene and supervise when necessary  - Involve family in performance of ADLs  - Assess for home care needs following discharge   - Consider OT consult to assist with ADL evaluation and planning for discharge  - Provide patient education as appropriate  Outcome: Progressing     Problem: Knowledge Deficit  Goal: Patient/family/caregiver demonstrates understanding of disease process, treatment plan, medications, and discharge instructions  Description: Complete learning assessment and assess knowledge base    Interventions:  - Provide teaching at level of understanding  - Provide teaching via preferred learning methods  Outcome: Progressing

## 2022-08-06 NOTE — ASSESSMENT & PLAN NOTE
Wt Readings from Last 3 Encounters:   08/06/22 54 1 kg (119 lb 4 3 oz)   07/26/22 49 4 kg (109 lb)   07/23/22 49 4 kg (109 lb)     · Not acutely decompensated at this time  · Continue Metoprolol to 50mg BID  · Lasix held as above  · Monitor daily weights and Is and Os;  Fluid and Na restriction

## 2022-08-06 NOTE — ASSESSMENT & PLAN NOTE
· Found incidentally upon screening prior to SNF DC on 8/5/2022  · Patient uses 2 L nasal cannula at baseline- primarily at night  · She has fluctuated between requiring 2-3 L nasal cannula  · Given mild increase in oxygen requirements will initiate Decadron and Remdesivir  · Further supportive care

## 2022-08-06 NOTE — ASSESSMENT & PLAN NOTE
· Presented with right hip pain and ambulatory dysfunction  · CT Pelvis (8/2): Radiographically healed right intertrochanteric fracture with stable hardware   No new fracture seen   · Continue pain control with Lidocaine patch and Oxycodone 5mg Q6H PRN  · PT/OT recommending post-acute rehab placement  · Was accepted at [facility] but incidentally found to be COVID positive on screening prior to discharge

## 2022-08-07 PROBLEM — R41.0 CONFUSION: Status: ACTIVE | Noted: 2022-01-01

## 2022-08-07 NOTE — PROGRESS NOTES
[facility]  67   Progress Note - Carlie Lan 1935, 81 y o  female MRN: [id number]  Unit/Bed#: -01 Encounter: [id number]  Primary Care Provider: Dominic Jaramillo MD   Date and time admitted to hospital: 8/2/2022  1:45 PM    * Hip pain, right  Assessment & Plan  · Presented with right hip pain and ambulatory dysfunction  · CT Pelvis (8/2): Radiographically healed right intertrochanteric fracture with stable hardware  No new fracture seen   · Continue pain control with Lidocaine patch and Tylenol   · Oxycodone 5mg Q6H PRN discontinued this AM due to increased confusion  · PT/OT recommending post-acute rehab placement  · Was accepted at [facility] but incidentally found to be COVID positive on screening prior to discharge    Confusion  Assessment & Plan  · Likely combination of sundowning which she notes having issues with previously, COVID, and pain medications  · Oxycodone held as above  · Continue to monitor    COVID-19  Assessment & Plan  · Found incidentally upon screening prior to SNF DC on 8/5/2022  · Patient uses 2 L nasal cannula at baseline- primarily at night  · She has fluctuated between requiring 2-3 L nasal cannula  · CTA Chest (8/6): No evidence of pulmonary artery embolus  · Continue Decadron and Remdesivir (Day 2)  · Further supportive care    Chronic heart failure with preserved ejection fraction (HFpEF) ([facility])  Assessment & Plan  Wt Readings from Last 3 Encounters:   08/07/22 54 8 kg (120 lb 13 oz)   07/26/22 49 4 kg (109 lb)   07/23/22 49 4 kg (109 lb)     · Not acutely decompensated at this time  · Continue Metoprolol 50mg BID  · Lasix held do to relative hypotension  · Monitor daily weights and Is and Os;  Fluid and Na restriction      Coronary artery disease involving native coronary artery of native heart  Assessment & Plan  · Continue home Aspirin 81mg daily, Metoprolol 50mg BID, and Atorvastatin 40mg daily      Longstanding persistent atrial fibrillation [facility])  Assessment & Plan  · Continue Metoprolol as above and Diltiazem 60mg Q8H  · Continue home Eliquis 2 5mg BID      VTE Pharmacologic Prophylaxis: VTE Score: 5 High Risk (Score >/= 5) - Pharmacological DVT Prophylaxis Ordered: apixaban (Eliquis)  Sequential Compression Devices Ordered  Patient Centered Rounds: I performed bedside rounds with nursing staff today  Discussions with Specialists or Other Care Team Provider: Nursing    Education and Discussions with Family / Patient: Patient declined call to   Time Spent for Care: 30 minutes  More than 50% of total time spent on counseling and coordination of care as described above  Current Length of Stay: 5 day(s)  Current Patient Status: Inpatient   Certification Statement: The patient will continue to require additional inpatient hospital stay due to COVID-19, STR placement  Discharge Plan: TBD based on clinical improvement and STR acceptance    Code Status: Level 1 - Full Code    Subjective:   Patient is slightly more confused this AM  Nursing notes she pulled out her IV and they have been unable to get a new one yet this AM  No further over night events reported  Objective:     Vitals:   Temp (24hrs), Av 7 °F (36 5 °C), Min:97 4 °F (36 3 °C), Max:97 9 °F (36 6 °C)    Temp:  [97 4 °F (36 3 °C)-97 9 °F (36 6 °C)] 97 9 °F (36 6 °C)  HR:  [103-120] 118  Resp:  [20-24] 24  BP: ()/(62-80) 106/71  SpO2:  [94 %-99 %] 99 %  Body mass index is 24 4 kg/m²  Input and Output Summary (last 24 hours): Intake/Output Summary (Last 24 hours) at 2022 0836  Last data filed at 2022 2100  Gross per 24 hour   Intake 240 ml   Output 300 ml   Net -60 ml       Physical Exam:   Physical Exam  Vitals and nursing note reviewed  Constitutional:       General: She is not in acute distress  Appearance: She is well-developed  HENT:      Head: Normocephalic and atraumatic        Mouth/Throat:      Mouth: Mucous membranes are moist  Pharynx: Oropharynx is clear  Eyes:      Extraocular Movements: Extraocular movements intact  Conjunctiva/sclera: Conjunctivae normal    Cardiovascular:      Rate and Rhythm: Tachycardia present  Rhythm irregular  Heart sounds: Murmur heard  Pulmonary:      Effort: Pulmonary effort is normal  No respiratory distress  Breath sounds: Normal breath sounds  Abdominal:      General: Bowel sounds are normal  There is no distension  Palpations: Abdomen is soft  Tenderness: There is no abdominal tenderness  Comments: 3L NC   Musculoskeletal:         General: Normal range of motion  Cervical back: Normal range of motion and neck supple  Skin:     General: Skin is warm and dry  Neurological:      General: No focal deficit present  Mental Status: She is alert  She is disoriented  Psychiatric:         Mood and Affect: Mood normal          Behavior: Behavior normal        Labs:  Results from last 7 days   Lab Units 08/05/22  0519 08/04/22  0513 08/03/22  0610   WBC Thousand/uL 7 47   < > 8 32   HEMOGLOBIN g/dL 11 1*   < > 11 6   HEMATOCRIT % 35 8   < > 37 1   PLATELETS Thousands/uL 245   < > 293   NEUTROS PCT %  --   --  71   LYMPHS PCT %  --   --  16   MONOS PCT %  --   --  9   EOS PCT %  --   --  4    < > = values in this interval not displayed       Results from last 7 days   Lab Units 08/05/22  0519   SODIUM mmol/L 131*   POTASSIUM mmol/L 4 0   CHLORIDE mmol/L 99   CO2 mmol/L 26   BUN mg/dL 20   CREATININE mg/dL 0 68   ANION GAP mmol/L 6   CALCIUM mg/dL 8 7   GLUCOSE RANDOM mg/dL 104                       Lines/Drains:  Invasive Devices  Report    Peripheral Intravenous Line  Duration           Peripheral IV 08/06/22 Left;Ventral (anterior) Forearm <1 day          Drain  Duration           External Urinary Catheter 2 days                Imaging: Reviewed radiology reports from this admission including: chest CT scan    Recent Cultures (last 7 days):         Last 24 Hours Medication List:   Current Facility-Administered Medications   Medication Dose Route Frequency Provider Last Rate    acetaminophen  975 mg Oral [facility] [place], [place]      ALPRAZolam  0 25 mg Oral HS PRN NEAL De León      apixaban  2 5 mg Oral BID NEAL De León      aspirin  81 mg Oral Daily NEAL De León      atorvastatin  40 mg Oral Daily With Arnel and NEAL Evans      dexamethasone  6 mg Intravenous Q24H [address] [place] [address],[address] [facility], [place]      diltiazem  60 mg Oral [facility] NEAL De León      hydrOXYzine HCL  25 mg Oral BID NEAL De León      lidocaine  1 patch Topical Daily [place], [place]      metoprolol tartrate  50 mg Oral BID [address] [place] [address],[address],       ondansetron  4 mg Intravenous Q4H PRN NAEL De León      oxybutynin  5 mg Oral TID NEAL De León      pantoprazole  40 mg Oral Early Morning NEAL De León      potassium chloride  20 mEq Oral Daily NEAL De León      remdesivir  100 mg Intravenous Q24H [address] [place] [address],[address] [place] [address], [place]      sertraline  75 mg Oral Daily NEAL De León          Today, Patient Was Seen By: [address] [place] [address],[address], DO    **Please Note: This note may have been constructed using a voice recognition system  **

## 2022-08-07 NOTE — PLAN OF CARE
Problem: Potential for Falls  Goal: Patient will remain free of falls  Description: INTERVENTIONS:  - Educate patient/family on patient safety including physical limitations  - Instruct patient to call for assistance with activity   - Consult OT/PT to assist with strengthening/mobility   - Keep Call bell within reach  - Keep bed low and locked with side rails adjusted as appropriate  - Keep care items and personal belongings within reach  - Initiate and maintain comfort rounds  - Make Fall Risk Sign visible to staff  - Offer Toileting every 2 Hours, in advance of need  - Initiate/Maintain bed alarm  - Obtain necessary fall risk management equipment: yellow socks  - Apply yellow socks and bracelet for high fall risk patients  - Consider moving patient to room near nurses station  8/7/2022 0131 by Tisha Mills, RN  Outcome: Progressing  8/7/2022 0131 by Tisha Mills, RN  Outcome: Progressing

## 2022-08-07 NOTE — PLAN OF CARE
Problem: MOBILITY - ADULT  Goal: Maintain or return to baseline ADL function  Description: INTERVENTIONS:  -  Assess patient's ability to carry out ADLs; assess patient's baseline for ADL function and identify physical deficits which impact ability to perform ADLs (bathing, care of mouth/teeth, toileting, grooming, dressing, etc )  - Assess/evaluate cause of self-care deficits   - Assess range of motion  - Assess patient's mobility; develop plan if impaired  - Assess patient's need for assistive devices and provide as appropriate  - Encourage maximum independence but intervene and supervise when necessary  - Involve family in performance of ADLs  - Assess for home care needs following discharge   - Consider OT consult to assist with ADL evaluation and planning for discharge  - Provide patient education as appropriate  Outcome: Progressing     Problem: PAIN - ADULT  Goal: Verbalizes/displays adequate comfort level or baseline comfort level  Description: Interventions:  - Encourage patient to monitor pain and request assistance  - Assess pain using appropriate pain scale  - Administer analgesics based on type and severity of pain and evaluate response  - Implement non-pharmacological measures as appropriate and evaluate response  - Consider cultural and social influences on pain and pain management  - Notify physician/advanced practitioner if interventions unsuccessful or patient reports new pain  Outcome: Progressing     Problem: INFECTION - ADULT  Goal: Absence or prevention of progression during hospitalization  Description: INTERVENTIONS:  - Assess and monitor for signs and symptoms of infection  - Monitor lab/diagnostic results  - Monitor all insertion sites, i e  indwelling lines, tubes, and drains  - Monitor endotracheal if appropriate and nasal secretions for changes in amount and color  - Merrick appropriate cooling/warming therapies per order  - Administer medications as ordered  - Instruct and encourage patient and family to use good hand hygiene technique  - Identify and instruct in appropriate isolation precautions for identified infection/condition  Outcome: Progressing     Problem: SAFETY ADULT  Goal: Maintain or return to baseline ADL function  Description: INTERVENTIONS:  -  Assess patient's ability to carry out ADLs; assess patient's baseline for ADL function and identify physical deficits which impact ability to perform ADLs (bathing, care of mouth/teeth, toileting, grooming, dressing, etc )  - Assess/evaluate cause of self-care deficits   - Assess range of motion  - Assess patient's mobility; develop plan if impaired  - Assess patient's need for assistive devices and provide as appropriate  - Encourage maximum independence but intervene and supervise when necessary  - Involve family in performance of ADLs  - Assess for home care needs following discharge   - Consider OT consult to assist with ADL evaluation and planning for discharge  - Provide patient education as appropriate  Outcome: Progressing

## 2022-08-07 NOTE — ASSESSMENT & PLAN NOTE
· Likely combination of sundowning which she notes having issues with previously, COVID, and pain medications  · Oxycodone held as above  · Continue to monitor

## 2022-08-07 NOTE — ASSESSMENT & PLAN NOTE
Wt Readings from Last 3 Encounters:   08/07/22 54 8 kg (120 lb 13 oz)   07/26/22 49 4 kg (109 lb)   07/23/22 49 4 kg (109 lb)     · Not acutely decompensated at this time  · Continue Metoprolol 50mg BID  · Lasix held do to relative hypotension  · Monitor daily weights and Is and Os;  Fluid and Na restriction

## 2022-08-07 NOTE — NURSING NOTE
IV access obtained by ICU with ultrasound  When attempted to flush and give medications, site infiltrated  Doctor aware site unable to maintain and bloodwork not able to pull today due to very poor vein access  Patient remains confused, lethargic, sip of fluids offered frequently, taking meds in applesauce

## 2022-08-07 NOTE — NURSING NOTE
IV placed  Radiology contacted and expecting call back for stat PE study  Patient currently laying in bed comfortable with call bell in reach

## 2022-08-07 NOTE — ASSESSMENT & PLAN NOTE
· Found incidentally upon screening prior to SNF DC on 8/5/2022  · Patient uses 2 L nasal cannula at baseline- primarily at night  · She has fluctuated between requiring 2-3 L nasal cannula  · CTA Chest (8/6): No evidence of pulmonary artery embolus     · Continue Decadron and Remdesivir (Day 2)  · Further supportive care

## 2022-08-07 NOTE — ASSESSMENT & PLAN NOTE
· Presented with right hip pain and ambulatory dysfunction  · CT Pelvis (8/2): Radiographically healed right intertrochanteric fracture with stable hardware   No new fracture seen   · Continue pain control with Lidocaine patch and Tylenol   · Oxycodone 5mg Q6H PRN discontinued this AM due to increased confusion  · PT/OT recommending post-acute rehab placement  · Was accepted at [facility] but incidentally found to be COVID positive on screening prior to discharge

## 2022-08-08 NOTE — NURSING NOTE
Critical Care CRNP in with RN and patient  IV line obtained, labs drawn and sent to lab  Family aware of patient condition per CRNP, en route to bedside

## 2022-08-08 NOTE — DEATH NOTE
INPATIENT DEATH NOTE  Aarti Long 81 y o  female MRN: [id number]  Unit/Bed#: -01 Encounter: [id number]             PHYSICAL EXAM:  Unresponsive to noxious stimuli, Spontaneous respirations absent, Breath sounds absent, Heart sounds absent and Pupillary light reflex absent    Medical Examiner notification criteria:  NONE APPLICABLE   Medical Examiner's office notified?:  Yes   Medical Examiner accepted case?:  No  Name of Medical Examiner: n/a          Autopsy Options:  Post-mortem examination declined by next of kin    Primary Service Attending Physician notified?:  yes - Attending: Leyla Ca  Physician/Resident responsible for completing Discharge Summary:  NEAL Kessler

## 2022-08-08 NOTE — ASSESSMENT & PLAN NOTE
· Presented with right hip pain and ambulatory dysfunction  · CT Pelvis (8/2): Radiographically healed right intertrochanteric fracture with stable hardware   No new fracture seen   · Continue pain control with Lidocaine patch and Tylenol    · Oxycodone 5mg Q6H PRN discontinued due to increased confusion  · PT/OT recommending post-acute rehab placement  · Was accepted at [facility] but incidentally found to be COVID positive on screening prior to discharge

## 2022-08-08 NOTE — OCCUPATIONAL THERAPY NOTE
08/08/22 1055   OT Last Visit   OT Visit Date 08/08/22   Note Type   Note Type Treatment   Restrictions/Precautions   Weight Bearing Precautions Per Order No   Other Precautions O2;Fall Risk;Pain   Pain Assessment   Pain Assessment Tool FLACC   Pain Score No Pain   Pain Rating: FLACC (Rest) - Face 0   Pain Rating: FLACC (Rest) - Legs 0   Pain Rating: FLACC (Rest) - Activity 0   Pain Rating: FLACC (Rest) - Cry 0   Pain Rating: FLACC (Rest) - Consolability 0   Score: FLACC (Rest) 0   Pain Rating: FLACC (Activity) - Face 0   Pain Rating: FLACC (Activity) - Legs 0   Pain Rating: FLACC (Activity) - Activity 0   Pain Rating: FLACC (Activity) - Cry 0   Pain Rating: FLACC (Activity) - Consolability 0   Score: FLACC (Activity) 0   Bed Mobility   Supine to Sit 2  Maximal assistance   Additional items Assist x 2;HOB elevated; Bedrails; Increased time required;Verbal cues;LE management   Sit to Supine 2  Maximal assistance   Additional items Assist x 2;Bedrails;Trapeze bar;HOB elevated; Increased time required;Verbal cues;LE management   Additional Comments Pt sat EOB with Max A x 1-2 to maintain sitting balance x 5 mins  Transfers   Sit to Stand Unable to assess   Cognition   Overall Cognitive Status Impaired   Arousal/Participation Lethargic;Persistent stimuli required;Poorly responsive   Attention Attends with cues to redirect   Orientation Level Oriented to person;Disoriented to place; Disoriented to time;Disoriented to situation   Memory Decreased short term memory   Following Commands Follows one step commands with increased time or repetition   Comments Pt agreeable to OT treatment,   Activity Tolerance   Activity Tolerance Patient limited by fatigue   Assessment   Assessment Patient participated in Skilled OT session this date with interventions consisting of bed mobility training, Energy Conservation techniques, increase dynamic sit/ stand balance during functional activity  and increase OOB/ sitting tolerance   Patient agreeable to OT treatment session, upon arrival patient was found supine in bed  Patient transfers supine <> sit EOB with Max A x 2  Once seated EOb, patient unable to maintain sitting balance, requiring Max A x 1 to maintain sitting posture  Patient unable to follow any direction and notably lethargic once sitting  Patient tolerated sitting EOB x approx 5 mins before returning to supine in bed  Session ended with patient supine in bed, SCDs reapplied, all needs met and call bell within reach  In comparison to previous session, patient with no noted improvements  Patient requiring frequent re direction, verbal cues for safety, verbal cues for correct technique, verbal cues for pacing thru activity steps and frequent rest periods  Patient performance  demonstrated good carryover of learned techniques and strategies to facilitate safety during functional tasks  Patient continues to be functioning below baseline level, occupational performance remains limited secondary to factors listed above and increased risk for falls and injury  From OT standpoint, recommendation at time of d/c would be Short Term Rehab  Patient to benefit from continued Occupational Therapy treatment while in the hospital to address deficits as defined above and maximize level of functional independence with ADLs and functional mobility in order to return to OF     Plan   Treatment Interventions Functional transfer training;Energy conservation   Goal Expiration Date 08/13/22   OT Treatment Day 3   OT Frequency 3-5x/wk   Recommendation   OT Discharge Recommendation Post acute rehabilitation services   AM-PAC Daily Activity Inpatient   Lower Body Dressing 2   Bathing 3   Toileting 1   Upper Body Dressing 3   Grooming 3   Eating 3   Daily Activity Raw Score 15   Daily Activity Standardized Score (Calc for Raw Score >=11) 34 69   AM-PAC Applied Cognition Inpatient   Following a Speech/Presentation 3   Understanding Ordinary Conversation 3 Taking Medications 1   Remembering Where Things Are Placed or Put Away 1   Remembering List of 4-5 Errands 1   Taking Care of Complicated Tasks 1   Applied Cognition Raw Score 10   Applied Cognition Standardized Score 24 98   DON Casas

## 2022-08-08 NOTE — ASSESSMENT & PLAN NOTE
Wt Readings from Last 3 Encounters:   08/08/22 54 9 kg (121 lb 0 5 oz)   07/26/22 49 4 kg (109 lb)   07/23/22 49 4 kg (109 lb)     · Not acutely decompensated at this time  · Continue Metoprolol 50mg BID  · Lasix was held due to relative hypotension; will resume  · Monitor daily weights and Is and Os;  Fluid and Na restriction

## 2022-08-08 NOTE — ASSESSMENT & PLAN NOTE
· Found incidentally upon screening prior to SNF DC on 8/5/2022  · Patient uses 2 L nasal cannula at baseline- primarily at night  · She has fluctuated between requiring 2-3 L nasal cannula  · CTA Chest (8/6): No evidence of pulmonary artery embolus  · Continue Decadron and Remdesivir (Day 3); patient has not been getting as there is no IV access  Will transition decadron to PO    · DD-2 58  There is no IV access currently  Will continue with PO eliquis for now for anticoagulation   CTA chest without evidence of PE, CRP 24 9   · Further supportive care

## 2022-08-08 NOTE — QUICK NOTE
Patient continues to be lethargic throughout the day  However, became very restless with dilated pupils and pallor  Her upper extremities were cool to touch  ABG and cxr obtained  ABG shows compensating metabolic acidosis from unclear source  cxr with pulmonary vascular congestion  Discussed case with critical care AP  Patient was evaluated by CC AP at bedside  Blood work obtained and results reviewed  The patient with elevated lactate of 10, wbc 21 88, transaminitis, HUY, and high anion gap metabolic acidosis with potassium 6 8  Discussed with her daughter- Gabriela Abbott and the family who came in the hospital  At this time, the family do not wish to pursue any aggressive interventions and opted for comfort measures only   Will consult hospice care per family request

## 2022-08-08 NOTE — NURSING NOTE
RN in with patient  Patient has pursed lip breathing, 98% on room air  Color pale/dusky  ABG ordered by CRNP  Patient more restless, not responding to verbal redirection or following commands

## 2022-08-08 NOTE — ASSESSMENT & PLAN NOTE
· Likely combination of sundowning which she notes having issues with previously, COVID, and pain medications  · Oxycodone and hydroxyzine were stopped   · Patient's condition was deteriorating  The POA and family opted for comfort measures only  The patient's code status was changed and she passed away at 1903 on 8/9/2022

## 2022-08-08 NOTE — PHYSICAL THERAPY NOTE
PHYSICAL THERAPY TREATMENT NOTE  NAME:  Ninfa Zepeda  DATE: 08/08/22    Length Of Stay: 6  Performed at least 2 patient identifiers during session: Name and ID bracelet    TREATMENT:      08/08/22 1056   PT Last Visit   PT Visit Date 08/08/22   Note Type   Note Type Treatment   Pain Assessment   Pain Assessment Tool FLACC   Pain Score No Pain   Restrictions/Precautions   Weight Bearing Precautions Per Order No   Other Precautions Chair Alarm; Bed Alarm;Contact/isolation; Airborne/isolation;O2;Fall Risk   General   Chart Reviewed Yes   Response to Previous Treatment Patient unable to report, no changes reported from family or staff   Family/Caregiver Present No   Cognition   Overall Cognitive Status Impaired   Arousal/Participation Lethargic;Persistent stimuli required;Poorly responsive   Attention Difficulty attending to directions   Orientation Level Unable to assess   Memory Unable to assess   Following Commands Follows one step commands inconsistently   Comments pt agreeable to PT session   Bed Mobility   Supine to Sit 2  Maximal assistance   Additional items Assist x 2;HOB elevated; Bedrails; Increased time required;Verbal cues;LE management   Sit to Supine 2  Maximal assistance   Additional items Assist x 2; Increased time required;Verbal cues;LE management   Additional Comments pt sat at EOB with maxA1-2 to maintain sitting balance x5 minutes   Transfers   Sit to Stand Unable to assess  (safety concerns, lethargy)   Additional Comments verbal cues for proper body mechanics and hand placement   Ambulation/Elevation   Gait pattern Not appropriate   Balance   Static Sitting Poor   Dynamic Sitting Poor -   Endurance Deficit   Endurance Deficit Yes   Endurance Deficit Description lethargy   Activity Tolerance   Activity Tolerance Patient limited by fatigue;Treatment limited secondary to medical complications (Comment)   Nurse Made Aware yes   Assessment   Prognosis Fair   Problem List Decreased strength;Decreased endurance; Impaired balance;Decreased mobility; Impaired judgement;Decreased safety awareness;Decreased cognition;Decreased coordination; Impaired hearing   Assessment Pt seen for PT treatment session this date with interventions consisting of therapeutic activity consisting of training: bed mobility, supine<>sit transfers and static sitting tolerance at EOB for 5 minutes w/ max UE support  Pt agreeable to PT treatment session upon arrival, pt found supine in bed w/ HOB elevated, poorly responsive, lethargic  In comparison to previous session, pt with no improvements as evidenced by pt unable to participate due to lethargy  Post session: pt returned BTB, bed alarm engaged, all needs in reach and RN notified of session findings/recommendations  Continue to recommend post acute rehabilitation services at time of d/c in order to maximize pt's functional independence and safety w/ mobility  Pt continues to be functioning below baseline level  PT will continue to see pt during current hospitalization in order to address the deficits listed above and provide interventions consistent w/ POC in effort to achieve STGs  Plan   Treatment/Interventions Functional transfer training;LE strengthening/ROM; Therapeutic exercise; Endurance training;Cognitive reorientation;Patient/family training;Equipment eval/education; Bed mobility;Gait training;Spoke to nursing;OT   Progress No functional improvements   PT Frequency 3-5x/wk   Recommendation   PT Discharge Recommendation Post acute rehabilitation services   AM-PAC Basic Mobility Inpatient   Turning in Bed Without Bedrails 1   Lying on Back to Sitting on Edge of Flat Bed 1   Moving Bed to Chair 1   Standing Up From Chair 1   Walk in Room 1   Climb 3-5 Stairs 1   Basic Mobility Inpatient Raw Score 6   Turning Head Towards Sound 2   Follow Simple Instructions 2   Low Function Basic Mobility Raw Score 10   Low Function Basic Mobility Standardized Score 14 65   Highest Level Of Mobility   [facility] Goal 2: Bed activities/Dependent transfer   [address] [facility] Achieved 2: Bed activities/Dependent transfer   Education   Education Provided Mobility training   Patient Reinforcement needed   End of Consult   Patient Position at End of Consult Supine; All needs within reach       The patient's AM-PAC Basic Mobility Inpatient Short Form Raw Score is 6  A Raw score of less than or equal to 16 suggests the patient may benefit from discharge to post-acute rehabilitation services  Please also refer to the recommendation of the Physical Therapist for safe discharge planning        Radhames Scott PTA,PTA

## 2022-08-08 NOTE — PLAN OF CARE
Problem: PHYSICAL THERAPY ADULT  Goal: Performs mobility at highest level of function for planned discharge setting  See evaluation for individualized goals  Description: Treatment/Interventions: Functional transfer training, LE strengthening/ROM, Therapeutic exercise, Elevations, Endurance training, Cognitive reorientation, Patient/family training, Equipment eval/education, Bed mobility, Gait training, Spoke to nursing, Spoke to case management, OT  Equipment Recommended: Obie Castleman       See flowsheet documentation for full assessment, interventions and recommendations  Outcome: Not Progressing  Note: Prognosis: Fair  Problem List: Decreased strength, Decreased endurance, Impaired balance, Decreased mobility, Impaired judgement, Decreased safety awareness, Decreased cognition, Decreased coordination, Impaired hearing  Assessment: Pt seen for PT treatment session this date with interventions consisting of therapeutic activity consisting of training: bed mobility, supine<>sit transfers and static sitting tolerance at EOB for 5 minutes w/ max UE support  Pt agreeable to PT treatment session upon arrival, pt found supine in bed w/ HOB elevated, poorly responsive, lethargic  In comparison to previous session, pt with no improvements as evidenced by pt unable to participate due to lethargy  Post session: pt returned BTB, bed alarm engaged, all needs in reach and RN notified of session findings/recommendations  Continue to recommend post acute rehabilitation services at time of d/c in order to maximize pt's functional independence and safety w/ mobility  Pt continues to be functioning below baseline level  PT will continue to see pt during current hospitalization in order to address the deficits listed above and provide interventions consistent w/ POC in effort to achieve STGs  PT Discharge Recommendation: Post acute rehabilitation services    See flowsheet documentation for full assessment

## 2022-08-08 NOTE — PLAN OF CARE
Problem: OCCUPATIONAL THERAPY ADULT  Goal: Performs self-care activities at highest level of function for planned discharge setting  See evaluation for individualized goals  Description: Treatment Interventions: ADL retraining, Functional transfer training, UE strengthening/ROM, Endurance training, Cognitive reorientation, Patient/family training, Equipment evaluation/education, Compensatory technique education, Energy conservation, Activityengagement     See flowsheet documentation for full assessment, interventions and recommendations  Outcome: Not Progressing  Note: Limitation: Decreased ADL status, Decreased UE strength, Decreased Safe judgement during ADL, Decreased cognition, Decreased endurance, Decreased self-care trans, Decreased high-level ADLs  Prognosis: Good  Assessment: Patient participated in Skilled OT session this date with interventions consisting of bed mobility training, Energy Conservation techniques, increase dynamic sit/ stand balance during functional activity  and increase OOB/ sitting tolerance   Patient agreeable to OT treatment session, upon arrival patient was found supine in bed  Patient transfers supine <> sit EOB with Max A x 2  Once seated EOb, patient unable to maintain sitting balance, requiring Max A x 1 to maintain sitting posture  Patient unable to follow any direction and notably lethargic once sitting  Patient tolerated sitting EOB x approx 5 mins before returning to supine in bed  Session ended with patient supine in bed, SCDs reapplied, all needs met and call bell within reach  In comparison to previous session, patient with no noted improvements  Patient requiring frequent re direction, verbal cues for safety, verbal cues for correct technique, verbal cues for pacing thru activity steps and frequent rest periods  Patient performance  demonstrated good carryover of learned techniques and strategies to facilitate safety during functional tasks   Patient continues to be functioning below baseline level, occupational performance remains limited secondary to factors listed above and increased risk for falls and injury  From OT standpoint, recommendation at time of d/c would be Short Term Rehab  Patient to benefit from continued Occupational Therapy treatment while in the hospital to address deficits as defined above and maximize level of functional independence with ADLs and functional mobility in order to return to PLOF       OT Discharge Recommendation: Post acute rehabilitation services

## 2022-08-08 NOTE — NURSING NOTE
Patient more restless, family present on floor  IV morphine administered  Bedside shift report given to receiving RN

## 2022-08-08 NOTE — ACP (ADVANCE CARE PLANNING)
Serious Illness Conversation    1  What is your understanding now of where you are with your illness? Prognostic Understanding: appropriate understanding of prognosis  Patient is with dementia  However, daughter- Maira Ramirez is aware of her prognosis      2  How much information about what is likely to be ahead with your illness would you like to have? Information: patient wants to be fully informed  Daughter would like to be fully informed      3  What did you (clinician) communicate to the patient? Prognostic Communication: Time - I wish we were not in this situation, but I am worried that time may be as short as 24-48 hours (express as a range, e g  days to weeks, weeks to months, months to a year)  Due to change in her status-- worsening breathing pattern the daughter would like the patient to be comfortable      4  If your health situation worsens, what are your most important goals? Unable to obtain  Daughter would like the patient to be comfortable  5  What are the biggest fears and worries about the future and your health? 6  What abilities are so critical to your life that you cannot imagine living without them? 7  What gives you strength as you think about the future with your illness? 8  If you become sicker, how much are you willing to go through for the possibility of gaining more time? Have a feeding tube: No   Be in the ICU: No    Be on a ventilator: No Be uncomfortable: No   Be on dialysis: No       9  How much does your proxy and family know about your priorities and wishes? [place] heard you say that keeping the patient comfortable is really important to you  Keeping that in mind, and what we know about your illness, I recommend that we consider hospice care  This will help us make sure that your treatment plans reflect whats important to you  How does this plan sound to you? I will do everything I can to help you through this    Patient verbalized understanding of the plan I have spent 30minutes speaking with my patient on advanced care planning today or during this visit     Advanced directives     Information was obtained from her daughter  Unable to obtain from patient due to dementia and acute change in mental status

## 2022-08-08 NOTE — PROGRESS NOTES
[facility]  67   Progress Note - Alberto Fess 1935, 81 y o  female MRN: [id number]  Unit/Bed#: -01 Encounter: [id number]  Primary Care Provider: Santa Martinez MD   Date and time admitted to hospital: 8/2/2022  1:45 PM    * Hip pain, right  Assessment & Plan  · Presented with right hip pain and ambulatory dysfunction  · CT Pelvis (8/2): Radiographically healed right intertrochanteric fracture with stable hardware  No new fracture seen   · Continue pain control with Lidocaine patch and Tylenol    · Oxycodone 5mg Q6H PRN discontinued due to increased confusion  · PT/OT recommending post-acute rehab placement  · Was accepted at the [facility] but incidentally found to be COVID positive on screening prior to discharge    Confusion  Assessment & Plan  · Likely combination of sundowning which she notes having issues with previously, COVID, and pain medications  · Oxycodone held as above  · Continue to monitor    COVID-19 virus infection  Assessment & Plan  · Found incidentally upon screening prior to SNF DC on 8/5/2022  · Patient uses 2 L nasal cannula at baseline- primarily at night  · She has fluctuated between requiring 2-3 L nasal cannula  · CTA Chest (8/6): No evidence of pulmonary artery embolus  · Continue Decadron and Remdesivir (Day 3); patient has not been getting as there is no IV access  Will transition decadron to PO    · DD-2 58  There is no IV access currently  Will continue with PO eliquis for now for anticoagulation  CTA chest without evidence of PE, CRP 24 9   · Further supportive care    Chronic heart failure with preserved ejection fraction (HFpEF) ([facility])  Assessment & Plan  Wt Readings from Last 3 Encounters:   08/08/22 54 9 kg (121 lb 0 5 oz)   07/26/22 49 4 kg (109 lb)   07/23/22 49 4 kg (109 lb)     · Not acutely decompensated at this time    · Continue Metoprolol 50mg BID  · Lasix was held due to relative hypotension; will resume  · Monitor daily weights and Is and Os; Fluid and Na restriction      Longstanding persistent atrial fibrillation (HCC)  Assessment & Plan  · Continue Metoprolol as above and Diltiazem 60mg Q8H  · Continue home Eliquis 2 5mg BID     Coronary artery disease involving native coronary artery of native heart  Assessment & Plan  · Continue home Aspirin 81mg daily, Metoprolol 50mg BID, and Atorvastatin 40mg daily         VTE Prophylaxis:  Apixaban (Eliquis)    Patient Centered Rounds: I have performed bedside rounds with nursing staff today  Discussions with Specialists or Other Care Team Provider: ROSETTE   Education and Discussions with Family / Patient: patient     Current Length of Stay: 6 day(s)    Current Patient Status: Inpatient   Certification Statement: The patient will continue to require additional inpatient hospital stay due to ambulatory dysfunction     Discharge Plan: pending hospital course  Code Status: Level 1 - Full Code    Subjective:   Unable to obtain  Patient sleeping in bed  Currently on 2L NC  Objective:     Vitals:   Temp (24hrs), Av 5 °F (36 4 °C), Min:97 4 °F (36 3 °C), Max:97 5 °F (36 4 °C)    Temp:  [97 4 °F (36 3 °C)-97 5 °F (36 4 °C)] 97 4 °F (36 3 °C)  HR:  [] 108  Resp:  [20-24] 24  BP: ()/(55-89) 116/78  SpO2:  [95 %-100 %] 98 %  Body mass index is 24 45 kg/m²  Input and Output Summary (last 24 hours): Intake/Output Summary (Last 24 hours) at 2022 1116  Last data filed at 2022 1723  Gross per 24 hour   Intake 60 ml   Output --   Net 60 ml       Physical Exam:   Physical Exam  Vitals and nursing note reviewed  Constitutional:       General: She is sleeping  She is not in acute distress  Appearance: Normal appearance  She is ill-appearing  Comments: Frail and elderly      HENT:      Head: Normocephalic and atraumatic  Right Ear: External ear normal       Left Ear: External ear normal       Nose: Nose normal  No rhinorrhea        Mouth/Throat:      Mouth: Mucous membranes are moist       Pharynx: Oropharynx is clear  Eyes:      General:         Right eye: No discharge  Left eye: No discharge  Pupils: Pupils are equal, round, and reactive to light  Cardiovascular:      Rate and Rhythm: Normal rate and regular rhythm  Pulses: Normal pulses  Heart sounds: Normal heart sounds  No murmur heard  Pulmonary:      Effort: Pulmonary effort is normal  No respiratory distress  Comments: Coarse     Abdominal:      General: Bowel sounds are normal  There is no distension  Palpations: Abdomen is soft  There is no mass  Tenderness: There is no abdominal tenderness  Musculoskeletal:         General: No swelling or tenderness  Normal range of motion  Cervical back: Normal range of motion and neck supple  No muscular tenderness  Skin:     General: Skin is warm and dry  Capillary Refill: Capillary refill takes less than 2 seconds  Findings: No erythema or rash  Neurological:      General: No focal deficit present  Mental Status: Mental status is at baseline  Sensory: No sensory deficit  Psychiatric:         Mood and Affect: Mood normal          Behavior: Behavior normal          Thought Content: Thought content normal          Additional Data:     Labs:    Results from last 7 days   Lab Units 08/05/22  0519 08/04/22  0513 08/03/22  0610   WBC Thousand/uL 7 47   < > 8 32   HEMOGLOBIN g/dL 11 1*   < > 11 6   HEMATOCRIT % 35 8   < > 37 1   PLATELETS Thousands/uL 245   < > 293   NEUTROS PCT %  --   --  71   LYMPHS PCT %  --   --  16   MONOS PCT %  --   --  9   EOS PCT %  --   --  4    < > = values in this interval not displayed  Results from last 7 days   Lab Units 08/05/22  0519   SODIUM mmol/L 131*   POTASSIUM mmol/L 4 0   CHLORIDE mmol/L 99   CO2 mmol/L 26   BUN mg/dL 20   CREATININE mg/dL 0 68   CALCIUM mg/dL 8 7                   * I Have Reviewed All Lab Data Listed Above  * Additional Pertinent Lab Tests Reviewed:  All [facility] Admission  Reviewed    Imaging:  Imaging Reports Reviewed Today Include: CTA chest     Recent Cultures (last 7 days):           Last 24 Hours Medication List:   Current Facility-Administered Medications   Medication Dose Route Frequency Provider Last Rate    acetaminophen  975 mg Oral [place], [place]      ALPRAZolam  0 25 mg Oral HS PRN Dauna Jocelynn, CRNP      apixaban  2 5 mg Oral BID Dauna Jocelynn, CRNP      aspirin  81 mg Oral Daily Dauna Jocelynn, CRNP      atorvastatin  40 mg Oral Daily With Arnel and Laura Bales, CRNP      dexamethasone  6 mg Oral Daily Claude Peel, NEAL      diltiazem  60 mg Oral [facility] Dauna Jocelynn, CRNP      furosemide  40 mg Oral Daily Claude Peel, CRNP      hydrOXYzine HCL  25 mg Oral BID Dauna Jocelynn, CRNP      lidocaine  1 patch Topical Daily [place], [place]      metoprolol tartrate  50 mg Oral BID Toure Yasmin, DO      ondansetron  4 mg Intravenous Q4H PRN Dauna Jocelynn, CRNP      oxybutynin  5 mg Oral TID Dauna Jocelynn, CRNP      pantoprazole  40 mg Oral Early Morning Dauna Jocelynn, CRNP      potassium chloride  20 mEq Oral Daily Dauna Jocelynn, CRNP      remdesivir  100 mg Intravenous Q24H Tejal Hedge, [address]      sertraline  75 mg Oral Daily Dauna Jocelynn, CRNP          Today, Patient Was Seen By: Claude Peel, CRNP    ** Please Note: Dictation voice to text software may have been used in the creation of this document   **

## 2022-08-09 NOTE — UTILIZATION REVIEW
Notification of Discharge   This is a Notification of Discharge from our facility [address]  Please be advised that this patient has been discharge from our facility  Below you will find the admission and discharge date and time including the patients disposition  UTILIZATION REVIEW CONTACT:  Mick Hunt  Utilization   Network Utilization Review Department  Phone: [phone] carefully listen to the prompts  All voicemails are confidential   Email: [email or web address]     PHYSICIAN ADVISORY SERVICES:  FOR [id number] REVIEW - MEDICAL NECESSITY DENIAL  Phone: [phone]  Fax: [phone]  Email: [email or web address]     PRESENTATION DATE: 2022  1:45 PM  OBERVATION ADMISSION DATE:   INPATIENT ADMISSION DATE: 22  4:38 PM   DISCHARGE DATE: 2022  8:24 PM  DISPOSITION:        IMPORTANT INFORMATION:  Send all requests for admission clinical reviews, approved or denied determinations and any other requests to dedicated fax number below belonging to the campus where the patient is receiving treatment   List of dedicated fax numbers:  [address] DENIALS (Administrative/Medical Necessity) [phone]   [address] (Maternity/NICU/Pediatrics) [phone]   Genoveva Fines [phone]   [address] [facility] [phone]   [address] [place] [address] [phone]    [facility] [address] [place] [address],[address] [phone]   [facility]  [phone]   [address] [facility] [address] [facility]  [address] [facility] [address] [facility] [phone]

## 2022-08-09 NOTE — ASSESSMENT & PLAN NOTE
· Presented with right hip pain and ambulatory dysfunction  · CT Pelvis (8/2): Radiographically healed right intertrochanteric fracture with stable hardware  No new fracture seen   · PT/OT recommending post-acute rehab placement  · Was accepted at the [facility] but incidentally found to be COVID positive on screening prior to discharge  · Patient's condition was deteriorating  The POA and family opted for comfort measures only  The patient's code status was changed and she passed away at 1903 on 8/8/2022

## 2022-08-09 NOTE — DISCHARGE SUMMARY
[address]  Discharge- [place] Nicole 1935, 81 y o  female MRN: [id number]  Unit/Bed#: -01 Encounter: [id number]  Primary Care Provider: Blair Lopez MD   Date and time admitted to hospital: 8/2/2022  1:45 PM    * Hip pain, right  Assessment & Plan  · Presented with right hip pain and ambulatory dysfunction  · CT Pelvis (8/2): Radiographically healed right intertrochanteric fracture with stable hardware  No new fracture seen   · PT/OT recommending post-acute rehab placement  · Was accepted at the [facility] but incidentally found to be COVID positive on screening prior to discharge  · Patient's condition was deteriorating  The POA and family opted for comfort measures only  The patient's code status was changed and she passed away at 1903 on 8/8/2022  Confusion  Assessment & Plan  · Likely combination of sundowning which she notes having issues with previously, COVID, and pain medications  · Oxycodone and hydroxyzine were stopped   · Patient's condition was deteriorating  The POA and family opted for comfort measures only  The patient's code status was changed and she passed away at 1903 on 8/9/2022  COVID-19 virus infection  Assessment & Plan  · Found incidentally upon screening prior to SNF DC on 8/5/2022  · Patient used 2 L nasal cannula at baseline- primarily at night  · She had fluctuated between requiring 2-3 L nasal cannula  · CTA Chest (8/6): No evidence of pulmonary artery embolus  · received Decadron and Remdesivir  · Patient's condition was deteriorating  The POA and family opted for comfort measures only  The patient's code status was changed and she passed away at 1903 on 8/8/2022  Chronic heart failure with preserved ejection fraction (HFpEF) ([facility])  Assessment & Plan  Wt Readings from Last 3 Encounters:   08/08/22 54 9 kg (121 lb 0 5 oz)   07/26/22 49 4 kg (109 lb)   07/23/22 49 4 kg (109 lb)     Patient's condition was deteriorating   The POA and family opted for comfort measures only  The patient's code status was changed and she passed away at 1903 on 8/9/2022  Longstanding persistent atrial fibrillation (HCC)  Assessment & Plan  · Patient's condition was deteriorating  The POA and family opted for comfort measures only  The patient's code status was changed and she passed away at 1903 on 8/8/2022  Coronary artery disease involving native coronary artery of native heart without angina pectoris  Assessment & Plan  · Patient's condition was deteriorating  The POA and family opted for comfort measures only  The patient's code status was changed and she passed away at 1903 on 8/8/2022  Discharging Physician / Practitioner: NEAL Iyer  PCP: Ayaan Porras MD  Admission Date:   Admission Orders (From admission, onward)     Ordered        08/02/22 1638  [facility]  Once                      Discharge Date: 8/8/2022    Medical Problems             Resolved Problems  Date Reviewed: 8/9/2022   None                 Consultations During Hospital Stay:  · None     Procedures Performed:   · None     Significant Findings / Test Results:   · cxr Possible mild pulmonary vascular congestion  · CTA chest No evidence of pulmonary artery embolus  · cxr Small bilateral pleural effusions  · CT lumbar spine No acute osseous abnormality of lumbar spine  Multilevel degenerative changes of lumbar spine with varying degrees of canal stenosis (mild L3-4 and L4-5) and foraminal narrowing (severe left L5-S1), as detailed above  Partially imaged small right and trace left pleural effusions with bilateral lower lobe subsegmental atelectasis, similar to CT abdomen pelvis 7/23/2022   Recommend dedicated chest radiograph for further evaluation  Ascites   Please see same-day CT pelvis without contrast for further evaluation     · CT pelvis Radiographically healed right intertrochanteric fracture with stable hardware   No new fracture seen   There is increasing presacral edema and increasing fluid in the abdomen, evaluate for volume overload/CHF     Incidental Findings:   · None      Test Results Pending at Discharge (will require follow up): · None      Outpatient Tests Requested:  · None     Complications:     None     Reason for Admission: right hip pain and ambulatory dysfunction     Hospital Course:     Aarti Long is a 81 y o  female patient who originally presented to the hospital on 8/2/2022 due to right hip pain and ambulatory dysfunction  The patient was admitted  Imaging reveals no acute finding  PT/OT evaluation done and recommend STR  The patient was supposed to be discharge to STR but COVID screen came back positive on 8/5/2022  The patient was started on remdesivir and dexamethasone as she has slight increased of oxygen requirement than her baseline  She is on oxygen supplement at baseline mainly at nighttime  CTA chest done on 8/6 without any evidence of PE or acute infiltrate  She developed an acute onset of encephalopathy with lethargy initially suspected to be due to narcotics and subsequently oxycodone and hydroxyzine were discontinued  The patient mental status did not improve much  On 8/8/2022, the patient was lethargic earlier in the day however became restless with some abnormal breathing pattern  An ABG shows a metabolic acidosis and chest x-ray with mild pulmonary vascular congestions  There were no blood work obtain earlier in the day due to difficulty with blood draw  Case was discussed with critical care  Critical care AP evaluated the patient and recommended to follow up with blood work and goals of care discussion with the family  I reached out to Mackenzie/POA/daughter; she wished that the patient's code status to be changed to DNR/DNI  Henry Reyez and the family came in to the hospital and later on changed her code status to comfort measures only with hospice evaluation   The patient was given one dose of IV morphine for comfort and restlessness  She noted to be bradycardic  The patient eventually passed away at 1903 pm 2022 with her family at bedside  Her blood work obtain prior to her death revealed multiple abnormality including HUY, transaminitis, metabolic encephalopathy, hyponatremia, leukocytosis and lactic acidosis  The final cause of death is cardiac arrest      Please see above list of diagnoses and related plan for additional information  Condition at Discharge:       Discharge Day Visit / Exam:     Subjective: family at bedside  The patient  at 200pm    Please refer to the death note for physical exam      Discussion with Family: daughterDiana Morales and family at bedside    Discharge instructions/Information to patient and family:   See after visit summary for information provided to patient and family  Provisions for Follow-Up Care:  See after visit summary for information related to follow-up care and any pertinent home health orders  Disposition:     Other:        Planned Readmission:    No      Discharge Statement:  I spent 45 minutes discharging the patient  This time was spent on the day of discharge  I had direct contact with the patient on the day of discharge  Greater than 50% of the total time was spent examining patient, answering all patient questions, arranging and discussing plan of care with patient as well as directly providing post-discharge instructions  Additional time then spent on discharge activities  Discharge Medications:  See after visit summary for reconciled discharge medications provided to patient and family        ** Please Note: This note has been constructed using a voice recognition system **

## 2022-08-09 NOTE — ASSESSMENT & PLAN NOTE
· Patient's condition was deteriorating  The POA and family opted for comfort measures only  The patient's code status was changed and she passed away at 1903 on 8/8/2022

## 2022-08-09 NOTE — ASSESSMENT & PLAN NOTE
· Found incidentally upon screening prior to SNF DC on 8/5/2022  · Patient used 2 L nasal cannula at baseline- primarily at night  · She had fluctuated between requiring 2-3 L nasal cannula  · CTA Chest (8/6): No evidence of pulmonary artery embolus  · received Decadron and Remdesivir  · Patient's condition was deteriorating  The POA and family opted for comfort measures only  The patient's code status was changed and she passed away at 1903 on 8/8/2022

## 2022-08-09 NOTE — ASSESSMENT & PLAN NOTE
Wt Readings from Last 3 Encounters:   08/08/22 54 9 kg (121 lb 0 5 oz)   07/26/22 49 4 kg (109 lb)   07/23/22 49 4 kg (109 lb)     Patient's condition was deteriorating  The POA and family opted for comfort measures only  The patient's code status was changed and she passed away at 1903 on 8/9/2022

## 2022-11-17 NOTE — SEPSIS NOTE
S: Susanna Peralta reports she is feeling well today.  She denies contractions, bleeding, leakage of fluid.  Feeling normal fetal movement.    O:   Vitals  Vitals:    22 1041   BP: 110/62   Pulse: 86        Exam  Gen: appears comfortable, NAD  Chest: normal effort of respiration  Abdomen: gravid, nondistended  Extremities: warm and well perfused    BSUS: vertex    A/P:  Susanna Peralta is a 25 year old  at 36w6d who presents for routine prenatal visit.      Prenatal care  -GBS today  -growth ultrasound ordered given fundal height 33cm from 32 at last visit 4 wks ago, discussed importance of routine visits      Dispo: return to office in 1 weeks for routine prenatal care     Sepsis Note   Alec Angeles 81 y o  female MRN: [id number]  Unit/Bed#: ICU 01 Encounter: [id number]      qSOFA     Row Name 02/28/20 2240 02/28/20 2045 02/28/20 2030 02/28/20 2015 02/28/20 2000    Altered mental status GCS < 15  --  --  --  --  --    Respiratory Rate > / =22  --  0  1  0  0    Systolic BP < / =741  0  --  0  --  --    Q Sofa Score  0  1  1  1  1    Row Name 02/28/20 1945 02/28/20 1930 02/28/20 1915 02/28/20 1810       Altered mental status GCS < 15  --  --  --  --     Respiratory Rate > / =22  0  0  0  0     Systolic BP < / =526  1  --  --  --     Q Sofa Score  1  --  --  --

## 2022-11-22 NOTE — UTILIZATION REVIEW
Inpatient Admission Authorization Request   NOTIFICATION OF INPATIENT ADMISSION/INPATIENT AUTHORIZATION REQUEST   SERVICING FACILITY:   [place] [address]  [address] [facility], [address]  Tax ID: [id number]  NPI: [id number]  Place of Service: Inpatient [facility]  [address]  Place of Service Code: 24     ATTENDING PROVIDER:  Attending Name and NPI#: Kelly Recinos Do [[id number]]  Address: [address] [place] [address], [address]  Phone: [phone]     UTILIZATION REVIEW CONTACT:  Edwina Akers Utilization   Network Utilization Review Department  Phone: [phone]  Fax [phone]  Email: [email or web address]     PHYSICIAN ADVISORY SERVICES:  FOR IVWR-ES-IGLT REVIEW - MEDICAL NECESSITY DENIAL  Phone: [phone]  Fax: [phone]  Email: [email or web address]     TYPE OF REQUEST:  Inpatient Status     ADMISSION INFORMATION:  ADMISSION DATE/TIME: 8/2/22  4:38 PM  PATIENT DIAGNOSIS CODE/DESCRIPTION:  Hip pain [M25 559]  Right hip pain [M25 551]  DISCHARGE DATE/TIME: No discharge date for patient encounter  IMPORTANT INFORMATION:  Please contact the Edwina Akers directly with any questions or concerns regarding this request  Department voicemails are confidential     Send requests for admission clinical reviews, concurrent reviews, approvals, and administrative denials due to lack of clinical to fax [phone]  Xeljanz Counseling: I discussed with the patient the risks of Xeljanz therapy including increased risk of infection, liver issues, headache, diarrhea, or cold symptoms. Live vaccines should be avoided. They were instructed to call if they have any problems.

## 2023-01-06 NOTE — ASSESSMENT & PLAN NOTE
· Difficult to control heart rate  · With fluctuating rates on p o  Medications, bradycardic and hypotensive yesterday with increased dose of metoprolol of 50 mg t i d   · Will decrease dose to 25 mg t i d   For now and defer to Cardiology regarding further rate controlling medications  · Started on Eliquis in terms of anticoagulation Merkel Cell Carcinoma Histology Text: There were aggregates of densely blue cells.

## 2023-05-17 NOTE — ASSESSMENT & PLAN NOTE
Wt Readings from Last 3 Encounters:   02/11/21 51 6 kg (113 lb 12 1 oz)   08/21/20 60 7 kg (133 lb 13 1 oz)   08/17/20 59 kg (130 lb 1 1 oz)     · Chronic diastolic CHF stable on furosemide 40 mg as needed  · Does not appear to be in acute exacerbation  · Unclear why the patient is requiring oxygen supplement at this time; chest x-ray mild pulmonary vascular congestion  · Received 1 dose of IV Lasix 2/9   started the patient on p o  Lasix 20 mg daily     · BNP 1034 DISPLAY PLAN FREE TEXT DISPLAY PLAN FREE TEXT

## 2023-06-29 NOTE — ASSESSMENT & PLAN NOTE
No further symptoms with better rate control  Proceed with iron infusions.  Follow-up with Hematology.  Please be advised constipation condition course.  I recommend increase daily water intake to an acceptable level.  Increase fiber.  Recommend stool softener and laxative if needed.  Goal of 1 bowel movement daily.  Follow-up to clinic or notify me if no improvement or symptoms are persistent or worsening.  ER precautions were given.  Recommend daily exercise as tolerated, adequate water intake (six 8-oz glasses of water daily), and high fiber diet. OTC fiber supplements are recommended if diet does not reach daily fiber goal (20-30 grams daily), such as Metamucil, Citrucel, or FiberCon (take as directed, separate from other oral medications by >2 hours).  - CONTINUE OTC stool softener such as Colace as directed to avoid hard stools and straining with bowel movements PRN  -If still no improvement with these measures, call/follow-up

## 2023-08-17 NOTE — ASSESSMENT & PLAN NOTE
· 67-year-old female with history of AFib on Eliquis, diastolic heart failure, hypertension and ambulatory dysfunction presented with generalized weakness  · Patient brought in by her daughter who is POA  States that patient has been weak and unable to get up and walk  She previously at baseline was able to get up and take several steps with a walker    Now unable to get up with assistance  · Previously taking cared of by her son who is also unable to care for himself  · Physical deconditioning likely contributing as patient did have STR on 09/2020  · Her daughter's unable to care for her and has no other siblings that is able to  · PT OT consulted  · Case management consulted, pending STR vs SNF for placement Suture Removal: 14 days

## 2024-06-25 NOTE — CASE MANAGEMENT
Case Management Discharge Planning Note    Patient name Padmini Botello  Location [facility] 20 MRN [id number]  : 1935 Date 2022       Current Admission Date: 2022  Current Admission Diagnosis:Atrial fibrillation with [facility])   Patient Active Problem List    Diagnosis Date Noted    Chronic respiratory failure with hypoxia ([facility] ) 2022    Other constipation 2022    Atrial fibrillation with RVR ([place] [address] ) 2022    SIRS (systemic inflammatory response syndrome) ([place] [address] ) 2022    Cognitive impairment 2021    Close exposure to COVID-19 virus 2021    Visual hallucinations 2021    Chest wall contusion, right, initial encounter 2020    Ambulatory dysfunction 2020    Hypokalemia 2020    Liver lesion 2020    Rib fractures 2020    CKD (chronic kidney disease), stage II 2020    Pulmonary hypertension ([place] [address] ) 2020    Dizziness 2020    Generalized weakness 2020    Osteoarthritis of knee 2020    Pneumonia 2020    Mixed hyperlipidemia 2019    Hypomagnesemia 2019    Essential hypertension 2019    Acute hypokalemia 2019    Depression with anxiety 2019    Gastroesophageal reflux disease without esophagitis 2019    OAB (overactive bladder) 2019    Acute on chronic diastolic heart failure ([facility] ) 2019    Longstanding persistent atrial fibrillation ([place] [address] ) 2019    CAD (coronary artery disease) 2019    Mitral regurgitation 2019      LOS (days): 3  Geometric Mean LOS (GMLOS) (days): 3 80  Days to GMLOS:0 5     OBJECTIVE:  Risk of Unplanned Readmission Score: 12         Current admission status: Inpatient   Preferred Pharmacy:    [facility]  [facility]  [place] [address] [place] [address]  Phone: [phone] Fax: [phone]    Primary Care Provider: Arva Bloch, MD    Primary Insurance: Provider left message to discuss Holter Monitor Results with patient    [address]  Secondary Insurance:     DISCHARGE DETAILS:                 Facility Insurance Auth Number: Submitted SNF auth request to Robin Parrish via [employer], pending ref# [id number] for [facility]: 4/6/22 for [place] npi# [id number] Dr James Parekh npi# [id number]   Clinicals faxed to [phone]

## 2024-09-17 NOTE — PLAN OF CARE
Problem: OCCUPATIONAL THERAPY ADULT  Goal: Performs self-care activities at highest level of function for planned discharge setting  See evaluation for individualized goals  Description: Treatment Interventions: ADL retraining,Functional transfer training,Endurance training,Equipment evaluation/education,Neuromuscular reeducation,Compensatory technique education,Energy conservation,Activityengagement  See flowsheet documentation for full assessment, interventions and recommendations  Note: Limitation: Decreased ADL status,Decreased endurance,Decreased self-care trans,Decreased high-level ADLs,Decreased Safe judgement during ADL  Prognosis: Good  Assessment: Pt is a 81 y o  female seen for OT evaluation s/p admit to [facility] on 4/2/2022 w/ Acute on chronic diastolic heart failure ([facility] )  Comorbidities affecting pt's functional performance at time of assessment include: CHF, Ambulatory dysfunction, Arthritis, CKD, CAD, HLD, HTN  Personal factors affecting pt at time of IE include:difficulty performing ADLS  Prior to admission, pt was Mod I with ADLs  Upon evaluation: the following deficits impact occupational performance: decreased balance, decreased tolerance and decreased safety awareness  Pt to benefit from continued skilled OT tx while in the hospital to address deficits as defined above and maximize level of functional independence w ADL's and functional mobility  Occupational Performance areas to address include: bathing/shower, toilet hygiene, dressing, functional mobility and clothing management  From OT standpoint, recommendation at time of d/c would be STR       OT Discharge Recommendation: Post acute rehabilitation services  OT - OK to Discharge: Yes (when medically stable)    Ferdinand Polanco MS, OTR/L Home

## 2025-07-14 NOTE — ASSESSMENT & PLAN NOTE
Being treated as per above, will obtain urine for strep pneumo and Legionella as well as sputum culture Gram stain and treat symptomatically with supportive care 
Continue pre-hospital Ditropan 5 mg p o   Daily
Continue pre-hospital Eliquis 2 5 mg p o  B i d , Lopressor 25 mg p o  B i d   And Cardizem 90 mg p o  Q 8 hours
Continue pre-hospital Lopressor 25 mg p o  B i d , Cardizem 90 mg p o  Q 8 hours and Lasix 40 mg p o   Daily
Continue pre-hospital Mag oxide 400 mg p o  3 times weekly
Continue pre-hospital Pravachol 40 mg p o   Daily
Continue pre-hospital Zoloft 50 mg p o  Daily, Atarax 25 mg p o  Q 6 hours and Xanax 0 25 mg p o  Q h s  P r n 
Continue pre-hospital pantoprazole 40 mg p o   Daily
In the setting of sepsis, will trend troponin place on telemetry 
Patient received IV as well as p o  Repletion and ER, will continue K-Dur 20 mEq p o  Daily and continue monitor with repeat labs 
This appears to be at her baseline with today's hemoglobin 11 7 with last available for review of 11 4, will continue monitor with repeat labs in a m 
This is chronic, patient has a history of congestive heart failure so will avoid fluid resuscitation continue monitor with repeat labs in a m 
Wt Readings from Last 3 Encounters:   02/28/20 68 kg (150 lb)   12/19/19 62 1 kg (137 lb)   12/04/19 64 3 kg (141 lb 12 1 oz)     Obtain daily weights, continue pre-hospital Lopressor 25 mg p o  B i d , Cardizem 90 mg p o  Q 8 hours and Lasix 40 mg p o   Daily
Wt Readings from Last 3 Encounters:   02/29/20 57 6 kg (126 lb 15 8 oz)   12/19/19 62 1 kg (137 lb)   12/04/19 64 3 kg (141 lb 12 1 oz)     · Patient appears dry in light of her sepsis  · Will hold her Lasix
Wt Readings from Last 3 Encounters:   03/01/20 57 5 kg (126 lb 10 5 oz)   12/19/19 62 1 kg (137 lb)   12/04/19 64 3 kg (141 lb 12 1 oz)     · Patient appears dry in light of her sepsis  · Will hold her Lasix
Wt Readings from Last 3 Encounters:   03/02/20 59 6 kg (131 lb 6 3 oz)   12/19/19 62 1 kg (137 lb)   12/04/19 64 3 kg (141 lb 12 1 oz)     · Patient appears dry in light of her sepsis  · Will hold her Lasix  · Continue daily weights  · BNP on admission was 1953  · Patient does have past medical history of severe MR, CAD with GARRET to LAD
Wt Readings from Last 3 Encounters:   03/03/20 58 5 kg (128 lb 15 5 oz)   12/19/19 62 1 kg (137 lb)   12/04/19 64 3 kg (141 lb 12 1 oz)     · Patient appears dry in light of her sepsis  · Lasix was held for the past 4 days  · Based on patient not improving from pneumonias status, Lasix 20 mg daily will be started on 03/03/2020, and then be daily    · Chest x-ray PA and lateral will also be ordered  · Continue daily weights  · BNP on admission was 1953  · Patient does have past medical history of severe MR, CAD with GARRET to LAD
Wt Readings from Last 3 Encounters:   03/04/20 57 3 kg (126 lb 5 2 oz)   12/19/19 62 1 kg (137 lb)   12/04/19 64 3 kg (141 lb 12 1 oz)     · Patient appears dry in light of her sepsis  · Lasix was held for the past 4 days  · Based on patient not improving from pneumonias status, Lasix 20 mg daily will be started on 03/03/2020, and then be daily    · Chest x-ray PA and lateral will also be ordered  · Continue daily weights  · BNP on admission was 1953  · Patient does have past medical history of severe MR, CAD with GARRET to LAD  · Daily weights
Wt Readings from Last 3 Encounters:   03/05/20 59 5 kg (131 lb 2 8 oz)   12/19/19 62 1 kg (137 lb)   12/04/19 64 3 kg (141 lb 12 1 oz)     · Patient appears dry in light of her sepsis  · Lasix was held for the past 4 days  · Based on patient not improving from pneumonias status, Lasix 20 mg daily will be started on 03/03/2020, and then be daily    · Chest x-ray PA and lateral will also be ordered as an outpatient for follow-up  · Continue daily weights  · BNP on admission was 1953  · Patient does have past medical history of severe MR, CAD with GARRET to LAD  · Daily weights
Wt Readings from Last 3 Encounters:   03/05/20 59 5 kg (131 lb 2 8 oz)   12/19/19 62 1 kg (137 lb)   12/04/19 64 3 kg (141 lb 12 1 oz)     · Patient appears dry in light of her sepsis  · Lasix was held for the past 4 days  · Based on patient not improving from pneumonias status, Lasix 20 mg daily will be started on 03/03/2020, and then be daily    · Chest x-ray PA and lateral will also be ordered as an outpatient for follow-up  · Continue daily weights  · BNP on admission was 1953  · Patient does have past medical history of severe MR, CAD with GARRET to LAD  · Daily weights
· Admit ICU  · Place on respiratory protocol and give DuoNebs q 6  · Give Tamiflu 30 mg p o  Q 12 hours  · Give Mucinex 600 mg p o  B i d   · Place on droplet precautions
· Admit to ICU  · Trend procalcitonin lactic acid  · Blood cultures urine culture currently pending  · Patient is not receiving sepsis protocol IV fluid resuscitation secondary to history of congestive heart failure  · Give Zithromax 500 mg IV and Rocephin 1 g IV daily  · Patient met sepsis criteria that she was tachycardic with heart rate of 122, febrile with T-max 100 6° and a known source of infection being multi lobar pneumonia 
· Being treated as per above, will obtain urine for strep pneumo and Legionella as well as sputum culture Gram stain and treat symptomatically with supportive care 
· Chronic condition  · Continue pre-hospital Mag oxide 400 mg p o  3 times weekly
· Continue Tamiflu  · Continue droplet precautions
· Continue Tamiflu  · Continue droplet precautions for now  · Patient stable for transfer to med surg
· Continue pre-hospital Ditropan 5 mg p o   Daily
· Continue pre-hospital Eliquis 2 5 mg p o  B i d , Lopressor 25 mg p o  B i d   And Cardizem 90 mg p o  Q 8 hours
· Continue pre-hospital Lopressor 25 mg p o  B i d , Cardizem 90 mg p o  Q 8 hours  · Lasix on hold in light of sepsis
· Continue pre-hospital Lopressor 25 mg p o  B i d , Cardizem 90 mg p o  Q 8 hours  · Lasix on hold in light of sepsis, but will be restarted at half the dose 
· Continue pre-hospital Pravachol 40 mg p o   Daily
· Continue pre-hospital Zoloft 50 mg p o  Daily, Atarax 25 mg p o  Q 6 hours and Xanax 0 25 mg p o  Q h s  P r n 
· Continue pre-hospital pantoprazole 40 mg p o   Daily
· Likely hypovolemic hyponatremia  · Improved
· Likely hypovolemic hyponatremia  · Improved with supplementation  · Continue to monitor
· No rise or fall, essentially negative  · Denies any chest pain
· Patient will receive normal saline bolus of 500 mL x1  · Will have blood work to follow-up as an outpatient  · Hypovolemic hyponatremia 
· Patient will receive normal saline bolus of 500 mL x1  · Will have blood work to follow-up as an outpatient  · Hypovolemic hyponatremia 
· Repleted
· Repleted
· Secondary to influenza A  · Procalcitonin negative  · Discontinue antibiotics  · Patient is not receiving sepsis protocol IV fluid resuscitation secondary to history of congestive heart failure  · Continue Tamiflu
· Secondary to influenza A  · Procalcitonin negative  · Discontinue antibiotics  · Patient is not receiving sepsis protocol IV fluid resuscitation secondary to history of congestive heart failure  · Continue Tamiflu
· Secondary to influenza A and right lower lobe pneumonia with left upper lobe pneumonia  · Procalcitonin negative  · Discontinue IV antibiotics will start patient on oral cefdinir  · Will start patient on prednisone taper  · Patient is not receiving sepsis protocol IV fluid resuscitation secondary to history of congestive heart failure  · Continue Tamiflu  · Continue nebulizer treatments
· Secondary to influenza A and right lower lobe pneumonia with left upper lobe pneumonia  · Procalcitonin negative  · Discontinue IV antibiotics will start patient on oral cefdinir - day 2  · Will start patient on prednisone taper  · Patient is not receiving sepsis protocol IV fluid resuscitation secondary to history of congestive heart failure  · Continue Tamiflu  · Continue nebulizer treatments
· Secondary to influenza A and right lower lobe pneumonia with left upper lobe pneumonia  · Procalcitonin negative  · Discontinue IV antibiotics will start patient on oral cefdinir - day 3  · Will start patient on prednisone taper  · Patient is not receiving sepsis protocol IV fluid resuscitation secondary to history of congestive heart failure  · Continue Tamiflu  · Continue nebulizer treatments
· Secondary to influenza A and right lower lobe pneumonia with left upper lobe pneumonia  · Procalcitonin negative  · Discontinue IV antibiotics will start patient on oral cefdinir - day 3  · Will start patient on prednisone taper  · Patient is not receiving sepsis protocol IV fluid resuscitation secondary to history of congestive heart failure  · Continue Tamiflu  · Continue nebulizer treatments
· Secondary to influenza A and right lower lobe pneumonia with left upper lobe pneumonia  · Procalcitonin negative  · Discontinue antibiotics  · Patient is not receiving sepsis protocol IV fluid resuscitation secondary to history of congestive heart failure  · Continue Tamiflu  · Continue nebulizer treatments
· Secondary to influenza and viral pneumonia  · Titrate oxygen as able
· Secondary to influenza and viral pneumonia  · Titrate oxygen as able  · Respiratory protocol  · Patient may need a desat study
· Secondary to influenza and viral pneumonia  · Titrate oxygen as able  · Respiratory protocol  · Patient may need a desat study
· Secondary to influenza and viral pneumonia  · Titrate oxygen as able  · Respiratory protocol  · Patient may need a desat study - no, patient is at baseline 2 L nasal cannula which she wears at all times 
· Secondary to influenza and viral pneumonia  · Titrate oxygen as able  · Respiratory protocol  · Patient may need a desat study - no, patient is at baseline 2 L nasal cannula which she wears at all times 
· Viral pneumonia  · Procalcitonin negative x2
· Viral pneumonia  · Procalcitonin negative x2  · Nebulizer treatments
· Viral pneumonia  · Procalcitonin negative x2  · Nebulizer treatments  · Start patient on oral cefdinir with prednisone taper  · Patient with minimal to no improvement will order chest x-ray PA and lateral
· Viral pneumonia  · Procalcitonin negative x2  · Nebulizer treatments  · Start patient on oral cefdinir with prednisone taper  · Patient with minimal to no improvement will order chest x-ray PA and lateral - bilateral lower lobe opacities with slightly worsening
· Viral pneumonia  · Procalcitonin negative x2  · Nebulizer treatments  · Start patient on oral cefdinir with prednisone taper  · Patient with minimal to no improvement will order chest x-ray PA and lateral - bilateral lower lobe opacities with slightly worsening  · Will have patient have outpatient chest x-ray in follow-up  · Patient is currently at baseline oxygen of 2 L nasal cannula
· Viral pneumonia  · Procalcitonin negative x2  · Nebulizer treatments  · Start patient on oral cefdinir with prednisone taper  · Patient with minimal to no improvement will order chest x-ray PA and lateral - bilateral lower lobe opacities with slightly worsening  · Will have patient have outpatient chest x-ray in follow-up  · Patient is currently at baseline oxygen of 2 L nasal cannula
Spouse